# Patient Record
Sex: MALE | Race: WHITE | NOT HISPANIC OR LATINO | Employment: OTHER | URBAN - METROPOLITAN AREA
[De-identification: names, ages, dates, MRNs, and addresses within clinical notes are randomized per-mention and may not be internally consistent; named-entity substitution may affect disease eponyms.]

---

## 2017-09-25 ENCOUNTER — APPOINTMENT (OUTPATIENT)
Dept: PREADMISSION TESTING | Facility: HOSPITAL | Age: 72
End: 2017-09-25

## 2017-09-25 ENCOUNTER — HOSPITAL ENCOUNTER (OUTPATIENT)
Dept: GENERAL RADIOLOGY | Facility: HOSPITAL | Age: 72
Discharge: HOME | End: 2017-09-25

## 2017-09-25 ENCOUNTER — HOSPITAL ENCOUNTER (OUTPATIENT)
Dept: GENERAL RADIOLOGY | Facility: HOSPITAL | Age: 72
Discharge: HOME | End: 2017-09-25
Admitting: ORTHOPAEDIC SURGERY

## 2017-09-25 VITALS
TEMPERATURE: 97.3 F | HEART RATE: 54 BPM | DIASTOLIC BLOOD PRESSURE: 86 MMHG | HEIGHT: 69 IN | OXYGEN SATURATION: 98 % | WEIGHT: 278.3 LBS | SYSTOLIC BLOOD PRESSURE: 131 MMHG | BODY MASS INDEX: 41.22 KG/M2 | RESPIRATION RATE: 16 BRPM

## 2017-09-25 LAB
ALBUMIN SERPL-MCNC: 4 G/DL (ref 3.5–5.2)
ALBUMIN/GLOB SERPL: 1.5 G/DL
ALP SERPL-CCNC: 53 U/L (ref 39–117)
ALT SERPL W P-5'-P-CCNC: 24 U/L (ref 1–41)
ANION GAP SERPL CALCULATED.3IONS-SCNC: 9.9 MMOL/L
APTT PPP: 26.1 SECONDS (ref 22.7–35.4)
AST SERPL-CCNC: 32 U/L (ref 1–40)
BASOPHILS # BLD AUTO: 0.01 10*3/MM3 (ref 0–0.2)
BASOPHILS NFR BLD AUTO: 0.1 % (ref 0–1.5)
BILIRUB SERPL-MCNC: 0.6 MG/DL (ref 0.1–1.2)
BILIRUB UR QL STRIP: NEGATIVE
BUN BLD-MCNC: 25 MG/DL (ref 8–23)
BUN/CREAT SERPL: 20.2 (ref 7–25)
CALCIUM SPEC-SCNC: 9.4 MG/DL (ref 8.6–10.5)
CHLORIDE SERPL-SCNC: 97 MMOL/L (ref 98–107)
CLARITY UR: CLEAR
CO2 SERPL-SCNC: 31.1 MMOL/L (ref 22–29)
COLOR UR: YELLOW
CREAT BLD-MCNC: 1.24 MG/DL (ref 0.76–1.27)
DEPRECATED RDW RBC AUTO: 50.3 FL (ref 37–54)
EOSINOPHIL # BLD AUTO: 0.03 10*3/MM3 (ref 0–0.7)
EOSINOPHIL NFR BLD AUTO: 0.4 % (ref 0.3–6.2)
ERYTHROCYTE [DISTWIDTH] IN BLOOD BY AUTOMATED COUNT: 14.4 % (ref 11.5–14.5)
GFR SERPL CREATININE-BSD FRML MDRD: 58 ML/MIN/1.73
GLOBULIN UR ELPH-MCNC: 2.7 GM/DL
GLUCOSE BLD-MCNC: 114 MG/DL (ref 65–99)
GLUCOSE UR STRIP-MCNC: NEGATIVE MG/DL
HCT VFR BLD AUTO: 39.1 % (ref 40.4–52.2)
HGB BLD-MCNC: 12.9 G/DL (ref 13.7–17.6)
HGB UR QL STRIP.AUTO: NEGATIVE
IMM GRANULOCYTES # BLD: 0 10*3/MM3 (ref 0–0.03)
IMM GRANULOCYTES NFR BLD: 0 % (ref 0–0.5)
INR PPP: 1.01 (ref 0.9–1.1)
KETONES UR QL STRIP: NEGATIVE
LEUKOCYTE ESTERASE UR QL STRIP.AUTO: NEGATIVE
LYMPHOCYTES # BLD AUTO: 2.04 10*3/MM3 (ref 0.9–4.8)
LYMPHOCYTES NFR BLD AUTO: 28.1 % (ref 19.6–45.3)
MCH RBC QN AUTO: 31.5 PG (ref 27–32.7)
MCHC RBC AUTO-ENTMCNC: 33 G/DL (ref 32.6–36.4)
MCV RBC AUTO: 95.6 FL (ref 79.8–96.2)
MONOCYTES # BLD AUTO: 0.71 10*3/MM3 (ref 0.2–1.2)
MONOCYTES NFR BLD AUTO: 9.8 % (ref 5–12)
NEUTROPHILS # BLD AUTO: 4.48 10*3/MM3 (ref 1.9–8.1)
NEUTROPHILS NFR BLD AUTO: 61.6 % (ref 42.7–76)
NITRITE UR QL STRIP: NEGATIVE
PH UR STRIP.AUTO: 6 [PH] (ref 5–8)
PLATELET # BLD AUTO: 211 10*3/MM3 (ref 140–500)
PMV BLD AUTO: 10.4 FL (ref 6–12)
POTASSIUM BLD-SCNC: 4.2 MMOL/L (ref 3.5–5.2)
PROT SERPL-MCNC: 6.7 G/DL (ref 6–8.5)
PROT UR QL STRIP: NEGATIVE
PROTHROMBIN TIME: 12.9 SECONDS (ref 11.7–14.2)
RBC # BLD AUTO: 4.09 10*6/MM3 (ref 4.6–6)
SODIUM BLD-SCNC: 138 MMOL/L (ref 136–145)
SP GR UR STRIP: 1.01 (ref 1–1.03)
UROBILINOGEN UR QL STRIP: NORMAL
WBC NRBC COR # BLD: 7.27 10*3/MM3 (ref 4.5–10.7)

## 2017-09-25 PROCEDURE — 63710000001 MUPIROCIN 2 % OINTMENT 0.9 G SYRINGE: Performed by: ORTHOPAEDIC SURGERY

## 2017-09-25 PROCEDURE — 85610 PROTHROMBIN TIME: CPT | Performed by: ORTHOPAEDIC SURGERY

## 2017-09-25 PROCEDURE — 73560 X-RAY EXAM OF KNEE 1 OR 2: CPT

## 2017-09-25 PROCEDURE — 93005 ELECTROCARDIOGRAM TRACING: CPT

## 2017-09-25 PROCEDURE — 85730 THROMBOPLASTIN TIME PARTIAL: CPT | Performed by: ORTHOPAEDIC SURGERY

## 2017-09-25 PROCEDURE — 85025 COMPLETE CBC W/AUTO DIFF WBC: CPT | Performed by: ORTHOPAEDIC SURGERY

## 2017-09-25 PROCEDURE — 80053 COMPREHEN METABOLIC PANEL: CPT | Performed by: ORTHOPAEDIC SURGERY

## 2017-09-25 PROCEDURE — 36415 COLL VENOUS BLD VENIPUNCTURE: CPT

## 2017-09-25 PROCEDURE — A9270 NON-COVERED ITEM OR SERVICE: HCPCS | Performed by: ORTHOPAEDIC SURGERY

## 2017-09-25 PROCEDURE — 81003 URINALYSIS AUTO W/O SCOPE: CPT | Performed by: ORTHOPAEDIC SURGERY

## 2017-09-25 PROCEDURE — 93010 ELECTROCARDIOGRAM REPORT: CPT | Performed by: INTERNAL MEDICINE

## 2017-09-25 PROCEDURE — 71020 HC CHEST PA AND LATERAL: CPT

## 2017-09-25 RX ORDER — FUROSEMIDE 20 MG/1
20 TABLET ORAL 2 TIMES DAILY
Status: ON HOLD | COMMUNITY
End: 2023-03-14 | Stop reason: SDUPTHER

## 2017-09-25 RX ORDER — SENNOSIDES 8.6 MG
650 CAPSULE ORAL EVERY 6 HOURS PRN
COMMUNITY

## 2017-09-25 RX ORDER — CETIRIZINE HYDROCHLORIDE 10 MG/1
10 TABLET ORAL DAILY
Status: ON HOLD | COMMUNITY
End: 2017-12-14

## 2017-09-25 RX ORDER — OMEPRAZOLE 40 MG/1
40 CAPSULE, DELAYED RELEASE ORAL EVERY EVENING
COMMUNITY

## 2017-09-25 RX ORDER — LISINOPRIL 40 MG/1
40 TABLET ORAL DAILY
COMMUNITY

## 2017-09-25 RX ORDER — CELECOXIB 200 MG/1
200 CAPSULE ORAL DAILY PRN
Status: ON HOLD | COMMUNITY
End: 2017-12-14

## 2017-09-25 RX ORDER — BENZONATATE 200 MG/1
200 CAPSULE ORAL 2 TIMES DAILY PRN
Status: ON HOLD | COMMUNITY
End: 2017-12-14

## 2017-09-25 RX ORDER — UBIQUINOL 100 MG
1 CAPSULE ORAL DAILY
COMMUNITY
End: 2018-01-17 | Stop reason: ALTCHOICE

## 2017-09-25 RX ORDER — LEVOTHYROXINE SODIUM 0.03 MG/1
25 TABLET ORAL DAILY
COMMUNITY

## 2017-09-25 RX ORDER — ALBUTEROL SULFATE 90 UG/1
2 AEROSOL, METERED RESPIRATORY (INHALATION) EVERY 4 HOURS PRN
COMMUNITY
End: 2020-04-06

## 2017-09-25 RX ORDER — ATORVASTATIN CALCIUM 20 MG/1
20 TABLET, FILM COATED ORAL DAILY
COMMUNITY

## 2017-09-25 RX ORDER — ASPIRIN 81 MG/1
81 TABLET ORAL DAILY
COMMUNITY

## 2017-09-25 RX ORDER — ATENOLOL 50 MG/1
50 TABLET ORAL DAILY
COMMUNITY
End: 2019-02-18

## 2017-09-25 RX ORDER — DIVALPROEX SODIUM 500 MG/1
500 TABLET, DELAYED RELEASE ORAL 2 TIMES DAILY
COMMUNITY
End: 2019-02-18

## 2017-09-25 ASSESSMENT — KOOS JR
KOOS JR SCORE: 17
KOOS JR SCORE: 44.905

## 2017-09-25 NOTE — DISCHARGE INSTRUCTIONS
Take the following medications the morning of surgery with a small sip of water:  ATENOLOL, DIVALPROEX    PLEASE ARRIVE AT HOSPITAL AT 10:00 AM ON October 9, 2017      General Instructions:  • Do not eat solid food after midnight the night before surgery.  • You may drink clear liquids day of surgery but must stop at least one hour before your hospital arrival time.  • It is beneficial for you to have a clear drink that contains carbohydrates the day of surgery.  We suggest a 20 ounce bottle of Gatorade or Powerade for non-diabetic patients or a 20 ounce bottle of G2 or Powerade Zero for diabetic patients. (Pediatric patients, are not advised to drink a 20 ounce carbohydrate drink)    Clear liquids are liquids you can see through.  Nothing red in color.     Plain water                               Sports drinks  Sodas                                   Gelatin (Jell-O)  Fruit juices without pulp such as white grape juice and apple juice  Popsicles that contain no fruit or yogurt  Tea or coffee (no cream or milk added)  Gatorade / Powerade  G2 / Powerade Zero    • Infants may have breast milk up to four hours before surgery.  • Infants drinking formula may drink formula up to six hours before surgery.   • Patients who avoid smoking, chewing tobacco and alcohol for 4 weeks prior to surgery have a reduced risk of post-operative complications.  Quit smoking as many days before surgery as you can.  • Do not smoke, use chewing tobacco or drink alcohol the day of surgery.   • If applicable bring your C-PAP/ BI-PAP machine.  • Bring any papers given to you in the doctor’s office.  • Wear clean comfortable clothes and socks.  • Do not wear contact lenses or make-up.  Bring a case for your glasses.   • Bring crutches or walker if applicable.  • Leave all other valuables and jewelry at home.  • The Pre-Admission Testing nurse will instruct you to bring medications if unable to obtain an accurate list in Pre-Admission Testing.         If you were given a blood bank ID arm band remember to bring it with you the day of surgery.    Preventing a Surgical Site Infection:  • For 2 to 3 days before surgery, avoid shaving with a razor because the razor can irritate skin and make it easier to develop an infection.  • The night prior to surgery sleep in a clean bed with clean clothing.  Do not allow pets to sleep with you.  • Shower on the morning of surgery using a fresh bar of anti-bacterial soap (such as Dial) and clean washcloth.  Dry with a clean towel and dress in clean clothing.  • Ask your surgeon if you will be receiving antibiotics prior to surgery.  • Make sure you, your family, and all healthcare providers clean their hands with soap and water or an alcohol based hand  before caring for you or your wound.    Day of surgery:  Upon arrival, a Pre-op nurse and Anesthesiologist will review your health history, obtain vital signs, and answer questions you may have.  The only belongings needed at this time will be your home medications and if applicable your C-PAP/BI-PAP machine.  If you are staying overnight your family can leave the rest of your belongings in the car and bring them to your room later.  A Pre-op nurse will start an IV and you may receive medication in preparation for surgery, including something to help you relax.  Your family will be able to see you in the Pre-op area.  While you are in surgery your family should notify the waiting room  if they leave the waiting room area and provide a contact phone number.    Please be aware that surgery does come with discomfort.  We want to make every effort to control your discomfort so please discuss any uncontrolled symptoms with your nurse.   Your doctor will most likely have prescribed pain medications.      If you are going home after surgery you will receive individualized written care instructions before being discharged.  A responsible adult must drive you to  and from the hospital on the day of your surgery and stay with you for 24 hours.    If you are staying overnight following surgery, you will be transported to your hospital room following the recovery period.  [facility] has all private rooms.    If you have any questions please call Pre-Admission Testing at [phone].        2% CHLORAHEXIDINE GLUCONATE* CLOTH  Preparing or “prepping” skin before surgery can reduce the risk of infection at the surgical site. To make the process easier, [facility] has chosen disposable cloths moistened with a rinse-free, 2% Chlorhexidine Gluconate (CHG) antiseptic solution. The steps below outline the prepping process and should be carefully followed.        Use the prep cloth on the area that is circled in the diagram             Directions Night before Surgery  1) Shower using a fresh bar of anti-bacterial soap (such as Dial) and clean washcloth.  Use a clean towel to completely dry your skin.  2) Do not use any lotions, oils or creams on your skin.  3) Open the package and remove 1 cloth, wipe your skin for 30 seconds in a circular motion.  Allow to dry for 3 minutes.  4) Repeat #3 with second cloth.  5) Do not touch your eyes, ears, or mouth with the prep cloth.  6) Allow the wet prep solution to air dry.  7) Discard the prep cloth and wash your hands with soap and water.   8) Dress in clean bed clothes and sleep on fresh clean bed sheets.   9) You may experience some temporary itching after the prep.    Directions Day of Surgery  1) Repeat steps 1,2,3,4,5,6,7, and 9.   2) Dress in clean clothes before coming to the hospital.    BACTROBAN NASAL OINTMENT  There are many germs normally in your nose. Bactroban is an ointment that will help reduce these germs. Please follow these instructions for Bactroban use:    ____Two days before surgery in the evening Date________    ____The day before surgery in the morning  Date________    ____The day before  surgery in the evening              Date________    ____The day of surgery in the morning    Date________    **Squirt ½ package of Bactroban Ointment onto a cotton applicator and apply to inside of 1st nostril.  Squirt the remaining Bactroban and apply to the inside of the other nostril.    Deductibles and co-payments are collected on the day of service. Please be prepared to pay the required co-pay, deductible or deposit on the day of service as defined by your plan.

## 2017-11-30 ENCOUNTER — APPOINTMENT (OUTPATIENT)
Dept: PREADMISSION TESTING | Facility: HOSPITAL | Age: 72
End: 2017-11-30

## 2017-11-30 ENCOUNTER — HOSPITAL ENCOUNTER (OUTPATIENT)
Dept: GENERAL RADIOLOGY | Facility: HOSPITAL | Age: 72
Discharge: HOME | End: 2017-11-30

## 2017-11-30 ENCOUNTER — HOSPITAL ENCOUNTER (OUTPATIENT)
Dept: GENERAL RADIOLOGY | Facility: HOSPITAL | Age: 72
Discharge: HOME | End: 2017-11-30
Admitting: ORTHOPAEDIC SURGERY

## 2017-11-30 VITALS
TEMPERATURE: 97.1 F | WEIGHT: 276.1 LBS | DIASTOLIC BLOOD PRESSURE: 73 MMHG | SYSTOLIC BLOOD PRESSURE: 144 MMHG | BODY MASS INDEX: 40.89 KG/M2 | OXYGEN SATURATION: 100 % | HEIGHT: 69 IN | HEART RATE: 54 BPM | RESPIRATION RATE: 16 BRPM

## 2017-11-30 LAB
ALBUMIN SERPL-MCNC: 4.1 G/DL (ref 3.5–5.2)
ALBUMIN/GLOB SERPL: 1.3 G/DL
ALP SERPL-CCNC: 65 U/L (ref 39–117)
ALT SERPL W P-5'-P-CCNC: 25 U/L (ref 1–41)
ANION GAP SERPL CALCULATED.3IONS-SCNC: 13.1 MMOL/L
APTT PPP: 26.1 SECONDS (ref 22.7–35.4)
AST SERPL-CCNC: 29 U/L (ref 1–40)
BACTERIA UR QL AUTO: ABNORMAL /HPF
BASOPHILS # BLD AUTO: 0.02 10*3/MM3 (ref 0–0.2)
BASOPHILS NFR BLD AUTO: 0.3 % (ref 0–1.5)
BILIRUB SERPL-MCNC: 0.8 MG/DL (ref 0.1–1.2)
BILIRUB UR QL STRIP: NEGATIVE
BUN BLD-MCNC: 23 MG/DL (ref 8–23)
BUN/CREAT SERPL: 22.5 (ref 7–25)
CALCIUM SPEC-SCNC: 9 MG/DL (ref 8.6–10.5)
CHLORIDE SERPL-SCNC: 96 MMOL/L (ref 98–107)
CLARITY UR: CLEAR
CO2 SERPL-SCNC: 30.9 MMOL/L (ref 22–29)
COLOR UR: YELLOW
CREAT BLD-MCNC: 1.02 MG/DL (ref 0.76–1.27)
DEPRECATED RDW RBC AUTO: 50.8 FL (ref 37–54)
EOSINOPHIL # BLD AUTO: 0.02 10*3/MM3 (ref 0–0.7)
EOSINOPHIL NFR BLD AUTO: 0.3 % (ref 0.3–6.2)
ERYTHROCYTE [DISTWIDTH] IN BLOOD BY AUTOMATED COUNT: 14.6 % (ref 11.5–14.5)
GFR SERPL CREATININE-BSD FRML MDRD: 72 ML/MIN/1.73
GLOBULIN UR ELPH-MCNC: 3.1 GM/DL
GLUCOSE BLD-MCNC: 103 MG/DL (ref 65–99)
GLUCOSE UR STRIP-MCNC: NEGATIVE MG/DL
HCT VFR BLD AUTO: 38.1 % (ref 40.4–52.2)
HGB BLD-MCNC: 12.4 G/DL (ref 13.7–17.6)
HGB UR QL STRIP.AUTO: NEGATIVE
HYALINE CASTS UR QL AUTO: ABNORMAL /LPF
IMM GRANULOCYTES # BLD: 0.02 10*3/MM3 (ref 0–0.03)
IMM GRANULOCYTES NFR BLD: 0.3 % (ref 0–0.5)
INR PPP: 1.03 (ref 0.9–1.1)
KETONES UR QL STRIP: NEGATIVE
LEUKOCYTE ESTERASE UR QL STRIP.AUTO: ABNORMAL
LYMPHOCYTES # BLD AUTO: 2.55 10*3/MM3 (ref 0.9–4.8)
LYMPHOCYTES NFR BLD AUTO: 33.2 % (ref 19.6–45.3)
MCH RBC QN AUTO: 31 PG (ref 27–32.7)
MCHC RBC AUTO-ENTMCNC: 32.5 G/DL (ref 32.6–36.4)
MCV RBC AUTO: 95.3 FL (ref 79.8–96.2)
MONOCYTES # BLD AUTO: 0.87 10*3/MM3 (ref 0.2–1.2)
MONOCYTES NFR BLD AUTO: 11.3 % (ref 5–12)
NEUTROPHILS # BLD AUTO: 4.21 10*3/MM3 (ref 1.9–8.1)
NEUTROPHILS NFR BLD AUTO: 54.6 % (ref 42.7–76)
NITRITE UR QL STRIP: NEGATIVE
PH UR STRIP.AUTO: 6 [PH] (ref 5–8)
PLATELET # BLD AUTO: 198 10*3/MM3 (ref 140–500)
PMV BLD AUTO: 10.2 FL (ref 6–12)
POTASSIUM BLD-SCNC: 4 MMOL/L (ref 3.5–5.2)
PROT SERPL-MCNC: 7.2 G/DL (ref 6–8.5)
PROT UR QL STRIP: NEGATIVE
PROTHROMBIN TIME: 13.1 SECONDS (ref 11.7–14.2)
RBC # BLD AUTO: 4 10*6/MM3 (ref 4.6–6)
RBC # UR: ABNORMAL /HPF
REF LAB TEST METHOD: ABNORMAL
SODIUM BLD-SCNC: 140 MMOL/L (ref 136–145)
SP GR UR STRIP: 1.02 (ref 1–1.03)
SQUAMOUS #/AREA URNS HPF: ABNORMAL /HPF
UROBILINOGEN UR QL STRIP: ABNORMAL
WBC NRBC COR # BLD: 7.69 10*3/MM3 (ref 4.5–10.7)
WBC UR QL AUTO: ABNORMAL /HPF

## 2017-11-30 PROCEDURE — 85610 PROTHROMBIN TIME: CPT | Performed by: ORTHOPAEDIC SURGERY

## 2017-11-30 PROCEDURE — 81001 URINALYSIS AUTO W/SCOPE: CPT | Performed by: ORTHOPAEDIC SURGERY

## 2017-11-30 PROCEDURE — 73560 X-RAY EXAM OF KNEE 1 OR 2: CPT

## 2017-11-30 PROCEDURE — 85025 COMPLETE CBC W/AUTO DIFF WBC: CPT | Performed by: ORTHOPAEDIC SURGERY

## 2017-11-30 PROCEDURE — 80053 COMPREHEN METABOLIC PANEL: CPT | Performed by: ORTHOPAEDIC SURGERY

## 2017-11-30 PROCEDURE — 36415 COLL VENOUS BLD VENIPUNCTURE: CPT

## 2017-11-30 PROCEDURE — 71020 HC CHEST PA AND LATERAL: CPT

## 2017-11-30 PROCEDURE — 85730 THROMBOPLASTIN TIME PARTIAL: CPT | Performed by: ORTHOPAEDIC SURGERY

## 2017-11-30 PROCEDURE — 87086 URINE CULTURE/COLONY COUNT: CPT | Performed by: ORTHOPAEDIC SURGERY

## 2017-11-30 ASSESSMENT — KOOS JR
KOOS JR SCORE: 12
KOOS JR SCORE: 57.14

## 2017-12-02 LAB
BACTERIA SPEC AEROBE CULT: NORMAL
BACTERIA SPEC AEROBE CULT: NORMAL

## 2017-12-14 ENCOUNTER — ANESTHESIA (OUTPATIENT)
Dept: PERIOP | Facility: HOSPITAL | Age: 72
End: 2017-12-14

## 2017-12-14 ENCOUNTER — HOSPITAL ENCOUNTER (INPATIENT)
Facility: HOSPITAL | Age: 72
LOS: 4 days | Discharge: NURSING, REHAB OR LONG-TERM CARE | End: 2017-12-18
Attending: ORTHOPAEDIC SURGERY | Admitting: ORTHOPAEDIC SURGERY

## 2017-12-14 ENCOUNTER — ANESTHESIA EVENT (OUTPATIENT)
Dept: PERIOP | Facility: HOSPITAL | Age: 72
End: 2017-12-14

## 2017-12-14 ENCOUNTER — APPOINTMENT (OUTPATIENT)
Dept: GENERAL RADIOLOGY | Facility: HOSPITAL | Age: 72
End: 2017-12-14

## 2017-12-14 DIAGNOSIS — R26.2 DIFFICULTY WALKING: Primary | ICD-10-CM

## 2017-12-14 PROBLEM — M17.9 DJD (DEGENERATIVE JOINT DISEASE) OF KNEE: Status: ACTIVE | Noted: 2017-12-14

## 2017-12-14 PROCEDURE — 25010000002 KETOROLAC TROMETHAMINE PER 15 MG: Performed by: ORTHOPAEDIC SURGERY

## 2017-12-14 PROCEDURE — 25010000002 PHENYLEPHRINE PER 1 ML: Performed by: NURSE ANESTHETIST, CERTIFIED REGISTERED

## 2017-12-14 PROCEDURE — 94799 UNLISTED PULMONARY SVC/PX: CPT

## 2017-12-14 PROCEDURE — 25010000002 SUCCINYLCHOLINE PER 20 MG: Performed by: NURSE ANESTHETIST, CERTIFIED REGISTERED

## 2017-12-14 PROCEDURE — 25010000002 MIDAZOLAM PER 1 MG: Performed by: ANESTHESIOLOGY

## 2017-12-14 PROCEDURE — 25010000002 CEFAZOLIN PER 500 MG: Performed by: NURSE ANESTHETIST, CERTIFIED REGISTERED

## 2017-12-14 PROCEDURE — C1713 ANCHOR/SCREW BN/BN,TIS/BN: HCPCS | Performed by: ORTHOPAEDIC SURGERY

## 2017-12-14 PROCEDURE — 25010000003 CEFAZOLIN IN DEXTROSE 2-4 GM/100ML-% SOLUTION: Performed by: ORTHOPAEDIC SURGERY

## 2017-12-14 PROCEDURE — 25010000002 HYDRALAZINE PER 20 MG: Performed by: NURSE ANESTHETIST, CERTIFIED REGISTERED

## 2017-12-14 PROCEDURE — 0SRD069 REPLACEMENT OF LEFT KNEE JOINT WITH OXIDIZED ZIRCONIUM ON POLYETHYLENE SYNTHETIC SUBSTITUTE, CEMENTED, OPEN APPROACH: ICD-10-PCS | Performed by: ORTHOPAEDIC SURGERY

## 2017-12-14 PROCEDURE — 25010000002 FENTANYL CITRATE (PF) 100 MCG/2ML SOLUTION: Performed by: NURSE ANESTHETIST, CERTIFIED REGISTERED

## 2017-12-14 PROCEDURE — 25010000002 DEXAMETHASONE PER 1 MG: Performed by: NURSE ANESTHETIST, CERTIFIED REGISTERED

## 2017-12-14 PROCEDURE — 25010000002 ONDANSETRON PER 1 MG: Performed by: NURSE ANESTHETIST, CERTIFIED REGISTERED

## 2017-12-14 PROCEDURE — 25010000002 VANCOMYCIN 10 G RECONSTITUTED SOLUTION: Performed by: ORTHOPAEDIC SURGERY

## 2017-12-14 PROCEDURE — 97161 PT EVAL LOW COMPLEX 20 MIN: CPT

## 2017-12-14 PROCEDURE — 25010000002 PROPOFOL 10 MG/ML EMULSION: Performed by: NURSE ANESTHETIST, CERTIFIED REGISTERED

## 2017-12-14 PROCEDURE — 97110 THERAPEUTIC EXERCISES: CPT

## 2017-12-14 PROCEDURE — C1776 JOINT DEVICE (IMPLANTABLE): HCPCS | Performed by: ORTHOPAEDIC SURGERY

## 2017-12-14 PROCEDURE — 73560 X-RAY EXAM OF KNEE 1 OR 2: CPT

## 2017-12-14 DEVICE — IMPLANTABLE DEVICE: Type: IMPLANTABLE DEVICE | Status: FUNCTIONAL

## 2017-12-14 DEVICE — JOURNEY II CR INSERT XLPE LEFT SZ                                    7-8 10MM
Type: IMPLANTABLE DEVICE | Site: KNEE | Status: FUNCTIONAL
Brand: JOURNEY

## 2017-12-14 DEVICE — JOURNEY TIBIAL BASEPLATE NONPOROUS                                    LEFT SIZE 7
Type: IMPLANTABLE DEVICE | Site: KNEE | Status: FUNCTIONAL
Brand: JOURNEY

## 2017-12-14 DEVICE — CMT BONE PALACOS 120001: Type: IMPLANTABLE DEVICE | Site: KNEE | Status: FUNCTIONAL

## 2017-12-14 DEVICE — JOURNEY 7.5 ROUND RESURF PAT 35MM STANDARD
Type: IMPLANTABLE DEVICE | Site: KNEE | Status: FUNCTIONAL
Brand: JOURNEY

## 2017-12-14 RX ORDER — FENTANYL CITRATE 50 UG/ML
50 INJECTION, SOLUTION INTRAMUSCULAR; INTRAVENOUS
Status: DISCONTINUED | OUTPATIENT
Start: 2017-12-14 | End: 2017-12-14 | Stop reason: HOSPADM

## 2017-12-14 RX ORDER — SODIUM CHLORIDE, SODIUM LACTATE, POTASSIUM CHLORIDE, CALCIUM CHLORIDE 600; 310; 30; 20 MG/100ML; MG/100ML; MG/100ML; MG/100ML
100 INJECTION, SOLUTION INTRAVENOUS CONTINUOUS
Status: DISCONTINUED | OUTPATIENT
Start: 2017-12-14 | End: 2017-12-18 | Stop reason: HOSPADM

## 2017-12-14 RX ORDER — ACETAMINOPHEN 500 MG
1000 TABLET ORAL ONCE
Status: DISCONTINUED | OUTPATIENT
Start: 2017-12-14 | End: 2017-12-14

## 2017-12-14 RX ORDER — ONDANSETRON 4 MG/1
4 TABLET, FILM COATED ORAL EVERY 6 HOURS PRN
Status: DISCONTINUED | OUTPATIENT
Start: 2017-12-14 | End: 2017-12-18 | Stop reason: HOSPADM

## 2017-12-14 RX ORDER — OXYCODONE AND ACETAMINOPHEN 7.5; 325 MG/1; MG/1
1 TABLET ORAL ONCE AS NEEDED
Status: DISCONTINUED | OUTPATIENT
Start: 2017-12-14 | End: 2017-12-14 | Stop reason: HOSPADM

## 2017-12-14 RX ORDER — FAMOTIDINE 10 MG/ML
20 INJECTION, SOLUTION INTRAVENOUS ONCE
Status: COMPLETED | OUTPATIENT
Start: 2017-12-14 | End: 2017-12-14

## 2017-12-14 RX ORDER — DIVALPROEX SODIUM 500 MG/1
500 TABLET, DELAYED RELEASE ORAL EVERY 12 HOURS SCHEDULED
Status: DISCONTINUED | OUTPATIENT
Start: 2017-12-14 | End: 2017-12-18 | Stop reason: HOSPADM

## 2017-12-14 RX ORDER — FUROSEMIDE 20 MG/1
20 TABLET ORAL 2 TIMES DAILY
Status: DISCONTINUED | OUTPATIENT
Start: 2017-12-14 | End: 2017-12-18 | Stop reason: HOSPADM

## 2017-12-14 RX ORDER — PROPOFOL 10 MG/ML
VIAL (ML) INTRAVENOUS AS NEEDED
Status: DISCONTINUED | OUTPATIENT
Start: 2017-12-14 | End: 2017-12-14 | Stop reason: SURG

## 2017-12-14 RX ORDER — ONDANSETRON 4 MG/1
4 TABLET, ORALLY DISINTEGRATING ORAL EVERY 6 HOURS PRN
Status: DISCONTINUED | OUTPATIENT
Start: 2017-12-14 | End: 2017-12-18 | Stop reason: HOSPADM

## 2017-12-14 RX ORDER — LABETALOL HYDROCHLORIDE 5 MG/ML
5 INJECTION, SOLUTION INTRAVENOUS
Status: DISCONTINUED | OUTPATIENT
Start: 2017-12-14 | End: 2017-12-14 | Stop reason: HOSPADM

## 2017-12-14 RX ORDER — ALBUTEROL SULFATE 90 UG/1
2 AEROSOL, METERED RESPIRATORY (INHALATION) EVERY 4 HOURS PRN
Status: DISCONTINUED | OUTPATIENT
Start: 2017-12-14 | End: 2017-12-14 | Stop reason: CLARIF

## 2017-12-14 RX ORDER — ACETAMINOPHEN 325 MG/1
325 TABLET ORAL EVERY 4 HOURS PRN
Status: DISCONTINUED | OUTPATIENT
Start: 2017-12-14 | End: 2017-12-18 | Stop reason: HOSPADM

## 2017-12-14 RX ORDER — ASPIRIN 325 MG
325 TABLET, DELAYED RELEASE (ENTERIC COATED) ORAL DAILY
Status: DISCONTINUED | OUTPATIENT
Start: 2017-12-15 | End: 2017-12-17

## 2017-12-14 RX ORDER — CEFAZOLIN SODIUM 2 G/100ML
2 INJECTION, SOLUTION INTRAVENOUS EVERY 8 HOURS
Status: COMPLETED | OUTPATIENT
Start: 2017-12-14 | End: 2017-12-15

## 2017-12-14 RX ORDER — ACETAMINOPHEN 500 MG
1000 TABLET ORAL ONCE
Status: COMPLETED | OUTPATIENT
Start: 2017-12-14 | End: 2017-12-14

## 2017-12-14 RX ORDER — DEXAMETHASONE SODIUM PHOSPHATE 10 MG/ML
INJECTION INTRAMUSCULAR; INTRAVENOUS AS NEEDED
Status: DISCONTINUED | OUTPATIENT
Start: 2017-12-14 | End: 2017-12-14 | Stop reason: SURG

## 2017-12-14 RX ORDER — MIDAZOLAM HYDROCHLORIDE 1 MG/ML
1 INJECTION INTRAMUSCULAR; INTRAVENOUS
Status: DISCONTINUED | OUTPATIENT
Start: 2017-12-14 | End: 2017-12-14 | Stop reason: HOSPADM

## 2017-12-14 RX ORDER — FENTANYL CITRATE 50 UG/ML
INJECTION, SOLUTION INTRAMUSCULAR; INTRAVENOUS AS NEEDED
Status: DISCONTINUED | OUTPATIENT
Start: 2017-12-14 | End: 2017-12-14 | Stop reason: SURG

## 2017-12-14 RX ORDER — FERROUS SULFATE 325(65) MG
325 TABLET ORAL
Status: DISCONTINUED | OUTPATIENT
Start: 2017-12-15 | End: 2017-12-18 | Stop reason: HOSPADM

## 2017-12-14 RX ORDER — HYDROMORPHONE HYDROCHLORIDE 1 MG/ML
0.5 INJECTION, SOLUTION INTRAMUSCULAR; INTRAVENOUS; SUBCUTANEOUS
Status: DISCONTINUED | OUTPATIENT
Start: 2017-12-14 | End: 2017-12-14 | Stop reason: HOSPADM

## 2017-12-14 RX ORDER — BISACODYL 5 MG/1
10 TABLET, DELAYED RELEASE ORAL DAILY PRN
Status: DISCONTINUED | OUTPATIENT
Start: 2017-12-14 | End: 2017-12-18 | Stop reason: HOSPADM

## 2017-12-14 RX ORDER — POTASSIUM CHLORIDE 750 MG/1
20 CAPSULE, EXTENDED RELEASE ORAL 2 TIMES DAILY WITH MEALS
Status: DISCONTINUED | OUTPATIENT
Start: 2017-12-14 | End: 2017-12-18 | Stop reason: HOSPADM

## 2017-12-14 RX ORDER — ALBUTEROL SULFATE 2.5 MG/3ML
2.5 SOLUTION RESPIRATORY (INHALATION) EVERY 4 HOURS PRN
Status: DISCONTINUED | OUTPATIENT
Start: 2017-12-14 | End: 2017-12-18 | Stop reason: HOSPADM

## 2017-12-14 RX ORDER — PANTOPRAZOLE SODIUM 40 MG/1
40 TABLET, DELAYED RELEASE ORAL
Status: DISCONTINUED | OUTPATIENT
Start: 2017-12-15 | End: 2017-12-18 | Stop reason: HOSPADM

## 2017-12-14 RX ORDER — HYDRALAZINE HYDROCHLORIDE 20 MG/ML
5 INJECTION INTRAMUSCULAR; INTRAVENOUS
Status: DISCONTINUED | OUTPATIENT
Start: 2017-12-14 | End: 2017-12-14 | Stop reason: HOSPADM

## 2017-12-14 RX ORDER — HYDROCODONE BITARTRATE AND ACETAMINOPHEN 7.5; 325 MG/1; MG/1
1 TABLET ORAL ONCE AS NEEDED
Status: DISCONTINUED | OUTPATIENT
Start: 2017-12-14 | End: 2017-12-14 | Stop reason: HOSPADM

## 2017-12-14 RX ORDER — LEVOTHYROXINE SODIUM 0.03 MG/1
25 TABLET ORAL
Status: DISCONTINUED | OUTPATIENT
Start: 2017-12-15 | End: 2017-12-18 | Stop reason: HOSPADM

## 2017-12-14 RX ORDER — HYDROMORPHONE HYDROCHLORIDE 1 MG/ML
0.5 INJECTION, SOLUTION INTRAMUSCULAR; INTRAVENOUS; SUBCUTANEOUS EVERY 4 HOURS PRN
Status: DISCONTINUED | OUTPATIENT
Start: 2017-12-14 | End: 2017-12-18 | Stop reason: HOSPADM

## 2017-12-14 RX ORDER — LISINOPRIL 40 MG/1
40 TABLET ORAL DAILY
Status: DISCONTINUED | OUTPATIENT
Start: 2017-12-14 | End: 2017-12-18 | Stop reason: HOSPADM

## 2017-12-14 RX ORDER — KETOROLAC TROMETHAMINE 30 MG/ML
15 INJECTION, SOLUTION INTRAMUSCULAR; INTRAVENOUS EVERY 6 HOURS
Status: COMPLETED | OUTPATIENT
Start: 2017-12-14 | End: 2017-12-15

## 2017-12-14 RX ORDER — DIPHENHYDRAMINE HCL 25 MG
25 CAPSULE ORAL EVERY 6 HOURS PRN
Status: DISCONTINUED | OUTPATIENT
Start: 2017-12-14 | End: 2017-12-18 | Stop reason: HOSPADM

## 2017-12-14 RX ORDER — NALOXONE HCL 0.4 MG/ML
0.2 VIAL (ML) INJECTION AS NEEDED
Status: DISCONTINUED | OUTPATIENT
Start: 2017-12-14 | End: 2017-12-14 | Stop reason: HOSPADM

## 2017-12-14 RX ORDER — ROCURONIUM BROMIDE 10 MG/ML
INJECTION, SOLUTION INTRAVENOUS AS NEEDED
Status: DISCONTINUED | OUTPATIENT
Start: 2017-12-14 | End: 2017-12-14 | Stop reason: SURG

## 2017-12-14 RX ORDER — NALOXONE HCL 0.4 MG/ML
0.1 VIAL (ML) INJECTION
Status: DISCONTINUED | OUTPATIENT
Start: 2017-12-14 | End: 2017-12-18 | Stop reason: HOSPADM

## 2017-12-14 RX ORDER — OXYCODONE HYDROCHLORIDE AND ACETAMINOPHEN 5; 325 MG/1; MG/1
2 TABLET ORAL EVERY 4 HOURS PRN
Status: DISCONTINUED | OUTPATIENT
Start: 2017-12-14 | End: 2017-12-18 | Stop reason: HOSPADM

## 2017-12-14 RX ORDER — OXYCODONE HYDROCHLORIDE AND ACETAMINOPHEN 5; 325 MG/1; MG/1
1 TABLET ORAL EVERY 4 HOURS PRN
Status: DISCONTINUED | OUTPATIENT
Start: 2017-12-14 | End: 2017-12-18 | Stop reason: HOSPADM

## 2017-12-14 RX ORDER — SODIUM CHLORIDE 9 MG/ML
INJECTION, SOLUTION INTRAVENOUS AS NEEDED
Status: DISCONTINUED | OUTPATIENT
Start: 2017-12-14 | End: 2017-12-14 | Stop reason: HOSPADM

## 2017-12-14 RX ORDER — LIDOCAINE HYDROCHLORIDE 20 MG/ML
INJECTION, SOLUTION INFILTRATION; PERINEURAL AS NEEDED
Status: DISCONTINUED | OUTPATIENT
Start: 2017-12-14 | End: 2017-12-14 | Stop reason: SURG

## 2017-12-14 RX ORDER — SODIUM CHLORIDE 0.9 % (FLUSH) 0.9 %
1-10 SYRINGE (ML) INJECTION AS NEEDED
Status: DISCONTINUED | OUTPATIENT
Start: 2017-12-14 | End: 2017-12-14 | Stop reason: HOSPADM

## 2017-12-14 RX ORDER — CEFAZOLIN SODIUM 500 MG/2.2ML
INJECTION, POWDER, FOR SOLUTION INTRAMUSCULAR; INTRAVENOUS AS NEEDED
Status: DISCONTINUED | OUTPATIENT
Start: 2017-12-14 | End: 2017-12-14 | Stop reason: SURG

## 2017-12-14 RX ORDER — SENNA AND DOCUSATE SODIUM 50; 8.6 MG/1; MG/1
2 TABLET, FILM COATED ORAL 2 TIMES DAILY
Status: DISCONTINUED | OUTPATIENT
Start: 2017-12-14 | End: 2017-12-18 | Stop reason: HOSPADM

## 2017-12-14 RX ORDER — POTASSIUM CHLORIDE 1.5 G/1.77G
20 POWDER, FOR SOLUTION ORAL 2 TIMES DAILY
Status: DISCONTINUED | OUTPATIENT
Start: 2017-12-14 | End: 2017-12-14 | Stop reason: CLARIF

## 2017-12-14 RX ORDER — SODIUM CHLORIDE 0.9 % (FLUSH) 0.9 %
1-10 SYRINGE (ML) INJECTION AS NEEDED
Status: DISCONTINUED | OUTPATIENT
Start: 2017-12-14 | End: 2017-12-18 | Stop reason: HOSPADM

## 2017-12-14 RX ORDER — FLUMAZENIL 0.1 MG/ML
0.2 INJECTION INTRAVENOUS AS NEEDED
Status: DISCONTINUED | OUTPATIENT
Start: 2017-12-14 | End: 2017-12-14 | Stop reason: HOSPADM

## 2017-12-14 RX ORDER — ATENOLOL 25 MG/1
50 TABLET ORAL DAILY
Status: DISCONTINUED | OUTPATIENT
Start: 2017-12-15 | End: 2017-12-18 | Stop reason: HOSPADM

## 2017-12-14 RX ORDER — EPHEDRINE SULFATE 50 MG/ML
5 INJECTION, SOLUTION INTRAVENOUS ONCE AS NEEDED
Status: DISCONTINUED | OUTPATIENT
Start: 2017-12-14 | End: 2017-12-14 | Stop reason: HOSPADM

## 2017-12-14 RX ORDER — ONDANSETRON 2 MG/ML
INJECTION INTRAMUSCULAR; INTRAVENOUS AS NEEDED
Status: DISCONTINUED | OUTPATIENT
Start: 2017-12-14 | End: 2017-12-14 | Stop reason: SURG

## 2017-12-14 RX ORDER — PROMETHAZINE HYDROCHLORIDE 25 MG/1
12.5 TABLET ORAL ONCE AS NEEDED
Status: DISCONTINUED | OUTPATIENT
Start: 2017-12-14 | End: 2017-12-14 | Stop reason: HOSPADM

## 2017-12-14 RX ORDER — DIPHENHYDRAMINE HYDROCHLORIDE 50 MG/ML
12.5 INJECTION INTRAMUSCULAR; INTRAVENOUS
Status: DISCONTINUED | OUTPATIENT
Start: 2017-12-14 | End: 2017-12-14 | Stop reason: HOSPADM

## 2017-12-14 RX ORDER — ONDANSETRON 2 MG/ML
4 INJECTION INTRAMUSCULAR; INTRAVENOUS EVERY 6 HOURS PRN
Status: DISCONTINUED | OUTPATIENT
Start: 2017-12-14 | End: 2017-12-18 | Stop reason: HOSPADM

## 2017-12-14 RX ORDER — ONDANSETRON 2 MG/ML
4 INJECTION INTRAMUSCULAR; INTRAVENOUS ONCE AS NEEDED
Status: DISCONTINUED | OUTPATIENT
Start: 2017-12-14 | End: 2017-12-14 | Stop reason: HOSPADM

## 2017-12-14 RX ORDER — SODIUM CHLORIDE, SODIUM LACTATE, POTASSIUM CHLORIDE, CALCIUM CHLORIDE 600; 310; 30; 20 MG/100ML; MG/100ML; MG/100ML; MG/100ML
9 INJECTION, SOLUTION INTRAVENOUS CONTINUOUS
Status: DISCONTINUED | OUTPATIENT
Start: 2017-12-14 | End: 2017-12-18 | Stop reason: HOSPADM

## 2017-12-14 RX ORDER — BISACODYL 10 MG
10 SUPPOSITORY, RECTAL RECTAL DAILY PRN
Status: DISCONTINUED | OUTPATIENT
Start: 2017-12-14 | End: 2017-12-18 | Stop reason: HOSPADM

## 2017-12-14 RX ORDER — SUCCINYLCHOLINE CHLORIDE 20 MG/ML
INJECTION INTRAMUSCULAR; INTRAVENOUS AS NEEDED
Status: DISCONTINUED | OUTPATIENT
Start: 2017-12-14 | End: 2017-12-14 | Stop reason: SURG

## 2017-12-14 RX ORDER — MAGNESIUM HYDROXIDE 1200 MG/15ML
LIQUID ORAL AS NEEDED
Status: DISCONTINUED | OUTPATIENT
Start: 2017-12-14 | End: 2017-12-14 | Stop reason: HOSPADM

## 2017-12-14 RX ORDER — MIDAZOLAM HYDROCHLORIDE 1 MG/ML
2 INJECTION INTRAMUSCULAR; INTRAVENOUS
Status: DISCONTINUED | OUTPATIENT
Start: 2017-12-14 | End: 2017-12-14 | Stop reason: HOSPADM

## 2017-12-14 RX ORDER — ACETAMINOPHEN 325 MG/1
650 TABLET ORAL EVERY 4 HOURS PRN
Status: DISCONTINUED | OUTPATIENT
Start: 2017-12-14 | End: 2017-12-18 | Stop reason: HOSPADM

## 2017-12-14 RX ADMIN — SUGAMMADEX 200 MG: 100 INJECTION, SOLUTION INTRAVENOUS at 11:08

## 2017-12-14 RX ADMIN — DIVALPROEX SODIUM 500 MG: 500 TABLET, DELAYED RELEASE ORAL at 21:17

## 2017-12-14 RX ADMIN — SODIUM CHLORIDE, POTASSIUM CHLORIDE, SODIUM LACTATE AND CALCIUM CHLORIDE: 600; 310; 30; 20 INJECTION, SOLUTION INTRAVENOUS at 10:54

## 2017-12-14 RX ADMIN — KETOROLAC TROMETHAMINE 15 MG: 30 INJECTION, SOLUTION INTRAMUSCULAR at 12:06

## 2017-12-14 RX ADMIN — SODIUM CHLORIDE, POTASSIUM CHLORIDE, SODIUM LACTATE AND CALCIUM CHLORIDE 100 ML/HR: 600; 310; 30; 20 INJECTION, SOLUTION INTRAVENOUS at 18:38

## 2017-12-14 RX ADMIN — DEXAMETHASONE SODIUM PHOSPHATE 8 MG: 10 INJECTION INTRAMUSCULAR; INTRAVENOUS at 09:51

## 2017-12-14 RX ADMIN — SUCCINYLCHOLINE CHLORIDE 140 MG: 20 INJECTION, SOLUTION INTRAMUSCULAR; INTRAVENOUS; PARENTERAL at 09:42

## 2017-12-14 RX ADMIN — LIDOCAINE HYDROCHLORIDE 100 MG: 20 INJECTION, SOLUTION INFILTRATION; PERINEURAL at 09:45

## 2017-12-14 RX ADMIN — HYDRALAZINE HYDROCHLORIDE 5 MG: 20 INJECTION INTRAMUSCULAR; INTRAVENOUS at 12:17

## 2017-12-14 RX ADMIN — PHENYLEPHRINE HYDROCHLORIDE 100 MCG: 10 INJECTION INTRAVENOUS at 10:00

## 2017-12-14 RX ADMIN — ONDANSETRON 4 MG: 2 INJECTION INTRAMUSCULAR; INTRAVENOUS at 10:45

## 2017-12-14 RX ADMIN — DOCUSATE SODIUM -SENNOSIDES 2 TABLET: 50; 8.6 TABLET, COATED ORAL at 18:33

## 2017-12-14 RX ADMIN — ROCURONIUM BROMIDE 40 MG: 10 INJECTION INTRAVENOUS at 09:51

## 2017-12-14 RX ADMIN — FUROSEMIDE 20 MG: 20 TABLET ORAL at 18:33

## 2017-12-14 RX ADMIN — VANCOMYCIN HYDROCHLORIDE 2000 MG: 100 INJECTION, POWDER, LYOPHILIZED, FOR SOLUTION INTRAVENOUS at 07:45

## 2017-12-14 RX ADMIN — HYDRALAZINE HYDROCHLORIDE 5 MG: 20 INJECTION INTRAMUSCULAR; INTRAVENOUS at 12:04

## 2017-12-14 RX ADMIN — Medication 2 MG: at 09:28

## 2017-12-14 RX ADMIN — FENTANYL CITRATE 50 MCG: 50 INJECTION INTRAMUSCULAR; INTRAVENOUS at 11:00

## 2017-12-14 RX ADMIN — SUGAMMADEX 200 MG: 100 INJECTION, SOLUTION INTRAVENOUS at 11:05

## 2017-12-14 RX ADMIN — POTASSIUM CHLORIDE 20 MEQ: 750 CAPSULE, EXTENDED RELEASE ORAL at 18:33

## 2017-12-14 RX ADMIN — ACETAMINOPHEN 1000 MG: 500 TABLET ORAL at 07:45

## 2017-12-14 RX ADMIN — CEFAZOLIN SODIUM 2 G: 500 INJECTION, POWDER, FOR SOLUTION INTRAMUSCULAR; INTRAVENOUS at 10:54

## 2017-12-14 RX ADMIN — PHENYLEPHRINE HYDROCHLORIDE 200 MCG: 10 INJECTION INTRAVENOUS at 10:20

## 2017-12-14 RX ADMIN — ROCURONIUM BROMIDE 10 MG: 10 INJECTION INTRAVENOUS at 09:42

## 2017-12-14 RX ADMIN — CEFAZOLIN SODIUM 2 G: 2 INJECTION, SOLUTION INTRAVENOUS at 18:33

## 2017-12-14 RX ADMIN — SODIUM CHLORIDE, POTASSIUM CHLORIDE, SODIUM LACTATE AND CALCIUM CHLORIDE: 600; 310; 30; 20 INJECTION, SOLUTION INTRAVENOUS at 09:35

## 2017-12-14 RX ADMIN — FAMOTIDINE 20 MG: 10 INJECTION, SOLUTION INTRAVENOUS at 09:28

## 2017-12-14 RX ADMIN — LIDOCAINE HYDROCHLORIDE 60 MG: 20 INJECTION, SOLUTION INFILTRATION; PERINEURAL at 09:42

## 2017-12-14 RX ADMIN — PROPOFOL 250 MG: 10 INJECTION, EMULSION INTRAVENOUS at 09:42

## 2017-12-14 RX ADMIN — KETOROLAC TROMETHAMINE 15 MG: 30 INJECTION, SOLUTION INTRAMUSCULAR at 18:33

## 2017-12-14 RX ADMIN — SODIUM CHLORIDE, POTASSIUM CHLORIDE, SODIUM LACTATE AND CALCIUM CHLORIDE 9 ML/HR: 600; 310; 30; 20 INJECTION, SOLUTION INTRAVENOUS at 09:28
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
## 2017-12-14 NOTE — ANESTHESIA POSTPROCEDURE EVALUATION
Patient: Hernando Lozada    Procedure Summary     Date Anesthesia Start Anesthesia Stop Room / Location    12/14/17 0934 1122  [facility]       Procedure Diagnosis Surgeon Provider    LT TOTAL KNEE ARTHROPLASTY (Left Knee) No diagnosis on file. MD Albert Salazar MD          Anesthesia Type: general  Last vitals  BP   170/95 (12/14/17 1218)   Temp   37.2 °C (98.9 °F) (12/14/17 1218)   Pulse   78 (12/14/17 1218)   Resp   18 (12/14/17 1202)     SpO2   100 % (12/14/17 1218)     Post Anesthesia Care and Evaluation    Patient location during evaluation: PACU  Patient participation: complete - patient participated  Level of consciousness: awake and alert  Pain management: adequate  Airway patency: patent  Anesthetic complications: No anesthetic complications    Cardiovascular status: acceptable  Respiratory status: acceptable  Hydration status: acceptable    Comments: ---------------------------               12/14/17 1218         ---------------------------   BP:          170/95         Pulse:         78           Resp:                       Temp:   37.2 °C (98.9 °F)   SpO2:         100%         ---------------------------

## 2017-12-14 NOTE — OP NOTE
Jatin Lancaster M.D. - [facility]    Patient Name:  Hernando Lozada  YOB: 1945  Medical Records Number:  [id number]    Date of Procedure:  12/14/2017    Pre-operative Diagnosis:  DJD Left Knee    Post-operative Diagnosis:  DJD Left Knee    Procedure Performed:  Left Total Knee Replacement     Anesthesia: General, supplemented by a periarticular Exparel/bupivacaine injection.      Surgeon: Jatin Lancaster MD     Assistant: Bronson Rodriguez MD; note that as part of the surgical procedure, I utilized service of an assistant surgeon, specifically Bronson Rodriguez MD. Assistant surgeon participated in crucial portion of the operation. Use of the assistant surgeon greatly reduced overall operative time, thus significantly reducing overall morbidity for the patient.      Implants:      Implant Name Type Inv. Item Serial No.  Lot No. LRB No. Used Action   CMT BONE PALACOS [id number] - [id number] Implant CMT BONE PALACOS [id number]  [employer] [id number] Left 1 Implanted   CMT BONE PALACOS [id number] - [id number] Implant CMT BONE PALACOS [id number]  [employer] [id number] Left 1 Implanted   SIZE 6 LEFT OXINIUM FEMORAL COMPONENT Implant   [employer] [id number] Left 1 Implanted   BASEPLT TIB JOURNEY NONPOR SZ7 LT - [id number] Implant BASEPLT TIB JOURNEY NONPOR SZ7 LT  [employer] [id number] Left 1 Implanted   PATELLA RESRF JOURNEY 7.5X35MM RND - [id number] Implant PATELLA RESRF JOURNEY 7.5X35MM RND  [employer] [id number] Left 1 Implanted   INSRT ART JOURNEY2 CR [id number] 10MM LT - [id number] Implant INSRT ART JOURNEY2 CR [id number] 10MM LT   [employer] [id number] Left 1 Implanted     Implant record: We used a Holley nephew journey 2 implant system.  Size 6 femoral component, size 7 tibial component, 35 patella, and 10 mm CR polyethylene tibial insert.    Tourniquet Time: Was 50 minutes.      Medications: Note that we gave 1 g Topical tranexamic acid when the cement was mixed.      Estimated  Blood Loss:   minimal    Specimens: * No orders in the log *     Complications: None.      Quality Measures: I have documented the patient's current medications including dosage, frequency, and route of administration in medical record today. Conservative alternatives were discussed with the patient as part of the decision-making process prior to the procedure. The patient was evaluated immediately preoperatively for cardiovascular and thromboembolic risk factors.  He has a history of a stroke in the past.  Prophylactic IV antibiotics were administered before the tourniquet went up.      Description of Procedure: After prep and drape, Left knee was approached via midline longitudinal anterior incision. Medial parapatellar arthrotomy was extended to the vastus medialis obliquus which was split in line with the fibers near the medial border, in keeping with minimally invasive technique. Patella was osteotomized proper depth for the patella implant. [place] holes are created. Patella was subluxed and protected. Intramedullary instrumentation was used on each side of the joint; careful and thorough canal content irrigation. I cut the femur 10 mm depth, 5° valgus controlling rotation. The femur  was sized appropriatelyt. Anterior, posterior, and chamfer cuts were made. Tibia is cut 10 mm depth, 5° of posterior slope. Meniscectomies are completed. Trial reduction is performed. Rotation is marked and keel slot was created.      Bony surface was thoroughly cleaned and dried. I injected the posterior capsule and medial lateral gutter with Exparel solution containing 20 mL Exparel, 25 mL 0.25% bupivacaine with epinephrine, 20 mL saline. Care was taken to protect popliteal neurovascular structures and the peroneal nerve. I cemented the tibial baseplate,  Femur, and patella.  Trial reduction revealed a 10 mm CR polyethylene insert best balanced stability and range of motion, and is locked in the tibial tray.      Tourniquet was  released; hemostasis obtained. Wound was closed in layers with #1 Vicryl on the capsule, 2-0 Vicryl in subcutaneous tissue, and staples in the skin over a 1/8-inch drain. Note that I injected my capsule with my Exparel solution during closure.      Jatin Lancaster MD  12/14/2017  12:32 PM

## 2017-12-14 NOTE — ANESTHESIA PREPROCEDURE EVALUATION
Anesthesia Evaluation     no history of anesthetic complications:         Airway   Mallampati: I  TM distance: >3 FB  Neck ROM: full  Dental      Comment: 2 lower teeth, worn down uppers    Pulmonary    (+) shortness of breath, sleep apnea,   (-) not a smoker  Cardiovascular     (+) hypertension, past MI , CAD, CABG, dysrhythmias, hyperlipidemia      Neuro/Psych  (+) CVA,      ROS Comment: Hx of head injury, impaired mental capacity  GI/Hepatic/Renal/Endo    (+) morbid obesity, GERD,     Musculoskeletal     Abdominal    Substance History      OB/GYN          Other   (+) arthritis                                     Anesthesia Plan    ASA 3     general     intravenous induction   Anesthetic plan and risks discussed with patient.

## 2017-12-14 NOTE — ANESTHESIA PROCEDURE NOTES
Airway  Urgency: elective    Difficult airway    General Information and Staff    Patient location during procedure: OR  Anesthesiologist: DENAE AMES  CRNA: OSCAR NASH    Indications and Patient Condition  Indications for airway management: airway protection    Preoxygenated: yes  MILS maintained throughout  Mask difficulty assessment: 2 - vent by mask + OA or adjuvant +/- NMBA    Final Airway Details  Final airway type: endotracheal airway      Successful airway: ETT  Cuffed: yes   Successful intubation technique: video laryngoscopy and direct laryngoscopy  Facilitating devices/methods: anterior pressure/BURP and intubating stylet  Endotracheal tube insertion site: oral  Blade: CMAC  Blade size: #3  ETT size: 8.0 mm  Placement verified by: chest auscultation and bronchoscopy   Cuff volume (mL): 8  Measured from: lips  ETT to lips (cm): 24  Number of attempts at approach: 2    Additional Comments  Pt preoxygenated, SIVI, DL with MIL 2, grade III view, esophageal intubation using eschmann, ETT removed immediately  pt bag mask ventilated, DL with CMAC D blade, grade I view, ATEI, dentition as before (poor dentition)

## 2017-12-14 NOTE — PLAN OF CARE
Problem: Patient Care Overview (Adult)  Goal: Plan of Care Review  Outcome: Ongoing (interventions implemented as appropriate)    12/14/17 0729   Coping/Psychosocial Response Interventions   Plan Of Care Reviewed With patient   Patient Care Overview   Progress no change       Goal: Adult Individualization and Mutuality  Outcome: Ongoing (interventions implemented as appropriate)    12/14/17 0729   Individualization   Patient Specific Preferences None       Goal: Discharge Needs Assessment  Outcome: Ongoing (interventions implemented as appropriate)    12/14/17 0729   Discharge Needs Assessment   Concerns To Be Addressed no discharge needs identified         Problem: Perioperative Period (Adult)  Goal: Signs and Symptoms of Listed Potential Problems Will be Absent or Manageable (Perioperative Period)  Outcome: Ongoing (interventions implemented as appropriate)    12/14/17 0729   Perioperative Period   Problems Assessed (Perioperative Period) pain;infection   Problems Present (Perioperative Period) none

## 2017-12-14 NOTE — PROGRESS NOTES
Discharge Planning Assessment  [facility]     Patient Name: Hernando Lozada  MRN: [id number]  Today's Date: 12/14/2017    Admit Date: 12/14/2017          Discharge Needs Assessment       12/14/17 1512    Living Environment    Lives With other (see comments)    Living Arrangements other (see comments)   [facility]    Transportation Available ambulance    Discharge Needs Assessment    Concerns To Be Addressed basic needs concerns    Readmission Within The Last 30 Days no previous admission in last 30 days    Anticipated Changes Related to Illness inability to care for self    Equipment Currently Used at Home bipap/ cpap    Discharge Facility/Level Of Care Needs nursing facility, skilled            Discharge Plan       12/14/17 1513    Case Management/Social Work Plan    Plan [facility]    Patient/Family In Agreement With Plan yes    Additional Comments See previous CCP note per JENNIFER Santacruz. Spoke with pts brother Danny Lozada [phone], who states pt would like to d/c to [facility] referral given to Mary with [facility]. Awaiting confirmation of bed availability.        Discharge Placement     Facility/Agency Request Status Selected? Address Phone Number Fax Number    [facility] Pending - Request Sent     [address] [facility] [address] [phone]                Demographic Summary     None            Functional Status       12/14/17 1512    Functional Status Prior    Ambulation 0-->independent    Transferring 0-->independent    Toileting 0-->independent    Bathing 0-->independent    Dressing 0-->independent    Eating 0-->independent    Communication 0-->understands/communicates without difficulty    Swallowing 0-->swallows foods/liquids without difficulty            Psychosocial     None            Abuse/Neglect     None            Legal     None            Substance Abuse     None            Patient Forms     None          Jaqueline Nova RN

## 2017-12-14 NOTE — DISCHARGE PLACEMENT REQUEST
"Ronald Lozada (72 y.o. Male)     Date of Birth Social Security Number Address Home Phone MRN    1945  [id number] [facility]  [facility]  [place] [id number] [phone] [id number]    Hinduism Marital Status          [facility] Single       Admission Date Admission Type Admitting Provider Attending Provider Department, Room/Bed    12/14/17 Elective Jatin Lancaster MD Sweet, Richard A, MD [address], P890/1    Discharge Date Discharge Disposition Discharge Destination                      Attending Provider: Jatin Lancaster MD     Allergies:  No Known Allergies    Isolation:  None   Infection:  None   Code Status:  FULL    Ht:  175.3 cm (69\")   Wt:  124 kg (272 lb 9.6 oz)    Admission Cmt:  None   Principal Problem:  None                Active Insurance as of 12/14/2017     Primary Coverage     Payor Plan Insurance Group Employer/Plan Group    MEDICARE MEDICARE A & B      Payor Plan Address Payor Plan Phone Number Effective From Effective To    [address] [phone] 1/1/[address]     [place] [address]       Subscriber Name Subscriber Birth Date Member ID       RONALD LOZADA 1945 [id number]           Secondary Coverage     Payor Plan Insurance Group Employer/Plan Group    [place]      Payor Plan Address Payor Plan Phone Number Effective From Effective To    [address] [phone] 9/11/2017     [place] [address]       Subscriber Name Subscriber Birth Date Member ID       RONALD LOZADA 1945 [id number]                 Emergency Contacts      (Rel.) Home Phone Work Phone Mobile Phone    KierraDanny (Brother) [phone] -- --    Zachary Lee (Care Giver) [phone] -- --            "

## 2017-12-14 NOTE — PLAN OF CARE
Problem: Patient Care Overview (Adult)  Goal: Plan of Care Review    12/14/17 1429   Coping/Psychosocial Response Interventions   Plan Of Care Reviewed With patient   Outcome Evaluation   Outcome Summary/Follow up Plan Pt. will benefit from skilled inpt. P.T. to address his functional deficits and to assist pt. in regaining his independence with functional mobility.          Problem: Inpatient Physical Therapy  Goal: Bed Mobility Goal LTG- PT    12/14/17 1429   Bed Mobility PT LTG   Bed Mobility PT LTG, Date Established 12/14/17   Bed Mobility PT LTG, Time to Achieve 3 days   Bed Mobility PT LTG, Activity Type all bed mobility   Bed Mobility PT LTG, [place] Level independent       Goal: Transfer Training Goal 1 LTG- PT    12/14/17 1429   Transfer Training PT LTG   Transfer Training PT LTG, Date Established 12/14/17   Transfer Training PT LTG, Time to Achieve 3 days   Transfer Training PT LTG, Activity Type all transfers   Transfer Training PT LTG, [place] Level independent   Transfer Training PT LTG, Assist Device walker, rolling       Goal: Gait Training Goal LTG- PT    12/14/17 1429   Gait Training PT LTG   Gait Training Goal PT LTG, Date Established 12/14/17   Gait Training Goal PT LTG, Time to Achieve 3 days   Gait Training Goal PT LTG, [place] Level independent   Gait Training Goal PT LTG, Assist Device walker, rolling   Gait Training Goal PT LTG, Distance to Achieve 120 feet       Goal: Range of Motion Goal LTG- PT    12/14/17 1429   Range of Motion PT LTG   Range of Motion Goal PT LTG, Date Established 12/14/17   Range of Motion Goal PT LTG, Time to Achieve 3 days   Range fo Motion Goal PT LTG, Joint L knee   Range of Motion Goal PT LTG, AROM Measure (5, 85)

## 2017-12-15 LAB
ANION GAP SERPL CALCULATED.3IONS-SCNC: 13.4 MMOL/L
BUN BLD-MCNC: 18 MG/DL (ref 8–23)
BUN/CREAT SERPL: 18.2 (ref 7–25)
CALCIUM SPEC-SCNC: 8.6 MG/DL (ref 8.6–10.5)
CHLORIDE SERPL-SCNC: 97 MMOL/L (ref 98–107)
CO2 SERPL-SCNC: 26.6 MMOL/L (ref 22–29)
CREAT BLD-MCNC: 0.99 MG/DL (ref 0.76–1.27)
GFR SERPL CREATININE-BSD FRML MDRD: 75 ML/MIN/1.73
GLUCOSE BLD-MCNC: 162 MG/DL (ref 65–99)
HCT VFR BLD AUTO: 33 % (ref 40.4–52.2)
HGB BLD-MCNC: 10.5 G/DL (ref 13.7–17.6)
POTASSIUM BLD-SCNC: 4 MMOL/L (ref 3.5–5.2)
SODIUM BLD-SCNC: 137 MMOL/L (ref 136–145)

## 2017-12-15 PROCEDURE — 85018 HEMOGLOBIN: CPT | Performed by: ORTHOPAEDIC SURGERY

## 2017-12-15 PROCEDURE — 97150 GROUP THERAPEUTIC PROCEDURES: CPT

## 2017-12-15 PROCEDURE — 25010000002 FONDAPARINUX PER 0.5 MG: Performed by: ORTHOPAEDIC SURGERY

## 2017-12-15 PROCEDURE — 80048 BASIC METABOLIC PNL TOTAL CA: CPT | Performed by: ORTHOPAEDIC SURGERY

## 2017-12-15 PROCEDURE — 25010000002 KETOROLAC TROMETHAMINE PER 15 MG: Performed by: ORTHOPAEDIC SURGERY

## 2017-12-15 PROCEDURE — 25010000003 CEFAZOLIN IN DEXTROSE 2-4 GM/100ML-% SOLUTION: Performed by: ORTHOPAEDIC SURGERY

## 2017-12-15 PROCEDURE — 97110 THERAPEUTIC EXERCISES: CPT

## 2017-12-15 PROCEDURE — 85014 HEMATOCRIT: CPT | Performed by: ORTHOPAEDIC SURGERY

## 2017-12-15 RX ORDER — FONDAPARINUX SODIUM 2.5 MG/.5ML
2.5 INJECTION SUBCUTANEOUS ONCE
Status: COMPLETED | OUTPATIENT
Start: 2017-12-15 | End: 2017-12-15

## 2017-12-15 RX ADMIN — ASPIRIN 325 MG: 325 TABLET, COATED ORAL at 08:41

## 2017-12-15 RX ADMIN — LEVOTHYROXINE SODIUM 25 MCG: 25 TABLET ORAL at 05:49

## 2017-12-15 RX ADMIN — CEFAZOLIN SODIUM 2 G: 2 INJECTION, SOLUTION INTRAVENOUS at 02:54

## 2017-12-15 RX ADMIN — OXYCODONE HYDROCHLORIDE AND ACETAMINOPHEN 2 TABLET: 5; 325 TABLET ORAL at 11:26

## 2017-12-15 RX ADMIN — ATENOLOL 50 MG: 25 TABLET ORAL at 08:41

## 2017-12-15 RX ADMIN — POTASSIUM CHLORIDE 20 MEQ: 750 CAPSULE, EXTENDED RELEASE ORAL at 17:32

## 2017-12-15 RX ADMIN — DIVALPROEX SODIUM 500 MG: 500 TABLET, DELAYED RELEASE ORAL at 08:41

## 2017-12-15 RX ADMIN — DOCUSATE SODIUM -SENNOSIDES 2 TABLET: 50; 8.6 TABLET, COATED ORAL at 08:41

## 2017-12-15 RX ADMIN — FUROSEMIDE 20 MG: 20 TABLET ORAL at 08:41

## 2017-12-15 RX ADMIN — DOCUSATE SODIUM -SENNOSIDES 2 TABLET: 50; 8.6 TABLET, COATED ORAL at 17:32

## 2017-12-15 RX ADMIN — FERROUS SULFATE TAB 325 MG (65 MG ELEMENTAL FE) 325 MG: 325 (65 FE) TAB at 11:27

## 2017-12-15 RX ADMIN — LISINOPRIL 40 MG: 40 TABLET ORAL at 08:41

## 2017-12-15 RX ADMIN — PANTOPRAZOLE SODIUM 40 MG: 40 TABLET, DELAYED RELEASE ORAL at 05:49

## 2017-12-15 RX ADMIN — POTASSIUM CHLORIDE 20 MEQ: 750 CAPSULE, EXTENDED RELEASE ORAL at 08:41

## 2017-12-15 RX ADMIN — DIVALPROEX SODIUM 500 MG: 500 TABLET, DELAYED RELEASE ORAL at 20:24

## 2017-12-15 RX ADMIN — KETOROLAC TROMETHAMINE 15 MG: 30 INJECTION, SOLUTION INTRAMUSCULAR at 00:19

## 2017-12-15 RX ADMIN — FONDAPARINUX SODIUM 2.5 MG: 2.5 INJECTION, SOLUTION SUBCUTANEOUS at 08:42

## 2017-12-15 RX ADMIN — KETOROLAC TROMETHAMINE 15 MG: 30 INJECTION, SOLUTION INTRAMUSCULAR at 05:50

## 2017-12-15 NOTE — THERAPY TREATMENT NOTE
Acute Care - Physical Therapy Treatment Note  [facility]     Patient Name: Hernando Lozada  : 1945  MRN: [id number]  Today's Date: 12/15/2017  Onset of Illness/Injury or Date of Surgery Date: 17  Date of Referral to PT: 17  Referring Physician: Jatin Mccord    Admit Date: 2017    Visit Dx:    ICD-10-CM ICD-9-CM   1. Difficulty walking R26.2 719.7     Patient Active Problem List   Diagnosis   • DJD (degenerative joint disease) of knee               Adult Rehabilitation Note       12/15/17 1611 12/15/17 0916       Rehab Assessment/Intervention    Discipline physical therapist  -MS physical therapist  -MS     Document Type therapy note (daily note)  -MS therapy note (daily note)  -MS     Subjective Information agree to therapy;complains of;pain  -MS agree to therapy;complains of;fatigue;pain  -MS     Patient Effort, Rehab Treatment good  -MS good  -MS     Symptoms Noted During/After Treatment fatigue  -MS fatigue  -MS     Symptoms Noted Comment  Pt. reports continued pain/soreness in his Left knee this AM.  -MS     Precautions/Limitations fall precautions   Exit alarm  -MS fall precautions   Exit alarm  -MS     Recorded by [MS] Axel Snyder, PT [MS] Axel Snyder, PT     Pain Assessment    Pain Assessment 0-10  -MS 0-10  -MS     Pain Score 3  -MS 4  -MS     Post Pain Score 3  -MS 4  -MS     Pain Type Acute pain;Surgical pain  -MS Acute pain;Surgical pain  -MS     Pain Location Knee  -MS Knee  -MS     Pain Orientation Left  -MS Left  -MS     Pain Intervention(s)  Medication (See MAR);Cold applied;Repositioned;Elevated;Rest  -MS     Recorded by [MS] Axel Snyder, PT [MS] Axel Snyder, PT     Cognitive Assessment/Intervention    Current Cognitive/Communication Assessment  functional  -MS     Orientation Status  oriented x 4  -MS     Follows Commands/Answers Questions  100% of the time  -MS     Personal Safety  WNL/WFL  -MS     Personal Safety Interventions  fall prevention  program maintained;gait belt;nonskid shoes/slippers when out of bed;supervised activity  -MS     Recorded by  [MS] Axel Snyder PT     ROM (Range of Motion)    General ROM Detail  Left knee AROM (4, 95)  -MS     Recorded by  [MS] Axel Snyder PT     Mobility Assessment/Training    Extremity Weight-Bearing Status  left lower extremity  -MS     Left Lower Extremity Weight-Bearing  weight-bearing as tolerated  -MS     Recorded by  [MS] Axel Snyder PT     Bed Mobility, Assessment/Treatment    Bed Mobility, Comment Up in chair this PM for the gym.  -MS Pt. up in chair this AM for the gym.  -MS     Recorded by [MS] Axel Snyder PT [MS] Axel Snyder PT     Transfer Assessment/Treatment    Transfers, Sit-Stand [place] contact guard assist  -MS contact guard assist  -MS     Transfers, Stand-Sit [place] contact guard assist  -MS contact guard assist  -MS     Transfers, Sit-Stand-Sit, Assist Device rolling walker  -MS rolling walker  -MS     Recorded by [MS] Axel Snyder PT [MS] Axel Snyder PT     Gait Assessment/Treatment    Gait, [place] Level contact guard assist  -MS contact guard assist  -MS     Gait, Assistive Device rolling walker  -MS rolling walker  -MS     Gait, Distance (Feet) 110  -MS 70  -MS     Gait, Gait Pattern Analysis swing-through gait  -MS swing-through gait  -MS     Gait, Gait Deviations left:;antalgic;sergei decreased;forward flexed posture  -MS left:;antalgic;sergei decreased;forward flexed posture  -MS     Gait, Comment Continued verbal/tactile cues for posture correction.  -MS Pt. given verbal/tactile cues for posture correction.  -MS     Recorded by [MS] Axel Snyder PT [MS] Axel Snyder PT     Therapy Exercises    Exercise Protocols total knee  -MS total knee  -MS     Total Knee Exercises left:;20 reps;completed protocol  -MS left:;15 reps;completed protocol  -MS     Recorded by [MS] Axel Snyder PT [MS] Axel Snyder PT      Positioning and Restraints    Pre-Treatment Position sitting in chair/recliner  -MS sitting in chair/recliner  -MS     Post Treatment Position chair  -MS chair  -MS     In Chair notified nsg;reclined;sitting;call light within reach;encouraged to call for assist;exit alarm on   Ice pack to Left knee.  -MS notified nsg;reclined;sitting;call light within reach;encouraged to call for assist;exit alarm on   Ice pack to Left knee.  -MS     Recorded by [MS] Axel Snyder, PT [MS] Axel Snyder, PT       User Key  (r) = Recorded By, (t) = Taken By, (c) = Cosigned By    Initials Name Effective Dates    MS Axel Snyder, PT 12/01/15 -                 IP PT Goals       12/14/17 1429          Bed Mobility PT LTG    Bed Mobility PT LTG, Date Established 12/14/17  -MS      Bed Mobility PT LTG, Time to Achieve 3 days  -MS      Bed Mobility PT LTG, Activity Type all bed mobility  -MS      Bed Mobility PT LTG, [place] Level independent  -MS      Transfer Training PT LTG    Transfer Training PT LTG, Date Established 12/14/17  -MS      Transfer Training PT LTG, Time to Achieve 3 days  -MS      Transfer Training PT LTG, Activity Type all transfers  -MS      Transfer Training PT LTG, [place] Level independent  -MS      Transfer Training PT LTG, Assist Device walker, rolling  -MS      Gait Training PT LTG    Gait Training Goal PT LTG, Date Established 12/14/17  -MS      Gait Training Goal PT LTG, Time to Achieve 3 days  -MS      Gait Training Goal PT LTG, [place] Level independent  -MS      Gait Training Goal PT LTG, Assist Device walker, rolling  -MS      Gait Training Goal PT LTG, Distance to Achieve 120 feet  -MS      Range of Motion PT LTG    Range of Motion Goal PT LTG, Date Established 12/14/17  -MS      Range of Motion Goal PT LTG, Time to Achieve 3 days  -MS      Range fo Motion Goal PT LTG, Joint L knee  -MS      Range of Motion Goal PT LTG, AROM Measure (5, 85)  -MS        User Key  (r) = Recorded By,  (t) = Taken By, (c) = Cosigned By    Initials Name Provider Type    MS Axel Snyder, PT Physical Therapist          Physical Therapy Education     Title: PT OT SLP Therapies (Done)     Topic: Physical Therapy (Done)     Point: Mobility training (Done)    Learning Progress Summary    Learner Readiness Method Response Comment Documented by Status   Patient Acceptance E,D VU,NR  MS 12/15/17 0918 Done    Acceptance E,D VU,NR  MS 12/14/17 1429 Done               Point: Home exercise program (Done)    Learning Progress Summary    Learner Readiness Method Response Comment Documented by Status   Patient Acceptance E,D VU,NR  MS 12/15/17 0918 Done    Acceptance E,D VU,NR  MS 12/14/17 1429 Done               Point: Body mechanics (Done)    Learning Progress Summary    Learner Readiness Method Response Comment Documented by Status   Patient Acceptance E,D VU,NR  MS 12/15/17 0918 Done    Acceptance E,D VU,NR  MS 12/14/17 1429 Done               Point: Precautions (Done)    Learning Progress Summary    Learner Readiness Method Response Comment Documented by Status   Patient Acceptance E,D VU,NR  MS 12/15/17 0918 Done    Acceptance E,D VU,NR  MS 12/14/17 1429 Done                      User Key     Initials Effective Dates Name Provider Type Discipline    MS 12/01/15 -  Axel Snyder PT Physical Therapist PT                    PT Recommendation and Plan  Anticipated Discharge Disposition: skilled nursing facility  Planned Therapy Interventions: balance training, bed mobility training, gait training, home exercise program, patient/family education, postural re-education, ROM (Range of Motion), strengthening, transfer training  PT Frequency: 2 times/day  Plan of Care Review  Plan Of Care Reviewed With: patient  Progress: improving  Outcome Summary/Follow up Plan: Improved tolerance to functional activity this PM with an increase in gait distance and progression of ther. ex. program          Outcome Measures       12/15/17  1600 12/15/17 0900 12/14/17 1400    How much help from another person do you currently need...    Turning from your back to your side while in flat bed without using bedrails? 4  -MS 4  -MS 3  -MS    Moving from lying on back to sitting on the side of a flat bed without bedrails? 3  -MS 3  -MS 3  -MS    Moving to and from a bed to a chair (including a wheelchair)? 3  -MS 3  -MS 3  -MS    Standing up from a chair using your arms (e.g., wheelchair, bedside chair)? 3  -MS 3  -MS 3  -MS    Climbing 3-5 steps with a railing? 3  -MS 3  -MS 3  -MS    To walk in hospital room? 3  -MS 3  -MS 3  -MS    AM-PAC 6 Clicks Score 19  -MS 19  -MS 18  -MS    Functional Assessment    Outcome Measure Options AM-PAC 6 Clicks Basic Mobility (PT)  -MS AM-PAC 6 Clicks Basic Mobility (PT)  -MS AM-PAC 6 Clicks Basic Mobility (PT)  -MS      User Key  (r) = Recorded By, (t) = Taken By, (c) = Cosigned By    Initials Name Provider Type    MS Axel L Lillian PT Physical Therapist           Time Calculation:         PT Charges       12/15/17 1612 12/15/17 0919       Time Calculation    Start Time 1413  -MS 0846  -MS     Stop Time 1440  -MS 0910  -MS     Time Calculation (min) 27 min  -MS 24 min  -MS     PT Received On 12/15/17  -MS 12/15/17  -MS     PT - Next Appointment 12/16/17  -MS 12/15/17  -MS       User Key  (r) = Recorded By, (t) = Taken By, (c) = Cosigned By    Initials Name Provider Type    MS Axel Snyder PT Physical Therapist          Therapy Charges for Today     Code Description Service Date Service Provider Modifiers Qty    91955953075 HC PT EVAL LOW COMPLEXITY 2 12/14/2017 Axel Snyder, PT GP 1    16291970354 HC PT THER PROC EA 15 MIN 12/14/2017 Axel Snyder PT GP 1    45355783769 HC PT THER SUPP EA 15 MIN 12/14/2017 Axel Snyder PT GP 1    29407045853 HC PT THER PROC EA 15 MIN 12/15/2017 Axel Snyder, PT GP 1    [id number]  PT THER PROC GROUP 12/15/2017 Axel Snyder, PT GP 1    [id number]  PT THER  PROC EA 15 MIN 12/15/2017 Axel Snyder, PT GP 1    71439028545 HC PT THER PROC GROUP 12/15/2017 Axel Snyder, PT GP 1          PT G-Codes  Outcome Measure Options: AM-PAC 6 Clicks Basic Mobility (PT)    Axel Snyder, PT  12/15/2017

## 2017-12-15 NOTE — PLAN OF CARE
Problem: Patient Care Overview (Adult)  Goal: Plan of Care Review    12/15/17 1612   Coping/Psychosocial Response Interventions   Plan Of Care Reviewed With patient   Patient Care Overview   Progress improving   Outcome Evaluation   Outcome Summary/Follow up Plan Improved tolerance to functional activity this PM with an increase in gait distance and progression of ther. ex. program

## 2017-12-15 NOTE — PLAN OF CARE
Problem: Patient Care Overview (Adult)  Goal: Plan of Care Review  Outcome: Ongoing (interventions implemented as appropriate)    12/14/17 0729 12/14/17 1429 12/14/17 2034   Coping/Psychosocial Response Interventions   Plan Of Care Reviewed With --  patient --    Patient Care Overview   Progress no change --  --    Outcome Evaluation   Outcome Summary/Follow up Plan --  --  admitted for a LTKA today. Pain well controlled from meds from surgery. only gave 15mg IV toradol scheduled. pain rated - 0. Had some drainage on dressing, reinfornced with ABD and acewrap. Doing IS independently, voiding, VSS, NV wnl Up to chair worked with PT. Educated patient on his HTN Hx and medications, and patient acknowledge the meds and how he takes them.        Goal: Adult Individualization and Mutuality  Outcome: Outcome(s) achieved Date Met:  12/14/17  Goal: Discharge Needs Assessment  Outcome: Ongoing (interventions implemented as appropriate)    Problem: Perioperative Period (Adult)  Goal: Signs and Symptoms of Listed Potential Problems Will be Absent or Manageable (Perioperative Period)  Outcome: Ongoing (interventions implemented as appropriate)    Problem: Knee Replacement, Total (Adult)  Goal: Signs and Symptoms of Listed Potential Problems Will be Absent or Manageable (Knee Replacement, Total)  Outcome: Ongoing (interventions implemented as appropriate)    Problem: Fall Risk (Adult)  Goal: Identify Related Risk Factors and Signs and Symptoms  Outcome: Ongoing (interventions implemented as appropriate)  Goal: Absence of Falls  Outcome: Ongoing (interventions implemented as appropriate)

## 2017-12-15 NOTE — PROGRESS NOTES
Continued Stay Note  [facility]     Patient Name: Hernando Lozada  MRN: [id number]  Today's Date: 12/15/2017    Admit Date: 12/14/2017          Discharge Plan       12/15/17 1451    Case Management/Social Work Plan    Plan [facility] bed aval on Sunday.  Pkt with numbers on chart.     Additional Comments Spoke with pt @ bedside.  He did express that he would like to return to his group home upon d/c.  Made several phone calls to [facility], spoke with Jen Mota, [phone])  and Zachary Lee ([phone]) nursing, and was unable to find out if they could provide appropriate PT for pt.  HH not able to take since they have some PT aval with company.  Zachary/ [employer] is not sure of pt's payment source, which will depend on if he can come back with or without an additional evaluation from [facility], and no one will be aval to do that until Monday, and then we will not be sure if the can provided appropriate PT until then.  Discussed situation with pt, and now he is agreeable to go to [facility] for rehab.  They will have a bed aval on Sunday for pt.  Pkt with numbers on chart.              Discharge Codes     None            Blanca Aguirre RN

## 2017-12-15 NOTE — PLAN OF CARE
Problem: Patient Care Overview (Adult)  Goal: Plan of Care Review  Outcome: Ongoing (interventions implemented as appropriate)    12/15/17 0645   Coping/Psychosocial Response Interventions   Plan Of Care Reviewed With patient   Patient Care Overview   Progress improving   Outcome Evaluation   Outcome Summary/Follow up Plan VSS, no complaints of pain during night, ambulates well with assist of 1, CPAP at bedside-used during the night, educated on BP monitoring and meds, will require rehab post-op d/t being from group home setting pre-op,        Goal: Adult Individualization and Mutuality  Outcome: Ongoing (interventions implemented as appropriate)  Goal: Discharge Needs Assessment  Outcome: Ongoing (interventions implemented as appropriate)    Problem: Perioperative Period (Adult)  Goal: Signs and Symptoms of Listed Potential Problems Will be Absent or Manageable (Perioperative Period)  Outcome: Ongoing (interventions implemented as appropriate)    Problem: Knee Replacement, Total (Adult)  Goal: Signs and Symptoms of Listed Potential Problems Will be Absent or Manageable (Knee Replacement, Total)  Outcome: Ongoing (interventions implemented as appropriate)    Problem: Fall Risk (Adult)  Goal: Identify Related Risk Factors and Signs and Symptoms  Outcome: Outcome(s) achieved Date Met:  12/15/17  Goal: Absence of Falls  Outcome: Ongoing (interventions implemented as appropriate)

## 2017-12-15 NOTE — THERAPY TREATMENT NOTE
Acute Care - Physical Therapy Treatment Note  [facility]     Patient Name: Hernando Lozada  : 1945  MRN: [id number]  Today's Date: 12/15/2017  Onset of Illness/Injury or Date of Surgery Date: 17  Date of Referral to PT: 17  Referring Physician: Jatin Mccord    Admit Date: 2017    Visit Dx:    ICD-10-CM ICD-9-CM   1. Difficulty walking R26.2 719.7     Patient Active Problem List   Diagnosis   • DJD (degenerative joint disease) of knee               Adult Rehabilitation Note       12/15/17 0916          Rehab Assessment/Intervention    Discipline physical therapist  -MS      Document Type therapy note (daily note)  -MS      Subjective Information agree to therapy;complains of;fatigue;pain  -MS      Patient Effort, Rehab Treatment good  -MS      Symptoms Noted During/After Treatment fatigue  -MS      Symptoms Noted Comment Pt. reports continued pain/soreness in his Left knee this AM.  -MS      Precautions/Limitations fall precautions   Exit alarm  -MS      Recorded by [MS] Axel Snyder PT      Pain Assessment    Pain Assessment 0-10  -MS      Pain Score 4  -MS      Post Pain Score 4  -MS      Pain Type Acute pain;Surgical pain  -MS      Pain Location Knee  -MS      Pain Orientation Left  -MS      Pain Intervention(s) Medication (See MAR);Cold applied;Repositioned;Elevated;Rest  -MS      Recorded by [MS] Axel Snyder PT      Cognitive Assessment/Intervention    Current Cognitive/Communication Assessment functional  -MS      Orientation Status oriented x 4  -MS      Follows Commands/Answers Questions 100% of the time  -MS      Personal Safety WNL/WFL  -MS      Personal Safety Interventions fall prevention program maintained;gait belt;nonskid shoes/slippers when out of bed;supervised activity  -MS      Recorded by [MS] Axel Snyder, PT      ROM (Range of Motion)    General ROM Detail Left knee AROM (4, 95)  -MS      Recorded by [MS] Axel Snyder PT      Mobility  Assessment/Training    Extremity Weight-Bearing Status left lower extremity  -MS      Left Lower Extremity Weight-Bearing weight-bearing as tolerated  -MS      Recorded by [MS] Axel Snyder PT      Bed Mobility, Assessment/Treatment    Bed Mobility, Comment Pt. up in chair this AM for the gym.  -MS      Recorded by [MS] Axel Snyder PT      Transfer Assessment/Treatment    Transfers, Sit-Stand [place] contact guard assist  -MS      Transfers, Stand-Sit [place] contact guard assist  -MS      Transfers, Sit-Stand-Sit, Assist Device rolling walker  -MS      Recorded by [MS] Axel Snyder PT      Gait Assessment/Treatment    Gait, [place] Level contact guard assist  -MS      Gait, Assistive Device rolling walker  -MS      Gait, Distance (Feet) 70  -MS      Gait, Gait Pattern Analysis swing-through gait  -MS      Gait, Gait Deviations left:;antalgic;sergei decreased;forward flexed posture  -MS      Gait, Comment Pt. given verbal/tactile cues for posture correction.  -MS      Recorded by [MS] Axel Snyder PT      Therapy Exercises    Exercise Protocols total knee  -MS      Total Knee Exercises left:;15 reps;completed protocol  -MS      Recorded by [MS] Axel Synder PT      Positioning and Restraints    Pre-Treatment Position sitting in chair/recliner  -MS      Post Treatment Position chair  -MS      In Chair notified nsg;reclined;sitting;call light within reach;encouraged to call for assist;exit alarm on   Ice pack to Left knee.  -MS      Recorded by [MS] Axel Snyder PT        User Key  (r) = Recorded By, (t) = Taken By, (c) = Cosigned By    Initials Name Effective Dates    MS Axel Snyder, PT 12/01/15 -                 IP PT Goals       12/14/17 1429          Bed Mobility PT LTG    Bed Mobility PT LTG, Date Established 12/14/17  -MS      Bed Mobility PT LTG, Time to Achieve 3 days  -MS      Bed Mobility PT LTG, Activity Type all bed mobility  -MS      Bed Mobility PT LTG,  [place] Level independent  -MS      Transfer Training PT LTG    Transfer Training PT LTG, Date Established 12/14/17  -MS      Transfer Training PT LTG, Time to Achieve 3 days  -MS      Transfer Training PT LTG, Activity Type all transfers  -MS      Transfer Training PT LTG, [place] Level independent  -MS      Transfer Training PT LTG, Assist Device walker, rolling  -MS      Gait Training PT LTG    Gait Training Goal PT LTG, Date Established 12/14/17  -MS      Gait Training Goal PT LTG, Time to Achieve 3 days  -MS      Gait Training Goal PT LTG, [place] Level independent  -MS      Gait Training Goal PT LTG, Assist Device walker, rolling  -MS      Gait Training Goal PT LTG, Distance to Achieve 120 feet  -MS      Range of Motion PT LTG    Range of Motion Goal PT LTG, Date Established 12/14/17  -MS      Range of Motion Goal PT LTG, Time to Achieve 3 days  -MS      Range fo Motion Goal PT LTG, Joint L knee  -MS      Range of Motion Goal PT LTG, AROM Measure (5, 85)  -MS        User Key  (r) = Recorded By, (t) = Taken By, (c) = Cosigned By    Initials Name Provider Type    MS Acostaew CARLOS EDUARDO Snyder, PT Physical Therapist          Physical Therapy Education     Title: PT OT SLP Therapies (Done)     Topic: Physical Therapy (Done)     Point: Mobility training (Done)    Learning Progress Summary    Learner Readiness Method Response Comment Documented by Status   Patient Acceptance MARIA ANTONIA ANDRES NR  MS 12/15/17 0918 Done    Acceptance MARIA ANTONIA ANDRES NR  MS 12/14/17 1429 Done               Point: Home exercise program (Done)    Learning Progress Summary    Learner Readiness Method Response Comment Documented by Status   Patient Acceptance MARIA ANTONIA ANDRES NR  MS 12/15/17 0918 Done    Acceptance MARIA ANTONIA ANDRES NR  MS 12/14/17 1429 Done               Point: Body mechanics (Done)    Learning Progress Summary    Learner Readiness Method Response Comment Documented by Status   Patient Acceptance MARIA ANTONIA ANDRES NR  MS 12/15/17 0918 Done    Acceptance MARIA ANTONIA ANDRES NR  MS  12/14/17 1429 Done               Point: Precautions (Done)    Learning Progress Summary    Learner Readiness Method Response Comment Documented by Status   Patient Acceptance MARIA ANTONIA ANDRES OLIVA,NR  MS 12/15/17 0918 Done    Acceptance MARIA ANTONIA ANDRES,ASHANTI  MS 12/14/17 1429 Done                      User Key     Initials Effective Dates Name Provider Type Discipline    MS 12/01/15 -  Axel Snyder, PT Physical Therapist PT                    PT Recommendation and Plan  Anticipated Discharge Disposition: skilled nursing facility  Planned Therapy Interventions: balance training, bed mobility training, gait training, home exercise program, patient/family education, postural re-education, ROM (Range of Motion), strengthening, transfer training  PT Frequency: 2 times/day  Plan of Care Review  Plan Of Care Reviewed With: patient  Progress: improving  Outcome Summary/Follow up Plan: Improved tolerance to functional activity this AM with an increase in gait distance and progression of ther. ex. protocol.  Pt. requires continued verbal/tactile cues for posture correction.          Outcome Measures       12/15/17 0900 12/14/17 1400       How much help from another person do you currently need...    Turning from your back to your side while in flat bed without using bedrails? 4  -MS 3  -MS     Moving from lying on back to sitting on the side of a flat bed without bedrails? 3  -MS 3  -MS     Moving to and from a bed to a chair (including a wheelchair)? 3  -MS 3  -MS     Standing up from a chair using your arms (e.g., wheelchair, bedside chair)? 3  -MS 3  -MS     Climbing 3-5 steps with a railing? 3  -MS 3  -MS     To walk in hospital room? 3  -MS 3  -MS     AM-PAC 6 Clicks Score 19  -MS 18  -MS     Functional Assessment    Outcome Measure Options AM-PAC 6 Clicks Basic Mobility (PT)  -MS AM-PAC 6 Clicks Basic Mobility (PT)  -MS       User Key  (r) = Recorded By, (t) = Taken By, (c) = Cosigned By    Initials Name Provider Type    MS Axel Snyder,  PT Physical Therapist           Time Calculation:         PT Charges       12/15/17 0919          Time Calculation    Start Time 0846  -MS      Stop Time 0910  -MS      Time Calculation (min) 24 min  -MS      PT Received On 12/15/17  -MS      PT - Next Appointment 12/15/17  -MS        User Key  (r) = Recorded By, (t) = Taken By, (c) = Cosigned By    Initials Name Provider Type    MS Axel Snyder, PT Physical Therapist          Therapy Charges for Today     Code Description Service Date Service Provider Modifiers Qty    88937662695 HC PT EVAL LOW COMPLEXITY 2 12/14/2017 Axel Snyder, PT GP 1    57826208477 HC PT THER PROC EA 15 MIN 12/14/2017 Axel Snyder, PT GP 1    12396945390 HC PT THER SUPP EA 15 MIN 12/14/2017 Axel Snyder, PT GP 1    31555156447 HC PT THER PROC EA 15 MIN 12/15/2017 Axel Snyder, PT GP 1    34097518019 HC PT THER PROC GROUP 12/15/2017 Axel Snyder, PT GP 1          PT G-Codes  Outcome Measure Options: AM-PAC 6 Clicks Basic Mobility (PT)    Axel Snyder, PT  12/15/2017

## 2017-12-15 NOTE — PROGRESS NOTES
"/67 (BP Location: Left arm, Patient Position: Lying)  Pulse 78  Temp 97.1 °F (36.2 °C) (Oral)   Resp 16  Ht 175.3 cm (69\")  Wt 124 kg (272 lb 9.6 oz)  SpO2 100%  BMI 40.26 kg/m2    Lab Results (last 24 hours)     Procedure Component Value Units Date/Time    Hemoglobin & Hematocrit, Blood [[id number]]  (Abnormal) Collected:  12/15/17 0351    Specimen:  Blood Updated:  12/15/17 0426     Hemoglobin 10.5 (L) g/dL      Hematocrit 33.0 (L) %     Basic Metabolic Panel [[id number]]  (Abnormal) Collected:  12/15/17 0351    Specimen:  Blood Updated:  12/15/17 0451     Glucose 162 (H) mg/dL      BUN 18 mg/dL      Creatinine 0.99 mg/dL      Sodium 137 mmol/L      Potassium 4.0 mmol/L      Chloride 97 (L) mmol/L      CO2 26.6 mmol/L      Calcium 8.6 mg/dL      eGFR Non African Amer 75 mL/min/1.73      BUN/Creatinine Ratio 18.2     Anion Gap 13.4 mmol/L     Narrative:       GFR Normal >60  Chronic Kidney Disease <60  Kidney Failure <15          Imaging Results (last 24 hours)     Procedure Component Value Units Date/Time    XR Knee 1 or 2 View Left [[id number]] Collected:  12/14/17 1232     Updated:  12/14/17 1329    Narrative:       TWO-VIEW LEFT KNEE     HISTORY: Knee replacement for osteoarthritis     The patient has had recent total knee replacement and the alignment  appears satisfactory.     This report was finalized on 12/14/2017 1:26 PM by Dr. Ankur Cherry MD.             Patient Care Team:  Milo Carrasquillo DO as PCP - General (Physical Medicine and Rehabilitation)  Albert Shukla MD as Consulting Physician (Cardiology)    SUBJECTIVE  comfortable  PHYSICAL EXAM  NV intact   Active Problems:    DJD (degenerative joint disease) of knee      PLAN / DISPOSITION:  Arixtra one time dose  Lives in [facility].  Will likely need to go to rehab.  Will wait on discharge  to weigh in....  Jatin Lancaster MD  12/15/17  7:00 AM      "

## 2017-12-15 NOTE — PLAN OF CARE
Problem: Patient Care Overview (Adult)  Goal: Plan of Care Review    12/15/17 0918   Coping/Psychosocial Response Interventions   Plan Of Care Reviewed With patient   Patient Care Overview   Progress improving   Outcome Evaluation   Outcome Summary/Follow up Plan Improved tolerance to functional activity this AM with an increase in gait distance and progression of ther. ex. protocol. Pt. requires continued verbal/tactile cues for posture correction.

## 2017-12-16 LAB
BILIRUB UR QL STRIP: NEGATIVE
CLARITY UR: CLEAR
COLOR UR: YELLOW
GLUCOSE UR STRIP-MCNC: NEGATIVE MG/DL
HGB UR QL STRIP.AUTO: NEGATIVE
KETONES UR QL STRIP: NEGATIVE
LEUKOCYTE ESTERASE UR QL STRIP.AUTO: NEGATIVE
NITRITE UR QL STRIP: NEGATIVE
PH UR STRIP.AUTO: 5.5 [PH] (ref 5–8)
PROT UR QL STRIP: NEGATIVE
SP GR UR STRIP: 1.03 (ref 1–1.03)
UROBILINOGEN UR QL STRIP: NORMAL

## 2017-12-16 PROCEDURE — 97110 THERAPEUTIC EXERCISES: CPT

## 2017-12-16 PROCEDURE — 81003 URINALYSIS AUTO W/O SCOPE: CPT | Performed by: ORTHOPAEDIC SURGERY

## 2017-12-16 PROCEDURE — 87086 URINE CULTURE/COLONY COUNT: CPT | Performed by: ORTHOPAEDIC SURGERY

## 2017-12-16 PROCEDURE — 97150 GROUP THERAPEUTIC PROCEDURES: CPT

## 2017-12-16 RX ORDER — POLYETHYLENE GLYCOL 3350 17 G/17G
17 POWDER, FOR SOLUTION ORAL DAILY PRN
Status: DISCONTINUED | OUTPATIENT
Start: 2017-12-16 | End: 2017-12-18 | Stop reason: HOSPADM

## 2017-12-16 RX ADMIN — OXYCODONE HYDROCHLORIDE AND ACETAMINOPHEN 1 TABLET: 5; 325 TABLET ORAL at 15:57

## 2017-12-16 RX ADMIN — ASPIRIN 325 MG: 325 TABLET, COATED ORAL at 08:33

## 2017-12-16 RX ADMIN — DIVALPROEX SODIUM 500 MG: 500 TABLET, DELAYED RELEASE ORAL at 20:27

## 2017-12-16 RX ADMIN — DIVALPROEX SODIUM 500 MG: 500 TABLET, DELAYED RELEASE ORAL at 08:33

## 2017-12-16 RX ADMIN — POLYETHYLENE GLYCOL 3350 17 G: 17 POWDER, FOR SOLUTION ORAL at 18:09

## 2017-12-16 RX ADMIN — OXYCODONE HYDROCHLORIDE AND ACETAMINOPHEN 1 TABLET: 5; 325 TABLET ORAL at 20:27

## 2017-12-16 RX ADMIN — OXYCODONE HYDROCHLORIDE AND ACETAMINOPHEN 2 TABLET: 5; 325 TABLET ORAL at 00:47

## 2017-12-16 RX ADMIN — DOCUSATE SODIUM -SENNOSIDES 2 TABLET: 50; 8.6 TABLET, COATED ORAL at 18:09

## 2017-12-16 RX ADMIN — PANTOPRAZOLE SODIUM 40 MG: 40 TABLET, DELAYED RELEASE ORAL at 06:36

## 2017-12-16 RX ADMIN — POTASSIUM CHLORIDE 20 MEQ: 750 CAPSULE, EXTENDED RELEASE ORAL at 08:33

## 2017-12-16 RX ADMIN — DOCUSATE SODIUM -SENNOSIDES 2 TABLET: 50; 8.6 TABLET, COATED ORAL at 08:33

## 2017-12-16 RX ADMIN — FUROSEMIDE 20 MG: 20 TABLET ORAL at 18:09

## 2017-12-16 RX ADMIN — POTASSIUM CHLORIDE 20 MEQ: 750 CAPSULE, EXTENDED RELEASE ORAL at 18:09

## 2017-12-16 RX ADMIN — OXYCODONE HYDROCHLORIDE AND ACETAMINOPHEN 1 TABLET: 5; 325 TABLET ORAL at 06:35

## 2017-12-16 RX ADMIN — LEVOTHYROXINE SODIUM 25 MCG: 25 TABLET ORAL at 06:36

## 2017-12-16 RX ADMIN — FERROUS SULFATE TAB 325 MG (65 MG ELEMENTAL FE) 325 MG: 325 (65 FE) TAB at 08:33

## 2017-12-16 RX ADMIN — OXYCODONE HYDROCHLORIDE AND ACETAMINOPHEN 1 TABLET: 5; 325 TABLET ORAL at 11:50

## 2017-12-16 NOTE — PROGRESS NOTES
"/69 (BP Location: Right arm, Patient Position: Lying)  Pulse 67  Temp 97.6 °F (36.4 °C) (Oral)   Resp 16  Ht 175.3 cm (69\")  Wt 124 kg (272 lb 9.6 oz)  SpO2 98%  BMI 40.26 kg/m2    Lab Results (last 24 hours)     Procedure Component Value Units Date/Time    Urine Culture - Urine, Urine, Clean Catch [[id number]] Collected:  12/16/17 0049    Specimen:  Urine from Urine, Clean Catch Updated:  12/16/17 0058    Urinalysis With / Microscopic If Indicated - Urine, Clean Catch [[id number]]  (Normal) Collected:  12/16/17 0049    Specimen:  Urine from Urine, Clean Catch Updated:  12/16/17 0110     Color, UA Yellow     Appearance, UA Clear     pH, UA 5.5     Specific Gravity, UA 1.026     Glucose, UA Negative     Ketones, UA Negative     Bilirubin, UA Negative     Blood, UA Negative     Protein, UA Negative     Leuk Esterase, UA Negative     Nitrite, UA Negative     Urobilinogen, UA 0.2 E.U./dL    Narrative:       Urine microscopic not indicated.          Imaging Results (last 24 hours)     ** No results found for the last 24 hours. **          Patient Care Team:  Milo Carrasquillo DO as PCP - General (Physical Medicine and Rehabilitation)  Albert Shukla MD as Consulting Physician (Cardiology)    SUBJECTIVE  Doing well  PHYSICAL EXAM  Wound fine   Active Problems:    DJD (degenerative joint disease) of knee      PLAN / DISPOSITION:  Rehab discharge today   Jatin Lancaster MD  12/16/17  7:24 AM      "

## 2017-12-16 NOTE — THERAPY TREATMENT NOTE
Acute Care - Physical Therapy Treatment Note  [facility]     Patient Name: Hernando Lozada  : 1945  MRN: [id number]  Today's Date: 2017  Onset of Illness/Injury or Date of Surgery Date: 17  Date of Referral to PT: 17  Referring Physician: Jatin Mccord    Admit Date: 2017    Visit Dx:    ICD-10-CM ICD-9-CM   1. Difficulty walking R26.2 719.7     Patient Active Problem List   Diagnosis   • DJD (degenerative joint disease) of knee               Adult Rehabilitation Note       17 1019 12/15/17 1611 12/15/17 0916    Rehab Assessment/Intervention    Discipline physical therapist  -MS physical therapist  -MS physical therapist  -MS    Document Type therapy note (daily note)  -MS therapy note (daily note)  -MS therapy note (daily note)  -MS    Subjective Information agree to therapy;complains of;fatigue;pain  -MS agree to therapy;complains of;pain  -MS agree to therapy;complains of;fatigue;pain  -MS    Patient Effort, Rehab Treatment good  -MS good  -MS good  -MS    Symptoms Noted During/After Treatment fatigue;increased pain  -MS fatigue  -MS fatigue  -MS    Symptoms Noted Comment   Pt. reports continued pain/soreness in his Left knee this AM.  -MS    Precautions/Limitations fall precautions   Exit alarm  -MS fall precautions   Exit alarm  -MS fall precautions   Exit alarm  -MS    Recorded by [MS] Axel Snyder, PT [MS] Axel Snyder, PT [MS] Axel Snyder, PT    Pain Assessment    Pain Assessment  0-10  -MS 0-10  -MS    Pain Score 5  -MS 3  -MS 4  -MS    Post Pain Score 5  -MS 3  -MS 4  -MS    Pain Type Acute pain;Surgical pain  -MS Acute pain;Surgical pain  -MS Acute pain;Surgical pain  -MS    Pain Location Knee  -MS Knee  -MS Knee  -MS    Pain Orientation Left  -MS Left  -MS Left  -MS    Pain Intervention(s) Medication (See MAR);Cold applied;Repositioned;Elevated;Rest  -MS  Medication (See MAR);Cold applied;Repositioned;Elevated;Rest  -MS    Recorded by [MS] Axel THIBODEAUX  Lillian, PT [MS] Axel Snyder PT [MS] Axel Snyder PT    Cognitive Assessment/Intervention    Current Cognitive/Communication Assessment functional  -MS  functional  -MS    Orientation Status oriented x 4  -MS  oriented x 4  -MS    Follows Commands/Answers Questions 100% of the time  -MS  100% of the time  -MS    Personal Safety WNL/WFL  -MS  WNL/WFL  -MS    Personal Safety Interventions fall prevention program maintained;gait belt;nonskid shoes/slippers when out of bed;supervised activity  -MS  fall prevention program maintained;gait belt;nonskid shoes/slippers when out of bed;supervised activity  -MS    Recorded by [MS] Axel Snyder PT  [MS] Axel Snyder PT    ROM (Range of Motion)    General ROM Detail Left knee AROM (8, 80)  -MS  Left knee AROM (4, 95)  -MS    Recorded by [MS] Axel Snyder PT  [MS] Axel Snyder PT    Mobility Assessment/Training    Extremity Weight-Bearing Status   left lower extremity  -MS    Left Lower Extremity Weight-Bearing   weight-bearing as tolerated  -MS    Recorded by   [MS] Axel Snyder PT    Bed Mobility, Assessment/Treatment    Bed Mobility, Comment Up in chair this AM.  -MS Up in chair this PM for the gym.  -MS Pt. up in chair this AM for the gym.  -MS    Recorded by [MS] Axel Snyder PT [MS] Axel Snyder PT [MS] Axel Snyder PT    Transfer Assessment/Treatment    Transfers, Sit-Stand [place] contact guard assist  -MS contact guard assist  -MS contact guard assist  -MS    Transfers, Stand-Sit [place] contact guard assist  -MS contact guard assist  -MS contact guard assist  -MS    Transfers, Sit-Stand-Sit, Assist Device rolling walker  -MS rolling walker  -MS rolling walker  -MS    Recorded by [MS] Axel Snyder PT [MS] Axel Snyder PT [MS] Axel Snyder PT    Gait Assessment/Treatment    Gait, [place] Level stand by assist  -MS contact guard assist  -MS contact guard assist  -MS    Gait, Assistive Device  rolling walker  -MS rolling walker  -MS rolling walker  -MS    Gait, Distance (Feet) 120  -  -MS 70  -MS    Gait, Gait Pattern Analysis swing-through gait  -MS swing-through gait  -MS swing-through gait  -MS    Gait, Gait Deviations left:;antalgic;sergei decreased;forward flexed posture  -MS left:;antalgic;sergei decreased;forward flexed posture  -MS left:;antalgic;sergei decreased;forward flexed posture  -MS    Gait, Comment Verbal/tactile cues for posture correction.  -MS Continued verbal/tactile cues for posture correction.  -MS Pt. given verbal/tactile cues for posture correction.  -MS    Recorded by [MS] Axel Snyder PT [MS] Axel Snyder PT [MS] Axel Snyder PT    Therapy Exercises    Exercise Protocols total knee  -MS total knee  -MS total knee  -MS    Total Knee Exercises left:;25 reps;completed protocol  -MS left:;20 reps;completed protocol  -MS left:;15 reps;completed protocol  -MS    Recorded by [MS] Axel Snyder PT [MS] Axel Snyder PT [MS] Axel Snyder PT    Positioning and Restraints    Pre-Treatment Position sitting in chair/recliner  -MS sitting in chair/recliner  -MS sitting in chair/recliner  -MS    Post Treatment Position chair  -MS chair  -MS chair  -MS    In Chair notified nsg;reclined;sitting;call light within reach;encouraged to call for assist;exit alarm on  -MS notified nsg;reclined;sitting;call light within reach;encouraged to call for assist;exit alarm on   Ice pack to Left knee.  -MS notified nsg;reclined;sitting;call light within reach;encouraged to call for assist;exit alarm on   Ice pack to Left knee.  -MS    Recorded by [MS] Axel Snyder PT [MS] Axel Snyder PT [MS] Axel Snyder PT      User Key  (r) = Recorded By, (t) = Taken By, (c) = Cosigned By    Initials Name Effective Dates    MS Axel Snyder PT 12/01/15 -                 IP PT Goals       12/14/17 1429          Bed Mobility PT LTG    Bed Mobility PT LTG, Date  Established 12/14/17  -MS      Bed Mobility PT LTG, Time to Achieve 3 days  -MS      Bed Mobility PT LTG, Activity Type all bed mobility  -MS      Bed Mobility PT LTG, [place] Level independent  -MS      Transfer Training PT LTG    Transfer Training PT LTG, Date Established 12/14/17  -MS      Transfer Training PT LTG, Time to Achieve 3 days  -MS      Transfer Training PT LTG, Activity Type all transfers  -MS      Transfer Training PT LTG, [place] Level independent  -MS      Transfer Training PT LTG, Assist Device walker, rolling  -MS      Gait Training PT LTG    Gait Training Goal PT LTG, Date Established 12/14/17  -MS      Gait Training Goal PT LTG, Time to Achieve 3 days  -MS      Gait Training Goal PT LTG, [place] Level independent  -MS      Gait Training Goal PT LTG, Assist Device walker, rolling  -MS      Gait Training Goal PT LTG, Distance to Achieve 120 feet  -MS      Range of Motion PT LTG    Range of Motion Goal PT LTG, Date Established 12/14/17  -MS      Range of Motion Goal PT LTG, Time to Achieve 3 days  -MS      Range fo Motion Goal PT LTG, Joint L knee  -MS      Range of Motion Goal PT LTG, AROM Measure (5, 85)  -MS        User Key  (r) = Recorded By, (t) = Taken By, (c) = Cosigned By    Initials Name Provider Type    MS Axel Snyder, PT Physical Therapist          Physical Therapy Education     Title: PT OT SLP Therapies (Done)     Topic: Physical Therapy (Done)     Point: Mobility training (Done)    Learning Progress Summary    Learner Readiness Method Response Comment Documented by Status   Patient Acceptance E,D OLIVA,NR  MS 12/16/17 1020 Done    Acceptance EMARIA ANTONIA,NR  MS 12/15/17 0918 Done    Acceptance EMARIA ANTONIA,NR  MS 12/14/17 1429 Done               Point: Home exercise program (Done)    Learning Progress Summary    Learner Readiness Method Response Comment Documented by Status   Patient Acceptance E,D OLIVA,NR  MS 12/16/17 1020 Done    Acceptance EMARIA ANTONIA,NR  MS 12/15/17 0918 Done     Acceptance EMARIA ANTONIA VU,NR  MS 12/14/17 1429 Done               Point: Body mechanics (Done)    Learning Progress Summary    Learner Readiness Method Response Comment Documented by Status   Patient Acceptance ED VU,NR  MS 12/16/17 1020 Done    Acceptance ED VU,NR  MS 12/15/17 0918 Done    Acceptance EMARIA ANTONIA,NR  MS 12/14/17 1429 Done               Point: Precautions (Done)    Learning Progress Summary    Learner Readiness Method Response Comment Documented by Status   Patient Acceptance ED VU,NR  MS 12/16/17 1020 Done    Acceptance EMARIA ANTONIA VU,NR  MS 12/15/17 0918 Done    Acceptance EMARIA ANTONIA VU,NR  MS 12/14/17 1429 Done                      User Key     Initials Effective Dates Name Provider Type Discipline    MS 12/01/15 -  Axel Snyder, PT Physical Therapist PT                    PT Recommendation and Plan  Anticipated Discharge Disposition: skilled nursing facility  Planned Therapy Interventions: balance training, bed mobility training, gait training, home exercise program, patient/family education, postural re-education, ROM (Range of Motion), strengthening, transfer training  PT Frequency: 2 times/day  Plan of Care Review  Plan Of Care Reviewed With: patient  Progress: improving  Outcome Summary/Follow up Plan: Improved tolerance to functional activity this day with an increase in gait distance and progression of ther. ex. protocol.          Outcome Measures       12/16/17 1000 12/15/17 1600 12/15/17 0900    How much help from another person do you currently need...    Turning from your back to your side while in flat bed without using bedrails? 4  -MS 4  -MS 4  -MS    Moving from lying on back to sitting on the side of a flat bed without bedrails? 3  -MS 3  -MS 3  -MS    Moving to and from a bed to a chair (including a wheelchair)? 3  -MS 3  -MS 3  -MS    Standing up from a chair using your arms (e.g., wheelchair, bedside chair)? 3  -MS 3  -MS 3  -MS    Climbing 3-5 steps with a railing? 3  -MS 3  -MS 3  -MS    To walk  in hospital room? 3  -MS 3  -MS 3  -MS    AM-PAC 6 Clicks Score 19  -MS 19  -MS 19  -MS    Functional Assessment    Outcome Measure Options AM-PAC 6 Clicks Basic Mobility (PT)  -MS AM-PAC 6 Clicks Basic Mobility (PT)  -MS AM-PAC 6 Clicks Basic Mobility (PT)  -MS      12/14/17 1400          How much help from another person do you currently need...    Turning from your back to your side while in flat bed without using bedrails? 3  -MS      Moving from lying on back to sitting on the side of a flat bed without bedrails? 3  -MS      Moving to and from a bed to a chair (including a wheelchair)? 3  -MS      Standing up from a chair using your arms (e.g., wheelchair, bedside chair)? 3  -MS      Climbing 3-5 steps with a railing? 3  -MS      To walk in hospital room? 3  -MS      AM-PAC 6 Clicks Score 18  -MS      Functional Assessment    Outcome Measure Options AM-PAC 6 Clicks Basic Mobility (PT)  -MS        User Key  (r) = Recorded By, (t) = Taken By, (c) = Cosigned By    Initials Name Provider Type    MS Axel THIBODEAUX HENRY Snyder Physical Therapist           Time Calculation:         PT Charges       12/16/17 1021          Time Calculation    Start Time 0944  -MS      Stop Time 1015  -MS      Time Calculation (min) 31 min  -MS      PT Received On 12/16/17  -MS      PT - Next Appointment 12/16/17  -MS        User Key  (r) = Recorded By, (t) = Taken By, (c) = Cosigned By    Initials Name Provider Type    MS Axel THIBODEAUX HENRY Snyder Physical Therapist          Therapy Charges for Today     Code Description Service Date Service Provider Modifiers Qty    02117955676 HC PT THER PROC EA 15 MIN 12/15/2017 Axel Snyder, PT GP 1    71845890747 HC PT THER PROC GROUP 12/15/2017 Axel Snyder, PT GP 1    43783759104 HC PT THER PROC EA 15 MIN 12/15/2017 Axel Snyder PT GP 1    26778023914 HC PT THER PROC GROUP 12/15/2017 Axel Snyder, PT GP 1    42930986133 HC PT THER PROC EA 15 MIN 12/16/2017 Axel Snyder, PT GP 1     [id number]  PT THER PROC GROUP 12/16/2017 Axel Snyder, PT GP 1          PT G-Codes  Outcome Measure Options: AM-PAC 6 Clicks Basic Mobility (PT)    Axel Snyder, PT  12/16/2017

## 2017-12-16 NOTE — PLAN OF CARE
Problem: Patient Care Overview (Adult)  Goal: Plan of Care Review  Outcome: Ongoing (interventions implemented as appropriate)    12/16/17 0443   Coping/Psychosocial Response Interventions   Plan Of Care Reviewed With patient   Patient Care Overview   Progress improving   Outcome Evaluation   Outcome Summary/Follow up Plan VSS, voiding well, UA sent, educated on BP monitoring and med management, rehab on DC       Goal: Adult Individualization and Mutuality  Outcome: Ongoing (interventions implemented as appropriate)  Goal: Discharge Needs Assessment  Outcome: Ongoing (interventions implemented as appropriate)    Problem: Knee Replacement, Total (Adult)  Goal: Signs and Symptoms of Listed Potential Problems Will be Absent or Manageable (Knee Replacement, Total)  Outcome: Ongoing (interventions implemented as appropriate)    Problem: Fall Risk (Adult)  Goal: Absence of Falls  Outcome: Ongoing (interventions implemented as appropriate)

## 2017-12-16 NOTE — PLAN OF CARE
Problem: Patient Care Overview (Adult)  Goal: Plan of Care Review  Outcome: Ongoing (interventions implemented as appropriate)    12/16/17 5386   Coping/Psychosocial Response Interventions   Plan Of Care Reviewed With patient   Patient Care Overview   Progress improving   Outcome Evaluation   Outcome Summary/Follow up Plan low BP this AM. home BP medication regimen held this AM due to hypotension. BP slowly improving throughout shift. voiding per urinal without difficulty. PT gym session x 2. increased ambulation with PT and nursing staff. voices adequate pain control with PO Percocet. moderate drainage from left knee incision but no s/s of infection. c/o constipation- miralax given this shift. plans to discharge to rehab in AM if medcally stable.          Problem: Knee Replacement, Total (Adult)  Goal: Signs and Symptoms of Listed Potential Problems Will be Absent or Manageable (Knee Replacement, Total)  Outcome: Ongoing (interventions implemented as appropriate)    Problem: Fall Risk (Adult)  Goal: Absence of Falls  Outcome: Ongoing (interventions implemented as appropriate)

## 2017-12-16 NOTE — PLAN OF CARE
Problem: Patient Care Overview (Adult)  Goal: Plan of Care Review    12/16/17 1020   Coping/Psychosocial Response Interventions   Plan Of Care Reviewed With patient   Patient Care Overview   Progress improving   Outcome Evaluation   Outcome Summary/Follow up Plan Improved tolerance to functional activity this day with an increase in gait distance and progression of ther. ex. protocol.

## 2017-12-16 NOTE — THERAPY TREATMENT NOTE
Acute Care - Physical Therapy Treatment Note  [facility]     Patient Name: Hernando Lozada  : 1945  MRN: [id number]  Today's Date: 2017  Onset of Illness/Injury or Date of Surgery Date: 17  Date of Referral to PT: 17  Referring Physician: Jatin Mccord    Admit Date: 2017    Visit Dx:    ICD-10-CM ICD-9-CM   1. Difficulty walking R26.2 719.7     Patient Active Problem List   Diagnosis   • DJD (degenerative joint disease) of knee               Adult Rehabilitation Note       17 1631 17 1019 12/15/17 1611    Rehab Assessment/Intervention    Discipline physical therapist  -MS physical therapist  -MS physical therapist  -MS    Document Type therapy note (daily note)  -MS therapy note (daily note)  -MS therapy note (daily note)  -MS    Subjective Information agree to therapy;complains of;pain  -MS agree to therapy;complains of;fatigue;pain  -MS agree to therapy;complains of;pain  -MS    Patient Effort, Rehab Treatment good  -MS good  -MS good  -MS    Symptoms Noted During/After Treatment fatigue  -MS fatigue;increased pain  -MS fatigue  -MS    Precautions/Limitations fall precautions   Exit alarm  -MS fall precautions   Exit alarm  -MS fall precautions   Exit alarm  -MS    Recorded by [MS] Axel Snyder PT [MS] Axel Snyder PT [MS] Axel Snyder PT    Pain Assessment    Pain Assessment 0-10  -MS  0-10  -MS    Pain Score 4  -MS 5  -MS 3  -MS    Post Pain Score 4  -MS 5  -MS 3  -MS    Pain Type Acute pain;Surgical pain  -MS Acute pain;Surgical pain  -MS Acute pain;Surgical pain  -MS    Pain Location Knee  -MS Knee  -MS Knee  -MS    Pain Orientation Left  -MS Left  -MS Left  -MS    Pain Intervention(s) Medication (See MAR);Cold applied;Repositioned;Elevated;Rest  -MS Medication (See MAR);Cold applied;Repositioned;Elevated;Rest  -MS     Recorded by [MS] Axel Snyder PT [MS] Axel Snyder PT [MS] Axel Snyder PT    Cognitive Assessment/Intervention     Current Cognitive/Communication Assessment functional  -MS functional  -MS     Orientation Status oriented x 4  -MS oriented x 4  -MS     Follows Commands/Answers Questions 100% of the time  -% of the time  -MS     Personal Safety WNL/WFL  -MS WNL/WFL  -MS     Personal Safety Interventions fall prevention program maintained;gait belt;nonskid shoes/slippers when out of bed;supervised activity  -MS fall prevention program maintained;gait belt;nonskid shoes/slippers when out of bed;supervised activity  -MS     Recorded by [MS] Axel Snyder PT [MS] Axel Snyder PT     ROM (Range of Motion)    General ROM Detail  Left knee AROM (8, 80)  -MS     Recorded by  [MS] Axel Snyder PT     Bed Mobility, Assessment/Treatment    Bed Mobility, Comment Up in chair this PM for the gym.  -MS Up in chair this AM.  -MS Up in chair this PM for the gym.  -MS    Recorded by [MS] Axel Snyder PT [MS] Axel Snyder PT [MS] Axel Snyder PT    Transfer Assessment/Treatment    Transfers, Sit-Stand [place] contact guard assist  -MS contact guard assist  -MS contact guard assist  -MS    Transfers, Stand-Sit [place] contact guard assist  -MS contact guard assist  -MS contact guard assist  -MS    Transfers, Sit-Stand-Sit, Assist Device rolling walker  -MS rolling walker  -MS rolling walker  -MS    Recorded by [MS] Axel Snyder PT [MS] Axel Snyder PT [MS] Axel Snyder PT    Gait Assessment/Treatment    Gait, [place] Level stand by assist  -MS stand by assist  -MS contact guard assist  -MS    Gait, Assistive Device rolling walker  -MS rolling walker  -MS rolling walker  -MS    Gait, Distance (Feet) 100  -  -  -MS    Gait, Gait Pattern Analysis swing-through gait  -MS swing-through gait  -MS swing-through gait  -MS    Gait, Gait Deviations left:;antalgic;sergei decreased;forward flexed posture  -MS left:;antalgic;sergei decreased;forward flexed posture  -MS  left:;antalgic;sergei decreased;forward flexed posture  -MS    Gait, Comment Limited this PM  in gait distance due to increased fatigue.  Pt. requires continued verbal/tactile cues for posture correction.  -MS Verbal/tactile cues for posture correction.  -MS Continued verbal/tactile cues for posture correction.  -MS    Recorded by [MS] Axel Snyder PT [MS] Axel Snyder PT [MS] Axel Snyder PT    Therapy Exercises    Exercise Protocols total knee  -MS total knee  -MS total knee  -MS    Total Knee Exercises left:;30 reps;completed protocol  -MS left:;25 reps;completed protocol  -MS left:;20 reps;completed protocol  -MS    Recorded by [MS] Axel Snyder PT [MS] Axel Snyder PT [MS] Axel Snyder PT    Positioning and Restraints    Pre-Treatment Position sitting in chair/recliner  -MS sitting in chair/recliner  -MS sitting in chair/recliner  -MS    Post Treatment Position chair  -MS chair  -MS chair  -MS    In Chair notified nsg;reclined;sitting;call light within reach;encouraged to call for assist  -MS notified nsg;reclined;sitting;call light within reach;encouraged to call for assist;exit alarm on  -MS notified nsg;reclined;sitting;call light within reach;encouraged to call for assist;exit alarm on   Ice pack to Left knee.  -MS    Recorded by [MS] Axel Snyder PT [MS] Axel Snyder PT [MS] Axel Snyder PT      12/15/17 0916          Rehab Assessment/Intervention    Discipline physical therapist  -MS      Document Type therapy note (daily note)  -MS      Subjective Information agree to therapy;complains of;fatigue;pain  -MS      Patient Effort, Rehab Treatment good  -MS      Symptoms Noted During/After Treatment fatigue  -MS      Symptoms Noted Comment Pt. reports continued pain/soreness in his Left knee this AM.  -MS      Precautions/Limitations fall precautions   Exit alarm  -MS      Recorded by [MS] Axel Snyder PT      Pain Assessment    Pain Assessment 0-10  -MS       Pain Score 4  -MS      Post Pain Score 4  -MS      Pain Type Acute pain;Surgical pain  -MS      Pain Location Knee  -MS      Pain Orientation Left  -MS      Pain Intervention(s) Medication (See MAR);Cold applied;Repositioned;Elevated;Rest  -MS      Recorded by [MS] Axel Snyder, PT      Cognitive Assessment/Intervention    Current Cognitive/Communication Assessment functional  -MS      Orientation Status oriented x 4  -MS      Follows Commands/Answers Questions 100% of the time  -MS      Personal Safety WNL/WFL  -MS      Personal Safety Interventions fall prevention program maintained;gait belt;nonskid shoes/slippers when out of bed;supervised activity  -MS      Recorded by [MS] Axel Snyder, PT      ROM (Range of Motion)    General ROM Detail Left knee AROM (4, 95)  -MS      Recorded by [MS] Axel Snyder PT      Mobility Assessment/Training    Extremity Weight-Bearing Status left lower extremity  -MS      Left Lower Extremity Weight-Bearing weight-bearing as tolerated  -MS      Recorded by [MS] Axel Snyder PT      Bed Mobility, Assessment/Treatment    Bed Mobility, Comment Pt. up in chair this AM for the gym.  -MS      Recorded by [MS] Axel Snyder PT      Transfer Assessment/Treatment    Transfers, Sit-Stand [place] contact guard assist  -MS      Transfers, Stand-Sit [place] contact guard assist  -MS      Transfers, Sit-Stand-Sit, Assist Device rolling walker  -MS      Recorded by [MS] Axel Snyder PT      Gait Assessment/Treatment    Gait, [place] Level contact guard assist  -MS      Gait, Assistive Device rolling walker  -MS      Gait, Distance (Feet) 70  -MS      Gait, Gait Pattern Analysis swing-through gait  -MS      Gait, Gait Deviations left:;antalgic;sergei decreased;forward flexed posture  -MS      Gait, Comment Pt. given verbal/tactile cues for posture correction.  -MS      Recorded by [MS] Axel Snyder, PT      Therapy Exercises    Exercise Protocols  total knee  -MS      Total Knee Exercises left:;15 reps;completed protocol  -MS      Recorded by [MS] Axel Snyder, PT      Positioning and Restraints    Pre-Treatment Position sitting in chair/recliner  -MS      Post Treatment Position chair  -MS      In Chair notified nsg;reclined;sitting;call light within reach;encouraged to call for assist;exit alarm on   Ice pack to Left knee.  -MS      Recorded by [MS] Axel Snyder, PT        User Key  (r) = Recorded By, (t) = Taken By, (c) = Cosigned By    Initials Name Effective Dates    MS Axel CARLOS EDUARDO Snyder, PT 12/01/15 -                 IP PT Goals       12/14/17 1429          Bed Mobility PT LTG    Bed Mobility PT LTG, Date Established 12/14/17  -MS      Bed Mobility PT LTG, Time to Achieve 3 days  -MS      Bed Mobility PT LTG, Activity Type all bed mobility  -MS      Bed Mobility PT LTG, [place] Level independent  -MS      Transfer Training PT LTG    Transfer Training PT LTG, Date Established 12/14/17  -MS      Transfer Training PT LTG, Time to Achieve 3 days  -MS      Transfer Training PT LTG, Activity Type all transfers  -MS      Transfer Training PT LTG, [place] Level independent  -MS      Transfer Training PT LTG, Assist Device walker, rolling  -MS      Gait Training PT LTG    Gait Training Goal PT LTG, Date Established 12/14/17  -MS      Gait Training Goal PT LTG, Time to Achieve 3 days  -MS      Gait Training Goal PT LTG, [place] Level independent  -MS      Gait Training Goal PT LTG, Assist Device walker, rolling  -MS      Gait Training Goal PT LTG, Distance to Achieve 120 feet  -MS      Range of Motion PT LTG    Range of Motion Goal PT LTG, Date Established 12/14/17  -MS      Range of Motion Goal PT LTG, Time to Achieve 3 days  -MS      Range fo Motion Goal PT LTG, Joint L knee  -MS      Range of Motion Goal PT LTG, AROM Measure (5, 85)  -MS        User Key  (r) = Recorded By, (t) = Taken By, (c) = Cosigned By    Initials Name Provider Type     MS Axel Snyder, PT Physical Therapist          Physical Therapy Education     Title: PT OT SLP Therapies (Done)     Topic: Physical Therapy (Done)     Point: Mobility training (Done)    Learning Progress Summary    Learner Readiness Method Response Comment Documented by Status   Patient Acceptance E,D VU,NR  MS 12/16/17 1633 Done    Acceptance E,D VU,NR  MS 12/16/17 1020 Done    Acceptance E,D VU,NR  MS 12/15/17 0918 Done    Acceptance E,D VU,NR  MS 12/14/17 1429 Done               Point: Home exercise program (Done)    Learning Progress Summary    Learner Readiness Method Response Comment Documented by Status   Patient Acceptance E,D VU,NR  MS 12/16/17 1633 Done    Acceptance E,D VU,NR  MS 12/16/17 1020 Done    Acceptance E,D VU,NR  MS 12/15/17 0918 Done    Acceptance E,D VU,NR  MS 12/14/17 1429 Done               Point: Body mechanics (Done)    Learning Progress Summary    Learner Readiness Method Response Comment Documented by Status   Patient Acceptance E,D VU,NR  MS 12/16/17 1633 Done    Acceptance E,D VU,NR  MS 12/16/17 1020 Done    Acceptance E,D VU,NR  MS 12/15/17 0918 Done    Acceptance E,D VU,NR  MS 12/14/17 1429 Done               Point: Precautions (Done)    Learning Progress Summary    Learner Readiness Method Response Comment Documented by Status   Patient Acceptance E,D VU,NR  MS 12/16/17 1633 Done    Acceptance E,D VU,NR  MS 12/16/17 1020 Done    Acceptance E,D VU,NR  MS 12/15/17 0918 Done    Acceptance E,D VU,NR  MS 12/14/17 1429 Done                      User Key     Initials Effective Dates Name Provider Type Discipline    MS 12/01/15 -  Axel Snyder, PT Physical Therapist PT                    PT Recommendation and Plan  Anticipated Discharge Disposition: skilled nursing facility  Planned Therapy Interventions: balance training, bed mobility training, gait training, home exercise program, patient/family education, postural re-education, ROM (Range of Motion), strengthening, transfer  training  PT Frequency: 2 times/day  Plan of Care Review  Plan Of Care Reviewed With: patient  Progress: improving  Outcome Summary/Follow up Plan: Pt. able to ambulate 100 feet with use of RWX this PM, limited in distance compared to this AM due to increased fatigue, weakness.  Pt. requires continued verbal/tactile cues for posture correction with use of RWX.          Outcome Measures       12/16/17 1600 12/16/17 1000 12/15/17 1600    How much help from another person do you currently need...    Turning from your back to your side while in flat bed without using bedrails? 4  -MS 4  -MS 4  -MS    Moving from lying on back to sitting on the side of a flat bed without bedrails? 3  -MS 3  -MS 3  -MS    Moving to and from a bed to a chair (including a wheelchair)? 3  -MS 3  -MS 3  -MS    Standing up from a chair using your arms (e.g., wheelchair, bedside chair)? 3  -MS 3  -MS 3  -MS    Climbing 3-5 steps with a railing? 3  -MS 3  -MS 3  -MS    To walk in hospital room? 3  -MS 3  -MS 3  -MS    AM-PAC 6 Clicks Score 19  -MS 19  -MS 19  -MS    Functional Assessment    Outcome Measure Options AM-PAC 6 Clicks Basic Mobility (PT)  -MS AM-PAC 6 Clicks Basic Mobility (PT)  -MS AM-PAC 6 Clicks Basic Mobility (PT)  -MS      12/15/17 0900 12/14/17 1400       How much help from another person do you currently need...    Turning from your back to your side while in flat bed without using bedrails? 4  -MS 3  -MS     Moving from lying on back to sitting on the side of a flat bed without bedrails? 3  -MS 3  -MS     Moving to and from a bed to a chair (including a wheelchair)? 3  -MS 3  -MS     Standing up from a chair using your arms (e.g., wheelchair, bedside chair)? 3  -MS 3  -MS     Climbing 3-5 steps with a railing? 3  -MS 3  -MS     To walk in hospital room? 3  -MS 3  -MS     AM-PAC 6 Clicks Score 19  -MS 18  -MS     Functional Assessment    Outcome Measure Options AM-PAC 6 Clicks Basic Mobility (PT)  -MS AM-PAC 6 Clicks Basic  Mobility (PT)  -MS       User Key  (r) = Recorded By, (t) = Taken By, (c) = Cosigned By    Initials Name Provider Type    MS Axel Snyder PT Physical Therapist           Time Calculation:         PT Charges       12/16/17 1634 12/16/17 1021       Time Calculation    Start Time 1405  -MS 0944  -MS     Stop Time 1448  -MS 1015  -MS     Time Calculation (min) 43 min  -MS 31 min  -MS     PT Received On 12/16/17  -MS 12/16/17  -MS     PT - Next Appointment 12/17/17  -MS 12/16/17  -MS       User Key  (r) = Recorded By, (t) = Taken By, (c) = Cosigned By    Initials Name Provider Type    MS Axel Snyder, PT Physical Therapist          Therapy Charges for Today     Code Description Service Date Service Provider Modifiers Qty    17138706087 HC PT THER PROC EA 15 MIN 12/15/2017 Axel Snyder, PT GP 1    01558995106 HC PT THER PROC GROUP 12/15/2017 Axel Snyder, PT GP 1    48610737452 HC PT THER PROC EA 15 MIN 12/15/2017 Axel Snyder, PT GP 1    23104101594 HC PT THER PROC GROUP 12/15/2017 Axel Snyder, PT GP 1    22118050235 HC PT THER PROC EA 15 MIN 12/16/2017 Axel Snyder, PT GP 1    75828701158 HC PT THER PROC GROUP 12/16/2017 Axel Snyder, PT GP 1    34668765234 HC PT THER PROC EA 15 MIN 12/16/2017 Axel Snyder, PT GP 1    74951970684 HC PT THER PROC GROUP 12/16/2017 Axel Snyder, PT GP 1          PT G-Codes  Outcome Measure Options: AM-PAC 6 Clicks Basic Mobility (PT)    Axel Snyder, PT  12/16/2017

## 2017-12-16 NOTE — PLAN OF CARE
Problem: Patient Care Overview (Adult)  Goal: Plan of Care Review  Outcome: Ongoing (interventions implemented as appropriate)    12/15/17 1800   Coping/Psychosocial Response Interventions   Plan Of Care Reviewed With patient   Patient Care Overview   Progress improving   Outcome Evaluation   Outcome Summary/Follow up Plan Patient ambulaitng well using walker and x1 assist. Pain is minimal and well controlled with po meds. Vitals are stable and voiding function intact. Paitient educated on importance of bp monitoring and med compliance. Patient anticipates d/c to snf on Sunday.        Goal: Adult Individualization and Mutuality  Outcome: Ongoing (interventions implemented as appropriate)    12/14/17 0729   Individualization   Patient Specific Preferences None       Goal: Discharge Needs Assessment  Outcome: Ongoing (interventions implemented as appropriate)    12/14/17 1512   Discharge Needs Assessment   Discharge Facility/Level Of Care Needs nursing facility, skilled   Living Environment   Transportation Available ambulance         Problem: Perioperative Period (Adult)  Goal: Signs and Symptoms of Listed Potential Problems Will be Absent or Manageable (Perioperative Period)  Outcome: Outcome(s) achieved Date Met:  12/15/17    12/15/17 1800   Perioperative Period   Problems Assessed (Perioperative Period) all   Problems Present (Perioperative Period) pain         Problem: Knee Replacement, Total (Adult)  Goal: Signs and Symptoms of Listed Potential Problems Will be Absent or Manageable (Knee Replacement, Total)  Outcome: Ongoing (interventions implemented as appropriate)    12/15/17 1800   Knee Replacement, Total   Problems Assessed (Total Knee Replacement) all   Problems Present (Total Knee Replacement) pain;functional decline/self care deficit;decreased range of motion         Problem: Fall Risk (Adult)  Goal: Absence of Falls  Outcome: Ongoing (interventions implemented as appropriate)    12/15/17 1800   Fall  Risk (Adult)   Absence of Falls achieves outcome

## 2017-12-16 NOTE — PLAN OF CARE
Problem: Patient Care Overview (Adult)  Goal: Plan of Care Review    12/16/17 6427   Coping/Psychosocial Response Interventions   Plan Of Care Reviewed With patient   Outcome Evaluation   Outcome Summary/Follow up Plan Pt. able to ambulate 100 feet with use of RWX this PM, limited in distance compared to this AM due to increased fatigue, weakness. Pt. requires continued verbal/tactile cues for posture correction with use of RWX.

## 2017-12-17 LAB — BACTERIA SPEC AEROBE CULT: NO GROWTH

## 2017-12-17 PROCEDURE — 97110 THERAPEUTIC EXERCISES: CPT

## 2017-12-17 PROCEDURE — 97150 GROUP THERAPEUTIC PROCEDURES: CPT

## 2017-12-17 RX ORDER — ASPIRIN 81 MG/1
81 TABLET, CHEWABLE ORAL DAILY
Status: DISCONTINUED | OUTPATIENT
Start: 2017-12-17 | End: 2017-12-18 | Stop reason: HOSPADM

## 2017-12-17 RX ORDER — TAMSULOSIN HYDROCHLORIDE 0.4 MG/1
0.4 CAPSULE ORAL DAILY
Status: DISCONTINUED | OUTPATIENT
Start: 2017-12-17 | End: 2017-12-18 | Stop reason: HOSPADM

## 2017-12-17 RX ADMIN — TAMSULOSIN HYDROCHLORIDE 0.4 MG: 0.4 CAPSULE ORAL at 17:34

## 2017-12-17 RX ADMIN — DIVALPROEX SODIUM 500 MG: 500 TABLET, DELAYED RELEASE ORAL at 08:57

## 2017-12-17 RX ADMIN — OXYCODONE HYDROCHLORIDE AND ACETAMINOPHEN 1 TABLET: 5; 325 TABLET ORAL at 03:30

## 2017-12-17 RX ADMIN — FUROSEMIDE 20 MG: 20 TABLET ORAL at 17:34

## 2017-12-17 RX ADMIN — OXYCODONE HYDROCHLORIDE AND ACETAMINOPHEN 1 TABLET: 5; 325 TABLET ORAL at 17:35

## 2017-12-17 RX ADMIN — OXYCODONE HYDROCHLORIDE AND ACETAMINOPHEN 1 TABLET: 5; 325 TABLET ORAL at 13:28

## 2017-12-17 RX ADMIN — DOCUSATE SODIUM -SENNOSIDES 2 TABLET: 50; 8.6 TABLET, COATED ORAL at 17:34

## 2017-12-17 RX ADMIN — ASPIRIN 81 MG: 81 TABLET, CHEWABLE ORAL at 11:18

## 2017-12-17 RX ADMIN — ATENOLOL 50 MG: 25 TABLET ORAL at 08:57

## 2017-12-17 RX ADMIN — POTASSIUM CHLORIDE 20 MEQ: 750 CAPSULE, EXTENDED RELEASE ORAL at 17:34

## 2017-12-17 RX ADMIN — OXYCODONE HYDROCHLORIDE AND ACETAMINOPHEN 1 TABLET: 5; 325 TABLET ORAL at 08:57

## 2017-12-17 RX ADMIN — DIVALPROEX SODIUM 500 MG: 500 TABLET, DELAYED RELEASE ORAL at 21:16

## 2017-12-17 RX ADMIN — LEVOTHYROXINE SODIUM 25 MCG: 25 TABLET ORAL at 06:55

## 2017-12-17 RX ADMIN — LISINOPRIL 40 MG: 40 TABLET ORAL at 08:57

## 2017-12-17 RX ADMIN — POTASSIUM CHLORIDE 20 MEQ: 750 CAPSULE, EXTENDED RELEASE ORAL at 08:56

## 2017-12-17 RX ADMIN — FUROSEMIDE 20 MG: 20 TABLET ORAL at 08:57

## 2017-12-17 RX ADMIN — DOCUSATE SODIUM -SENNOSIDES 2 TABLET: 50; 8.6 TABLET, COATED ORAL at 08:56

## 2017-12-17 RX ADMIN — FERROUS SULFATE TAB 325 MG (65 MG ELEMENTAL FE) 325 MG: 325 (65 FE) TAB at 08:57

## 2017-12-17 RX ADMIN — PANTOPRAZOLE SODIUM 40 MG: 40 TABLET, DELAYED RELEASE ORAL at 06:55

## 2017-12-17 NOTE — PLAN OF CARE
Problem: Patient Care Overview (Adult)  Goal: Plan of Care Review    12/17/17 1118   Coping/Psychosocial Response Interventions   Plan Of Care Reviewed With patient   Patient Care Overview   Progress progress towards functional goals is fair   Outcome Evaluation   Outcome Summary/Follow up Plan Improved tolerance to functional activity this AM with an increase in gait distance. Pt. continues to participate at 30 reps of TKR ther. ex. protocol. Pt. requires verbal/tactile cues for posture correction during ambulation but is able to perform reciprocating gait pattern.

## 2017-12-17 NOTE — THERAPY TREATMENT NOTE
Acute Care - Physical Therapy Treatment Note  [facility]     Patient Name: Hernando Lozada  : 1945  MRN: [id number]  Today's Date: 2017  Onset of Illness/Injury or Date of Surgery Date: 17  Date of Referral to PT: 17  Referring Physician: Jatin Mccord    Admit Date: 2017    Visit Dx:    ICD-10-CM ICD-9-CM   1. Difficulty walking R26.2 719.7     Patient Active Problem List   Diagnosis   • DJD (degenerative joint disease) of knee               Adult Rehabilitation Note       17 1115 17 1631 17 1019    Rehab Assessment/Intervention    Discipline physical therapist  -MS physical therapist  -MS physical therapist  -MS    Document Type therapy note (daily note)  -MS therapy note (daily note)  -MS therapy note (daily note)  -MS    Subjective Information agree to therapy;complains of;pain  -MS agree to therapy;complains of;pain  -MS agree to therapy;complains of;fatigue;pain  -MS    Patient Effort, Rehab Treatment good  -MS good  -MS good  -MS    Symptoms Noted During/After Treatment fatigue;increased pain  -MS fatigue  -MS fatigue;increased pain  -MS    Precautions/Limitations fall precautions  -MS fall precautions   Exit alarm  -MS fall precautions   Exit alarm  -MS    Recorded by [MS] Axel Snyder, PT [MS] Axel Snyder, PT [MS] Axel Snyder, PT    Pain Assessment    Pain Assessment 0-10  -MS 0-10  -MS     Pain Score 5  -MS 4  -MS 5  -MS    Post Pain Score 5  -MS 4  -MS 5  -MS    Pain Type Acute pain;Surgical pain  -MS Acute pain;Surgical pain  -MS Acute pain;Surgical pain  -MS    Pain Location Knee  -MS Knee  -MS Knee  -MS    Pain Orientation Left  -MS Left  -MS Left  -MS    Pain Descriptors Sore  -MS      Pain Intervention(s) Medication (See MAR);Cold applied;Repositioned;Elevated;Rest  -MS Medication (See MAR);Cold applied;Repositioned;Elevated;Rest  -MS Medication (See MAR);Cold applied;Repositioned;Elevated;Rest  -MS    Recorded by [MS] Axel THIBODEAUX  Lillian PT [MS] Axel Snyder PT [MS] Axel Snyder PT    Cognitive Assessment/Intervention    Current Cognitive/Communication Assessment functional  -MS functional  -MS functional  -MS    Orientation Status oriented x 4  -MS oriented x 4  -MS oriented x 4  -MS    Follows Commands/Answers Questions 100% of the time  -% of the time  -% of the time  -MS    Personal Safety WNL/WFL  -MS WNL/WFL  -MS WNL/WFL  -MS    Personal Safety Interventions fall prevention program maintained;gait belt;nonskid shoes/slippers when out of bed;supervised activity  -MS fall prevention program maintained;gait belt;nonskid shoes/slippers when out of bed;supervised activity  -MS fall prevention program maintained;gait belt;nonskid shoes/slippers when out of bed;supervised activity  -MS    Recorded by [MS] Axel Snyder PT [MS] Axel Snyder PT [MS] Axel Snyder PT    ROM (Range of Motion)    General ROM Detail Left knee AROM (9, 83)  -MS  Left knee AROM (8, 80)  -MS    Recorded by [MS] Axel Snyder PT  [MS] Axel Snyder PT    Bed Mobility, Assessment/Treatment    Bed Mobility, Comment Up in chair this AM for the gym.  -MS Up in chair this PM for the gym.  -MS Up in chair this AM.  -MS    Recorded by [MS] Axel Snyder PT [MS] Axel Snyder PT [MS] Axel Snyder PT    Transfer Assessment/Treatment    Transfers, Sit-Stand [place] contact guard assist  -MS contact guard assist  -MS contact guard assist  -MS    Transfers, Stand-Sit [place] contact guard assist  -MS contact guard assist  -MS contact guard assist  -MS    Transfers, Sit-Stand-Sit, Assist Device rolling walker  -MS rolling walker  -MS rolling walker  -MS    Recorded by [MS] Axel Snyder PT [MS] Axel Snyder PT [MS] Axel Snyder PT    Gait Assessment/Treatment    Gait, [place] Level stand by assist  -MS stand by assist  -MS stand by assist  -MS    Gait, Assistive Device rolling walker  -MS rolling  walker  -MS rolling walker  -MS    Gait, Distance (Feet) 120  -  -  -MS    Gait, Gait Pattern Analysis swing-through gait  -MS swing-through gait  -MS swing-through gait  -MS    Gait, Gait Deviations left:;antalgic;sergei decreased;forward flexed posture  -MS left:;antalgic;sergei decreased;forward flexed posture  -MS left:;antalgic;sergei decreased;forward flexed posture  -MS    Gait, Comment Min. verbal/tactile cues for posture correction with use of the RWX.  -MS Limited this PM  in gait distance due to increased fatigue.  Pt. requires continued verbal/tactile cues for posture correction.  -MS Verbal/tactile cues for posture correction.  -MS    Recorded by [MS] Axel Snydre PT [MS] Axel Snyder PT [MS] Axel Snyder PT    Therapy Exercises    Exercise Protocols total knee  -MS total knee  -MS total knee  -MS    Total Knee Exercises left:;30 reps;completed protocol  -MS left:;30 reps;completed protocol  -MS left:;25 reps;completed protocol  -MS    Recorded by [MS] Axel Snyder PT [MS] Axel Snyder PT [MS] Axel Snyder PT    Positioning and Restraints    Pre-Treatment Position sitting in chair/recliner  -MS sitting in chair/recliner  -MS sitting in chair/recliner  -MS    Post Treatment Position chair  -MS chair  -MS chair  -MS    In Chair notified nsg;reclined;sitting;call light within reach;encouraged to call for assist   Ice pack to Left knee.  -MS notified nsg;reclined;sitting;call light within reach;encouraged to call for assist  -MS notified nsg;reclined;sitting;call light within reach;encouraged to call for assist;exit alarm on  -MS    Recorded by [MS] Axel Snyder PT [MS] Axel Snyder PT [MS] Axel Snyder PT      12/15/17 1611 12/15/17 0916       Rehab Assessment/Intervention    Discipline physical therapist  -MS physical therapist  -MS     Document Type therapy note (daily note)  -MS therapy note (daily note)  -MS     Subjective Information agree to  therapy;complains of;pain  -MS agree to therapy;complains of;fatigue;pain  -MS     Patient Effort, Rehab Treatment good  -MS good  -MS     Symptoms Noted During/After Treatment fatigue  -MS fatigue  -MS     Symptoms Noted Comment  Pt. reports continued pain/soreness in his Left knee this AM.  -MS     Precautions/Limitations fall precautions   Exit alarm  -MS fall precautions   Exit alarm  -MS     Recorded by [MS] Axel Snyder PT [MS] Axel Snyder PT     Pain Assessment    Pain Assessment 0-10  -MS 0-10  -MS     Pain Score 3  -MS 4  -MS     Post Pain Score 3  -MS 4  -MS     Pain Type Acute pain;Surgical pain  -MS Acute pain;Surgical pain  -MS     Pain Location Knee  -MS Knee  -MS     Pain Orientation Left  -MS Left  -MS     Pain Intervention(s)  Medication (See MAR);Cold applied;Repositioned;Elevated;Rest  -MS     Recorded by [MS] Axel Snyder PT [MS] Axel Snyder PT     Cognitive Assessment/Intervention    Current Cognitive/Communication Assessment  functional  -MS     Orientation Status  oriented x 4  -MS     Follows Commands/Answers Questions  100% of the time  -MS     Personal Safety  WNL/WFL  -MS     Personal Safety Interventions  fall prevention program maintained;gait belt;nonskid shoes/slippers when out of bed;supervised activity  -MS     Recorded by  [MS] Axel Snyder PT     ROM (Range of Motion)    General ROM Detail  Left knee AROM (4, 95)  -MS     Recorded by  [MS] Axel Snyder PT     Mobility Assessment/Training    Extremity Weight-Bearing Status  left lower extremity  -MS     Left Lower Extremity Weight-Bearing  weight-bearing as tolerated  -MS     Recorded by  [MS] Axel Snyder PT     Bed Mobility, Assessment/Treatment    Bed Mobility, Comment Up in chair this PM for the gym.  -MS Pt. up in chair this AM for the gym.  -MS     Recorded by [MS] Axel Snyder PT [MS] Axel Snyder PT     Transfer Assessment/Treatment    Transfers, Sit-Stand [place] contact  guard assist  -MS contact guard assist  -MS     Transfers, Stand-Sit [place] contact guard assist  -MS contact guard assist  -MS     Transfers, Sit-Stand-Sit, Assist Device rolling walker  -MS rolling walker  -MS     Recorded by [MS] Axel Snyder PT [MS] Axel Snyder PT     Gait Assessment/Treatment    Gait, [place] Level contact guard assist  -MS contact guard assist  -MS     Gait, Assistive Device rolling walker  -MS rolling walker  -MS     Gait, Distance (Feet) 110  -MS 70  -MS     Gait, Gait Pattern Analysis swing-through gait  -MS swing-through gait  -MS     Gait, Gait Deviations left:;antalgic;sergei decreased;forward flexed posture  -MS left:;antalgic;sergei decreased;forward flexed posture  -MS     Gait, Comment Continued verbal/tactile cues for posture correction.  -MS Pt. given verbal/tactile cues for posture correction.  -MS     Recorded by [MS] Axel Snyder PT [MS] Axel Snyder PT     Therapy Exercises    Exercise Protocols total knee  -MS total knee  -MS     Total Knee Exercises left:;20 reps;completed protocol  -MS left:;15 reps;completed protocol  -MS     Recorded by [MS] Axel Snyder PT [MS] Axel Snyder PT     Positioning and Restraints    Pre-Treatment Position sitting in chair/recliner  -MS sitting in chair/recliner  -MS     Post Treatment Position chair  -MS chair  -MS     In Chair notified nsg;reclined;sitting;call light within reach;encouraged to call for assist;exit alarm on   Ice pack to Left knee.  -MS notified nsg;reclined;sitting;call light within reach;encouraged to call for assist;exit alarm on   Ice pack to Left knee.  -MS     Recorded by [MS] Axel Snyder PT [MS] Axel Snyder PT       User Key  (r) = Recorded By, (t) = Taken By, (c) = Cosigned By    Initials Name Effective Dates    MS Axel Snyder PT 12/01/15 -                 IP PT Goals       12/14/17 1429          Bed Mobility PT LTG    Bed Mobility PT LTG, Date Established  12/14/17  -MS      Bed Mobility PT LTG, Time to Achieve 3 days  -MS      Bed Mobility PT LTG, Activity Type all bed mobility  -MS      Bed Mobility PT LTG, [place] Level independent  -MS      Transfer Training PT LTG    Transfer Training PT LTG, Date Established 12/14/17  -MS      Transfer Training PT LTG, Time to Achieve 3 days  -MS      Transfer Training PT LTG, Activity Type all transfers  -MS      Transfer Training PT LTG, [place] Level independent  -MS      Transfer Training PT LTG, Assist Device walker, rolling  -MS      Gait Training PT LTG    Gait Training Goal PT LTG, Date Established 12/14/17  -MS      Gait Training Goal PT LTG, Time to Achieve 3 days  -MS      Gait Training Goal PT LTG, [place] Level independent  -MS      Gait Training Goal PT LTG, Assist Device walker, rolling  -MS      Gait Training Goal PT LTG, Distance to Achieve 120 feet  -MS      Range of Motion PT LTG    Range of Motion Goal PT LTG, Date Established 12/14/17  -MS      Range of Motion Goal PT LTG, Time to Achieve 3 days  -MS      Range fo Motion Goal PT LTG, Joint L knee  -MS      Range of Motion Goal PT LTG, AROM Measure (5, 85)  -MS        User Key  (r) = Recorded By, (t) = Taken By, (c) = Cosigned By    Initials Name Provider Type    MS Axel Snyder, PT Physical Therapist          Physical Therapy Education     Title: PT OT SLP Therapies (Done)     Topic: Physical Therapy (Done)     Point: Mobility training (Done)    Learning Progress Summary    Learner Readiness Method Response Comment Documented by Status   Patient Acceptance E,D VU,NR  MS 12/17/17 1117 Done    Acceptance E,D VU,NR  MS 12/16/17 1633 Done    Acceptance E,D VU,NR  MS 12/16/17 1020 Done    Acceptance E,D VU,NR  MS 12/15/17 0918 Done    Acceptance E,D VU,NR  MS 12/14/17 1429 Done               Point: Home exercise program (Done)    Learning Progress Summary    Learner Readiness Method Response Comment Documented by Status   Patient Acceptance E,D  VU,NR  MS 12/17/17 1117 Done    Acceptance E,D VU,NR  MS 12/16/17 1633 Done    Acceptance E,D VU,NR  MS 12/16/17 1020 Done    Acceptance E,D VU,NR  MS 12/15/17 0918 Done    Acceptance E,D VU,NR  MS 12/14/17 1429 Done               Point: Body mechanics (Done)    Learning Progress Summary    Learner Readiness Method Response Comment Documented by Status   Patient Acceptance E,D VU,NR  MS 12/17/17 1117 Done    Acceptance E,D VU,NR  MS 12/16/17 1633 Done    Acceptance E,D VU,NR  MS 12/16/17 1020 Done    Acceptance E,D VU,NR  MS 12/15/17 0918 Done    Acceptance E,D VU,NR  MS 12/14/17 1429 Done               Point: Precautions (Done)    Learning Progress Summary    Learner Readiness Method Response Comment Documented by Status   Patient Acceptance E,D VU,NR  MS 12/17/17 1117 Done    Acceptance E,D VU,NR  MS 12/16/17 1633 Done    Acceptance E,D VU,NR  MS 12/16/17 1020 Done    Acceptance E,D VU,NR  MS 12/15/17 0918 Done    Acceptance E,D VU,NR  MS 12/14/17 1429 Done                      User Key     Initials Effective Dates Name Provider Type Discipline    MS 12/01/15 -  Axel Snyder, PT Physical Therapist PT                    PT Recommendation and Plan  Anticipated Discharge Disposition: skilled nursing facility  Planned Therapy Interventions: balance training, bed mobility training, gait training, home exercise program, patient/family education, postural re-education, ROM (Range of Motion), strengthening, transfer training  PT Frequency: 2 times/day  Plan of Care Review  Plan Of Care Reviewed With: patient  Progress: progress towards functional goals is fair  Outcome Summary/Follow up Plan: Improved tolerance to functional activity this AM with an increase in gait distance.  Pt. continues to participate at 30 reps of TKR ther. ex. protocol.  Pt. requires verbal/tactile cues for posture correction during ambulation but is able to perform reciprocating gait pattern.          Outcome Measures       12/17/17 1100  12/16/17 1600 12/16/17 1000    How much help from another person do you currently need...    Turning from your back to your side while in flat bed without using bedrails? 4  -MS 4  -MS 4  -MS    Moving from lying on back to sitting on the side of a flat bed without bedrails? 3  -MS 3  -MS 3  -MS    Moving to and from a bed to a chair (including a wheelchair)? 3  -MS 3  -MS 3  -MS    Standing up from a chair using your arms (e.g., wheelchair, bedside chair)? 3  -MS 3  -MS 3  -MS    Climbing 3-5 steps with a railing? 3  -MS 3  -MS 3  -MS    To walk in hospital room? 3  -MS 3  -MS 3  -MS    AM-PAC 6 Clicks Score 19  -MS 19  -MS 19  -MS    Functional Assessment    Outcome Measure Options AM-PAC 6 Clicks Basic Mobility (PT)  -MS AM-PAC 6 Clicks Basic Mobility (PT)  -MS AM-PAC 6 Clicks Basic Mobility (PT)  -MS      12/15/17 1600 12/15/17 0900 12/14/17 1400    How much help from another person do you currently need...    Turning from your back to your side while in flat bed without using bedrails? 4  -MS 4  -MS 3  -MS    Moving from lying on back to sitting on the side of a flat bed without bedrails? 3  -MS 3  -MS 3  -MS    Moving to and from a bed to a chair (including a wheelchair)? 3  -MS 3  -MS 3  -MS    Standing up from a chair using your arms (e.g., wheelchair, bedside chair)? 3  -MS 3  -MS 3  -MS    Climbing 3-5 steps with a railing? 3  -MS 3  -MS 3  -MS    To walk in hospital room? 3  -MS 3  -MS 3  -MS    AM-PAC 6 Clicks Score 19  -MS 19  -MS 18  -MS    Functional Assessment    Outcome Measure Options AM-PAC 6 Clicks Basic Mobility (PT)  -MS AM-PAC 6 Clicks Basic Mobility (PT)  -MS AM-PAC 6 Clicks Basic Mobility (PT)  -MS      User Key  (r) = Recorded By, (t) = Taken By, (c) = Cosigned By    Initials Name Provider Type    MS Axel Snyder, PT Physical Therapist           Time Calculation:         PT Charges       12/17/17 1114          Time Calculation    Start Time 0945  -MS      Stop Time 1035  -MS      Time  Calculation (min) 50 min  -MS      PT Received On 12/17/17  -MS      PT - Next Appointment 12/17/17  -MS        User Key  (r) = Recorded By, (t) = Taken By, (c) = Cosigned By    Initials Name Provider Type    MS Axel Snyder, PT Physical Therapist          Therapy Charges for Today     Code Description Service Date Service Provider Modifiers Qty    92113293966 HC PT THER PROC EA 15 MIN 12/16/2017 Axel Snyder, PT GP 1    35732970685 HC PT THER PROC GROUP 12/16/2017 Axel Snyder, PT GP 1    55044199047 HC PT THER PROC EA 15 MIN 12/16/2017 Axel Snyder, PT GP 1    29184326076 HC PT THER PROC GROUP 12/16/2017 Axel Snyder, PT GP 1    79653507244 HC PT THER PROC EA 15 MIN 12/17/2017 Axel Snyder, PT GP 1    13545558178 HC PT THER PROC GROUP 12/17/2017 Axel Snyder, PT GP 1          PT G-Codes  Outcome Measure Options: AM-PAC 6 Clicks Basic Mobility (PT)    Axel Snyder, PT  12/17/2017

## 2017-12-17 NOTE — THERAPY TREATMENT NOTE
"Acute Care - Physical Therapy Treatment Note  [facility]     Patient Name: Hernando Lozada  : 1945  MRN: [id number]  Today's Date: 2017  Onset of Illness/Injury or Date of Surgery Date: 17  Date of Referral to PT: 17  Referring Physician: Jatin Mccord    Admit Date: 2017    Visit Dx:    ICD-10-CM ICD-9-CM   1. Difficulty walking R26.2 719.7     Patient Active Problem List   Diagnosis   • DJD (degenerative joint disease) of knee               Adult Rehabilitation Note       17 1421 17 1115 17 1631    Rehab Assessment/Intervention    Discipline physical therapist  -MS physical therapist  -MS physical therapist  -MS    Document Type therapy note (daily note)  -MS therapy note (daily note)  -MS therapy note (daily note)  -MS    Subjective Information agree to therapy;complains of;weakness;fatigue;pain  -MS agree to therapy;complains of;pain  -MS agree to therapy;complains of;pain  -MS    Patient Effort, Rehab Treatment good  -MS good  -MS good  -MS    Symptoms Noted During/After Treatment  fatigue;increased pain  -MS fatigue  -MS    Symptoms Noted Comment Pt. reports feeling \"worn out\" this PM with c/o continued pain/soreness in his Left knee.  -MS      Precautions/Limitations fall precautions  -MS fall precautions  -MS fall precautions   Exit alarm  -MS    Recorded by [MS] Axel Snyder, PT [MS] Axel Snyder, PT [MS] Axel Snyder, PT    Pain Assessment    Pain Assessment 0-10  -MS 0-10  -MS 0-10  -MS    Pain Score 5  -MS 5  -MS 4  -MS    Post Pain Score 5  -MS 5  -MS 4  -MS    Pain Type Acute pain;Surgical pain  -MS Acute pain;Surgical pain  -MS Acute pain;Surgical pain  -MS    Pain Location Knee  -MS Knee  -MS Knee  -MS    Pain Orientation Left  -MS Left  -MS Left  -MS    Pain Descriptors Sore  -MS Sore  -MS     Pain Intervention(s) Medication (See MAR);Cold applied;Repositioned;Elevated;Rest  -MS Medication (See MAR);Cold " applied;Repositioned;Elevated;Rest  -MS Medication (See MAR);Cold applied;Repositioned;Elevated;Rest  -MS    Recorded by [MS] Axel Snyder PT [MS] Axel Snyder PT [MS] Axel Snyder PT    Cognitive Assessment/Intervention    Current Cognitive/Communication Assessment functional  -MS functional  -MS functional  -MS    Orientation Status oriented x 4  -MS oriented x 4  -MS oriented x 4  -MS    Follows Commands/Answers Questions 100% of the time  -% of the time  -% of the time  -MS    Personal Safety WNL/WFL  -MS WNL/WFL  -MS WNL/WFL  -MS    Personal Safety Interventions fall prevention program maintained;gait belt;nonskid shoes/slippers when out of bed;supervised activity  -MS fall prevention program maintained;gait belt;nonskid shoes/slippers when out of bed;supervised activity  -MS fall prevention program maintained;gait belt;nonskid shoes/slippers when out of bed;supervised activity  -MS    Recorded by [MS] Axel Snyder PT [MS] Axel Snyder PT [MS] Axel Snyder PT    ROM (Range of Motion)    General ROM Detail  Left knee AROM (9, 83)  -MS     Recorded by  [MS] Axel Snyder PT     Bed Mobility, Assessment/Treatment    Bed Mobility, Comment Pt. up in chair this PM for the gym.  -MS Up in chair this AM for the gym.  -MS Up in chair this PM for the gym.  -MS    Recorded by [MS] Axel Snyder PT [MS] Axel Snyder PT [MS] Axel Snyder PT    Transfer Assessment/Treatment    Transfers, Sit-Stand [place] contact guard assist  -MS contact guard assist  -MS contact guard assist  -MS    Transfers, Stand-Sit [place] contact guard assist  -MS contact guard assist  -MS contact guard assist  -MS    Transfers, Sit-Stand-Sit, Assist Device rolling walker  -MS rolling walker  -MS rolling walker  -MS    Recorded by [MS] Axel Snyder PT [MS] Axel Snyder PT [MS] Axel Snyder PT    Gait Assessment/Treatment    Gait, [place] Level contact guard  "assist  -MS stand by assist  -MS stand by assist  -MS    Gait, Assistive Device rolling walker  -MS rolling walker  -MS rolling walker  -MS    Gait, Distance (Feet) 85  -  -  -MS    Gait, Gait Pattern Analysis swing-through gait  -MS swing-through gait  -MS swing-through gait  -MS    Gait, Gait Deviations left:;antalgic;sergei decreased;forward flexed posture  -MS left:;antalgic;sergei decreased;forward flexed posture  -MS left:;antalgic;sergei decreased;forward flexed posture  -MS    Gait, Comment x 2 standing rest breaks this PM during ambulation due to increased fatigue and pt. feeling \"worn out\".  Pt. requires continued verbal/tactile cues for posture correction with use of RWX.  -MS Min. verbal/tactile cues for posture correction with use of the RWX.  -MS Limited this PM  in gait distance due to increased fatigue.  Pt. requires continued verbal/tactile cues for posture correction.  -MS    Recorded by [MS] Axel Snyder PT [MS] Axel Snyder PT [MS] Axel Snyder PT    Therapy Exercises    Exercise Protocols total knee  -MS total knee  -MS total knee  -MS    Total Knee Exercises left:;30 reps;completed protocol  -MS left:;30 reps;completed protocol  -MS left:;30 reps;completed protocol  -MS    Recorded by [MS] Axel Snyder PT [MS] Axel Snyder PT [MS] Axel Snyder PT    Positioning and Restraints    Pre-Treatment Position sitting in chair/recliner  -MS sitting in chair/recliner  -MS sitting in chair/recliner  -MS    Post Treatment Position chair  -MS chair  -MS chair  -MS    In Chair notified nsg;reclined;sitting;call light within reach;encouraged to call for assist   Ice pack to Left knee.  -MS notified nsg;reclined;sitting;call light within reach;encouraged to call for assist   Ice pack to Left knee.  -MS notified nsg;reclined;sitting;call light within reach;encouraged to call for assist  -MS    Recorded by [MS] Axel Snyder PT [MS] Axel Snyder PT [MS] " Axel Snyder, PT      12/16/17 1019 12/15/17 1611 12/15/17 0916    Rehab Assessment/Intervention    Discipline physical therapist  -MS physical therapist  -MS physical therapist  -MS    Document Type therapy note (daily note)  -MS therapy note (daily note)  -MS therapy note (daily note)  -MS    Subjective Information agree to therapy;complains of;fatigue;pain  -MS agree to therapy;complains of;pain  -MS agree to therapy;complains of;fatigue;pain  -MS    Patient Effort, Rehab Treatment good  -MS good  -MS good  -MS    Symptoms Noted During/After Treatment fatigue;increased pain  -MS fatigue  -MS fatigue  -MS    Symptoms Noted Comment   Pt. reports continued pain/soreness in his Left knee this AM.  -MS    Precautions/Limitations fall precautions   Exit alarm  -MS fall precautions   Exit alarm  -MS fall precautions   Exit alarm  -MS    Recorded by [MS] Axel Snyder PT [MS] Axel Snyder PT [MS] Axel Snyder PT    Pain Assessment    Pain Assessment  0-10  -MS 0-10  -MS    Pain Score 5  -MS 3  -MS 4  -MS    Post Pain Score 5  -MS 3  -MS 4  -MS    Pain Type Acute pain;Surgical pain  -MS Acute pain;Surgical pain  -MS Acute pain;Surgical pain  -MS    Pain Location Knee  -MS Knee  -MS Knee  -MS    Pain Orientation Left  -MS Left  -MS Left  -MS    Pain Intervention(s) Medication (See MAR);Cold applied;Repositioned;Elevated;Rest  -MS  Medication (See MAR);Cold applied;Repositioned;Elevated;Rest  -MS    Recorded by [MS] Axel Snyder PT [MS] Axel Snyder, PT [MS] Axel Snyder PT    Cognitive Assessment/Intervention    Current Cognitive/Communication Assessment functional  -MS  functional  -MS    Orientation Status oriented x 4  -MS  oriented x 4  -MS    Follows Commands/Answers Questions 100% of the time  -MS  100% of the time  -MS    Personal Safety WNL/WFL  -MS  WNL/WFL  -MS    Personal Safety Interventions fall prevention program maintained;gait belt;nonskid shoes/slippers when out of  bed;supervised activity  -MS  fall prevention program maintained;gait belt;nonskid shoes/slippers when out of bed;supervised activity  -MS    Recorded by [MS] Axel Snyder PT  [MS] Axel Snyder PT    ROM (Range of Motion)    General ROM Detail Left knee AROM (8, 80)  -MS  Left knee AROM (4, 95)  -MS    Recorded by [MS] Axel Snyder PT  [MS] Axel Snyder PT    Mobility Assessment/Training    Extremity Weight-Bearing Status   left lower extremity  -MS    Left Lower Extremity Weight-Bearing   weight-bearing as tolerated  -MS    Recorded by   [MS] Axel Snyder PT    Bed Mobility, Assessment/Treatment    Bed Mobility, Comment Up in chair this AM.  -MS Up in chair this PM for the gym.  -MS Pt. up in chair this AM for the gym.  -MS    Recorded by [MS] Axel Snyder PT [MS] Axel Snyder PT [MS] Axel Snyder PT    Transfer Assessment/Treatment    Transfers, Sit-Stand [place] contact guard assist  -MS contact guard assist  -MS contact guard assist  -MS    Transfers, Stand-Sit [place] contact guard assist  -MS contact guard assist  -MS contact guard assist  -MS    Transfers, Sit-Stand-Sit, Assist Device rolling walker  -MS rolling walker  -MS rolling walker  -MS    Recorded by [MS] Axel Snyder PT [MS] Axel Snyder, PT [MS] Axel Snyder PT    Gait Assessment/Treatment    Gait, [place] Level stand by assist  -MS contact guard assist  -MS contact guard assist  -MS    Gait, Assistive Device rolling walker  -MS rolling walker  -MS rolling walker  -MS    Gait, Distance (Feet) 120  -  -MS 70  -MS    Gait, Gait Pattern Analysis swing-through gait  -MS swing-through gait  -MS swing-through gait  -MS    Gait, Gait Deviations left:;antalgic;sergei decreased;forward flexed posture  -MS left:;antalgic;sergei decreased;forward flexed posture  -MS left:;antalgic;sergei decreased;forward flexed posture  -MS    Gait, Comment Verbal/tactile cues for posture  correction.  -MS Continued verbal/tactile cues for posture correction.  -MS Pt. given verbal/tactile cues for posture correction.  -MS    Recorded by [MS] Axel Snyder PT [MS] Axel Snyder PT [MS] Axel Snyder PT    Therapy Exercises    Exercise Protocols total knee  -MS total knee  -MS total knee  -MS    Total Knee Exercises left:;25 reps;completed protocol  -MS left:;20 reps;completed protocol  -MS left:;15 reps;completed protocol  -MS    Recorded by [MS] Axel Snyder PT [MS] Axel Snyder PT [MS] Axel Snyder PT    Positioning and Restraints    Pre-Treatment Position sitting in chair/recliner  -MS sitting in chair/recliner  -MS sitting in chair/recliner  -MS    Post Treatment Position chair  -MS chair  -MS chair  -MS    In Chair notified nsg;reclined;sitting;call light within reach;encouraged to call for assist;exit alarm on  -MS notified nsg;reclined;sitting;call light within reach;encouraged to call for assist;exit alarm on   Ice pack to Left knee.  -MS notified nsg;reclined;sitting;call light within reach;encouraged to call for assist;exit alarm on   Ice pack to Left knee.  -MS    Recorded by [MS] Axel Snyder PT [MS] Axel Snyder PT [MS] Axel Snyder PT      User Key  (r) = Recorded By, (t) = Taken By, (c) = Cosigned By    Initials Name Effective Dates    MS Axel Snyder PT 12/01/15 -                 IP PT Goals       12/14/17 1429          Bed Mobility PT LTG    Bed Mobility PT LTG, Date Established 12/14/17  -MS      Bed Mobility PT LTG, Time to Achieve 3 days  -MS      Bed Mobility PT LTG, Activity Type all bed mobility  -MS      Bed Mobility PT LTG, [place] Level independent  -MS      Transfer Training PT LTG    Transfer Training PT LTG, Date Established 12/14/17  -MS      Transfer Training PT LTG, Time to Achieve 3 days  -MS      Transfer Training PT LTG, Activity Type all transfers  -MS      Transfer Training PT LTG, [place] Level independent   -MS      Transfer Training PT LTG, Assist Device walker, rolling  -MS      Gait Training PT LTG    Gait Training Goal PT LTG, Date Established 12/14/17  -MS      Gait Training Goal PT LTG, Time to Achieve 3 days  -MS      Gait Training Goal PT LTG, [place] Level independent  -MS      Gait Training Goal PT LTG, Assist Device walker, rolling  -MS      Gait Training Goal PT LTG, Distance to Achieve 120 feet  -MS      Range of Motion PT LTG    Range of Motion Goal PT LTG, Date Established 12/14/17  -MS      Range of Motion Goal PT LTG, Time to Achieve 3 days  -MS      Range fo Motion Goal PT LTG, Joint L knee  -MS      Range of Motion Goal PT LTG, AROM Measure (5, 85)  -MS        User Key  (r) = Recorded By, (t) = Taken By, (c) = Cosigned By    Initials Name Provider Type    MS Axel Snyder, PT Physical Therapist          Physical Therapy Education     Title: PT OT SLP Therapies (Done)     Topic: Physical Therapy (Done)     Point: Mobility training (Done)    Learning Progress Summary    Learner Readiness Method Response Comment Documented by Status   Patient Acceptance E,D VU,NR  MS 12/17/17 1424 Done    Acceptance E,D VU,NR  MS 12/17/17 1117 Done    Acceptance E,D VU,NR  MS 12/16/17 1633 Done    Acceptance E,D VU,NR  MS 12/16/17 1020 Done    Acceptance E,D VU,NR  MS 12/15/17 0918 Done    Acceptance E,D VU,NR  MS 12/14/17 1429 Done               Point: Home exercise program (Done)    Learning Progress Summary    Learner Readiness Method Response Comment Documented by Status   Patient Acceptance E,D VU,NR  MS 12/17/17 1424 Done    Acceptance E,D VU,NR  MS 12/17/17 1117 Done    Acceptance E,D VU,NR  MS 12/16/17 1633 Done    Acceptance E,D VU,NR  MS 12/16/17 1020 Done    Acceptance E,D VU,NR  MS 12/15/17 0918 Done    Acceptance E,D VU,NR  MS 12/14/17 1429 Done               Point: Body mechanics (Done)    Learning Progress Summary    Learner Readiness Method Response Comment Documented by Status   Patient  Acceptance E,D VU,NR  MS 12/17/17 1424 Done    Acceptance E,D VU,NR  MS 12/17/17 1117 Done    Acceptance E,D VU,NR  MS 12/16/17 1633 Done    Acceptance E,D VU,NR  MS 12/16/17 1020 Done    Acceptance E,D VU,NR  MS 12/15/17 0918 Done    Acceptance E,D VU,NR  MS 12/14/17 1429 Done               Point: Precautions (Done)    Learning Progress Summary    Learner Readiness Method Response Comment Documented by Status   Patient Acceptance E,D VU,NR  MS 12/17/17 1424 Done    Acceptance E,D VU,NR  MS 12/17/17 1117 Done    Acceptance E,D VU,NR  MS 12/16/17 1633 Done    Acceptance E,D VU,NR  MS 12/16/17 1020 Done    Acceptance E,D VU,NR  MS 12/15/17 0918 Done    Acceptance E,D VU,NR  MS 12/14/17 1429 Done                      User Key     Initials Effective Dates Name Provider Type Discipline    MS 12/01/15 -  Axel Snyder, PT Physical Therapist PT                    PT Recommendation and Plan  Anticipated Discharge Disposition: skilled nursing facility  Planned Therapy Interventions: balance training, bed mobility training, gait training, home exercise program, patient/family education, postural re-education, ROM (Range of Motion), strengthening, transfer training  PT Frequency: 2 times/day  Plan of Care Review  Plan Of Care Reviewed With: patient  Progress: progress towards functional goals is fair  Outcome Summary/Follow up Plan: Gait distance limited this PM due to increased fatigue, weakness.  Pt. requires continued verbal/tactile cues for posture correction with use of RWX.  Pt. independent however in TKR ther. ex. protocol at 30 reps.          Outcome Measures       12/17/17 1400 12/17/17 1100 12/16/17 1600    How much help from another person do you currently need...    Turning from your back to your side while in flat bed without using bedrails? 4  -MS 4  -MS 4  -MS    Moving from lying on back to sitting on the side of a flat bed without bedrails? 3  -MS 3  -MS 3  -MS    Moving to and from a bed to a chair  (including a wheelchair)? 3  -MS 3  -MS 3  -MS    Standing up from a chair using your arms (e.g., wheelchair, bedside chair)? 3  -MS 3  -MS 3  -MS    Climbing 3-5 steps with a railing? 3  -MS 3  -MS 3  -MS    To walk in hospital room? 3  -MS 3  -MS 3  -MS    AM-PAC 6 Clicks Score 19  -MS 19  -MS 19  -MS    Functional Assessment    Outcome Measure Options AM-PAC 6 Clicks Basic Mobility (PT)  -MS AM-PAC 6 Clicks Basic Mobility (PT)  -MS AM-PAC 6 Clicks Basic Mobility (PT)  -MS      12/16/17 1000 12/15/17 1600 12/15/17 0900    How much help from another person do you currently need...    Turning from your back to your side while in flat bed without using bedrails? 4  -MS 4  -MS 4  -MS    Moving from lying on back to sitting on the side of a flat bed without bedrails? 3  -MS 3  -MS 3  -MS    Moving to and from a bed to a chair (including a wheelchair)? 3  -MS 3  -MS 3  -MS    Standing up from a chair using your arms (e.g., wheelchair, bedside chair)? 3  -MS 3  -MS 3  -MS    Climbing 3-5 steps with a railing? 3  -MS 3  -MS 3  -MS    To walk in hospital room? 3  -MS 3  -MS 3  -MS    AM-PAC 6 Clicks Score 19  -MS 19  -MS 19  -MS    Functional Assessment    Outcome Measure Options AM-PAC 6 Clicks Basic Mobility (PT)  -MS AM-PAC 6 Clicks Basic Mobility (PT)  -MS AM-PAC 6 Clicks Basic Mobility (PT)  -MS      User Key  (r) = Recorded By, (t) = Taken By, (c) = Cosigned By    Initials Name Provider Type    MS Axel Snyder, PT Physical Therapist           Time Calculation:         PT Charges       12/17/17 1425 12/17/17 1119       Time Calculation    Start Time 1319  -MS 0945  -MS     Stop Time 1345  -MS 1035  -MS     Time Calculation (min) 26 min  -MS 50 min  -MS     PT Received On 12/17/17  -MS 12/17/17  -MS     PT - Next Appointment 12/18/17  -MS 12/17/17  -MS       User Key  (r) = Recorded By, (t) = Taken By, (c) = Cosigned By    Initials Name Provider Type    MS Axel Snyder, PT Physical Therapist          Therapy  Charges for Today     Code Description Service Date Service Provider Modifiers Qty    24127623644 HC PT THER PROC EA 15 MIN 12/16/2017 Axel Snyder, PT GP 1    96575328492 HC PT THER PROC GROUP 12/16/2017 Axel Snyder, PT GP 1    17725445152 HC PT THER PROC EA 15 MIN 12/16/2017 Axel Snyder, PT GP 1    93414635944 HC PT THER PROC GROUP 12/16/2017 Axel Snyder, PT GP 1    40608148015 HC PT THER PROC EA 15 MIN 12/17/2017 Axel Snyder, PT GP 1    67395124006 HC PT THER PROC GROUP 12/17/2017 Axel Snyder, PT GP 1    58767842936 HC PT THER PROC EA 15 MIN 12/17/2017 Axel Snyder, PT GP 1    06131783638 HC PT THER PROC GROUP 12/17/2017 Axel Snyder, PT GP 1          PT G-Codes  Outcome Measure Options: AM-PAC 6 Clicks Basic Mobility (PT)    Axel Snyder, PT  12/17/2017

## 2017-12-17 NOTE — NURSING NOTE
Patient states he should have some clothes that he came to the hospital with. No key present on chart. This RN called security to see if patient belonging were locked up. Security states his name is not on their belongings log. This RN reviewed the charting, which states his belongings were placed in locker the today of surgery by Justina Wise RN. This RN called PACU and spoke with Shirley who is checking to see if belongings are still in the recovery room. Awaiting a return call.

## 2017-12-17 NOTE — PLAN OF CARE
Problem: Patient Care Overview (Adult)  Goal: Plan of Care Review  Outcome: Ongoing (interventions implemented as appropriate)    12/17/17 0228   Coping/Psychosocial Response Interventions   Plan Of Care Reviewed With patient   Patient Care Overview   Progress improving   Outcome Evaluation   Outcome Summary/Follow up Plan VSS, very minimal pain, BRP, RA, SL, CPAP at hs, encouraged IS use and educated on BP monitoring and use of CPAP while sleeping, plans for rehab in the AM       Goal: Adult Individualization and Mutuality  Outcome: Ongoing (interventions implemented as appropriate)  Goal: Discharge Needs Assessment  Outcome: Ongoing (interventions implemented as appropriate)    Problem: Knee Replacement, Total (Adult)  Goal: Signs and Symptoms of Listed Potential Problems Will be Absent or Manageable (Knee Replacement, Total)  Outcome: Ongoing (interventions implemented as appropriate)    Problem: Fall Risk (Adult)  Goal: Absence of Falls  Outcome: Ongoing (interventions implemented as appropriate)

## 2017-12-17 NOTE — PROGRESS NOTES
"/72 (BP Location: Right arm, Patient Position: Lying)  Pulse 80  Temp 98.5 °F (36.9 °C) (Oral)   Resp 16  Ht 175.3 cm (69\")  Wt 124 kg (272 lb 9.6 oz)  SpO2 97%  BMI 40.26 kg/m2    Lab Results (last 24 hours)     Procedure Component Value Units Date/Time    Urine Culture - Urine, Urine, Clean Catch [[id number]]  (Normal) Collected:  12/16/17 0049    Specimen:  Urine from Urine, Clean Catch Updated:  12/17/17 0718     Urine Culture No growth          Imaging Results (last 24 hours)     ** No results found for the last 24 hours. **          Patient Care Team:  Milo Carrasquillo DO as PCP - General (Physical Medicine and Rehabilitation)  Albert Shukla MD as Consulting Physician (Cardiology)    SUBJECTIVE  Doing well in PT  PHYSICAL EXAM   Quite a bit of bloody drainage  Active Problems:    DJD (degenerative joint disease) of knee      PLAN / DISPOSITION:  Hold off discharge 24 hours and watch wound  Jatin Lancaster MD  12/17/17  8:05 AM      "

## 2017-12-17 NOTE — NURSING NOTE
Patient c/o not emptying his bladder after he urinates. Patient has voided small amounts all day. Ambulated to the bathroom and he voided 25 cc in the toilet. Bladder scan shows PVR of 932 cc. Dr. Lancaster notified and Flomax ordered and perez catheter ordered.

## 2017-12-17 NOTE — PLAN OF CARE
Problem: Patient Care Overview (Adult)  Goal: Plan of Care Review    12/17/17 4760   Coping/Psychosocial Response Interventions   Plan Of Care Reviewed With patient   Outcome Evaluation   Outcome Summary/Follow up Plan Gait distance limited this PM due to increased fatigue, weakness. Pt. requires continued verbal/tactile cues for posture correction with use of RWX. Pt. independent however in TKR ther. ex. protocol at 30 reps.

## 2017-12-17 NOTE — PLAN OF CARE
Problem: Patient Care Overview (Adult)  Goal: Plan of Care Review  Outcome: Ongoing (interventions implemented as appropriate)    12/17/17 1211   Coping/Psychosocial Response Interventions   Plan Of Care Reviewed With patient   Patient Care Overview   Progress improving   Outcome Evaluation   Outcome Summary/Follow up Plan discharge held today for increased drainage from incision. VSS. BP stable on home BP medication regimen. no s/s of HTN voiced or noted this shift. PT gym session x 2. voices adequate pain control with PO Percocet. scant drainage coming from left knee incision. large BM this shift. c/o not empting bladder, bladder fullness/pressure- PVR should > 900cc. perez placed this shift and flomax initiated. plans to discharge to rehab in AM if medically stable.          Problem: Knee Replacement, Total (Adult)  Goal: Signs and Symptoms of Listed Potential Problems Will be Absent or Manageable (Knee Replacement, Total)  Outcome: Ongoing (interventions implemented as appropriate)    Problem: Fall Risk (Adult)  Goal: Absence of Falls  Outcome: Ongoing (interventions implemented as appropriate)

## 2017-12-18 VITALS
TEMPERATURE: 98.2 F | WEIGHT: 272.6 LBS | RESPIRATION RATE: 18 BRPM | HEIGHT: 69 IN | HEART RATE: 87 BPM | BODY MASS INDEX: 40.38 KG/M2 | OXYGEN SATURATION: 94 % | SYSTOLIC BLOOD PRESSURE: 107 MMHG | DIASTOLIC BLOOD PRESSURE: 61 MMHG

## 2017-12-18 PROCEDURE — 97110 THERAPEUTIC EXERCISES: CPT

## 2017-12-18 PROCEDURE — 97150 GROUP THERAPEUTIC PROCEDURES: CPT

## 2017-12-18 RX ORDER — OXYCODONE HYDROCHLORIDE AND ACETAMINOPHEN 5; 325 MG/1; MG/1
1-2 TABLET ORAL EVERY 4 HOURS PRN
Qty: 60 TABLET | Refills: 0 | Status: SHIPPED | OUTPATIENT
Start: 2017-12-18 | End: 2017-12-24

## 2017-12-18 RX ORDER — TAMSULOSIN HYDROCHLORIDE 0.4 MG/1
0.4 CAPSULE ORAL DAILY
Qty: 30 CAPSULE | Refills: 0 | Status: SHIPPED | OUTPATIENT
Start: 2017-12-18 | End: 2018-01-17

## 2017-12-18 RX ADMIN — PANTOPRAZOLE SODIUM 40 MG: 40 TABLET, DELAYED RELEASE ORAL at 06:49

## 2017-12-18 RX ADMIN — DIVALPROEX SODIUM 500 MG: 500 TABLET, DELAYED RELEASE ORAL at 08:30

## 2017-12-18 RX ADMIN — DOCUSATE SODIUM -SENNOSIDES 2 TABLET: 50; 8.6 TABLET, COATED ORAL at 08:30

## 2017-12-18 RX ADMIN — POTASSIUM CHLORIDE 20 MEQ: 750 CAPSULE, EXTENDED RELEASE ORAL at 08:30

## 2017-12-18 RX ADMIN — OXYCODONE HYDROCHLORIDE AND ACETAMINOPHEN 1 TABLET: 5; 325 TABLET ORAL at 04:21

## 2017-12-18 RX ADMIN — ASPIRIN 81 MG: 81 TABLET, CHEWABLE ORAL at 08:30

## 2017-12-18 RX ADMIN — TAMSULOSIN HYDROCHLORIDE 0.4 MG: 0.4 CAPSULE ORAL at 08:30

## 2017-12-18 RX ADMIN — OXYCODONE HYDROCHLORIDE AND ACETAMINOPHEN 1 TABLET: 5; 325 TABLET ORAL at 11:56

## 2017-12-18 RX ADMIN — FERROUS SULFATE TAB 325 MG (65 MG ELEMENTAL FE) 325 MG: 325 (65 FE) TAB at 08:30

## 2017-12-18 RX ADMIN — OXYCODONE HYDROCHLORIDE AND ACETAMINOPHEN 1 TABLET: 5; 325 TABLET ORAL at 16:20

## 2017-12-18 RX ADMIN — LEVOTHYROXINE SODIUM 25 MCG: 25 TABLET ORAL at 06:49

## 2017-12-18 NOTE — THERAPY DISCHARGE NOTE
Acute Care - Physical Therapy Treatment Note/Discharge  [facility]     Patient Name: Hernando Lozada  : 1945  MRN: [id number]  Today's Date: 2017  Onset of Illness/Injury or Date of Surgery Date: 17  Date of Referral to PT: 17  Referring Physician: Jatin Mccord    Admit Date: 2017    Visit Dx:    ICD-10-CM ICD-9-CM   1. Difficulty walking R26.2 719.7     Patient Active Problem List   Diagnosis   • DJD (degenerative joint disease) of knee       Physical Therapy Education     Title: PT OT SLP Therapies (Done)     Topic: Physical Therapy (Done)     Point: Mobility training (Done)    Learning Progress Summary    Learner Readiness Method Response Comment Documented by Status   Patient Acceptance E,D VU,NR  MS 17 0935 Done    Acceptance E,D VU,NR  MS 17 1424 Done    Acceptance E,D VU,NR  MS 17 1117 Done    Acceptance E,D VU,NR  MS 17 1633 Done    Acceptance E,D VU,NR  MS 17 1020 Done    Acceptance E,D VU,NR  MS 12/15/17 0918 Done    Acceptance E,D VU,NR  MS 17 1429 Done               Point: Home exercise program (Done)    Learning Progress Summary    Learner Readiness Method Response Comment Documented by Status   Patient Acceptance E,D VU,NR  MS 17 0935 Done    Acceptance E,D VU,NR  MS 17 1424 Done    Acceptance E,D VU,NR  MS 17 1117 Done    Acceptance E,D VU,NR  MS 17 1633 Done    Acceptance E,D VU,NR  MS 17 1020 Done    Acceptance E,D VU,NR  MS 12/15/17 0918 Done    Acceptance E,D VU,NR  MS 17 1429 Done               Point: Body mechanics (Done)    Learning Progress Summary    Learner Readiness Method Response Comment Documented by Status   Patient Acceptance E,D VU,NR  MS 17 0935 Done    Acceptance E,D VU,NR  MS 17 1424 Done    Acceptance E,D VU,NR  MS 17 1117 Done    Acceptance E,D VU,NR  MS 17 1633 Done    Acceptance E,D VU,NR  MS 17 1020 Done    Acceptance MARIA ANTONIA ANDRES NR  MS 12/15/17  0918 Done    Acceptance E,D VU,NR  MS 12/14/17 1429 Done               Point: Precautions (Done)    Learning Progress Summary    Learner Readiness Method Response Comment Documented by Status   Patient Acceptance E,D VU,NR  MS 12/18/17 0935 Done    Acceptance E,D VU,NR  MS 12/17/17 1424 Done    Acceptance E,D VU,NR  MS 12/17/17 1117 Done    Acceptance E,D VU,NR  MS 12/16/17 1633 Done    Acceptance E,D VU,NR  MS 12/16/17 1020 Done    Acceptance E,D VU,NR  MS 12/15/17 0918 Done    Acceptance E,D VU,NR  MS 12/14/17 1429 Done                      User Key     Initials Effective Dates Name Provider Type Discipline    MS 12/01/15 -  Axel Snyder, PT Physical Therapist PT                    IP PT Goals       12/18/17 0935 12/14/17 1429       Bed Mobility PT LTG    Bed Mobility PT LTG, Date Established 12/18/17  -MS 12/14/17  -MS     Bed Mobility PT LTG, Time to Achieve  3 days  -MS     Bed Mobility PT LTG, Activity Type  all bed mobility  -MS     Bed Mobility PT LTG, [place] Level  independent  -MS     Bed Mobility PT LTG, Outcome goal not met  -MS      Transfer Training PT LTG    Transfer Training PT LTG, Date Established 12/18/17  -MS 12/14/17  -MS     Transfer Training PT LTG, Time to Achieve  3 days  -MS     Transfer Training PT LTG, Activity Type  all transfers  -MS     Transfer Training PT LTG, [place] Level  independent  -MS     Transfer Training PT LTG, Assist Device  walker, rolling  -MS     Transfer Training PT LTG, Outcome goal not met  -MS      Transfer Training PT LTG, Reason Goal Not Met discharged from facility  -MS      Gait Training PT LTG    Gait Training Goal PT LTG, Date Established 12/18/17  -MS 12/14/17  -MS     Gait Training Goal PT LTG, Time to Achieve  3 days  -MS     Gait Training Goal PT LTG, [place] Level contact guard assist  -MS independent  -MS     Gait Training Goal PT LTG, Assist Device walker, rolling  -MS walker, rolling  -MS     Gait Training Goal PT LTG, Distance to  "Achieve 120 feet  - feet  -MS     Gait Training Goal PT LTG, Outcome goal partially met  -MS      Gait Training Goal PT LTG, Reason Goal Not Met discharged from facility  -MS      Range of Motion PT LTG    Range of Motion Goal PT LTG, Date Established 12/18/17  -MS 12/14/17  -MS     Range of Motion Goal PT LTG, Time to Achieve  3 days  -MS     Range fo Motion Goal PT LTG, Joint L knee  -MS L knee  -MS     Range of Motion Goal PT LTG, AROM Measure (7, 90)  -MS (5, 85)  -MS     Range of Motion Goal PT LTG, Outcome goal partially met  -MS      Reason Goal Not Met (ROM) PT LTG discharged from facility  -MS        User Key  (r) = Recorded By, (t) = Taken By, (c) = Cosigned By    Initials Name Provider Type    MS Axel Snyder, PT Physical Therapist              Adult Rehabilitation Note       12/18/17 0932 12/17/17 1421 12/17/17 1115    Rehab Assessment/Intervention    Discipline physical therapist  -MS physical therapist  -MS physical therapist  -MS    Document Type therapy note (daily note);discharge summary  -MS therapy note (daily note)  -MS therapy note (daily note)  -MS    Subjective Information agree to therapy;complains of;pain  -MS agree to therapy;complains of;weakness;fatigue;pain  -MS agree to therapy;complains of;pain  -MS    Patient Effort, Rehab Treatment good  -MS good  -MS good  -MS    Symptoms Noted During/After Treatment   fatigue;increased pain  -MS    Symptoms Noted Comment Pt. eager to work with P.T. this AM and \"head to rehab\".  Pt. reports mild pain/soreness in his Left knee.  -MS Pt. reports feeling \"worn out\" this PM with c/o continued pain/soreness in his Left knee.  -MS     Precautions/Limitations --   Pt. now has Trujillo Catheter.  -MS fall precautions  -MS fall precautions  -MS    Recorded by [MS] Axel Snyder, PT [MS] Axel Snyder, PT [MS] Axel Snyder, PT    Pain Assessment    Pain Assessment 0-10  -MS 0-10  -MS 0-10  -MS    Pain Score 3  -MS 5  -MS 5  -MS    Post Pain " Score 3  -MS 5  -MS 5  -MS    Pain Type Acute pain;Surgical pain  -MS Acute pain;Surgical pain  -MS Acute pain;Surgical pain  -MS    Pain Location Knee  -MS Knee  -MS Knee  -MS    Pain Orientation Left  -MS Left  -MS Left  -MS    Pain Descriptors Sore  -MS Sore  -MS Sore  -MS    Pain Intervention(s)  Medication (See MAR);Cold applied;Repositioned;Elevated;Rest  -MS Medication (See MAR);Cold applied;Repositioned;Elevated;Rest  -MS    Recorded by [MS] Axel Snyder PT [MS] Axel Snyder PT [MS] Axel Snyder PT    Cognitive Assessment/Intervention    Current Cognitive/Communication Assessment functional  -MS functional  -MS functional  -MS    Orientation Status oriented x 4  -MS oriented x 4  -MS oriented x 4  -MS    Follows Commands/Answers Questions 100% of the time  -% of the time  -% of the time  -MS    Personal Safety WNL/WFL  -MS WNL/WFL  -MS WNL/WFL  -MS    Personal Safety Interventions gait belt;fall prevention program maintained;nonskid shoes/slippers when out of bed;supervised activity  -MS fall prevention program maintained;gait belt;nonskid shoes/slippers when out of bed;supervised activity  -MS fall prevention program maintained;gait belt;nonskid shoes/slippers when out of bed;supervised activity  -MS    Recorded by [MS] Axel Snyder PT [MS] Axel Snyder PT [MS] Axel Snyder PT    ROM (Range of Motion)    General ROM Detail Left knee AROM (7, 90)  -MS  Left knee AROM (9, 83)  -MS    Recorded by [MS] Axel Snyder PT  [MS] Axel Snyder PT    Bed Mobility, Assessment/Treatment    Bed Mobility, Comment Up in chair this AM for the gym.  -MS Pt. up in chair this PM for the gym.  -MS Up in chair this AM for the gym.  -MS    Recorded by [MS] Axel Sndyer PT [MS] Axel Snyder PT [MS] Axel Snyder PT    Transfer Assessment/Treatment    Transfers, Sit-Stand [place] contact guard assist  -MS contact guard assist  -MS contact guard assist  -MS     "Transfers, Stand-Sit [place] contact guard assist  -MS contact guard assist  -MS contact guard assist  -MS    Transfers, Sit-Stand-Sit, Assist Device rolling walker  -MS rolling walker  -MS rolling walker  -MS    Recorded by [MS] Axel Snyder PT [MS] Axel Snyder PT [MS] Axel Snyder PT    Gait Assessment/Treatment    Gait, [place] Level contact guard assist  -MS contact guard assist  -MS stand by assist  -MS    Gait, Assistive Device rolling walker  -MS rolling walker  -MS rolling walker  -MS    Gait, Distance (Feet) 120  -MS 85  -  -MS    Gait, Gait Pattern Analysis swing-through gait  -MS swing-through gait  -MS swing-through gait  -MS    Gait, Gait Deviations left:;antalgic;sergei decreased;forward flexed posture  -MS left:;antalgic;sergei decreased;forward flexed posture  -MS left:;antalgic;sergei decreased;forward flexed posture  -MS    Gait, Comment Pt. requires continued verbal/tactile cues for posture correction.   -MS x 2 standing rest breaks this PM during ambulation due to increased fatigue and pt. feeling \"worn out\".  Pt. requires continued verbal/tactile cues for posture correction with use of RWX.  -MS Min. verbal/tactile cues for posture correction with use of the RWX.  -MS    Recorded by [MS] Axel Snyder PT [MS] Axel Snyder PT [MS] Axel Snyder PT    Therapy Exercises    Exercise Protocols total knee  -MS total knee  -MS total knee  -MS    Total Knee Exercises left:;30 reps;completed protocol   Independent with all ther. ex.  -MS left:;30 reps;completed protocol  -MS left:;30 reps;completed protocol  -MS    Recorded by [MS] Axel Snyder PT [MS] Axel Snyder PT [MS] Axel Snyder PT    Positioning and Restraints    Pre-Treatment Position sitting in chair/recliner  -MS sitting in chair/recliner  -MS sitting in chair/recliner  -MS    Post Treatment Position chair  -MS chair  -MS chair  -MS    In Chair notified nsg;reclined;sitting;call " light within reach;encouraged to call for assist   MD (Urologist) in room to see pt.  -MS notified nsg;reclined;sitting;call light within reach;encouraged to call for assist   Ice pack to Left knee.  -MS notified nsg;reclined;sitting;call light within reach;encouraged to call for assist   Ice pack to Left knee.  -MS    Recorded by [MS] Axel Snyder PT [MS] Axel Snyder PT [MS] Axel Snyder PT      12/16/17 1631 12/16/17 1019 12/15/17 1611    Rehab Assessment/Intervention    Discipline physical therapist  -MS physical therapist  -MS physical therapist  -MS    Document Type therapy note (daily note)  -MS therapy note (daily note)  -MS therapy note (daily note)  -MS    Subjective Information agree to therapy;complains of;pain  -MS agree to therapy;complains of;fatigue;pain  -MS agree to therapy;complains of;pain  -MS    Patient Effort, Rehab Treatment good  -MS good  -MS good  -MS    Symptoms Noted During/After Treatment fatigue  -MS fatigue;increased pain  -MS fatigue  -MS    Precautions/Limitations fall precautions   Exit alarm  -MS fall precautions   Exit alarm  -MS fall precautions   Exit alarm  -MS    Recorded by [MS] Axel Snyder PT [MS] Axel Snyder PT [MS] Axel Snyder PT    Pain Assessment    Pain Assessment 0-10  -MS  0-10  -MS    Pain Score 4  -MS 5  -MS 3  -MS    Post Pain Score 4  -MS 5  -MS 3  -MS    Pain Type Acute pain;Surgical pain  -MS Acute pain;Surgical pain  -MS Acute pain;Surgical pain  -MS    Pain Location Knee  -MS Knee  -MS Knee  -MS    Pain Orientation Left  -MS Left  -MS Left  -MS    Pain Intervention(s) Medication (See MAR);Cold applied;Repositioned;Elevated;Rest  -MS Medication (See MAR);Cold applied;Repositioned;Elevated;Rest  -MS     Recorded by [MS] Axel Snyder PT [MS] Axel Snyder PT [MS] Axel Snyder PT    Cognitive Assessment/Intervention    Current Cognitive/Communication Assessment functional  -MS functional  -MS     Orientation Status  oriented x 4  -MS oriented x 4  -MS     Follows Commands/Answers Questions 100% of the time  -% of the time  -MS     Personal Safety WNL/WFL  -MS WNL/WFL  -MS     Personal Safety Interventions fall prevention program maintained;gait belt;nonskid shoes/slippers when out of bed;supervised activity  -MS fall prevention program maintained;gait belt;nonskid shoes/slippers when out of bed;supervised activity  -MS     Recorded by [MS] Axel Snyder PT [MS] Axel Snyder PT     ROM (Range of Motion)    General ROM Detail  Left knee AROM (8, 80)  -MS     Recorded by  [MS] Axel Snyder PT     Bed Mobility, Assessment/Treatment    Bed Mobility, Comment Up in chair this PM for the gym.  -MS Up in chair this AM.  -MS Up in chair this PM for the gym.  -MS    Recorded by [MS] Axel Snyder PT [MS] Axel Snyder PT [MS] Axel Snyder PT    Transfer Assessment/Treatment    Transfers, Sit-Stand [place] contact guard assist  -MS contact guard assist  -MS contact guard assist  -MS    Transfers, Stand-Sit [place] contact guard assist  -MS contact guard assist  -MS contact guard assist  -MS    Transfers, Sit-Stand-Sit, Assist Device rolling walker  -MS rolling walker  -MS rolling walker  -MS    Recorded by [MS] Axel Snyder PT [MS] Axel Snyder PT [MS] Axel Snyder PT    Gait Assessment/Treatment    Gait, [place] Level stand by assist  -MS stand by assist  -MS contact guard assist  -MS    Gait, Assistive Device rolling walker  -MS rolling walker  -MS rolling walker  -MS    Gait, Distance (Feet) 100  -  -  -MS    Gait, Gait Pattern Analysis swing-through gait  -MS swing-through gait  -MS swing-through gait  -MS    Gait, Gait Deviations left:;antalgic;sergei decreased;forward flexed posture  -MS left:;antalgic;sergei decreased;forward flexed posture  -MS left:;antalgic;sergei decreased;forward flexed posture  -MS    Gait, Comment Limited this PM  in gait distance  due to increased fatigue.  Pt. requires continued verbal/tactile cues for posture correction.  -MS Verbal/tactile cues for posture correction.  -MS Continued verbal/tactile cues for posture correction.  -MS    Recorded by [MS] Axel Snyder PT [MS] Axel Snyder PT [MS] Axel Snyder PT    Therapy Exercises    Exercise Protocols total knee  -MS total knee  -MS total knee  -MS    Total Knee Exercises left:;30 reps;completed protocol  -MS left:;25 reps;completed protocol  -MS left:;20 reps;completed protocol  -MS    Recorded by [MS] Axel Snyder PT [MS] Axel Snyder PT [MS] Axel Snyder PT    Positioning and Restraints    Pre-Treatment Position sitting in chair/recliner  -MS sitting in chair/recliner  -MS sitting in chair/recliner  -MS    Post Treatment Position chair  -MS chair  -MS chair  -MS    In Chair notified nsg;reclined;sitting;call light within reach;encouraged to call for assist  -MS notified nsg;reclined;sitting;call light within reach;encouraged to call for assist;exit alarm on  -MS notified nsg;reclined;sitting;call light within reach;encouraged to call for assist;exit alarm on   Ice pack to Left knee.  -MS    Recorded by [MS] Axel Snyder PT [MS] Axel Snyder PT [MS] Axel Snyder PT      User Key  (r) = Recorded By, (t) = Taken By, (c) = Cosigned By    Initials Name Effective Dates    MS Axel Snyder PT 12/01/15 -           PT Recommendation and Plan  Anticipated Discharge Disposition: skilled nursing facility  Planned Therapy Interventions: balance training, bed mobility training, gait training, home exercise program, patient/family education, postural re-education, ROM (Range of Motion), strengthening, transfer training  PT Frequency: 2 times/day  Plan of Care Review  Plan Of Care Reviewed With: patient  Progress: progress towards functional goals is fair  Outcome Summary/Follow up Plan: Gait distance limited this PM due to increased fatigue, weakness.   Pt. requires continued verbal/tactile cues for posture correction with use of RWX.  Pt. independent however in TKR ther. ex. protocol at 30 reps.          Outcome Measures       12/18/17 0900 12/17/17 1400 12/17/17 1100    How much help from another person do you currently need...    Turning from your back to your side while in flat bed without using bedrails? 4  -MS 4  -MS 4  -MS    Moving from lying on back to sitting on the side of a flat bed without bedrails? 3  -MS 3  -MS 3  -MS    Moving to and from a bed to a chair (including a wheelchair)? 3  -MS 3  -MS 3  -MS    Standing up from a chair using your arms (e.g., wheelchair, bedside chair)? 3  -MS 3  -MS 3  -MS    Climbing 3-5 steps with a railing? 3  -MS 3  -MS 3  -MS    To walk in hospital room? 3  -MS 3  -MS 3  -MS    AM-PAC 6 Clicks Score 19  -MS 19  -MS 19  -MS    Functional Assessment    Outcome Measure Options AM-PAC 6 Clicks Basic Mobility (PT)  -MS AM-PAC 6 Clicks Basic Mobility (PT)  -MS AM-PAC 6 Clicks Basic Mobility (PT)  -MS      12/16/17 1600 12/16/17 1000 12/15/17 1600    How much help from another person do you currently need...    Turning from your back to your side while in flat bed without using bedrails? 4  -MS 4  -MS 4  -MS    Moving from lying on back to sitting on the side of a flat bed without bedrails? 3  -MS 3  -MS 3  -MS    Moving to and from a bed to a chair (including a wheelchair)? 3  -MS 3  -MS 3  -MS    Standing up from a chair using your arms (e.g., wheelchair, bedside chair)? 3  -MS 3  -MS 3  -MS    Climbing 3-5 steps with a railing? 3  -MS 3  -MS 3  -MS    To walk in hospital room? 3  -MS 3  -MS 3  -MS    AM-PAC 6 Clicks Score 19  -MS 19  -MS 19  -MS    Functional Assessment    Outcome Measure Options AM-PAC 6 Clicks Basic Mobility (PT)  -MS AM-PAC 6 Clicks Basic Mobility (PT)  -MS AM-PAC 6 Clicks Basic Mobility (PT)  -MS      User Key  (r) = Recorded By, (t) = Taken By, (c) = Cosigned By    Initials Name Provider Type    MS  Axel Snyder, PT Physical Therapist           Time Calculation:         PT Charges       12/18/17 0937          Time Calculation    Start Time 0843  -MS      Stop Time 0909  -MS      Time Calculation (min) 26 min  -MS      PT Received On 12/18/17  -MS        User Key  (r) = Recorded By, (t) = Taken By, (c) = Cosigned By    Initials Name Provider Type    MS Axel Snyder, PT Physical Therapist          Therapy Charges for Today     Code Description Service Date Service Provider Modifiers Qty    77168278042 HC PT THER PROC EA 15 MIN 12/17/2017 Axel Snyder, PT GP 1    41039057356 HC PT THER PROC GROUP 12/17/2017 Axel Snyder, PT GP 1    38729842931 HC PT THER PROC EA 15 MIN 12/17/2017 Axel Snyder, PT GP 1    64586443419 HC PT THER PROC GROUP 12/17/2017 Axel Snyder, PT GP 1    00760582438 HC PT THER PROC EA 15 MIN 12/18/2017 Axel Snyder, PT GP 1    46620727254 HC PT THER PROC GROUP 12/18/2017 Axel Snyder, PT GP 1          PT G-Codes  Outcome Measure Options: AM-PAC 6 Clicks Basic Mobility (PT)    PT Discharge Summary  Anticipated Discharge Disposition: skilled nursing facility  Reason for Discharge: Discharge from facility  Outcomes Achieved: Refer to plan of care for updates on goals achieved  Discharge Destination: SNF    Axel Snyder, PT  12/18/2017

## 2017-12-18 NOTE — PLAN OF CARE
Problem: Inpatient Physical Therapy  Goal: Bed Mobility Goal LTG- PT    12/18/17 0935   Bed Mobility PT LTG   Bed Mobility PT LTG, Date Established 12/18/17   Bed Mobility PT LTG, Outcome goal not met       Goal: Transfer Training Goal 1 LTG- PT    12/18/17 0935   Transfer Training PT LTG   Transfer Training PT LTG, Date Established 12/18/17   Transfer Training PT LTG, Outcome goal not met   Transfer Training PT LTG, Reason Goal Not Met discharged from facility       Goal: Gait Training Goal LTG- PT    12/18/17 0935   Gait Training PT LTG   Gait Training Goal PT LTG, Date Established 12/18/17   Gait Training Goal PT LTG, [place] Level contact guard assist   Gait Training Goal PT LTG, Assist Device walker, rolling   Gait Training Goal PT LTG, Distance to Achieve 120 feet   Gait Training Goal PT LTG, Outcome goal partially met   Gait Training Goal PT LTG, Reason Goal Not Met discharged from facility       Goal: Range of Motion Goal LTG- PT    12/18/17 0935   Range of Motion PT LTG   Range of Motion Goal PT LTG, Date Established 12/18/17   Range fo Motion Goal PT LTG, Joint L knee   Range of Motion Goal PT LTG, AROM Measure (7, 90)   Range of Motion Goal PT LTG, Outcome goal partially met   Reason Goal Not Met (ROM) PT LTG discharged from facility

## 2017-12-18 NOTE — PROGRESS NOTES
"/67 (BP Location: Right arm, Patient Position: Lying)  Pulse 83  Temp 97 °F (36.1 °C) (Oral)   Resp 16  Ht 175.3 cm (69\")  Wt 124 kg (272 lb 9.6 oz)  SpO2 97%  BMI 40.26 kg/m2    Lab Results (last 24 hours)     Procedure Component Value Units Date/Time    Urine Culture - Urine, Urine, Clean Catch [[id number]]  (Normal) Collected:  12/16/17 0049    Specimen:  Urine from Urine, Clean Catch Updated:  12/17/17 0718     Urine Culture No growth          Imaging Results (last 24 hours)     ** No results found for the last 24 hours. **          Patient Care Team:  Milo Carrasquillo DO as PCP - General (Physical Medicine and Rehabilitation)  Albert Shukla MD as Consulting Physician (Cardiology)    SUBJECTIVE  Knee doing well  Voiding issues  PHYSICAL EXAM   Wound better.  Minimal bloody drainage now  Active Problems:    DJD (degenerative joint disease) of knee  Urinary retention (900 cc residual when Trujillo placed)    PLAN / DISPOSITION:  Send to rehab with Trujillo and follow up with Urology (will check with Urology to see if this plan is OK with them)  Rehab discharge today  Jatin Lancaster MD  12/18/17  6:36 AM      "

## 2017-12-18 NOTE — DISCHARGE SUMMARY
Discharge Summary   Jatin Lancaster M.D.    NAME: Hernando Lozada ADMIT: 2017   : 1945  PCP: Milo Carrasquillo DO    MRN: [id number] LOS: 4 days   AGE/SEX: 72 y.o. male  ROOM:        Date of Discharge:  17    Primary Discharge Diagnosis:  DJD (degenerative joint disease) of knee [M17.10]    Secondary Discharge Diagnosis:    Problem List Items Addressed This Visit     None          Procedures Performed:  Left Total Knee Arthroplasty    Hospital Course:    Hernando Lozada is a 72 y.o.  male who underwent successful Left Total Knee Arthroplasty on 2017.  Hernando Lozada was started on Aspirin 325mg po daily immediately post-operatively for DVT prophylaxis.  On post-op day 1 the patients dressing was changed, drain removed and their incision was clean, with no signs of infection and their calf was soft, with no signs of DVT.  The patient progressed well with physical therapy and the patients hemoglobin remained stable. On post-operative day 4 the patient was felt ready for discharge.  We kept him in hospital an extra day due to bloody wound seepage.  This had largely resolved by the day of discharge.  However, he was having issues voiding, and the night before discharge a perez was placed with a 900 cc residual.      Total Knee Joint Replacement Discharge Instructions:    I. ACTIVITIES:    1. Exercises:  ? Complete exercise program as taught by the hospital physical therapist 2 times per day  ? Exercise program will be advanced by the physical therapist  ? During the day be up ambulating every 2 hours (while awake) for short distances  ? Complete the ankle pump exercises at least 10 times per hour (while awake)  ? Elevate legs most of the day the first week post operatively and thereafter elevate legs when in bed and for at least 30 minutes during the day. Caution must be taken to avoid pillow placement under the bend of the knee as this can led to flexion contractures of the knee.  ? Use cold  packs 20-30 minutes approximately 5 times per day. This should be done before and after completing your exercises and at any time you are experiencing pain/ stiffness in your operative extremity.    2. Activities of Daily Living:  ? No tub baths, hot tubs, or swimming pools for 4 weeks  ? May shower and let water run over the incision on post-operative day #7 if no drainage. Do not scrub or rub the incision. Simply let the water run over the incision and pat dry.    II. Precautions:  ? Everyone that comes near you should wash their hands  ? No elective dental, genital-urinary, or colon procedures or surgical procedures for 12 weeks after surgery unless absolutely necessary.  ? If dental work or surgical procedure is deemed absolutely necessary during the first 12 weeks, you will need to contact your surgeon as you will need to take antibiotics 1 hour prior to any dental work (including teeth cleanings).  ? Please discuss with your surgeon prophylactic antibiotics as the length of time this intervention will be necessary for you varies with each patient’s health history and situation.  ? Avoid sick people. If you must be around someone who is ill, they should wear a mask.  ? Avoid visits to the Emergency Room or Urgent Care unless you are having a life threatening event.     III. INCISION CARE:  ? Wash your hands prior to dressing changes  ? Change the dressing as needed to keep incision clean and dry. Utilize dry gauze and paper tape. Avoid touching the side of the gauze that goes against the incision with your hands.  ? No creams or ointments to the incision  ? May remove dressing once the incision is free of drainage  ? Do not touch or pick at the incision  ? Check incision every day and notify surgeon immediately if any of the following signs or symptoms are noted:  o Increase in redness  o Increase in swelling around the incision and of the entire extremity  o Increase in pain  o Drainage oozing from the  incision  o Pulling apart of the edges of the incision  o Increase in overall body temperature (greater than 100.5 degrees)  ? Your surgeon will instruct you regarding suture or staple removal    IV. Medications:     1. Anticoagulants: You will be discharged on an anticoagulant. This is a prophylactic medication that helps prevent blood clots during your post-operative period. The type and length of dosage varies based on your individual needs, procedure performed, and surgeon’s preference.    ? While taking the anticoagulant, you should avoid taking any additional aspirin, ibuprofen (Advil or Motrin), Aleve (Naprosyn) or other non-steroidal anti-inflammatory medications.   ? Notify surgeon immediately if any kelton bleeding is noted in the urine, stool, emesis, or from the nose or the incision. Blood in the stool will often appear as black rather than red. Blood in urine may appear as pink. Blood in emesis may appear as brown/black like coffee grounds.  ? You will need to apply pressure for longer periods of time to any cuts or abrasions to stop bleeding  ? Avoid alcohol while taking anticoagulants    2. Stool Softeners: You will be at greater risk of constipation after surgery due to being less mobile and the pain medications.     ? Take stool softeners as instructed by your surgeon while on pain medications. Bran cereal is most effective. Over the counter Colace 100 mg 1-2 capsules twice daily.   ? Drink plenty of fluids, and eat fruits and vegetables during your recovery time    3. Pain Medications utilized after surgery are narcotics and the law requires that the following information be given to all patients that are prescribed narcotics:    ? CLASSIFICATION: Pain medications are called Opioids and are narcotics  ? LEGALITIES: It is illegal to share narcotics with others and to drive within 24 hours of taking narcotics  ? POTENTIAL SIDE EFFECTS: Potential side effects of opioids include: nausea, vomiting,  itching, dizziness, drowsiness, dry mouth, constipation, and difficulty urinating.  ? POTENTIAL ADVERSE EFFECTS:   o Opioid tolerance can develop with use of pain medications and this simply means that it requires more and more of the medication to control pain; however, this is seen more in patients that use opioids for longer periods of time.  o Opioid dependence can develop with use of Opioids and this simply means that to stop the medication can cause withdrawal symptoms; however, this is seen with patients that use Opioids for longer periods of time.  o Opioid addiction can develop with use of Opioids and the incidence of this is very unlikely in patients who take the medications as ordered and stop the medications as instructed.  o Opioid overdose can be dangerous, but is unlikely when the medication is taken as ordered and stopped when ordered. It is important not to mix opioids with alcohol or with and type of sedative such as Benadryl as this can lead to over sedation and respiratory difficulty.  ? DOSAGE:   o Pain medications will need to be taken consistently for the first week to decrease pain and promote adequate pain relief and participation in physical therapy.  o After the initial surgical pain begins to resolve, you may begin to decrease the pain medication. By the end of 6-8 weeks, you should be off of pain medications.  o Refills will not be given by the office during evening hours, on weekends, or after 6-8 weeks post-op.  o To seek refills on pain medications during the initial 6 week post-operative period, you must call the office 48 hours in advance to request the refill. The office will then notify you when to  the prescription. DO NOT wait until you are out of the medication to request a refill.    V. FOLLOW-UP VISITS:  ? You will need to follow up in the office with your surgeon in 3 weeks. Please call this number [phone] to schedule this appointment.  If you have any concerns  or suspected complications prior to your follow up visit, please call your surgeons office. Do not wait until your appointment time if you suspect complications. These will need to be addressed in the office promptly.  -- Follow up with Urology    Discharge Medications:     1) Percocet 5/325  1-2 po q 4-6 hours for pain control  2)  Aspirin 81 mg po daily.      Jatin Lancaster MD  12/18/2017  6:43 AM
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
## 2017-12-18 NOTE — PLAN OF CARE
Problem: Patient Care Overview (Adult)  Goal: Plan of Care Review  Outcome: Ongoing (interventions implemented as appropriate)    12/18/17 0255   Coping/Psychosocial Response Interventions   Plan Of Care Reviewed With patient   Patient Care Overview   Progress improving   Outcome Evaluation   Outcome Summary/Follow up Plan VSS. Neurovascular assessments WNL. No drainage to left knee. Has denied pain thus far this shift. Compliant with wearing CPAP while asleep. [place] patency maintained with cath care complete. ABD soft/non-distended, Verbalizes understanding of educational topics to include monitoring b/p and compliance with antihypertensives as well as indications and possible side effects of indwelling urinary catheter. Plans to d/c this date to Rehab if medically stable         Problem: Knee Replacement, Total (Adult)  Goal: Signs and Symptoms of Listed Potential Problems Will be Absent or Manageable (Knee Replacement, Total)  Outcome: Ongoing (interventions implemented as appropriate)    12/18/17 0255   Knee Replacement, Total   Problems Assessed (Total Knee Replacement) all   Problems Present (Total Knee Replacement) decreased range of motion;functional decline/self care deficit;voiding dysfunction         Problem: Fall Risk (Adult)  Goal: Absence of Falls  Outcome: Ongoing (interventions implemented as appropriate)    12/18/17 0255   Fall Risk (Adult)   Absence of Falls achieves outcome

## 2018-01-09 ENCOUNTER — HOSPITAL ENCOUNTER (EMERGENCY)
Facility: HOSPITAL | Age: 73
Discharge: LEFT BEFORE DISCHARGE | End: 2018-01-09

## 2018-01-09 VITALS
HEIGHT: 69 IN | SYSTOLIC BLOOD PRESSURE: 110 MMHG | TEMPERATURE: 98.7 F | HEART RATE: 62 BPM | RESPIRATION RATE: 15 BRPM | BODY MASS INDEX: 39.1 KG/M2 | WEIGHT: 264 LBS | OXYGEN SATURATION: 91 % | DIASTOLIC BLOOD PRESSURE: 69 MMHG

## 2018-01-09 LAB
ALBUMIN SERPL-MCNC: 3.5 G/DL (ref 3.5–5.2)
ALBUMIN/GLOB SERPL: 1 G/DL
ALP SERPL-CCNC: 76 U/L (ref 39–117)
ALT SERPL W P-5'-P-CCNC: 14 U/L (ref 1–41)
ANION GAP SERPL CALCULATED.3IONS-SCNC: 11.5 MMOL/L
AST SERPL-CCNC: 24 U/L (ref 1–40)
BASOPHILS # BLD AUTO: 0.01 10*3/MM3 (ref 0–0.2)
BASOPHILS NFR BLD AUTO: 0.2 % (ref 0–1.5)
BILIRUB SERPL-MCNC: 0.4 MG/DL (ref 0.1–1.2)
BUN BLD-MCNC: 19 MG/DL (ref 8–23)
BUN/CREAT SERPL: 14 (ref 7–25)
CALCIUM SPEC-SCNC: 8.6 MG/DL (ref 8.6–10.5)
CHLORIDE SERPL-SCNC: 97 MMOL/L (ref 98–107)
CO2 SERPL-SCNC: 29.5 MMOL/L (ref 22–29)
CREAT BLD-MCNC: 1.36 MG/DL (ref 0.76–1.27)
DEPRECATED RDW RBC AUTO: 51 FL (ref 37–54)
EOSINOPHIL # BLD AUTO: 0.02 10*3/MM3 (ref 0–0.7)
EOSINOPHIL NFR BLD AUTO: 0.3 % (ref 0.3–6.2)
ERYTHROCYTE [DISTWIDTH] IN BLOOD BY AUTOMATED COUNT: 14.8 % (ref 11.5–14.5)
GFR SERPL CREATININE-BSD FRML MDRD: 52 ML/MIN/1.73
GLOBULIN UR ELPH-MCNC: 3.4 GM/DL
GLUCOSE BLD-MCNC: 133 MG/DL (ref 65–99)
HCT VFR BLD AUTO: 31.1 % (ref 40.4–52.2)
HGB BLD-MCNC: 9.6 G/DL (ref 13.7–17.6)
HOLD SPECIMEN: NORMAL
HOLD SPECIMEN: NORMAL
IMM GRANULOCYTES # BLD: 0 10*3/MM3 (ref 0–0.03)
IMM GRANULOCYTES NFR BLD: 0 % (ref 0–0.5)
LYMPHOCYTES # BLD AUTO: 1.27 10*3/MM3 (ref 0.9–4.8)
LYMPHOCYTES NFR BLD AUTO: 21.5 % (ref 19.6–45.3)
MCH RBC QN AUTO: 29.4 PG (ref 27–32.7)
MCHC RBC AUTO-ENTMCNC: 30.9 G/DL (ref 32.6–36.4)
MCV RBC AUTO: 95.4 FL (ref 79.8–96.2)
MONOCYTES # BLD AUTO: 0.78 10*3/MM3 (ref 0.2–1.2)
MONOCYTES NFR BLD AUTO: 13.2 % (ref 5–12)
NEUTROPHILS # BLD AUTO: 3.84 10*3/MM3 (ref 1.9–8.1)
NEUTROPHILS NFR BLD AUTO: 64.8 % (ref 42.7–76)
PLATELET # BLD AUTO: 267 10*3/MM3 (ref 140–500)
PMV BLD AUTO: 9.1 FL (ref 6–12)
POTASSIUM BLD-SCNC: 3.9 MMOL/L (ref 3.5–5.2)
PROT SERPL-MCNC: 6.9 G/DL (ref 6–8.5)
RBC # BLD AUTO: 3.26 10*6/MM3 (ref 4.6–6)
SODIUM BLD-SCNC: 138 MMOL/L (ref 136–145)
TROPONIN T SERPL-MCNC: <0.01 NG/ML (ref 0–0.03)
VALPROATE SERPL-MCNC: 56 MCG/ML (ref 50–125)
WBC NRBC COR # BLD: 5.92 10*3/MM3 (ref 4.5–10.7)
WHOLE BLOOD HOLD SPECIMEN: NORMAL
WHOLE BLOOD HOLD SPECIMEN: NORMAL

## 2018-01-09 PROCEDURE — 99211 OFF/OP EST MAY X REQ PHY/QHP: CPT

## 2018-01-09 PROCEDURE — 80053 COMPREHEN METABOLIC PANEL: CPT | Performed by: PHYSICIAN ASSISTANT

## 2018-01-09 PROCEDURE — 85025 COMPLETE CBC W/AUTO DIFF WBC: CPT | Performed by: PHYSICIAN ASSISTANT

## 2018-01-09 PROCEDURE — 84484 ASSAY OF TROPONIN QUANT: CPT | Performed by: PHYSICIAN ASSISTANT

## 2018-01-09 PROCEDURE — 80164 ASSAY DIPROPYLACETIC ACD TOT: CPT | Performed by: PHYSICIAN ASSISTANT

## 2018-01-09 RX ORDER — SODIUM CHLORIDE 0.9 % (FLUSH) 0.9 %
10 SYRINGE (ML) INJECTION AS NEEDED
Status: DISCONTINUED | OUTPATIENT
Start: 2018-01-09 | End: 2018-01-09 | Stop reason: HOSPADM

## 2018-01-09 NOTE — ED TRIAGE NOTES
[facility] reports a BP of 73/48 and called EMS. EMS reports a BP of 90/46 while sitting upon EMS arrival. Upon standing, pt had a BP of 90/50.

## 2018-01-17 ENCOUNTER — OFFICE VISIT (OUTPATIENT)
Dept: CARDIOLOGY | Age: 73
End: 2018-01-17

## 2018-01-17 VITALS
HEART RATE: 62 BPM | DIASTOLIC BLOOD PRESSURE: 74 MMHG | SYSTOLIC BLOOD PRESSURE: 121 MMHG | WEIGHT: 262 LBS | BODY MASS INDEX: 38.69 KG/M2

## 2018-01-17 DIAGNOSIS — I25.118 CORONARY ARTERY DISEASE OF NATIVE HEART WITH STABLE ANGINA PECTORIS, UNSPECIFIED VESSEL OR LESION TYPE (HCC): Primary | ICD-10-CM

## 2018-01-17 PROCEDURE — 99213 OFFICE O/P EST LOW 20 MIN: CPT | Performed by: INTERNAL MEDICINE

## 2018-01-17 PROCEDURE — 93000 ELECTROCARDIOGRAM COMPLETE: CPT | Performed by: INTERNAL MEDICINE

## 2018-01-17 RX ORDER — CELECOXIB 200 MG/1
200 CAPSULE ORAL DAILY
COMMUNITY
End: 2019-02-18
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
## 2018-07-06 ENCOUNTER — TRANSCRIBE ORDERS (OUTPATIENT)
Dept: ADMINISTRATIVE | Facility: HOSPITAL | Age: 73
End: 2018-07-06

## 2018-07-06 DIAGNOSIS — R13.10 DYSPHAGIA, UNSPECIFIED TYPE: Primary | ICD-10-CM

## 2018-07-13 ENCOUNTER — HOSPITAL ENCOUNTER (OUTPATIENT)
Dept: GENERAL RADIOLOGY | Facility: HOSPITAL | Age: 73
Discharge: HOME | End: 2018-07-13
Admitting: NURSE PRACTITIONER

## 2018-07-13 DIAGNOSIS — R13.10 DYSPHAGIA, UNSPECIFIED TYPE: ICD-10-CM

## 2018-07-13 PROCEDURE — 92611 MOTION FLUOROSCOPY/SWALLOW: CPT | Performed by: SPEECH-LANGUAGE PATHOLOGIST

## 2018-07-13 PROCEDURE — G8996 SWALLOW CURRENT STATUS: HCPCS

## 2018-07-13 PROCEDURE — 74230 X-RAY XM SWLNG FUNCJ C+: CPT

## 2018-07-13 PROCEDURE — A9270 NON-COVERED ITEM OR SERVICE: HCPCS | Performed by: NURSE PRACTITIONER

## 2018-07-13 PROCEDURE — 63710000001 BARIUM SULFATE 98 % RECONSTITUTED SUSPENSION: Performed by: NURSE PRACTITIONER

## 2018-07-13 PROCEDURE — 63710000001 BARIUM SULFATE 40 % SUSPENSION: Performed by: NURSE PRACTITIONER

## 2018-07-13 PROCEDURE — 63710000001 BARIUM SULFATE 96 % RECONSTITUTED SUSPENSION: Performed by: NURSE PRACTITIONER

## 2018-07-13 PROCEDURE — G8998 SWALLOW D/C STATUS: HCPCS

## 2018-07-13 PROCEDURE — G8997 SWALLOW GOAL STATUS: HCPCS

## 2018-07-13 RX ADMIN — BARIUM SULFATE 65 ML: 960 POWDER, FOR SUSPENSION ORAL at 10:35

## 2018-07-13 RX ADMIN — BARIUM SULFATE 50 ML: 400 SUSPENSION ORAL at 10:35

## 2018-07-13 RX ADMIN — BARIUM SULFATE 4 ML: 980 POWDER, FOR SUSPENSION ORAL at 10:35
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
## 2018-12-31 ENCOUNTER — HOSPITAL ENCOUNTER (EMERGENCY)
Facility: HOSPITAL | Age: 73
Discharge: HOME | End: 2018-12-31
Attending: EMERGENCY MEDICINE | Admitting: EMERGENCY MEDICINE

## 2018-12-31 ENCOUNTER — APPOINTMENT (OUTPATIENT)
Dept: GENERAL RADIOLOGY | Facility: HOSPITAL | Age: 73
End: 2018-12-31

## 2018-12-31 VITALS
OXYGEN SATURATION: 95 % | HEIGHT: 69 IN | HEART RATE: 75 BPM | DIASTOLIC BLOOD PRESSURE: 79 MMHG | RESPIRATION RATE: 16 BRPM | BODY MASS INDEX: 38.69 KG/M2 | TEMPERATURE: 98.6 F | SYSTOLIC BLOOD PRESSURE: 118 MMHG

## 2018-12-31 DIAGNOSIS — R07.89 ATYPICAL CHEST PAIN: Primary | ICD-10-CM

## 2018-12-31 LAB
ALBUMIN SERPL-MCNC: 4.1 G/DL (ref 3.5–5.2)
ALBUMIN/GLOB SERPL: 1.1 G/DL
ALP SERPL-CCNC: 66 U/L (ref 39–117)
ALT SERPL W P-5'-P-CCNC: 30 U/L (ref 1–41)
ANION GAP SERPL CALCULATED.3IONS-SCNC: 6.7 MMOL/L
AST SERPL-CCNC: 41 U/L (ref 1–40)
BASOPHILS # BLD AUTO: 0.02 10*3/MM3 (ref 0–0.2)
BASOPHILS NFR BLD AUTO: 0.3 % (ref 0–1.5)
BILIRUB SERPL-MCNC: 0.8 MG/DL (ref 0.1–1.2)
BUN BLD-MCNC: 24 MG/DL (ref 8–23)
BUN/CREAT SERPL: 19.8 (ref 7–25)
CALCIUM SPEC-SCNC: 9.4 MG/DL (ref 8.6–10.5)
CHLORIDE SERPL-SCNC: 99 MMOL/L (ref 98–107)
CO2 SERPL-SCNC: 32.3 MMOL/L (ref 22–29)
CREAT BLD-MCNC: 1.21 MG/DL (ref 0.76–1.27)
DEPRECATED RDW RBC AUTO: 49.7 FL (ref 37–54)
EOSINOPHIL # BLD AUTO: 0.04 10*3/MM3 (ref 0–0.7)
EOSINOPHIL NFR BLD AUTO: 0.5 % (ref 0.3–6.2)
ERYTHROCYTE [DISTWIDTH] IN BLOOD BY AUTOMATED COUNT: 14.6 % (ref 11.5–14.5)
GFR SERPL CREATININE-BSD FRML MDRD: 59 ML/MIN/1.73
GLOBULIN UR ELPH-MCNC: 3.7 GM/DL
GLUCOSE BLD-MCNC: 149 MG/DL (ref 65–99)
HCT VFR BLD AUTO: 39.1 % (ref 40.4–52.2)
HGB BLD-MCNC: 13 G/DL (ref 13.7–17.6)
HOLD SPECIMEN: NORMAL
HOLD SPECIMEN: NORMAL
IMM GRANULOCYTES # BLD AUTO: 0.02 10*3/MM3 (ref 0–0.03)
IMM GRANULOCYTES NFR BLD AUTO: 0.3 % (ref 0–0.5)
LYMPHOCYTES # BLD AUTO: 2.47 10*3/MM3 (ref 0.9–4.8)
LYMPHOCYTES NFR BLD AUTO: 33.3 % (ref 19.6–45.3)
MCH RBC QN AUTO: 31.2 PG (ref 27–32.7)
MCHC RBC AUTO-ENTMCNC: 33.2 G/DL (ref 32.6–36.4)
MCV RBC AUTO: 93.8 FL (ref 79.8–96.2)
MONOCYTES # BLD AUTO: 1.08 10*3/MM3 (ref 0.2–1.2)
MONOCYTES NFR BLD AUTO: 14.6 % (ref 5–12)
NEUTROPHILS # BLD AUTO: 3.81 10*3/MM3 (ref 1.9–8.1)
NEUTROPHILS NFR BLD AUTO: 51.3 % (ref 42.7–76)
PLATELET # BLD AUTO: 198 10*3/MM3 (ref 140–500)
PMV BLD AUTO: 10.4 FL (ref 6–12)
POTASSIUM BLD-SCNC: 4.4 MMOL/L (ref 3.5–5.2)
PROT SERPL-MCNC: 7.8 G/DL (ref 6–8.5)
RBC # BLD AUTO: 4.17 10*6/MM3 (ref 4.6–6)
SODIUM BLD-SCNC: 138 MMOL/L (ref 136–145)
TROPONIN T SERPL-MCNC: <0.01 NG/ML (ref 0–0.03)
TROPONIN T SERPL-MCNC: <0.01 NG/ML (ref 0–0.03)
WBC NRBC COR # BLD: 7.42 10*3/MM3 (ref 4.5–10.7)
WHOLE BLOOD HOLD SPECIMEN: NORMAL
WHOLE BLOOD HOLD SPECIMEN: NORMAL

## 2018-12-31 PROCEDURE — 84484 ASSAY OF TROPONIN QUANT: CPT | Performed by: EMERGENCY MEDICINE

## 2018-12-31 PROCEDURE — 93005 ELECTROCARDIOGRAM TRACING: CPT

## 2018-12-31 PROCEDURE — 93005 ELECTROCARDIOGRAM TRACING: CPT | Performed by: EMERGENCY MEDICINE

## 2018-12-31 PROCEDURE — 93010 ELECTROCARDIOGRAM REPORT: CPT | Performed by: INTERNAL MEDICINE

## 2018-12-31 PROCEDURE — 71046 X-RAY EXAM CHEST 2 VIEWS: CPT

## 2018-12-31 PROCEDURE — 80053 COMPREHEN METABOLIC PANEL: CPT

## 2018-12-31 PROCEDURE — 99285 EMERGENCY DEPT VISIT HI MDM: CPT

## 2018-12-31 PROCEDURE — 85025 COMPLETE CBC W/AUTO DIFF WBC: CPT

## 2018-12-31 PROCEDURE — 84484 ASSAY OF TROPONIN QUANT: CPT

## 2018-12-31 RX ORDER — SODIUM CHLORIDE 0.9 % (FLUSH) 0.9 %
10 SYRINGE (ML) INJECTION AS NEEDED
Status: DISCONTINUED | OUTPATIENT
Start: 2018-12-31 | End: 2018-12-31 | Stop reason: HOSPADM

## 2018-12-31 RX ORDER — ASPIRIN 325 MG
325 TABLET ORAL ONCE
Status: DISCONTINUED | OUTPATIENT
Start: 2018-12-31 | End: 2018-12-31 | Stop reason: HOSPADM

## 2019-01-01 NOTE — DISCHARGE INSTRUCTIONS
Return to the emergency department for recurrent or persistent chest pain, difficulty breathing, nausea, unexplained sweating, or other concern.  Follow-up with Dr. Miranda.

## 2019-02-18 ENCOUNTER — OFFICE VISIT (OUTPATIENT)
Dept: CARDIOLOGY | Facility: CLINIC | Age: 74
End: 2019-02-18

## 2019-02-18 VITALS
HEART RATE: 60 BPM | HEIGHT: 69 IN | BODY MASS INDEX: 42.95 KG/M2 | DIASTOLIC BLOOD PRESSURE: 91 MMHG | WEIGHT: 290 LBS | SYSTOLIC BLOOD PRESSURE: 174 MMHG

## 2019-02-18 DIAGNOSIS — I10 ESSENTIAL HYPERTENSION: ICD-10-CM

## 2019-02-18 DIAGNOSIS — R07.2 PRECORDIAL PAIN: Primary | ICD-10-CM

## 2019-02-18 DIAGNOSIS — M79.89 LEG SWELLING: ICD-10-CM

## 2019-02-18 DIAGNOSIS — R06.02 SOB (SHORTNESS OF BREATH): ICD-10-CM

## 2019-02-18 PROCEDURE — 99214 OFFICE O/P EST MOD 30 MIN: CPT | Performed by: INTERNAL MEDICINE

## 2019-02-18 RX ORDER — PROPRANOLOL HYDROCHLORIDE 20 MG/1
20 TABLET ORAL 3 TIMES DAILY
Status: ON HOLD | COMMUNITY
End: 2022-11-27 | Stop reason: SDUPTHER

## 2019-02-18 RX ORDER — SODIUM PHOSPHATE,MONO-DIBASIC 19G-7G/118
1000 ENEMA (ML) RECTAL DAILY
COMMUNITY

## 2019-02-18 RX ORDER — AMLODIPINE BESYLATE 2.5 MG/1
2.5 TABLET ORAL DAILY
Qty: 30 TABLET | Refills: 11 | Status: SHIPPED | OUTPATIENT
Start: 2019-02-18 | End: 2023-03-14 | Stop reason: HOSPADM

## 2019-03-04 ENCOUNTER — HOSPITAL ENCOUNTER (OUTPATIENT)
Dept: CARDIOLOGY | Facility: HOSPITAL | Age: 74
Discharge: HOME | End: 2019-03-04
Admitting: INTERNAL MEDICINE

## 2019-03-04 VITALS
SYSTOLIC BLOOD PRESSURE: 147 MMHG | BODY MASS INDEX: 42.95 KG/M2 | HEIGHT: 69 IN | HEART RATE: 64 BPM | DIASTOLIC BLOOD PRESSURE: 92 MMHG | WEIGHT: 290 LBS

## 2019-03-04 PROCEDURE — 93306 TTE W/DOPPLER COMPLETE: CPT | Performed by: INTERNAL MEDICINE

## 2019-03-04 PROCEDURE — 93306 TTE W/DOPPLER COMPLETE: CPT

## 2019-03-04 PROCEDURE — 25010000002 PERFLUTREN (DEFINITY) 8.476 MG IN SODIUM CHLORIDE 0.9 % 10 ML INJECTION: Performed by: INTERNAL MEDICINE

## 2019-03-04 RX ADMIN — SODIUM CHLORIDE 2.5 ML: 9 INJECTION INTRAMUSCULAR; INTRAVENOUS; SUBCUTANEOUS at 13:20

## 2019-03-06 LAB
BH CV ECHO MEAS - ACS: 1.7 CM
BH CV ECHO MEAS - AO MAX PG (FULL): 14.7 MMHG
BH CV ECHO MEAS - AO MAX PG: 20.8 MMHG
BH CV ECHO MEAS - AO MEAN PG (FULL): 8 MMHG
BH CV ECHO MEAS - AO MEAN PG: 11 MMHG
BH CV ECHO MEAS - AO ROOT AREA (BSA CORRECTED): 1.6
BH CV ECHO MEAS - AO ROOT AREA: 11.3 CM^2
BH CV ECHO MEAS - AO ROOT DIAM: 3.8 CM
BH CV ECHO MEAS - AO V2 MAX: 228 CM/SEC
BH CV ECHO MEAS - AO V2 MEAN: 154 CM/SEC
BH CV ECHO MEAS - AO V2 VTI: 46 CM
BH CV ECHO MEAS - AVA(I,A): 2.6 CM^2
BH CV ECHO MEAS - AVA(I,D): 2.6 CM^2
BH CV ECHO MEAS - AVA(V,A): 2.4 CM^2
BH CV ECHO MEAS - AVA(V,D): 2.4 CM^2
BH CV ECHO MEAS - BSA(HAYCOCK): 2.6 M^2
BH CV ECHO MEAS - BSA: 2.4 M^2
BH CV ECHO MEAS - BZI_BMI: 42.8 KILOGRAMS/M^2
BH CV ECHO MEAS - BZI_METRIC_HEIGHT: 175.3 CM
BH CV ECHO MEAS - BZI_METRIC_WEIGHT: 131.5 KG
BH CV ECHO MEAS - EDV(CUBED): 110.6 ML
BH CV ECHO MEAS - EDV(MOD-SP2): 81 ML
BH CV ECHO MEAS - EDV(MOD-SP4): 114 ML
BH CV ECHO MEAS - EDV(TEICH): 107.5 ML
BH CV ECHO MEAS - EF(CUBED): 70.4 %
BH CV ECHO MEAS - EF(MOD-BP): 66 %
BH CV ECHO MEAS - EF(MOD-SP2): 65.4 %
BH CV ECHO MEAS - EF(MOD-SP4): 64 %
BH CV ECHO MEAS - EF(TEICH): 61.9 %
BH CV ECHO MEAS - ESV(CUBED): 32.8 ML
BH CV ECHO MEAS - ESV(MOD-SP2): 28 ML
BH CV ECHO MEAS - ESV(MOD-SP4): 41 ML
BH CV ECHO MEAS - ESV(TEICH): 41 ML
BH CV ECHO MEAS - FS: 33.3 %
BH CV ECHO MEAS - IVS/LVPW: 1.1
BH CV ECHO MEAS - IVSD: 1.5 CM
BH CV ECHO MEAS - LA DIMENSION: 5.2 CM
BH CV ECHO MEAS - LA/AO: 1.4
BH CV ECHO MEAS - LAT PEAK E' VEL: 7.1 CM/SEC
BH CV ECHO MEAS - LV DIASTOLIC VOL/BSA (35-75): 47.1 ML/M^2
BH CV ECHO MEAS - LV MASS(C)D: 288.4 GRAMS
BH CV ECHO MEAS - LV MASS(C)DI: 119.2 GRAMS/M^2
BH CV ECHO MEAS - LV MAX PG: 6.1 MMHG
BH CV ECHO MEAS - LV MEAN PG: 3 MMHG
BH CV ECHO MEAS - LV SYSTOLIC VOL/BSA (12-30): 16.9 ML/M^2
BH CV ECHO MEAS - LV V1 MAX: 123 CM/SEC
BH CV ECHO MEAS - LV V1 MEAN: 78.4 CM/SEC
BH CV ECHO MEAS - LV V1 VTI: 26.1 CM
BH CV ECHO MEAS - LVIDD: 4.8 CM
BH CV ECHO MEAS - LVIDS: 3.2 CM
BH CV ECHO MEAS - LVLD AP2: 7.1 CM
BH CV ECHO MEAS - LVLD AP4: 7.5 CM
BH CV ECHO MEAS - LVLS AP2: 6.3 CM
BH CV ECHO MEAS - LVLS AP4: 6.3 CM
BH CV ECHO MEAS - LVOT AREA (M): 4.5 CM^2
BH CV ECHO MEAS - LVOT AREA: 4.5 CM^2
BH CV ECHO MEAS - LVOT DIAM: 2.4 CM
BH CV ECHO MEAS - LVPWD: 1.4 CM
BH CV ECHO MEAS - MED PEAK E' VEL: 6.1 CM/SEC
BH CV ECHO MEAS - MV A DUR: 0.23 SEC
BH CV ECHO MEAS - MV A MAX VEL: 144 CM/SEC
BH CV ECHO MEAS - MV DEC SLOPE: 264 CM/SEC^2
BH CV ECHO MEAS - MV DEC TIME: 0.21 SEC
BH CV ECHO MEAS - MV E MAX VEL: 120 CM/SEC
BH CV ECHO MEAS - MV E/A: 0.83
BH CV ECHO MEAS - MV MAX PG: 10 MMHG
BH CV ECHO MEAS - MV MEAN PG: 4 MMHG
BH CV ECHO MEAS - MV P1/2T MAX VEL: 119 CM/SEC
BH CV ECHO MEAS - MV P1/2T: 132 MSEC
BH CV ECHO MEAS - MV V2 MAX: 158 CM/SEC
BH CV ECHO MEAS - MV V2 MEAN: 95.7 CM/SEC
BH CV ECHO MEAS - MV V2 VTI: 44.4 CM
BH CV ECHO MEAS - MVA P1/2T LCG: 1.8 CM^2
BH CV ECHO MEAS - MVA(P1/2T): 1.7 CM^2
BH CV ECHO MEAS - MVA(VTI): 2.7 CM^2
BH CV ECHO MEAS - PA ACC TIME: 0.1 SEC
BH CV ECHO MEAS - PA MAX PG (FULL): 3.1 MMHG
BH CV ECHO MEAS - PA MAX PG: 4.6 MMHG
BH CV ECHO MEAS - PA PR(ACCEL): 34.9 MMHG
BH CV ECHO MEAS - PA V2 MAX: 107 CM/SEC
BH CV ECHO MEAS - PULM A REVS DUR: 0.15 SEC
BH CV ECHO MEAS - PULM A REVS VEL: 21 CM/SEC
BH CV ECHO MEAS - PULM DIAS VEL: 35.1 CM/SEC
BH CV ECHO MEAS - PULM S/D: 0.9
BH CV ECHO MEAS - PULM SYS VEL: 31.6 CM/SEC
BH CV ECHO MEAS - PVA(V,A): 2.2 CM^2
BH CV ECHO MEAS - PVA(V,D): 2.2 CM^2
BH CV ECHO MEAS - QP/QS: 0.38
BH CV ECHO MEAS - RAP SYSTOLE: 3 MMHG
BH CV ECHO MEAS - RV MAX PG: 1.5 MMHG
BH CV ECHO MEAS - RV MEAN PG: 1 MMHG
BH CV ECHO MEAS - RV V1 MAX: 60.6 CM/SEC
BH CV ECHO MEAS - RV V1 MEAN: 45.6 CM/SEC
BH CV ECHO MEAS - RV V1 VTI: 11.8 CM
BH CV ECHO MEAS - RVDD: 3 CM
BH CV ECHO MEAS - RVOT AREA: 3.8 CM^2
BH CV ECHO MEAS - RVOT DIAM: 2.2 CM
BH CV ECHO MEAS - RVSP: 10.6 MMHG
BH CV ECHO MEAS - SI(AO): 215.7 ML/M^2
BH CV ECHO MEAS - SI(CUBED): 32.2 ML/M^2
BH CV ECHO MEAS - SI(LVOT): 48.8 ML/M^2
BH CV ECHO MEAS - SI(MOD-SP2): 21.9 ML/M^2
BH CV ECHO MEAS - SI(MOD-SP4): 30.2 ML/M^2
BH CV ECHO MEAS - SI(TEICH): 27.5 ML/M^2
BH CV ECHO MEAS - SV(AO): 521.7 ML
BH CV ECHO MEAS - SV(CUBED): 77.8 ML
BH CV ECHO MEAS - SV(LVOT): 118.1 ML
BH CV ECHO MEAS - SV(MOD-SP2): 53 ML
BH CV ECHO MEAS - SV(MOD-SP4): 73 ML
BH CV ECHO MEAS - SV(RVOT): 44.9 ML
BH CV ECHO MEAS - SV(TEICH): 66.6 ML
BH CV ECHO MEAS - TAPSE (>1.6): 1.4 CM2
BH CV ECHO MEAS - TR MAX VEL: 138 CM/SEC
BH CV ECHO MEASUREMENTS AVERAGE E/E' RATIO: 18.18
BH CV XLRA - RV BASE: 2.9 CM
BH CV XLRA - RV LENGTH: 6.5 CM
BH CV XLRA - RV MID: 1.6 CM
BH CV XLRA - TDI S': 11.3 CM/SEC
LEFT ATRIUM VOLUME INDEX: 35 ML/M2
MAXIMAL PREDICTED HEART RATE: 149 BPM
STRESS TARGET HR: 127 BPM

## 2019-03-18 ENCOUNTER — TELEPHONE (OUTPATIENT)
Dept: CARDIOLOGY | Facility: CLINIC | Age: 74
End: 2019-03-18

## 2019-03-18 NOTE — TELEPHONE ENCOUNTER
----- Message from Kelly Ontiveros sent at 3/13/2019  3:02 PM EDT -----  Regarding: D/C MED  Contact: [phone]  EXT [phone]  ELMER WITH [facility] IS ASKING ABOUT HIS   glucosamine sulfate 500 MG capsule capsule   SHE IS SAYING PHARMACY HAS IT THAT HE WAS D/C FROM IT???

## 2019-03-27 ENCOUNTER — HOSPITAL ENCOUNTER (OUTPATIENT)
Dept: CARDIOLOGY | Facility: HOSPITAL | Age: 74
Discharge: HOME | End: 2019-03-27

## 2019-03-27 VITALS — DIASTOLIC BLOOD PRESSURE: 80 MMHG | SYSTOLIC BLOOD PRESSURE: 128 MMHG

## 2019-03-27 PROCEDURE — 25010000002 REGADENOSON 0.4 MG/5ML SOLUTION: Performed by: INTERNAL MEDICINE

## 2019-03-27 PROCEDURE — 78452 HT MUSCLE IMAGE SPECT MULT: CPT

## 2019-03-27 PROCEDURE — 93018 CV STRESS TEST I&R ONLY: CPT | Performed by: INTERNAL MEDICINE

## 2019-03-27 PROCEDURE — 0 TECHNETIUM SESTAMIBI: Performed by: INTERNAL MEDICINE

## 2019-03-27 PROCEDURE — 78452 HT MUSCLE IMAGE SPECT MULT: CPT | Performed by: INTERNAL MEDICINE

## 2019-03-27 PROCEDURE — 93017 CV STRESS TEST TRACING ONLY: CPT

## 2019-03-27 PROCEDURE — A9500 TC99M SESTAMIBI: HCPCS | Performed by: INTERNAL MEDICINE

## 2019-03-27 PROCEDURE — 93016 CV STRESS TEST SUPVJ ONLY: CPT | Performed by: NURSE PRACTITIONER

## 2019-03-27 RX ADMIN — TECHNETIUM TC 99M SESTAMIBI 1 DOSE: 1 INJECTION INTRAVENOUS at 08:04

## 2019-03-27 RX ADMIN — REGADENOSON 0.4 MG: 0.08 INJECTION, SOLUTION INTRAVENOUS at 10:01

## 2019-03-27 RX ADMIN — TECHNETIUM TC 99M SESTAMIBI 1 DOSE: 1 INJECTION INTRAVENOUS at 10:01

## 2019-03-29 LAB
BH CV STRESS BP STAGE 1: NORMAL
BH CV STRESS COMMENTS STAGE 1: NORMAL
BH CV STRESS DOSE REGADENOSON STAGE 1: 0.4
BH CV STRESS DURATION MIN STAGE 1: 2
BH CV STRESS DURATION SEC STAGE 1: 33
BH CV STRESS GRADE STAGE 1: 4
BH CV STRESS HR STAGE 1: 114
BH CV STRESS METS STAGE 1: 2.3
BH CV STRESS PROTOCOL 1: NORMAL
BH CV STRESS RECOVERY BP: NORMAL MMHG
BH CV STRESS RECOVERY HR: 83 BPM
BH CV STRESS RECOVERY O2: 96 %
BH CV STRESS SPEED STAGE 1: 1
BH CV STRESS STAGE 1: 1
LV EF NUC BP: 60 %
MAXIMAL PREDICTED HEART RATE: 148 BPM
PERCENT MAX PREDICTED HR: 77.03 %
STRESS BASELINE BP: NORMAL MMHG
STRESS BASELINE HR: 86 BPM
STRESS O2 SAT REST: 95 %
STRESS PERCENT HR: 91 %
STRESS POST ESTIMATED WORKLOAD: 2.3 METS
STRESS POST EXERCISE DUR MIN: 2 MIN
STRESS POST EXERCISE DUR SEC: 33 SEC
STRESS POST PEAK BP: NORMAL MMHG
STRESS POST PEAK HR: 114 BPM
STRESS TARGET HR: 126 BPM

## 2019-04-01 ENCOUNTER — OFFICE VISIT (OUTPATIENT)
Dept: CARDIOLOGY | Facility: CLINIC | Age: 74
End: 2019-04-01

## 2019-04-01 VITALS
DIASTOLIC BLOOD PRESSURE: 92 MMHG | HEIGHT: 69 IN | HEART RATE: 70 BPM | BODY MASS INDEX: 42.83 KG/M2 | SYSTOLIC BLOOD PRESSURE: 162 MMHG

## 2019-04-01 DIAGNOSIS — I10 ESSENTIAL HYPERTENSION: Primary | ICD-10-CM

## 2019-04-01 DIAGNOSIS — R06.02 SOB (SHORTNESS OF BREATH): ICD-10-CM

## 2019-04-01 DIAGNOSIS — M79.89 LEG SWELLING: ICD-10-CM

## 2019-04-01 PROCEDURE — 99213 OFFICE O/P EST LOW 20 MIN: CPT | Performed by: INTERNAL MEDICINE

## 2019-04-01 RX ORDER — FLUTICASONE PROPIONATE 50 MCG
2 SPRAY, SUSPENSION (ML) NASAL DAILY
COMMUNITY
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
## 2019-04-29 ENCOUNTER — APPOINTMENT (OUTPATIENT)
Dept: GENERAL RADIOLOGY | Facility: HOSPITAL | Age: 74
End: 2019-04-29

## 2019-04-29 ENCOUNTER — HOSPITAL ENCOUNTER (OUTPATIENT)
Facility: HOSPITAL | Age: 74
Discharge: HOME | End: 2019-05-01
Attending: EMERGENCY MEDICINE | Admitting: INTERNAL MEDICINE

## 2019-04-29 DIAGNOSIS — I10 PRIMARY HYPERTENSION: ICD-10-CM

## 2019-04-29 DIAGNOSIS — R07.9 CHEST PAIN WITH HIGH RISK OF ACUTE CORONARY SYNDROME: Primary | ICD-10-CM

## 2019-04-29 DIAGNOSIS — E78.5 DYSLIPIDEMIA: ICD-10-CM

## 2019-04-29 DIAGNOSIS — I10 ESSENTIAL HYPERTENSION: ICD-10-CM

## 2019-04-29 DIAGNOSIS — R06.02 SOB (SHORTNESS OF BREATH): ICD-10-CM

## 2019-04-29 LAB
ALBUMIN SERPL-MCNC: 3.8 G/DL (ref 3.5–5.2)
ALBUMIN/GLOB SERPL: 1.2 G/DL
ALP SERPL-CCNC: 79 U/L (ref 39–117)
ALT SERPL W P-5'-P-CCNC: 26 U/L (ref 1–41)
ANION GAP SERPL CALCULATED.3IONS-SCNC: 11 MMOL/L
AST SERPL-CCNC: 29 U/L (ref 1–40)
BASOPHILS # BLD AUTO: 0.03 10*3/MM3 (ref 0–0.2)
BASOPHILS NFR BLD AUTO: 0.4 % (ref 0–1.5)
BILIRUB SERPL-MCNC: 0.4 MG/DL (ref 0.2–1.2)
BUN BLD-MCNC: 19 MG/DL (ref 8–23)
BUN/CREAT SERPL: 19.8 (ref 7–25)
CALCIUM SPEC-SCNC: 8.7 MG/DL (ref 8.6–10.5)
CHLORIDE SERPL-SCNC: 100 MMOL/L (ref 98–107)
CO2 SERPL-SCNC: 28 MMOL/L (ref 22–29)
CREAT BLD-MCNC: 0.96 MG/DL (ref 0.76–1.27)
DEPRECATED RDW RBC AUTO: 49.2 FL (ref 37–54)
EOSINOPHIL # BLD AUTO: 0.06 10*3/MM3 (ref 0–0.4)
EOSINOPHIL NFR BLD AUTO: 0.8 % (ref 0.3–6.2)
ERYTHROCYTE [DISTWIDTH] IN BLOOD BY AUTOMATED COUNT: 14.1 % (ref 12.3–15.4)
GFR SERPL CREATININE-BSD FRML MDRD: 77 ML/MIN/1.73
GLOBULIN UR ELPH-MCNC: 3.1 GM/DL
GLUCOSE BLD-MCNC: 202 MG/DL (ref 65–99)
HCT VFR BLD AUTO: 42 % (ref 37.5–51)
HGB BLD-MCNC: 13.4 G/DL (ref 13–17.7)
LARGE PLATELETS: NORMAL
LIPASE SERPL-CCNC: 29 U/L (ref 13–60)
LYMPHOCYTES # BLD AUTO: 2.26 10*3/MM3 (ref 0.7–3.1)
LYMPHOCYTES NFR BLD AUTO: 28.4 % (ref 19.6–45.3)
MCH RBC QN AUTO: 30.4 PG (ref 26.6–33)
MCHC RBC AUTO-ENTMCNC: 31.9 G/DL (ref 31.5–35.7)
MCV RBC AUTO: 95.2 FL (ref 79–97)
MONOCYTES # BLD AUTO: 0.97 10*3/MM3 (ref 0.1–0.9)
MONOCYTES NFR BLD AUTO: 12.2 % (ref 5–12)
NEUTROPHILS # BLD AUTO: 4.63 10*3/MM3 (ref 1.7–7)
NEUTROPHILS NFR BLD AUTO: 57.9 % (ref 42.7–76)
NT-PROBNP SERPL-MCNC: 293.1 PG/ML (ref 5–900)
PLATELET # BLD AUTO: 169 10*3/MM3 (ref 140–450)
PMV BLD AUTO: 10.6 FL (ref 6–12)
POTASSIUM BLD-SCNC: 4.3 MMOL/L (ref 3.5–5.2)
PROT SERPL-MCNC: 6.9 G/DL (ref 6–8.5)
RBC # BLD AUTO: 4.41 10*6/MM3 (ref 4.14–5.8)
RBC MORPH BLD: NORMAL
SODIUM BLD-SCNC: 139 MMOL/L (ref 136–145)
TROPONIN T SERPL-MCNC: <0.01 NG/ML (ref 0–0.03)
WBC MORPH BLD: NORMAL
WBC NRBC COR # BLD: 7.97 10*3/MM3 (ref 3.4–10.8)

## 2019-04-29 PROCEDURE — 80053 COMPREHEN METABOLIC PANEL: CPT | Performed by: EMERGENCY MEDICINE

## 2019-04-29 PROCEDURE — 99285 EMERGENCY DEPT VISIT HI MDM: CPT

## 2019-04-29 PROCEDURE — 83690 ASSAY OF LIPASE: CPT | Performed by: EMERGENCY MEDICINE

## 2019-04-29 PROCEDURE — 83880 ASSAY OF NATRIURETIC PEPTIDE: CPT | Performed by: EMERGENCY MEDICINE

## 2019-04-29 PROCEDURE — 71046 X-RAY EXAM CHEST 2 VIEWS: CPT

## 2019-04-29 PROCEDURE — 85025 COMPLETE CBC W/AUTO DIFF WBC: CPT | Performed by: EMERGENCY MEDICINE

## 2019-04-29 PROCEDURE — 93010 ELECTROCARDIOGRAM REPORT: CPT | Performed by: INTERNAL MEDICINE

## 2019-04-29 PROCEDURE — G0378 HOSPITAL OBSERVATION PER HR: HCPCS

## 2019-04-29 PROCEDURE — 85007 BL SMEAR W/DIFF WBC COUNT: CPT | Performed by: EMERGENCY MEDICINE

## 2019-04-29 PROCEDURE — 93005 ELECTROCARDIOGRAM TRACING: CPT

## 2019-04-29 PROCEDURE — 93005 ELECTROCARDIOGRAM TRACING: CPT | Performed by: EMERGENCY MEDICINE

## 2019-04-29 PROCEDURE — 84484 ASSAY OF TROPONIN QUANT: CPT | Performed by: EMERGENCY MEDICINE

## 2019-04-29 PROCEDURE — 99219 PR INITIAL OBSERVATION CARE/DAY 50 MINUTES: CPT | Performed by: NURSE PRACTITIONER

## 2019-04-29 RX ORDER — FUROSEMIDE 20 MG/1
20 TABLET ORAL
Status: DISCONTINUED | OUTPATIENT
Start: 2019-04-29 | End: 2019-05-01 | Stop reason: HOSPADM

## 2019-04-29 RX ORDER — SODIUM CHLORIDE 0.9 % (FLUSH) 0.9 %
10 SYRINGE (ML) INJECTION AS NEEDED
Status: DISCONTINUED | OUTPATIENT
Start: 2019-04-29 | End: 2019-05-01 | Stop reason: HOSPADM

## 2019-04-29 RX ORDER — ATORVASTATIN CALCIUM 20 MG/1
20 TABLET, FILM COATED ORAL NIGHTLY
Status: DISCONTINUED | OUTPATIENT
Start: 2019-04-29 | End: 2019-05-01 | Stop reason: HOSPADM

## 2019-04-29 RX ORDER — SODIUM CHLORIDE 0.9 % (FLUSH) 0.9 %
3-10 SYRINGE (ML) INJECTION AS NEEDED
Status: DISCONTINUED | OUTPATIENT
Start: 2019-04-29 | End: 2019-05-01 | Stop reason: HOSPADM

## 2019-04-29 RX ORDER — ASPIRIN 81 MG/1
81 TABLET, CHEWABLE ORAL DAILY
Status: DISCONTINUED | OUTPATIENT
Start: 2019-04-29 | End: 2019-04-30 | Stop reason: SDUPTHER

## 2019-04-29 RX ORDER — PANTOPRAZOLE SODIUM 40 MG/1
40 TABLET, DELAYED RELEASE ORAL EVERY MORNING
Status: DISCONTINUED | OUTPATIENT
Start: 2019-04-30 | End: 2019-05-01 | Stop reason: HOSPADM

## 2019-04-29 RX ORDER — SODIUM CHLORIDE 0.9 % (FLUSH) 0.9 %
3 SYRINGE (ML) INJECTION EVERY 12 HOURS SCHEDULED
Status: DISCONTINUED | OUTPATIENT
Start: 2019-04-29 | End: 2019-05-01 | Stop reason: HOSPADM

## 2019-04-29 RX ORDER — POTASSIUM CHLORIDE 750 MG/1
10 CAPSULE, EXTENDED RELEASE ORAL 2 TIMES DAILY WITH MEALS
Status: DISCONTINUED | OUTPATIENT
Start: 2019-04-29 | End: 2019-05-01 | Stop reason: HOSPADM

## 2019-04-29 RX ORDER — LISINOPRIL 40 MG/1
40 TABLET ORAL DAILY
Status: DISCONTINUED | OUTPATIENT
Start: 2019-04-29 | End: 2019-05-01 | Stop reason: HOSPADM

## 2019-04-29 RX ORDER — LEVOTHYROXINE SODIUM 0.03 MG/1
25 TABLET ORAL
Status: DISCONTINUED | OUTPATIENT
Start: 2019-04-30 | End: 2019-05-01 | Stop reason: HOSPADM

## 2019-04-29 RX ORDER — AMLODIPINE BESYLATE 2.5 MG/1
2.5 TABLET ORAL DAILY
Status: DISCONTINUED | OUTPATIENT
Start: 2019-04-29 | End: 2019-05-01 | Stop reason: HOSPADM

## 2019-04-29 RX ADMIN — LISINOPRIL 40 MG: 40 TABLET ORAL at 17:54

## 2019-04-29 RX ADMIN — ASPIRIN 81 MG: 81 TABLET, CHEWABLE ORAL at 17:52

## 2019-04-29 RX ADMIN — AMLODIPINE BESYLATE 2.5 MG: 2.5 TABLET ORAL at 17:54

## 2019-04-29 RX ADMIN — ATORVASTATIN CALCIUM 20 MG: 20 TABLET, FILM COATED ORAL at 20:24

## 2019-04-29 RX ADMIN — POTASSIUM CHLORIDE 10 MEQ: 750 CAPSULE, EXTENDED RELEASE ORAL at 17:52

## 2019-04-29 RX ADMIN — FUROSEMIDE 20 MG: 20 TABLET ORAL at 17:52

## 2019-04-29 RX ADMIN — SODIUM CHLORIDE, PRESERVATIVE FREE 3 ML: 5 INJECTION INTRAVENOUS at 20:24

## 2019-04-30 LAB
ACT BLD: 164 SECONDS (ref 82–152)
ALBUMIN SERPL-MCNC: 3.7 G/DL (ref 3.5–5.2)
ALBUMIN/GLOB SERPL: 1.3 G/DL
ALP SERPL-CCNC: 55 U/L (ref 39–117)
ALT SERPL W P-5'-P-CCNC: 25 U/L (ref 1–41)
ANION GAP SERPL CALCULATED.3IONS-SCNC: 9.7 MMOL/L
AST SERPL-CCNC: 24 U/L (ref 1–40)
BASOPHILS # BLD AUTO: 0.02 10*3/MM3 (ref 0–0.2)
BASOPHILS NFR BLD AUTO: 0.3 % (ref 0–1.5)
BILIRUB SERPL-MCNC: 0.6 MG/DL (ref 0.2–1.2)
BUN BLD-MCNC: 17 MG/DL (ref 8–23)
BUN/CREAT SERPL: 20.5 (ref 7–25)
CALCIUM SPEC-SCNC: 8.5 MG/DL (ref 8.6–10.5)
CHLORIDE SERPL-SCNC: 99 MMOL/L (ref 98–107)
CO2 SERPL-SCNC: 30.3 MMOL/L (ref 22–29)
CREAT BLD-MCNC: 0.83 MG/DL (ref 0.76–1.27)
DEPRECATED RDW RBC AUTO: 49 FL (ref 37–54)
EOSINOPHIL # BLD AUTO: 0.07 10*3/MM3 (ref 0–0.4)
EOSINOPHIL NFR BLD AUTO: 1 % (ref 0.3–6.2)
ERYTHROCYTE [DISTWIDTH] IN BLOOD BY AUTOMATED COUNT: 14 % (ref 12.3–15.4)
GFR SERPL CREATININE-BSD FRML MDRD: 91 ML/MIN/1.73
GLOBULIN UR ELPH-MCNC: 2.9 GM/DL
GLUCOSE BLD-MCNC: 146 MG/DL (ref 65–99)
HCT VFR BLD AUTO: 39 % (ref 37.5–51)
HGB BLD-MCNC: 12.4 G/DL (ref 13–17.7)
IMM GRANULOCYTES # BLD AUTO: 0.03 10*3/MM3 (ref 0–0.05)
IMM GRANULOCYTES NFR BLD AUTO: 0.4 % (ref 0–0.5)
INR PPP: 1.01 (ref 0.9–1.1)
LYMPHOCYTES # BLD AUTO: 1.93 10*3/MM3 (ref 0.7–3.1)
LYMPHOCYTES NFR BLD AUTO: 27.7 % (ref 19.6–45.3)
MAGNESIUM SERPL-MCNC: 2.1 MG/DL (ref 1.6–2.4)
MCH RBC QN AUTO: 30.3 PG (ref 26.6–33)
MCHC RBC AUTO-ENTMCNC: 31.8 G/DL (ref 31.5–35.7)
MCV RBC AUTO: 95.4 FL (ref 79–97)
MONOCYTES # BLD AUTO: 0.86 10*3/MM3 (ref 0.1–0.9)
MONOCYTES NFR BLD AUTO: 12.3 % (ref 5–12)
NEUTROPHILS # BLD AUTO: 4.07 10*3/MM3 (ref 1.7–7)
NEUTROPHILS NFR BLD AUTO: 58.3 % (ref 42.7–76)
NRBC BLD AUTO-RTO: 0 /100 WBC (ref 0–0.2)
PLATELET # BLD AUTO: 176 10*3/MM3 (ref 140–450)
PMV BLD AUTO: 10 FL (ref 6–12)
POTASSIUM BLD-SCNC: 4.3 MMOL/L (ref 3.5–5.2)
PROT SERPL-MCNC: 6.6 G/DL (ref 6–8.5)
PROTHROMBIN TIME: 13 SECONDS (ref 11.7–14.2)
RBC # BLD AUTO: 4.09 10*6/MM3 (ref 4.14–5.8)
SODIUM BLD-SCNC: 139 MMOL/L (ref 136–145)
TROPONIN T SERPL-MCNC: <0.01 NG/ML (ref 0–0.03)
WBC NRBC COR # BLD: 6.98 10*3/MM3 (ref 3.4–10.8)

## 2019-04-30 PROCEDURE — 85610 PROTHROMBIN TIME: CPT | Performed by: NURSE PRACTITIONER

## 2019-04-30 PROCEDURE — C1769 GUIDE WIRE: HCPCS | Performed by: INTERNAL MEDICINE

## 2019-04-30 PROCEDURE — 83735 ASSAY OF MAGNESIUM: CPT | Performed by: NURSE PRACTITIONER

## 2019-04-30 PROCEDURE — 25010000002 FENTANYL CITRATE (PF) 100 MCG/2ML SOLUTION: Performed by: INTERNAL MEDICINE

## 2019-04-30 PROCEDURE — 93459 L HRT ART/GRFT ANGIO: CPT | Performed by: INTERNAL MEDICINE

## 2019-04-30 PROCEDURE — G0378 HOSPITAL OBSERVATION PER HR: HCPCS

## 2019-04-30 PROCEDURE — A9270 NON-COVERED ITEM OR SERVICE: HCPCS | Performed by: INTERNAL MEDICINE

## 2019-04-30 PROCEDURE — 63710000001 TICAGRELOR 90 MG TABLET: Performed by: INTERNAL MEDICINE

## 2019-04-30 PROCEDURE — C1887 CATHETER, GUIDING: HCPCS | Performed by: INTERNAL MEDICINE

## 2019-04-30 PROCEDURE — 80053 COMPREHEN METABOLIC PANEL: CPT | Performed by: NURSE PRACTITIONER

## 2019-04-30 PROCEDURE — 85025 COMPLETE CBC W/AUTO DIFF WBC: CPT | Performed by: NURSE PRACTITIONER

## 2019-04-30 PROCEDURE — 25010000002 HYDRALAZINE PER 20 MG: Performed by: INTERNAL MEDICINE

## 2019-04-30 PROCEDURE — A9270 NON-COVERED ITEM OR SERVICE: HCPCS | Performed by: NURSE PRACTITIONER

## 2019-04-30 PROCEDURE — 93005 ELECTROCARDIOGRAM TRACING: CPT | Performed by: NURSE PRACTITIONER

## 2019-04-30 PROCEDURE — 99153 MOD SED SAME PHYS/QHP EA: CPT | Performed by: INTERNAL MEDICINE

## 2019-04-30 PROCEDURE — 92928 PRQ TCAT PLMT NTRAC ST 1 LES: CPT | Performed by: INTERNAL MEDICINE

## 2019-04-30 PROCEDURE — 93010 ELECTROCARDIOGRAM REPORT: CPT | Performed by: INTERNAL MEDICINE

## 2019-04-30 PROCEDURE — 85347 COAGULATION TIME ACTIVATED: CPT

## 2019-04-30 PROCEDURE — 99152 MOD SED SAME PHYS/QHP 5/>YRS: CPT | Performed by: INTERNAL MEDICINE

## 2019-04-30 PROCEDURE — C9600 PERC DRUG-EL COR STENT SING: HCPCS | Performed by: INTERNAL MEDICINE

## 2019-04-30 PROCEDURE — 25010000002 HEPARIN (PORCINE) PER 1000 UNITS: Performed by: INTERNAL MEDICINE

## 2019-04-30 PROCEDURE — C1874 STENT, COATED/COV W/DEL SYS: HCPCS | Performed by: INTERNAL MEDICINE

## 2019-04-30 PROCEDURE — 84484 ASSAY OF TROPONIN QUANT: CPT | Performed by: NURSE PRACTITIONER

## 2019-04-30 PROCEDURE — C1725 CATH, TRANSLUMIN NON-LASER: HCPCS | Performed by: INTERNAL MEDICINE

## 2019-04-30 PROCEDURE — 63710000001 POTASSIUM CHLORIDE 10 MEQ CAPSULE CONTROLLED-RELEASE: Performed by: NURSE PRACTITIONER

## 2019-04-30 PROCEDURE — 25010000002 MIDAZOLAM PER 1 MG: Performed by: INTERNAL MEDICINE

## 2019-04-30 PROCEDURE — 0 IOPAMIDOL PER 1 ML: Performed by: INTERNAL MEDICINE

## 2019-04-30 PROCEDURE — 63710000001 ASPIRIN 81 MG CHEWABLE TABLET: Performed by: INTERNAL MEDICINE

## 2019-04-30 PROCEDURE — C1894 INTRO/SHEATH, NON-LASER: HCPCS | Performed by: INTERNAL MEDICINE

## 2019-04-30 DEVICE — XIENCE SIERRA™ EVEROLIMUS ELUTING CORONARY STENT SYSTEM 2.75 MM X 15 MM / RAPID-EXCHANGE
Type: IMPLANTABLE DEVICE | Status: FUNCTIONAL
Brand: XIENCE SIERRA™

## 2019-04-30 RX ORDER — SODIUM CHLORIDE 9 MG/ML
INJECTION, SOLUTION INTRAVENOUS CONTINUOUS PRN
Status: COMPLETED | OUTPATIENT
Start: 2019-04-30 | End: 2019-04-30

## 2019-04-30 RX ORDER — LIDOCAINE HYDROCHLORIDE 20 MG/ML
INJECTION, SOLUTION INFILTRATION; PERINEURAL AS NEEDED
Status: DISCONTINUED | OUTPATIENT
Start: 2019-04-30 | End: 2019-04-30 | Stop reason: HOSPADM

## 2019-04-30 RX ORDER — HYDRALAZINE HYDROCHLORIDE 20 MG/ML
10 INJECTION INTRAMUSCULAR; INTRAVENOUS ONCE
Status: COMPLETED | OUTPATIENT
Start: 2019-04-30 | End: 2019-04-30

## 2019-04-30 RX ORDER — SODIUM CHLORIDE 9 MG/ML
250 INJECTION, SOLUTION INTRAVENOUS ONCE AS NEEDED
Status: DISCONTINUED | OUTPATIENT
Start: 2019-04-30 | End: 2019-05-01 | Stop reason: HOSPADM

## 2019-04-30 RX ORDER — SODIUM CHLORIDE 9 MG/ML
100 INJECTION, SOLUTION INTRAVENOUS CONTINUOUS
Status: DISCONTINUED | OUTPATIENT
Start: 2019-04-30 | End: 2019-05-01 | Stop reason: HOSPADM

## 2019-04-30 RX ORDER — HEPARIN SODIUM 1000 [USP'U]/ML
INJECTION, SOLUTION INTRAVENOUS; SUBCUTANEOUS AS NEEDED
Status: DISCONTINUED | OUTPATIENT
Start: 2019-04-30 | End: 2019-04-30 | Stop reason: HOSPADM

## 2019-04-30 RX ORDER — ASPIRIN 81 MG/1
81 TABLET, CHEWABLE ORAL DAILY
Status: DISCONTINUED | OUTPATIENT
Start: 2019-04-30 | End: 2019-05-01 | Stop reason: HOSPADM

## 2019-04-30 RX ORDER — ASPIRIN 81 MG/1
TABLET, CHEWABLE ORAL AS NEEDED
Status: DISCONTINUED | OUTPATIENT
Start: 2019-04-30 | End: 2019-04-30 | Stop reason: HOSPADM

## 2019-04-30 RX ORDER — MIDAZOLAM HYDROCHLORIDE 1 MG/ML
INJECTION INTRAMUSCULAR; INTRAVENOUS AS NEEDED
Status: DISCONTINUED | OUTPATIENT
Start: 2019-04-30 | End: 2019-04-30 | Stop reason: HOSPADM

## 2019-04-30 RX ORDER — FENTANYL CITRATE 50 UG/ML
INJECTION, SOLUTION INTRAMUSCULAR; INTRAVENOUS AS NEEDED
Status: DISCONTINUED | OUTPATIENT
Start: 2019-04-30 | End: 2019-04-30 | Stop reason: HOSPADM

## 2019-04-30 RX ADMIN — AMLODIPINE BESYLATE 2.5 MG: 2.5 TABLET ORAL at 08:14

## 2019-04-30 RX ADMIN — POTASSIUM CHLORIDE 10 MEQ: 750 CAPSULE, EXTENDED RELEASE ORAL at 22:55

## 2019-04-30 RX ADMIN — POTASSIUM CHLORIDE 10 MEQ: 750 CAPSULE, EXTENDED RELEASE ORAL at 08:14

## 2019-04-30 RX ADMIN — TICAGRELOR 90 MG: 90 TABLET ORAL at 22:57

## 2019-04-30 RX ADMIN — PANTOPRAZOLE SODIUM 40 MG: 40 TABLET, DELAYED RELEASE ORAL at 06:07

## 2019-04-30 RX ADMIN — SODIUM CHLORIDE 100 ML/HR: 9 INJECTION, SOLUTION INTRAVENOUS at 20:42

## 2019-04-30 RX ADMIN — LEVOTHYROXINE SODIUM 25 MCG: 25 TABLET ORAL at 06:07

## 2019-04-30 RX ADMIN — ASPIRIN 81 MG: 81 TABLET, CHEWABLE ORAL at 08:14

## 2019-04-30 RX ADMIN — SODIUM CHLORIDE, PRESERVATIVE FREE 3 ML: 5 INJECTION INTRAVENOUS at 22:58

## 2019-04-30 RX ADMIN — FUROSEMIDE 20 MG: 20 TABLET ORAL at 08:14

## 2019-04-30 RX ADMIN — HYDRALAZINE HYDROCHLORIDE 10 MG: 20 INJECTION INTRAMUSCULAR; INTRAVENOUS at 22:54

## 2019-04-30 RX ADMIN — LISINOPRIL 40 MG: 40 TABLET ORAL at 08:14

## 2019-04-30 RX ADMIN — ASPIRIN 81 MG: 81 TABLET, CHEWABLE ORAL at 22:55

## 2019-05-01 VITALS
HEART RATE: 84 BPM | RESPIRATION RATE: 16 BRPM | SYSTOLIC BLOOD PRESSURE: 115 MMHG | DIASTOLIC BLOOD PRESSURE: 98 MMHG | BODY MASS INDEX: 41.34 KG/M2 | HEIGHT: 69 IN | TEMPERATURE: 98.7 F | OXYGEN SATURATION: 95 % | WEIGHT: 279.1 LBS

## 2019-05-01 LAB
ACT BLD: 224 SECONDS (ref 82–152)
ACT BLD: 252 SECONDS (ref 82–152)
ANION GAP SERPL CALCULATED.3IONS-SCNC: 12.9 MMOL/L
BUN BLD-MCNC: 18 MG/DL (ref 8–23)
BUN/CREAT SERPL: 19.8 (ref 7–25)
CALCIUM SPEC-SCNC: 8.6 MG/DL (ref 8.6–10.5)
CHLORIDE SERPL-SCNC: 99 MMOL/L (ref 98–107)
CHOLEST SERPL-MCNC: 145 MG/DL (ref 0–200)
CO2 SERPL-SCNC: 23.1 MMOL/L (ref 22–29)
CREAT BLD-MCNC: 0.91 MG/DL (ref 0.76–1.27)
DEPRECATED RDW RBC AUTO: 47.9 FL (ref 37–54)
ERYTHROCYTE [DISTWIDTH] IN BLOOD BY AUTOMATED COUNT: 14.3 % (ref 12.3–15.4)
GFR SERPL CREATININE-BSD FRML MDRD: 82 ML/MIN/1.73
GLUCOSE BLD-MCNC: 178 MG/DL (ref 65–99)
HBA1C MFR BLD: 6.8 % (ref 4.8–5.6)
HCT VFR BLD AUTO: 38.5 % (ref 37.5–51)
HDLC SERPL-MCNC: 33 MG/DL (ref 40–60)
HGB BLD-MCNC: 12.7 G/DL (ref 13–17.7)
LDLC SERPL CALC-MCNC: 82 MG/DL (ref 0–100)
LDLC/HDLC SERPL: 2.48 {RATIO}
MAGNESIUM SERPL-MCNC: 2.2 MG/DL (ref 1.6–2.4)
MCH RBC QN AUTO: 30.5 PG (ref 26.6–33)
MCHC RBC AUTO-ENTMCNC: 33 G/DL (ref 31.5–35.7)
MCV RBC AUTO: 92.3 FL (ref 79–97)
PLATELET # BLD AUTO: 218 10*3/MM3 (ref 140–450)
PMV BLD AUTO: 10.1 FL (ref 6–12)
POTASSIUM BLD-SCNC: 4 MMOL/L (ref 3.5–5.2)
RBC # BLD AUTO: 4.17 10*6/MM3 (ref 4.14–5.8)
SODIUM BLD-SCNC: 135 MMOL/L (ref 136–145)
TRIGL SERPL-MCNC: 150 MG/DL (ref 0–150)
VLDLC SERPL-MCNC: 30 MG/DL (ref 5–40)
WBC NRBC COR # BLD: 9.12 10*3/MM3 (ref 3.4–10.8)

## 2019-05-01 PROCEDURE — 93005 ELECTROCARDIOGRAM TRACING: CPT | Performed by: INTERNAL MEDICINE

## 2019-05-01 PROCEDURE — 63710000001 LEVOTHYROXINE 25 MCG TABLET: Performed by: NURSE PRACTITIONER

## 2019-05-01 PROCEDURE — 93010 ELECTROCARDIOGRAM REPORT: CPT | Performed by: INTERNAL MEDICINE

## 2019-05-01 PROCEDURE — 80061 LIPID PANEL: CPT | Performed by: INTERNAL MEDICINE

## 2019-05-01 PROCEDURE — A9270 NON-COVERED ITEM OR SERVICE: HCPCS | Performed by: NURSE PRACTITIONER

## 2019-05-01 PROCEDURE — A9270 NON-COVERED ITEM OR SERVICE: HCPCS | Performed by: INTERNAL MEDICINE

## 2019-05-01 PROCEDURE — 63710000001 POTASSIUM CHLORIDE 10 MEQ CAPSULE CONTROLLED-RELEASE: Performed by: NURSE PRACTITIONER

## 2019-05-01 PROCEDURE — 63710000001 FUROSEMIDE 20 MG TABLET: Performed by: NURSE PRACTITIONER

## 2019-05-01 PROCEDURE — 85027 COMPLETE CBC AUTOMATED: CPT | Performed by: INTERNAL MEDICINE

## 2019-05-01 PROCEDURE — 63710000001 ASPIRIN 81 MG CHEWABLE TABLET: Performed by: INTERNAL MEDICINE

## 2019-05-01 PROCEDURE — 99217 PR OBSERVATION CARE DISCHARGE MANAGEMENT: CPT | Performed by: NURSE PRACTITIONER

## 2019-05-01 PROCEDURE — 63710000001 ATORVASTATIN 20 MG TABLET: Performed by: NURSE PRACTITIONER

## 2019-05-01 PROCEDURE — 63710000001 PANTOPRAZOLE 40 MG TABLET DELAYED-RELEASE: Performed by: NURSE PRACTITIONER

## 2019-05-01 PROCEDURE — G0378 HOSPITAL OBSERVATION PER HR: HCPCS

## 2019-05-01 PROCEDURE — 63710000001 TICAGRELOR 90 MG TABLET: Performed by: INTERNAL MEDICINE

## 2019-05-01 PROCEDURE — 83735 ASSAY OF MAGNESIUM: CPT | Performed by: INTERNAL MEDICINE

## 2019-05-01 PROCEDURE — 80048 BASIC METABOLIC PNL TOTAL CA: CPT | Performed by: INTERNAL MEDICINE

## 2019-05-01 PROCEDURE — 63710000001 LISINOPRIL 40 MG TABLET: Performed by: NURSE PRACTITIONER

## 2019-05-01 PROCEDURE — 83036 HEMOGLOBIN GLYCOSYLATED A1C: CPT | Performed by: INTERNAL MEDICINE

## 2019-05-01 PROCEDURE — 63710000001 AMLODIPINE 2.5 MG TABLET: Performed by: NURSE PRACTITIONER

## 2019-05-01 RX ADMIN — SODIUM CHLORIDE, PRESERVATIVE FREE 3 ML: 5 INJECTION INTRAVENOUS at 08:15

## 2019-05-01 RX ADMIN — FUROSEMIDE 20 MG: 20 TABLET ORAL at 08:14

## 2019-05-01 RX ADMIN — AMLODIPINE BESYLATE 2.5 MG: 2.5 TABLET ORAL at 08:14

## 2019-05-01 RX ADMIN — LEVOTHYROXINE SODIUM 25 MCG: 25 TABLET ORAL at 06:04

## 2019-05-01 RX ADMIN — ATORVASTATIN CALCIUM 20 MG: 20 TABLET, FILM COATED ORAL at 00:31

## 2019-05-01 RX ADMIN — ASPIRIN 81 MG: 81 TABLET, CHEWABLE ORAL at 08:14

## 2019-05-01 RX ADMIN — PANTOPRAZOLE SODIUM 40 MG: 40 TABLET, DELAYED RELEASE ORAL at 06:04

## 2019-05-01 RX ADMIN — POTASSIUM CHLORIDE 10 MEQ: 750 CAPSULE, EXTENDED RELEASE ORAL at 08:14

## 2019-05-01 RX ADMIN — TICAGRELOR 90 MG: 90 TABLET ORAL at 08:14

## 2019-05-01 RX ADMIN — LISINOPRIL 40 MG: 40 TABLET ORAL at 08:14

## 2019-05-01 NOTE — DISCHARGE SUMMARY
[facility]  Physician Discharge Summary    Patient Identification:  Name: Hernando Lozada  Age: 74 y.o.  Sex: male  :  1945  MRN: [id number]    Admit date: 2019    Discharge date and time:  19 15:35      Admitting Physician: Rio Miranda MD     Discharge Physician: Rio Miranda MD    Discharge Diagnoses:   Patient Active Problem List   Diagnosis   • DJD (degenerative joint disease) of knee   • Essential hypertension   • SOB (shortness of breath)   • Leg swelling   • Chest pain with high risk of acute coronary syndrome       Discharged Condition: good    Hospital Course: This is a 74-year-old male who presented on  to the emergency department complaining of pinching central chest pain that had begun the night prior.  History to include CAD, hyperlipidemia, hypertension, sleep apnea, stroke.  Troponins were negative on admission with nonspecific ST wave abnormalities noted on ECG.  The patient had 1 month prior in 2019 underwent stress testing which showed a small sized mildly severe area of ischemia in the lateral wall.  Echo also performed in 2019 revealed moderate dilation of LAC and EF of 66%.  Patient underwent cardiac catheterization/ which yielded successful angioplasty and stent to the obtuse marginal branch with 80% stenosis reduced to 0 status post stent, normal left main, % occluded with LIMA to LAD patent, circumflex artery had OM 80% stenosis reduced to 0 status post stent, SVG to the point closed, RCA dominant with minimum diffuse irregularity, and normal LV gram.    Electrolytes have been normal and stable throughout admission.  Blood pressure has been stable and within normal limits throughout admission, discharge blood pressure is noted to be 127/66 with heart rate in the 80s.  Internal medicine was consulted to manage comorbidities throughout admission.  Patient referred to cardiac rehab upon discharge.  Patient is discharged  "home on maximum medical therapy to include Brilinta, aspirin, statin, calcium channel blocker, ACE inhibitor, beta-blockade.  CHF patient was counseled on the necessity for dual antiplatelet therapy for at least one year after stenting.  Patient is discharged home in stable condition with no complaints of chest pain, shortness of breath, dizziness, weakness, palpitations, syncope or near syncope.  Cardiac catheterization site at groin is free of any signs or symptoms of hematoma, infection, pain, or irritation.    Consults:   IP CONSULT TO CARDIOLOGY  IP CONSULT TO INTERNAL MEDICINE  CARDIAC REHAB EVALUATION AND ENROLLMENT    Discharge Exam:   General: No acute distress; resting in chair watching tv   Skin: Warm and dry, no diaphoresis noted   EYES: PERTL   HEENT: external ear and nose normal; oral mucosa moist   Neck: Supple; no carotid bruits; no JVD   Heart: S1S2 regular rate and rhythm; Systolic murmur RSB; no gallop or rub appreciated   Pulmonary: Respirations regular, unlabored at rest, bilateral breath sounds have good air entry throughout all lung fields; no crackles, rubs or wheezes auscultated.     GI: Soft, non-tender, non-distended, positive bowel sounds  No hepatosplenomegaly   Extremities: Bilateral lower extremities have no pre-tibial pitting edema; DP/PT pulses are palpable   Neurological: Alert and oriented x 3; no neuro deficits      /98 (BP Location: Right arm, Patient Position: Sitting)   Pulse 84   Temp 98.7 °F (37.1 °C) (Oral)   Resp 16   Ht 175.3 cm (69\")   Wt 127 kg (279 lb 1.6 oz)   SpO2 95%   BMI 41.22 kg/m²   General appearance: No acute changes   Neck: Trachea midline; NECK, supple, no thyromegaly or lymphadenopathy   Lungs: Normal size and shape, normal breath sounds, equal distribution of air, no rales and rhonchi   CV: S1-S2 regular, no murmurs, no rub, no gallop   Abdomen: Soft, non-tender; no masses , no abnormal abdominal sounds   Extremities: No deformity , normal " color , no peripheral edema   Skin: Normal temperature, turgor and texture; no rash, ulcers        LABS:  Results from last 7 days   Lab Units 04/30/19  0605 04/29/19  1046   TROPONIN T ng/mL <0.010 <0.010     Results from last 7 days   Lab Units 05/01/19  0436   MAGNESIUM mg/dL 2.2     Results from last 7 days   Lab Units 05/01/19  0436   SODIUM mmol/L 135*   POTASSIUM mmol/L 4.0   BUN mg/dL 18   CREATININE mg/dL 0.91   CALCIUM mg/dL 8.6       Results from last 7 days   Lab Units 05/01/19  0436 04/30/19  0605 04/29/19  1046   WBC 10*3/mm3 9.12 6.98 7.97   HEMOGLOBIN g/dL 12.7* 12.4* 13.4   HEMATOCRIT % 38.5 39.0 42.0   PLATELETS 10*3/mm3 218 176 169     Results from last 7 days   Lab Units 04/30/19  0605   INR  1.01     Results from last 7 days   Lab Units 05/01/19  0436   CHOLESTEROL mg/dL 145   TRIGLYCERIDES mg/dL 150   HDL CHOL mg/dL 33*   LDL CHOL mg/dL 82     Disposition:  Home    Discharge Medications:      Discharge Medications      New Medications      Instructions Start Date   ticagrelor 90 MG tablet tablet  Commonly known as:  BRILINTA   90 mg, Oral, 2 Times Daily         Continue These Medications      Instructions Start Date   acetaminophen 650 MG 8 hr tablet  Commonly known as:  TYLENOL   650 mg, Oral, Every 8 Hours PRN      albuterol sulfate  (90 Base) MCG/ACT inhaler  Commonly known as:  PROVENTIL HFA;VENTOLIN HFA;PROAIR HFA   2 puffs, Inhalation, Every 4 Hours PRN      amLODIPine 2.5 MG tablet  Commonly known as:  NORVASC   2.5 mg, Oral, Daily      aspirin 81 MG chewable tablet   81 mg, Oral, Daily, TO BE HELD 10 DAYS PRIOR TO SURGERY      atorvastatin 20 MG tablet  Commonly known as:  LIPITOR   20 mg, Oral, Daily      fluticasone 50 MCG/ACT nasal spray  Commonly known as:  FLONASE   fluticasone propionate 50 mcg/actuation nasal spray,suspension      furosemide 20 MG tablet  Commonly known as:  LASIX   20 mg, Oral, 2 Times Daily      glucosamine sulfate 500 MG capsule capsule   Oral, 3 Times  Daily With Meals      KLOR-CON 10 PO   1 tablet, Oral, 2 Times Daily      levothyroxine 25 MCG tablet  Commonly known as:  SYNTHROID, LEVOTHROID   25 mcg, Oral, Daily      lisinopril 40 MG tablet  Commonly known as:  PRINIVIL,ZESTRIL   40 mg, Oral, Daily      omeprazole 40 MG capsule  Commonly known as:  priLOSEC   40 mg, Oral, Every Evening      propranolol 20 MG tablet  Commonly known as:  INDERAL   20 mg, Oral, 3 Times Daily               Discharge Home Instructions:     Discharge Instructions    Routine post cardiac catheterization/PCI discharge home care instructions:    1. No submerging procedure site below water for 7-10 days.  2. No lifting objects greater than 1 lbs for 3 days.  3. If groin site used, avoid climbing several flights of stairs or sitting for longer than 2 hours at a time for the next 24 hours.   4. Monitor puncture site for bleeding and/or knots;. If bleeding should occur at the groin site: lie flat, apply pressure and return to the ER.  5.  You may apply a DRY Band-Aid over the puncture site if needed. Do not apply any lotions, salves or ointments to site.  6. No driving for 3 days.  7. Return to ER for recurrent symptoms.  8. No smoking.  9. Take all medications as prescribed.  10.  Do not stop or change Brilinta or aspirin without first speaking with Dr. Miranda.  11. Follow up with Dr. Miranda's nurse practitioner FAISAL Reeves Friday May 17th at 9:30am  12. Follow up with your PCP within one week of discharge.       Signed:  FAISAL Marsh  5/1/2019  12:27 PM    Patient personally interviewed and above subjective findings personally confirmed during a face to face contact with patient today  All findings of physical examination confirmed  All pertinent and performed labs, cardiac procedures ,  radiographs of the last 24 hours personally reviewed  Impression and plans discussed/elaborated and implemented jointly as described above     Rio Miranda  "MD MAURO Soares/Transcription:   \"Dictated utilizing Dragon dictation\".   "

## 2019-05-01 NOTE — PLAN OF CARE
Problem: Patient Care Overview  Goal: Plan of Care Review  Outcome: Ongoing (interventions implemented as appropriate)   05/01/19 7475   Coping/Psychosocial   Plan of Care Reviewed With patient   Plan of Care Review   Progress improving   OTHER   Outcome Summary Pt. ran NSR. on heart monitor. Had heart cath yesterday 4/30/19 put a stent to the OM. Pt. right groin site clean dry and intact no blood on dressing.

## 2019-05-01 NOTE — CONSULTS
"Met with patient, discussed benefits of cardiac rehab. Provided phase II information packet, which includes; general information about cardiac rehab, hospitals Cardiac Rehab Programs handout and [facility] letter article entitled “Cardiac Rehab is often the Best Medicine for Recovery\", stresses the importance of cardiac rehab after a heart event.   I provided the contact information for cardiac rehab here at [facility] and encouraged him to call when discharged.    Instructed to bring a copy of After Visit Summary to initial assessment.      "

## 2019-05-01 NOTE — PROGRESS NOTES
Case Management Discharge Note    Final Note: Pt to D/C today back to his home managed by  sahil [facility].  Per nursing call has been made to [facility] [phone] to arrange for pt's ride home.      Destination      No service has been selected for the patient.      Durable Medical Equipment      No service has been selected for the patient.      Dialysis/Infusion      No service has been selected for the patient.      Home Medical Care      No service has been selected for the patient.      Therapy      No service has been selected for the patient.      Community Resources      No service has been selected for the patient.             Final Discharge Disposition Code: 01 - home or self-care(neuro restoritive)

## 2019-05-01 NOTE — PROGRESS NOTES
"DAILY PROGRESS NOTE  [facility]    Patient Identification:  Name: Hernando Lozada  Age: 74 y.o.  Sex: male  :  1945  MRN: [id number]         Primary Care Physician: Milo Carrasquillo DO    Subjective: patient is sitting up; his neck is better today  Interval History: follow up for hypertension, cad, hyperglycemia, obesity    Objective:    Scheduled Meds:  amLODIPine 2.5 mg Oral Daily   aspirin 81 mg Oral Daily   atorvastatin 20 mg Oral Nightly   furosemide 20 mg Oral BID   levothyroxine 25 mcg Oral Q AM   lisinopril 40 mg Oral Daily   pantoprazole 40 mg Oral QAM   potassium chloride 10 mEq Oral BID With Meals   sodium chloride 3 mL Intravenous Q12H   ticagrelor 90 mg Oral BID     Continuous Infusions:  sodium chloride 100 mL/hr Last Rate: Stopped (19 0815)       Vital signs in last 24 hours:  Temp:  [98.1 °F (36.7 °C)-98.5 °F (36.9 °C)] 98.3 °F (36.8 °C)  Heart Rate:  [] 100  Resp:  [16-18] 16  BP: (122-173)/() 127/66    Intake/Output:    Intake/Output Summary (Last 24 hours) at 2019 0942  Last data filed at 2019 0729  Gross per 24 hour   Intake 465 ml   Output 1100 ml   Net -635 ml       Exam:  /66 (BP Location: Left arm, Patient Position: Sitting)   Pulse 100   Temp 98.3 °F (36.8 °C) (Oral)   Resp 16   Ht 175.3 cm (69\")   Wt 127 kg (279 lb 1.6 oz)   SpO2 95%   BMI 41.22 kg/m²     General Appearance:    Alert, cooperative, no distress, AAOx3                          Head:    Normocephalic, without obvious abnormality, atraumatic                           Eyes:    PERRL, conjunctiva/corneas clear, EOM's intact, both eyes                         Throat:   Lips, tongue, gums normal; oral mucosa pink and moist                           Neck:   Supple, symmetrical, trachea midline, no JVD                         Lungs:    Decreased breath sounds bilaterally, respirations unlabored                 Chest Wall:    No tenderness or deformity                          " Heart:    Regular rate and rhythm, S1 and S2 normal, no murmur,no  Rub                                      or gallop                  Abdomen:     Soft, non-tender, bowel sounds active, no masses, no                                                        organomegaly                  Extremities:   Extremities normal, atraumatic, no cyanosis or edema                        Pulses:   Pulses palpable in all extremities                            Skin:   Skin is warm and dry,  no rashes or palpable lesions                         Data Review:  Lab Results   Component Value Date    WBC 9.12 05/01/2019    HGB 12.7 (L) 05/01/2019    HCT 38.5 05/01/2019     05/01/2019     Lab Results   Component Value Date     (L) 05/01/2019    K 4.0 05/01/2019    CL 99 05/01/2019    CO2 23.1 05/01/2019    BUN 18 05/01/2019    CREATININE 0.91 05/01/2019    GLUCOSE 178 (H) 05/01/2019     Lab Results   Component Value Date    CALCIUM 8.6 05/01/2019    MG 2.2 05/01/2019     Lab Results   Component Value Date    AST 24 04/30/2019    ALT 25 04/30/2019    ALKPHOS 55 04/30/2019     Lab Results   Component Value Date    INR 1.01 04/30/2019     Patient Active Problem List   Diagnosis Code   • DJD (degenerative joint disease) of knee M17.10   • Essential hypertension I10   • SOB (shortness of breath) R06.02   • Leg swelling M79.89   • Chest pain with high risk of acute coronary syndrome R07.9       Assessment:    Essential hypertension    SOB (shortness of breath)    Chest pain with high risk of acute coronary syndrome  hyperglycemia  Hypertension  Obesity affecting all aspects of care  Plan:  hgba1c iss6.8  Can follow up with pcp if discharged  Blood pressure is better with hydralazine added  Not short of air today  Neck pain/stiffness has also resolved  Medium risk  Shonna Stovall MD  5/1/2019  9:42 AM
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
## 2019-05-17 ENCOUNTER — OFFICE VISIT (OUTPATIENT)
Dept: CARDIOLOGY | Facility: CLINIC | Age: 74
End: 2019-05-17

## 2019-05-17 VITALS
WEIGHT: 293 LBS | HEIGHT: 69 IN | BODY MASS INDEX: 43.4 KG/M2 | DIASTOLIC BLOOD PRESSURE: 82 MMHG | SYSTOLIC BLOOD PRESSURE: 123 MMHG | HEART RATE: 67 BPM

## 2019-05-17 DIAGNOSIS — I10 ESSENTIAL HYPERTENSION: Primary | ICD-10-CM

## 2019-05-17 PROCEDURE — 99212 OFFICE O/P EST SF 10 MIN: CPT | Performed by: NURSE PRACTITIONER
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
## 2019-08-23 ENCOUNTER — OFFICE VISIT (OUTPATIENT)
Dept: CARDIOLOGY | Facility: CLINIC | Age: 74
End: 2019-08-23

## 2019-08-23 VITALS
BODY MASS INDEX: 41.62 KG/M2 | WEIGHT: 281 LBS | DIASTOLIC BLOOD PRESSURE: 73 MMHG | HEIGHT: 69 IN | HEART RATE: 66 BPM | SYSTOLIC BLOOD PRESSURE: 109 MMHG

## 2019-08-23 DIAGNOSIS — R07.2 PRECORDIAL PAIN: ICD-10-CM

## 2019-08-23 DIAGNOSIS — E78.5 HYPERLIPIDEMIA LDL GOAL <100: ICD-10-CM

## 2019-08-23 DIAGNOSIS — I10 ESSENTIAL HYPERTENSION: ICD-10-CM

## 2019-08-23 DIAGNOSIS — R06.02 SOB (SHORTNESS OF BREATH): Primary | ICD-10-CM

## 2019-08-23 PROCEDURE — 99213 OFFICE O/P EST LOW 20 MIN: CPT | Performed by: NURSE PRACTITIONER

## 2019-09-12 ENCOUNTER — APPOINTMENT (OUTPATIENT)
Dept: CT IMAGING | Facility: HOSPITAL | Age: 74
End: 2019-09-12

## 2019-09-12 ENCOUNTER — APPOINTMENT (OUTPATIENT)
Dept: GENERAL RADIOLOGY | Facility: HOSPITAL | Age: 74
End: 2019-09-12

## 2019-09-12 ENCOUNTER — HOSPITAL ENCOUNTER (EMERGENCY)
Facility: HOSPITAL | Age: 74
Discharge: NURSING, REHAB OR LONG-TERM CARE | End: 2019-09-12
Attending: EMERGENCY MEDICINE | Admitting: EMERGENCY MEDICINE

## 2019-09-12 VITALS
BODY MASS INDEX: 41.47 KG/M2 | HEART RATE: 68 BPM | DIASTOLIC BLOOD PRESSURE: 95 MMHG | HEIGHT: 69 IN | RESPIRATION RATE: 18 BRPM | OXYGEN SATURATION: 94 % | TEMPERATURE: 97.7 F | SYSTOLIC BLOOD PRESSURE: 161 MMHG | WEIGHT: 280 LBS

## 2019-09-12 DIAGNOSIS — R53.1 GENERAL WEAKNESS: Primary | ICD-10-CM

## 2019-09-12 DIAGNOSIS — R73.9 ELEVATED BLOOD SUGAR: ICD-10-CM

## 2019-09-12 LAB
ALBUMIN SERPL-MCNC: 4.2 G/DL (ref 3.5–5.2)
ALBUMIN/GLOB SERPL: 1.3 G/DL
ALP SERPL-CCNC: 76 U/L (ref 39–117)
ALT SERPL W P-5'-P-CCNC: 18 U/L (ref 1–41)
ANION GAP SERPL CALCULATED.3IONS-SCNC: 9.3 MMOL/L (ref 5–15)
AST SERPL-CCNC: 23 U/L (ref 1–40)
BASOPHILS # BLD AUTO: 0.02 10*3/MM3 (ref 0–0.2)
BASOPHILS NFR BLD AUTO: 0.3 % (ref 0–1.5)
BILIRUB SERPL-MCNC: 1.1 MG/DL (ref 0.2–1.2)
BUN BLD-MCNC: 17 MG/DL (ref 8–23)
BUN/CREAT SERPL: 14.7 (ref 7–25)
CALCIUM SPEC-SCNC: 9.3 MG/DL (ref 8.6–10.5)
CHLORIDE SERPL-SCNC: 89 MMOL/L (ref 98–107)
CO2 SERPL-SCNC: 30.7 MMOL/L (ref 22–29)
CREAT BLD-MCNC: 1.16 MG/DL (ref 0.76–1.27)
DEPRECATED RDW RBC AUTO: 48.3 FL (ref 37–54)
EOSINOPHIL # BLD AUTO: 0.02 10*3/MM3 (ref 0–0.4)
EOSINOPHIL NFR BLD AUTO: 0.3 % (ref 0.3–6.2)
ERYTHROCYTE [DISTWIDTH] IN BLOOD BY AUTOMATED COUNT: 14 % (ref 12.3–15.4)
GFR SERPL CREATININE-BSD FRML MDRD: 62 ML/MIN/1.73
GLOBULIN UR ELPH-MCNC: 3.3 GM/DL
GLUCOSE BLD-MCNC: 359 MG/DL (ref 65–99)
GLUCOSE BLDC GLUCOMTR-MCNC: 307 MG/DL (ref 70–130)
HCT VFR BLD AUTO: 40.4 % (ref 37.5–51)
HGB BLD-MCNC: 13.2 G/DL (ref 13–17.7)
HOLD SPECIMEN: NORMAL
HOLD SPECIMEN: NORMAL
IMM GRANULOCYTES # BLD AUTO: 0.04 10*3/MM3 (ref 0–0.05)
IMM GRANULOCYTES NFR BLD AUTO: 0.6 % (ref 0–0.5)
LYMPHOCYTES # BLD AUTO: 1.83 10*3/MM3 (ref 0.7–3.1)
LYMPHOCYTES NFR BLD AUTO: 26 % (ref 19.6–45.3)
MAGNESIUM SERPL-MCNC: 2 MG/DL (ref 1.6–2.4)
MCH RBC QN AUTO: 31.1 PG (ref 26.6–33)
MCHC RBC AUTO-ENTMCNC: 32.7 G/DL (ref 31.5–35.7)
MCV RBC AUTO: 95.1 FL (ref 79–97)
MONOCYTES # BLD AUTO: 0.65 10*3/MM3 (ref 0.1–0.9)
MONOCYTES NFR BLD AUTO: 9.2 % (ref 5–12)
NEUTROPHILS # BLD AUTO: 4.47 10*3/MM3 (ref 1.7–7)
NEUTROPHILS NFR BLD AUTO: 63.6 % (ref 42.7–76)
NRBC BLD AUTO-RTO: 0 /100 WBC (ref 0–0.2)
NT-PROBNP SERPL-MCNC: 183.8 PG/ML (ref 5–900)
PLATELET # BLD AUTO: 216 10*3/MM3 (ref 140–450)
PMV BLD AUTO: 10.3 FL (ref 6–12)
POTASSIUM BLD-SCNC: 4.4 MMOL/L (ref 3.5–5.2)
PROT SERPL-MCNC: 7.5 G/DL (ref 6–8.5)
RBC # BLD AUTO: 4.25 10*6/MM3 (ref 4.14–5.8)
SODIUM BLD-SCNC: 129 MMOL/L (ref 136–145)
TROPONIN T SERPL-MCNC: <0.01 NG/ML (ref 0–0.03)
WBC NRBC COR # BLD: 7.03 10*3/MM3 (ref 3.4–10.8)
WHOLE BLOOD HOLD SPECIMEN: NORMAL
WHOLE BLOOD HOLD SPECIMEN: NORMAL

## 2019-09-12 PROCEDURE — 70450 CT HEAD/BRAIN W/O DYE: CPT

## 2019-09-12 PROCEDURE — 80053 COMPREHEN METABOLIC PANEL: CPT | Performed by: NURSE PRACTITIONER

## 2019-09-12 PROCEDURE — 84484 ASSAY OF TROPONIN QUANT: CPT | Performed by: NURSE PRACTITIONER

## 2019-09-12 PROCEDURE — 83735 ASSAY OF MAGNESIUM: CPT | Performed by: NURSE PRACTITIONER

## 2019-09-12 PROCEDURE — 93010 ELECTROCARDIOGRAM REPORT: CPT | Performed by: INTERNAL MEDICINE

## 2019-09-12 PROCEDURE — 82962 GLUCOSE BLOOD TEST: CPT

## 2019-09-12 PROCEDURE — 85025 COMPLETE CBC W/AUTO DIFF WBC: CPT | Performed by: NURSE PRACTITIONER

## 2019-09-12 PROCEDURE — 99284 EMERGENCY DEPT VISIT MOD MDM: CPT

## 2019-09-12 PROCEDURE — 93005 ELECTROCARDIOGRAM TRACING: CPT | Performed by: NURSE PRACTITIONER

## 2019-09-12 PROCEDURE — 83880 ASSAY OF NATRIURETIC PEPTIDE: CPT | Performed by: NURSE PRACTITIONER

## 2019-09-12 PROCEDURE — 71046 X-RAY EXAM CHEST 2 VIEWS: CPT

## 2019-09-12 RX ORDER — SODIUM CHLORIDE 0.9 % (FLUSH) 0.9 %
10 SYRINGE (ML) INJECTION AS NEEDED
Status: DISCONTINUED | OUTPATIENT
Start: 2019-09-12 | End: 2019-09-12 | Stop reason: HOSPADM

## 2019-09-12 RX ADMIN — SODIUM CHLORIDE 500 ML: 9 INJECTION, SOLUTION INTRAVENOUS at 15:43

## 2019-09-12 NOTE — ED PROVIDER NOTES
Pt presents to the ED c/o generalized weakness that started two days ago. Pt reports that he has been running into walls secondary to weakness. Pt denies CP, SOA, diarrhea, and difficulty urinating. Pt is not a known diabetic.      On exam,   Awake, alert, answers questions appropriately, lungs clear, regular rate and rhythm, abd soft and non-tender     Plan: pt's blood sugar is elevated, will consult with pt's PCP    EKG          EKG time: 1347  Rhythm/Rate: nsr, 60  P waves and MT: nl  QRS, axis: nl   ST and T waves: lateral T wave inversion     Interpreted Contemporaneously by me, independently viewed  Unchanged compared to prior 5/1/19.        Attestation:  The BRANDI and I have discussed this patient's history, physical exam, and treatment plan.  I have reviewed the documentation and personally had a face to face interaction with the patient. I affirm the documentation and agree with the treatment and plan.  The attached note describes my personal findings.       Documentation assistance provided by ana Daniel for Dr. Snow.  Information recorded by the scribe was done at my direction and has been verified and validated by me.              Antoni Daniel  09/12/19 2213       Scot Snow MD  09/12/19 2327

## 2019-09-12 NOTE — DISCHARGE INSTRUCTIONS
Start metformin.   Home to rest  Drink plenty of fluids  Follow up with Dr Carrasquillo   Return if worse or new concerns   Continue care with your primary care physician and have your blood pressure regularly checked and managed. Normal blood pressure is 120/80.

## 2019-09-12 NOTE — ED PROVIDER NOTES
EMERGENCY DEPARTMENT ENCOUNTER    CHIEF COMPLAINT  Chief Complaint: weakness  History given by: patient  History limited by: nothing  Room Number: 30/30  PMD: Milo Carrasquillo DO      HPI:  Pt is a 74 y.o. male who presents complaining of generalized weakness that started 2 days ago. Pt states he does 'not feel like [him]self' and he 'runs into walls.' He reports an abnormal gait, stating he cannot acquire enough strength to ambulate properly. He denies CP, SOA, palpitations, abd pain, N/V/D, dysuria, hematuria, headache, blurred vision and all other complaints at this time. He reports chronic BLE edema. He denies syncope or any falls or injuries. BP was 117/40 this morning. Pt takes diuretics. He denies recent medication change.  He had a stent placed in May 2018. Pt notes a hx of CVA which occurred almost 20 years ago.    Duration:  2 days  Onset: gradual  Timing: constant  Location: generalized  Radiation: none  Quality: weakness  Intensity/Severity: mild  Progression: unchanged  Associated Symptoms: abnormal gait, chronic BLE edema  Aggravating Factors: none  Alleviating Factors: none  Previous Episodes: none  Treatment before arrival: none    PAST MEDICAL HISTORY  Active Ambulatory Problems     Diagnosis Date Noted   • DJD (degenerative joint disease) of knee 12/14/2017   • Essential hypertension 02/18/2019   • SOB (shortness of breath) 02/18/2019   • Leg swelling 02/18/2019   • Chest pain with high risk of acute coronary syndrome 04/29/2019   • Hyperlipidemia LDL goal <100 08/23/2019     Resolved Ambulatory Problems     Diagnosis Date Noted   • No Resolved Ambulatory Problems     Past Medical History:   Diagnosis Date   • Arthritis    • Bilateral leg edema    • CAD (coronary artery disease)    • Disease of thyroid gland    • GERD (gastroesophageal reflux disease)    • Heart murmur    • History of head injury    • Douglas (hard of hearing)    • Hyperlipidemia    • Hypertension    • Irregular heart beat    • GIOVANNY  (obstructive sleep apnea)    • Osteoarthritis    • Past heart attack    • SOB (shortness of breath) on exertion    • Stroke (CMS/HCC)    • Vasovagal episode        PAST SURGICAL HISTORY  Past Surgical History:   Procedure Laterality Date   • CARDIAC CATHETERIZATION N/A 4/30/2019    Procedure: Coronary angiography with grafts;  Surgeon: Rio Miranda MD;  Location:  [facility];  Service: Cardiology   • CARDIAC CATHETERIZATION N/A 4/30/2019    Procedure: Left Heart Cath;  Surgeon: Rio Miranda MD;  Location: [facility] CATH INVASIVE LOCATION;  Service: Cardiology   • CARDIAC CATHETERIZATION N/A 4/30/2019    Procedure: Left ventriculography;  Surgeon: Rio Miranda MD;  Location: [facility] CATH INVASIVE LOCATION;  Service: Cardiology   • CARDIAC CATHETERIZATION  4/30/2019    Procedure: Saphenous Vein Graft;  Surgeon: Rio Miranda MD;  Location: [facility] CATH INVASIVE LOCATION;  Service: Cardiology   • CARDIAC CATHETERIZATION N/A 4/30/2019    Procedure: Stent GLENDY coronary;  Surgeon: Rio Miranda MD;  Location: [facility] CATH INVASIVE LOCATION;  Service: Cardiology   • CARPAL TUNNEL RELEASE Bilateral    • CATARACT EXTRACTION     • CORONARY ARTERY BYPASS GRAFT  2002   • FINGER SURGERY      MISSING LEFT POINTER FINGER TIP   • KNEE ARTHROPLASTY Right 02/15/2017   • AK RT/LT HEART CATHETERS N/A 4/30/2019    Procedure: Percutaneous Coronary Intervention;  Surgeon: Rio Miranda MD;  Location: [facility] CATH INVASIVE LOCATION;  Service: Cardiology   • AK TOTAL KNEE ARTHROPLASTY Left 12/14/2017    Procedure: LT TOTAL KNEE ARTHROPLASTY;  Surgeon: Jatin Lancaster MD;  Location: [facility] OR;  Service: Orthopedics       FAMILY HISTORY  Family History   Problem Relation Age of Onset   • Parkinsonism Sister    • Diabetes Brother    • Malig Hyperthermia Neg Hx        SOCIAL HISTORY  Social History     Socioeconomic History   • Marital status: Single     Spouse name: Not  on file   • Number of children: Not on file   • Years of education: Not on file   • Highest education level: Not on file   Tobacco Use   • Smoking status: Never Smoker   • Smokeless tobacco: Never Used   Substance and Sexual Activity   • Alcohol use: No   • Drug use: No   • Sexual activity: Defer       ALLERGIES  Patient has no known allergies.    REVIEW OF SYSTEMS  Review of Systems   Constitutional: Negative for activity change, appetite change and fever.   HENT: Negative for congestion and sore throat.    Eyes: Negative.    Respiratory: Negative for cough and shortness of breath.    Cardiovascular: Positive for leg swelling (chronic BLE). Negative for chest pain.   Gastrointestinal: Negative for abdominal pain, diarrhea and vomiting.   Endocrine: Negative.    Genitourinary: Negative for decreased urine volume and dysuria.   Musculoskeletal: Positive for gait problem. Negative for neck pain.   Skin: Negative for rash and wound.   Allergic/Immunologic: Negative.    Neurological: Positive for weakness (generalized). Negative for numbness and headaches.   Hematological: Negative.    Psychiatric/Behavioral: Negative.    All other systems reviewed and are negative.      PHYSICAL EXAM  ED Triage Vitals [09/12/19 1250]   Temp Heart Rate Resp BP SpO2   97.7 °F (36.5 °C) 74 16 -- 96 %       Physical Exam   Constitutional: He is oriented to person, place, and time. No distress.   HENT:   Head: Normocephalic and atraumatic.   Eyes: EOM are normal. Pupils are equal, round, and reactive to light.   Neck: Normal range of motion. Neck supple.   Cardiovascular: Normal rate and regular rhythm.   Murmur heard.   Systolic murmur is present with a grade of 3/6.  3/6 systolic murmur   Pulmonary/Chest: Effort normal and breath sounds normal. No respiratory distress.   Abdominal: Soft. There is no tenderness. There is no rebound and no guarding.   Musculoskeletal: Normal range of motion. He exhibits edema.   2+ BLE edema   Neurological:  He is alert and oriented to person, place, and time. He has normal sensation and normal strength.   Skin: Skin is warm and dry.   Psychiatric: Mood and affect normal.   Nursing note and vitals reviewed.      LAB RESULTS  Lab Results (last 24 hours)     Procedure Component Value Units Date/Time    POC Glucose Once [[id number]]  (Abnormal) Collected:  09/12/19 1559    Specimen:  Blood Updated:  09/12/19 1601     Glucose 307 mg/dL         Labs Reviewed   COMPREHENSIVE METABOLIC PANEL - Abnormal; Notable for the following components:       Result Value    Glucose 359 (*)     Sodium 129 (*)     Chloride 89 (*)     CO2 30.7 (*)     All other components within normal limits    Narrative:     GFR Normal >60  Chronic Kidney Disease <60  Kidney Failure <15   CBC WITH AUTO DIFFERENTIAL - Abnormal; Notable for the following components:    Immature Grans % 0.6 (*)     All other components within normal limits   POCT GLUCOSE FINGERSTICK - Abnormal; Notable for the following components:    Glucose 307 (*)     All other components within normal limits   TROPONIN (IN-HOUSE) - Normal    Narrative:     Troponin T Reference Range:  <= 0.03 ng/mL-   Negative for AMI  >0.03 ng/mL-     Abnormal for myocardial necrosis.  Clinicians would have to utilize clinical acumen, EKG, Troponin and serial changes to determine if it is an Acute Myocardial Infarction or myocardial injury due to an underlying chronic condition.    BNP (IN-HOUSE) - Normal    Narrative:     Among patients with dyspnea, NT-proBNP is highly sensitive for the detection of acute congestive heart failure. In addition NT-proBNP of <300 pg/ml effectively rules out acute congestive heart failure with 99% negative predictive value.   MAGNESIUM - Normal   RAINBOW DRAW    Narrative:     The following orders were created for panel order [place] Draw.  Procedure                               Abnormality         Status                     ---------                                -----------         ------                     Light Blue Top[[id number]]                                   Final result               Green Top (Gel)[[id number]]                                  Final result               Lavender Top[[id number]]                                     Final result               Gold Top - SST[[id number]]                                   Final result                 Please view results for these tests on the individual orders.   POCT GLUCOSE FINGERSTICK   CBC AND DIFFERENTIAL    Narrative:     The following orders were created for panel order CBC & Differential.  Procedure                               Abnormality         Status                     ---------                               -----------         ------                     CBC Auto Differential[[id number]]        Abnormal            Final result                 Please view results for these tests on the individual orders.   LIGHT BLUE TOP   GREEN TOP   LAVENDER TOP   GOLD TOP - SST         I ordered the above labs and reviewed the results    RADIOLOGY  CT Head Without Contrast   Final Result   Atrophy, small vessel ischemic disease and vascular   calcification are noted. There is no evidence of acute infarction or   hemorrhage. Further evaluation could be performed with an MRI   examination of the brain as indicated.           Radiation dose reduction techniques were utilized, including automated   exposure control and exposure modulation based on body size.       This report was finalized on 9/13/2019 8:16 AM by Dr. Fercho Snyder M.D.          XR Chest 2 View   Final Result   No focal pulmonary consolidation. Follow-up as clinical   indications persist.       This report was finalized on 9/12/2019 2:22 PM by Dr. Chava Flores M.D.               I ordered the above noted radiological studies. Interpreted by radiologist. Reviewed by me in PACS.       PROCEDURES  Procedures    EKG interpreted by ED physician. See note.        PROGRESS AND CONSULTS  ED Course as of Sep 13 1529   Thu Sep 12, 2019   1507 Dr Delarosa: neg acute Ct head. Athersclerotic disease noted.   [JS]      ED Course User Index  [JS] Kishan  Thierry, FAISAL      1340. Ordered CXR and labs.     1356. Ordered orthostatic vitals.     1358. Ordered CT head.     1515. Discussed case with Dr Snow. After a bedside evaluation, he agrees with the plan of care.    1545 Spoke with assistant for Dr Carrasquillo as he is unavailable .she requests any medications we order are sent to [facility] and they can be given at the facility. States the pt can be seen in 2 weeks and they will try to see him sooner.discussed blood sugar, she confirmed pt does not have diabetes. Will start on Metformin. Renal function wnl in the ED. This was also communicated with the staff member for neurorestorative who is present in the ED.  Pt is agreeable and ready for discharge.     MEDICAL DECISION MAKING  Results were reviewed/discussed with the patient and they were also made aware of online access. Pt also made aware that some labs, such as cultures, will not be resulted during ER visit and follow up with PMD is necessary.     MDM  Number of Diagnoses or Management Options  General weakness:      Amount and/or Complexity of Data Reviewed  Clinical lab tests: ordered and reviewed (Glucose 359)  Tests in the radiology section of CPT®: ordered and reviewed (Nothing acute seen on CXR)  Tests in the medicine section of CPT®: reviewed and ordered (See EKG procedure note.)    Patient Progress  Patient progress: stable         DIAGNOSIS  Final diagnoses:   General weakness   Elevated blood sugar       DISPOSITION  Discharge     Latest Documented Vital Signs:  As of 3:29 PM  BP- 161/95 HR- 68 Temp- 97.7 °F (36.5 °C) (Tympanic) O2 sat- 94%    --  Documentation assistance provided by ana Alvarado for FAISAL Holley.  Information recorded by the scribsaurav was done at my direction and has been  verified and validated by me.     Rhonda Alvarado  09/12/19 1456       Jennifer Holley APRN  09/13/19 1531       Jennifer Holley, FAISAL  09/13/19 1538

## 2019-09-12 NOTE — ED NOTES
"Pt reports generalized weakness x2 days. Pt states \"I feel like I'm in a daze. My balance is off.\" Pt ambulated to triage desk with steady gait. Denies fever.     Jacque Wallace RN  09/12/19 7261    "

## 2019-10-24 ENCOUNTER — APPOINTMENT (OUTPATIENT)
Dept: CT IMAGING | Facility: HOSPITAL | Age: 74
End: 2019-10-24

## 2019-10-24 ENCOUNTER — HOSPITAL ENCOUNTER (EMERGENCY)
Facility: HOSPITAL | Age: 74
Discharge: HOME | End: 2019-10-24
Attending: EMERGENCY MEDICINE | Admitting: EMERGENCY MEDICINE

## 2019-10-24 VITALS
RESPIRATION RATE: 16 BRPM | HEART RATE: 67 BPM | TEMPERATURE: 98 F | BODY MASS INDEX: 41.35 KG/M2 | HEIGHT: 69 IN | SYSTOLIC BLOOD PRESSURE: 144 MMHG | DIASTOLIC BLOOD PRESSURE: 86 MMHG | OXYGEN SATURATION: 98 %

## 2019-10-24 DIAGNOSIS — R94.6 BORDERLINE ABNORMAL TFTS: ICD-10-CM

## 2019-10-24 DIAGNOSIS — R45.89 DEPRESSED MOOD: Primary | ICD-10-CM

## 2019-10-24 LAB
ALBUMIN SERPL-MCNC: 4.3 G/DL (ref 3.5–5.2)
ALBUMIN/GLOB SERPL: 1.5 G/DL
ALP SERPL-CCNC: 70 U/L (ref 39–117)
ALT SERPL W P-5'-P-CCNC: 19 U/L (ref 1–41)
AMPHET+METHAMPHET UR QL: NEGATIVE
ANION GAP SERPL CALCULATED.3IONS-SCNC: 11.6 MMOL/L (ref 5–15)
AST SERPL-CCNC: 19 U/L (ref 1–40)
BARBITURATES UR QL SCN: NEGATIVE
BASOPHILS # BLD AUTO: 0.03 10*3/MM3 (ref 0–0.2)
BASOPHILS NFR BLD AUTO: 0.4 % (ref 0–1.5)
BENZODIAZ UR QL SCN: NEGATIVE
BILIRUB SERPL-MCNC: 0.5 MG/DL (ref 0.2–1.2)
BILIRUB UR QL STRIP: NEGATIVE
BUN BLD-MCNC: 14 MG/DL (ref 8–23)
BUN/CREAT SERPL: 14.4 (ref 7–25)
CALCIUM SPEC-SCNC: 9 MG/DL (ref 8.6–10.5)
CANNABINOIDS SERPL QL: NEGATIVE
CHLORIDE SERPL-SCNC: 94 MMOL/L (ref 98–107)
CLARITY UR: CLEAR
CO2 SERPL-SCNC: 30.4 MMOL/L (ref 22–29)
COCAINE UR QL: NEGATIVE
COLOR UR: YELLOW
CREAT BLD-MCNC: 0.97 MG/DL (ref 0.76–1.27)
DEPRECATED RDW RBC AUTO: 45.6 FL (ref 37–54)
EOSINOPHIL # BLD AUTO: 0.05 10*3/MM3 (ref 0–0.4)
EOSINOPHIL NFR BLD AUTO: 0.7 % (ref 0.3–6.2)
ERYTHROCYTE [DISTWIDTH] IN BLOOD BY AUTOMATED COUNT: 13.4 % (ref 12.3–15.4)
ETHANOL BLD-MCNC: <10 MG/DL (ref 0–10)
ETHANOL UR QL: <0.01 %
GFR SERPL CREATININE-BSD FRML MDRD: 76 ML/MIN/1.73
GLOBULIN UR ELPH-MCNC: 2.8 GM/DL
GLUCOSE BLD-MCNC: 138 MG/DL (ref 65–99)
GLUCOSE UR STRIP-MCNC: NEGATIVE MG/DL
HCT VFR BLD AUTO: 35.9 % (ref 37.5–51)
HGB BLD-MCNC: 12.7 G/DL (ref 13–17.7)
HGB UR QL STRIP.AUTO: NEGATIVE
IMM GRANULOCYTES # BLD AUTO: 0.03 10*3/MM3 (ref 0–0.05)
IMM GRANULOCYTES NFR BLD AUTO: 0.4 % (ref 0–0.5)
INR PPP: 0.96 (ref 0.9–1.1)
KETONES UR QL STRIP: NEGATIVE
LEUKOCYTE ESTERASE UR QL STRIP.AUTO: NEGATIVE
LYMPHOCYTES # BLD AUTO: 2.02 10*3/MM3 (ref 0.7–3.1)
LYMPHOCYTES NFR BLD AUTO: 28.5 % (ref 19.6–45.3)
MAGNESIUM SERPL-MCNC: 2.1 MG/DL (ref 1.6–2.4)
MCH RBC QN AUTO: 33.2 PG (ref 26.6–33)
MCHC RBC AUTO-ENTMCNC: 35.4 G/DL (ref 31.5–35.7)
MCV RBC AUTO: 93.7 FL (ref 79–97)
METHADONE UR QL SCN: NEGATIVE
MONOCYTES # BLD AUTO: 0.81 10*3/MM3 (ref 0.1–0.9)
MONOCYTES NFR BLD AUTO: 11.4 % (ref 5–12)
NEUTROPHILS # BLD AUTO: 4.16 10*3/MM3 (ref 1.7–7)
NEUTROPHILS NFR BLD AUTO: 58.6 % (ref 42.7–76)
NITRITE UR QL STRIP: NEGATIVE
NRBC BLD AUTO-RTO: 0 /100 WBC (ref 0–0.2)
OPIATES UR QL: NEGATIVE
OXYCODONE UR QL SCN: NEGATIVE
PH UR STRIP.AUTO: 6.5 [PH] (ref 5–8)
PLATELET # BLD AUTO: 285 10*3/MM3 (ref 140–450)
PMV BLD AUTO: 9.6 FL (ref 6–12)
POTASSIUM BLD-SCNC: 3.7 MMOL/L (ref 3.5–5.2)
PROT SERPL-MCNC: 7.1 G/DL (ref 6–8.5)
PROT UR QL STRIP: NEGATIVE
PROTHROMBIN TIME: 12.5 SECONDS (ref 11.7–14.2)
RBC # BLD AUTO: 3.83 10*6/MM3 (ref 4.14–5.8)
SODIUM BLD-SCNC: 136 MMOL/L (ref 136–145)
SP GR UR STRIP: 1.01 (ref 1–1.03)
T4 FREE SERPL-MCNC: 0.89 NG/DL (ref 0.93–1.7)
TSH SERPL DL<=0.05 MIU/L-ACNC: 6.16 UIU/ML (ref 0.27–4.2)
UROBILINOGEN UR QL STRIP: NORMAL
WBC NRBC COR # BLD: 7.1 10*3/MM3 (ref 3.4–10.8)

## 2019-10-24 PROCEDURE — 80307 DRUG TEST PRSMV CHEM ANLYZR: CPT | Performed by: EMERGENCY MEDICINE

## 2019-10-24 PROCEDURE — 81003 URINALYSIS AUTO W/O SCOPE: CPT | Performed by: EMERGENCY MEDICINE

## 2019-10-24 PROCEDURE — 84439 ASSAY OF FREE THYROXINE: CPT | Performed by: EMERGENCY MEDICINE

## 2019-10-24 PROCEDURE — 85025 COMPLETE CBC W/AUTO DIFF WBC: CPT | Performed by: EMERGENCY MEDICINE

## 2019-10-24 PROCEDURE — 83735 ASSAY OF MAGNESIUM: CPT | Performed by: EMERGENCY MEDICINE

## 2019-10-24 PROCEDURE — 90791 PSYCH DIAGNOSTIC EVALUATION: CPT | Performed by: SOCIAL WORKER

## 2019-10-24 PROCEDURE — 70450 CT HEAD/BRAIN W/O DYE: CPT

## 2019-10-24 PROCEDURE — 85610 PROTHROMBIN TIME: CPT | Performed by: EMERGENCY MEDICINE

## 2019-10-24 PROCEDURE — 80053 COMPREHEN METABOLIC PANEL: CPT | Performed by: EMERGENCY MEDICINE

## 2019-10-24 PROCEDURE — 99283 EMERGENCY DEPT VISIT LOW MDM: CPT

## 2019-10-24 PROCEDURE — 84443 ASSAY THYROID STIM HORMONE: CPT | Performed by: EMERGENCY MEDICINE

## 2019-10-24 RX ORDER — SODIUM CHLORIDE 0.9 % (FLUSH) 0.9 %
10 SYRINGE (ML) INJECTION AS NEEDED
Status: DISCONTINUED | OUTPATIENT
Start: 2019-10-24 | End: 2019-10-24 | Stop reason: HOSPADM

## 2019-10-24 NOTE — ED PROVIDER NOTES
" EMERGENCY DEPARTMENT ENCOUNTER    CHIEF COMPLAINT  Chief Complaint: Depression  History given by: Patient  History limited by: nothing  Room Number: 10/10  PMD: Milo Carrasquillo DO      HPI:  Pt is a 74 y.o. male with hx of DM  presents complaining of depression and needing a \"tune up\"  that has been going on for \"2-3 days\". He admits to tooth pain and BLE swelling. He denies SI/HI. Pt says that he does not feel himself. \"I was wondering if you all could scan my brain to make sure it is ticking right\". Pt is on Brilinta.    Duration:  \"2-3 days\"  Onset: gradual  Timing: constant  Quality: Depression  Intensity/Severity: mild  Progression: unchanged  Associated Symptoms: tooth pain  Aggravating Factors: none  Alleviating Factors: none      PAST MEDICAL HISTORY  Active Ambulatory Problems     Diagnosis Date Noted   • DJD (degenerative joint disease) of knee 12/14/2017   • Essential hypertension 02/18/2019   • SOB (shortness of breath) 02/18/2019   • Leg swelling 02/18/2019   • Chest pain with high risk of acute coronary syndrome 04/29/2019   • Hyperlipidemia LDL goal <100 08/23/2019     Resolved Ambulatory Problems     Diagnosis Date Noted   • No Resolved Ambulatory Problems     Past Medical History:   Diagnosis Date   • Arthritis    • Bilateral leg edema    • CAD (coronary artery disease)    • Disease of thyroid gland    • GERD (gastroesophageal reflux disease)    • Heart murmur    • History of head injury    • [place] (hard of hearing)    • Hyperlipidemia    • Hypertension    • Irregular heart beat    • GIOVANNY (obstructive sleep apnea)    • Osteoarthritis    • Past heart attack    • SOB (shortness of breath) on exertion    • Stroke (CMS/HCC)    • Vasovagal episode        PAST SURGICAL HISTORY  Past Surgical History:   Procedure Laterality Date   • CARDIAC CATHETERIZATION N/A 4/30/2019    Procedure: Coronary angiography with grafts;  Surgeon: Rio Miranda MD;  Location: [facility] INVASIVE LOCATION;  " Service: Cardiology   • CARDIAC CATHETERIZATION N/A 4/30/2019    Procedure: Left Heart Cath;  Surgeon: Rio Miranda MD;  Location:  [facility] CATH INVASIVE LOCATION;  Service: Cardiology   • CARDIAC CATHETERIZATION N/A 4/30/2019    Procedure: Left ventriculography;  Surgeon: Rio Miranda MD;  Location:  [facility] CATH INVASIVE LOCATION;  Service: Cardiology   • CARDIAC CATHETERIZATION  4/30/2019    Procedure: Saphenous Vein Graft;  Surgeon: Rio Miranda MD;  [facility];  Service: Cardiology   • CARDIAC CATHETERIZATION N/A 4/30/2019    Procedure: Stent GLENDY coronary;  Surgeon: Rio Miranda MD;  [facility] C[facility] LOC[facility];  Service: Cardiology   • CARPAL TUNNEL RELEASE Bilateral    • CATARACT EXTRACTION     • CORONARY ARTERY BYPASS GRAFT  2002   • FINGER SURGERY      MISSING LEFT POINTER FINGER TIP   • KNEE ARTHROPLASTY Right 02/15/2017   • OH RT/LT HEART CATHETERS N/A 4/30/2019    Procedure: Percutaneous Coronary Intervention;  Surgeon: Rio Miranda MD;  [facility] LOC[facility];  Service: Cardiology   • OH TOTAL KNEE ARTHROPLASTY Left 12/14/2017    Procedure: LT TOTAL KNEE ARTHROPLASTY;  Surgeon: Jatin Lancaster MD;  Location: [facility] MAIN OR;  Service: Orthopedics       FAMILY HISTORY  Family History   Problem Relation Age of Onset   • Parkinsonism Sister    • Diabetes Brother    • Malig Hyperthermia Neg Hx        SOCIAL HISTORY  Social History     Socioeconomic History   • Marital status: Single     Spouse name: Not on file   • Number of children: Not on file   • Years of education: Not on file   • Highest education level: Not on file   Tobacco Use   • Smoking status: Never Smoker   • Smokeless tobacco: Never Used   Substance and Sexual Activity   • Alcohol use: No   • Drug use: No   • Sexual activity: Defer       ALLERGIES  Patient has no known allergies.    REVIEW OF SYSTEMS  Review of Systems   Constitutional:  Negative for activity change, appetite change and fever.   HENT: Negative for congestion and sore throat.         + Tooth pain   Eyes: Negative.    Respiratory: Negative for cough and shortness of breath.    Cardiovascular: Positive for leg swelling (BLE). Negative for chest pain.   Gastrointestinal: Negative for abdominal pain, diarrhea and vomiting.   Endocrine: Negative.    Genitourinary: Negative for decreased urine volume and dysuria.   Musculoskeletal: Negative for neck pain.   Skin: Negative for rash and wound.   Allergic/Immunologic: Negative.    Neurological: Negative for weakness, numbness and headaches.   Hematological: Negative.    Psychiatric/Behavioral: Positive for dysphoric mood (depression). Negative for suicidal ideas.   All other systems reviewed and are negative.        PHYSICAL EXAM  ED Triage Vitals [10/24/19 1750]   Temp Heart Rate Resp BP SpO2   97.5 °F (36.4 °C) 75 16 -- 98 %      Temp src Heart Rate Source Patient Position BP Location FiO2 (%)   Tympanic -- -- -- --       Physical Exam   Constitutional: He is oriented to person, place, and time. No distress.   HENT:   Head: Normocephalic and atraumatic.   Mouth/Throat: Oropharynx is clear and moist and mucous membranes are normal.   Eyes: EOM are normal. Pupils are equal, round, and reactive to light.   Neck: Normal range of motion. Neck supple.   Cardiovascular: Normal rate, regular rhythm and normal heart sounds.   Pulmonary/Chest: Effort normal and breath sounds normal. No respiratory distress.   Abdominal: Soft. There is no tenderness. There is no rebound and no guarding.   Musculoskeletal: Normal range of motion. He exhibits no edema.   Neurological: He is alert and oriented to person, place, and time. He has normal sensation and normal strength.   Skin: Skin is warm and dry.   Psychiatric: Mood and affect normal.   Nursing note and vitals reviewed.        LAB RESULTS  Lab Results (last 24 hours)     Procedure Component Value Units  Date/Time    CBC & Differential [[id number]] Collected:  10/24/19 1829    Specimen:  Blood Updated:  10/24/19 1843    Narrative:       The following orders were created for panel order CBC & Differential.  Procedure                               Abnormality         Status                     ---------                               -----------         ------                     CBC Auto Differential[[id number]]        Abnormal            Final result                 Please view results for these tests on the individual orders.    Comprehensive Metabolic Panel [[id number]]  (Abnormal) Collected:  10/24/19 1829    Specimen:  Blood Updated:  10/24/19 1907     Glucose 138 mg/dL      BUN 14 mg/dL      Creatinine 0.97 mg/dL      Sodium 136 mmol/L      Potassium 3.7 mmol/L      Chloride 94 mmol/L      CO2 30.4 mmol/L      Calcium 9.0 mg/dL      Total Protein 7.1 g/dL      Albumin 4.30 g/dL      ALT (SGPT) 19 U/L      AST (SGOT) 19 U/L      Alkaline Phosphatase 70 U/L      Total Bilirubin 0.5 mg/dL      eGFR Non African Amer 76 mL/min/1.73      Globulin 2.8 gm/dL      A/G Ratio 1.5 g/dL      BUN/Creatinine Ratio 14.4     Anion Gap 11.6 mmol/L     Narrative:       GFR Normal >60  Chronic Kidney Disease <60  Kidney Failure <15    Ethanol [[id number]] Collected:  10/24/19 1829    Specimen:  Blood Updated:  10/24/19 1907     Ethanol <10 mg/dL      Ethanol % <0.010 %     Magnesium [[id number]]  (Normal) Collected:  10/24/19 1829    Specimen:  Blood Updated:  10/24/19 1906     Magnesium 2.1 mg/dL     TSH [[id number]]  (Abnormal) Collected:  10/24/19 1829    Specimen:  Blood Updated:  10/24/19 1914     TSH 6.160 uIU/mL     T4, Free [[id number]]  (Abnormal) Collected:  10/24/19 1829    Specimen:  Blood Updated:  10/24/19 1914     Free T4 0.89 ng/dL     CBC Auto Differential [[id number]]  (Abnormal) Collected:  10/24/19 1829    Specimen:  Blood Updated:  10/24/19 1843     WBC 7.10 10*3/mm3      RBC 3.83 10*6/mm3      Hemoglobin 12.7 g/dL       Hematocrit 35.9 %      MCV 93.7 fL      MCH 33.2 pg      MCHC 35.4 g/dL      RDW 13.4 %      RDW-SD 45.6 fl      MPV 9.6 fL      Platelets 285 10*3/mm3      Neutrophil % 58.6 %      Lymphocyte % 28.5 %      Monocyte % 11.4 %      Eosinophil % 0.7 %      Basophil % 0.4 %      Immature Grans % 0.4 %      Neutrophils, Absolute 4.16 10*3/mm3      Lymphocytes, Absolute 2.02 10*3/mm3      Monocytes, Absolute 0.81 10*3/mm3      Eosinophils, Absolute 0.05 10*3/mm3      Basophils, Absolute 0.03 10*3/mm3      Immature Grans, Absolute 0.03 10*3/mm3      nRBC 0.0 /100 WBC     Protime-INR [[id number]]  (Normal) Collected:  10/24/19 1830    Specimen:  Blood Updated:  10/24/19 1853     Protime 12.5 Seconds      INR 0.96    Urinalysis With Microscopic If Indicated (No Culture) - Urine, Clean Catch [[id number]]  (Normal) Collected:  10/24/19 1855    Specimen:  Urine, Clean Catch Updated:  10/24/19 1910     Color, UA Yellow     Appearance, UA Clear     pH, UA 6.5     Specific Gravity, UA 1.015     Glucose, UA Negative     Ketones, UA Negative     Bilirubin, UA Negative     Blood, UA Negative     Protein, UA Negative     Leuk Esterase, UA Negative     Nitrite, UA Negative     Urobilinogen, UA 0.2 E.U./dL    Narrative:       Urine microscopic not indicated.    Urine Drug Screen - Urine, Clean Catch [[id number]]  (Normal) Collected:  10/24/19 1855    Specimen:  Urine, Clean Catch Updated:  10/24/19 1929     Amphet/Methamphet, Screen Negative     Barbiturates Screen, Urine Negative     Benzodiazepine Screen, Urine Negative     Cocaine Screen, Urine Negative     Opiate Screen Negative     THC, Screen, Urine Negative     Methadone Screen, Urine Negative     Oxycodone Screen, Urine Negative    Narrative:       Negative Thresholds For Drugs Screened:     Amphetamines               500 ng/ml   Barbiturates               200 ng/ml   Benzodiazepines            100 ng/ml   Cocaine                    300 ng/ml   Methadone                  300  ng/ml   Opiates                    300 ng/ml   Oxycodone                  100 ng/ml   THC                        50 ng/ml    The Normal Value for all drugs tested is negative. This report includes final unconfirmed screening results to be used for medical treatment purposes only. Unconfirmed results must not be used for non-medical purposes such as employment or legal testing. Clinical consideration should be applied to any drug of abuse test, particulary when unconfirmed results are used.          I ordered the above labs and reviewed the results    RADIOLOGY  Ct Head Without Contrast    Result Date: 10/24/2019  CT HEAD WO CONTRAST-  INDICATIONS: Trauma  TECHNIQUE: Radiation dose reduction techniques were utilized, including automated exposure control and exposure modulation based on body size. Noncontrast head CT  COMPARISON: 09/12/2019  FINDINGS:    No acute intracranial hemorrhage, midline shift or mass effect. No acute territorial infarct is identified.  Mild periventricular hypodensities suggest chronic small vessel ischemic change in a patient this age.  Arterial calcifications are seen at the base of the brain.  Ventricles, cisterns, cerebral sulci are unremarkable for patient age.  Small mucosal thickening in right maxillary sinus, minimally in the right frontal sinus.. The visualized paranasal sinuses, orbits, mastoid air cells are otherwise unremarkable.           No acute intracranial hemorrhage or hydrocephalus. If there is further clinical concern, MRI could be considered for further evaluation.  This report was finalized on 10/24/2019 8:17 PM by Dr. Chava Flores M.D.        I ordered the above noted radiological studies. Interpreted by radiologist. Reviewed by me in PACS.       PROCEDURES  Procedures      PROGRESS AND CONSULTS     1822- Access requested.     1932- Access evaluated the pt and state the pt is safe to be DC and will be given referrals for care.    1949- Rechecked pt who is resting  comfortably in NAD.Pt states that he has a guard rail on his bed, but hen he got up this morning he slipped out of the bed and hit his head but did not lose consciousness. Informed pt of the lab results. Pt understands and agrees with plan. All questions answered.        MEDICATIONS GIVEN IN ER  Medications - No data to display      MEDICAL DECISION MAKING  Results were reviewed/discussed with the patient and they were also made aware of online access. Pt also made aware that some labs, such as cultures, will not be resulted during ER visit and follow up with PMD is necessary.     MDM  Number of Diagnoses or Management Options  Depressed mood:      Amount and/or Complexity of Data Reviewed  Clinical lab tests: ordered and reviewed (TSH: 6.160  WBC: WNL)           DIAGNOSIS  Final diagnoses:   Depressed mood   Borderline abnormal TFTs       DISPOSITION  DISCHARGE    Patient discharged in stable condition.    Reviewed implications of results, diagnosis, meds, responsibility to follow up, warning signs and symptoms of possible worsening, potential complications and reasons to return to ER.    Patient/Family voiced understanding of above instructions.    Discussed plan for discharge, as there is no emergent indication for admission. Patient referred to primary care provider for BP management due to today's BP. Pt/family is agreeable and understands need for follow up and repeat testing.  Pt is aware that discharge does not mean that nothing is wrong but it indicates no emergency is present that requires admission and they must continue care with follow-up as given below or physician of their choice.     FOLLOW-UP  Milo Carrasquillo,   [address] [facility] [address]  [facility] [address]  [phone]    In 1 week  If symptoms worsen         Medication List      No changes were made to your prescriptions during this visit.           Latest Documented Vital Signs:  As of 10:46 PM  BP- 144/86 HR- 67 Temp- 98 °F (36.7 °C)  (Oral) O2 sat- 98%    --  Documentation assistance provided by ana Lazo for Dr. Patterson.  Information recorded by the scribe was done at my direction and has been verified and validated by me.     Ricarda Lazo  10/24/19 2042       Satish Patterson MD  10/24/19 6125

## 2019-10-24 NOTE — ED NOTES
Pts caregiver brings pt in requesting pt be evaluated for depression. Pt denies SI and HI.      Jacque Wallace, RN  10/24/19 2574
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
## 2019-10-25 NOTE — CONSULTS
75 y/o cauc male with a TBI who [facility] in [facility] home. He has multiple medical issues are no no longer acute. He reports that he has not been feeling himself recently and wanted to have his brain scanned to make sure it was working properly.  He denies any SI or HI. He denied any sleep or appetite issues.  He denies any a/v hallucinations.  He is not interested in any psychiatric medications.      Patient lives in a group home. He is on disability. He has a caregiver with him.  He reports that he has a lot to live for naming his brothers and cousins.  He likes where he lives. He states that he has a good  Jen Nakul.  He indicates she provides a lot of good help and guidance.  He denies use of ETOH or illicit drugs.     Patient was alert and O to person place and recent events.  He denies any SI or HI.  He is bright and engaging. His affect did not appear as if he was depressed.    Patient was provided a list of outpatient mental health providers. The list was provided to the care giver. He is also going to talk to his  Ms Mota as well about resources.     Discussed case w/ Dr. Patterson.  Pt is not meeting IP psych criteria.

## 2020-03-23 RX ORDER — TICAGRELOR 90 MG/1
TABLET ORAL
Qty: 60 TABLET | Refills: 0 | Status: SHIPPED | OUTPATIENT
Start: 2020-03-23

## 2020-04-06 ENCOUNTER — OFFICE VISIT (OUTPATIENT)
Dept: CARDIOLOGY | Facility: CLINIC | Age: 75
End: 2020-04-06

## 2020-04-06 VITALS
BODY MASS INDEX: 37.92 KG/M2 | DIASTOLIC BLOOD PRESSURE: 87 MMHG | HEIGHT: 69 IN | SYSTOLIC BLOOD PRESSURE: 132 MMHG | HEART RATE: 77 BPM | WEIGHT: 256 LBS

## 2020-04-06 DIAGNOSIS — G47.33 OBSTRUCTIVE SLEEP APNEA SYNDROME: ICD-10-CM

## 2020-04-06 DIAGNOSIS — M79.89 LEG SWELLING: ICD-10-CM

## 2020-04-06 DIAGNOSIS — E78.5 HYPERLIPIDEMIA LDL GOAL <100: ICD-10-CM

## 2020-04-06 DIAGNOSIS — I25.119 CORONARY ARTERY DISEASE INVOLVING NATIVE CORONARY ARTERY OF NATIVE HEART WITH ANGINA PECTORIS (HCC): ICD-10-CM

## 2020-04-06 DIAGNOSIS — I10 ESSENTIAL HYPERTENSION: Primary | ICD-10-CM

## 2020-04-06 PROCEDURE — 99442 PR PHYS/QHP TELEPHONE EVALUATION 11-20 MIN: CPT | Performed by: NURSE PRACTITIONER

## 2020-04-06 RX ORDER — DIVALPROEX SODIUM 500 MG/1
500 TABLET, EXTENDED RELEASE ORAL 2 TIMES DAILY
COMMUNITY

## 2020-04-06 RX ORDER — SEMAGLUTIDE 1.34 MG/ML
0.5 INJECTION, SOLUTION SUBCUTANEOUS WEEKLY
COMMUNITY
Start: 2020-03-30
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
## 2020-04-08 DIAGNOSIS — E78.5 HYPERLIPIDEMIA LDL GOAL <100: ICD-10-CM

## 2020-04-08 DIAGNOSIS — I10 ESSENTIAL HYPERTENSION: Primary | ICD-10-CM

## 2020-04-08 DIAGNOSIS — M79.89 LEG SWELLING: ICD-10-CM

## 2020-12-24 ENCOUNTER — APPOINTMENT (OUTPATIENT)
Dept: GENERAL RADIOLOGY | Facility: HOSPITAL | Age: 75
End: 2020-12-24

## 2020-12-24 ENCOUNTER — HOSPITAL ENCOUNTER (EMERGENCY)
Facility: HOSPITAL | Age: 75
Discharge: HOME | End: 2020-12-25
Attending: EMERGENCY MEDICINE | Admitting: EMERGENCY MEDICINE

## 2020-12-24 DIAGNOSIS — F41.9 ANXIETY: ICD-10-CM

## 2020-12-24 DIAGNOSIS — R07.2 PRECORDIAL PAIN: Primary | ICD-10-CM

## 2020-12-24 LAB
ALBUMIN SERPL-MCNC: 4.3 G/DL (ref 3.5–5.2)
ALBUMIN/GLOB SERPL: 1.6 G/DL
ALP SERPL-CCNC: 71 U/L (ref 39–117)
ALT SERPL W P-5'-P-CCNC: 16 U/L (ref 1–41)
ANION GAP SERPL CALCULATED.3IONS-SCNC: 13.2 MMOL/L (ref 5–15)
AST SERPL-CCNC: 21 U/L (ref 1–40)
BASOPHILS # BLD AUTO: 0.02 10*3/MM3 (ref 0–0.2)
BASOPHILS NFR BLD AUTO: 0.3 % (ref 0–1.5)
BILIRUB SERPL-MCNC: 0.4 MG/DL (ref 0–1.2)
BUN SERPL-MCNC: 10 MG/DL (ref 8–23)
BUN/CREAT SERPL: 13.2 (ref 7–25)
CALCIUM SPEC-SCNC: 9.3 MG/DL (ref 8.6–10.5)
CHLORIDE SERPL-SCNC: 95 MMOL/L (ref 98–107)
CO2 SERPL-SCNC: 29.8 MMOL/L (ref 22–29)
CREAT SERPL-MCNC: 0.76 MG/DL (ref 0.76–1.27)
DEPRECATED RDW RBC AUTO: 46.4 FL (ref 37–54)
EOSINOPHIL # BLD AUTO: 0.02 10*3/MM3 (ref 0–0.4)
EOSINOPHIL NFR BLD AUTO: 0.3 % (ref 0.3–6.2)
ERYTHROCYTE [DISTWIDTH] IN BLOOD BY AUTOMATED COUNT: 14 % (ref 12.3–15.4)
GFR SERPL CREATININE-BSD FRML MDRD: 101 ML/MIN/1.73
GLOBULIN UR ELPH-MCNC: 2.7 GM/DL
GLUCOSE SERPL-MCNC: 137 MG/DL (ref 65–99)
HCT VFR BLD AUTO: 35 % (ref 37.5–51)
HGB BLD-MCNC: 11.8 G/DL (ref 13–17.7)
IMM GRANULOCYTES # BLD AUTO: 0.02 10*3/MM3 (ref 0–0.05)
IMM GRANULOCYTES NFR BLD AUTO: 0.3 % (ref 0–0.5)
LYMPHOCYTES # BLD AUTO: 1.51 10*3/MM3 (ref 0.7–3.1)
LYMPHOCYTES NFR BLD AUTO: 20.1 % (ref 19.6–45.3)
MCH RBC QN AUTO: 31.1 PG (ref 26.6–33)
MCHC RBC AUTO-ENTMCNC: 33.7 G/DL (ref 31.5–35.7)
MCV RBC AUTO: 92.1 FL (ref 79–97)
MONOCYTES # BLD AUTO: 0.78 10*3/MM3 (ref 0.1–0.9)
MONOCYTES NFR BLD AUTO: 10.4 % (ref 5–12)
NEUTROPHILS NFR BLD AUTO: 5.15 10*3/MM3 (ref 1.7–7)
NEUTROPHILS NFR BLD AUTO: 68.6 % (ref 42.7–76)
NRBC BLD AUTO-RTO: 0 /100 WBC (ref 0–0.2)
NT-PROBNP SERPL-MCNC: 1072 PG/ML (ref 0–900)
PLATELET # BLD AUTO: 216 10*3/MM3 (ref 140–450)
PMV BLD AUTO: 9.9 FL (ref 6–12)
POTASSIUM SERPL-SCNC: 3.2 MMOL/L (ref 3.5–5.2)
PROT SERPL-MCNC: 7 G/DL (ref 6–8.5)
RBC # BLD AUTO: 3.8 10*6/MM3 (ref 4.14–5.8)
SODIUM SERPL-SCNC: 138 MMOL/L (ref 136–145)
TROPONIN T SERPL-MCNC: <0.01 NG/ML (ref 0–0.03)
WBC # BLD AUTO: 7.5 10*3/MM3 (ref 3.4–10.8)

## 2020-12-24 PROCEDURE — 93010 ELECTROCARDIOGRAM REPORT: CPT | Performed by: INTERNAL MEDICINE

## 2020-12-24 PROCEDURE — 84484 ASSAY OF TROPONIN QUANT: CPT | Performed by: EMERGENCY MEDICINE

## 2020-12-24 PROCEDURE — 80053 COMPREHEN METABOLIC PANEL: CPT | Performed by: EMERGENCY MEDICINE

## 2020-12-24 PROCEDURE — 93005 ELECTROCARDIOGRAM TRACING: CPT | Performed by: EMERGENCY MEDICINE

## 2020-12-24 PROCEDURE — 99284 EMERGENCY DEPT VISIT MOD MDM: CPT

## 2020-12-24 PROCEDURE — 83880 ASSAY OF NATRIURETIC PEPTIDE: CPT | Performed by: EMERGENCY MEDICINE

## 2020-12-24 PROCEDURE — 85025 COMPLETE CBC W/AUTO DIFF WBC: CPT | Performed by: EMERGENCY MEDICINE

## 2020-12-24 PROCEDURE — 71045 X-RAY EXAM CHEST 1 VIEW: CPT

## 2020-12-24 RX ORDER — ASPIRIN 325 MG
325 TABLET ORAL ONCE
Status: COMPLETED | OUTPATIENT
Start: 2020-12-24 | End: 2020-12-24

## 2020-12-24 RX ORDER — SODIUM CHLORIDE 0.9 % (FLUSH) 0.9 %
10 SYRINGE (ML) INJECTION AS NEEDED
Status: DISCONTINUED | OUTPATIENT
Start: 2020-12-24 | End: 2020-12-25 | Stop reason: HOSPADM

## 2020-12-24 RX ADMIN — ASPIRIN 325 MG: 325 TABLET ORAL at 23:03

## 2020-12-25 VITALS
HEART RATE: 97 BPM | SYSTOLIC BLOOD PRESSURE: 138 MMHG | TEMPERATURE: 98.4 F | WEIGHT: 223 LBS | DIASTOLIC BLOOD PRESSURE: 92 MMHG | HEIGHT: 69 IN | RESPIRATION RATE: 20 BRPM | OXYGEN SATURATION: 98 % | BODY MASS INDEX: 33.03 KG/M2

## 2020-12-25 LAB — QT INTERVAL: 368 MS
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
## 2020-12-25 NOTE — DISCHARGE INSTRUCTIONS
Follow-up with Dr. Miranda.  Return to emergency department for worsening or persistent chest pain, shortness of breath, nausea, vomiting, sweating, or other concern.
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
## 2020-12-25 NOTE — ED PROVIDER NOTES
" EMERGENCY DEPARTMENT ENCOUNTER    Room Number:  21/21  Date of encounter:  12/25/2020  PCP: Milo Carrasquillo DO  Historian: Patient     I used full protective equipment while examining this patient.  This includes face mask, gloves and protective eyewear.  I washed my hands before entering the room and immediately upon leaving the room.  Patient was wearing a surgical mask.      HPI:  Chief Complaint: Chest pain  A complete HPI/ROS/PMH/PSH/SH/FH are unobtainable due to: None    Context: Hernando Lozada is a 75 y.o. male who presents to the ED c/o chest pain that began around 8 PM.  Patient states the pain began after he had an argument with his brother.  Pain is described as \"feeling like a bunch of needles were sticking me\".  Pain did not radiate.  Patient felt mildly short of breath but denies nausea, vomiting, or sweating.  Pain lasted approximately 1 hour and then resolved.  Pain was moderate in intensity.  EMS reports that upon their arrival on scene, the patient was tachycardic and shaky.  Patient denies recent illness, cough, fever, abdominal pain, palpitations, syncope, dizziness, leg swelling, or calf pain.  Patient does have a history of CAD.      PAST MEDICAL HISTORY  Active Ambulatory Problems     Diagnosis Date Noted   • DJD (degenerative joint disease) of knee 12/14/2017   • Essential hypertension 02/18/2019   • SOB (shortness of breath) 02/18/2019   • Leg swelling 02/18/2019   • Chest pain with high risk of acute coronary syndrome 04/29/2019   • Hyperlipidemia LDL goal <100 08/23/2019     Resolved Ambulatory Problems     Diagnosis Date Noted   • No Resolved Ambulatory Problems     Past Medical History:   Diagnosis Date   • Arthritis    • Bilateral leg edema    • CAD (coronary artery disease)    • Disease of thyroid gland    • GERD (gastroesophageal reflux disease)    • Heart murmur    • History of head injury    • [place] (hard of hearing)    • Hyperlipidemia    • Hypertension    • Irregular heart beat  "   • GIOVANNY (obstructive sleep apnea)    • Osteoarthritis    • Past heart attack    • SOB (shortness of breath) on exertion    • Stroke (CMS/HCC)    • Vasovagal episode          PAST SURGICAL HISTORY  Past Surgical History:   Procedure Laterality Date   • CARDIAC CATHETERIZATION N/A 4/30/2019    Procedure: Coronary angiography with grafts;  Surgeon: Rio Miranda MD;  Location: [facility] CATH INVASIVE LOCATION;  Service: Cardiology   • CARDIAC CATHETERIZATION N/A 4/30/2019    Procedure: Left Heart Cath;  Surgeon: Rio Miranda MD;  Location: [facility] CATH INVASIVE LOCATION;  Service: Cardiology   • CARDIAC CATHETERIZATION N/A 4/30/2019    Procedure: Left ventriculography;  Surgeon: Rio Miranda MD;  Location: [facility] CATH INVASIVE LOCATION;  Service: Cardiology   • CARDIAC CATHETERIZATION  4/30/2019    Procedure: Saphenous Vein Graft;  Surgeon: Rio Miranda MD;  Location: [facility] CATH INVASIVE LOCATION;  Service: Cardiology   • CARDIAC CATHETERIZATION N/A 4/30/2019    Procedure: Stent GLENDY coronary;  Surgeon: Rio Miranda MD;  Location: [facility] CATH INVASIVE LOCATION;  Service: Cardiology   • CARPAL TUNNEL RELEASE Bilateral    • CATARACT EXTRACTION     • CORONARY ARTERY BYPASS GRAFT  2002   • FINGER SURGERY      MISSING LEFT POINTER FINGER TIP   • KNEE ARTHROPLASTY Right 02/15/2017   • HI RT/LT HEART CATHETERS N/A 4/30/2019    Procedure: Percutaneous Coronary Intervention;  Surgeon: Rio Miranda MD;  Location: [facility] CATH INVASIVE LOCATION;  Service: Cardiology   • HI TOTAL KNEE ARTHROPLASTY Left 12/14/2017    Procedure: LT TOTAL KNEE ARTHROPLASTY;  Surgeon: Jatin Lancaster MD;  Location: [facility] OR;  Service: Orthopedics         FAMILY HISTORY  Family History   Problem Relation Age of Onset   • Parkinsonism Sister    • Diabetes Brother    • Malig Hyperthermia Neg Hx          SOCIAL HISTORY  Social History     Socioeconomic History   • Marital status: Single      Spouse name: Not on file   • Number of children: Not on file   • Years of education: Not on file   • Highest education level: Not on file   Tobacco Use   • Smoking status: Never Smoker   • Smokeless tobacco: Never Used   Substance and Sexual Activity   • Alcohol use: No   • Drug use: No   • Sexual activity: Defer         ALLERGIES  Patient has no known allergies.       REVIEW OF SYSTEMS  Review of Systems      All systems have been reviewed and are negative except as as discussed in the HPI    PHYSICAL EXAM    I have reviewed the triage vital signs and nursing notes.    ED Triage Vitals   Temp Heart Rate Resp BP SpO2   12/24/20 2122 12/24/20 2122 12/24/20 2122 12/24/20 2122 12/24/20 2122   98.4 °F (36.9 °C) 86 18 150/62 100 %      Temp src Heart Rate Source Patient Position BP Location FiO2 (%)   12/24/20 2122 12/24/20 2137 12/24/20 2137 12/24/20 2137 --   Tympanic Monitor Lying Right arm        Physical Exam  GENERAL: Awake, alert, oriented x3  HENT: NCAT, nares patent, moist mucous membranes  NECK: supple  EYES: no scleral icterus  CV: regular rhythm, regular rate, normal radial pulses bilaterally  RESPIRATORY: normal effort, clear to auscultation bilaterally, chest nontender  ABDOMEN: soft, nontender  MUSCULOSKELETAL: Extremities are nontender and without obvious deformity.  There is normal range of motion in all extremities.  There is no calf tenderness or pedal edema  NEURO: Strength, sensation, and coordination are grossly intact.  Speech and mentation are unremarkable.  No facial droop.  SKIN: warm, dry, no rash  PSYCH: Normal mood and affect.  Denies SI.      LAB RESULTS  Recent Results (from the past 24 hour(s))   Comprehensive Metabolic Panel    Collection Time: 12/24/20 10:23 PM    Specimen: Blood   Result Value Ref Range    Glucose 137 (H) 65 - 99 mg/dL    BUN 10 8 - 23 mg/dL    Creatinine 0.76 0.76 - 1.27 mg/dL    Sodium 138 136 - 145 mmol/L    Potassium 3.2 (L) 3.5 - 5.2 mmol/L    Chloride 95 (L) 98 -  107 mmol/L    CO2 29.8 (H) 22.0 - 29.0 mmol/L    Calcium 9.3 8.6 - 10.5 mg/dL    Total Protein 7.0 6.0 - 8.5 g/dL    Albumin 4.30 3.50 - 5.20 g/dL    ALT (SGPT) 16 1 - 41 U/L    AST (SGOT) 21 1 - 40 U/L    Alkaline Phosphatase 71 39 - 117 U/L    Total Bilirubin 0.4 0.0 - 1.2 mg/dL    eGFR Non African Amer 101 >60 mL/min/1.73    Globulin 2.7 gm/dL    A/G Ratio 1.6 g/dL    BUN/Creatinine Ratio 13.2 7.0 - 25.0    Anion Gap 13.2 5.0 - 15.0 mmol/L   BNP    Collection Time: 12/24/20 10:23 PM    Specimen: Blood   Result Value Ref Range    proBNP 1,072.0 (H) 0.0 - 900.0 pg/mL   Troponin    Collection Time: 12/24/20 10:23 PM    Specimen: Blood   Result Value Ref Range    Troponin T <0.010 0.000 - 0.030 ng/mL   CBC Auto Differential    Collection Time: 12/24/20 10:23 PM    Specimen: Blood   Result Value Ref Range    WBC 7.50 3.40 - 10.80 10*3/mm3    RBC 3.80 (L) 4.14 - 5.80 10*6/mm3    Hemoglobin 11.8 (L) 13.0 - 17.7 g/dL    Hematocrit 35.0 (L) 37.5 - 51.0 %    MCV 92.1 79.0 - 97.0 fL    MCH 31.1 26.6 - 33.0 pg    MCHC 33.7 31.5 - 35.7 g/dL    RDW 14.0 12.3 - 15.4 %    RDW-SD 46.4 37.0 - 54.0 fl    MPV 9.9 6.0 - 12.0 fL    Platelets 216 140 - 450 10*3/mm3    Neutrophil % 68.6 42.7 - 76.0 %    Lymphocyte % 20.1 19.6 - 45.3 %    Monocyte % 10.4 5.0 - 12.0 %    Eosinophil % 0.3 0.3 - 6.2 %    Basophil % 0.3 0.0 - 1.5 %    Immature Grans % 0.3 0.0 - 0.5 %    Neutrophils, Absolute 5.15 1.70 - 7.00 10*3/mm3    Lymphocytes, Absolute 1.51 0.70 - 3.10 10*3/mm3    Monocytes, Absolute 0.78 0.10 - 0.90 10*3/mm3    Eosinophils, Absolute 0.02 0.00 - 0.40 10*3/mm3    Basophils, Absolute 0.02 0.00 - 0.20 10*3/mm3    Immature Grans, Absolute 0.02 0.00 - 0.05 10*3/mm3    nRBC 0.0 0.0 - 0.2 /100 WBC   ECG 12 Lead    Collection Time: 12/24/20 10:26 PM   Result Value Ref Range    QT Interval 368 ms       Ordered the above labs and independently reviewed the results.      RADIOLOGY  Xr Chest 1 View    Result Date: 12/24/2020  CHEST X-RAY 1 View:   HISTORY: Chest pain   COMPARISON:   Chest x-ray 09/12/2019.      FINDINGS/IMPRESSION: Low lung volumes. Hazy left lung base opacity that may represent infiltrate or atelectasis. The cardiac mediastinal silhouette is normal in size. Stable sternotomy wires. No acute osseous abnormality.  This report was finalized on 12/24/2020 11:25 PM by Dr. Juanjo Baptiste M.D.        I ordered the above noted radiological studies. Reviewed by me and discussed with radiologist.  See dictation for official radiology interpretation.      PROCEDURES  Procedures      MEDICATIONS GIVEN IN ER    Medications   sodium chloride 0.9 % flush 10 mL (has no administration in time range)   aspirin tablet 325 mg (325 mg Oral Given 12/24/20 2303)         PROGRESS, DATA ANALYSIS, CONSULTS, AND MEDICAL DECISION MAKING    All labs have been independently reviewed by me.  All radiology studies have been reviewed by me and discussed with radiologist dictating the report.   EKG's independently viewed and interpreted by me.  I have reviewed the nurse's notes, vital signs, past medical history, and medication list.  Discussion below represents my analysis of pertinent findings related to patient's condition, differential diagnosis, treatment plan and final disposition.      DDx includes but is not limited to acute coronary syndrome, pulmonary embolism, thoracic aortic dissection, pneumonia, pneumothorax, musculoskeletal pain, GERD or esophageal spasm, anxiety, myocarditis/pericarditis, esophageal rupture, pancreatitis.           ED Course as of Dec 25 0103   Thu Dec 24, 2020   2159 Old records reviewed.  Patient was last admitted here in April 2019 for chest pain.  Cardiac catheterization was performed and the patient underwent angioplasty and stent placement to the obtuse marginal branch.  LV gram was normal.    [WH]   2229 EKG          EKG time: 2226  Rhythm/Rate: Sinus rhythm with a PVC, rate 98  P waves and WI: Normal  QRS, axis: LAD, small lateral Q  waves  ST and T waves: Nonspecific ST changes laterally    Interpreted Contemporaneously by me, independently viewed  EKG is not significantly changed compared to prior EKG done 9/12/2019      []   2358 Patient is resting comfortably.  He denies any further chest pain.  Initial troponin is negative.  Repeat troponin is pending.    []   Fri Dec 25, 2020   0034 Patient is now refusing to have a repeat troponin drawn.  He states he is ready to be discharged.  I suspect this chest pain was due to anxiety.  His EKG is unchanged.  He remained asymptomatic in the ER.  Return precautions were discussed.  He was advised to follow-up with Dr. Miranda soon as possible.    []      ED Course User Index  [] Randall Smalls MD       AS OF 01:03 EST VITALS:    BP - 153/79  HR - 101  TEMP - 98.4 °F (36.9 °C) (Tympanic)  O2 SATS - 90%      DIAGNOSIS  Final diagnoses:   Precordial pain   Anxiety         DISPOSITION  Discharge    DISCHARGE    Patient discharged in stable condition.    Reviewed implications of results, diagnosis, meds, responsibility to follow up, warning signs and symptoms of possible worsening, potential complications and reasons to return to ER, including worsening or persistent chest pain, nausea, vomiting, shortness of breath, fever, or other concern..    Patient/Family voiced understanding of above instructions.    Discussed plan for discharge, as there is no emergent indication for admission. Patient referred to primary care provider for BP management due to today's BP. Pt/family is agreeable and understands need for follow up and repeat testing.  Pt is aware that discharge does not mean that nothing is wrong but it indicates no emergency is present that requires admission and they must continue care with follow-up as given below or physician of their choice.     FOLLOW-UP  Rio Miranda MD  [address] [facility] [id number] [phone]    Schedule an appointment as soon as  possible for a visit            Medication List      No changes were made to your prescriptions during this visit.           Dictated utilizing Dragon dictation:  Much of this encounter note is an electronic transcription/translation of spoken language to printed text. The electronic translation of spoken language may permit erroneous, or at times, nonsensical words or phrases to be inadvertently transcribed; Although I have reviewed the note for such errors, some may still exist.     Randall Smalls MD  12/25/20 0103

## 2020-12-25 NOTE — ED NOTES
PPE per protocol, eye protection, mask, and gloves worn, pt in mask,        Carlos, Tracie, RN  12/24/20 1249

## 2020-12-30 ENCOUNTER — HOSPITAL ENCOUNTER (EMERGENCY)
Facility: HOSPITAL | Age: 75
Discharge: HOME | End: 2020-12-30
Attending: EMERGENCY MEDICINE | Admitting: EMERGENCY MEDICINE

## 2020-12-30 VITALS
OXYGEN SATURATION: 100 % | RESPIRATION RATE: 16 BRPM | DIASTOLIC BLOOD PRESSURE: 87 MMHG | TEMPERATURE: 97.8 F | HEART RATE: 80 BPM | SYSTOLIC BLOOD PRESSURE: 139 MMHG

## 2020-12-30 DIAGNOSIS — R45.851 SUICIDAL THOUGHTS: Primary | ICD-10-CM

## 2020-12-30 LAB
AMPHET+METHAMPHET UR QL: NEGATIVE
BARBITURATES UR QL SCN: NEGATIVE
BENZODIAZ UR QL SCN: NEGATIVE
CANNABINOIDS SERPL QL: NEGATIVE
COCAINE UR QL: NEGATIVE
ETHANOL BLD-MCNC: <10 MG/DL (ref 0–10)
ETHANOL UR QL: <0.01 %
METHADONE UR QL SCN: NEGATIVE
OPIATES UR QL: NEGATIVE
OXYCODONE UR QL SCN: NEGATIVE
VALPROATE SERPL-MCNC: 73 MCG/ML (ref 50–125)

## 2020-12-30 PROCEDURE — 80307 DRUG TEST PRSMV CHEM ANLYZR: CPT | Performed by: EMERGENCY MEDICINE

## 2020-12-30 PROCEDURE — 36415 COLL VENOUS BLD VENIPUNCTURE: CPT

## 2020-12-30 PROCEDURE — 90791 PSYCH DIAGNOSTIC EVALUATION: CPT

## 2020-12-30 PROCEDURE — 99283 EMERGENCY DEPT VISIT LOW MDM: CPT

## 2020-12-30 PROCEDURE — 80164 ASSAY DIPROPYLACETIC ACD TOT: CPT | Performed by: EMERGENCY MEDICINE

## 2021-02-19 ENCOUNTER — HOSPITAL ENCOUNTER (EMERGENCY)
Facility: HOSPITAL | Age: 76
Discharge: HOME | End: 2021-02-19
Attending: EMERGENCY MEDICINE | Admitting: EMERGENCY MEDICINE

## 2021-02-19 ENCOUNTER — APPOINTMENT (OUTPATIENT)
Dept: CT IMAGING | Facility: HOSPITAL | Age: 76
End: 2021-02-19

## 2021-02-19 VITALS
RESPIRATION RATE: 18 BRPM | BODY MASS INDEX: 32.93 KG/M2 | OXYGEN SATURATION: 98 % | TEMPERATURE: 97 F | HEART RATE: 89 BPM | DIASTOLIC BLOOD PRESSURE: 86 MMHG | SYSTOLIC BLOOD PRESSURE: 149 MMHG | HEIGHT: 69 IN

## 2021-02-19 DIAGNOSIS — N48.1 BALANITIS: ICD-10-CM

## 2021-02-19 DIAGNOSIS — R31.9 HEMATURIA, UNSPECIFIED TYPE: ICD-10-CM

## 2021-02-19 DIAGNOSIS — S00.03XA CONTUSION OF SCALP, INITIAL ENCOUNTER: Primary | ICD-10-CM

## 2021-02-19 LAB
ALBUMIN SERPL-MCNC: 3.7 G/DL (ref 3.5–5.2)
ALBUMIN/GLOB SERPL: 1.4 G/DL
ALP SERPL-CCNC: 83 U/L (ref 39–117)
ALT SERPL W P-5'-P-CCNC: 14 U/L (ref 1–41)
ANION GAP SERPL CALCULATED.3IONS-SCNC: 7 MMOL/L (ref 5–15)
AST SERPL-CCNC: 17 U/L (ref 1–40)
BACTERIA UR QL AUTO: ABNORMAL /HPF
BASOPHILS # BLD AUTO: 0.04 10*3/MM3 (ref 0–0.2)
BASOPHILS NFR BLD AUTO: 0.7 % (ref 0–1.5)
BILIRUB SERPL-MCNC: 0.3 MG/DL (ref 0–1.2)
BILIRUB UR QL STRIP: NEGATIVE
BILIRUB UR QL STRIP: NEGATIVE
BUN SERPL-MCNC: 15 MG/DL (ref 8–23)
BUN/CREAT SERPL: 16.7 (ref 7–25)
CALCIUM SPEC-SCNC: 8.2 MG/DL (ref 8.6–10.5)
CHLORIDE SERPL-SCNC: 103 MMOL/L (ref 98–107)
CLARITY UR: CLEAR
CLARITY UR: CLEAR
CO2 SERPL-SCNC: 30 MMOL/L (ref 22–29)
COLOR UR: YELLOW
COLOR UR: YELLOW
CREAT SERPL-MCNC: 0.9 MG/DL (ref 0.76–1.27)
DEPRECATED RDW RBC AUTO: 43.7 FL (ref 37–54)
EOSINOPHIL # BLD AUTO: 0.06 10*3/MM3 (ref 0–0.4)
EOSINOPHIL NFR BLD AUTO: 1 % (ref 0.3–6.2)
ERYTHROCYTE [DISTWIDTH] IN BLOOD BY AUTOMATED COUNT: 13.1 % (ref 12.3–15.4)
GFR SERPL CREATININE-BSD FRML MDRD: 83 ML/MIN/1.73
GLOBULIN UR ELPH-MCNC: 2.6 GM/DL
GLUCOSE SERPL-MCNC: 110 MG/DL (ref 65–99)
GLUCOSE UR STRIP-MCNC: NEGATIVE MG/DL
GLUCOSE UR STRIP-MCNC: NEGATIVE MG/DL
HCT VFR BLD AUTO: 34.9 % (ref 37.5–51)
HGB BLD-MCNC: 11.3 G/DL (ref 13–17.7)
HGB UR QL STRIP.AUTO: ABNORMAL
HGB UR QL STRIP.AUTO: ABNORMAL
HYALINE CASTS UR QL AUTO: ABNORMAL /LPF
IMM GRANULOCYTES # BLD AUTO: 0.02 10*3/MM3 (ref 0–0.05)
IMM GRANULOCYTES NFR BLD AUTO: 0.3 % (ref 0–0.5)
KETONES UR QL STRIP: NEGATIVE
KETONES UR QL STRIP: NEGATIVE
LEUKOCYTE ESTERASE UR QL STRIP.AUTO: NEGATIVE
LEUKOCYTE ESTERASE UR QL STRIP.AUTO: NEGATIVE
LYMPHOCYTES # BLD AUTO: 1.77 10*3/MM3 (ref 0.7–3.1)
LYMPHOCYTES NFR BLD AUTO: 28.8 % (ref 19.6–45.3)
MCH RBC QN AUTO: 29.7 PG (ref 26.6–33)
MCHC RBC AUTO-ENTMCNC: 32.4 G/DL (ref 31.5–35.7)
MCV RBC AUTO: 91.8 FL (ref 79–97)
MONOCYTES # BLD AUTO: 0.76 10*3/MM3 (ref 0.1–0.9)
MONOCYTES NFR BLD AUTO: 12.4 % (ref 5–12)
NEUTROPHILS NFR BLD AUTO: 3.5 10*3/MM3 (ref 1.7–7)
NEUTROPHILS NFR BLD AUTO: 56.8 % (ref 42.7–76)
NITRITE UR QL STRIP: NEGATIVE
NITRITE UR QL STRIP: NEGATIVE
NRBC BLD AUTO-RTO: 0 /100 WBC (ref 0–0.2)
PH UR STRIP.AUTO: 7.5 [PH] (ref 5–8)
PH UR STRIP.AUTO: 7.5 [PH] (ref 5–8)
PLATELET # BLD AUTO: 241 10*3/MM3 (ref 140–450)
PMV BLD AUTO: 9.2 FL (ref 6–12)
POTASSIUM SERPL-SCNC: 4 MMOL/L (ref 3.5–5.2)
PROT SERPL-MCNC: 6.3 G/DL (ref 6–8.5)
PROT UR QL STRIP: NEGATIVE
PROT UR QL STRIP: NEGATIVE
RBC # BLD AUTO: 3.8 10*6/MM3 (ref 4.14–5.8)
RBC # UR: ABNORMAL /HPF
REF LAB TEST METHOD: ABNORMAL
SODIUM SERPL-SCNC: 140 MMOL/L (ref 136–145)
SP GR UR STRIP: 1.01 (ref 1–1.03)
SP GR UR STRIP: 1.01 (ref 1–1.03)
SQUAMOUS #/AREA URNS HPF: ABNORMAL /HPF
UROBILINOGEN UR QL STRIP: ABNORMAL
UROBILINOGEN UR QL STRIP: ABNORMAL
WBC # BLD AUTO: 6.15 10*3/MM3 (ref 3.4–10.8)
WBC UR QL AUTO: ABNORMAL /HPF

## 2021-02-19 PROCEDURE — 99283 EMERGENCY DEPT VISIT LOW MDM: CPT

## 2021-02-19 PROCEDURE — 74176 CT ABD & PELVIS W/O CONTRAST: CPT

## 2021-02-19 PROCEDURE — 81001 URINALYSIS AUTO W/SCOPE: CPT | Performed by: EMERGENCY MEDICINE

## 2021-02-19 PROCEDURE — 80053 COMPREHEN METABOLIC PANEL: CPT | Performed by: PHYSICIAN ASSISTANT

## 2021-02-19 PROCEDURE — 85025 COMPLETE CBC W/AUTO DIFF WBC: CPT | Performed by: PHYSICIAN ASSISTANT

## 2021-02-19 PROCEDURE — 36415 COLL VENOUS BLD VENIPUNCTURE: CPT

## 2021-02-19 PROCEDURE — 70450 CT HEAD/BRAIN W/O DYE: CPT

## 2021-02-19 RX ORDER — CEPHALEXIN 500 MG/1
500 CAPSULE ORAL 3 TIMES DAILY
Qty: 21 CAPSULE | Refills: 0 | Status: ON HOLD | OUTPATIENT
Start: 2021-02-19 | End: 2022-11-25

## 2021-02-19 RX ORDER — CLOTRIMAZOLE 1 %
CREAM (GRAM) TOPICAL 2 TIMES DAILY
Qty: 28 G | Refills: 0 | Status: ON HOLD | OUTPATIENT
Start: 2021-02-19 | End: 2022-11-25

## 2021-02-20 NOTE — ED PROVIDER NOTES
EMERGENCY DEPARTMENT ENCOUNTER    Room Number:  02/02  Date of encounter:  2/19/2021  PCP: Mlio Carrasquillo DO  Historian: Patient, caregiver      I used full protective equipment while examining this patient.  This includes face mask, gloves and protective eyewear.  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 male who presents to the ED c/o 2 separate complaints.  Patient states he has had an approximate 5-month history of dysuria, hematuria and drops of blood coming out of the end of his penis.  He states the symptoms are intermittent.  He states the pain is mild and seems to occur at the end of his urine stream.  Patient denies any abdominal or flank pain.  Patient states that he has mild discharge around the glans of his penis.  And it has a foul odor.  Patient states he has been prescribed antibiotics in the past however he has not taken these.    In addition patient states he was on his way to the hospital for the above complaint, when he slipped on ice falling and hitting his head.  Patient is on Brilinta for heart disease.  He denies any loss of consciousness, nausea, vomiting, dizziness.  He denies any neck pain.  He denies any other injuries from the fall.    Review of Medical Records  I reviewed telemedicine visit from 1/11/2021.  Patient being treated for spastic neurogenic bladder.    PAST MEDICAL HISTORY  Active Ambulatory Problems     Diagnosis Date Noted   • DJD (degenerative joint disease) of knee 12/14/2017   • Essential hypertension 02/18/2019   • SOB (shortness of breath) 02/18/2019   • Leg swelling 02/18/2019   • Chest pain with high risk of acute coronary syndrome 04/29/2019   • Hyperlipidemia LDL goal <100 08/23/2019     Resolved Ambulatory Problems      Diagnosis Date Noted   • No Resolved Ambulatory Problems     Past Medical History:   Diagnosis Date   • Arthritis    • Bilateral leg edema    • CAD (coronary artery disease)    • Disease of thyroid gland    • GERD (gastroesophageal reflux disease)    • Heart murmur    • History of head injury    • [place] (hard of hearing)    • Hyperlipidemia    • Hypertension    • Irregular heart beat    • GIOVANNY (obstructive sleep apnea)    • Osteoarthritis    • Past heart attack    • SOB (shortness of breath) on exertion    • Stroke (CMS/HCC)    • Vasovagal episode          PAST SURGICAL HISTORY  Past Surgical History:   Procedure Laterality Date   • CARDIAC CATHETERIZATION N/A 4/30/2019    Procedure: Coronary angiography with grafts;  Surgeon: Rio Miranda MD;  Location: [facility] CATH INVASIVE LOCATION;  Service: Cardiology   • CARDIAC CATHETERIZATION N/A 4/30/2019    Procedure: Left Heart Cath;  Surgeon: Rio Miranda MD;  Location: [facility] CATH INVASIVE LOCATION;  Service: Cardiology   • CARDIAC CATHETERIZATION N/A 4/30/2019    Procedure: Left ventriculography;  Surgeon: Rio Miranda MD;  Location: [facility] CATH INVASIVE LOCATION;  Service: Cardiology   • CARDIAC CATHETERIZATION  4/30/2019    Procedure: Saphenous Vein Graft;  Surgeon: Rio Miranda MD;  Location: [facility] CATH INVASIVE LOCATION;  Service: Cardiology   • CARDIAC CATHETERIZATION N/A 4/30/2019    Procedure: Stent GLENDY coronary;  Surgeon: Rio Miranda MD;  Location: [facility] CATH INVASIVE LOCATION;  Service: Cardiology   • CARPAL TUNNEL RELEASE Bilateral    • CATARACT EXTRACTION     • CORONARY ARTERY BYPASS GRAFT  2002   • FINGER SURGERY      MISSING LEFT POINTER FINGER TIP   • KNEE ARTHROPLASTY Right 02/15/2017   • AR RT/LT HEART CATHETERS N/A 4/30/2019    Procedure: Percutaneous Coronary Intervention;  Surgeon: Rio Miranda MD;  Location: [facility] CATH INVASIVE LOCATION;  Service: Cardiology   • AR TOTAL KNEE ARTHROPLASTY  Left 12/14/2017    Procedure: LT TOTAL KNEE ARTHROPLASTY;  Surgeon: Jatin Lancaster MD;  Location: [facility] OR;  Service: Orthopedics         FAMILY HISTORY  Family History   Problem Relation Age of Onset   • Parkinsonism Sister    • Diabetes Brother    • Malig Hyperthermia Neg Hx          SOCIAL HISTORY  Social History     Socioeconomic History   • Marital status: Single     Spouse name: Not on file   • Number of children: Not on file   • Years of education: Not on file   • Highest education level: Not on file   Tobacco Use   • Smoking status: Never Smoker   • Smokeless tobacco: Never Used   Substance and Sexual Activity   • Alcohol use: No   • Drug use: No   • Sexual activity: Defer         ALLERGIES  Patient has no known allergies.        REVIEW OF SYSTEMS  All systems reviewed and negative except for those discussed in HPI.       PHYSICAL EXAM    I have reviewed the triage vital signs and nursing notes.    ED Triage Vitals   Temp Heart Rate Resp BP SpO2   02/19/21 2007 02/19/21 2007 02/19/21 2007 02/19/21 2030 02/19/21 2007   97 °F (36.1 °C) 101 18 140/85 99 %      Temp src Heart Rate Source Patient Position BP Location FiO2 (%)   -- -- -- -- --              Physical Exam  GENERAL: Alert, oriented, not distressed  HENT: No obvious hematoma or laceration.  No vertebral tenderness or step-off.  EYES: no scleral icterus, EOMI  CV: regular rhythm, regular rate, no murmur  RESPIRATORY: normal effort, CTA  ABDOMEN: soft, nontender  : No discharge.  Thick white discharge around the glans of the penis.  No discharge from the meatus.  No skin lesions.  MUSCULOSKELETAL: no deformity, FROM, no calf swelling or tenderness  NEURO: alert, moves all extremities, follows commands  SKIN: warm, dry        LAB RESULTS  Recent Results (from the past 24 hour(s))   Urinalysis With Microscopic If Indicated (No Culture) - Urine, Clean Catch    Collection Time: 02/19/21  8:35 PM    Specimen: Urine, Clean Catch   Result Value Ref  Range    Color, UA Yellow Yellow, Straw    Appearance, UA Clear Clear    pH, UA 7.5 5.0 - 8.0    Specific Gravity, UA 1.012 1.005 - 1.030    Glucose, UA Negative Negative    Ketones, UA Negative Negative    Bilirubin, UA Negative Negative    Blood, UA Moderate (2+) (A) Negative    Protein, UA Negative Negative    Leuk Esterase, UA Negative Negative    Nitrite, UA Negative Negative    Urobilinogen, UA 1.0 E.U./dL 0.2 - 1.0 E.U./dL   Urinalysis With Culture If Indicated - Urine, Clean Catch    Collection Time: 02/19/21  8:35 PM    Specimen: Urine, Clean Catch   Result Value Ref Range    Color, UA Yellow Yellow, Straw    Appearance, UA Clear Clear    pH, UA 7.5 5.0 - 8.0    Specific Gravity, UA 1.012 1.005 - 1.030    Glucose, UA Negative Negative    Ketones, UA Negative Negative    Bilirubin, UA Negative Negative    Blood, UA Moderate (2+) (A) Negative    Protein, UA Negative Negative    Leuk Esterase, UA Negative Negative    Nitrite, UA Negative Negative    Urobilinogen, UA 1.0 E.U./dL 0.2 - 1.0 E.U./dL   Urinalysis, Microscopic Only - Urine, Clean Catch    Collection Time: 02/19/21  8:35 PM    Specimen: Urine, Clean Catch   Result Value Ref Range    RBC, UA Too Numerous to Count (A) None Seen, 0-2 /HPF    WBC, UA 0-2 None Seen, 0-2 /HPF    Bacteria, UA None Seen None Seen /HPF    Squamous Epithelial Cells, UA 0-2 None Seen, 0-2 /HPF    Hyaline Casts, UA None Seen None Seen /LPF    Methodology Automated Microscopy    Comprehensive Metabolic Panel    Collection Time: 02/19/21  9:06 PM    Specimen: Blood   Result Value Ref Range    Glucose 110 (H) 65 - 99 mg/dL    BUN 15 8 - 23 mg/dL    Creatinine 0.90 0.76 - 1.27 mg/dL    Sodium 140 136 - 145 mmol/L    Potassium 4.0 3.5 - 5.2 mmol/L    Chloride 103 98 - 107 mmol/L    CO2 30.0 (H) 22.0 - 29.0 mmol/L    Calcium 8.2 (L) 8.6 - 10.5 mg/dL    Total Protein 6.3 6.0 - 8.5 g/dL    Albumin 3.70 3.50 - 5.20 g/dL    ALT (SGPT) 14 1 - 41 U/L    AST (SGOT) 17 1 - 40 U/L    Alkaline  Phosphatase 83 39 - 117 U/L    Total Bilirubin 0.3 0.0 - 1.2 mg/dL    eGFR Non African Amer 83 >60 mL/min/1.73    Globulin 2.6 gm/dL    A/G Ratio 1.4 g/dL    BUN/Creatinine Ratio 16.7 7.0 - 25.0    Anion Gap 7.0 5.0 - 15.0 mmol/L   CBC Auto Differential    Collection Time: 02/19/21  9:06 PM    Specimen: Blood   Result Value Ref Range    WBC 6.15 3.40 - 10.80 10*3/mm3    RBC 3.80 (L) 4.14 - 5.80 10*6/mm3    Hemoglobin 11.3 (L) 13.0 - 17.7 g/dL    Hematocrit 34.9 (L) 37.5 - 51.0 %    MCV 91.8 79.0 - 97.0 fL    MCH 29.7 26.6 - 33.0 pg    MCHC 32.4 31.5 - 35.7 g/dL    RDW 13.1 12.3 - 15.4 %    RDW-SD 43.7 37.0 - 54.0 fl    MPV 9.2 6.0 - 12.0 fL    Platelets 241 140 - 450 10*3/mm3    Neutrophil % 56.8 42.7 - 76.0 %    Lymphocyte % 28.8 19.6 - 45.3 %    Monocyte % 12.4 (H) 5.0 - 12.0 %    Eosinophil % 1.0 0.3 - 6.2 %    Basophil % 0.7 0.0 - 1.5 %    Immature Grans % 0.3 0.0 - 0.5 %    Neutrophils, Absolute 3.50 1.70 - 7.00 10*3/mm3    Lymphocytes, Absolute 1.77 0.70 - 3.10 10*3/mm3    Monocytes, Absolute 0.76 0.10 - 0.90 10*3/mm3    Eosinophils, Absolute 0.06 0.00 - 0.40 10*3/mm3    Basophils, Absolute 0.04 0.00 - 0.20 10*3/mm3    Immature Grans, Absolute 0.02 0.00 - 0.05 10*3/mm3    nRBC 0.0 0.0 - 0.2 /100 WBC       Ordered the above labs and independently reviewed the results.        RADIOLOGY  Ct Abdomen Pelvis Without Contrast    Result Date: 2/19/2021  CT OF THE ABDOMEN AND PELVIS WITHOUT CONTRAST  HISTORY: Hematuria  COMPARISON: None available.  TECHNIQUE: Axial CT imaging was obtained through the abdomen and pelvis. No IV contrast was administered.  FINDINGS: No fractures are identified. Images through the lung bases demonstrate some mild scarring. Dense coronary calcifications are present. The stomach and duodenum are unremarkable, as are the adrenal glands. No focal hepatic lesions are seen. Gallbladder is surgically absent. Calcified granulomata are seen within the spleen. Pancreas is normal. There is no  hydronephrosis. There are areas of cortical thinning identified within both kidneys, suggesting sequela prior insults. There is a hyperdensity noted within the inferior pole of the right kidney. It measures 3 mm, and may reflect a nonobstructing stone. Prostate gland contains a few calcifications. Urinary bladder appears unremarkable. There are calcifications of the aorta. There is no bowel obstruction. The appendix is within normal limits.      Suspected 3 mm nonobstructing stone noted within the inferior pole of the right kidney.  Radiation dose reduction techniques were utilized, including automated exposure control and exposure modulation based on body size.  This report was finalized on 2/19/2021 9:29 PM by Dr. Sosa Leos M.D.      Ct Head Without Contrast    Result Date: 2/19/2021  CT OF THE HEAD WITHOUT CONTRAST  HISTORY: Fall. Patient is on blood thinners.  COMPARISON: 10/24/2019  TECHNIQUE: Axial CT imaging was obtained through the brain. No IV contrast was administered.  FINDINGS: No acute intracranial hemorrhage is seen. There is diffuse atrophy. There is periventricular and deep white matter microangiopathic change. There is no calvarial fracture. There is mucosal thickening noted within the ethmoid and maxillary sinuses, as well as mild partial opacification of the mastoid air cells.      No acute intracranial findings.  Radiation dose reduction techniques were utilized, including automated exposure control and exposure modulation based on body size.  This report was finalized on 2/19/2021 9:31 PM by Dr. Sosa Leos M.D.        I ordered the above noted radiological studies. Reviewed by me and discussed with radiologist.  See dictation for official radiology interpretation.        PROGRESS, DATA ANALYSIS, CONSULTS, AND MEDICAL DECISION MAKING    All labs have been independently reviewed by me.  All radiology studies have been reviewed by me and discussed with radiologist dictating the  report.   EKG's independently viewed and interpreted by me.  Discussion below represents my analysis of pertinent findings related to patient's condition, differential diagnosis, treatment plan and final disposition.    I have discussed case with Dr. Dixon, emergency room physician.  He has performed his own bedside examination and agrees with treatment plan.    ED Course as of Feb 19 2313   Fri Feb 19, 2021 2047 Patient presents with 2 separate complaints.  Patient has had dysuria and hematuria x5 months, as well as irritation around the glans of his penis.  Differential diagnoses include but not limited to UTI, bladder/kidney mass, kidney stone, balanitis, STI.  In addition patient states he fell on the ice coming to the hospital.  Patient did hit his head.  He denies any loss of consciousness.  He is on Brilinta.  Will obtain a head CT to rule out intracranial hemorrhage.    [EE]   2056 CT scan of the abdomen ordered for further evaluation.   RBC, UA(!): Too Numerous to Count [EE]   2059 Bacteria, UA: None Seen [EE]   2122 WBC: 6.15 [EE]   2122 Hemoglobin(!): 11.3 [EE]   2122 Platelets: 241 [EE]   2137 Patient has a negative head CT.  He has no neuro deficits.Patient has evidence of a nonobstructing stone.  He has no flank pain.  No clear etiology to patient's hematuria.  We will culture urine, place patient on antibiotics, and refer to urology.  In addition we will treat patient's balanitis with OTC Chlortrimazole.    [EE]   2139 Creatinine: 0.90 [EE]      ED Course User Index  [EE] Raúl Matos PA       AS OF 23:13 EST VITALS:    BP - 149/86  HR - 89  TEMP - 97 °F (36.1 °C)  O2 SATS - 98%        DIAGNOSIS  Final diagnoses:   Contusion of scalp, initial encounter   Hematuria, unspecified type   Balanitis         DISPOSITION  Discharged           Raúl Matos PA  02/19/21 2313

## 2021-02-20 NOTE — ED NOTES
"Pt to ED from [facility] with staff member at bedside with reports of bladder infection, \"pecker stinking\", and dysuria x2 weeks.  Pt reports he was prescribed antibiotics but has not started them.      Pt also reports he slipped on ice today and fell, striking head on concrete.  No LOC, no emesis reported.  Pt awake, alert, appropriate.       Graciela Saucedo, RN  02/19/21 2048    "

## 2021-02-20 NOTE — ED TRIAGE NOTES
Pt to ER from home via [facility] with c/o bleeding from his penis as well as dysuria. Pt states symptoms have been ongoing for approx 2-3 months. Pt states he is being treated for a bladder infection.    Pt states while stepping out of his house today he slipped on ice and fell, striking his head. Pt denies LOC or blood thinners.    Pt alert, oriented in triage.      Pt masked in triage, staff in appropriate ppe.

## 2021-02-20 NOTE — ED PROVIDER NOTES
MD ATTESTATION NOTE    The BRANDI and I have discussed this patient's history, physical exam, and treatment plan.  I have reviewed the documentation and personally had a face to face interaction with the patient. I affirm the documentation and agree with the treatment and plan.  The attached note describes my personal findings.    75-year-old gentleman presents with hematuria as well as head injury when he slipped and fell on the ice.  The hematuria is been present for 6 months, the head injury occurred today.    He does have microscopic hematuria, and we will treat him with some antibiotics for that.  His CT head is negative and his GCS is 15.  He safe for discharge home     Davy Dixon MD  02/20/21 0047

## 2021-03-02 DIAGNOSIS — Z23 IMMUNIZATION DUE: ICD-10-CM

## 2021-09-02 ENCOUNTER — HOSPITAL ENCOUNTER (EMERGENCY)
Facility: HOSPITAL | Age: 76
Discharge: HOME | End: 2021-09-02
Attending: EMERGENCY MEDICINE | Admitting: EMERGENCY MEDICINE

## 2021-09-02 ENCOUNTER — APPOINTMENT (OUTPATIENT)
Dept: GENERAL RADIOLOGY | Facility: HOSPITAL | Age: 76
End: 2021-09-02

## 2021-09-02 VITALS
HEART RATE: 72 BPM | TEMPERATURE: 98.3 F | DIASTOLIC BLOOD PRESSURE: 85 MMHG | RESPIRATION RATE: 20 BRPM | OXYGEN SATURATION: 100 % | SYSTOLIC BLOOD PRESSURE: 147 MMHG

## 2021-09-02 DIAGNOSIS — J06.9 VIRAL UPPER RESPIRATORY TRACT INFECTION: Primary | ICD-10-CM

## 2021-09-02 LAB
ALBUMIN SERPL-MCNC: 4.2 G/DL (ref 3.5–5.2)
ALBUMIN/GLOB SERPL: 1.4 G/DL
ALP SERPL-CCNC: 58 U/L (ref 39–117)
ALT SERPL W P-5'-P-CCNC: 21 U/L (ref 1–41)
ANION GAP SERPL CALCULATED.3IONS-SCNC: 8.2 MMOL/L (ref 5–15)
AST SERPL-CCNC: 21 U/L (ref 1–40)
BASOPHILS # BLD AUTO: 0.03 10*3/MM3 (ref 0–0.2)
BASOPHILS NFR BLD AUTO: 0.4 % (ref 0–1.5)
BILIRUB SERPL-MCNC: 0.6 MG/DL (ref 0–1.2)
BUN SERPL-MCNC: 10 MG/DL (ref 8–23)
BUN/CREAT SERPL: 10.5 (ref 7–25)
CALCIUM SPEC-SCNC: 9.1 MG/DL (ref 8.6–10.5)
CHLORIDE SERPL-SCNC: 103 MMOL/L (ref 98–107)
CO2 SERPL-SCNC: 28.8 MMOL/L (ref 22–29)
CREAT SERPL-MCNC: 0.95 MG/DL (ref 0.76–1.27)
DEPRECATED RDW RBC AUTO: 47.5 FL (ref 37–54)
EOSINOPHIL # BLD AUTO: 0.08 10*3/MM3 (ref 0–0.4)
EOSINOPHIL NFR BLD AUTO: 1.2 % (ref 0.3–6.2)
ERYTHROCYTE [DISTWIDTH] IN BLOOD BY AUTOMATED COUNT: 13.8 % (ref 12.3–15.4)
GFR SERPL CREATININE-BSD FRML MDRD: 77 ML/MIN/1.73
GLOBULIN UR ELPH-MCNC: 2.9 GM/DL
GLUCOSE SERPL-MCNC: 110 MG/DL (ref 65–99)
HCT VFR BLD AUTO: 36.9 % (ref 37.5–51)
HGB BLD-MCNC: 12.5 G/DL (ref 13–17.7)
HOLD SPECIMEN: NORMAL
HOLD SPECIMEN: NORMAL
IMM GRANULOCYTES # BLD AUTO: 0.02 10*3/MM3 (ref 0–0.05)
IMM GRANULOCYTES NFR BLD AUTO: 0.3 % (ref 0–0.5)
LYMPHOCYTES # BLD AUTO: 1.69 10*3/MM3 (ref 0.7–3.1)
LYMPHOCYTES NFR BLD AUTO: 24.5 % (ref 19.6–45.3)
MCH RBC QN AUTO: 31.6 PG (ref 26.6–33)
MCHC RBC AUTO-ENTMCNC: 33.9 G/DL (ref 31.5–35.7)
MCV RBC AUTO: 93.4 FL (ref 79–97)
MONOCYTES # BLD AUTO: 0.65 10*3/MM3 (ref 0.1–0.9)
MONOCYTES NFR BLD AUTO: 9.4 % (ref 5–12)
NEUTROPHILS NFR BLD AUTO: 4.43 10*3/MM3 (ref 1.7–7)
NEUTROPHILS NFR BLD AUTO: 64.2 % (ref 42.7–76)
NRBC BLD AUTO-RTO: 0 /100 WBC (ref 0–0.2)
PLATELET # BLD AUTO: 188 10*3/MM3 (ref 140–450)
PMV BLD AUTO: 10.3 FL (ref 6–12)
POTASSIUM SERPL-SCNC: 4.2 MMOL/L (ref 3.5–5.2)
PROT SERPL-MCNC: 7.1 G/DL (ref 6–8.5)
RBC # BLD AUTO: 3.95 10*6/MM3 (ref 4.14–5.8)
SARS-COV-2 RNA RESP QL NAA+PROBE: NOT DETECTED
SODIUM SERPL-SCNC: 140 MMOL/L (ref 136–145)
WBC # BLD AUTO: 6.9 10*3/MM3 (ref 3.4–10.8)
WHOLE BLOOD HOLD SPECIMEN: NORMAL
WHOLE BLOOD HOLD SPECIMEN: NORMAL

## 2021-09-02 PROCEDURE — 71045 X-RAY EXAM CHEST 1 VIEW: CPT

## 2021-09-02 PROCEDURE — U0005 INFEC AGEN DETEC AMPLI PROBE: HCPCS | Performed by: EMERGENCY MEDICINE

## 2021-09-02 PROCEDURE — 85025 COMPLETE CBC W/AUTO DIFF WBC: CPT | Performed by: EMERGENCY MEDICINE

## 2021-09-02 PROCEDURE — 99284 EMERGENCY DEPT VISIT MOD MDM: CPT

## 2021-09-02 PROCEDURE — 80053 COMPREHEN METABOLIC PANEL: CPT | Performed by: EMERGENCY MEDICINE

## 2021-09-02 PROCEDURE — U0003 INFECTIOUS AGENT DETECTION BY NUCLEIC ACID (DNA OR RNA); SEVERE ACUTE RESPIRATORY SYNDROME CORONAVIRUS 2 (SARS-COV-2) (CORONAVIRUS DISEASE [COVID-19]), AMPLIFIED PROBE TECHNIQUE, MAKING USE OF HIGH THROUGHPUT TECHNOLOGIES AS DESCRIBED BY CMS-2020-01-R: HCPCS | Performed by: EMERGENCY MEDICINE

## 2021-09-02 RX ORDER — BENZONATATE 200 MG/1
200 CAPSULE ORAL 3 TIMES DAILY PRN
Qty: 21 CAPSULE | Refills: 0 | Status: ON HOLD | OUTPATIENT
Start: 2021-09-02 | End: 2022-11-25

## 2021-09-02 NOTE — ED PROVIDER NOTES
EMERGENCY DEPARTMENT ENCOUNTER  Patient was placed in face mask in first look and the following protective measures were taken unless additional measures were taken and documented below in the ED course. Patient was wearing facemask when I entered the room and throughout our encounter. I wore full protective equipment throughout this patient encounter including a face mask, and gloves. Hand hygiene was performed before donning protective equipment and after removal when leaving the room.    Room Number:  30/30  Date of encounter:  9/2/2021  PCP: Milo Carrasquillo DO    HPI:  Context: Hernando Lozada is a 76 y.o. male who presents to the ED c/o chief complaint of cough.  Patient complains of cough for the last 2 weeks, nonproductive in nature.  Has had some runny nose but no purulent nasal discharge, no sinus congestion.  Patient reports mild sore throat but only after coughing excessively.  Patient denies any nausea vomiting.  Patient does report he was having diarrhea but has not had diarrhea for the last several days.  Patient denies any shortness of breath.  No loss of sense of smell or taste.  No fever shakes chills or night sweats.  Patient reports that he does live with 2 roommates and one of them has had upper respiratory symptoms recently but they have not been tested for Covid.  Patient denies any known exposure to Covid.  Patient reports he was previously vaccinated against Covid.    MEDICAL HISTORY REVIEW  Reviewed in EPIC    PAST MEDICAL HISTORY  Active Ambulatory Problems     Diagnosis Date Noted   • DJD (degenerative joint disease) of knee 12/14/2017   • Essential hypertension 02/18/2019   • SOB (shortness of breath) 02/18/2019   • Leg swelling 02/18/2019   • Chest pain with high risk of acute coronary syndrome 04/29/2019   • Hyperlipidemia LDL goal <100 08/23/2019     Resolved Ambulatory Problems     Diagnosis Date Noted   • No Resolved Ambulatory Problems     Past Medical History:   Diagnosis Date   •  Arthritis    • Bilateral leg edema    • CAD (coronary artery disease)    • Disease of thyroid gland    • GERD (gastroesophageal reflux disease)    • Heart murmur    • History of head injury    • [place] (hard of hearing)    • Hyperlipidemia    • Hypertension    • Irregular heart beat    • GIOVANNY (obstructive sleep apnea)    • Osteoarthritis    • Past heart attack    • SOB (shortness of breath) on exertion    • Stroke (CMS/HCC)    • Vasovagal episode        PAST SURGICAL HISTORY  Past Surgical History:   Procedure Laterality Date   • CARDIAC CATHETERIZATION N/A 4/30/2019    Procedure: Coronary angiography with grafts;  Surgeon: Rio Miranda MD;  Location: [facility] CATH INVASIVE LOCATION;  Service: Cardiology   • CARDIAC CATHETERIZATION N/A 4/30/2019    Procedure: Left Heart Cath;  Surgeon: Rio Miranda MD;  Location: [facility] CATH INVASIVE LOCATION;  Service: Cardiology   • CARDIAC CATHETERIZATION N/A 4/30/2019    Procedure: Left ventriculography;  Surgeon: Rio Miranda MD;  Location: [facility] CATH INVASIVE LOCATION;  Service: Cardiology   • CARDIAC CATHETERIZATION  4/30/2019    Procedure: Saphenous Vein Graft;  Surgeon: Rio Miranda MD;  Location: [facility] CATH INVASIVE LOCATION;  Service: Cardiology   • CARDIAC CATHETERIZATION N/A 4/30/2019    Procedure: Stent GLENDY coronary;  Surgeon: Rio Miranda MD;  Location: [facility] INVASIVE LOCATION;  Service: Cardiology   • CARPAL TUNNEL RELEASE Bilateral    • CATARACT EXTRACTION     • CORONARY ARTERY BYPASS GRAFT  2002   • FINGER SURGERY      MISSING LEFT POINTER FINGER TIP   • KNEE ARTHROPLASTY Right 02/15/2017   • OH RT/LT HEART CATHETERS N/A 4/30/2019    Procedure: Percutaneous Coronary Intervention;  Surgeon: Rio Miranda MD;  Location: [facility] INVASIVE LOCATION;  Service: Cardiology   • OH TOTAL KNEE ARTHROPLASTY Left 12/14/2017    Procedure: LT TOTAL KNEE ARTHROPLASTY;  Surgeon: Jatin Lancaster MD;  Location: [facility]  MAIN OR;  Service: Orthopedics       FAMILY HISTORY  Family History   Problem Relation Age of Onset   • Parkinsonism Sister    • Diabetes Brother    • Malig Hyperthermia Neg Hx        SOCIAL HISTORY  Social History     Socioeconomic History   • Marital status: Single     Spouse name: Not on file   • Number of children: Not on file   • Years of education: Not on file   • Highest education level: Not on file   Tobacco Use   • Smoking status: Never Smoker   • Smokeless tobacco: Never Used   Substance and Sexual Activity   • Alcohol use: No   • Drug use: No   • Sexual activity: Defer       ALLERGIES  Patient has no known allergies.    The patient's allergies have been reviewed    REVIEW OF SYSTEMS  All systems reviewed and negative except for those discussed in HPI.     PHYSICAL EXAM  I have reviewed the triage vital signs and nursing notes.  ED Triage Vitals [09/02/21 1022]   Temp Heart Rate Resp BP SpO2   98.3 °F (36.8 °C) 74 20 172/97 99 %      Temp src Heart Rate Source Patient Position BP Location FiO2 (%)   Oral Monitor -- -- --       General: No acute distress, well-appearing, speaking full sentences, maintaining oxygen saturation on room air without difficulty  HENT: NCAT, PERRL, Nares patent  Eyes: no scleral icterus  Neck: trachea midline, no ROM limitations  CV: regular rhythm, regular rate  Respiratory: normal effort, CTAB, no wheezing rales or rhonchi  Abdomen: soft, nondistended, nontender to palpation, no rebound tenderness, no guarding or rigidity  : deferred  Musculoskeletal: no deformity  Neuro: alert, moves all extremities, follows commands  Skin: warm, dry, no peripheral edema      LAB RESULTS  Recent Results (from the past 24 hour(s))   Comprehensive Metabolic Panel    Collection Time: 09/02/21 12:23 PM    Specimen: Blood   Result Value Ref Range    Glucose 110 (H) 65 - 99 mg/dL    BUN 10 8 - 23 mg/dL    Creatinine 0.95 0.76 - 1.27 mg/dL    Sodium 140 136 - 145 mmol/L    Potassium 4.2 3.5 - 5.2  mmol/L    Chloride 103 98 - 107 mmol/L    CO2 28.8 22.0 - 29.0 mmol/L    Calcium 9.1 8.6 - 10.5 mg/dL    Total Protein 7.1 6.0 - 8.5 g/dL    Albumin 4.20 3.50 - 5.20 g/dL    ALT (SGPT) 21 1 - 41 U/L    AST (SGOT) 21 1 - 40 U/L    Alkaline Phosphatase 58 39 - 117 U/L    Total Bilirubin 0.6 0.0 - 1.2 mg/dL    eGFR Non African Amer 77 >60 mL/min/1.73    Globulin 2.9 gm/dL    A/G Ratio 1.4 g/dL    BUN/Creatinine Ratio 10.5 7.0 - 25.0    Anion Gap 8.2 5.0 - 15.0 mmol/L   Green Top (Gel)    Collection Time: 09/02/21 12:23 PM   Result Value Ref Range    Extra Tube Hold for add-ons.    Lavender Top    Collection Time: 09/02/21 12:23 PM   Result Value Ref Range    Extra Tube hold for add-on    Gold Top - SST    Collection Time: 09/02/21 12:23 PM   Result Value Ref Range    Extra Tube Hold for add-ons.    Light Blue Top    Collection Time: 09/02/21 12:23 PM   Result Value Ref Range    Extra Tube hold for add-on    CBC Auto Differential    Collection Time: 09/02/21 12:23 PM    Specimen: Blood   Result Value Ref Range    WBC 6.90 3.40 - 10.80 10*3/mm3    RBC 3.95 (L) 4.14 - 5.80 10*6/mm3    Hemoglobin 12.5 (L) 13.0 - 17.7 g/dL    Hematocrit 36.9 (L) 37.5 - 51.0 %    MCV 93.4 79.0 - 97.0 fL    MCH 31.6 26.6 - 33.0 pg    MCHC 33.9 31.5 - 35.7 g/dL    RDW 13.8 12.3 - 15.4 %    RDW-SD 47.5 37.0 - 54.0 fl    MPV 10.3 6.0 - 12.0 fL    Platelets 188 140 - 450 10*3/mm3    Neutrophil % 64.2 42.7 - 76.0 %    Lymphocyte % 24.5 19.6 - 45.3 %    Monocyte % 9.4 5.0 - 12.0 %    Eosinophil % 1.2 0.3 - 6.2 %    Basophil % 0.4 0.0 - 1.5 %    Immature Grans % 0.3 0.0 - 0.5 %    Neutrophils, Absolute 4.43 1.70 - 7.00 10*3/mm3    Lymphocytes, Absolute 1.69 0.70 - 3.10 10*3/mm3    Monocytes, Absolute 0.65 0.10 - 0.90 10*3/mm3    Eosinophils, Absolute 0.08 0.00 - 0.40 10*3/mm3    Basophils, Absolute 0.03 0.00 - 0.20 10*3/mm3    Immature Grans, Absolute 0.02 0.00 - 0.05 10*3/mm3    nRBC 0.0 0.0 - 0.2 /100 WBC   COVID-19, YOU IN-HOUSE CEPHEID/VIANEY NP  SWAB IN TRANSPORT MEDIA 8-12 HR TAT - Swab, Nasopharynx    Collection Time: 09/02/21 12:24 PM    Specimen: Nasopharynx; Swab   Result Value Ref Range    COVID19 Not Detected Not Detected - Ref. Range       I ordered the above labs and reviewed the results.    RADIOLOGY  XR Chest 1 View    Result Date: 9/2/2021  XR CHEST 1 VW-  09/02/2021  HISTORY: Cough.  Heart size is within normal limits. Lungs appear free of acute infiltrates. Sternotomy wires are seen. Bones and soft tissues are otherwise unremarkable.      No acute process.  This report was finalized on 9/2/2021 1:09 PM by Dr. Mario Davis M.D.        I ordered the above noted radiological studies. I reviewed the images and results. I agree with the radiologist interpretation.    PROCEDURES  Procedures    MEDICATIONS GIVEN IN ER  Medications - No data to display    PROGRESS, DATA ANALYSIS, CONSULTS, AND MEDICAL DECISION MAKING  A complete history and physical exam have been performed.  All available laboratory and imaging results have been reviewed by myself prior to disposition.    MDM  After the initial H&P, I discussed pertinent information from history and physical exam with patient/family.  Discussed differential diagnosis.  Discussed plan for ED evaluation/work-up/treatment.  All questions answered.  Patient/family is agreeable with plan.  ED Course as of Sep 02 1430   Thu Sep 02, 2021   1252 I wore full protective equipment throughout this patient encounter including a N95 facemask, faceshield, gown and gloves. Hand hygiene was performed before donning protective equipment and after removal when leaving the room.        [JG]   1327 Patient complains of cough, concern for possible Covid.  Patient afebrile, vital signs stable, no leukocytosis or left shift, chest x-ray unremarkable.  Patient has Covid test pending but is speaking full sentences, satting fine on room air, well-appearing and appears appropriate for discharge.  Did discussed pending  Covid test, need to quarantine until test has resulted, patient given extensive discussion return precautions, discharging.    [JG]   1328 The patient was reexamined.  They have had symptomatic improvement during their ED stay.  I discussed today's findings with the patient, explaining the pertinent positives and negatives from today's visit, and the plan of care.  Discussed plan for discharge as there is no emergent indication for admission.  Discussed limitation of the ED work-up and that this is to rule out life-threatening emergencies but that they could require further testing as determined by their primary care and or any referred specialist patient is agreeable and understands need for follow-up and repeat exam/testing.  Patient is aware that discharge does not mean there is nothing wrong, indicates no emergency is present, and that they must continue their care with their primary care physician and/or any referred specialist.  They were given appropriate follow-up with their primary care physician and/or specialist.  I had an extensive discussion on the expected clinical course and return precautions.  Patient understands to return to the emergency department for continuation, worsening, or new symptoms.  I answered any of the patient's questions. Patient was discharged home in a stable condition.        [JG]   1430 Covid test negative, patient updated on results while waiting for his ride.    [JG]      ED Course User Index  [JG] Martínez Rincon MD       AS OF 14:30 EDT VITALS:    BP - 147/85  HR - 72  TEMP - 98.3 °F (36.8 °C) (Oral)  O2 SATS - 100%    DIAGNOSIS  Final diagnoses:   Viral upper respiratory tract infection         DISPOSITION  DISCHARGE    Patient discharged in stable condition.    Reviewed implications of results, diagnosis, meds, responsibility to follow up, warning signs and symptoms of possible worsening, potential complications and reasons to return to ER.    Patient/Family voiced  understanding of above instructions.    Discussed plan for discharge, as there is no emergent indication for admission. Patient referred to primary care provider for BP management due to today's BP. Pt/family is agreeable and understands need for follow up and repeat testing.  Pt is aware that discharge does not mean that nothing is wrong but it indicates no emergency is present that requires admission and they must continue care with follow-up as given below or physician of their choice.     FOLLOW-UP  Milo Carrasquillo,   [address] [place] [address]  [phone]    Schedule an appointment as soon as possible for a visit in 2 days  even if well         Medication List      New Prescriptions    benzonatate 200 MG capsule  Commonly known as: TESSALON  Take 1 capsule by mouth 3 (Three) Times a Day As Needed for Cough.           Where to Get Your Medications      You can get these medications from any pharmacy    Bring a paper prescription for each of these medications  · benzonatate 200 MG capsule          Martínez Rincon MD  09/02/21 1330       Martínez Rincon MD  09/02/21 1333       Martínez Rincon MD  09/02/21 1430

## 2021-09-02 NOTE — ED NOTES
"Pt to ED from home via PV for cough x 2 weeks. Pt reports he has \"annoying\" dry cough. Pt denies covid 19 exposure and is vaccinated. He denies SOA, cp, fever. Pt reports he has not been tested for covid in the last two weeks.    Pt does not appear to be in distress. Pt is ambulatory.     This RN wore goggles, gown, mask, and gloves while in pts room. Pt wearing surgical mask.     Gogo Canales, DESMOND  09/02/21 1303    "

## 2021-09-02 NOTE — ED NOTES
Pt requesting to use restroom. This RN offered BSC and urinal. Pt verbalizes frustration and raising voice due to not being able to go to bathroom. Pt educated on covid- hospital guidelines.     Gogo Canales RN  09/02/21 [id number]

## 2021-09-02 NOTE — ED NOTES
[facility] staff to  patient.     Gogo Canales RN  09/02/21 1418    [facility] unable to provide ETA. MD informed pt he can wait in waiting room. Catherine branch RN notified and triage staff.     Gogo Canales RN  09/02/21 1435       Gogo Canales RN  09/02/21 143

## 2021-09-02 NOTE — ED TRIAGE NOTES
Pt has cough, sore throat and runny nose x 1 week.  Home wants covid ruled out    Patient was placed in face mask during first look triage.  Patient was wearing a face mask throughout encounter.  I wore personal protective equipment throughout the encounter.  Hand hygiene was performed before and after patient encounter.

## 2022-11-25 ENCOUNTER — HOSPITAL ENCOUNTER (INPATIENT)
Facility: HOSPITAL | Age: 77
LOS: 2 days | Discharge: HOME HEALTH | DRG: 178 | End: 2022-11-27
Attending: EMERGENCY MEDICINE | Admitting: INTERNAL MEDICINE
Payer: MEDICARE

## 2022-11-25 ENCOUNTER — APPOINTMENT (OUTPATIENT)
Dept: GENERAL RADIOLOGY | Facility: HOSPITAL | Age: 77
DRG: 178 | End: 2022-11-25
Payer: MEDICARE

## 2022-11-25 DIAGNOSIS — U07.1 COVID-19: Primary | ICD-10-CM

## 2022-11-25 DIAGNOSIS — R53.1 GENERALIZED WEAKNESS: ICD-10-CM

## 2022-11-25 DIAGNOSIS — R09.02 HYPOXIA: ICD-10-CM

## 2022-11-25 DIAGNOSIS — R01.1 MURMUR: ICD-10-CM

## 2022-11-25 DIAGNOSIS — N28.9 ACUTE RENAL INSUFFICIENCY: ICD-10-CM

## 2022-11-25 LAB
ALBUMIN SERPL-MCNC: 4 G/DL (ref 3.5–5.2)
ALBUMIN/GLOB SERPL: 1.5 G/DL
ALP SERPL-CCNC: 53 U/L (ref 39–117)
ALT SERPL W P-5'-P-CCNC: 30 U/L (ref 1–41)
ANION GAP SERPL CALCULATED.3IONS-SCNC: 12 MMOL/L (ref 5–15)
AST SERPL-CCNC: 44 U/L (ref 1–40)
B PARAPERT DNA SPEC QL NAA+PROBE: NOT DETECTED
B PERT DNA SPEC QL NAA+PROBE: NOT DETECTED
BASOPHILS # BLD AUTO: 0.01 10*3/MM3 (ref 0–0.2)
BASOPHILS NFR BLD AUTO: 0.1 % (ref 0–1.5)
BILIRUB SERPL-MCNC: 0.9 MG/DL (ref 0–1.2)
BUN SERPL-MCNC: 16 MG/DL (ref 8–23)
BUN/CREAT SERPL: 12.1 (ref 7–25)
C PNEUM DNA NPH QL NAA+NON-PROBE: NOT DETECTED
CALCIUM SPEC-SCNC: 9.1 MG/DL (ref 8.6–10.5)
CHLORIDE SERPL-SCNC: 100 MMOL/L (ref 98–107)
CO2 SERPL-SCNC: 26 MMOL/L (ref 22–29)
CREAT SERPL-MCNC: 1.32 MG/DL (ref 0.76–1.27)
DEPRECATED RDW RBC AUTO: 51.7 FL (ref 37–54)
EGFRCR SERPLBLD CKD-EPI 2021: 56.2 ML/MIN/1.73
EOSINOPHIL # BLD AUTO: 0.01 10*3/MM3 (ref 0–0.4)
EOSINOPHIL NFR BLD AUTO: 0.1 % (ref 0.3–6.2)
ERYTHROCYTE [DISTWIDTH] IN BLOOD BY AUTOMATED COUNT: 14.6 % (ref 12.3–15.4)
FLUAV SUBTYP SPEC NAA+PROBE: NOT DETECTED
FLUBV RNA ISLT QL NAA+PROBE: NOT DETECTED
GLOBULIN UR ELPH-MCNC: 2.6 GM/DL
GLUCOSE BLDC GLUCOMTR-MCNC: 141 MG/DL (ref 70–130)
GLUCOSE BLDC GLUCOMTR-MCNC: 151 MG/DL (ref 70–130)
GLUCOSE SERPL-MCNC: 140 MG/DL (ref 65–99)
HADV DNA SPEC NAA+PROBE: NOT DETECTED
HCOV 229E RNA SPEC QL NAA+PROBE: NOT DETECTED
HCOV HKU1 RNA SPEC QL NAA+PROBE: NOT DETECTED
HCOV NL63 RNA SPEC QL NAA+PROBE: NOT DETECTED
HCOV OC43 RNA SPEC QL NAA+PROBE: NOT DETECTED
HCT VFR BLD AUTO: 38.3 % (ref 37.5–51)
HGB BLD-MCNC: 12.6 G/DL (ref 13–17.7)
HMPV RNA NPH QL NAA+NON-PROBE: NOT DETECTED
HOLD SPECIMEN: NORMAL
HOLD SPECIMEN: NORMAL
HPIV1 RNA ISLT QL NAA+PROBE: NOT DETECTED
HPIV2 RNA SPEC QL NAA+PROBE: NOT DETECTED
HPIV3 RNA NPH QL NAA+PROBE: NOT DETECTED
HPIV4 P GENE NPH QL NAA+PROBE: NOT DETECTED
IMM GRANULOCYTES # BLD AUTO: 0.03 10*3/MM3 (ref 0–0.05)
IMM GRANULOCYTES NFR BLD AUTO: 0.4 % (ref 0–0.5)
LYMPHOCYTES # BLD AUTO: 0.9 10*3/MM3 (ref 0.7–3.1)
LYMPHOCYTES NFR BLD AUTO: 12 % (ref 19.6–45.3)
M PNEUMO IGG SER IA-ACNC: NOT DETECTED
MAGNESIUM SERPL-MCNC: 1.4 MG/DL (ref 1.6–2.4)
MCH RBC QN AUTO: 31.3 PG (ref 26.6–33)
MCHC RBC AUTO-ENTMCNC: 32.9 G/DL (ref 31.5–35.7)
MCV RBC AUTO: 95.3 FL (ref 79–97)
MONOCYTES # BLD AUTO: 1.48 10*3/MM3 (ref 0.1–0.9)
MONOCYTES NFR BLD AUTO: 19.7 % (ref 5–12)
NEUTROPHILS NFR BLD AUTO: 5.07 10*3/MM3 (ref 1.7–7)
NEUTROPHILS NFR BLD AUTO: 67.7 % (ref 42.7–76)
NRBC BLD AUTO-RTO: 0 /100 WBC (ref 0–0.2)
PLATELET # BLD AUTO: 183 10*3/MM3 (ref 140–450)
PMV BLD AUTO: 10.8 FL (ref 6–12)
POTASSIUM SERPL-SCNC: 4 MMOL/L (ref 3.5–5.2)
PROT SERPL-MCNC: 6.6 G/DL (ref 6–8.5)
QT INTERVAL: 357 MS
RBC # BLD AUTO: 4.02 10*6/MM3 (ref 4.14–5.8)
RHINOVIRUS RNA SPEC NAA+PROBE: NOT DETECTED
RSV RNA NPH QL NAA+NON-PROBE: NOT DETECTED
SARS-COV-2 RNA NPH QL NAA+NON-PROBE: DETECTED
SODIUM SERPL-SCNC: 138 MMOL/L (ref 136–145)
TROPONIN T SERPL-MCNC: <0.01 NG/ML (ref 0–0.03)
TSH SERPL DL<=0.05 MIU/L-ACNC: 3.28 UIU/ML (ref 0.27–4.2)
VALPROATE SERPL-MCNC: 67 MCG/ML (ref 50–125)
WBC NRBC COR # BLD: 7.5 10*3/MM3 (ref 3.4–10.8)
WHOLE BLOOD HOLD COAG: NORMAL
WHOLE BLOOD HOLD SPECIMEN: NORMAL

## 2022-11-25 PROCEDURE — 84443 ASSAY THYROID STIM HORMONE: CPT | Performed by: EMERGENCY MEDICINE

## 2022-11-25 PROCEDURE — 84484 ASSAY OF TROPONIN QUANT: CPT | Performed by: EMERGENCY MEDICINE

## 2022-11-25 PROCEDURE — 83735 ASSAY OF MAGNESIUM: CPT | Performed by: EMERGENCY MEDICINE

## 2022-11-25 PROCEDURE — 0202U NFCT DS 22 TRGT SARS-COV-2: CPT | Performed by: EMERGENCY MEDICINE

## 2022-11-25 PROCEDURE — XW033E5 INTRODUCTION OF REMDESIVIR ANTI-INFECTIVE INTO PERIPHERAL VEIN, PERCUTANEOUS APPROACH, NEW TECHNOLOGY GROUP 5: ICD-10-PCS | Performed by: STUDENT IN AN ORGANIZED HEALTH CARE EDUCATION/TRAINING PROGRAM

## 2022-11-25 PROCEDURE — 82962 GLUCOSE BLOOD TEST: CPT

## 2022-11-25 PROCEDURE — 25010000002 ENOXAPARIN PER 10 MG: Performed by: INTERNAL MEDICINE

## 2022-11-25 PROCEDURE — 80053 COMPREHEN METABOLIC PANEL: CPT | Performed by: EMERGENCY MEDICINE

## 2022-11-25 PROCEDURE — 93005 ELECTROCARDIOGRAM TRACING: CPT | Performed by: EMERGENCY MEDICINE

## 2022-11-25 PROCEDURE — 25010000002 DEXAMETHASONE PER 1 MG: Performed by: EMERGENCY MEDICINE

## 2022-11-25 PROCEDURE — 80164 ASSAY DIPROPYLACETIC ACD TOT: CPT | Performed by: EMERGENCY MEDICINE

## 2022-11-25 PROCEDURE — 25010000002 REMDESIVIR 100 MG/20ML SOLUTION 1 EACH VIAL: Performed by: INTERNAL MEDICINE

## 2022-11-25 PROCEDURE — 93010 ELECTROCARDIOGRAM REPORT: CPT | Performed by: INTERNAL MEDICINE

## 2022-11-25 PROCEDURE — 25010000002 MAGNESIUM SULFATE 2 GM/50ML SOLUTION: Performed by: INTERNAL MEDICINE

## 2022-11-25 PROCEDURE — 71045 X-RAY EXAM CHEST 1 VIEW: CPT

## 2022-11-25 PROCEDURE — 85025 COMPLETE CBC W/AUTO DIFF WBC: CPT

## 2022-11-25 PROCEDURE — 99285 EMERGENCY DEPT VISIT HI MDM: CPT

## 2022-11-25 RX ORDER — MAGNESIUM SULFATE HEPTAHYDRATE 40 MG/ML
2 INJECTION, SOLUTION INTRAVENOUS AS NEEDED
Status: DISCONTINUED | OUTPATIENT
Start: 2022-11-25 | End: 2022-11-27 | Stop reason: HOSPADM

## 2022-11-25 RX ORDER — POTASSIUM CHLORIDE 7.45 MG/ML
10 INJECTION INTRAVENOUS
Status: DISCONTINUED | OUTPATIENT
Start: 2022-11-25 | End: 2022-11-27 | Stop reason: HOSPADM

## 2022-11-25 RX ORDER — DIVALPROEX SODIUM 500 MG/1
500 TABLET, EXTENDED RELEASE ORAL DAILY
Status: DISCONTINUED | OUTPATIENT
Start: 2022-11-25 | End: 2022-11-27 | Stop reason: HOSPADM

## 2022-11-25 RX ORDER — PROPRANOLOL HYDROCHLORIDE 20 MG/1
20 TABLET ORAL 3 TIMES DAILY
Status: DISCONTINUED | OUTPATIENT
Start: 2022-11-25 | End: 2022-11-27

## 2022-11-25 RX ORDER — SODIUM CHLORIDE 9 MG/ML
100 INJECTION, SOLUTION INTRAVENOUS CONTINUOUS
Status: ACTIVE | OUTPATIENT
Start: 2022-11-25 | End: 2022-11-26

## 2022-11-25 RX ORDER — LISINOPRIL 40 MG/1
40 TABLET ORAL DAILY
Status: DISCONTINUED | OUTPATIENT
Start: 2022-11-25 | End: 2022-11-27 | Stop reason: HOSPADM

## 2022-11-25 RX ORDER — ASPIRIN 81 MG/1
81 TABLET, CHEWABLE ORAL DAILY
Status: DISCONTINUED | OUTPATIENT
Start: 2022-11-25 | End: 2022-11-27 | Stop reason: HOSPADM

## 2022-11-25 RX ORDER — POTASSIUM CHLORIDE 750 MG/1
40 TABLET, FILM COATED, EXTENDED RELEASE ORAL AS NEEDED
Status: DISCONTINUED | OUTPATIENT
Start: 2022-11-25 | End: 2022-11-27 | Stop reason: HOSPADM

## 2022-11-25 RX ORDER — SODIUM CHLORIDE 9 MG/ML
40 INJECTION, SOLUTION INTRAVENOUS AS NEEDED
Status: DISCONTINUED | OUTPATIENT
Start: 2022-11-25 | End: 2022-11-27 | Stop reason: HOSPADM

## 2022-11-25 RX ORDER — NITROGLYCERIN 0.4 MG/1
0.4 TABLET SUBLINGUAL
Status: DISCONTINUED | OUTPATIENT
Start: 2022-11-25 | End: 2022-11-27 | Stop reason: HOSPADM

## 2022-11-25 RX ORDER — ONDANSETRON 2 MG/ML
4 INJECTION INTRAMUSCULAR; INTRAVENOUS EVERY 6 HOURS PRN
Status: DISCONTINUED | OUTPATIENT
Start: 2022-11-25 | End: 2022-11-27 | Stop reason: HOSPADM

## 2022-11-25 RX ORDER — MAGNESIUM SULFATE HEPTAHYDRATE 40 MG/ML
4 INJECTION, SOLUTION INTRAVENOUS AS NEEDED
Status: DISCONTINUED | OUTPATIENT
Start: 2022-11-25 | End: 2022-11-27 | Stop reason: HOSPADM

## 2022-11-25 RX ORDER — DEXTROSE MONOHYDRATE 25 G/50ML
25 INJECTION, SOLUTION INTRAVENOUS
Status: DISCONTINUED | OUTPATIENT
Start: 2022-11-25 | End: 2022-11-27 | Stop reason: HOSPADM

## 2022-11-25 RX ORDER — BENZONATATE 100 MG/1
200 CAPSULE ORAL 3 TIMES DAILY PRN
Status: DISCONTINUED | OUTPATIENT
Start: 2022-11-25 | End: 2022-11-27 | Stop reason: HOSPADM

## 2022-11-25 RX ORDER — SODIUM CHLORIDE 0.9 % (FLUSH) 0.9 %
10 SYRINGE (ML) INJECTION AS NEEDED
Status: DISCONTINUED | OUTPATIENT
Start: 2022-11-25 | End: 2022-11-27 | Stop reason: HOSPADM

## 2022-11-25 RX ORDER — AMLODIPINE BESYLATE 2.5 MG/1
2.5 TABLET ORAL DAILY
Status: DISCONTINUED | OUTPATIENT
Start: 2022-11-25 | End: 2022-11-27 | Stop reason: HOSPADM

## 2022-11-25 RX ORDER — INSULIN LISPRO 100 [IU]/ML
0-9 INJECTION, SOLUTION INTRAVENOUS; SUBCUTANEOUS
Status: DISCONTINUED | OUTPATIENT
Start: 2022-11-25 | End: 2022-11-27 | Stop reason: HOSPADM

## 2022-11-25 RX ORDER — POTASSIUM CHLORIDE 1.5 G/1.77G
40 POWDER, FOR SOLUTION ORAL AS NEEDED
Status: DISCONTINUED | OUTPATIENT
Start: 2022-11-25 | End: 2022-11-27 | Stop reason: HOSPADM

## 2022-11-25 RX ORDER — PANTOPRAZOLE SODIUM 40 MG/1
40 TABLET, DELAYED RELEASE ORAL EVERY MORNING
Status: DISCONTINUED | OUTPATIENT
Start: 2022-11-26 | End: 2022-11-27 | Stop reason: HOSPADM

## 2022-11-25 RX ORDER — SODIUM CHLORIDE 0.9 % (FLUSH) 0.9 %
10 SYRINGE (ML) INJECTION EVERY 12 HOURS SCHEDULED
Status: DISCONTINUED | OUTPATIENT
Start: 2022-11-25 | End: 2022-11-27 | Stop reason: HOSPADM

## 2022-11-25 RX ORDER — ACETAMINOPHEN 325 MG/1
650 TABLET ORAL EVERY 6 HOURS PRN
Status: DISCONTINUED | OUTPATIENT
Start: 2022-11-25 | End: 2022-11-27 | Stop reason: HOSPADM

## 2022-11-25 RX ORDER — LEVOTHYROXINE SODIUM 0.03 MG/1
25 TABLET ORAL DAILY
Status: DISCONTINUED | OUTPATIENT
Start: 2022-11-25 | End: 2022-11-27 | Stop reason: HOSPADM

## 2022-11-25 RX ORDER — ENOXAPARIN SODIUM 100 MG/ML
30 INJECTION SUBCUTANEOUS DAILY
Status: DISCONTINUED | OUTPATIENT
Start: 2022-11-25 | End: 2022-11-26

## 2022-11-25 RX ORDER — ATORVASTATIN CALCIUM 20 MG/1
20 TABLET, FILM COATED ORAL DAILY
Status: DISCONTINUED | OUTPATIENT
Start: 2022-11-25 | End: 2022-11-27 | Stop reason: HOSPADM

## 2022-11-25 RX ORDER — DEXAMETHASONE SODIUM PHOSPHATE 10 MG/ML
6 INJECTION INTRAMUSCULAR; INTRAVENOUS ONCE
Status: COMPLETED | OUTPATIENT
Start: 2022-11-25 | End: 2022-11-25

## 2022-11-25 RX ORDER — NICOTINE POLACRILEX 4 MG
15 LOZENGE BUCCAL
Status: DISCONTINUED | OUTPATIENT
Start: 2022-11-25 | End: 2022-11-27 | Stop reason: HOSPADM

## 2022-11-25 RX ADMIN — PROPRANOLOL HYDROCHLORIDE 20 MG: 20 TABLET ORAL at 18:19

## 2022-11-25 RX ADMIN — LISINOPRIL 40 MG: 40 TABLET ORAL at 18:19

## 2022-11-25 RX ADMIN — SODIUM CHLORIDE 500 ML: 9 INJECTION, SOLUTION INTRAVENOUS at 11:19

## 2022-11-25 RX ADMIN — Medication 10 ML: at 20:58

## 2022-11-25 RX ADMIN — DIVALPROEX SODIUM 500 MG: 500 TABLET, FILM COATED, EXTENDED RELEASE ORAL at 20:57

## 2022-11-25 RX ADMIN — TICAGRELOR 90 MG: 90 TABLET ORAL at 20:57

## 2022-11-25 RX ADMIN — ATORVASTATIN CALCIUM 20 MG: 20 TABLET, FILM COATED ORAL at 18:19

## 2022-11-25 RX ADMIN — MAGNESIUM SULFATE HEPTAHYDRATE 2 G: 2 INJECTION, SOLUTION INTRAVENOUS at 18:29

## 2022-11-25 RX ADMIN — MAGNESIUM SULFATE HEPTAHYDRATE 2 G: 2 INJECTION, SOLUTION INTRAVENOUS at 20:58

## 2022-11-25 RX ADMIN — ENOXAPARIN SODIUM 30 MG: 30 INJECTION SUBCUTANEOUS at 18:18

## 2022-11-25 RX ADMIN — DEXAMETHASONE SODIUM PHOSPHATE 6 MG: 10 INJECTION INTRAMUSCULAR; INTRAVENOUS at 13:17

## 2022-11-25 RX ADMIN — ASPIRIN 81 MG: 81 TABLET, CHEWABLE ORAL at 18:19

## 2022-11-25 RX ADMIN — AMLODIPINE BESYLATE 2.5 MG: 2.5 TABLET ORAL at 18:19

## 2022-11-25 RX ADMIN — SODIUM CHLORIDE 100 ML/HR: 9 INJECTION, SOLUTION INTRAVENOUS at 18:18

## 2022-11-25 RX ADMIN — PROPRANOLOL HYDROCHLORIDE 20 MG: 20 TABLET ORAL at 20:57

## 2022-11-25 RX ADMIN — REMDESIVIR 200 MG: 100 INJECTION, POWDER, LYOPHILIZED, FOR SOLUTION INTRAVENOUS at 20:57

## 2022-11-25 RX ADMIN — LEVOTHYROXINE SODIUM 25 MCG: 0.03 TABLET ORAL at 18:19
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
## 2022-11-25 NOTE — ED NOTES
"Nursing report ED to floor  Hernando Lozada  77 y.o.  male    HPI :   Chief Complaint   Patient presents with    Weakness - Generalized       Admitting doctor:   Jeanne Chan MD    Admitting diagnosis:   The primary encounter diagnosis was COVID-19. Diagnoses of Generalized weakness, Acute renal insufficiency, Hypoxia, and Murmur were also pertinent to this visit.    Code status:   Current Code Status       Date Active Code Status Order ID Comments User Context       Prior            Allergies:   Patient has no known allergies.    Intake and Output  No intake or output data in the 24 hours ending 11/25/22 1322    Weight:       11/25/22  1019   Weight: 114 kg (251 lb)       Most recent vitals:   Vitals:    11/25/22 0837 11/25/22 1019 11/25/22 1120 11/25/22 1317   BP: 122/68  116/70 117/78   Pulse: 88  80 90   Resp: 16  16 16   Temp: 99.1 °F (37.3 °C)      SpO2: 96%  93% 94%   Weight:  114 kg (251 lb)     Height:  175.3 cm (69\")         Active LDAs/IV Access:   Lines, Drains & Airways       Active LDAs       Name Placement date Placement time Site Days    Peripheral IV 11/25/22 1030 Right Antecubital 11/25/22  1030  Antecubital  less than 1                    Labs (abnormal labs have a star):   Labs Reviewed   RESPIRATORY PANEL PCR W/ COVID-19 (SARS-COV-2) YOU/EMILY/VALENTIN/PAD/COR/MAD/CHRISSY IN-HOUSE, NP SWAB IN UTM/VTP, 3-4 HR TAT - Abnormal; Notable for the following components:       Result Value    COVID19 Detected (*)     All other components within normal limits    Narrative:     In the setting of a positive respiratory panel with a viral infection PLUS a negative procalcitonin without other underlying concern for bacterial infection, consider observing off antibiotics or discontinuation of antibiotics and continue supportive care. If the respiratory panel is positive for atypical bacterial infection (Bordetella pertussis, Chlamydophila pneumoniae, or Mycoplasma pneumoniae), consider antibiotic de-escalation to target " atypical bacterial infection.   COMPREHENSIVE METABOLIC PANEL - Abnormal; Notable for the following components:    Glucose 140 (*)     Creatinine 1.32 (*)     AST (SGOT) 44 (*)     eGFR 56.2 (*)     All other components within normal limits    Narrative:     GFR Normal >60  Chronic Kidney Disease <60  Kidney Failure <15    The GFR formula is only valid for adults with stable renal function between ages 18 and 70.   MAGNESIUM - Abnormal; Notable for the following components:    Magnesium 1.4 (*)     All other components within normal limits   CBC WITH AUTO DIFFERENTIAL - Abnormal; Notable for the following components:    RBC 4.02 (*)     Hemoglobin 12.6 (*)     Lymphocyte % 12.0 (*)     Monocyte % 19.7 (*)     Eosinophil % 0.1 (*)     Monocytes, Absolute 1.48 (*)     All other components within normal limits   TROPONIN (IN-HOUSE) - Normal    Narrative:     Troponin T Reference Range:  <= 0.03 ng/mL-   Negative for AMI  >0.03 ng/mL-     Abnormal for myocardial necrosis.  Clinicians would have to utilize clinical acumen, EKG, Troponin and serial changes to determine if it is an Acute Myocardial Infarction or myocardial injury due to an underlying chronic condition.       Results may be falsely decreased if patient taking Biotin.     VALPROIC ACID LEVEL, TOTAL - Normal    Narrative:     Therapeutic Ranges for Valproic Acid    Epilepsy:       mcg/ml  Bipolar/Tatiana  up to 125 mcg/ml     TSH - Normal   RAINBOW DRAW    Narrative:     The following orders were created for panel order [place] Draw.  Procedure                               Abnormality         Status                     ---------                               -----------         ------                     Green Top (Gel)[[id number]]                                  Final result               Lavender Top[[id number]]                                     Final result               Gold Top - SST[[id number]]                                   Final result                Light Blue Top[[id number]]                                   Final result                 Please view results for these tests on the individual orders.   URINALYSIS W/ MICROSCOPIC IF INDICATED (NO CULTURE)   POCT GLUCOSE FINGERSTICK   CBC AND DIFFERENTIAL    Narrative:     The following orders were created for panel order CBC & Differential.  Procedure                               Abnormality         Status                     ---------                               -----------         ------                     CBC Auto Differential[[id number]]        Abnormal            Final result                 Please view results for these tests on the individual orders.   GREEN TOP   LAVENDER TOP   GOLD TOP - SST   LIGHT BLUE TOP       EKG:   ECG 12 Lead ED Triage Standing Order; Weak / Dizzy / AMS   Preliminary Result   HEART RATE= 78  bpm   RR Interval= 769  ms   IN Interval= 170  ms   P Horizontal Axis= -27  deg   P Front Axis= 1  deg   QRSD Interval= 102  ms   QT Interval= 357  ms   QRS Axis= 2  deg   T Wave Axis= 160  deg   - ABNORMAL ECG -   Sinus rhythm   Multiform ventricular premature complexes   Borderline repolarization abnormality   Electronically Signed By:    Date and Time of Study: 2022-11-25 11:20:53          Meds given in ED:   Medications   sodium chloride 0.9 % flush 10 mL (has no administration in time range)   sodium chloride 0.9 % bolus 500 mL (0 mL Intravenous Stopped 11/25/22 1142)   dexamethasone (DECADRON) injection 6 mg (6 mg Intravenous Given 11/25/22 1317)       Imaging results:  XR Chest 1 View    Result Date: 11/25/2022  No focal pulmonary consolidation. Borderline heart size. Follow-up as clinical indications persist.  This report was finalized on 11/25/2022 10:57 AM by Dr. Chava Flores M.D.       Ambulatory status: Up with assist    Social issues:   Social History     Socioeconomic History    Marital status: Single   Tobacco Use    Smoking status: Never    Smokeless tobacco: Never    Substance and Sexual Activity    Alcohol use: No    Drug use: No    Sexual activity: Defer       NIH Stroke Scale:        Nursing report ED to floor:
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
## 2022-11-25 NOTE — ED TRIAGE NOTES
Patient to Er via ems from home for weakness x 12 hours    Patient reports that he couldn't even button his pants this morning    Patient wearing mask this RN in PPE

## 2022-11-25 NOTE — NURSING NOTE
Patient does not know all of the home meds he is currently taking. He lives in a home that is a part of the [facility] here in [place] and they are in charge of giving him his medications. He did not have a number for his home. I looked up the local number and I tried Calling the Program to obtain a list of his current meds multiple times. I did not get an answer. I called his Brother Danny but he did not have a number for them either. I called the National hotline number and received the mobile number of the local . I did not receive and answer there either. The lady at the national hotline sent the director and email for me with my name and number. She also tried reaching out to several people but did not receive and answer. Numbers are listed below and placed in the patients paper chart.     Local Office [phone]  Marine Amanda Local Director [phone]  [place] Hotline [phone]

## 2022-11-25 NOTE — PLAN OF CARE
Goal Outcome Evaluation:  Plan of Care Reviewed With: patient        Progress: no change  Outcome Evaluation: pt admitted from ER today. COVID +. VSS on 2L NC. NSR with frequent PVCs. Med list updated to the best of my ability. See nursing note for details. IVF started. Low mag, getting replacement now.

## 2022-11-25 NOTE — PROGRESS NOTES
[facility]  Clinical Pharmacy Department     Remdesivir Review Note    Hernando Lozada is a 77 y.o. male with confirmed COVID-19 infection on day 1 of hospitalization.     Consulting Provider:  Dr. Chan  Date of Confirmed SARS-CoV-2: 11/25/22  Date of Symptom Onset: 11/25/22  Other Antimicrobials: none  Hydroxychloroquine or chloroquine prior to arrival: no    Allergies  Allergies as of 11/25/2022    (No Known Allergies)       Microbiology:  Microbiology Results (last 10 days)       Procedure Component Value - Date/Time    Respiratory Panel PCR w/COVID-19(SARS-CoV-2) YOU/[facility] In-House, NP Swab in UTM/VTM, 3-4 HR TAT - Swab, Nasopharynx [[id number]]  (Abnormal) Collected: 11/25/22 1121    Lab Status: Final result Specimen: Swab from Nasopharynx Updated: 11/25/22 1253     ADENOVIRUS, PCR Not Detected     Coronavirus 229E Not Detected     Coronavirus HKU1 Not Detected     Coronavirus NL63 Not Detected     Coronavirus OC43 Not Detected     COVID19 Detected     Human Metapneumovirus Not Detected     Human Rhinovirus/Enterovirus Not Detected     Influenza A PCR Not Detected     Influenza B PCR Not Detected     Parainfluenza Virus 1 Not Detected     Parainfluenza Virus 2 Not Detected     Parainfluenza Virus 3 Not Detected     Parainfluenza Virus 4 Not Detected     RSV, PCR Not Detected     Bordetella pertussis pcr Not Detected     Bordetella parapertussis PCR Not Detected     Chlamydophila pneumoniae PCR Not Detected     Mycoplasma pneumo by PCR Not Detected    Narrative:      In the setting of a positive respiratory panel with a viral infection PLUS a negative procalcitonin without other underlying concern for bacterial infection, consider observing off antibiotics or discontinuation of antibiotics and continue supportive care. If the respiratory panel is positive for atypical bacterial infection (Bordetella pertussis, Chlamydophila pneumoniae, or Mycoplasma pneumoniae), consider antibiotic de-escalation  "to target atypical bacterial infection.            Vitals/Labs/I&O  Visit Vitals  /98 (BP Location: Left arm, Patient Position: Lying)   Pulse 86   Temp 99.5 °F (37.5 °C) (Oral)   Resp 18   Ht 175.3 cm (69\")   Wt 114 kg (251 lb)   SpO2 96%   BMI 37.07 kg/m²       Results from last 7 days   Lab Units 11/25/22  1021   WBC 10*3/mm3 7.50         Results from last 7 days   Lab Units 11/25/22  1021   AST (SGOT) U/L 44*      Results from last 7 days   Lab Units 11/25/22  1021   ALT (SGPT) U/L 30       Estimated Creatinine Clearance: 60.2 mL/min (A) (by C-G formula based on SCr of 1.32 mg/dL (H)).  Results from last 7 days   Lab Units 11/25/22  1021   BUN mg/dL 16   CREATININE mg/dL 1.32*     Intake & Output (last 3 days)       None            Assessment/Plan:    Patient meets the following inclusion/exclusion criteria:  Patient is hospitalized with confirmed COVID-19 infection (and accompanying symptoms)  Patient meets one of the two below criteria:  Patient is requiring an increase in baseline supplemental oxygen requirements OR SpO2 </= 94% on room air secondary to COVID-19 infection OR  Patient is symptomatic but not requiring supplemental oxygen or an increase in baseline oxygen AND has at least one of the below high risk criteria for progression to severe illness. High risk criteria:   Age >/= 65  Cancer  Cardiovascular diseases (HF, CAD, or cardiomyopathies)  CKD  Chronic lung disease (COPD, CF, interstitial lung disease, PE, pulmonary hypertension, bronchopulmonary dysplasia, bronchiectasis)  Diabetes mellitis (insulin dependent or poorly controlled)  Immunocompromising conditions or receipt of immunosuppressive medications  Obesity (BMI > 35 kg/m2)  Pregnancy and recent pregnancy (within 7 days of delivery)  Sickle cell disease  Baseline and daily LFTs and Scr have been ordered prior to remdesivir initiation  ALT is not ? 10 times the upper limit of normal  Patient is not on concomitant hydroxychloroquine or " chloroquine  Patient does not require invasive mechanical ventilation (IMV) or ECMO  Patient is within 10 days from symptom onset (for criteria 2a) or within 7 days of symptom onset (for criteria 2b)    Remdesivir 200 mg IV x 1 dose, followed by 100 mg IV q24h for 4 days or until hospital discharge (whichever comes first) has been ordered.    Thank you for involving pharmacy in this patient's care. Please contact pharmacy with any questions or concerns.                           Hina Bacon, [facility]  Clinical Pharmacist

## 2022-11-26 LAB
ALBUMIN SERPL-MCNC: 3.5 G/DL (ref 3.5–5.2)
ALBUMIN/GLOB SERPL: 1.3 G/DL
ALP SERPL-CCNC: 48 U/L (ref 39–117)
ALT SERPL W P-5'-P-CCNC: 27 U/L (ref 1–41)
ANION GAP SERPL CALCULATED.3IONS-SCNC: 6.3 MMOL/L (ref 5–15)
AST SERPL-CCNC: 33 U/L (ref 1–40)
BASOPHILS # BLD AUTO: 0 10*3/MM3 (ref 0–0.2)
BASOPHILS NFR BLD AUTO: 0 % (ref 0–1.5)
BILIRUB CONJ SERPL-MCNC: <0.2 MG/DL (ref 0–0.3)
BILIRUB SERPL-MCNC: 0.8 MG/DL (ref 0–1.2)
BUN SERPL-MCNC: 16 MG/DL (ref 8–23)
BUN/CREAT SERPL: 17.2 (ref 7–25)
CALCIUM SPEC-SCNC: 8.2 MG/DL (ref 8.6–10.5)
CHLORIDE SERPL-SCNC: 101 MMOL/L (ref 98–107)
CO2 SERPL-SCNC: 28.7 MMOL/L (ref 22–29)
CREAT SERPL-MCNC: 0.93 MG/DL (ref 0.76–1.27)
DEPRECATED RDW RBC AUTO: 50.6 FL (ref 37–54)
EGFRCR SERPLBLD CKD-EPI 2021: 85.6 ML/MIN/1.73
EOSINOPHIL # BLD AUTO: 0 10*3/MM3 (ref 0–0.4)
EOSINOPHIL NFR BLD AUTO: 0 % (ref 0.3–6.2)
ERYTHROCYTE [DISTWIDTH] IN BLOOD BY AUTOMATED COUNT: 14.6 % (ref 12.3–15.4)
GLOBULIN UR ELPH-MCNC: 2.7 GM/DL
GLUCOSE BLDC GLUCOMTR-MCNC: 102 MG/DL (ref 70–130)
GLUCOSE BLDC GLUCOMTR-MCNC: 109 MG/DL (ref 70–130)
GLUCOSE BLDC GLUCOMTR-MCNC: 126 MG/DL (ref 70–130)
GLUCOSE BLDC GLUCOMTR-MCNC: 172 MG/DL (ref 70–130)
GLUCOSE SERPL-MCNC: 133 MG/DL (ref 65–99)
HBA1C MFR BLD: 5.3 % (ref 4.8–5.6)
HCT VFR BLD AUTO: 34.7 % (ref 37.5–51)
HGB BLD-MCNC: 11.5 G/DL (ref 13–17.7)
IMM GRANULOCYTES # BLD AUTO: 0.02 10*3/MM3 (ref 0–0.05)
IMM GRANULOCYTES NFR BLD AUTO: 0.5 % (ref 0–0.5)
LYMPHOCYTES # BLD AUTO: 0.51 10*3/MM3 (ref 0.7–3.1)
LYMPHOCYTES NFR BLD AUTO: 11.8 % (ref 19.6–45.3)
MCH RBC QN AUTO: 31 PG (ref 26.6–33)
MCHC RBC AUTO-ENTMCNC: 33.1 G/DL (ref 31.5–35.7)
MCV RBC AUTO: 93.5 FL (ref 79–97)
MONOCYTES # BLD AUTO: 0.55 10*3/MM3 (ref 0.1–0.9)
MONOCYTES NFR BLD AUTO: 12.8 % (ref 5–12)
NEUTROPHILS NFR BLD AUTO: 3.23 10*3/MM3 (ref 1.7–7)
NEUTROPHILS NFR BLD AUTO: 74.9 % (ref 42.7–76)
NRBC BLD AUTO-RTO: 0 /100 WBC (ref 0–0.2)
PLATELET # BLD AUTO: 137 10*3/MM3 (ref 140–450)
PMV BLD AUTO: 10.6 FL (ref 6–12)
POTASSIUM SERPL-SCNC: 4.3 MMOL/L (ref 3.5–5.2)
PROT SERPL-MCNC: 6.2 G/DL (ref 6–8.5)
QT INTERVAL: 406 MS
RBC # BLD AUTO: 3.71 10*6/MM3 (ref 4.14–5.8)
SODIUM SERPL-SCNC: 136 MMOL/L (ref 136–145)
WBC NRBC COR # BLD: 4.31 10*3/MM3 (ref 3.4–10.8)

## 2022-11-26 PROCEDURE — 83036 HEMOGLOBIN GLYCOSYLATED A1C: CPT | Performed by: INTERNAL MEDICINE

## 2022-11-26 PROCEDURE — 97161 PT EVAL LOW COMPLEX 20 MIN: CPT

## 2022-11-26 PROCEDURE — 85025 COMPLETE CBC W/AUTO DIFF WBC: CPT | Performed by: INTERNAL MEDICINE

## 2022-11-26 PROCEDURE — 82248 BILIRUBIN DIRECT: CPT | Performed by: INTERNAL MEDICINE

## 2022-11-26 PROCEDURE — 82962 GLUCOSE BLOOD TEST: CPT

## 2022-11-26 PROCEDURE — 97530 THERAPEUTIC ACTIVITIES: CPT

## 2022-11-26 PROCEDURE — 80053 COMPREHEN METABOLIC PANEL: CPT | Performed by: INTERNAL MEDICINE

## 2022-11-26 PROCEDURE — 25010000002 ENOXAPARIN PER 10 MG: Performed by: INTERNAL MEDICINE

## 2022-11-26 PROCEDURE — 63710000001 INSULIN LISPRO (HUMAN) PER 5 UNITS: Performed by: INTERNAL MEDICINE

## 2022-11-26 PROCEDURE — 25010000002 REMDESIVIR 100 MG/20ML SOLUTION 1 EACH VIAL: Performed by: INTERNAL MEDICINE

## 2022-11-26 PROCEDURE — 93010 ELECTROCARDIOGRAM REPORT: CPT | Performed by: STUDENT IN AN ORGANIZED HEALTH CARE EDUCATION/TRAINING PROGRAM

## 2022-11-26 PROCEDURE — 93005 ELECTROCARDIOGRAM TRACING: CPT | Performed by: STUDENT IN AN ORGANIZED HEALTH CARE EDUCATION/TRAINING PROGRAM

## 2022-11-26 RX ORDER — DEXAMETHASONE 6 MG/1
6 TABLET ORAL
Status: DISCONTINUED | OUTPATIENT
Start: 2022-11-26 | End: 2022-11-26

## 2022-11-26 RX ORDER — DEXAMETHASONE 6 MG/1
6 TABLET ORAL
Status: DISCONTINUED | OUTPATIENT
Start: 2022-11-26 | End: 2022-11-27 | Stop reason: HOSPADM

## 2022-11-26 RX ORDER — ENOXAPARIN SODIUM 100 MG/ML
40 INJECTION SUBCUTANEOUS DAILY
Status: DISCONTINUED | OUTPATIENT
Start: 2022-11-27 | End: 2022-11-27 | Stop reason: HOSPADM

## 2022-11-26 RX ADMIN — ENOXAPARIN SODIUM 30 MG: 30 INJECTION SUBCUTANEOUS at 09:02

## 2022-11-26 RX ADMIN — INSULIN LISPRO 2 UNITS: 100 INJECTION, SOLUTION INTRAVENOUS; SUBCUTANEOUS at 12:37

## 2022-11-26 RX ADMIN — TICAGRELOR 90 MG: 90 TABLET ORAL at 09:02

## 2022-11-26 RX ADMIN — REMDESIVIR 100 MG: 100 INJECTION, POWDER, LYOPHILIZED, FOR SOLUTION INTRAVENOUS at 19:45

## 2022-11-26 RX ADMIN — ATORVASTATIN CALCIUM 20 MG: 20 TABLET, FILM COATED ORAL at 09:12

## 2022-11-26 RX ADMIN — PROPRANOLOL HYDROCHLORIDE 20 MG: 20 TABLET ORAL at 16:43

## 2022-11-26 RX ADMIN — LEVOTHYROXINE SODIUM 25 MCG: 0.03 TABLET ORAL at 09:02

## 2022-11-26 RX ADMIN — DIVALPROEX SODIUM 500 MG: 500 TABLET, FILM COATED, EXTENDED RELEASE ORAL at 09:02

## 2022-11-26 RX ADMIN — TICAGRELOR 90 MG: 90 TABLET ORAL at 21:03

## 2022-11-26 RX ADMIN — ASPIRIN 81 MG: 81 TABLET, CHEWABLE ORAL at 09:02

## 2022-11-26 RX ADMIN — Medication 10 ML: at 09:03

## 2022-11-26 RX ADMIN — PANTOPRAZOLE SODIUM 40 MG: 40 TABLET, DELAYED RELEASE ORAL at 09:02

## 2022-11-26 RX ADMIN — PROPRANOLOL HYDROCHLORIDE 20 MG: 20 TABLET ORAL at 09:02

## 2022-11-26 RX ADMIN — AMLODIPINE BESYLATE 2.5 MG: 2.5 TABLET ORAL at 09:02

## 2022-11-26 RX ADMIN — PROPRANOLOL HYDROCHLORIDE 20 MG: 20 TABLET ORAL at 21:03

## 2022-11-26 RX ADMIN — DEXAMETHASONE 6 MG: 6 TABLET ORAL at 18:24

## 2022-11-26 RX ADMIN — Medication 10 ML: at 21:04

## 2022-11-26 RX ADMIN — LISINOPRIL 40 MG: 40 TABLET ORAL at 09:02
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
## 2022-11-26 NOTE — PLAN OF CARE
Goal Outcome Evaluation:              Outcome Evaluation: pt is A&Ox4, up with standby assist, pt is now on RA, no c/o of SOB. Pt will receive day one of remdesivir tonight. VSS, Will continue to monitor rest of shift.

## 2022-11-26 NOTE — PROGRESS NOTES
Name: Hernando Lozada ADMIT: 2022   : 1945  PCP: Milo Carrasquillo DO    MRN: [id number] LOS: 1 days   AGE/SEX: 77 y.o. male  ROOM: [facility]     Subjective   Subjective   Patient sitting up in the bed, said he is feeling tired,  Denies chest pain, fevers, chills, nausea, vomiting.  Denies shortness of breath.  Has mild cough.  Patient on 2 L nasal cannula, when turned off oxygen dropped to 91-93 percent while in the room.  Per RN,  patient had 21 beats of SVT last night.  Asymptomatic.    Review of Systems   as above  Objective   Objective   Vital Signs  Temp:  [96.8 °F (36 °C)-98.4 °F (36.9 °C)] 97.8 °F (36.6 °C)  Heart Rate:  [73-83] 75  Resp:  [18-20] 20  BP: (101-129)/(62-77) 120/67  SpO2:  [96 %] 96 %  on  Flow (L/min):  [2] 2;   Device (Oxygen Therapy): room air  Body mass index is 38.42 kg/m².  Physical Exam    General: Alert, patient with delayed response to questions however oriented x3,  HEENT: Normocephalic, atraumatic  CV: Regular rate and rhythm,  + systolic murmur  Lungs: Decreased at bases, no wheezing, on 2 L nasal cannula   abdomen: Soft, nontender, nondistended  Extremities: No significant peripheral edema , no cyanosis, generalized weakness    Results Review     I reviewed the patient's new clinical results.  Results from last 7 days   Lab Units 22  0600 22  1021   WBC 10*3/mm3 4.31 7.50   HEMOGLOBIN g/dL 11.5* 12.6*   PLATELETS 10*3/mm3 137* 183     Results from last 7 days   Lab Units 22  0600 22  1021   SODIUM mmol/L 136 138   POTASSIUM mmol/L 4.3 4.0   CHLORIDE mmol/L 101 100   CO2 mmol/L 28.7 26.0   BUN mg/dL 16 16   CREATININE mg/dL 0.93 1.32*   GLUCOSE mg/dL 133* 140*   Estimated Creatinine Clearance: 87 mL/min (by C-G formula based on SCr of 0.93 mg/dL).  Results from last 7 days   Lab Units 22  0600 22  1021   ALBUMIN g/dL 3.50 4.00   BILIRUBIN mg/dL 0.8 0.9   ALK PHOS U/L 48 53   AST (SGOT) U/L 33 44*   ALT (SGPT) U/L 27 30     Results  from last 7 days   Lab Units 11/26/22  0600 11/25/22  1021   CALCIUM mg/dL 8.2* 9.1   ALBUMIN g/dL 3.50 4.00   MAGNESIUM mg/dL  --  1.4*       COVID19   Date Value Ref Range Status   11/25/2022 Detected (C) Not Detected - Ref. Range Final   09/02/2021 Not Detected Not Detected - Ref. Range Final     Hemoglobin A1C   Date/Time Value Ref Range Status   11/26/2022 0600 5.30 4.80 - 5.60 % Final     Glucose   Date/Time Value Ref Range Status   11/26/2022 1141 172 (H) 70 - 130 mg/dL Final     Comment:     Meter: [id number] : [id number] Smailagic Madhavi CNA   11/26/2022 0654 109 70 - 130 mg/dL Final     Comment:     Meter: [id number] : [id number] Hector Fan RN   11/25/2022 2138 141 (H) 70 - 130 mg/dL Final     Comment:     Meter: [id number] : [id number] Hector Fan RN   11/25/2022 1656 151 (H) 70 - 130 mg/dL Final     Comment:     Meter: [id number] : [id number] Terrance KIRBY       XR Chest 1 View  Narrative: XR CHEST 1 VW-     HISTORY: Male who is 77 years-old,  weakness     TECHNIQUE: Frontal view of the chest     COMPARISON: 09/02/2021     FINDINGS: Patient is rotated to the left. The heart size is borderline.  Sternotomy wires are present. Pulmonary vasculature appears  unremarkable. No focal pulmonary consolidation, pleural effusion, or  pneumothorax. No acute osseous process.     Impression: No focal pulmonary consolidation. Borderline heart size.  Follow-up as clinical indications persist.     This report was finalized on 11/25/2022 10:57 AM by Dr. Chava Flores M.D.       Scheduled Medications  amLODIPine, 2.5 mg, Oral, Daily  aspirin, 81 mg, Oral, Daily  atorvastatin, 20 mg, Oral, Daily  dexamethasone, 6 mg, Oral, Daily With Breakfast  divalproex, 500 mg, Oral, Daily  [START ON 11/27/2022] enoxaparin, 40 mg, Subcutaneous, Daily  insulin lispro, 0-9 Units, Subcutaneous, TID AC  levothyroxine, 25 mcg, Oral, Daily  lisinopril, 40 mg, Oral, Daily  pantoprazole, 40 mg, Oral,  QAM  propranolol, 20 mg, Oral, TID  remdesivir, 100 mg, Intravenous, Q24H  sodium chloride, 10 mL, Intravenous, Q12H  ticagrelor, 90 mg, Oral, BID    Infusions   Diet  Diet: Cardiac Diets, Diabetic Diets, Renal Diets; Healthy Heart (2-3 Na+); Consistent Carbohydrate; Low Sodium (2-3g), Low Potassium, Low Phosphorus; Texture: Regular Texture (IDDSI 7); Fluid Consistency: Thin (IDDSI 0)       Assessment/Plan     Active Hospital Problems    Diagnosis  POA   • **COVID-19 [U07.1]  Yes   • CAD (coronary artery disease) [I25.10]  Unknown   • Diabetes mellitus (HCC) [E11.9]  Unknown   • GIOVANNY (obstructive sleep apnea) [G47.33]  Unknown   • Disease of thyroid gland [E07.9]  Unknown   • CKD (chronic kidney disease) [N18.9]  Unknown   • Hypomagnesemia [E83.42]  Unknown   • Chronic brain injury [S06.9XAS]  Not Applicable   • Generalized weakness [R53.1]  Unknown   • Essential hypertension [I10]  Yes      Resolved Hospital Problems   No resolved problems to display.     Patient is a 77-year-old male past medical history of sleep apnea on CPAP, CVA, hypothyroidism, CAD, DM type II, history of TBI, presented to the ED with generalized weakness.  Was diagnosed with COVID-19.    COVID 19 infection;   -Oxygen saturations drop below 94% on room air  -Continue remdesivir x5 days  -Continue dexamethasone 6 mg daily   -Incentive spirometer  -Lovenox for DVT prophylaxis  -Monitor inflammatory markers          CAD status post CABG and stent placement:  PCI 04/2019 -successful angioplasty and stent to the obtuse marginal branch with 80% stenosis reduced to 0 status post stent, normal left main, % occluded with LIMA to LAD patent, circumflex artery had OM 80% stenosis reduced to 0 status post stent, SVG to the point closed, RCA dominant with minimum diffuse irregularity, and normal LV gram.  -Continue ticagrelor, aspirin, statin  -Patient with an episode of SVT 21 beats, asymptomatic.  EKG ordered.  Cardiology consult.      Essential  hypertension: Continue lisinopril, amlodipine.  BP stable.      GIOVANNY: Home CPAP available, oxygen supplement.    Discussed with patient  Discussed with RN,        I wore protective equipment throughout this patient encounter including a face mask, gloves and protective eyewear.  Hand hygiene was performed before donning protective equipment and after removal when leaving the room.      Dictated utilizing Dragon dictation        Jacqui Chambers MD  [facility]  11/26/22  16:19 EST

## 2022-11-27 ENCOUNTER — READMISSION MANAGEMENT (OUTPATIENT)
Dept: CALL CENTER | Facility: HOSPITAL | Age: 77
End: 2022-11-27

## 2022-11-27 VITALS
WEIGHT: 260.14 LBS | RESPIRATION RATE: 18 BRPM | DIASTOLIC BLOOD PRESSURE: 79 MMHG | SYSTOLIC BLOOD PRESSURE: 125 MMHG | BODY MASS INDEX: 38.53 KG/M2 | OXYGEN SATURATION: 94 % | HEART RATE: 66 BPM | TEMPERATURE: 97.6 F | HEIGHT: 69 IN

## 2022-11-27 LAB
ALBUMIN SERPL-MCNC: 3.3 G/DL (ref 3.5–5.2)
ALBUMIN/GLOB SERPL: 1.1 G/DL
ALP SERPL-CCNC: 51 U/L (ref 39–117)
ALT SERPL W P-5'-P-CCNC: 24 U/L (ref 1–41)
ANION GAP SERPL CALCULATED.3IONS-SCNC: 8.9 MMOL/L (ref 5–15)
AST SERPL-CCNC: 28 U/L (ref 1–40)
BASOPHILS # BLD AUTO: 0 10*3/MM3 (ref 0–0.2)
BASOPHILS NFR BLD AUTO: 0 % (ref 0–1.5)
BILIRUB CONJ SERPL-MCNC: <0.2 MG/DL (ref 0–0.3)
BILIRUB SERPL-MCNC: 0.6 MG/DL (ref 0–1.2)
BUN SERPL-MCNC: 17 MG/DL (ref 8–23)
BUN/CREAT SERPL: 19.8 (ref 7–25)
CALCIUM SPEC-SCNC: 8.3 MG/DL (ref 8.6–10.5)
CHLORIDE SERPL-SCNC: 101 MMOL/L (ref 98–107)
CK SERPL-CCNC: 125 U/L (ref 20–200)
CO2 SERPL-SCNC: 26.1 MMOL/L (ref 22–29)
CREAT SERPL-MCNC: 0.86 MG/DL (ref 0.76–1.27)
D DIMER PPP FEU-MCNC: 0.42 MCGFEU/ML (ref 0–0.49)
DEPRECATED RDW RBC AUTO: 47.4 FL (ref 37–54)
EGFRCR SERPLBLD CKD-EPI 2021: 90.3 ML/MIN/1.73
EOSINOPHIL # BLD AUTO: 0 10*3/MM3 (ref 0–0.4)
EOSINOPHIL NFR BLD AUTO: 0 % (ref 0.3–6.2)
ERYTHROCYTE [DISTWIDTH] IN BLOOD BY AUTOMATED COUNT: 14.4 % (ref 12.3–15.4)
FERRITIN SERPL-MCNC: 471 NG/ML (ref 30–400)
GLOBULIN UR ELPH-MCNC: 2.9 GM/DL
GLUCOSE BLDC GLUCOMTR-MCNC: 123 MG/DL (ref 70–130)
GLUCOSE BLDC GLUCOMTR-MCNC: 162 MG/DL (ref 70–130)
GLUCOSE SERPL-MCNC: 135 MG/DL (ref 65–99)
HCT VFR BLD AUTO: 35.3 % (ref 37.5–51)
HGB BLD-MCNC: 12.1 G/DL (ref 13–17.7)
IMM GRANULOCYTES # BLD AUTO: 0.02 10*3/MM3 (ref 0–0.05)
IMM GRANULOCYTES NFR BLD AUTO: 0.4 % (ref 0–0.5)
LYMPHOCYTES # BLD AUTO: 0.58 10*3/MM3 (ref 0.7–3.1)
LYMPHOCYTES NFR BLD AUTO: 10.5 % (ref 19.6–45.3)
MAGNESIUM SERPL-MCNC: 2.2 MG/DL (ref 1.6–2.4)
MCH RBC QN AUTO: 31.2 PG (ref 26.6–33)
MCHC RBC AUTO-ENTMCNC: 34.3 G/DL (ref 31.5–35.7)
MCV RBC AUTO: 91 FL (ref 79–97)
MONOCYTES # BLD AUTO: 0.44 10*3/MM3 (ref 0.1–0.9)
MONOCYTES NFR BLD AUTO: 8 % (ref 5–12)
NEUTROPHILS NFR BLD AUTO: 4.46 10*3/MM3 (ref 1.7–7)
NEUTROPHILS NFR BLD AUTO: 81.1 % (ref 42.7–76)
NRBC BLD AUTO-RTO: 0 /100 WBC (ref 0–0.2)
PHOSPHATE SERPL-MCNC: 3.2 MG/DL (ref 2.5–4.5)
PLATELET # BLD AUTO: 151 10*3/MM3 (ref 140–450)
PMV BLD AUTO: 10.9 FL (ref 6–12)
POTASSIUM SERPL-SCNC: 4.3 MMOL/L (ref 3.5–5.2)
PROT SERPL-MCNC: 6.2 G/DL (ref 6–8.5)
RBC # BLD AUTO: 3.88 10*6/MM3 (ref 4.14–5.8)
SODIUM SERPL-SCNC: 136 MMOL/L (ref 136–145)
WBC NRBC COR # BLD: 5.5 10*3/MM3 (ref 3.4–10.8)

## 2022-11-27 PROCEDURE — 80053 COMPREHEN METABOLIC PANEL: CPT | Performed by: STUDENT IN AN ORGANIZED HEALTH CARE EDUCATION/TRAINING PROGRAM

## 2022-11-27 PROCEDURE — 85379 FIBRIN DEGRADATION QUANT: CPT | Performed by: STUDENT IN AN ORGANIZED HEALTH CARE EDUCATION/TRAINING PROGRAM

## 2022-11-27 PROCEDURE — 99222 1ST HOSP IP/OBS MODERATE 55: CPT | Performed by: STUDENT IN AN ORGANIZED HEALTH CARE EDUCATION/TRAINING PROGRAM

## 2022-11-27 PROCEDURE — 25010000002 REMDESIVIR 100 MG/20ML SOLUTION 1 EACH VIAL: Performed by: INTERNAL MEDICINE

## 2022-11-27 PROCEDURE — 82962 GLUCOSE BLOOD TEST: CPT

## 2022-11-27 PROCEDURE — 25010000002 ENOXAPARIN PER 10 MG: Performed by: INTERNAL MEDICINE

## 2022-11-27 PROCEDURE — 82728 ASSAY OF FERRITIN: CPT | Performed by: STUDENT IN AN ORGANIZED HEALTH CARE EDUCATION/TRAINING PROGRAM

## 2022-11-27 PROCEDURE — 84100 ASSAY OF PHOSPHORUS: CPT | Performed by: STUDENT IN AN ORGANIZED HEALTH CARE EDUCATION/TRAINING PROGRAM

## 2022-11-27 PROCEDURE — 82248 BILIRUBIN DIRECT: CPT | Performed by: INTERNAL MEDICINE

## 2022-11-27 PROCEDURE — 83735 ASSAY OF MAGNESIUM: CPT | Performed by: INTERNAL MEDICINE

## 2022-11-27 PROCEDURE — 85025 COMPLETE CBC W/AUTO DIFF WBC: CPT | Performed by: STUDENT IN AN ORGANIZED HEALTH CARE EDUCATION/TRAINING PROGRAM

## 2022-11-27 PROCEDURE — 82550 ASSAY OF CK (CPK): CPT | Performed by: STUDENT IN AN ORGANIZED HEALTH CARE EDUCATION/TRAINING PROGRAM

## 2022-11-27 PROCEDURE — 63710000001 INSULIN LISPRO (HUMAN) PER 5 UNITS: Performed by: INTERNAL MEDICINE

## 2022-11-27 RX ORDER — PROPRANOLOL HCL 60 MG
60 CAPSULE, EXTENDED RELEASE 24HR ORAL 2 TIMES DAILY
Status: DISCONTINUED | OUTPATIENT
Start: 2022-11-27 | End: 2022-11-27 | Stop reason: HOSPADM

## 2022-11-27 RX ORDER — BENZONATATE 200 MG/1
200 CAPSULE ORAL 3 TIMES DAILY PRN
Qty: 21 CAPSULE | Refills: 0 | Status: SHIPPED | OUTPATIENT
Start: 2022-11-27 | End: 2023-03-06

## 2022-11-27 RX ORDER — PROPRANOLOL HYDROCHLORIDE 20 MG/1
60 TABLET ORAL 2 TIMES DAILY
Start: 2022-11-27 | End: 2023-04-20 | Stop reason: HOSPADM

## 2022-11-27 RX ADMIN — LEVOTHYROXINE SODIUM 25 MCG: 0.03 TABLET ORAL at 08:04

## 2022-11-27 RX ADMIN — PANTOPRAZOLE SODIUM 40 MG: 40 TABLET, DELAYED RELEASE ORAL at 08:04

## 2022-11-27 RX ADMIN — DEXAMETHASONE 6 MG: 6 TABLET ORAL at 08:03

## 2022-11-27 RX ADMIN — LISINOPRIL 40 MG: 40 TABLET ORAL at 08:03

## 2022-11-27 RX ADMIN — DIVALPROEX SODIUM 500 MG: 500 TABLET, FILM COATED, EXTENDED RELEASE ORAL at 08:04

## 2022-11-27 RX ADMIN — REMDESIVIR 100 MG: 100 INJECTION, POWDER, LYOPHILIZED, FOR SOLUTION INTRAVENOUS at 16:08

## 2022-11-27 RX ADMIN — ASPIRIN 81 MG: 81 TABLET, CHEWABLE ORAL at 08:04

## 2022-11-27 RX ADMIN — AMLODIPINE BESYLATE 2.5 MG: 2.5 TABLET ORAL at 08:03

## 2022-11-27 RX ADMIN — TICAGRELOR 90 MG: 90 TABLET ORAL at 08:03

## 2022-11-27 RX ADMIN — ATORVASTATIN CALCIUM 20 MG: 20 TABLET, FILM COATED ORAL at 08:03

## 2022-11-27 RX ADMIN — PROPRANOLOL HYDROCHLORIDE 20 MG: 20 TABLET ORAL at 08:04

## 2022-11-27 RX ADMIN — INSULIN LISPRO 2 UNITS: 100 INJECTION, SOLUTION INTRAVENOUS; SUBCUTANEOUS at 12:53

## 2022-11-27 RX ADMIN — Medication 10 ML: at 08:06

## 2022-11-27 RX ADMIN — ENOXAPARIN SODIUM 40 MG: 100 INJECTION SUBCUTANEOUS at 08:02
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
## 2022-11-27 NOTE — PLAN OF CARE
Goal Outcome Evaluation:              Outcome Evaluation: pt being d/c after dose of remdesivir, Jazlyn from [facility] coming to pick patient up.

## 2022-11-28 ENCOUNTER — READMISSION MANAGEMENT (OUTPATIENT)
Dept: CALL CENTER | Facility: HOSPITAL | Age: 77
End: 2022-11-28

## 2022-11-28 NOTE — OUTREACH NOTE
COVID-19 Week 1 Survey    Flowsheet Row Responses   [facility] facility patient discharged from? [place]   Does the patient have one of the following disease processes/diagnoses(primary or secondary)? COVID-19   COVID-19 underlying condition? None   Call Number Call 1   Week 1 Call successful? No   Discharge diagnosis COVID-19 with transient hypoxia, dehydration, DM, CKD, Hx traumatic brain injury          ERA SCHWAB - Licensed Nurse

## 2022-11-28 NOTE — H&P
Patient Name: Hernando Lozada  : 1945  MRN: [id number]    Date of Admission: 2022  Date of Discharge:  2022  Primary Care Physician: Milo Carrasquillo DO      Chief Complaint:   Weakness - Generalized      Discharge Diagnoses     Active Hospital Problems    Diagnosis  POA   • **COVID-19 [U07.1]  Yes   • CAD (coronary artery disease) [I25.10]  Unknown   • Diabetes mellitus (HCC) [E11.9]  Unknown   • GIOVANNY (obstructive sleep apnea) [G47.33]  Unknown   • Disease of thyroid gland [E07.9]  Unknown   • CKD (chronic kidney disease) [N18.9]  Unknown   • Hypomagnesemia [E83.42]  Unknown   • Chronic brain injury [S06.9XAS]  Not Applicable   • Generalized weakness [R53.1]  Unknown   • Essential hypertension [I10]  Yes      Resolved Hospital Problems   No resolved problems to display.        Hospital Course     Patient is a 77-year-old male past medical history of sleep apnea on CPAP, CVA, hypothyroidism, CAD, DM type II, history of TBI, presented to the ED with generalized weakness.  Was diagnosed with COVID-19.     COVID 19 infection/weakness.   Chest x-ray with no acute findings.  Oxygen saturations were dropping below 94% on room air, so patient was initiated on remdesivir and steroids.  .  Completed 3-day course of remdesivir and steroids. Symptomatically improved.  Patient was on room air at the time of discharge.  Weakness improved as well, worked with the physical therapy, and patient was back at baseline functionally.             Nonsustained ventricular tachycardia.  Patient had an episode of 21 beats of NSVT.  Asymptomatic.  Cardiology was consulted, propranolol was adjusted back to home dose of 60 mg twice daily.  Discussed with patient to follow-up with outpatient with primary cardiologist in 1 to 2 months per recommendations.    At the time of discharge patient was told to take all medications as prescribed, keep all follow-up appointments, and call their doctor or return to the hospital with  any worsening or concerning symptoms.              Day of Discharge     Subjective:  Laying in bed.  No complaints.  Denies shortness breath, cough, chest pain, palpitations, nausea, vomiting.  Patient states that he feels like  million bucks this morning.  Wants to be discharged home.    Physical Exam:  Temp:  [95.9 °F (35.5 °C)-97.6 °F (36.4 °C)] 97.6 °F (36.4 °C)  Heart Rate:  [61-66] 66  Resp:  [18] 18  BP: (117-125)/(79-83) 125/79  Body mass index is 38.42 kg/m².  Physical Exam    General: Alert and oriented x3, no acute distress  HEENT: Normocephalic, atraumatic  CV: Regular rate and rhythm, + systolic murmur  Lungs: Clear to auscultation bilaterally, no crackles or wheezes  Abdomen: Soft, nontender, nondistended  Extremities: No significant peripheral edema , no cyanosis     Consultants     Consult Orders (all) (From admission, onward)     Start     Ordered    11/26/22 1618  Inpatient Cardiology Consult  Once        Specialty:  Cardiology  Provider:  Rio Miranda MD    11/26/22 1618    11/26/22 1601  Inpatient Cardiology Consult  Once,   Status:  Canceled        Specialty:  Cardiology  Provider:  Elfego Spivey MD    11/26/22 1601    11/25/22 1547  Inpatient Case Management  Consult  Once        Provider:  (Not yet assigned)    11/25/22 1547    11/25/22 1302  [facility] (on-call MD unless specified) Details  Once        Specialty:  Hospitalist  Provider:  Jeanne Chan MD    11/25/22 1301              Procedures     * Surgery not found *      Imaging Results (All)     Procedure Component Value Units Date/Time    XR Chest 1 View [[id number]] Collected: 11/25/22 1057     Updated: 11/25/22 1101    Narrative:      XR CHEST 1 VW-     HISTORY: Male who is 77 years-old,  weakness     TECHNIQUE: Frontal view of the chest     COMPARISON: 09/02/2021     FINDINGS: Patient is rotated to the left. The heart size is borderline.  Sternotomy wires are present. Pulmonary vasculature appears  unremarkable. No  focal pulmonary consolidation, pleural effusion, or  pneumothorax. No acute osseous process.       Impression:      No focal pulmonary consolidation. Borderline heart size.  Follow-up as clinical indications persist.     This report was finalized on 11/25/2022 10:57 AM by Dr. Chava Flores M.D.             Results for orders placed in visit on 02/18/19    Adult Transthoracic Echo Complete W/ Cont if Necessary Per Protocol    Interpretation Summary  · Left atrial cavity size is moderately dilated.  · Calculated EF = 66%  · There is no evidence of pericardial effusion.    Pertinent Labs     Results from last 7 days   Lab Units 11/27/22 0646 11/26/22 0600 11/25/22  1021   WBC 10*3/mm3 5.50 4.31 7.50   HEMOGLOBIN g/dL 12.1* 11.5* 12.6*   PLATELETS 10*3/mm3 151 137* 183     Results from last 7 days   Lab Units 11/27/22 0646 11/26/22 0600 11/25/22  1021   SODIUM mmol/L 136 136 138   POTASSIUM mmol/L 4.3 4.3 4.0   CHLORIDE mmol/L 101 101 100   CO2 mmol/L 26.1 28.7 26.0   BUN mg/dL 17 16 16   CREATININE mg/dL 0.86 0.93 1.32*   GLUCOSE mg/dL 135* 133* 140*   Estimated Creatinine Clearance: 94.1 mL/min (by C-G formula based on SCr of 0.86 mg/dL).  Results from last 7 days   Lab Units 11/27/22 0646 11/26/22 0600 11/25/22  1021   ALBUMIN g/dL 3.30* 3.50 4.00   BILIRUBIN mg/dL 0.6 0.8 0.9   ALK PHOS U/L 51 48 53   AST (SGOT) U/L 28 33 44*   ALT (SGPT) U/L 24 27 30     Results from last 7 days   Lab Units 11/27/22 0646 11/26/22 0600 11/25/22  1021   CALCIUM mg/dL 8.3* 8.2* 9.1   ALBUMIN g/dL 3.30* 3.50 4.00   MAGNESIUM mg/dL 2.2  --  1.4*   PHOSPHORUS mg/dL 3.2  --   --        Results from last 7 days   Lab Units 11/27/22 0646 11/25/22  1021   CK TOTAL U/L 125  --    TROPONIN T ng/mL  --  <0.010   D DIMER QUANT MCGFEU/mL 0.42  --            Invalid input(s): LDLCALC      Results from last 7 days   Lab Units 11/25/22  1121   COVID19  Detected*       Test Results Pending at Discharge       Discharge Details         Discharge Medications      Changes to Medications      Instructions Start Date   propranolol 20 MG tablet  Commonly known as: INDERAL  What changed:   · how much to take  · when to take this   60 mg, Oral, 2 Times Daily         Continue These Medications      Instructions Start Date   acetaminophen 650 MG 8 hr tablet  Commonly known as: TYLENOL   650 mg, Oral, Every 8 Hours PRN      amLODIPine 2.5 MG tablet  Commonly known as: NORVASC   2.5 mg, Oral, Daily      aspirin 81 MG chewable tablet   81 mg, Oral, Daily, TO BE HELD 10 DAYS PRIOR TO SURGERY      atorvastatin 20 MG tablet  Commonly known as: LIPITOR   20 mg, Oral, Daily      benzonatate 200 MG capsule  Commonly known as: TESSALON   200 mg, Oral, 3 Times Daily PRN      Brilinta 90 MG tablet tablet  Generic drug: ticagrelor   Take 1 tablet by mouth 2 (Two) Times a Day.      divalproex 500 MG 24 hr tablet  Commonly known as: DEPAKOTE ER   divalproex  mg tablet,extended release 24 hr      fluticasone 50 MCG/ACT nasal spray  Commonly known as: FLONASE   fluticasone propionate 50 mcg/actuation nasal spray,suspension      furosemide 20 MG tablet  Commonly known as: LASIX   20 mg, Oral, 2 Times Daily      glucosamine sulfate 500 MG capsule capsule   Oral, 3 Times Daily With Meals      KLOR-CON 10 PO   1 tablet, Oral, 2 Times Daily      levothyroxine 25 MCG tablet  Commonly known as: SYNTHROID, LEVOTHROID   25 mcg, Oral, Daily      lisinopril 40 MG tablet  Commonly known as: PRINIVIL,ZESTRIL   40 mg, Oral, Daily      metFORMIN 850 MG tablet  Commonly known as: GLUCOPHAGE   500 mg, Daily With Breakfast      omeprazole 40 MG capsule  Commonly known as: priLOSEC   40 mg, Oral, Every Evening      Ozempic (0.25 or 0.5 MG/DOSE) 2 MG/1.5ML solution pen-injector  Generic drug: Semaglutide(0.25 or 0.5MG/DOS)   No dose, route, or frequency recorded.             No Known Allergies    [place] [facility] [place] Diet:  Diet Order    Procedures   • Diet: Cardiac Diets, Diabetic Diets, Renal Diets; Healthy Heart (2-3 Na+); Consistent Carbohydrate; Low Sodium (2-3g), Low Potassium, Low Phosphorus; Texture: Regular Texture (IDDSI 7); Fluid Consistency: Thin (IDDSI 0)       Discharge Activity:       CODE STATUS:    Code Status and Medical Interventions:   Ordered at: 11/25/22 1650     Code Status (Patient has no pulse and is not breathing):    CPR (Attempt to Resuscitate)     Medical Interventions (Patient has pulse or is breathing):    Full Support       No future appointments.   Follow-up Information     Milo Carrasquillo, DO Follow up in 1 week(s).    Specialty: Physical Medicine and Rehabilitation  Contact information:  [address] [place] [address]  [phone]                         [unfilled]Time Spent on Discharge:  Greater than 30 minutes      Jacqui Chambers MD  [facility]  11/27/22  20:11 EST

## 2022-11-28 NOTE — OUTREACH NOTE
Prep Survey    Flowsheet Row Responses   [facility] facility patient discharged from? [place]   Is LACE score < 7 ? No   Emergency Room discharge w/ pulse ox? No   Eligibility Readm Mgmt   Discharge diagnosis COVID-19 with transient hypoxia, dehydration, DM, CKD, Hx traumatic brain injury   Does the patient have one of the following disease processes/diagnoses(primary or secondary)? COVID-19   Does the patient have Home health ordered? No   Is there a DME ordered? No   Prep survey completed? Yes          PRESLEY SCHWAB - Registered Nurse

## 2022-11-28 NOTE — DISCHARGE SUMMARY
Patient Name: Hernando Lozada  : 1945  MRN: [id number]    Date of Admission: 2022  Date of Discharge:  2022  Primary Care Physician: Milo Carrasquillo DO      Chief Complaint:   Weakness - Generalized      Discharge Diagnoses     Active Hospital Problems    Diagnosis  POA   • **COVID-19 [U07.1]  Yes   • CAD (coronary artery disease) [I25.10]  Unknown   • Diabetes mellitus (HCC) [E11.9]  Unknown   • GIOVANNY (obstructive sleep apnea) [G47.33]  Unknown   • Disease of thyroid gland [E07.9]  Unknown   • CKD (chronic kidney disease) [N18.9]  Unknown   • Hypomagnesemia [E83.42]  Unknown   • Chronic brain injury [S06.9XAS]  Not Applicable   • Generalized weakness [R53.1]  Unknown   • Essential hypertension [I10]  Yes      Resolved Hospital Problems   No resolved problems to display.        Hospital Course     Patient is a 77-year-old male past medical history of sleep apnea on CPAP, CVA, hypothyroidism, CAD, DM type II, history of TBI, presented to the ED with generalized weakness.  Was diagnosed with COVID-19.     COVID 19 infection/weakness.   Chest x-ray with no acute findings.  Oxygen saturations were dropping below 94% on room air, so patient was initiated on remdesivir and steroids.  .  Completed 3-day course of remdesivir and steroids. Symptomatically improved.  Patient was on room air at the time of discharge.  Weakness improved as well, worked with the physical therapy, and patient was back at baseline functionally.               Nonsustained ventricular tachycardia.  Patient had an episode of 21 beats of NSVT.  Asymptomatic.  Cardiology was consulted, propranolol was adjusted back to home dose of 60 mg twice daily.  Discussed with patient to follow-up with outpatient with primary cardiologist in 1 to 2 months per recommendations.     At the time of discharge patient was told to take all medications as prescribed, keep all follow-up appointments, and call their doctor or return to the hospital with  any worsening or concerning symptoms.      Day of Discharge     Subjective:  Laying in bed.  No complaints.  Denies shortness breath, cough, chest pain, palpitations, nausea, vomiting.  Patient states that he feels like  million bucks this morning.  Wants to be discharged home.    Physical Exam:  Temp:  [95.9 °F (35.5 °C)-97.6 °F (36.4 °C)] 97.6 °F (36.4 °C)  Heart Rate:  [61-66] 66  Resp:  [18] 18  BP: (117-125)/(79-83) 125/79  Body mass index is 38.42 kg/m².  Physical Exam  General: Alert and oriented x3, no acute distress  HEENT: Normocephalic, atraumatic  CV: Regular rate and rhythm, + systolic murmur  Lungs: Clear to auscultation bilaterally, no crackles or wheezes  Abdomen: Soft, nontender, nondistended  Extremities: No significant peripheral edema , no cyanosis   Consultants     Consult Orders (all) (From admission, onward)     Start     Ordered    11/26/22 1618  Inpatient Cardiology Consult  Once        Specialty:  Cardiology  Provider:  Rio Miranda MD    11/26/22 1618    11/26/22 1601  Inpatient Cardiology Consult  Once,   Status:  Canceled        Specialty:  Cardiology  Provider:  Elfego Spivey MD    11/26/22 1601    11/25/22 1547  Inpatient Case Management  Consult  Once        Provider:  (Not yet assigned)    11/25/22 1547    11/25/22 1302  [facility] (on-call MD unless specified) Details  Once        Specialty:  Hospitalist  Provider:  Jeanne Chan MD    11/25/22 1301              Procedures     * Surgery not found *      Imaging Results (All)     Procedure Component Value Units Date/Time    XR Chest 1 View [[id number]] Collected: 11/25/22 1057     Updated: 11/25/22 1101    Narrative:      XR CHEST 1 VW-     HISTORY: Male who is 77 years-old,  weakness     TECHNIQUE: Frontal view of the chest     COMPARISON: 09/02/2021     FINDINGS: Patient is rotated to the left. The heart size is borderline.  Sternotomy wires are present. Pulmonary vasculature appears  unremarkable. No focal  pulmonary consolidation, pleural effusion, or  pneumothorax. No acute osseous process.       Impression:      No focal pulmonary consolidation. Borderline heart size.  Follow-up as clinical indications persist.     This report was finalized on 11/25/2022 10:57 AM by Dr. Chava Flores M.D.             Results for orders placed in visit on 02/18/19    Adult Transthoracic Echo Complete W/ Cont if Necessary Per Protocol    Interpretation Summary  · Left atrial cavity size is moderately dilated.  · Calculated EF = 66%  · There is no evidence of pericardial effusion.    Pertinent Labs     Results from last 7 days   Lab Units 11/27/22 0646 11/26/22 0600 11/25/22  1021   WBC 10*3/mm3 5.50 4.31 7.50   HEMOGLOBIN g/dL 12.1* 11.5* 12.6*   PLATELETS 10*3/mm3 151 137* 183     Results from last 7 days   Lab Units 11/27/22 0646 11/26/22 0600 11/25/22  1021   SODIUM mmol/L 136 136 138   POTASSIUM mmol/L 4.3 4.3 4.0   CHLORIDE mmol/L 101 101 100   CO2 mmol/L 26.1 28.7 26.0   BUN mg/dL 17 16 16   CREATININE mg/dL 0.86 0.93 1.32*   GLUCOSE mg/dL 135* 133* 140*   Estimated Creatinine Clearance: 94.1 mL/min (by C-G formula based on SCr of 0.86 mg/dL).  Results from last 7 days   Lab Units 11/27/22 0646 11/26/22 0600 11/25/22  1021   ALBUMIN g/dL 3.30* 3.50 4.00   BILIRUBIN mg/dL 0.6 0.8 0.9   ALK PHOS U/L 51 48 53   AST (SGOT) U/L 28 33 44*   ALT (SGPT) U/L 24 27 30     Results from last 7 days   Lab Units 11/27/22 0646 11/26/22 0600 11/25/22  1021   CALCIUM mg/dL 8.3* 8.2* 9.1   ALBUMIN g/dL 3.30* 3.50 4.00   MAGNESIUM mg/dL 2.2  --  1.4*   PHOSPHORUS mg/dL 3.2  --   --        Results from last 7 days   Lab Units 11/27/22 0646 11/25/22  1021   CK TOTAL U/L 125  --    TROPONIN T ng/mL  --  <0.010   D DIMER QUANT MCGFEU/mL 0.42  --            Invalid input(s): LDLCALC      Results from last 7 days   Lab Units 11/25/22  1121   COVID19  Detected*       Test Results Pending at Discharge       Discharge Details         Discharge Medications      Changes to Medications      Instructions Start Date   propranolol 20 MG tablet  Commonly known as: INDERAL  What changed:   · how much to take  · when to take this   60 mg, Oral, 2 Times Daily         Continue These Medications      Instructions Start Date   acetaminophen 650 MG 8 hr tablet  Commonly known as: TYLENOL   650 mg, Oral, Every 8 Hours PRN      amLODIPine 2.5 MG tablet  Commonly known as: NORVASC   2.5 mg, Oral, Daily      aspirin 81 MG chewable tablet   81 mg, Oral, Daily, TO BE HELD 10 DAYS PRIOR TO SURGERY      atorvastatin 20 MG tablet  Commonly known as: LIPITOR   20 mg, Oral, Daily      benzonatate 200 MG capsule  Commonly known as: TESSALON   200 mg, Oral, 3 Times Daily PRN      Brilinta 90 MG tablet tablet  Generic drug: ticagrelor   Take 1 tablet by mouth 2 (Two) Times a Day.      divalproex 500 MG 24 hr tablet  Commonly known as: DEPAKOTE ER   divalproex  mg tablet,extended release 24 hr      fluticasone 50 MCG/ACT nasal spray  Commonly known as: FLONASE   fluticasone propionate 50 mcg/actuation nasal spray,suspension      furosemide 20 MG tablet  Commonly known as: LASIX   20 mg, Oral, 2 Times Daily      glucosamine sulfate 500 MG capsule capsule   Oral, 3 Times Daily With Meals      KLOR-CON 10 PO   1 tablet, Oral, 2 Times Daily      levothyroxine 25 MCG tablet  Commonly known as: SYNTHROID, LEVOTHROID   25 mcg, Oral, Daily      lisinopril 40 MG tablet  Commonly known as: PRINIVIL,ZESTRIL   40 mg, Oral, Daily      metFORMIN 850 MG tablet  Commonly known as: GLUCOPHAGE   500 mg, Daily With Breakfast      omeprazole 40 MG capsule  Commonly known as: priLOSEC   40 mg, Oral, Every Evening      Ozempic (0.25 or 0.5 MG/DOSE) 2 MG/1.5ML solution pen-injector  Generic drug: Semaglutide(0.25 or 0.5MG/DOS)   No dose, route, or frequency recorded.             No Known Allergies    Discharge Disposition:  Home-Health Care [facility] [place]      Discharge Diet:  Diet Order    Procedures   • Diet: Cardiac Diets, Diabetic Diets, Renal Diets; Healthy Heart (2-3 Na+); Consistent Carbohydrate; Low Sodium (2-3g), Low Potassium, Low Phosphorus; Texture: Regular Texture (IDDSI 7); Fluid Consistency: Thin (IDDSI 0)       Discharge Activity:       CODE STATUS:    Code Status and Medical Interventions:   Ordered at: 11/25/22 1650     Code Status (Patient has no pulse and is not breathing):    CPR (Attempt to Resuscitate)     Medical Interventions (Patient has pulse or is breathing):    Full Support       No future appointments.   Follow-up Information     Milo Carrasquillo, DO Follow up in 1 week(s).    Specialty: Physical Medicine and Rehabilitation  Contact information:  [address] [place] [id number] [phone]                         Time Spent on Discharge:  Greater than 30 minutes      Jacqui Chambers MD  [facility]  11/27/22  20:22 EST

## 2022-11-28 NOTE — CASE MANAGEMENT/SOCIAL WORK
Case Management Discharge Note      Final Note: Home no additonal dc orders noted for CCP. Yue LOGAN/[facility]P         Selected Continued Care - Discharged on 11/27/2022 Admission date: 11/25/2022 - Discharge disposition: Home-Health Care Svc    Destination    No services have been selected for the patient.              Durable Medical Equipment    No services have been selected for the patient.              Dialysis/Infusion    No services have been selected for the patient.              Home Medical Care    No services have been selected for the patient.              Therapy    No services have been selected for the patient.              Community Resources    No services have been selected for the patient.              Community & DME    No services have been selected for the patient.                  Transportation Services  Private: Car    Final Discharge Disposition Code: 01 - home or self-care

## 2022-11-30 ENCOUNTER — READMISSION MANAGEMENT (OUTPATIENT)
Dept: CALL CENTER | Facility: HOSPITAL | Age: 77
End: 2022-11-30

## 2022-11-30 NOTE — OUTREACH NOTE
COVID-19 Week 1 Survey    Flowsheet Row Responses   [facility] patient discharged from? [place]   Does the patient have one of the following disease processes/diagnoses(primary or secondary)? COVID-19   COVID-19 underlying condition? None   Call Number Call 2   Week 1 Call successful? No   Revoke Phone number issues  [brother does not have medical info on pt,  # provided by brother is not in service]   Discharge diagnosis COVID-19 with transient hypoxia, dehydration, DM, CKD, Hx traumatic brain injury          MICHEAL SCHWAB - Registered Nurse

## 2022-12-02 ENCOUNTER — APPOINTMENT (OUTPATIENT)
Dept: GENERAL RADIOLOGY | Facility: HOSPITAL | Age: 77
End: 2022-12-02

## 2022-12-02 ENCOUNTER — HOSPITAL ENCOUNTER (EMERGENCY)
Facility: HOSPITAL | Age: 77
Discharge: HOME | End: 2022-12-02
Attending: EMERGENCY MEDICINE | Admitting: EMERGENCY MEDICINE

## 2022-12-02 VITALS
DIASTOLIC BLOOD PRESSURE: 67 MMHG | RESPIRATION RATE: 18 BRPM | TEMPERATURE: 97 F | HEIGHT: 69 IN | SYSTOLIC BLOOD PRESSURE: 98 MMHG | BODY MASS INDEX: 38.51 KG/M2 | HEART RATE: 69 BPM | WEIGHT: 260 LBS | OXYGEN SATURATION: 98 %

## 2022-12-02 DIAGNOSIS — G47.00 INSOMNIA, UNSPECIFIED TYPE: Primary | ICD-10-CM

## 2022-12-02 LAB
ALBUMIN SERPL-MCNC: 3.9 G/DL (ref 3.5–5.2)
ALBUMIN/GLOB SERPL: 2.1 G/DL
ALP SERPL-CCNC: 48 U/L (ref 39–117)
ALT SERPL W P-5'-P-CCNC: 24 U/L (ref 1–41)
AMMONIA BLD-SCNC: 32 UMOL/L (ref 16–60)
ANION GAP SERPL CALCULATED.3IONS-SCNC: 10 MMOL/L (ref 5–15)
AST SERPL-CCNC: 26 U/L (ref 1–40)
BASOPHILS # BLD AUTO: 0.01 10*3/MM3 (ref 0–0.2)
BASOPHILS NFR BLD AUTO: 0.2 % (ref 0–1.5)
BILIRUB SERPL-MCNC: 0.8 MG/DL (ref 0–1.2)
BUN SERPL-MCNC: 16 MG/DL (ref 8–23)
BUN/CREAT SERPL: 15.1 (ref 7–25)
CALCIUM SPEC-SCNC: 8.4 MG/DL (ref 8.6–10.5)
CHLORIDE SERPL-SCNC: 102 MMOL/L (ref 98–107)
CO2 SERPL-SCNC: 28 MMOL/L (ref 22–29)
CREAT SERPL-MCNC: 1.06 MG/DL (ref 0.76–1.27)
DEPRECATED RDW RBC AUTO: 47.7 FL (ref 37–54)
EGFRCR SERPLBLD CKD-EPI 2021: 73.2 ML/MIN/1.73
EOSINOPHIL # BLD AUTO: 0.04 10*3/MM3 (ref 0–0.4)
EOSINOPHIL NFR BLD AUTO: 0.7 % (ref 0.3–6.2)
ERYTHROCYTE [DISTWIDTH] IN BLOOD BY AUTOMATED COUNT: 14.4 % (ref 12.3–15.4)
GLOBULIN UR ELPH-MCNC: 1.9 GM/DL
GLUCOSE BLDC GLUCOMTR-MCNC: 97 MG/DL (ref 70–130)
GLUCOSE SERPL-MCNC: 119 MG/DL (ref 65–99)
HCT VFR BLD AUTO: 36.1 % (ref 37.5–51)
HGB BLD-MCNC: 12.6 G/DL (ref 13–17.7)
IMM GRANULOCYTES # BLD AUTO: 0.04 10*3/MM3 (ref 0–0.05)
IMM GRANULOCYTES NFR BLD AUTO: 0.7 % (ref 0–0.5)
LYMPHOCYTES # BLD AUTO: 1.9 10*3/MM3 (ref 0.7–3.1)
LYMPHOCYTES NFR BLD AUTO: 34.1 % (ref 19.6–45.3)
MCH RBC QN AUTO: 31.3 PG (ref 26.6–33)
MCHC RBC AUTO-ENTMCNC: 34.9 G/DL (ref 31.5–35.7)
MCV RBC AUTO: 89.6 FL (ref 79–97)
MONOCYTES # BLD AUTO: 0.53 10*3/MM3 (ref 0.1–0.9)
MONOCYTES NFR BLD AUTO: 9.5 % (ref 5–12)
NEUTROPHILS NFR BLD AUTO: 3.06 10*3/MM3 (ref 1.7–7)
NEUTROPHILS NFR BLD AUTO: 54.8 % (ref 42.7–76)
NRBC BLD AUTO-RTO: 0 /100 WBC (ref 0–0.2)
PLATELET # BLD AUTO: 183 10*3/MM3 (ref 140–450)
PMV BLD AUTO: 10.3 FL (ref 6–12)
POTASSIUM SERPL-SCNC: 3.6 MMOL/L (ref 3.5–5.2)
PROT SERPL-MCNC: 5.8 G/DL (ref 6–8.5)
RBC # BLD AUTO: 4.03 10*6/MM3 (ref 4.14–5.8)
SODIUM SERPL-SCNC: 140 MMOL/L (ref 136–145)
VALPROATE SERPL-MCNC: 72 MCG/ML (ref 50–125)
WBC NRBC COR # BLD: 5.58 10*3/MM3 (ref 3.4–10.8)

## 2022-12-02 PROCEDURE — 80053 COMPREHEN METABOLIC PANEL: CPT | Performed by: EMERGENCY MEDICINE

## 2022-12-02 PROCEDURE — 82962 GLUCOSE BLOOD TEST: CPT

## 2022-12-02 PROCEDURE — 80164 ASSAY DIPROPYLACETIC ACD TOT: CPT | Performed by: EMERGENCY MEDICINE

## 2022-12-02 PROCEDURE — 96360 HYDRATION IV INFUSION INIT: CPT

## 2022-12-02 PROCEDURE — 99284 EMERGENCY DEPT VISIT MOD MDM: CPT

## 2022-12-02 PROCEDURE — 71045 X-RAY EXAM CHEST 1 VIEW: CPT

## 2022-12-02 PROCEDURE — 82140 ASSAY OF AMMONIA: CPT | Performed by: EMERGENCY MEDICINE

## 2022-12-02 PROCEDURE — 36415 COLL VENOUS BLD VENIPUNCTURE: CPT

## 2022-12-02 PROCEDURE — 85025 COMPLETE CBC W/AUTO DIFF WBC: CPT | Performed by: EMERGENCY MEDICINE

## 2022-12-02 RX ORDER — CHOLECALCIFEROL (VITAMIN D3) 125 MCG
5 CAPSULE ORAL NIGHTLY PRN
Qty: 30 TABLET | Refills: 0 | Status: SHIPPED | OUTPATIENT
Start: 2022-12-02 | End: 2023-01-01

## 2022-12-02 RX ORDER — SODIUM CHLORIDE 0.9 % (FLUSH) 0.9 %
10 SYRINGE (ML) INJECTION AS NEEDED
Status: DISCONTINUED | OUTPATIENT
Start: 2022-12-02 | End: 2022-12-02 | Stop reason: HOSPADM

## 2022-12-02 RX ADMIN — SODIUM CHLORIDE 1000 ML: 9 INJECTION, SOLUTION INTRAVENOUS at 10:36

## 2022-12-02 NOTE — ED NOTES
"Pt recently admitted for COVID.  Pt states \"I think I was released too soon from the hospital\". When asked why, pt stated he has not been able to sleep and feels \"fidgety\" or restless.  Pt denies SOB or chest pain.     Facility stated that when they woke pt he did not want to do his normal routine like taking his blood sugar and blood pressure. Facility states this is not his normal. When pt was asked why he did not want to do his normal activity he stated \"I just did not feel like it\". Pt did not want to get out of bed however stated he can walk, talk and do what he wants he just doesn't feel like it.   "

## 2022-12-02 NOTE — ED PROVIDER NOTES
EMERGENCY DEPARTMENT ENCOUNTER    Room Number:  26/26  Date seen:  12/2/2022  PCP: Milo Carrasquillo DO  Historian: Patient      HPI:  Chief Complaint: Difficulty sleeping  A complete HPI/ROS/PMH/PSH/SH/FH are unobtainable due to: Nothing  Context: Hernando Lozada is a 77 y.o. male who presents to the ED c/o difficulty sleeping.  He was sent from the group home where he resides due to not acting himself this morning.  Patient reports that he has a history of brain injury and he lives at home with 2 other individuals from [facility].  Patient reports that he has recently had COVID-19.  He feels that he is mostly gotten over that.  He denies shortness of breath.  He denies nausea, vomiting, diarrhea.  He denies any pain.  He reports that he has not been able to sleep well recently.  He denies any hallucinations.  He has had some fatigue.            PAST MEDICAL HISTORY  Active Ambulatory Problems     Diagnosis Date Noted   • DJD (degenerative joint disease) of knee 12/14/2017   • Essential hypertension 02/18/2019   • SOB (shortness of breath) 02/18/2019   • Leg swelling 02/18/2019   • Chest pain with high risk of acute coronary syndrome 04/29/2019   • Hyperlipidemia LDL goal <100 08/23/2019   • COVID-19 11/25/2022   • Diabetes mellitus (HCC)    • GIOVANNY (obstructive sleep apnea)    • Disease of thyroid gland    • CKD (chronic kidney disease)    • Hypomagnesemia    • Chronic brain injury    • Generalized weakness    • CAD (coronary artery disease)      Resolved Ambulatory Problems     Diagnosis Date Noted   • No Resolved Ambulatory Problems     Past Medical History:   Diagnosis Date   • Arthritis    • Bilateral leg edema    • GERD (gastroesophageal reflux disease)    • Heart murmur    • History of head injury    • [place] (hard of hearing)    • Hyperlipidemia    • Hypertension    • Irregular heart beat    • Osteoarthritis    • Past heart attack    • SOB (shortness of breath) on exertion    • Stroke (HCC)    • Vasovagal  episode          PAST SURGICAL HISTORY  Past Surgical History:   Procedure Laterality Date   • CARDIAC CATHETERIZATION N/A 4/30/2019    Procedure: Coronary angiography with grafts;  Surgeon: Rio Miranda MD;  Location: [facility] CATH [facility] LOC[facility];  Service: Cardiology   • CARDIAC CATHETERIZATION N/A 4/30/2019    Procedure: Left Heart Cath;  Surgeon: Rio Miranda MD;  Location:  [facility] CATH INVASIVE LOCATION;  Service: Cardiology   • CARDIAC CATHETERIZATION N/A 4/30/2019    Procedure: Left ventriculography;  Surgeon: Rio Miranda MD;  Location: [facility] LOC[facility];  Service: Cardiology   • CARDIAC CATHETERIZATION  4/30/2019    Procedure: Saphenous Vein Graft;  Surgeon: Rio Miranda MD;  Location: [facility] C[facility] LOC[facility];  Service: Cardiology   • CARDIAC CATHETERIZATION N/A 4/30/2019    Procedure: Stent GLENDY coronary;  Surgeon: Rio Miranda MD;  Location: [facility] CATH [facility]IVE LOC[facility];  Service: Cardiology   • CARPAL TUNNEL RELEASE Bilateral    • CATARACT EXTRACTION     • CORONARY ARTERY BYPASS GRAFT  2002   • FINGER SURGERY      MISSING LEFT POINTER FINGER TIP   • KNEE ARTHROPLASTY Right 02/15/2017   • UT RT/LT HEART CATHETERS N/A 4/30/2019    Procedure: Percutaneous Coronary Intervention;  Surgeon: Rio Miranda MD;  Location: [facility] C[facility]IVE LOC[facility];  Service: Cardiology   • UT TOTAL KNEE ARTHROPLASTY Left 12/14/2017    Procedure: LT TOTAL KNEE ARTHROPLASTY;  Surgeon: Jatin Lancaster MD;  Location: [facility] MAIN OR;  Service: Orthopedics         FAMILY HISTORY  Family History   Problem Relation Age of Onset   • Parkinsonism Sister    • Diabetes Brother    • Malig Hyperthermia Neg Hx          SOCIAL HISTORY  Social History     Socioeconomic History   • Marital status: Single   Tobacco Use   • Smoking status: Never   • Smokeless tobacco: Never   Vaping Use   • Vaping Use: Never used   Substance and Sexual Activity   • Alcohol use: No   •  Drug use: No   • Sexual activity: Defer         ALLERGIES  Patient has no known allergies.        REVIEW OF SYSTEMS  Review of Systems   Review of all 14 systems is negative other than stated in the HPI above.      PHYSICAL EXAM  ED Triage Vitals   Temp Heart Rate Resp BP SpO2   12/02/22 0904 12/02/22 0904 12/02/22 0904 12/02/22 0904 12/02/22 0904   97 °F (36.1 °C) 72 18 109/69 99 %      Temp src Heart Rate Source Patient Position BP Location FiO2 (%)   -- 12/02/22 0917 12/02/22 0917 12/02/22 0917 --    Monitor Sitting Right arm        Physical Exam      GENERAL: Awake and alert, well-appearing, no acute distress  HENT: nares patent  EYES: no scleral icterus, EOMI  CV: regular rhythm, normal rate  RESPIRATORY: normal effort, lungs clear to auscultation bilaterally  ABDOMEN: soft, nondistended, nontender throughout  MUSCULOSKELETAL: no deformity  NEURO: alert, moves all extremities, follows commands, cranial nerves II through XII grossly intact, no apparent neurologic deficits  PSYCH:  calm, cooperative  SKIN: warm, dry, normal to inspection, no rash    Vital signs and nursing notes reviewed.          LAB RESULTS  Recent Results (from the past 24 hour(s))   Comprehensive Metabolic Panel    Collection Time: 12/02/22  9:21 AM    Specimen: Blood   Result Value Ref Range    Glucose 119 (H) 65 - 99 mg/dL    BUN 16 8 - 23 mg/dL    Creatinine 1.06 0.76 - 1.27 mg/dL    Sodium 140 136 - 145 mmol/L    Potassium 3.6 3.5 - 5.2 mmol/L    Chloride 102 98 - 107 mmol/L    CO2 28.0 22.0 - 29.0 mmol/L    Calcium 8.4 (L) 8.6 - 10.5 mg/dL    Total Protein 5.8 (L) 6.0 - 8.5 g/dL    Albumin 3.90 3.50 - 5.20 g/dL    ALT (SGPT) 24 1 - 41 U/L    AST (SGOT) 26 1 - 40 U/L    Alkaline Phosphatase 48 39 - 117 U/L    Total Bilirubin 0.8 0.0 - 1.2 mg/dL    Globulin 1.9 gm/dL    A/G Ratio 2.1 g/dL    BUN/Creatinine Ratio 15.1 7.0 - 25.0    Anion Gap 10.0 5.0 - 15.0 mmol/L    eGFR 73.2 >60.0 mL/min/1.73   Valproic Acid Level, Total    Collection  Time: 12/02/22  9:21 AM    Specimen: Blood   Result Value Ref Range    Valproic Acid 72.0 50.0 - 125.0 mcg/mL   Ammonia    Collection Time: 12/02/22  9:21 AM    Specimen: Blood   Result Value Ref Range    Ammonia 32 16 - 60 umol/L   CBC Auto Differential    Collection Time: 12/02/22  9:21 AM    Specimen: Blood   Result Value Ref Range    WBC 5.58 3.40 - 10.80 10*3/mm3    RBC 4.03 (L) 4.14 - 5.80 10*6/mm3    Hemoglobin 12.6 (L) 13.0 - 17.7 g/dL    Hematocrit 36.1 (L) 37.5 - 51.0 %    MCV 89.6 79.0 - 97.0 fL    MCH 31.3 26.6 - 33.0 pg    MCHC 34.9 31.5 - 35.7 g/dL    RDW 14.4 12.3 - 15.4 %    RDW-SD 47.7 37.0 - 54.0 fl    MPV 10.3 6.0 - 12.0 fL    Platelets 183 140 - 450 10*3/mm3    Neutrophil % 54.8 42.7 - 76.0 %    Lymphocyte % 34.1 19.6 - 45.3 %    Monocyte % 9.5 5.0 - 12.0 %    Eosinophil % 0.7 0.3 - 6.2 %    Basophil % 0.2 0.0 - 1.5 %    Immature Grans % 0.7 (H) 0.0 - 0.5 %    Neutrophils, Absolute 3.06 1.70 - 7.00 10*3/mm3    Lymphocytes, Absolute 1.90 0.70 - 3.10 10*3/mm3    Monocytes, Absolute 0.53 0.10 - 0.90 10*3/mm3    Eosinophils, Absolute 0.04 0.00 - 0.40 10*3/mm3    Basophils, Absolute 0.01 0.00 - 0.20 10*3/mm3    Immature Grans, Absolute 0.04 0.00 - 0.05 10*3/mm3    nRBC 0.0 0.0 - 0.2 /100 WBC   POC Glucose Once    Collection Time: 12/02/22  9:24 AM    Specimen: Blood   Result Value Ref Range    Glucose 97 70 - 130 mg/dL       Ordered the above labs and reviewed the results.        RADIOLOGY  XR Chest 1 View    Result Date: 12/2/2022  XR CHEST 1 VW-  12/02/2022  HISTORY: Weakness. Covid.  The heart size is within normal limits. Lungs appear free of acute infiltrates. Sternotomy wires are seen. Bones and soft tissues are unremarkable.      1. No acute process.  This report was finalized on 12/2/2022 9:44 AM by Dr. Mario Davis M.D.        Ordered the above noted radiological studies. Reviewed by me in PACS.            PROCEDURES  Procedures            MEDICATIONS GIVEN IN ER  Medications   sodium  chloride 0.9 % flush 10 mL (has no administration in time range)   sodium chloride 0.9 % bolus 1,000 mL (1,000 mL Intravenous New Bag 12/2/22 1036)                   MEDICAL DECISION MAKING, PROGRESS, and CONSULTS    All labs have been independently reviewed by me.  All radiology studies have been reviewed by me and discussed with radiologist dictating the report.   EKG's independently viewed and interpreted by me.  Discussion below represents my analysis of pertinent findings related to patient's condition, differential diagnosis, treatment plan and final disposition.      Additional sources:  - Discussed/ obtained information from independent historians: N/A    - External (non-ED) record review:  reviewed recent discharge summary from 11/27/2022 where patient was admitted for COVID-19.  He completed a 3-day course of remdesivir and steroids.    - Chronic or social conditions impacting care: Patient has a history of brain injury      Orders placed during this visit:  Orders Placed This Encounter   Procedures   • XR Chest 1 View   • Comprehensive Metabolic Panel   • Valproic Acid Level, Total   • Ammonia   • CBC Auto Differential   • Cardiac Monitoring   • Pulse Oximetry, Continuous   • Monitor Blood Pressure   • Orthostatic Vitals   • POC Glucose Once   • Insert Peripheral IV   • CBC & Differential         Additional orders considered but not ordered:  N/A        Differential diagnosis:    My differential diagnosis for altered mental status includes but is not limited to:  Hypoglycemia, hyperglycemia, DKA, overdose, ethanol intoxication, thiamine deficiency, niacin deficiency, hypothymia, hyperviscosity, Reinaldo’s disease, hyponatremia, hypernatremia, liver failure, kidney failure, hyper or hypothyroid, no insufficiency, hypoxia, hypercarbia, carbon monoxide poisoning, postanoxic encephalopathy, ischemic stroke, intracranial bleed, subarachnoid hemorrhage, brain tumor, closed head injury, epidural hematoma, epidural  hematoma, seizure activity, postictal state, syncopal episode, disseminated encephalomyelitis, central pontine myelinolysis, post cardiac arrest, bacterial meningitis, viral meningitis, fungal meningitis, encephalitis, brain abscess, subdural empyema, hysteria, catatonic state, malingering, hypertensive encephalopathy, vasculitis, TTP, DIC      Independent interpretation of labs, radiology studies, and discussions with consultants:  ED Course as of 12/02/22 1213   Fri Dec 02, 2022   0940 Hemoglobin(!): 12.6 [JR]   0940 SpO2: 99 % [JR]   0940 Device (Oxygen Therapy): room air [JR]   1028 Creatinine: 1.06 [JR]   1028 Valproic Acid: 72.0 [JR]   1028 Ammonia: 32 [JR]   1028 Chest x-ray independently interpreted in PACS and demonstrates no infiltrate, no pleural effusion. [JR]   1031 Medical record review: I reviewed echo from 3/6/2019 that showed EF 66% with normal diastolic function. [JR]   1140 Patient had some low blood pressure recordings here.  I ordered IV fluids.  I then had V. tach perform orthostatic vital signs.  He was not orthostatic nor was he symptomatic upon standing.  He may have some mild dehydration.  His hemoglobin is normal.  He does not have any evidence of sepsis or bacterial infectious process at this time.  His chief complaint is just difficulty sleeping.  I recommended a trial of melatonin 5 mg p.o. nightly to help with his sleep.  He is appropriate discharge home at this time with return precautions. [JR]      ED Course User Index  [JR] Indio Sierra MD     Patient's insomnia may be secondary to recent steroid use.  He will be given a short course of melatonin.  Labs are otherwise reassuring.  No indication for admission at this time.         I wore an N95 mask, face shield, and gloves during this patient encounter.  Patient also wearing a surgical mask.  Hand hygeine performed before and after seeing the patient.    DIAGNOSIS  Final diagnoses:   Insomnia, unspecified type          DISPOSITION  DISCHARGE    Patient discharged in stable condition.    Reviewed implications of results, diagnosis, meds, responsibility to follow up, warning signs and symptoms of possible worsening, potential complications and reasons to return to ER.    Patient/Family voiced understanding of above instructions.    Discussed plan for discharge, as there is no emergent indication for admission. Patient referred to primary care provider for BP management due to today's BP. Pt/family is agreeable and understands need for follow up and repeat testing.  Pt is aware that discharge does not mean that nothing is wrong but it indicates no emergency is present that requires admission and they must continue care with follow-up as given below or physician of their choice.     FOLLOW-UP  Milo Carrasquillo DO  [address]  [address]  [place] [address]  [phone]      As needed         Medication List      New Prescriptions    Melatonin 5 MG chewable tablet  Chew 1 tablet At Night As Needed (insomnia) for up to 30 days.           Where to Get Your Medications      These medications were sent to [facility] - [place] [id number]    Hours: 7:00 AM-6:00 PM Mon-Fri, 8:00 AM-4:30 PM Sat-Sun (Closed 12-12:30PM) Phone: [phone]   · Melatonin 5 MG chewable tablet                   Latest Documented Vital Signs:  As of 12:13 EST  BP- 113/62 HR- 72 Temp- 97 °F (36.1 °C) O2 sat- 98%        --    Please note that portions of this were completed with a voice recognition program.       Note Disclaimer: At [facility], we believe that sharing information builds trust and better relationships. You are receiving this note because you are receiving care at [facility] or recently visited. It is possible you will see health information before a provider has talked with you about it. This kind of information can be easy to misunderstand. To help you fully understand what it  means for your health, we urge you to discuss this note with your provider.           Indio Sierra MD  12/02/22 3332

## 2022-12-02 NOTE — ED TRIAGE NOTES
"Pt comes to ER for generally \"not feeling well\" and not being able sleep. Staff at the facility state pt was just taken off of quarantine for COVID and hasn't been acting himself.     Pt and RN wearing mask upon triage.     "

## 2022-12-02 NOTE — ED NOTES
Pt's fluids were not flowing in by gravity, RN flushed line and applied a pressure bag to fluids. NS fluids are now flowing.

## 2022-12-20 ENCOUNTER — HOSPITAL ENCOUNTER (EMERGENCY)
Facility: HOSPITAL | Age: 77
Discharge: HOME | End: 2022-12-20
Attending: EMERGENCY MEDICINE | Admitting: EMERGENCY MEDICINE

## 2022-12-20 VITALS
HEIGHT: 69 IN | WEIGHT: 260 LBS | SYSTOLIC BLOOD PRESSURE: 96 MMHG | DIASTOLIC BLOOD PRESSURE: 72 MMHG | RESPIRATION RATE: 18 BRPM | BODY MASS INDEX: 38.51 KG/M2 | OXYGEN SATURATION: 97 % | HEART RATE: 74 BPM | TEMPERATURE: 97.5 F

## 2022-12-20 DIAGNOSIS — S06.9XAS CHRONIC BRAIN INJURY: ICD-10-CM

## 2022-12-20 DIAGNOSIS — G47.00 INSOMNIA, UNSPECIFIED TYPE: Primary | ICD-10-CM

## 2022-12-20 DIAGNOSIS — N18.9 CHRONIC KIDNEY DISEASE, UNSPECIFIED CKD STAGE: ICD-10-CM

## 2022-12-20 PROCEDURE — 99283 EMERGENCY DEPT VISIT LOW MDM: CPT

## 2022-12-20 RX ORDER — AMITRIPTYLINE HYDROCHLORIDE 10 MG/1
10 TABLET, FILM COATED ORAL NIGHTLY
Qty: 30 TABLET | Refills: 0 | Status: SHIPPED | OUTPATIENT
Start: 2022-12-20

## 2022-12-20 NOTE — ED TRIAGE NOTES
From home with difficulty sleeping x 3 nights.  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    Pt wearing mask on arrival.

## 2022-12-20 NOTE — ED PROVIDER NOTES
EMERGENCY DEPARTMENT ENCOUNTER    Room Number:  11/11  Date seen:  12/20/2022  PCP: Milo Carrasquillo DO  Historian: Patient at bedside      HPI:  Chief Complaint: Insomnia  A complete HPI/ROS/PMH/PSH/SH/FH are unobtainable due to: History of traumatic brain injury  Context: Hernando Lozada is a 77 y.o. male who presents to the ED c/o insomnia.  Patient has had insomnia ongoing for several weeks.  He was seen in the ED and prescribed melatonin which really has not helped well.  He states he is not sleeping excessively in the daytime but has trouble sleeping at night.  Denies chest pain or trouble breathing.  Denies recent fever.  He is compliant with his medications.      MEDICAL RECORD REVIEW  I reviewed prior medical records note patient was here on 12/2 with insomnia.  He had fairly extensive work-up including x-ray and labs which were fairly reassuring.  Patient was prescribed melatonin to help with his sleep.  Patient was hospitalized here in late November with COVID infection.    PAST MEDICAL HISTORY  Active Ambulatory Problems     Diagnosis Date Noted   • DJD (degenerative joint disease) of knee 12/14/2017   • Essential hypertension 02/18/2019   • SOB (shortness of breath) 02/18/2019   • Leg swelling 02/18/2019   • Chest pain with high risk of acute coronary syndrome 04/29/2019   • Hyperlipidemia LDL goal <100 08/23/2019   • COVID-19 11/25/2022   • Diabetes mellitus (HCC)    • GIOVANNY (obstructive sleep apnea)    • Disease of thyroid gland    • CKD (chronic kidney disease)    • Hypomagnesemia    • Chronic brain injury    • Generalized weakness    • CAD (coronary artery disease)      Resolved Ambulatory Problems     Diagnosis Date Noted   • No Resolved Ambulatory Problems     Past Medical History:   Diagnosis Date   • Arthritis    • Bilateral leg edema    • GERD (gastroesophageal reflux disease)    • Heart murmur    • History of head injury    • [place] (hard of hearing)    • Hyperlipidemia    • Hypertension    •  Irregular heart beat    • Osteoarthritis    • Past heart attack    • SOB (shortness of breath) on exertion    • Stroke (HCC)    • Vasovagal episode          PAST SURGICAL HISTORY  Past Surgical History:   Procedure Laterality Date   • CARDIAC CATHETERIZATION N/A 4/30/2019    Procedure: Coronary angiography with grafts;  Surgeon: Rio Miranda MD;  [facility];  Service: Cardiology   • CARDIAC CATHETERIZATION N/A 4/30/2019    Procedure: Left Heart Cath;  Surgeon: Rio Miranda MD;  [facility];  Service: Cardiology   • CARDIAC CATHETERIZATION N/A 4/30/2019    Procedure: Left ventriculography;  Surgeon: Rio Miranda MD;  Location: [facility] CATH INVASIVE LOCATION;  Service: Cardiology   • CARDIAC CATHETERIZATION  4/30/2019    Procedure: Saphenous Vein Graft;  Surgeon: Rio Miranda MD;  Location: [facility] CATH INVASIVE LOCATION;  Service: Cardiology   • CARDIAC CATHETERIZATION N/A 4/30/2019    Procedure: Stent GLENDY coronary;  Surgeon: Rio Miranda MD;  [facility];  Service: Cardiology   • CARPAL TUNNEL RELEASE Bilateral    • CATARACT EXTRACTION     • CORONARY ARTERY BYPASS GRAFT  2002   • FINGER SURGERY      MISSING LEFT POINTER FINGER TIP   • KNEE ARTHROPLASTY Right 02/15/2017   • MI RT/LT HEART CATHETERS N/A 4/30/2019    Procedure: Percutaneous Coronary Intervention;  Surgeon: Rio Miranda MD;  Location: [facility] CATH INVASIVE LOCATION;  Service: Cardiology   • MI TOTAL KNEE ARTHROPLASTY Left 12/14/2017    Procedure: LT TOTAL KNEE ARTHROPLASTY;  Surgeon: Jatin Lancaster MD;  Location: [facility];  Service: Orthopedics         FAMILY HISTORY  Family History   Problem Relation Age of Onset   • Parkinsonism Sister    • Diabetes Brother    • Malig Hyperthermia Neg Hx          SOCIAL HISTORY  Social History     Socioeconomic History   • Marital status: Single   Tobacco Use   • Smoking  status: Never   • Smokeless tobacco: Never   Vaping Use   • Vaping Use: Never used   Substance and Sexual Activity   • Alcohol use: No   • Drug use: No   • Sexual activity: Defer         ALLERGIES  Patient has no known allergies.        REVIEW OF SYSTEMS  Review of Systems   Constitutional: Negative for fever.   Respiratory: Negative for shortness of breath.    Cardiovascular: Negative for chest pain.   Psychiatric/Behavioral: Positive for sleep disturbance.   All other systems reviewed and are negative.       PHYSICAL EXAM  ED Triage Vitals   Temp Heart Rate Resp BP SpO2   12/20/22 1217 12/20/22 1217 12/20/22 1217 12/20/22 1217 12/20/22 1217   98.5 °F (36.9 °C) 82 18 157/86 99 %      Temp src Heart Rate Source Patient Position BP Location FiO2 (%)   -- 12/20/22 1316 12/20/22 1316 12/20/22 1316 --    Monitor Lying Right arm        Physical Exam    GENERAL: Alert male in no obvious distress.  Triage vitals reviewed and are unremarkable  HENT: nares patent  EYES: no scleral icterus  CV: regular rhythm, regular rate  RESPIRATORY: normal effort, clear to auscultation bilaterally-O2 sats upper 90s on room air  ABDOMEN: soft  MUSCULOSKELETAL: no deformity  NEURO: Strength sensation and coordination are grossly intact.  Speech and mentation are unremarkable  SKIN: warm, dry      Vital signs and nursing notes reviewed.          LAB RESULTS  No results found for this or any previous visit (from the past 24 hour(s)).    Ordered the above labs and reviewed the results.        RADIOLOGY  No Radiology Exams Resulted Within Past 24 Hours    Ordered the above noted radiological studies. Reviewed by me in PACS.            PROCEDURES  Procedures          MEDICATIONS GIVEN IN ER  Medications - No data to display                MEDICAL DECISION MAKING, PROGRESS, and CONSULTS    All labs have been independently reviewed by me.  All radiology studies have been reviewed by me and I have also reviewed the radiology report.   EKG's  independently viewed and interpreted by me.  Discussion below represents my analysis of pertinent findings related to patient's condition, differential diagnosis, treatment plan and final disposition.      Additional sources:    - External (non-ED) record review: Please see documented above    - Chronic or social conditions impacting care: Patient with chronic traumatic brain injury    - Shared decision making: Discussed evaluation and treatment plan with patient bedside      Orders placed during this visit:  No orders of the defined types were placed in this encounter.          Differential diagnosis:    Primary insomnia  Medication reaction  Anxiety reaction      Independent interpretation of labs, radiology studies, and discussions with consultants:  ED Course as of 12/20/22 1514   Tue Dec 20, 2022   1512 [facility]-I spent at least 5 minutes reviewing old records including recent hospitalization and ED visit for insomnia.  I reviewed patient's current medications.  Patient is well-appearing but having trouble sleeping.  I think at this point that he is failed melatonin and would start him on some amitriptyline nightly.  I see no indication for medical work-up as I do not see signs of acute infection, dehydration or electrolyte disturbance. [DB]      ED Course User Index  [DB] Juan Miguel Carroll MD             I used full protective equipment while examining this patient.  This includes face mask, gloves and protective eyewear.  I washed my hands before entering the room and immediately upon leaving the room    DIAGNOSIS  Final diagnoses:   Insomnia, unspecified type   Chronic kidney disease, unspecified CKD stage   Chronic brain injury         DISPOSITION  DISCHARGE    Patient discharged in stable condition.    Reviewed implications of results, diagnosis, meds, responsibility to follow up, warning signs and symptoms of possible worsening, potential complications and reasons to return to ER, including worsening symptoms  worsening.    Patient/Family voiced understanding of above instructions.    Discussed plan for discharge, as there is no emergent indication for admission. Patient referred to primary care provider for BP management due to today's BP. Pt/family is agreeable and understands need for follow up and repeat testing.  Pt is aware that discharge does not mean that nothing is wrong but it indicates no emergency is present that requires admission and they must continue care with follow-up as given below or physician of their choice.     FOLLOW-UP  No follow-up provider specified.       Medication List      New Prescriptions    amitriptyline 10 MG tablet  Commonly known as: ELAVIL  Take 1 tablet by mouth Every Night.           Where to Get Your Medications      These medications were sent to [facility] [place] [address]    Hours: 7:00 AM-6:00 PM Mon-Fri, 8:00 AM-4:30 PM Sat-Sun (Closed 12-12:30PM) Phone: [phone]   · amitriptyline 10 MG tablet                   Latest Documented Vital Signs:  As of 15:14 EST  BP- 90/58 HR- 77 Temp- 98.5 °F (36.9 °C) O2 sat- 95%              --    Please note that portions of this were completed with a voice recognition program.       Note Disclaimer: At [facility], we believe that sharing information builds trust and better relationships. You are receiving this note because you are receiving care at [facility] or recently visited. It is possible you will see health information before a provider has talked with you about it. This kind of information can be easy to misunderstand. To help you fully understand what it means for your health, we urge you to discuss this note with your provider.           Juan Miguel Carroll MD  12/20/22 5526

## 2023-02-16 ENCOUNTER — APPOINTMENT (OUTPATIENT)
Dept: GENERAL RADIOLOGY | Facility: HOSPITAL | Age: 78
End: 2023-02-16
Payer: MEDICARE

## 2023-02-16 ENCOUNTER — APPOINTMENT (OUTPATIENT)
Dept: CT IMAGING | Facility: HOSPITAL | Age: 78
End: 2023-02-16
Payer: MEDICARE

## 2023-02-16 ENCOUNTER — HOSPITAL ENCOUNTER (EMERGENCY)
Facility: HOSPITAL | Age: 78
Discharge: HOME | End: 2023-02-16
Attending: EMERGENCY MEDICINE | Admitting: EMERGENCY MEDICINE
Payer: MEDICARE

## 2023-02-16 VITALS
DIASTOLIC BLOOD PRESSURE: 81 MMHG | BODY MASS INDEX: 35.7 KG/M2 | RESPIRATION RATE: 18 BRPM | OXYGEN SATURATION: 100 % | HEIGHT: 69 IN | HEART RATE: 84 BPM | WEIGHT: 241 LBS | TEMPERATURE: 97.5 F | SYSTOLIC BLOOD PRESSURE: 119 MMHG

## 2023-02-16 DIAGNOSIS — S39.012A LUMBAR STRAIN, INITIAL ENCOUNTER: ICD-10-CM

## 2023-02-16 DIAGNOSIS — S16.1XXA CERVICAL STRAIN, ACUTE, INITIAL ENCOUNTER: ICD-10-CM

## 2023-02-16 DIAGNOSIS — S80.00XA CONTUSION OF KNEE, UNSPECIFIED LATERALITY, INITIAL ENCOUNTER: ICD-10-CM

## 2023-02-16 DIAGNOSIS — S09.90XA INJURY OF HEAD, INITIAL ENCOUNTER: Primary | ICD-10-CM

## 2023-02-16 DIAGNOSIS — S50.02XA CONTUSION OF LEFT ELBOW, INITIAL ENCOUNTER: ICD-10-CM

## 2023-02-16 PROCEDURE — 73070 X-RAY EXAM OF ELBOW: CPT

## 2023-02-16 PROCEDURE — 72110 X-RAY EXAM L-2 SPINE 4/>VWS: CPT

## 2023-02-16 PROCEDURE — 70450 CT HEAD/BRAIN W/O DYE: CPT

## 2023-02-16 PROCEDURE — 72125 CT NECK SPINE W/O DYE: CPT

## 2023-02-16 PROCEDURE — 73560 X-RAY EXAM OF KNEE 1 OR 2: CPT

## 2023-02-16 PROCEDURE — 99283 EMERGENCY DEPT VISIT LOW MDM: CPT

## 2023-02-16 RX ORDER — ACETAMINOPHEN 500 MG
1000 TABLET ORAL ONCE
Status: COMPLETED | OUTPATIENT
Start: 2023-02-16 | End: 2023-02-16

## 2023-02-16 RX ADMIN — ACETAMINOPHEN 1000 MG: 500 TABLET, FILM COATED ORAL at 14:26

## 2023-02-16 NOTE — ED PROVIDER NOTES
EMERGENCY DEPARTMENT ENCOUNTER    Room Number:  T03/03  Date seen:  2/16/2023  PCP: Milo Carrasquillo DO  Discussed/ obtained information from independent historians: patient, caregiver at bedside      HPI:  Chief Complaint: fall, multiple injuries  A complete HPI/ROS/PMH/PSH/SH/FH are unobtainable due to: incomplete historian  Context: Hernando Lozada is a 77 y.o. male who presents to the ED c/o multiple injuries related to a fall that occurred yesterday.  He is a resident at [facility].  He states he slipped in socked feet on the floor in his room and fell striking the back of his head on a cabinet and onto his back.  Has some mild low back pain as well as some mild to moderate headache.  Denies any loss of consciousness nausea vomiting or vision changes.  Does report some mild neck pain and left elbow pain.  He states it was very difficult to get up and was crawling around on his knees which made his knees hurt as well.  He is not anticoagulated.  Denies any chest pain or shortness of breath.      External (non-ED) record review: Patient was admitted to the hospital in November 2022 for generalized weakness, found to have COVID-19 and mild hypoxia, completed 3-day course of remdesivir and steroids and was discharged.      PAST MEDICAL HISTORY  Active Ambulatory Problems     Diagnosis Date Noted   • DJD (degenerative joint disease) of knee 12/14/2017   • Essential hypertension 02/18/2019   • SOB (shortness of breath) 02/18/2019   • Leg swelling 02/18/2019   • Chest pain with high risk of acute coronary syndrome 04/29/2019   • Hyperlipidemia LDL goal <100 08/23/2019   • COVID-19 11/25/2022   • Diabetes mellitus (HCC)    • GIOVANNY (obstructive sleep apnea)    • Disease of thyroid gland    • CKD (chronic kidney disease)    • Hypomagnesemia    • Chronic brain injury    • Generalized weakness    • CAD (coronary artery disease)      Resolved Ambulatory Problems     Diagnosis Date Noted   • No Resolved Ambulatory  Problems     Past Medical History:   Diagnosis Date   • Arthritis    • Bilateral leg edema    • GERD (gastroesophageal reflux disease)    • Heart murmur    • History of head injury    • [place] (hard of hearing)    • Hyperlipidemia    • Hypertension    • Irregular heart beat    • Osteoarthritis    • Past heart attack    • SOB (shortness of breath) on exertion    • Stroke (HCC)    • Vasovagal episode          PAST SURGICAL HISTORY  Past Surgical History:   Procedure Laterality Date   • CARDIAC CATHETERIZATION N/A 4/30/2019    Procedure: Coronary angiography with grafts;  Surgeon: Rio Miranda MD;  [facility];  Service: Cardiology   • CARDIAC CATHETERIZATION N/A 4/30/2019    Procedure: Left Heart Cath;  Surgeon: Rio Miranda MD;  [facility];  Service: Cardiology   • CARDIAC CATHETERIZATION N/A 4/30/2019    Procedure: Left ventriculography;  Surgeon: Rio Miranda MD;  Location: [facility] CATH INVASIVE LOCATION;  Service: Cardiology   • CARDIAC CATHETERIZATION  4/30/2019    Procedure: Saphenous Vein Graft;  Surgeon: Rio Miranda MD;  Location: [facility] CATH INVASIVE LOCATION;  Service: Cardiology   • CARDIAC CATHETERIZATION N/A 4/30/2019    Procedure: Stent GLENDY coronary;  Surgeon: Rio Miranda MD;  Location: [facility] CATH INVASIVE LOCATION;  Service: Cardiology   • CARPAL TUNNEL RELEASE Bilateral    • CATARACT EXTRACTION     • CORONARY ARTERY BYPASS GRAFT  2002   • FINGER SURGERY      MISSING LEFT POINTER FINGER TIP   • KNEE ARTHROPLASTY Right 02/15/2017   • IA RT/LT HEART CATHETERS N/A 4/30/2019    Procedure: Percutaneous Coronary Intervention;  Surgeon: Rio Miranda MD;  Location: [facility] CATH INVASIVE LOCATION;  Service: Cardiology   • IA TOTAL KNEE ARTHROPLASTY Left 12/14/2017    Procedure: LT TOTAL KNEE ARTHROPLASTY;  Surgeon: Jatin Lancaster MD;  Location: [facility] MAIN OR;  Service: Orthopedics         FAMILY  HISTORY  Family History   Problem Relation Age of Onset   • Parkinsonism Sister    • Diabetes Brother    • Malig Hyperthermia Neg Hx          SOCIAL HISTORY  Social History     Socioeconomic History   • Marital status: Single   Tobacco Use   • Smoking status: Never   • Smokeless tobacco: Never   Vaping Use   • Vaping Use: Never used   Substance and Sexual Activity   • Alcohol use: No   • Drug use: No   • Sexual activity: Defer         ALLERGIES  Patient has no known allergies.        REVIEW OF SYSTEMS  Review of Systems         PHYSICAL EXAM  ED Triage Vitals   Temp Heart Rate Resp BP SpO2   02/16/23 1128 02/16/23 1128 02/16/23 1128 02/16/23 1200 02/16/23 1128   97.5 °F (36.4 °C) 87 17 119/81 100 %      Temp src Heart Rate Source Patient Position BP Location FiO2 (%)   02/16/23 1128 02/16/23 1200 02/16/23 1200 02/16/23 1200 --   Tympanic Monitor Sitting Right arm        Physical Exam      GENERAL: no acute distress  HENT: normocephalic, atraumatic, no bajwa sign raccoon eyes septal hematoma otorrhea or rhinorrhea  EYES: no scleral icterus, PERRL, extract movements intact  CV: regular rhythm, normal rate  RESPIRATORY: normal effort CTA B no chest wall tenderness  ABDOMEN: nondistended, soft, nontender  MUSCULOSKELETAL: no deformity.  Mild tenderness about left elbow and the anterior knees bilaterally.  No signs of trauma.  He has full range of motion of these areas.  He is ambulatory.  No midline C, T, L-spine tenderness.  He has some tenderness to the paraspinal musculature of the neck in all lumbosacral area.  Full range of motion of the neck.  NEURO: alert, moves all extremities, follows commands  PSYCH:  calm, cooperative  SKIN: warm, dry    Vital signs and nursing notes reviewed.     RADIOLOGY  XR ELBOW 2 VIEW LEFT, XR Knee 1 or 2 View Bilateral, XR Spine Lumbar Complete 4+VW    Result Date: 2/16/2023  XR ELBOW 2 VW LEFT-, XR SPINE LUMBAR COMPLETE 4+VW-, XR KNEE 1 OR 2 VW BILATERAL-  INDICATIONS: Trauma   TECHNIQUE: 2 views of the left elbow, 6 views of the lumbar spine, 5 views including the bilateral knees  COMPARISON: Correlated with CT of the abdomen and pelvis from 02/18/2021  FINDINGS:  Left elbow: No acute fracture, erosion, or dislocation is identified. A rounded metallic density projecting at the soft tissues posterior to the distal end of the humerus suggests form body. No elbow effusion is noted. Arterial calcification is present.  Lumbar spine: Alignment is in range of normal. Vertebral body heights appear stable. No acute fracture is identified. Endplate spurring and disc space narrowing are seen at L1/2, and lower lumbar facet arthropathy is evident. Arterial calcifications are noted.  Bilateral knees: Bilateral knee arthroplasty hardware appears intact. No acute fracture, erosion, or dislocation is identified. Trace knee effusions. Arterial calcifications are present.  Follow-up/further evaluation can be obtained as indications persist.         As described.  This report was finalized on 2/16/2023 2:30 PM by Dr. Chava Flores M.D.      CT Head Without Contrast, CT Cervical Spine Without Contrast    Result Date: 2/16/2023  CT HEAD AND CERVICAL SPINE WITHOUT CONTRAST  CLINICAL HISTORY: Fall with head injury. Neck pain.  TECHNIQUE: CT scan of the head was obtained with 3 mm axial soft tissue and 2 mm bone algorithm images. No intravenous contrast was administered. Sagittal and coronal reconstructions were obtained.  COMPARISON: CT head dated 02/09/2021.  FINDINGS:   There is no evidence for a calvarial fracture. There is no evidence for an acute extra-axial hemorrhage.  Diffuse parenchymal atrophic changes are noted. There is no abnormality within the gray-white matter differentiation. The basal ganglia and thalami are unremarkable. Atherosclerotic calcifications are incidentally appreciated within the intracranial vasculature.  Incidental mucosal thickening and mucous retention cysts are noted  within the left maxillary sinus. A mucus retention cyst is seen within the right maxillary sinus. Minor mucosal thickening is seen within the ethmoid air cells.       No evidence for acute traumatic intracranial pathology.    TECHNIQUE: CT scan of the cervical spine was obtained with 1 mm axial bone algorithm and 2 mm axial soft tissue algorithm images. Sagittal and coronal reconstructed images were obtained.  FINDINGS:  There is no evidence for acute fracture or bony malalignment involving the cervical spine.  A disc osteophyte complex at C6-7 results in a mild degree of canal narrowing. There is mild to moderate right C6-7 foraminal narrowing secondary to uncovertebral joint hypertrophy. Additionally, mild bilateral C3-4 foraminal narrowing is noted.  IMPRESSION:  No evidence for acute fracture or bony malalignment involving the cervical spine.   Radiation dose reduction techniques were utilized, including automated exposure control and exposure modulation based on body size.  This report was finalized on 2/16/2023 2:35 PM by Dr. Humberto Ramirez M.D.        Ordered the above noted radiological studies. Reviewed by me in PACS.            PROCEDURES  Procedures              MEDICATIONS GIVEN IN ER  Medications   acetaminophen (TYLENOL) tablet 1,000 mg (1,000 mg Oral Given 2/16/23 1426)                   MEDICAL DECISION MAKING, PROGRESS, and CONSULTS    All labs have been independently reviewed by me.  All radiology studies have been reviewed by me and I have also reviewed the radiology report.   EKG's independently viewed and interpreted by me.  Discussion below represents my analysis of pertinent findings related to patient's condition, differential diagnosis, treatment plan and final disposition.      Additional sources:    - Chronic or social conditions impacting care: TBI, incomplete historian        Orders placed during this visit:  Orders Placed This Encounter   Procedures   • CT Head Without Contrast   • CT  Cervical Spine Without Contrast   • XR ELBOW 2 VIEW LEFT   • XR Knee 1 or 2 View Bilateral   • XR Spine Lumbar Complete 4+VW             Differential diagnosis:  Head contusion, skull fracture, intracranial hemorrhage, cervical strain, cervical fracture, lumbar strain, lumbar fracture, elbow contusion, elbow fracture, knee contusion, knee fracture first IV fluid      Independent interpretation of labs, radiology studies, and discussions with consultants:       I reassessed the patient, he is well-appearing.  Tylenol given for pain here in the ER has improved his symptoms.  He is ambulatory has full range of motion no significant signs of trauma.  All imaging is unremarkable though I did discuss all of his incidental findings with him.  At this time he can be discharged for symptomatic treatment, Tylenol as needed, follow-up with PCP for persistent symptoms.  Patient and caregiver are agreeable with the plan.      Patient was wearing a face mask when I entered the room and they continued to wear a mask throughout their stay in the ED.  I wore PPE, including  gloves, face mask with shield or face mask with goggles whenever I was in the room with patient.     DIAGNOSIS  Final diagnoses:   Injury of head, initial encounter   Cervical strain, acute, initial encounter   Contusion of left elbow, initial encounter   Contusion of knee, unspecified laterality, initial encounter   Lumbar strain, initial encounter           Follow Up:  Milo Carrasquillo,   [address] [address]  [phone]    In 1 week  As needed      RX:     Medication List      No changes were made to your prescriptions during this visit.         Latest Documented Vital Signs:  As of 17:30 EST  BP- 119/81 HR- 84 Temp- 97.5 °F (36.4 °C) (Tympanic) O2 sat- 100%              --    Please note that portions of this were completed with a voice recognition program.       Note Disclaimer: At [facility], we believe that sharing  information builds trust and better relationships. You are receiving this note because you are receiving care at [facility] or recently visited. It is possible you will see health information before a provider has talked with you about it. This kind of information can be easy to misunderstand. To help you fully understand what it means for your health, we urge you to discuss this note with your provider.           Komal Allen PA  02/16/23 1731

## 2023-02-16 NOTE — ED NOTES
Pt c/o left shoulder, left elbow, and left knee pain. Pt also c/o headache. Pt states he slipped and fell last night. Pt states he hit his head, but denies loc or blood thinners.    This RN wore mask, gloves, eyewear and all other appropriate PPE while performing patient care.

## 2023-02-16 NOTE — ED TRIAGE NOTES
C/O LT shoulder, LT leg and HA S/P fall yesterday. Slipped on wood floor     Mask placed on patient in triage. Triage staff wore appropriate PPE during interaction with patient.

## 2023-02-16 NOTE — ED PROVIDER NOTES
I supervised care provided by the midlevel provider.   We have discussed this patient's history, physical exam, and treatment plan.  I have reviewed the note and personally saw and examined the patient and agree with the plan of care.   I have seen and evaluated this gentleman.  He is a patient in neuro restorative unit.  He had a fall yesterday.  Injured his left knee and hit the back of his head.  When I am seeing him he is acting at his baseline.  He mainly only complains of some pain in his left knee.  He is status post total knee replacement to the left.  He is very hard to get an accurate history from he is very talkative and rambles and does not stay on point when he asked, direct question.    GENERAL: Elderly male that is chronically ill.  Not distressed  HENT: nares patent  Head/neck/ face are symmetric without gross deformity or swelling  EYES: no scleral icterus  CV: regular rhythm, regular rate with intact distal pulses  RESPIRATORY: normal effort and no respiratory distress  ABDOMEN: soft and nontender  MUSCULOSKELETAL: no deformity patient is able to actively and passively move all extremities.  There is no gross bony deformity.  He has no pain with external/internal rotation of hips bilaterally.  Intact distal pulses  NEURO: alert moves all extremities, follows commands  SKIN: warm, dry    Vital signs and nursing notes reviewed.    Plan I reviewed the CT scan and x-rays report.  I also talked with Bridgette Holley the physician assistant caring for the patient.  No acute fracture or abnormalities on the CT scan.  Patient will be discharged back to neuro restorative.             Eliel Terry MD  02/16/23 1927

## 2023-03-06 ENCOUNTER — HOSPITAL ENCOUNTER (INPATIENT)
Facility: HOSPITAL | Age: 78
LOS: 7 days | Discharge: NURSING, REHAB OR LONG-TERM CARE | DRG: 246 | End: 2023-03-14
Attending: EMERGENCY MEDICINE | Admitting: INTERNAL MEDICINE
Payer: MEDICARE

## 2023-03-06 ENCOUNTER — APPOINTMENT (OUTPATIENT)
Dept: GENERAL RADIOLOGY | Facility: HOSPITAL | Age: 78
DRG: 246 | End: 2023-03-06
Payer: MEDICARE

## 2023-03-06 DIAGNOSIS — R60.0 PEDAL EDEMA: Primary | ICD-10-CM

## 2023-03-06 DIAGNOSIS — Z86.79 HISTORY OF CORONARY ARTERY DISEASE: ICD-10-CM

## 2023-03-06 DIAGNOSIS — R53.1 WEAKNESS: ICD-10-CM

## 2023-03-06 DIAGNOSIS — I35.0 NONRHEUMATIC AORTIC VALVE STENOSIS: ICD-10-CM

## 2023-03-06 DIAGNOSIS — I10 ESSENTIAL HYPERTENSION: ICD-10-CM

## 2023-03-06 DIAGNOSIS — I25.10 CORONARY ARTERY DISEASE INVOLVING NATIVE HEART WITHOUT ANGINA PECTORIS, UNSPECIFIED VESSEL OR LESION TYPE: ICD-10-CM

## 2023-03-06 DIAGNOSIS — E11.69 TYPE 2 DIABETES MELLITUS WITH OTHER SPECIFIED COMPLICATION, UNSPECIFIED WHETHER LONG TERM INSULIN USE: ICD-10-CM

## 2023-03-06 DIAGNOSIS — R06.02 SOB (SHORTNESS OF BREATH): ICD-10-CM

## 2023-03-06 PROBLEM — R25.1 TREMOR: Status: ACTIVE | Noted: 2023-03-06

## 2023-03-06 PROBLEM — L03.119 LOWER EXTREMITY CELLULITIS: Status: ACTIVE | Noted: 2023-03-06

## 2023-03-06 PROBLEM — R79.89 ELEVATED TROPONIN: Status: ACTIVE | Noted: 2023-03-06

## 2023-03-06 PROBLEM — B35.6 TINEA CRURIS: Status: ACTIVE | Noted: 2023-03-06

## 2023-03-06 PROBLEM — R77.8 ELEVATED TROPONIN: Status: ACTIVE | Noted: 2023-03-06

## 2023-03-06 LAB
ALBUMIN SERPL-MCNC: 4.2 G/DL (ref 3.5–5.2)
ALBUMIN/GLOB SERPL: 1.4 G/DL
ALP SERPL-CCNC: 74 U/L (ref 39–117)
ALT SERPL W P-5'-P-CCNC: 18 U/L (ref 1–41)
ANION GAP SERPL CALCULATED.3IONS-SCNC: 11.7 MMOL/L (ref 5–15)
AST SERPL-CCNC: 26 U/L (ref 1–40)
BASOPHILS # BLD AUTO: 0.02 10*3/MM3 (ref 0–0.2)
BASOPHILS # BLD AUTO: 0.03 10*3/MM3 (ref 0–0.2)
BASOPHILS NFR BLD AUTO: 0.3 % (ref 0–1.5)
BASOPHILS NFR BLD AUTO: 0.6 % (ref 0–1.5)
BILIRUB SERPL-MCNC: 0.5 MG/DL (ref 0–1.2)
BILIRUB UR QL STRIP: NEGATIVE
BUN SERPL-MCNC: 11 MG/DL (ref 8–23)
BUN/CREAT SERPL: 10.4 (ref 7–25)
CALCIUM SPEC-SCNC: 9.4 MG/DL (ref 8.6–10.5)
CHLORIDE SERPL-SCNC: 95 MMOL/L (ref 98–107)
CLARITY UR: CLEAR
CO2 SERPL-SCNC: 30.3 MMOL/L (ref 22–29)
COLOR UR: YELLOW
CREAT SERPL-MCNC: 1.06 MG/DL (ref 0.76–1.27)
D-LACTATE SERPL-SCNC: 2 MMOL/L (ref 0.5–2)
DEPRECATED RDW RBC AUTO: 46.2 FL (ref 37–54)
DEPRECATED RDW RBC AUTO: 48.6 FL (ref 37–54)
EGFRCR SERPLBLD CKD-EPI 2021: 72.7 ML/MIN/1.73
EOSINOPHIL # BLD AUTO: 0.04 10*3/MM3 (ref 0–0.4)
EOSINOPHIL # BLD AUTO: 0.06 10*3/MM3 (ref 0–0.4)
EOSINOPHIL NFR BLD AUTO: 0.7 % (ref 0.3–6.2)
EOSINOPHIL NFR BLD AUTO: 1.1 % (ref 0.3–6.2)
ERYTHROCYTE [DISTWIDTH] IN BLOOD BY AUTOMATED COUNT: 13.3 % (ref 12.3–15.4)
ERYTHROCYTE [DISTWIDTH] IN BLOOD BY AUTOMATED COUNT: 13.7 % (ref 12.3–15.4)
GEN 5 2HR TROPONIN T REFLEX: 27 NG/L
GLOBULIN UR ELPH-MCNC: 2.9 GM/DL
GLUCOSE BLDC GLUCOMTR-MCNC: 132 MG/DL (ref 70–130)
GLUCOSE BLDC GLUCOMTR-MCNC: 162 MG/DL (ref 70–130)
GLUCOSE SERPL-MCNC: 109 MG/DL (ref 65–99)
GLUCOSE UR STRIP-MCNC: NEGATIVE MG/DL
HBA1C MFR BLD: 5.5 % (ref 4.8–5.6)
HCT VFR BLD AUTO: 36 % (ref 37.5–51)
HCT VFR BLD AUTO: 36.9 % (ref 37.5–51)
HGB BLD-MCNC: 11.6 G/DL (ref 13–17.7)
HGB BLD-MCNC: 12.6 G/DL (ref 13–17.7)
HGB UR QL STRIP.AUTO: NEGATIVE
IMM GRANULOCYTES # BLD AUTO: 0.01 10*3/MM3 (ref 0–0.05)
IMM GRANULOCYTES # BLD AUTO: 0.02 10*3/MM3 (ref 0–0.05)
IMM GRANULOCYTES NFR BLD AUTO: 0.2 % (ref 0–0.5)
IMM GRANULOCYTES NFR BLD AUTO: 0.3 % (ref 0–0.5)
KETONES UR QL STRIP: NEGATIVE
LEUKOCYTE ESTERASE UR QL STRIP.AUTO: NEGATIVE
LYMPHOCYTES # BLD AUTO: 1.46 10*3/MM3 (ref 0.7–3.1)
LYMPHOCYTES # BLD AUTO: 1.69 10*3/MM3 (ref 0.7–3.1)
LYMPHOCYTES NFR BLD AUTO: 24.7 % (ref 19.6–45.3)
LYMPHOCYTES NFR BLD AUTO: 31.3 % (ref 19.6–45.3)
MCH RBC QN AUTO: 30.9 PG (ref 26.6–33)
MCH RBC QN AUTO: 32 PG (ref 26.6–33)
MCHC RBC AUTO-ENTMCNC: 32.2 G/DL (ref 31.5–35.7)
MCHC RBC AUTO-ENTMCNC: 34.1 G/DL (ref 31.5–35.7)
MCV RBC AUTO: 93.7 FL (ref 79–97)
MCV RBC AUTO: 95.7 FL (ref 79–97)
MONOCYTES # BLD AUTO: 0.77 10*3/MM3 (ref 0.1–0.9)
MONOCYTES # BLD AUTO: 0.85 10*3/MM3 (ref 0.1–0.9)
MONOCYTES NFR BLD AUTO: 14.3 % (ref 5–12)
MONOCYTES NFR BLD AUTO: 14.4 % (ref 5–12)
NEUTROPHILS NFR BLD AUTO: 2.84 10*3/MM3 (ref 1.7–7)
NEUTROPHILS NFR BLD AUTO: 3.51 10*3/MM3 (ref 1.7–7)
NEUTROPHILS NFR BLD AUTO: 52.5 % (ref 42.7–76)
NEUTROPHILS NFR BLD AUTO: 59.6 % (ref 42.7–76)
NITRITE UR QL STRIP: NEGATIVE
NRBC BLD AUTO-RTO: 0 /100 WBC (ref 0–0.2)
NRBC BLD AUTO-RTO: 0 /100 WBC (ref 0–0.2)
NT-PROBNP SERPL-MCNC: 1616 PG/ML (ref 0–1800)
PH UR STRIP.AUTO: 7.5 [PH] (ref 5–8)
PLATELET # BLD AUTO: 219 10*3/MM3 (ref 140–450)
PLATELET # BLD AUTO: 259 10*3/MM3 (ref 140–450)
PMV BLD AUTO: 9.5 FL (ref 6–12)
PMV BLD AUTO: 9.8 FL (ref 6–12)
POTASSIUM SERPL-SCNC: 3.8 MMOL/L (ref 3.5–5.2)
PROCALCITONIN SERPL-MCNC: 0.02 NG/ML (ref 0–0.25)
PROT SERPL-MCNC: 7.1 G/DL (ref 6–8.5)
PROT UR QL STRIP: NEGATIVE
QT INTERVAL: 398 MS
RBC # BLD AUTO: 3.76 10*6/MM3 (ref 4.14–5.8)
RBC # BLD AUTO: 3.94 10*6/MM3 (ref 4.14–5.8)
SODIUM SERPL-SCNC: 137 MMOL/L (ref 136–145)
SP GR UR STRIP: 1.01 (ref 1–1.03)
TROPONIN T DELTA: 5 NG/L
TROPONIN T SERPL HS-MCNC: 22 NG/L
TSH SERPL DL<=0.05 MIU/L-ACNC: 3.35 UIU/ML (ref 0.27–4.2)
UROBILINOGEN UR QL STRIP: NORMAL
WBC NRBC COR # BLD: 5.4 10*3/MM3 (ref 3.4–10.8)
WBC NRBC COR # BLD: 5.9 10*3/MM3 (ref 3.4–10.8)

## 2023-03-06 PROCEDURE — 25010000002 FUROSEMIDE PER 20 MG

## 2023-03-06 PROCEDURE — 99285 EMERGENCY DEPT VISIT HI MDM: CPT

## 2023-03-06 PROCEDURE — G0378 HOSPITAL OBSERVATION PER HR: HCPCS

## 2023-03-06 PROCEDURE — 81003 URINALYSIS AUTO W/O SCOPE: CPT | Performed by: EMERGENCY MEDICINE

## 2023-03-06 PROCEDURE — 25010000002 CEFTRIAXONE PER 250 MG: Performed by: INTERNAL MEDICINE

## 2023-03-06 PROCEDURE — 25010000002 FUROSEMIDE PER 20 MG: Performed by: PHYSICIAN ASSISTANT

## 2023-03-06 PROCEDURE — 85025 COMPLETE CBC W/AUTO DIFF WBC: CPT | Performed by: PHYSICIAN ASSISTANT

## 2023-03-06 PROCEDURE — 80053 COMPREHEN METABOLIC PANEL: CPT | Performed by: PHYSICIAN ASSISTANT

## 2023-03-06 PROCEDURE — 71045 X-RAY EXAM CHEST 1 VIEW: CPT

## 2023-03-06 PROCEDURE — 85025 COMPLETE CBC W/AUTO DIFF WBC: CPT | Performed by: INTERNAL MEDICINE

## 2023-03-06 PROCEDURE — 63710000001 INSULIN LISPRO (HUMAN) PER 5 UNITS: Performed by: INTERNAL MEDICINE

## 2023-03-06 PROCEDURE — 83605 ASSAY OF LACTIC ACID: CPT | Performed by: INTERNAL MEDICINE

## 2023-03-06 PROCEDURE — 93010 ELECTROCARDIOGRAM REPORT: CPT | Performed by: INTERNAL MEDICINE

## 2023-03-06 PROCEDURE — 83036 HEMOGLOBIN GLYCOSYLATED A1C: CPT | Performed by: INTERNAL MEDICINE

## 2023-03-06 PROCEDURE — 84443 ASSAY THYROID STIM HORMONE: CPT | Performed by: NURSE PRACTITIONER

## 2023-03-06 PROCEDURE — 93005 ELECTROCARDIOGRAM TRACING: CPT | Performed by: PHYSICIAN ASSISTANT

## 2023-03-06 PROCEDURE — 25010000002 VANCOMYCIN 10 G RECONSTITUTED SOLUTION: Performed by: INTERNAL MEDICINE

## 2023-03-06 PROCEDURE — 99221 1ST HOSP IP/OBS SF/LOW 40: CPT | Performed by: INTERNAL MEDICINE

## 2023-03-06 PROCEDURE — 25010000002 ENOXAPARIN PER 10 MG: Performed by: NURSE PRACTITIONER

## 2023-03-06 PROCEDURE — 82962 GLUCOSE BLOOD TEST: CPT

## 2023-03-06 PROCEDURE — 83880 ASSAY OF NATRIURETIC PEPTIDE: CPT | Performed by: PHYSICIAN ASSISTANT

## 2023-03-06 PROCEDURE — 87040 BLOOD CULTURE FOR BACTERIA: CPT | Performed by: INTERNAL MEDICINE

## 2023-03-06 PROCEDURE — 84484 ASSAY OF TROPONIN QUANT: CPT | Performed by: PHYSICIAN ASSISTANT

## 2023-03-06 PROCEDURE — 84145 PROCALCITONIN (PCT): CPT | Performed by: INTERNAL MEDICINE

## 2023-03-06 RX ORDER — LEVOTHYROXINE SODIUM 0.07 MG/1
37.5 TABLET ORAL WEEKLY
COMMUNITY

## 2023-03-06 RX ORDER — LOPERAMIDE HYDROCHLORIDE 2 MG/1
2 TABLET ORAL 4 TIMES DAILY PRN
COMMUNITY

## 2023-03-06 RX ORDER — ONDANSETRON 4 MG/1
4 TABLET, FILM COATED ORAL EVERY 6 HOURS PRN
Status: DISCONTINUED | OUTPATIENT
Start: 2023-03-06 | End: 2023-03-14 | Stop reason: HOSPADM

## 2023-03-06 RX ORDER — PROPRANOLOL HYDROCHLORIDE 20 MG/1
60 TABLET ORAL 2 TIMES DAILY
Status: DISCONTINUED | OUTPATIENT
Start: 2023-03-06 | End: 2023-03-14 | Stop reason: HOSPADM

## 2023-03-06 RX ORDER — DOCUSATE SODIUM 100 MG/1
100 CAPSULE, LIQUID FILLED ORAL DAILY
COMMUNITY

## 2023-03-06 RX ORDER — POTASSIUM CHLORIDE 750 MG/1
20 TABLET, FILM COATED, EXTENDED RELEASE ORAL
Status: DISCONTINUED | OUTPATIENT
Start: 2023-03-06 | End: 2023-03-14 | Stop reason: HOSPADM

## 2023-03-06 RX ORDER — IBUPROFEN 400 MG/1
400 TABLET ORAL EVERY 8 HOURS PRN
COMMUNITY
End: 2023-03-14 | Stop reason: HOSPADM

## 2023-03-06 RX ORDER — CLOTRIMAZOLE 1 %
1 CREAM (GRAM) TOPICAL 2 TIMES DAILY
COMMUNITY
End: 2023-03-06

## 2023-03-06 RX ORDER — ASPIRIN 81 MG/1
81 TABLET ORAL DAILY
Status: DISCONTINUED | OUTPATIENT
Start: 2023-03-07 | End: 2023-03-14 | Stop reason: HOSPADM

## 2023-03-06 RX ORDER — FUROSEMIDE 20 MG/1
20 TABLET ORAL
Status: DISCONTINUED | OUTPATIENT
Start: 2023-03-06 | End: 2023-03-06

## 2023-03-06 RX ORDER — NYSTATIN 100000 [USP'U]/G
POWDER TOPICAL EVERY 8 HOURS SCHEDULED
Status: DISCONTINUED | OUTPATIENT
Start: 2023-03-06 | End: 2023-03-14 | Stop reason: HOSPADM

## 2023-03-06 RX ORDER — TAMSULOSIN HYDROCHLORIDE 0.4 MG/1
0.4 CAPSULE ORAL DAILY
Status: DISCONTINUED | OUTPATIENT
Start: 2023-03-07 | End: 2023-03-14 | Stop reason: HOSPADM

## 2023-03-06 RX ORDER — INSULIN LISPRO 100 [IU]/ML
0-7 INJECTION, SOLUTION INTRAVENOUS; SUBCUTANEOUS
Status: DISCONTINUED | OUTPATIENT
Start: 2023-03-06 | End: 2023-03-14 | Stop reason: HOSPADM

## 2023-03-06 RX ORDER — ACETAMINOPHEN 650 MG/1
650 SUPPOSITORY RECTAL EVERY 4 HOURS PRN
Status: DISCONTINUED | OUTPATIENT
Start: 2023-03-06 | End: 2023-03-11

## 2023-03-06 RX ORDER — ACETAMINOPHEN 325 MG/1
650 TABLET ORAL EVERY 4 HOURS PRN
Status: DISCONTINUED | OUTPATIENT
Start: 2023-03-06 | End: 2023-03-14 | Stop reason: HOSPADM

## 2023-03-06 RX ORDER — LEVOTHYROXINE SODIUM 0.03 MG/1
25 TABLET ORAL DAILY
Status: DISCONTINUED | OUTPATIENT
Start: 2023-03-07 | End: 2023-03-14 | Stop reason: HOSPADM

## 2023-03-06 RX ORDER — IBUPROFEN 600 MG/1
1 TABLET ORAL
Status: DISCONTINUED | OUTPATIENT
Start: 2023-03-06 | End: 2023-03-14 | Stop reason: HOSPADM

## 2023-03-06 RX ORDER — POTASSIUM CHLORIDE 750 MG/1
40 TABLET, FILM COATED, EXTENDED RELEASE ORAL AS NEEDED
Status: DISCONTINUED | OUTPATIENT
Start: 2023-03-06 | End: 2023-03-14 | Stop reason: HOSPADM

## 2023-03-06 RX ORDER — POLYETHYLENE GLYCOL 3350 17 G/17G
17 POWDER, FOR SOLUTION ORAL DAILY PRN
Status: DISCONTINUED | OUTPATIENT
Start: 2023-03-06 | End: 2023-03-14 | Stop reason: HOSPADM

## 2023-03-06 RX ORDER — POTASSIUM CHLORIDE 1.5 G/1.77G
40 POWDER, FOR SOLUTION ORAL AS NEEDED
Status: DISCONTINUED | OUTPATIENT
Start: 2023-03-06 | End: 2023-03-14 | Stop reason: HOSPADM

## 2023-03-06 RX ORDER — DIVALPROEX SODIUM 500 MG/1
500 TABLET, EXTENDED RELEASE ORAL 2 TIMES DAILY
Status: DISCONTINUED | OUTPATIENT
Start: 2023-03-06 | End: 2023-03-14 | Stop reason: HOSPADM

## 2023-03-06 RX ORDER — DEXTROSE MONOHYDRATE 25 G/50ML
25 INJECTION, SOLUTION INTRAVENOUS
Status: DISCONTINUED | OUTPATIENT
Start: 2023-03-06 | End: 2023-03-14 | Stop reason: HOSPADM

## 2023-03-06 RX ORDER — OXYBUTYNIN CHLORIDE 5 MG/1
5 TABLET, EXTENDED RELEASE ORAL DAILY
COMMUNITY

## 2023-03-06 RX ORDER — ATORVASTATIN CALCIUM 20 MG/1
20 TABLET, FILM COATED ORAL DAILY
Status: DISCONTINUED | OUTPATIENT
Start: 2023-03-07 | End: 2023-03-14 | Stop reason: HOSPADM

## 2023-03-06 RX ORDER — MENTHOL 5.8 MG
1 LOZENGE MUCOUS MEMBRANE
COMMUNITY

## 2023-03-06 RX ORDER — NITROGLYCERIN 0.4 MG/1
0.4 TABLET SUBLINGUAL
Status: DISCONTINUED | OUTPATIENT
Start: 2023-03-06 | End: 2023-03-14 | Stop reason: HOSPADM

## 2023-03-06 RX ORDER — UREA 40 %
1 CREAM (GRAM) TOPICAL 2 TIMES DAILY
COMMUNITY
End: 2023-03-06

## 2023-03-06 RX ORDER — ACETAMINOPHEN 160 MG/5ML
650 SOLUTION ORAL EVERY 4 HOURS PRN
Status: DISCONTINUED | OUTPATIENT
Start: 2023-03-06 | End: 2023-03-11

## 2023-03-06 RX ORDER — SODIUM CHLORIDE 0.9 % (FLUSH) 0.9 %
10 SYRINGE (ML) INJECTION AS NEEDED
Status: DISCONTINUED | OUTPATIENT
Start: 2023-03-06 | End: 2023-03-11

## 2023-03-06 RX ORDER — LISINOPRIL 40 MG/1
40 TABLET ORAL DAILY
Status: DISCONTINUED | OUTPATIENT
Start: 2023-03-07 | End: 2023-03-14 | Stop reason: HOSPADM

## 2023-03-06 RX ORDER — AMITRIPTYLINE HYDROCHLORIDE 10 MG/1
10 TABLET, FILM COATED ORAL NIGHTLY
Status: DISCONTINUED | OUTPATIENT
Start: 2023-03-06 | End: 2023-03-14 | Stop reason: HOSPADM

## 2023-03-06 RX ORDER — AMOXICILLIN 250 MG
2 CAPSULE ORAL 2 TIMES DAILY PRN
Status: DISCONTINUED | OUTPATIENT
Start: 2023-03-06 | End: 2023-03-14 | Stop reason: HOSPADM

## 2023-03-06 RX ORDER — ENOXAPARIN SODIUM 100 MG/ML
40 INJECTION SUBCUTANEOUS NIGHTLY
Status: DISCONTINUED | OUTPATIENT
Start: 2023-03-06 | End: 2023-03-07

## 2023-03-06 RX ORDER — TAMSULOSIN HYDROCHLORIDE 0.4 MG/1
1 CAPSULE ORAL DAILY
COMMUNITY

## 2023-03-06 RX ORDER — FUROSEMIDE 10 MG/ML
40 INJECTION INTRAMUSCULAR; INTRAVENOUS EVERY 8 HOURS
Status: DISCONTINUED | OUTPATIENT
Start: 2023-03-06 | End: 2023-03-07

## 2023-03-06 RX ORDER — MAGNESIUM SULFATE HEPTAHYDRATE 40 MG/ML
2 INJECTION, SOLUTION INTRAVENOUS AS NEEDED
Status: DISCONTINUED | OUTPATIENT
Start: 2023-03-06 | End: 2023-03-14 | Stop reason: HOSPADM

## 2023-03-06 RX ORDER — NICOTINE POLACRILEX 4 MG
15 LOZENGE BUCCAL
Status: DISCONTINUED | OUTPATIENT
Start: 2023-03-06 | End: 2023-03-14 | Stop reason: HOSPADM

## 2023-03-06 RX ORDER — CETIRIZINE HYDROCHLORIDE 10 MG/1
10 TABLET ORAL DAILY
COMMUNITY

## 2023-03-06 RX ORDER — FUROSEMIDE 10 MG/ML
80 INJECTION INTRAMUSCULAR; INTRAVENOUS ONCE
Status: COMPLETED | OUTPATIENT
Start: 2023-03-06 | End: 2023-03-06

## 2023-03-06 RX ORDER — ONDANSETRON 2 MG/ML
4 INJECTION INTRAMUSCULAR; INTRAVENOUS EVERY 6 HOURS PRN
Status: DISCONTINUED | OUTPATIENT
Start: 2023-03-06 | End: 2023-03-14 | Stop reason: HOSPADM

## 2023-03-06 RX ORDER — SODIUM CHLORIDE 0.9 % (FLUSH) 0.9 %
10 SYRINGE (ML) INJECTION AS NEEDED
Status: DISCONTINUED | OUTPATIENT
Start: 2023-03-06 | End: 2023-03-14 | Stop reason: HOSPADM

## 2023-03-06 RX ORDER — SODIUM CHLORIDE 9 MG/ML
40 INJECTION, SOLUTION INTRAVENOUS AS NEEDED
Status: DISCONTINUED | OUTPATIENT
Start: 2023-03-06 | End: 2023-03-14 | Stop reason: HOSPADM

## 2023-03-06 RX ORDER — BISACODYL 5 MG/1
5 TABLET, DELAYED RELEASE ORAL DAILY PRN
Status: DISCONTINUED | OUTPATIENT
Start: 2023-03-06 | End: 2023-03-14 | Stop reason: HOSPADM

## 2023-03-06 RX ORDER — MAGNESIUM SULFATE HEPTAHYDRATE 40 MG/ML
4 INJECTION, SOLUTION INTRAVENOUS AS NEEDED
Status: DISCONTINUED | OUTPATIENT
Start: 2023-03-06 | End: 2023-03-14 | Stop reason: HOSPADM

## 2023-03-06 RX ORDER — BISACODYL 10 MG
10 SUPPOSITORY, RECTAL RECTAL DAILY PRN
Status: DISCONTINUED | OUTPATIENT
Start: 2023-03-06 | End: 2023-03-14 | Stop reason: HOSPADM

## 2023-03-06 RX ORDER — SODIUM CHLORIDE 0.9 % (FLUSH) 0.9 %
10 SYRINGE (ML) INJECTION EVERY 12 HOURS SCHEDULED
Status: DISCONTINUED | OUTPATIENT
Start: 2023-03-06 | End: 2023-03-14 | Stop reason: HOSPADM

## 2023-03-06 RX ORDER — PANTOPRAZOLE SODIUM 40 MG/1
40 TABLET, DELAYED RELEASE ORAL
Status: DISCONTINUED | OUTPATIENT
Start: 2023-03-07 | End: 2023-03-14 | Stop reason: HOSPADM

## 2023-03-06 RX ADMIN — ENOXAPARIN SODIUM 40 MG: 100 INJECTION SUBCUTANEOUS at 21:26

## 2023-03-06 RX ADMIN — POTASSIUM CHLORIDE 20 MEQ: 750 TABLET, EXTENDED RELEASE ORAL at 18:43

## 2023-03-06 RX ADMIN — VANCOMYCIN HYDROCHLORIDE 2250 MG: 10 INJECTION, POWDER, LYOPHILIZED, FOR SOLUTION INTRAVENOUS at 22:02

## 2023-03-06 RX ADMIN — FUROSEMIDE 80 MG: 10 INJECTION, SOLUTION INTRAMUSCULAR; INTRAVENOUS at 11:17

## 2023-03-06 RX ADMIN — DIVALPROEX SODIUM 500 MG: 500 TABLET, FILM COATED, EXTENDED RELEASE ORAL at 21:26

## 2023-03-06 RX ADMIN — NYSTATIN: 100000 POWDER TOPICAL at 21:26

## 2023-03-06 RX ADMIN — TICAGRELOR 90 MG: 90 TABLET ORAL at 21:26

## 2023-03-06 RX ADMIN — AMITRIPTYLINE HYDROCHLORIDE 10 MG: 10 TABLET, FILM COATED ORAL at 21:26

## 2023-03-06 RX ADMIN — INSULIN LISPRO 2 UNITS: 100 INJECTION, SOLUTION INTRAVENOUS; SUBCUTANEOUS at 19:06

## 2023-03-06 RX ADMIN — CEFTRIAXONE SODIUM 1 G: 1 INJECTION, POWDER, FOR SOLUTION INTRAMUSCULAR; INTRAVENOUS at 21:20

## 2023-03-06 RX ADMIN — PROPRANOLOL HYDROCHLORIDE 60 MG: 20 TABLET ORAL at 21:26

## 2023-03-06 RX ADMIN — Medication 10 ML: at 21:27

## 2023-03-06 RX ADMIN — Medication 10 ML: at 21:26

## 2023-03-06 RX ADMIN — ACETAMINOPHEN 650 MG: 325 TABLET, FILM COATED ORAL at 23:42

## 2023-03-06 RX ADMIN — FUROSEMIDE 40 MG: 10 INJECTION, SOLUTION INTRAMUSCULAR; INTRAVENOUS at 18:42
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
## 2023-03-06 NOTE — ED NOTES
Nursing report ED to floor  Hernando Lozada  78 y.o.  male    HPI :   Chief Complaint   Patient presents with    Foot Swelling       Admitting doctor:   Jorge Banks MD    Admitting diagnosis:   The primary encounter diagnosis was Pedal edema. Diagnoses of Weakness, History of coronary artery disease, and Type 2 diabetes mellitus with other specified complication, unspecified whether long term insulin use (HCC) were also pertinent to this visit.    Code status:   Current Code Status       Date Active Code Status Order ID Comments User Context       3/6/2023 1318 CPR (Attempt to Resuscitate) [id number]  Elfego Chakraborty APRN ED        Question Answer    Code Status (Patient has no pulse and is not breathing) CPR (Attempt to Resuscitate)    Medical Interventions (Patient has pulse or is breathing) Full Support                    Allergies:   Patient has no known allergies.    Isolation:   No active isolations    Intake and Output    Intake/Output Summary (Last 24 hours) at 3/6/2023 1406  Last data filed at 3/6/2023 1241  Gross per 24 hour   Intake --   Output 650 ml   Net -650 ml       Weight:       03/06/23  0935   Weight: 109 kg (241 lb)       Most recent vitals:   Vitals:    03/06/23 1201 03/06/23 1209 03/06/23 1241 03/06/23 1406   BP: 145/87      Pulse: 68 79 82 68   Resp:       Temp:       TempSrc:       SpO2: 97% 97% 99% 100%   Weight:       Height:           Active LDAs/IV Access:   Lines, Drains & Airways       Active LDAs       Name Placement date Placement time Site Days    Peripheral IV 03/06/23 1113 Anterior;Distal;Right;Upper Arm 03/06/23  1113  Arm  less than 1                    Labs (abnormal labs have a star):   Labs Reviewed   COMPREHENSIVE METABOLIC PANEL - Abnormal; Notable for the following components:       Result Value    Glucose 109 (*)     Chloride 95 (*)     CO2 30.3 (*)     All other components within normal limits    Narrative:     GFR Normal >60  Chronic Kidney Disease <60  Kidney Failure  <15    The GFR formula is only valid for adults with stable renal function between ages 18 and 70.   TROPONIN - Abnormal; Notable for the following components:    HS Troponin T 22 (*)     All other components within normal limits    Narrative:     High Sensitive Troponin T Reference Range:  <10.0 ng/L- Negative Female for AMI  <15.0 ng/L- Negative Male for AMI  >=10 - Abnormal Female indicating possible myocardial injury.  >=15 - Abnormal Male indicating possible myocardial injury.   Clinicians would have to utilize clinical acumen, EKG, Troponin, and serial changes to determine if it is an Acute Myocardial Infarction or myocardial injury due to an underlying chronic condition.        CBC WITH AUTO DIFFERENTIAL - Abnormal; Notable for the following components:    RBC 3.76 (*)     Hemoglobin 11.6 (*)     Hematocrit 36.0 (*)     Monocyte % 14.4 (*)     All other components within normal limits   BNP (IN-HOUSE) - Normal    Narrative:     Among patients with dyspnea, NT-proBNP is highly sensitive for the detection of acute congestive heart failure. In addition NT-proBNP of <300 pg/ml effectively rules out acute congestive heart failure with 99% negative predictive value.    Results may be falsely decreased if patient taking Biotin.     URINALYSIS W/ MICROSCOPIC IF INDICATED (NO CULTURE) - Normal    Narrative:     Urine microscopic not indicated.   HIGH SENSITIVITIY TROPONIN T 2HR   CBC AND DIFFERENTIAL    Narrative:     The following orders were created for panel order CBC & Differential.  Procedure                               Abnormality         Status                     ---------                               -----------         ------                     CBC Auto Differential[[id number]]        Abnormal            Final result                 Please view results for these tests on the individual orders.       EKG:   ECG 12 Lead Other; pedal edema   Preliminary Result   HEART RATE= 78  bpm   RR Interval= 769  ms    MT Interval= 198  ms   P Horizontal Axis= -23  deg   P Front Axis= 13  deg   QRSD Interval= 103  ms   QT Interval= 398  ms   QRS Axis= -9  deg   T Wave Axis= 125  deg   - ABNORMAL ECG -   Sinus rhythm   LVH with secondary repolarization abnormality   Electronically Signed By:    Date and Time of Study: 2023-03-06 10:22:33          Meds given in ED:   Medications   sodium chloride 0.9 % flush 10 mL (has no administration in time range)   sodium chloride 0.9 % flush 10 mL (has no administration in time range)   sodium chloride 0.9 % flush 10 mL (has no administration in time range)   sodium chloride 0.9 % infusion 40 mL (has no administration in time range)   nitroglycerin (NITROSTAT) SL tablet 0.4 mg (has no administration in time range)   acetaminophen (TYLENOL) tablet 650 mg (has no administration in time range)     Or   acetaminophen (TYLENOL) 160 MG/5ML solution 650 mg (has no administration in time range)     Or   acetaminophen (TYLENOL) suppository 650 mg (has no administration in time range)   ondansetron (ZOFRAN) tablet 4 mg (has no administration in time range)     Or   ondansetron (ZOFRAN) injection 4 mg (has no administration in time range)   sennosides-docusate (PERICOLACE) 8.6-50 MG per tablet 2 tablet (has no administration in time range)     And   polyethylene glycol (MIRALAX) packet 17 g (has no administration in time range)     And   bisacodyl (DULCOLAX) EC tablet 5 mg (has no administration in time range)     And   bisacodyl (DULCOLAX) suppository 10 mg (has no administration in time range)   Enoxaparin Sodium (LOVENOX) syringe 40 mg (has no administration in time range)   furosemide (LASIX) injection 80 mg (80 mg Intravenous Given 3/6/23 1117)       Imaging results:  XR Chest 1 View    Result Date: 3/6/2023  1. Borderline cardiomegaly. 2. Lungs appear clear.  This report was finalized on 3/6/2023 10:16 AM by Dr. Mario Davis M.D.       Ambulatory status:   - stand by assist    Social issues:    Social History     Socioeconomic History    Marital status: Single   Tobacco Use    Smoking status: Never    Smokeless tobacco: Never   Vaping Use    Vaping Use: Never used   Substance and Sexual Activity    Alcohol use: No    Drug use: No    Sexual activity: Defer       NIH Stroke Scale:         Negra Mcrae RN  03/06/23 14:06 EST

## 2023-03-06 NOTE — ED PROVIDER NOTES
EMERGENCY DEPARTMENT ENCOUNTER    Room Number:  23/23  Date of encounter:  3/6/2023  PCP: Milo Carrasquillo DO  Historian: Patient  Chronic or social conditions impacting care (social determinants of health): Patient lives in a group home      I used full protective equipment while examining this patient.  This includes face mask, gloves and protective eyewear.  I washed my hands before entering the room and immediately upon leaving the room      HPI:  Chief Complaint: Pedal edema, weakness  A complete HPI/ROS/PMH/PSH/SH/FH are unobtainable due to: Cognitive deficits    Context: Hernando Lozada is a 78 y.o. male who presents to the ED c/o pedal edema times several weeks.  Patient is a poor historian secondary to underlying cognitive deficits.  He states his legs have been swollen for several weeks.  He denies any overt chest pain or shortness of breath.  He states that his swelling is to the point where he can no longer walk.  He does feel weak in general.  Denies any fever or significant redness of his feet.  He denies any history of a fib or CHF.  He has not seen a cardiologist in many years.    Review of Medical Records  I reviewed Epic records from 1/23/2023.  Patient saw Dr. Carrasquillo, his primary care physician.  Patient being followed for bipolar disorder, mild cognitive decline, diabetes.    PAST MEDICAL HISTORY  Active Ambulatory Problems     Diagnosis Date Noted   • DJD (degenerative joint disease) of knee 12/14/2017   • Essential hypertension 02/18/2019   • SOB (shortness of breath) 02/18/2019   • Leg swelling 02/18/2019   • Chest pain with high risk of acute coronary syndrome 04/29/2019   • Hyperlipidemia LDL goal <100 08/23/2019   • COVID-19 11/25/2022   • Diabetes mellitus (HCC)    • GIOVANNY (obstructive sleep apnea)    • Disease of thyroid gland    • CKD (chronic kidney disease)    • Hypomagnesemia    • Chronic brain injury    • Generalized weakness    • CAD (coronary artery disease)      Resolved Ambulatory  Problems     Diagnosis Date Noted   • No Resolved Ambulatory Problems     Past Medical History:   Diagnosis Date   • Arthritis    • Bilateral leg edema    • GERD (gastroesophageal reflux disease)    • Heart murmur    • History of head injury    • [place] (hard of hearing)    • Hyperlipidemia    • Hypertension    • Irregular heart beat    • Osteoarthritis    • Past heart attack    • SOB (shortness of breath) on exertion    • Stroke (HCC)    • Vasovagal episode          PAST SURGICAL HISTORY  Past Surgical History:   Procedure Laterality Date   • CARDIAC CATHETERIZATION N/A 4/30/2019    Procedure: Coronary angiography with grafts;  Surgeon: Rio Miranda MD;  Location: [facility] CATH INVASIVE LOCATION;  Service: Cardiology   • CARDIAC CATHETERIZATION N/A 4/30/2019    Procedure: Left Heart Cath;  Surgeon: Rio Miranda MD;  Location: [facility] CATH INVASIVE LOCATION;  Service: Cardiology   • CARDIAC CATHETERIZATION N/A 4/30/2019    Procedure: Left ventriculography;  Surgeon: Rio Miranda MD;  Location: [facility] CATH INVASIVE LOCATION;  Service: Cardiology   • CARDIAC CATHETERIZATION  4/30/2019    Procedure: Saphenous Vein Graft;  Surgeon: Rio Miranda MD;  Location: [facility] CATH INVASIVE LOCATION;  Service: Cardiology   • CARDIAC CATHETERIZATION N/A 4/30/2019    Procedure: Stent GLENDY coronary;  Surgeon: Rio Miranda MD;  Location: [facility] CATH INVASIVE LOCATION;  Service: Cardiology   • CARPAL TUNNEL RELEASE Bilateral    • CATARACT EXTRACTION     • CORONARY ARTERY BYPASS GRAFT  2002   • FINGER SURGERY      MISSING LEFT POINTER FINGER TIP   • KNEE ARTHROPLASTY Right 02/15/2017   • AK ARTHRP KNE CONDYLE&PLATU MEDIAL&LAT COMPARTMENTS Left 12/14/2017    Procedure: LT TOTAL KNEE ARTHROPLASTY;  Surgeon: Jatin Lancaster MD;  Location: [facility] OR;  Service: Orthopedics   • AK RT/LT HEART CATHETERS N/A 4/30/2019    Procedure: Percutaneous Coronary Intervention;  Surgeon: Ruth  MD Rio;  Location: [facility] INVASIVE LOCATION;  Service: Cardiology         FAMILY HISTORY  Family History   Problem Relation Age of Onset   • Parkinsonism Sister    • Diabetes Brother    • Malig Hyperthermia Neg Hx          SOCIAL HISTORY  Social History     Socioeconomic History   • Marital status: Single   Tobacco Use   • Smoking status: Never   • Smokeless tobacco: Never   Vaping Use   • Vaping Use: Never used   Substance and Sexual Activity   • Alcohol use: No   • Drug use: No   • Sexual activity: Defer         ALLERGIES  Patient has no known allergies.        REVIEW OF SYSTEMS  All systems reviewed and negative except for those discussed in HPI.       PHYSICAL EXAM    I have reviewed the triage vital signs and nursing notes.    ED Triage Vitals [03/06/23 0934]   Temp Heart Rate Resp BP SpO2   97.8 °F (36.6 °C) 79 14 148/94 99 %      Temp src Heart Rate Source Patient Position BP Location FiO2 (%)   Tympanic Monitor -- -- --       Physical Exam  GENERAL: Alert, oriented, not distressed  HENT: head atraumatic, no nuchal rigidity  EYES: no scleral icterus, EOMI  CV: regular rhythm, regular rate  RESPIRATORY: normal effort, CTA  ABDOMEN: soft, nontender  MUSCULOSKELETAL: no deformity, FROM, 3+ pedal edema bilaterally  NEURO: alert, moves all extremities, follows commands  SKIN: warm, dry        LAB RESULTS  Recent Results (from the past 24 hour(s))   ECG 12 Lead Other; pedal edema    Collection Time: 03/06/23 10:22 AM   Result Value Ref Range    QT Interval 398 ms   Comprehensive Metabolic Panel    Collection Time: 03/06/23 11:15 AM    Specimen: Blood   Result Value Ref Range    Glucose 109 (H) 65 - 99 mg/dL    BUN 11 8 - 23 mg/dL    Creatinine 1.06 0.76 - 1.27 mg/dL    Sodium 137 136 - 145 mmol/L    Potassium 3.8 3.5 - 5.2 mmol/L    Chloride 95 (L) 98 - 107 mmol/L    CO2 30.3 (H) 22.0 - 29.0 mmol/L    Calcium 9.4 8.6 - 10.5 mg/dL    Total Protein 7.1 6.0 - 8.5 g/dL    Albumin 4.2 3.5 - 5.2 g/dL    ALT  (SGPT) 18 1 - 41 U/L    AST (SGOT) 26 1 - 40 U/L    Alkaline Phosphatase 74 39 - 117 U/L    Total Bilirubin 0.5 0.0 - 1.2 mg/dL    Globulin 2.9 gm/dL    A/G Ratio 1.4 g/dL    BUN/Creatinine Ratio 10.4 7.0 - 25.0    Anion Gap 11.7 5.0 - 15.0 mmol/L    eGFR 72.7 >60.0 mL/min/1.73   BNP    Collection Time: 03/06/23 11:15 AM    Specimen: Blood   Result Value Ref Range    proBNP 1,616.0 0.0 - 1,800.0 pg/mL   High Sensitivity Troponin T    Collection Time: 03/06/23 11:15 AM    Specimen: Blood   Result Value Ref Range    HS Troponin T 22 (H) <15 ng/L   CBC Auto Differential    Collection Time: 03/06/23 11:15 AM    Specimen: Blood   Result Value Ref Range    WBC 5.90 3.40 - 10.80 10*3/mm3    RBC 3.76 (L) 4.14 - 5.80 10*6/mm3    Hemoglobin 11.6 (L) 13.0 - 17.7 g/dL    Hematocrit 36.0 (L) 37.5 - 51.0 %    MCV 95.7 79.0 - 97.0 fL    MCH 30.9 26.6 - 33.0 pg    MCHC 32.2 31.5 - 35.7 g/dL    RDW 13.7 12.3 - 15.4 %    RDW-SD 48.6 37.0 - 54.0 fl    MPV 9.5 6.0 - 12.0 fL    Platelets 219 140 - 450 10*3/mm3    Neutrophil % 59.6 42.7 - 76.0 %    Lymphocyte % 24.7 19.6 - 45.3 %    Monocyte % 14.4 (H) 5.0 - 12.0 %    Eosinophil % 0.7 0.3 - 6.2 %    Basophil % 0.3 0.0 - 1.5 %    Immature Grans % 0.3 0.0 - 0.5 %    Neutrophils, Absolute 3.51 1.70 - 7.00 10*3/mm3    Lymphocytes, Absolute 1.46 0.70 - 3.10 10*3/mm3    Monocytes, Absolute 0.85 0.10 - 0.90 10*3/mm3    Eosinophils, Absolute 0.04 0.00 - 0.40 10*3/mm3    Basophils, Absolute 0.02 0.00 - 0.20 10*3/mm3    Immature Grans, Absolute 0.02 0.00 - 0.05 10*3/mm3    nRBC 0.0 0.0 - 0.2 /100 WBC   Urinalysis With Microscopic If Indicated (No Culture) - Urine, Clean Catch    Collection Time: 03/06/23 11:21 AM    Specimen: Urine, Clean Catch   Result Value Ref Range    Color, UA Yellow Yellow, Straw    Appearance, UA Clear Clear    pH, UA 7.5 5.0 - 8.0    Specific Gravity, UA 1.012 1.005 - 1.030    Glucose, UA Negative Negative    Ketones, UA Negative Negative    Bilirubin, UA Negative Negative     Blood, UA Negative Negative    Protein, UA Negative Negative    Leuk Esterase, UA Negative Negative    Nitrite, UA Negative Negative    Urobilinogen, UA 1.0 E.U./dL 0.2 - 1.0 E.U./dL       Ordered the above labs and independently reviewed the results.        RADIOLOGY  XR Chest 1 View    Result Date: 3/6/2023  XR CHEST 1 VW-  03/06/2023  HISTORY: Pedal edema.  Heart size is at the upper limits of normal. Lungs appear clear. Sternotomy wires are seen. No pneumothorax is seen.      1. Borderline cardiomegaly. 2. Lungs appear clear.  This report was finalized on 3/6/2023 10:16 AM by Dr. Mario Davis M.D.        I ordered the above noted radiological studies. Reviewed by me and discussed with radiologist.  See dictation for official radiology interpretation.      MEDICATIONS GIVEN IN ER    Medications   sodium chloride 0.9 % flush 10 mL (has no administration in time range)   sodium chloride 0.9 % flush 10 mL (has no administration in time range)   sodium chloride 0.9 % flush 10 mL (has no administration in time range)   sodium chloride 0.9 % infusion 40 mL (has no administration in time range)   nitroglycerin (NITROSTAT) SL tablet 0.4 mg (has no administration in time range)   acetaminophen (TYLENOL) tablet 650 mg (has no administration in time range)     Or   acetaminophen (TYLENOL) 160 MG/5ML solution 650 mg (has no administration in time range)     Or   acetaminophen (TYLENOL) suppository 650 mg (has no administration in time range)   ondansetron (ZOFRAN) tablet 4 mg (has no administration in time range)     Or   ondansetron (ZOFRAN) injection 4 mg (has no administration in time range)   sennosides-docusate (PERICOLACE) 8.6-50 MG per tablet 2 tablet (has no administration in time range)     And   polyethylene glycol (MIRALAX) packet 17 g (has no administration in time range)     And   bisacodyl (DULCOLAX) EC tablet 5 mg (has no administration in time range)     And   bisacodyl (DULCOLAX) suppository 10 mg  (has no administration in time range)   Enoxaparin Sodium (LOVENOX) syringe 40 mg (has no administration in time range)   furosemide (LASIX) injection 80 mg (80 mg Intravenous Given 3/6/23 1117)         ADDITIONAL ORDERS CONSIDERED BUT NOT ORDERED:  Nothing      PROGRESS, DATA ANALYSIS, CONSULTS, AND MEDICAL DECISION MAKING    All labs have been independently interpreted by myself.  All radiology studies have been independently interpreted by myself and discussed with radiologist dictating the report.   EKG's independently interpreted by myself.  Discussion below represents my analysis of pertinent findings related to patient's condition, differential diagnosis, treatment plan and final disposition.    I have discussed case with Dr. Holley, emergency room physician.  He has performed his own bedside examination and agrees with treatment plan.    ED Course as of 03/06/23 1529   Mon Mar 06, 2023   1011 Patient presents with several week history of bilateral pedal edema, generalized weakness.  Differential diagnoses include but not limited to CHF, venous insufficiency, DVT. [EE]   1011 Chest x-ray interpreted myself shows no acute evidence of CHF or infiltrate. [EE]   1127 WBC: 5.90 [EE]   1127 Hemoglobin(!): 11.6 [EE]   1142 proBNP: 1,616.0 [EE]   1142 EKG independently interpreted myself.  Time 1022.  Sinus rhythm, 70 bpm.  Normal P/LUCIANA.  QRS normal, with normal axis.  No significant ST abnormalities.  Similar to prior EKG from 11/26/2022. [EE]   1145 HS Troponin T(!): 22 [EE]   1224 I discussed case with Elfego Chakraborty, nurse practitioner with [facility].  She agrees to admit the patient to Dr. Banks.  [EE]      ED Course User Index  [EE] Raúl Matos PA       AS OF 15:29 EST VITALS:    BP - 145/87  HR - 68  TEMP - 97.8 °F (36.6 °C) (Tympanic)  O2 SATS - 100%        DIAGNOSIS  Final diagnoses:   Pedal edema   Weakness   History of coronary artery disease   Type 2 diabetes mellitus with other specified complication,  unspecified whether long term insulin use (HCC)         DISPOSITION  Admitted      Dictated utilizing Dragon dictation     Raúl Matos PA  03/06/23 8552

## 2023-03-06 NOTE — ED PROVIDER NOTES
Brief history of present illness: 78-year-old male complaining of increasing lower extremity edema now affecting the lower abdomen over several months.  He feels very weak and unable to walk at this time.    ED Triage Vitals [03/06/23 0934]   Temp Heart Rate Resp BP SpO2   97.8 °F (36.6 °C) 79 14 148/94 99 %      Temp src Heart Rate Source Patient Position BP Location FiO2 (%)   Tympanic Monitor -- -- --       Physical exam:     Physical Exam   Constitutional: No distress.  Nontoxic but chronically ill-appearing  HENT:  Head: Normocephalic and atraumatic.   Oropharynx: Mucous membranes are moist.   Eyes: . No scleral icterus. No conjunctival pallor.  Neck: Normal range of motion. Neck supple.   Cardiovascular: Pink warm and well perfused throughout.    Pulmonary/Chest: Speaks in full sentences.  No tachypnea or increased work of breathing  Abdominal: Soft. There is no tenderness. There is no rebound and no guarding.   Musculoskeletal: Moves all extremities equally.  3+ pitting edema bilateral lower extremities extending all the way up to the buttock posteriorly.  Some hyperemia bilateral lower extremities without focal fluctuance  Neurological: Alert and oriented.  Diminished sensation throughout bilateral lower extremities.  Skin: Skin is pink, warm, and dry.   Psychiatric: Mood and affect normal.   Nursing note and vitals reviewed.      MDM:      I have personally reviewed and interpreted the EKG obtained today in the emergency department at 1022 concomitant with treatment.  EKG reveals rhythm/rate -sinus, 80. [place]-LUCIANA within normal.  QRS-narrow complex ST/T-wave -no STEMI; QTc within normal limits.  Comparison: 1/26/2020-no acute change    RADIOLOGY      Study: Single view chest  Findings: Cardiomegaly, no pneumothorax  I independently viewed and interpreted these images contemporaneously with treatment.       My diagnosis for lower extremity pain and injury includes but is not limited to hip fracture, femur  fracture, hip dislocation, hip contusion, hip sprain, hip strain, pelvic fracture, ischio-tibial band pain, ischio-tibial band bursitis, knee sprain, patella dislocation, knee dislocation, internal derangement of knee, fractures of the femur, tibia, fibula, ankle, foot and digits, ankle sprain, ankle dislocation, Lisfranc fracture, fracture dislocations of the digits, pulmonary embolism, claudication, peripheral vascular disease, gout, osteoarthritis, rheumatoid arthritis, bursitis, septic joint, poly-rheumatica, polyarthralgia and other inflammatory or infectious disease processes.      I have seen and personally evaluated this patient, discussed the case with the treating advanced practice provider, and reviewed their note. I was involved in the medical decision making during the evaluation, testing and disposition planning for this patient.    Progress:  Patient stable for admission    Final diagnoses:   Pedal edema   Weakness   History of coronary artery disease   Type 2 diabetes mellitus with other specified complication, unspecified whether long term insulin use (HCC)       Disposition:  ADMISSION    Discussed treatment plan and reason for admission with pt/family and admitting physician.  Pt/family voiced understanding of the plan for admission for further testing/treatment as needed.          René Holley MD  03/06/23 2024

## 2023-03-06 NOTE — ED NOTES
bilat foot swelling x 2 months.  He is weak    Patient was placed in face mask during first look triage.  Patient was wearing a face mask throughout encounter.  I wore personal protective equipment throughout the encounter.  Hand hygiene was performed before and after patient encounter.

## 2023-03-06 NOTE — PROGRESS NOTES
"[facility] Clinical Pharmacy Services: Enoxaparin Consult    Hernando Lozada has a pharmacy consult to dose prophylactic enoxaparin per FAISAL Moura with [facility]'s request.     Indication: VTE Prophylaxis  Home Anticoagulation: None     Relevant clinical data and objective history reviewed:  78 y.o. male 175.3 cm (69\") 109 kg (241 lb)   Body mass index is 35.59 kg/m².   Results from last 7 days   Lab Units 03/06/23  1115   PLATELETS 10*3/mm3 219     Estimated Creatinine Clearance: 72.1 mL/min (by C-G formula based on SCr of 1.06 mg/dL).    Assessment/Plan    Will start patient on enoxaparin 40mg subcutaneous every 24 hours, adjusted for renal function. Consult order will be discontinued but pharmacy will continue to follow.     Cristina Rhodes, PharmD, [facility]  Clinical Pharmacy Specialist, Emergency Medicine   Phone: [phone]      "
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
## 2023-03-07 ENCOUNTER — APPOINTMENT (OUTPATIENT)
Dept: CARDIOLOGY | Facility: HOSPITAL | Age: 78
DRG: 246 | End: 2023-03-07
Payer: MEDICARE

## 2023-03-07 ENCOUNTER — APPOINTMENT (OUTPATIENT)
Dept: NUCLEAR MEDICINE | Facility: HOSPITAL | Age: 78
DRG: 246 | End: 2023-03-07
Payer: MEDICARE

## 2023-03-07 PROBLEM — I82.562 CHRONIC DEEP VEIN THROMBOSIS (DVT) OF CALF MUSCLE VEIN OF LEFT LOWER EXTREMITY (HCC): Status: ACTIVE | Noted: 2023-03-07

## 2023-03-07 LAB
ANION GAP SERPL CALCULATED.3IONS-SCNC: 8 MMOL/L (ref 5–15)
AORTIC DIMENSIONLESS INDEX: 0.3 (DI)
ASCENDING AORTA: 4.2 CM
BASOPHILS # BLD AUTO: 0.02 10*3/MM3 (ref 0–0.2)
BASOPHILS NFR BLD AUTO: 0.3 % (ref 0–1.5)
BH CV ECHO MEAS - ACS: 1.31 CM
BH CV ECHO MEAS - AO MAX PG: 33.1 MMHG
BH CV ECHO MEAS - AO MEAN PG: 17.5 MMHG
BH CV ECHO MEAS - AO ROOT DIAM: 3.9 CM
BH CV ECHO MEAS - AO V2 MAX: 287.8 CM/SEC
BH CV ECHO MEAS - AO V2 VTI: 69.1 CM
BH CV ECHO MEAS - AVA(I,D): 1 CM2
BH CV ECHO MEAS - EDV(CUBED): 138.8 ML
BH CV ECHO MEAS - EDV(MOD-SP2): 61 ML
BH CV ECHO MEAS - EDV(MOD-SP4): 99 ML
BH CV ECHO MEAS - EF(MOD-BP): 59.1 %
BH CV ECHO MEAS - EF(MOD-SP2): 57.4 %
BH CV ECHO MEAS - EF(MOD-SP4): 61.6 %
BH CV ECHO MEAS - ESV(CUBED): 37.5 ML
BH CV ECHO MEAS - ESV(MOD-SP2): 26 ML
BH CV ECHO MEAS - ESV(MOD-SP4): 38 ML
BH CV ECHO MEAS - FS: 35.3 %
BH CV ECHO MEAS - IVS/LVPW: 0.97 CM
BH CV ECHO MEAS - IVSD: 1.27 CM
BH CV ECHO MEAS - LAT PEAK E' VEL: 5.5 CM/SEC
BH CV ECHO MEAS - LV DIASTOLIC VOL/BSA (35-75): 44.3 CM2
BH CV ECHO MEAS - LV MASS(C)D: 274.4 GRAMS
BH CV ECHO MEAS - LV MAX PG: 3 MMHG
BH CV ECHO MEAS - LV MEAN PG: 1.75 MMHG
BH CV ECHO MEAS - LV SYSTOLIC VOL/BSA (12-30): 17 CM2
BH CV ECHO MEAS - LV V1 MAX: 87 CM/SEC
BH CV ECHO MEAS - LV V1 VTI: 22 CM
BH CV ECHO MEAS - LVIDD: 5.2 CM
BH CV ECHO MEAS - LVIDS: 3.3 CM
BH CV ECHO MEAS - LVOT AREA: 3.1 CM2
BH CV ECHO MEAS - LVOT DIAM: 2 CM
BH CV ECHO MEAS - LVPWD: 1.31 CM
BH CV ECHO MEAS - MED PEAK E' VEL: 5.7 CM/SEC
BH CV ECHO MEAS - MV A DUR: 0.13 SEC
BH CV ECHO MEAS - MV A MAX VEL: 127.8 CM/SEC
BH CV ECHO MEAS - MV DEC SLOPE: 1049 CM/SEC2
BH CV ECHO MEAS - MV DEC TIME: 0.23 MSEC
BH CV ECHO MEAS - MV E MAX VEL: 173 CM/SEC
BH CV ECHO MEAS - MV E/A: 1.35
BH CV ECHO MEAS - MV MAX PG: 18.2 MMHG
BH CV ECHO MEAS - MV MEAN PG: 7 MMHG
BH CV ECHO MEAS - MV P1/2T: 56.9 MSEC
BH CV ECHO MEAS - MV V2 VTI: 45.5 CM
BH CV ECHO MEAS - MVA(P1/2T): 3.9 CM2
BH CV ECHO MEAS - MVA(VTI): 1.52 CM2
BH CV ECHO MEAS - PA ACC TIME: 0.12 SEC
BH CV ECHO MEAS - PA PR(ACCEL): 26.7 MMHG
BH CV ECHO MEAS - PA V2 MAX: 90.9 CM/SEC
BH CV ECHO MEAS - PULM A REVS DUR: 0.08 SEC
BH CV ECHO MEAS - PULM A REVS VEL: 20.9 CM/SEC
BH CV ECHO MEAS - PULM DIAS VEL: 34 CM/SEC
BH CV ECHO MEAS - PULM S/D: 1.15
BH CV ECHO MEAS - PULM SYS VEL: 39.2 CM/SEC
BH CV ECHO MEAS - QP/QS: 0.7
BH CV ECHO MEAS - RAP SYSTOLE: 3 MMHG
BH CV ECHO MEAS - RV MAX PG: 1.9 MMHG
BH CV ECHO MEAS - RV V1 MAX: 69 CM/SEC
BH CV ECHO MEAS - RV V1 VTI: 14.5 CM
BH CV ECHO MEAS - RVOT DIAM: 2.07 CM
BH CV ECHO MEAS - RVSP: 33 MMHG
BH CV ECHO MEAS - SI(MOD-SP2): 15.7 ML/M2
BH CV ECHO MEAS - SI(MOD-SP4): 27.3 ML/M2
BH CV ECHO MEAS - SV(LVOT): 69.4 ML
BH CV ECHO MEAS - SV(MOD-SP2): 35 ML
BH CV ECHO MEAS - SV(MOD-SP4): 61 ML
BH CV ECHO MEAS - SV(RVOT): 48.9 ML
BH CV ECHO MEAS - TAPSE (>1.6): 1.07 CM
BH CV ECHO MEAS - TR MAX PG: 30.8 MMHG
BH CV ECHO MEAS - TR MAX VEL: 277.5 CM/SEC
BH CV ECHO MEASUREMENTS AVERAGE E/E' RATIO: 30.89
BH CV LOW VAS LEFT MID FEMORAL SPONT: 1
BH CV LOWER VASCULAR LEFT COMMON FEMORAL AUGMENT: NORMAL
BH CV LOWER VASCULAR LEFT COMMON FEMORAL COMPETENT: NORMAL
BH CV LOWER VASCULAR LEFT COMMON FEMORAL COMPRESS: NORMAL
BH CV LOWER VASCULAR LEFT COMMON FEMORAL PHASIC: NORMAL
BH CV LOWER VASCULAR LEFT COMMON FEMORAL SPONT: NORMAL
BH CV LOWER VASCULAR LEFT DISTAL FEMORAL COMPRESS: NORMAL
BH CV LOWER VASCULAR LEFT GASTRONEMIUS COMPRESS: NORMAL
BH CV LOWER VASCULAR LEFT GREATER SAPH AK COMPRESS: NORMAL
BH CV LOWER VASCULAR LEFT GREATER SAPH BK COMPRESS: NORMAL
BH CV LOWER VASCULAR LEFT LESSER SAPH COMPRESS: NORMAL
BH CV LOWER VASCULAR LEFT MID FEMORAL AUGMENT: NORMAL
BH CV LOWER VASCULAR LEFT MID FEMORAL COMPRESS: NORMAL
BH CV LOWER VASCULAR LEFT MID FEMORAL PHASIC: NORMAL
BH CV LOWER VASCULAR LEFT MID FEMORAL SPONT: NORMAL
BH CV LOWER VASCULAR LEFT MID FEMORAL THROMBUS: NORMAL
BH CV LOWER VASCULAR LEFT PERONEAL COMPRESS: NORMAL
BH CV LOWER VASCULAR LEFT POPLITEAL AUGMENT: NORMAL
BH CV LOWER VASCULAR LEFT POPLITEAL COMPETENT: NORMAL
BH CV LOWER VASCULAR LEFT POPLITEAL COMPRESS: NORMAL
BH CV LOWER VASCULAR LEFT POPLITEAL PHASIC: NORMAL
BH CV LOWER VASCULAR LEFT POPLITEAL SPONT: NORMAL
BH CV LOWER VASCULAR LEFT POSTERIOR TIBIAL COMPRESS: NORMAL
BH CV LOWER VASCULAR LEFT PROFUNDA FEMORAL COMPRESS: NORMAL
BH CV LOWER VASCULAR LEFT PROXIMAL FEMORAL COMPRESS: NORMAL
BH CV LOWER VASCULAR LEFT SAPHENOFEMORAL JUNCTION COMPRESS: NORMAL
BH CV LOWER VASCULAR RIGHT COMMON FEMORAL AUGMENT: NORMAL
BH CV LOWER VASCULAR RIGHT COMMON FEMORAL COMPETENT: NORMAL
BH CV LOWER VASCULAR RIGHT COMMON FEMORAL COMPRESS: NORMAL
BH CV LOWER VASCULAR RIGHT COMMON FEMORAL PHASIC: NORMAL
BH CV LOWER VASCULAR RIGHT COMMON FEMORAL SPONT: NORMAL
BH CV LOWER VASCULAR RIGHT DISTAL FEMORAL COMPRESS: NORMAL
BH CV LOWER VASCULAR RIGHT GASTRONEMIUS COMPRESS: NORMAL
BH CV LOWER VASCULAR RIGHT LESSER SAPH COMPRESS: NORMAL
BH CV LOWER VASCULAR RIGHT MID FEMORAL AUGMENT: NORMAL
BH CV LOWER VASCULAR RIGHT MID FEMORAL COMPETENT: NORMAL
BH CV LOWER VASCULAR RIGHT MID FEMORAL COMPRESS: NORMAL
BH CV LOWER VASCULAR RIGHT MID FEMORAL PHASIC: NORMAL
BH CV LOWER VASCULAR RIGHT MID FEMORAL SPONT: NORMAL
BH CV LOWER VASCULAR RIGHT PERONEAL COMPRESS: NORMAL
BH CV LOWER VASCULAR RIGHT POPLITEAL AUGMENT: NORMAL
BH CV LOWER VASCULAR RIGHT POPLITEAL COMPETENT: NORMAL
BH CV LOWER VASCULAR RIGHT POPLITEAL COMPRESS: NORMAL
BH CV LOWER VASCULAR RIGHT POPLITEAL PHASIC: NORMAL
BH CV LOWER VASCULAR RIGHT POPLITEAL SPONT: NORMAL
BH CV LOWER VASCULAR RIGHT POSTERIOR TIBIAL COMPRESS: NORMAL
BH CV LOWER VASCULAR RIGHT PROFUNDA FEMORAL COMPRESS: NORMAL
BH CV LOWER VASCULAR RIGHT PROXIMAL FEMORAL COMPRESS: NORMAL
BH CV LOWER VASCULAR RIGHT SAPHENOFEMORAL JUNCTION COMPRESS: NORMAL
BH CV REST NUCLEAR ISOTOPE DOSE: 10.5 MCI
BH CV STRESS COMMENTS STAGE 1: NORMAL
BH CV STRESS DOSE REGADENOSON STAGE 1: 0.4
BH CV STRESS DURATION MIN STAGE 1: 0
BH CV STRESS DURATION SEC STAGE 1: 10
BH CV STRESS NUCLEAR ISOTOPE DOSE: 33.2 MCI
BH CV STRESS PROTOCOL 1: NORMAL
BH CV STRESS RECOVERY BP: NORMAL MMHG
BH CV STRESS RECOVERY HR: 85 BPM
BH CV STRESS STAGE 1: 1
BH CV XLRA - TDI S': 10 CM/SEC
BUN SERPL-MCNC: 12 MG/DL (ref 8–23)
BUN/CREAT SERPL: 10.8 (ref 7–25)
CALCIUM SPEC-SCNC: 8.6 MG/DL (ref 8.6–10.5)
CHLORIDE SERPL-SCNC: 100 MMOL/L (ref 98–107)
CO2 SERPL-SCNC: 32 MMOL/L (ref 22–29)
CREAT SERPL-MCNC: 1.11 MG/DL (ref 0.76–1.27)
DEPRECATED RDW RBC AUTO: 47.8 FL (ref 37–54)
EGFRCR SERPLBLD CKD-EPI 2021: 68.8 ML/MIN/1.73
EOSINOPHIL # BLD AUTO: 0.05 10*3/MM3 (ref 0–0.4)
EOSINOPHIL NFR BLD AUTO: 0.9 % (ref 0.3–6.2)
ERYTHROCYTE [DISTWIDTH] IN BLOOD BY AUTOMATED COUNT: 13.7 % (ref 12.3–15.4)
GLUCOSE BLDC GLUCOMTR-MCNC: 111 MG/DL (ref 70–130)
GLUCOSE BLDC GLUCOMTR-MCNC: 118 MG/DL (ref 70–130)
GLUCOSE BLDC GLUCOMTR-MCNC: 83 MG/DL (ref 70–130)
GLUCOSE BLDC GLUCOMTR-MCNC: 91 MG/DL (ref 70–130)
GLUCOSE SERPL-MCNC: 97 MG/DL (ref 65–99)
HCT VFR BLD AUTO: 34.7 % (ref 37.5–51)
HGB BLD-MCNC: 11.3 G/DL (ref 13–17.7)
IMM GRANULOCYTES # BLD AUTO: 0.02 10*3/MM3 (ref 0–0.05)
IMM GRANULOCYTES NFR BLD AUTO: 0.3 % (ref 0–0.5)
LEFT ATRIUM VOLUME INDEX: 29.4 ML/M2
LV EF NUC BP: 60 %
LYMPHOCYTES # BLD AUTO: 2.06 10*3/MM3 (ref 0.7–3.1)
LYMPHOCYTES NFR BLD AUTO: 36 % (ref 19.6–45.3)
MAGNESIUM SERPL-MCNC: 2.2 MG/DL (ref 1.6–2.4)
MAXIMAL PREDICTED HEART RATE: 144 BPM
MCH RBC QN AUTO: 31.2 PG (ref 26.6–33)
MCHC RBC AUTO-ENTMCNC: 32.6 G/DL (ref 31.5–35.7)
MCV RBC AUTO: 95.9 FL (ref 79–97)
MONOCYTES # BLD AUTO: 1 10*3/MM3 (ref 0.1–0.9)
MONOCYTES NFR BLD AUTO: 17.5 % (ref 5–12)
NEUTROPHILS NFR BLD AUTO: 2.57 10*3/MM3 (ref 1.7–7)
NEUTROPHILS NFR BLD AUTO: 45 % (ref 42.7–76)
NRBC BLD AUTO-RTO: 0 /100 WBC (ref 0–0.2)
PERCENT MAX PREDICTED HR: 63.89 %
PLATELET # BLD AUTO: 242 10*3/MM3 (ref 140–450)
PMV BLD AUTO: 10 FL (ref 6–12)
POTASSIUM SERPL-SCNC: 3.4 MMOL/L (ref 3.5–5.2)
RBC # BLD AUTO: 3.62 10*6/MM3 (ref 4.14–5.8)
SINUS: 3.4 CM
SODIUM SERPL-SCNC: 140 MMOL/L (ref 136–145)
STJ: 3.2 CM
STRESS BASELINE BP: NORMAL MMHG
STRESS BASELINE HR: 78 BPM
STRESS PERCENT HR: 75 %
STRESS POST PEAK BP: NORMAL MMHG
STRESS POST PEAK HR: 92 BPM
STRESS TARGET HR: 122 BPM
TROPONIN T SERPL HS-MCNC: 27 NG/L
VALPROATE SERPL-MCNC: 59 MCG/ML (ref 50–125)
WBC NRBC COR # BLD: 5.72 10*3/MM3 (ref 3.4–10.8)

## 2023-03-07 PROCEDURE — 25010000002 REGADENOSON 0.4 MG/5ML SOLUTION: Performed by: INTERNAL MEDICINE

## 2023-03-07 PROCEDURE — 97110 THERAPEUTIC EXERCISES: CPT

## 2023-03-07 PROCEDURE — 97162 PT EVAL MOD COMPLEX 30 MIN: CPT

## 2023-03-07 PROCEDURE — 93018 CV STRESS TEST I&R ONLY: CPT | Performed by: INTERNAL MEDICINE

## 2023-03-07 PROCEDURE — 83735 ASSAY OF MAGNESIUM: CPT

## 2023-03-07 PROCEDURE — 25010000002 ENOXAPARIN PER 10 MG: Performed by: INTERNAL MEDICINE

## 2023-03-07 PROCEDURE — 85025 COMPLETE CBC W/AUTO DIFF WBC: CPT | Performed by: INTERNAL MEDICINE

## 2023-03-07 PROCEDURE — 0 TECHNETIUM SESTAMIBI: Performed by: INTERNAL MEDICINE

## 2023-03-07 PROCEDURE — 93005 ELECTROCARDIOGRAM TRACING: CPT

## 2023-03-07 PROCEDURE — 25010000002 CEFTRIAXONE PER 250 MG: Performed by: INTERNAL MEDICINE

## 2023-03-07 PROCEDURE — 99232 SBSQ HOSP IP/OBS MODERATE 35: CPT

## 2023-03-07 PROCEDURE — 78452 HT MUSCLE IMAGE SPECT MULT: CPT | Performed by: INTERNAL MEDICINE

## 2023-03-07 PROCEDURE — 84484 ASSAY OF TROPONIN QUANT: CPT

## 2023-03-07 PROCEDURE — 25010000002 FUROSEMIDE PER 20 MG

## 2023-03-07 PROCEDURE — 93970 EXTREMITY STUDY: CPT

## 2023-03-07 PROCEDURE — 25010000002 VANCOMYCIN 10 G RECONSTITUTED SOLUTION: Performed by: INTERNAL MEDICINE

## 2023-03-07 PROCEDURE — 25510000001 PERFLUTREN (DEFINITY) 8.476 MG IN SODIUM CHLORIDE (PF) 0.9 % 10 ML INJECTION

## 2023-03-07 PROCEDURE — 82962 GLUCOSE BLOOD TEST: CPT

## 2023-03-07 PROCEDURE — 93306 TTE W/DOPPLER COMPLETE: CPT | Performed by: INTERNAL MEDICINE

## 2023-03-07 PROCEDURE — 78452 HT MUSCLE IMAGE SPECT MULT: CPT

## 2023-03-07 PROCEDURE — 93010 ELECTROCARDIOGRAM REPORT: CPT | Performed by: INTERNAL MEDICINE

## 2023-03-07 PROCEDURE — A9500 TC99M SESTAMIBI: HCPCS | Performed by: INTERNAL MEDICINE

## 2023-03-07 PROCEDURE — 80164 ASSAY DIPROPYLACETIC ACD TOT: CPT | Performed by: INTERNAL MEDICINE

## 2023-03-07 PROCEDURE — 93306 TTE W/DOPPLER COMPLETE: CPT

## 2023-03-07 PROCEDURE — 87102 FUNGUS ISOLATION CULTURE: CPT | Performed by: INTERNAL MEDICINE

## 2023-03-07 PROCEDURE — 80048 BASIC METABOLIC PNL TOTAL CA: CPT

## 2023-03-07 PROCEDURE — 93017 CV STRESS TEST TRACING ONLY: CPT

## 2023-03-07 RX ORDER — ENOXAPARIN SODIUM 150 MG/ML
110 INJECTION SUBCUTANEOUS EVERY 24 HOURS
Status: DISCONTINUED | OUTPATIENT
Start: 2023-03-07 | End: 2023-03-08

## 2023-03-07 RX ORDER — FINASTERIDE 5 MG/1
5 TABLET, FILM COATED ORAL DAILY
Status: DISCONTINUED | OUTPATIENT
Start: 2023-03-07 | End: 2023-03-14 | Stop reason: HOSPADM

## 2023-03-07 RX ORDER — FUROSEMIDE 10 MG/ML
20 INJECTION INTRAMUSCULAR; INTRAVENOUS EVERY 8 HOURS
Status: DISCONTINUED | OUTPATIENT
Start: 2023-03-08 | End: 2023-03-14 | Stop reason: HOSPADM

## 2023-03-07 RX ADMIN — ENOXAPARIN SODIUM 105 MG: 150 INJECTION SUBCUTANEOUS at 21:36

## 2023-03-07 RX ADMIN — TAMSULOSIN HYDROCHLORIDE 0.4 MG: 0.4 CAPSULE ORAL at 14:37

## 2023-03-07 RX ADMIN — FINASTERIDE 5 MG: 5 TABLET, FILM COATED ORAL at 21:36

## 2023-03-07 RX ADMIN — FUROSEMIDE 40 MG: 10 INJECTION, SOLUTION INTRAMUSCULAR; INTRAVENOUS at 14:36

## 2023-03-07 RX ADMIN — NYSTATIN: 100000 POWDER TOPICAL at 21:37

## 2023-03-07 RX ADMIN — DIVALPROEX SODIUM 500 MG: 500 TABLET, FILM COATED, EXTENDED RELEASE ORAL at 14:36

## 2023-03-07 RX ADMIN — VANCOMYCIN HYDROCHLORIDE 1500 MG: 10 INJECTION, POWDER, LYOPHILIZED, FOR SOLUTION INTRAVENOUS at 14:35

## 2023-03-07 RX ADMIN — TECHNETIUM TC 99M SESTAMIBI 1 DOSE: 1 INJECTION INTRAVENOUS at 06:48

## 2023-03-07 RX ADMIN — REGADENOSON 0.4 MG: 0.08 INJECTION, SOLUTION INTRAVENOUS at 10:10

## 2023-03-07 RX ADMIN — PERFLUTREN 2 ML: 6.52 INJECTION, SUSPENSION INTRAVENOUS at 12:50

## 2023-03-07 RX ADMIN — POTASSIUM CHLORIDE 20 MEQ: 750 TABLET, EXTENDED RELEASE ORAL at 14:36

## 2023-03-07 RX ADMIN — ASPIRIN 81 MG: 81 TABLET, COATED ORAL at 14:37

## 2023-03-07 RX ADMIN — TICAGRELOR 90 MG: 90 TABLET ORAL at 14:37

## 2023-03-07 RX ADMIN — POTASSIUM CHLORIDE 20 MEQ: 750 TABLET, EXTENDED RELEASE ORAL at 18:13

## 2023-03-07 RX ADMIN — TECHNETIUM TC 99M SESTAMIBI 1 DOSE: 1 INJECTION INTRAVENOUS at 10:10

## 2023-03-07 RX ADMIN — Medication 10 ML: at 20:45

## 2023-03-07 RX ADMIN — LEVOTHYROXINE SODIUM 25 MCG: 0.03 TABLET ORAL at 14:37

## 2023-03-07 RX ADMIN — FUROSEMIDE 40 MG: 10 INJECTION, SOLUTION INTRAMUSCULAR; INTRAVENOUS at 03:26

## 2023-03-07 RX ADMIN — PROPRANOLOL HYDROCHLORIDE 60 MG: 20 TABLET ORAL at 20:41

## 2023-03-07 RX ADMIN — PROPRANOLOL HYDROCHLORIDE 60 MG: 20 TABLET ORAL at 14:37

## 2023-03-07 RX ADMIN — NYSTATIN: 100000 POWDER TOPICAL at 14:39

## 2023-03-07 RX ADMIN — ATORVASTATIN CALCIUM 20 MG: 20 TABLET, FILM COATED ORAL at 14:37

## 2023-03-07 RX ADMIN — AMITRIPTYLINE HYDROCHLORIDE 10 MG: 10 TABLET, FILM COATED ORAL at 20:41

## 2023-03-07 RX ADMIN — PANTOPRAZOLE SODIUM 40 MG: 40 TABLET, DELAYED RELEASE ORAL at 06:19

## 2023-03-07 RX ADMIN — FUROSEMIDE 40 MG: 10 INJECTION, SOLUTION INTRAMUSCULAR; INTRAVENOUS at 18:14

## 2023-03-07 RX ADMIN — DOCUSATE SODIUM 50MG AND SENNOSIDES 8.6MG 2 TABLET: 8.6; 5 TABLET, FILM COATED ORAL at 20:41

## 2023-03-07 RX ADMIN — NYSTATIN: 100000 POWDER TOPICAL at 06:19

## 2023-03-07 RX ADMIN — CEFTRIAXONE SODIUM 1 G: 1 INJECTION, POWDER, FOR SOLUTION INTRAMUSCULAR; INTRAVENOUS at 20:41

## 2023-03-07 RX ADMIN — LISINOPRIL 40 MG: 40 TABLET ORAL at 14:37

## 2023-03-07 RX ADMIN — Medication 10 ML: at 14:38

## 2023-03-07 RX ADMIN — POTASSIUM CHLORIDE 20 MEQ: 750 TABLET, EXTENDED RELEASE ORAL at 14:35

## 2023-03-07 RX ADMIN — TICAGRELOR 90 MG: 90 TABLET ORAL at 20:41

## 2023-03-07 RX ADMIN — DIVALPROEX SODIUM 500 MG: 500 TABLET, FILM COATED, EXTENDED RELEASE ORAL at 20:41

## 2023-03-07 NOTE — PLAN OF CARE
Goal Outcome Evaluation:     Patient is a pleasant 78 y.o. male admitted to [facility] for pedal edema and weakness on 3/6/2023. PMHx includes CAD and TBI and resides in a group home. Patient is ambulatory with bilateral canes at baseline- patient reported he has access to a RW though his family MD wanted him to use the canes or get a rollator instead? + fall history noted. Today, patient performed bed mobility with SBA, required CGA with a RW for transfers, and ambulated 8' CGA with a RW. Pain in B feet limited further mobility today. Patient may benefit from skilled PT services acutely to address functional deficits as well as improve level of independence prior to discharge. Discussed recommendation to use RW for mobility as opposed to bilateral canes at this time. Anticipate patient returning back to group home upon DC.
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
## 2023-03-07 NOTE — PLAN OF CARE
Goal Outcome Evaluation:  Plan of Care Reviewed With: patient        Progress: no change  Outcome Evaluation: Patient admitted to the unit with pedal edema, pleasant. AOx4 but has periods of forgetfulness and disorganized thinking at times. IV lasix given. Orientated to the room. Up to BR with one assist and walker. IV abx ordered. Pt denies pain. BG monitored. Q 2 hour turns.

## 2023-03-07 NOTE — SIGNIFICANT NOTE
03/07/23 1429   OTHER   Discipline occupational therapist   Rehab Time/Intention   Session Not Performed patient unavailable for evaluation  (pt was off floor for test, will follow up 3/8)   Recommendation   OT - Next Appointment 03/08/23
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
## 2023-03-07 NOTE — NURSING NOTE
Called lab to request someone come to draw blood culture sets x2 so that abx can be hung/administered.  Nichole in lab will send someone.

## 2023-03-07 NOTE — CASE MANAGEMENT/SOCIAL WORK
Continued Stay Note  [facility]     Patient Name: Hernando Lozada  MRN: [id number]  Today's Date: 3/7/2023    Admit Date: 3/6/2023    Plan: [facility] when medically ready   Discharge Plan     Row Name 03/07/23 1417       Plan    Plan [facility] when medically ready    Patient/Family in Agreement with Plan yes    Plan Comments CCP placed call to Danny [phone], pts brother, d/t pts confusion at times; introduced self and role of CCP. Face sheet information and pharmacy verified. Pt lives at [facility] and facility help with all ADL’s, meds, meals etc. Pt does not drive. Pt uses a CPAP. Pt has a living will. Pt is currently enrolled in meds to beds and denies trouble affording medications. Danny unsure if pt has used HH or been to SNF in the past. DC plan is to return to [facility] at [place]. Sharmin/[facility] notified and stated pt can return; [place] summary needs to be faxed to [phone] and report called to Sharmin at [phone]. Partial Packet in CCP office. Shari LOGAN/CCP               Discharge Codes    No documentation.                     Fozia Erickson, RN

## 2023-03-07 NOTE — DISCHARGE PLACEMENT REQUEST
"Ronald Lozada (78 y.o. Male)     Date of Birth   1945    Social Security Number       [place]   [address] [place] [address]    Home Phone   [phone]    MRN   [id number]       [facility]    Marital Status   Single                            Admission Date   3/6/23    Admission Type   Emergency    Admitting Provider   Jorge Banks MD    Attending Provider   Jorge Banks MD    Department, Room/Bed   [address], [address]/1       Discharge Date       Discharge Disposition       Discharge Destination                               Attending Provider: Jorge Banks MD    Allergies: No Known Allergies    Isolation: None   Infection: None   Code Status: CPR    Ht: 175.3 cm (69\")   Wt: 110 kg (241 lb 8 oz)    Admission Cmt: None   Principal Problem: Pedal edema [R60.0]                 Active Insurance as of 3/6/2023     Primary Coverage     Payor Plan Insurance Group Employer/Plan Group    MEDICARE MEDICARE A & B      Payor Plan Address Payor Plan Phone Number Payor Plan Fax Number Effective Dates    [address] [phone]  1/1/1992 - None Entered    [facility] [id number]       Subscriber Name Subscriber Birth Date Member ID       RONALD LOZADA 1945 [id number]           Secondary Coverage     Payor Plan Insurance Group Employer/Plan Group    [place]      Payor Plan Address Payor Plan Phone Number Payor Plan Fax Number Effective Dates    [address] [phone]  7/11/2018 - None Entered    [place] [address]       Subscriber Name Subscriber Birth Date Member ID       RONALD LOZADA 1945 [id number]                 Emergency Contacts      (Rel.) Home Phone Work Phone Mobile Phone    Danny Lozada (Brother) [phone] -- --    Teo Lozada (Brother) -- -- --              "

## 2023-03-07 NOTE — PROGRESS NOTES
"[facility] Clinical Pharmacy Services: Vancomycin Pharmacokinetic Initial Consult Note    Hernando Lozada is a 78 y.o. male who is on day 1 of pharmacy to dose vancomycin.    Indication: Skin and Soft Tissue  Consulting Provider: Dr oJrge Banks  Planned Duration of Therapy: 5 days  Loading Dose Ordered or Given: 2250 mg on 03/06/23 at 2202  Target: -600 mg/L.hr   Other Antimicrobials: Rocephin    Vitals/Labs  Ht: 175.3 cm (69\"); Wt: 110 kg (241 lb 8 oz)  Temp Readings from Last 1 Encounters:   03/06/23 98 °F (36.7 °C) (Oral)    Estimated Creatinine Clearance: 72.5 mL/min (by C-G formula based on SCr of 1.06 mg/dL).        Results from last 7 days   Lab Units 03/06/23 2047 03/06/23  1115   CREATININE mg/dL  --  1.06   WBC 10*3/mm3 5.40 5.90     Assessment/Plan:    1. Vancomycin Dose:   1500 mg IV every  24  hours  2. Predictive AUC level for the dose ordered is 411 mg/L.hr, which is within the target of 400-600 mg/L.hr  3. Vanc Trough has been ordered for 03/09/23 at 0900     Pharmacy will follow patient's kidney function and will adjust doses and obtain levels as necessary. Thank you for involving pharmacy in this patient's care. Please contact pharmacy with any questions or concerns.                           Jatin Bates, [facility]  Clinical Pharmacist      "
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
## 2023-03-07 NOTE — PLAN OF CARE
Goal Outcome Evaluation:  Plan of Care Reviewed With: patient           Outcome Evaluation: VSS, ra, sr, a/o x4 but forgetful and occassional disorganized thoughts. Q2 turns. IV lasix, IV abx. Echo pending. Stress test normal. Doppler positive for L femoral DVT.

## 2023-03-08 LAB
ANION GAP SERPL CALCULATED.3IONS-SCNC: 10 MMOL/L (ref 5–15)
BASOPHILS # BLD AUTO: 0.03 10*3/MM3 (ref 0–0.2)
BASOPHILS NFR BLD AUTO: 0.5 % (ref 0–1.5)
BUN SERPL-MCNC: 12 MG/DL (ref 8–23)
BUN/CREAT SERPL: 11.5 (ref 7–25)
CALCIUM SPEC-SCNC: 8.7 MG/DL (ref 8.6–10.5)
CHLORIDE SERPL-SCNC: 101 MMOL/L (ref 98–107)
CO2 SERPL-SCNC: 24 MMOL/L (ref 22–29)
CREAT SERPL-MCNC: 1.04 MG/DL (ref 0.76–1.27)
CRP SERPL-MCNC: 0.38 MG/DL (ref 0–0.5)
DEPRECATED RDW RBC AUTO: 44.6 FL (ref 37–54)
EGFRCR SERPLBLD CKD-EPI 2021: 74.4 ML/MIN/1.73
EOSINOPHIL # BLD AUTO: 0.03 10*3/MM3 (ref 0–0.4)
EOSINOPHIL NFR BLD AUTO: 0.5 % (ref 0.3–6.2)
ERYTHROCYTE [DISTWIDTH] IN BLOOD BY AUTOMATED COUNT: 13 % (ref 12.3–15.4)
GLUCOSE BLDC GLUCOMTR-MCNC: 108 MG/DL (ref 70–130)
GLUCOSE BLDC GLUCOMTR-MCNC: 122 MG/DL (ref 70–130)
GLUCOSE BLDC GLUCOMTR-MCNC: 128 MG/DL (ref 70–130)
GLUCOSE SERPL-MCNC: 109 MG/DL (ref 65–99)
HCT VFR BLD AUTO: 36.4 % (ref 37.5–51)
HGB BLD-MCNC: 12.1 G/DL (ref 13–17.7)
LYMPHOCYTES # BLD AUTO: 1.48 10*3/MM3 (ref 0.7–3.1)
LYMPHOCYTES NFR BLD AUTO: 23.1 % (ref 19.6–45.3)
MCH RBC QN AUTO: 31.4 PG (ref 26.6–33)
MCHC RBC AUTO-ENTMCNC: 33.2 G/DL (ref 31.5–35.7)
MCV RBC AUTO: 94.5 FL (ref 79–97)
MONOCYTES # BLD AUTO: 1.21 10*3/MM3 (ref 0.1–0.9)
MONOCYTES NFR BLD AUTO: 18.9 % (ref 5–12)
NEUTROPHILS NFR BLD AUTO: 3.63 10*3/MM3 (ref 1.7–7)
NEUTROPHILS NFR BLD AUTO: 56.7 % (ref 42.7–76)
PLATELET # BLD AUTO: 190 10*3/MM3 (ref 140–450)
PMV BLD AUTO: 10.5 FL (ref 6–12)
POTASSIUM SERPL-SCNC: 4.1 MMOL/L (ref 3.5–5.2)
RBC # BLD AUTO: 3.85 10*6/MM3 (ref 4.14–5.8)
SODIUM SERPL-SCNC: 135 MMOL/L (ref 136–145)
WBC NRBC COR # BLD: 6.4 10*3/MM3 (ref 3.4–10.8)

## 2023-03-08 PROCEDURE — 85025 COMPLETE CBC W/AUTO DIFF WBC: CPT | Performed by: INTERNAL MEDICINE

## 2023-03-08 PROCEDURE — 25010000002 VANCOMYCIN 10 G RECONSTITUTED SOLUTION: Performed by: INTERNAL MEDICINE

## 2023-03-08 PROCEDURE — 97166 OT EVAL MOD COMPLEX 45 MIN: CPT

## 2023-03-08 PROCEDURE — 82962 GLUCOSE BLOOD TEST: CPT

## 2023-03-08 PROCEDURE — 80048 BASIC METABOLIC PNL TOTAL CA: CPT

## 2023-03-08 PROCEDURE — 97110 THERAPEUTIC EXERCISES: CPT

## 2023-03-08 PROCEDURE — 97530 THERAPEUTIC ACTIVITIES: CPT

## 2023-03-08 PROCEDURE — 25010000002 ENOXAPARIN PER 10 MG: Performed by: INTERNAL MEDICINE

## 2023-03-08 PROCEDURE — 25010000002 FUROSEMIDE PER 20 MG: Performed by: INTERNAL MEDICINE

## 2023-03-08 PROCEDURE — 99232 SBSQ HOSP IP/OBS MODERATE 35: CPT | Performed by: INTERNAL MEDICINE

## 2023-03-08 PROCEDURE — 86140 C-REACTIVE PROTEIN: CPT | Performed by: STUDENT IN AN ORGANIZED HEALTH CARE EDUCATION/TRAINING PROGRAM

## 2023-03-08 RX ORDER — ENOXAPARIN SODIUM 150 MG/ML
110 INJECTION SUBCUTANEOUS EVERY 12 HOURS
Status: COMPLETED | OUTPATIENT
Start: 2023-03-08 | End: 2023-03-10

## 2023-03-08 RX ADMIN — TICAGRELOR 90 MG: 90 TABLET ORAL at 09:37

## 2023-03-08 RX ADMIN — NYSTATIN: 100000 POWDER TOPICAL at 22:23

## 2023-03-08 RX ADMIN — PANTOPRAZOLE SODIUM 40 MG: 40 TABLET, DELAYED RELEASE ORAL at 06:15

## 2023-03-08 RX ADMIN — ACETAMINOPHEN 650 MG: 325 TABLET, FILM COATED ORAL at 02:27

## 2023-03-08 RX ADMIN — VANCOMYCIN HYDROCHLORIDE 1500 MG: 10 INJECTION, POWDER, LYOPHILIZED, FOR SOLUTION INTRAVENOUS at 09:37

## 2023-03-08 RX ADMIN — POTASSIUM CHLORIDE 20 MEQ: 750 TABLET, EXTENDED RELEASE ORAL at 11:45

## 2023-03-08 RX ADMIN — FUROSEMIDE 20 MG: 10 INJECTION, SOLUTION INTRAMUSCULAR; INTRAVENOUS at 17:10

## 2023-03-08 RX ADMIN — PROPRANOLOL HYDROCHLORIDE 60 MG: 20 TABLET ORAL at 09:37

## 2023-03-08 RX ADMIN — POLYETHYLENE GLYCOL 3350 17 G: 17 POWDER, FOR SOLUTION ORAL at 10:55

## 2023-03-08 RX ADMIN — FUROSEMIDE 20 MG: 10 INJECTION, SOLUTION INTRAMUSCULAR; INTRAVENOUS at 01:31

## 2023-03-08 RX ADMIN — NYSTATIN: 100000 POWDER TOPICAL at 06:15

## 2023-03-08 RX ADMIN — DIVALPROEX SODIUM 500 MG: 500 TABLET, FILM COATED, EXTENDED RELEASE ORAL at 09:40

## 2023-03-08 RX ADMIN — AMITRIPTYLINE HYDROCHLORIDE 10 MG: 10 TABLET, FILM COATED ORAL at 20:11

## 2023-03-08 RX ADMIN — DIVALPROEX SODIUM 500 MG: 500 TABLET, FILM COATED, EXTENDED RELEASE ORAL at 22:20

## 2023-03-08 RX ADMIN — PROPRANOLOL HYDROCHLORIDE 60 MG: 20 TABLET ORAL at 20:10

## 2023-03-08 RX ADMIN — TAMSULOSIN HYDROCHLORIDE 0.4 MG: 0.4 CAPSULE ORAL at 09:37

## 2023-03-08 RX ADMIN — NYSTATIN: 100000 POWDER TOPICAL at 13:27

## 2023-03-08 RX ADMIN — FUROSEMIDE 20 MG: 10 INJECTION, SOLUTION INTRAMUSCULAR; INTRAVENOUS at 09:39

## 2023-03-08 RX ADMIN — BISACODYL 5 MG: 5 TABLET ORAL at 10:55

## 2023-03-08 RX ADMIN — LEVOTHYROXINE SODIUM 25 MCG: 0.03 TABLET ORAL at 09:36

## 2023-03-08 RX ADMIN — Medication 10 ML: at 20:11

## 2023-03-08 RX ADMIN — TICAGRELOR 90 MG: 90 TABLET ORAL at 20:12

## 2023-03-08 RX ADMIN — ASPIRIN 81 MG: 81 TABLET, COATED ORAL at 09:39

## 2023-03-08 RX ADMIN — POTASSIUM CHLORIDE 20 MEQ: 750 TABLET, EXTENDED RELEASE ORAL at 17:06

## 2023-03-08 RX ADMIN — POTASSIUM CHLORIDE 20 MEQ: 750 TABLET, EXTENDED RELEASE ORAL at 09:39

## 2023-03-08 RX ADMIN — ENOXAPARIN SODIUM 105 MG: 150 INJECTION SUBCUTANEOUS at 17:06

## 2023-03-08 RX ADMIN — ATORVASTATIN CALCIUM 20 MG: 20 TABLET, FILM COATED ORAL at 09:39

## 2023-03-08 RX ADMIN — Medication 10 ML: at 09:39

## 2023-03-08 RX ADMIN — ACETAMINOPHEN 650 MG: 325 TABLET, FILM COATED ORAL at 22:22

## 2023-03-08 RX ADMIN — LISINOPRIL 40 MG: 40 TABLET ORAL at 09:36
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
## 2023-03-08 NOTE — PLAN OF CARE
Goal Outcome Evaluation:        Problem: Adult Inpatient Plan of Care  Goal: Absence of Hospital-Acquired Illness or Injury  Intervention: Identify and Manage Fall Risk  Recent Flowsheet Documentation  Taken 3/8/2023 1422 by Verónica Kee RN  Safety Promotion/Fall Prevention:   activity supervised   room organization consistent   safety round/check completed  Taken 3/8/2023 1239 by Verónica Kee RN  Safety Promotion/Fall Prevention:   activity supervised   room organization consistent   safety round/check completed  Taken 3/8/2023 1022 by Verónica Kee RN  Safety Promotion/Fall Prevention:   activity supervised   room organization consistent   safety round/check completed  Taken 3/8/2023 0931 by Verónica Kee RN  Safety Promotion/Fall Prevention: activity supervised  Taken 3/8/2023 0803 by Verónica Kee RN  Safety Promotion/Fall Prevention:   activity supervised   room organization consistent   safety round/check completed     Problem: Adult Inpatient Plan of Care  Goal: Absence of Hospital-Acquired Illness or Injury  Outcome: Ongoing, Progressing  Intervention: Identify and Manage Fall Risk  Recent Flowsheet Documentation  Taken 3/8/2023 1422 by Verónica Kee RN  Safety Promotion/Fall Prevention:   activity supervised   room organization consistent   safety round/check completed  Taken 3/8/2023 1239 by Verónica Kee RN  Safety Promotion/Fall Prevention:   activity supervised   room organization consistent   safety round/check completed  Taken 3/8/2023 1022 by Verónica Kee RN  Safety Promotion/Fall Prevention:   activity supervised   room organization consistent   safety round/check completed  Taken 3/8/2023 0931 by Verónica Kee RN  Safety Promotion/Fall Prevention: activity supervised  Taken 3/8/2023 0803 by Verónica Kee RN  Safety Promotion/Fall Prevention:   activity supervised   room organization consistent   safety round/check completed  Intervention: Prevent Skin  Injury  Recent Flowsheet Documentation  Taken 3/8/2023 1422 by Verónica Kee RN  Body Position: (sitting in chair) --  Skin Protection: adhesive use limited  Taken 3/8/2023 1239 by Verónica Kee RN  Body Position: (Sitting in chair) --  Taken 3/8/2023 1022 by Verónica Kee RN  Body Position: (Sitting in chair) --  Taken 3/8/2023 0931 by Verónica Kee RN  Body Position: (sitrting in chair) weight shifting  Skin Protection: adhesive use limited  Taken 3/8/2023 0803 by Verónica Kee RN  Body Position:   weight shifting   sitting up in bed  Intervention: Prevent and Manage VTE (Venous Thromboembolism) Risk  Recent Flowsheet Documentation  Taken 3/8/2023 1422 by Verónica Kee RN  Activity Management: activity adjusted per tolerance  VTE Prevention/Management:   bilateral   sequential compression devices off  Taken 3/8/2023 1239 by Verónica Kee RN  Activity Management: activity adjusted per tolerance  Taken 3/8/2023 1022 by Verónica Kee RN  Activity Management:   activity adjusted per tolerance   activity encouraged  Taken 3/8/2023 0931 by Verónica Kee RN  Activity Management: activity adjusted per tolerance  VTE Prevention/Management:   bilateral   sequential compression devices off  Taken 3/8/2023 0803 by Verónica Kee RN  Activity Management: activity adjusted per tolerance  Intervention: Prevent Infection  Recent Flowsheet Documentation  Taken 3/8/2023 1422 by Verónica Kee RN  Infection Prevention:   cohorting utilized   hand hygiene promoted  Taken 3/8/2023 1239 by Verónica Kee RN  Infection Prevention:   cohorting utilized   hand hygiene promoted  Taken 3/8/2023 1022 by Verónica Kee RN  Infection Prevention:   cohorting utilized   hand hygiene promoted  Taken 3/8/2023 0931 by Verónica Kee RN  Infection Prevention:   cohorting utilized   hand hygiene promoted  Taken 3/8/2023 0803 by Verónica Kee RN  Infection Prevention: cohorting utilized      Problem: Adult Inpatient Plan of Care  Goal: Patient-Specific Goal (Individualized)  Outcome: Ongoing, Progressing      VSS. Sat in chair most of the day. Ivabx. A/ox4 but confused at time. IV lasix. Good urine output. Care plan carefully updated.

## 2023-03-08 NOTE — PLAN OF CARE
Goal Outcome Evaluation:      Pt resting in bed. Alert and oriented, but forgetful and occasional disorganized thoughts. On RA. NSR on tele. VSS. Will continue to monitor.

## 2023-03-08 NOTE — PLAN OF CARE
Goal Outcome Evaluation:  Plan of Care Reviewed With: patient        Progress: improving  Outcome Evaluation: Patient pleasant and agreeable to PT and sitting UIC upon arrival. SBA for transfers and ambulated 150' SBA-CGA with a RW. No safety concerns demo'd and encouraged patient to continue to use RW at DC instead of bilateral canes. PT will continue to advance as able and begin following peripherally. Encourage ambulation 3x/day with nsg to maintain functional mobility.

## 2023-03-08 NOTE — PROGRESS NOTES
Name: Hernando Lozada ADMIT: 3/6/2023   : 1945  PCP: Milo Carrasquillo DO    MRN: [id number] LOS: 0 days   AGE/SEX: 78 y.o. male  ROOM: [facility]     Subjective   Subjective   Patient reports improvement of the lower extremity swelling and redness.  Decreased pain in the lower extremity.  There is no fever or chills.  No chest pain.  No palpitation.  No PND.  No orthopnea.    Review of Systems  GI.  No abdominal pain or nausea or vomiting.  .  Positive polyuria but no dysuria or hematuria.     Objective   Objective   Vital Signs  Temp:  [98 °F (36.7 °C)-98.9 °F (37.2 °C)] 98.9 °F (37.2 °C)  Heart Rate:  [60-84] 71  Resp:  [16] 16  BP: (102-161)/(65-87) 102/71  SpO2:  [100 %] 100 %  on   ;   Device (Oxygen Therapy): room air    Intake/Output Summary (Last 24 hours) at 3/7/2023 193  Last data filed at 3/7/2023 191  Gross per 24 hour   Intake 720 ml   Output 2740 ml   Net -2020 ml     Body mass index is 35.59 kg/m².      23  0935 23  1623 23  1249   Weight: 109 kg (241 lb) 110 kg (241 lb 8 oz) 109 kg (241 lb)     Physical Exam  General.  Elderly gentleman appears younger than stated age.  Alert and oriented x3.  No apparent pain/distress/diaphoresis.  Normal mood and affect.  Eyes.  Pupils equal round and reactive.  Intact extraocular musculature.  Status post bilateral cataract surgery.  No pallor or jaundice  Oral cavity.  Moist mucous membrane.  Neck.  Supple.  No JVD.  No lymphadenopathy or thyromegaly.  Cardiovascular.  Regular rate and rhythm.  Occasional ectopic beat.  Grade 3 systolic murmur.  Chest.  Clear to auscultation bilaterally with no added sounds  Abdomen.  Soft lax.  No tenderness.  No organomegaly.  No guarding or rebound.  Extremities.  +2  bilateral lower extremity edema associated with redness and hotness and tenderness of both legs with small abrasions over the right leg (improved from yesterday).  Genitalia.  Macular erythematous rash on both groins.   CNS.  No acute  focal neurological deficits.      Results Review:      Results from last 7 days   Lab Units 03/07/23  0318 03/06/23  1115   SODIUM mmol/L 140 137   POTASSIUM mmol/L 3.4* 3.8   CHLORIDE mmol/L 100 95*   CO2 mmol/L 32.0* 30.3*   BUN mg/dL 12 11   CREATININE mg/dL 1.11 1.06   GLUCOSE mg/dL 97 109*   CALCIUM mg/dL 8.6 9.4   AST (SGOT) U/L  --  26   ALT (SGPT) U/L  --  18     Estimated Creatinine Clearance: 68.9 mL/min (by C-G formula based on SCr of 1.11 mg/dL).  Results from last 7 days   Lab Units 03/06/23  2047   HEMOGLOBIN A1C % 5.50     Results from last 7 days   Lab Units 03/07/23  1619 03/07/23  1431 03/07/23  0531 03/06/23  2041 03/06/23  1849   GLUCOSE mg/dL 111 83 118 132* 162*     Results from last 7 days   Lab Units 03/07/23  0318 03/06/23  1508 03/06/23  1115   HSTROP T ng/L 27* 27* 22*     Results from last 7 days   Lab Units 03/06/23  1115   PROBNP pg/mL 1,616.0     Results from last 7 days   Lab Units 03/06/23  1508   TSH uIU/mL 3.350     Results from last 7 days   Lab Units 03/07/23  0318   MAGNESIUM mg/dL 2.2           Invalid input(s): LDLCALC  Results from last 7 days   Lab Units 03/07/23  0318 03/06/23 2047 03/06/23  1115   WBC 10*3/mm3 5.72 5.40 5.90   HEMOGLOBIN g/dL 11.3* 12.6* 11.6*   HEMATOCRIT % 34.7* 36.9* 36.0*   PLATELETS 10*3/mm3 242 259 219   MCV fL 95.9 93.7 95.7   MCH pg 31.2 32.0 30.9   MCHC g/dL 32.6 34.1 32.2   RDW % 13.7 13.3 13.7   RDW-SD fl 47.8 46.2 48.6   MPV fL 10.0 9.8 9.5   NEUTROPHIL % % 45.0 52.5 59.6   LYMPHOCYTE % % 36.0 31.3 24.7   MONOCYTES % % 17.5* 14.3* 14.4*   EOSINOPHIL % % 0.9 1.1 0.7   BASOPHIL % % 0.3 0.6 0.3   IMM GRAN % % 0.3 0.2 0.3   NEUTROS ABS 10*3/mm3 2.57 2.84 3.51   LYMPHS ABS 10*3/mm3 2.06 1.69 1.46   MONOS ABS 10*3/mm3 1.00* 0.77 0.85   EOS ABS 10*3/mm3 0.05 0.06 0.04   BASOS ABS 10*3/mm3 0.02 0.03 0.02   IMMATURE GRANS (ABS) 10*3/mm3 0.02 0.01 0.02   NRBC /100 WBC 0.0 0.0 0.0             Results from last 7 days   Lab Units 03/06/23  5496  03/06/23  1508   PROCALCITONIN ng/mL  --  0.02   LACTATE mmol/L 2.0  --                      Results from last 7 days   Lab Units 03/06/23  1121   NITRITE UA  Negative           Imaging:  Imaging Results (Last 24 Hours)     ** No results found for the last 24 hours. **             I reviewed the patient's new clinical results / labs / tests / procedures      Assessment/Plan     Active Hospital Problems    Diagnosis  POA   • **Pedal edema [R60.0]  Yes   • Chronic deep vein thrombosis (DVT) of calf muscle vein of left lower extremity (HCC) [I82.562]  Yes   • Tremor [R25.1]  Yes   • Elevated troponin [R77.8]  Yes   • Lower extremity cellulitis [L03.119]  Yes   • Tinea cruris [B35.6]  Yes   • CAD (coronary artery disease) [I25.10]  Yes   • Diabetes mellitus (HCC) [E11.9]  Yes   • Disease of thyroid gland [E07.9]  Yes   • Chronic brain injury [S06.9XAS]  Not Applicable   • Essential hypertension [I10]  Yes      Resolved Hospital Problems   No resolved problems to display.         1.  Progressive wilate lower extremity edema.  Secondary to right-sided heart failure/left lower extremity chronic DVT/bilateral lower extremity cellulitis. proBNP is normal.  Total protein and albumin are normal.  Renal function is normal.  UA without proteinuria.  TSH is normal. .  2D echo with moderate to severe aortic stenosis and normal left ejection fraction and diastolic function. Normal  lactic acid and procalcitonin.    Continue IV vancomycin and IV Rocephin.  Will decrease IV diuresis.  Awaiting nuclear stress test resolved done by cardiology.  We will see what cardiology has to say about the aortic stenosis.  Clinically much better.  Good diuresis.  Initiate anticoagulation with Lovenox (was not on any anticoagulation at home)  2.  Elevated troponin patient with a history of coronary artery disease and hypertension/severe aortic stenosis/right-sided heart failure..  EKG without acute ischemic changes.  There is no chest pain.  2D echo  with normal ejection fraction, moderate to severe AS, normal diastolic function.   Good diuresis on IV Lasix and I will decrease dose. Good blood pressure control.    Continue holding Norvasc secondary to the edema.  Continue the patient on has aspirin/Lipitor/lisinopril/Inderal/Brilinta.  Cardiology following.  3.  Type 2 diabetes.    Continue holding metformin and Ozempic and place A1c 5.5.  Continue sliding scale insulin.  Might have to decrease his diabetic regimen to avoid hypoglycemia at the time of discharge.  4.  Hypothyroidism.  Continue current replacement.  TSH is normal.  5.  History of traumatic brain injury/CVA/benign essential tremor/intellectual disability.  This appears to be stable.  CNS examination is negative.  We will continue Depakote (check level)/Elavil/aspirin/Brilinta/Lipitor.  6.  Benign prostatic hypertrophy with symptoms of urine outflow dysfunction.  We will continue Flomax/Proscar.    Oxybutynin DC'd..  UA is negative.    7.  Anemia.  Hemoglobin stable about the baseline between 11.3 and 12.6.  Will monitor  8.  Dyslipidemia.  Continue aspirin and statin.  9.  GERD.  Benign GI examination.  Continue proton pump inhibitor.  10.  Hypokalemia.  Normal magnesium.  Continue substitution.  11.  DVT prophylaxis.  Patient will be anticoagulated.       Discussed my findings and plan of treatment with the patient.  Disposition.  To be determined based on clinical course.      Jorge Banks MD  [facility]  03/07/23  19:36 EST

## 2023-03-08 NOTE — PROGRESS NOTES
Name: Hernando Lozada ADMIT: 3/6/2023   : 1945  PCP: Milo Carrasquillo DO    MRN: [id number] LOS: 1 days   AGE/SEX: 78 y.o. male  ROOM: [facility]     Subjective   Subjective   Sitting up in chair.  No chest pain.  No new issues this morning.    Objective   Objective   Vital Signs  Temp:  [98 °F (36.7 °C)-98.9 °F (37.2 °C)] 98 °F (36.7 °C)  Heart Rate:  [60-84] 81  Resp:  [16-18] 18  BP: (102-161)/(65-87) 117/87  SpO2:  [98 %-100 %] 99 %  on   ;   Device (Oxygen Therapy): room air  Body mass index is 35.59 kg/m².  Physical Exam  Constitutional:       Appearance: He is obese.      Comments: Chronically ill-appearing   Cardiovascular:      Rate and Rhythm: Normal rate.      Heart sounds: No murmur heard.    No gallop.   Pulmonary:      Effort: Pulmonary effort is normal. No respiratory distress.      Breath sounds: Normal breath sounds. No wheezing.   Abdominal:      General: Abdomen is flat. There is no distension.      Palpations: Abdomen is soft.      Tenderness: There is no guarding.   Musculoskeletal:      Right lower leg: Edema present.      Left lower leg: Edema present.   Neurological:      General: No focal deficit present.      Mental Status: He is alert.         Results Review     I reviewed the patient's new clinical results.  Results from last 7 days   Lab Units 23  04223  1115   WBC 10*3/mm3 6.40 5.72 5.40 5.90   HEMOGLOBIN g/dL 12.1* 11.3* 12.6* 11.6*   PLATELETS 10*3/mm3 190 242 259 219     Results from last 7 days   Lab Units 23  04223  1115   SODIUM mmol/L 135* 140 137   POTASSIUM mmol/L 4.1 3.4* 3.8   CHLORIDE mmol/L 101 100 95*   CO2 mmol/L 24.0 32.0* 30.3*   BUN mg/dL 12 12 11   CREATININE mg/dL 1.04 1.11 1.06   GLUCOSE mg/dL 109* 97 109*   EGFR mL/min/1.73 74.4 68.8 72.7     Results from last 7 days   Lab Units 23  1115   ALBUMIN g/dL 4.2   BILIRUBIN mg/dL 0.5   ALK PHOS U/L 74   AST (SGOT) U/L 26   ALT (SGPT) U/L  18     Results from last 7 days   Lab Units 03/08/23  0421 03/07/23  0318 03/06/23  1115   CALCIUM mg/dL 8.7 8.6 9.4   ALBUMIN g/dL  --   --  4.2   MAGNESIUM mg/dL  --  2.2  --      Results from last 7 days   Lab Units 03/06/23 2047 03/06/23  1508   PROCALCITONIN ng/mL  --  0.02   LACTATE mmol/L 2.0  --      Hemoglobin A1C   Date/Time Value Ref Range Status   03/06/2023 2047 5.50 4.80 - 5.60 % Final     Glucose   Date/Time Value Ref Range Status   03/08/2023 0546 108 70 - 130 mg/dL Final     Comment:     Meter: [id number] : [id number] Chaparro Pat MIRTA   03/07/2023 2033 91 70 - 130 mg/dL Final     Comment:     Meter: [id number] : [id number] Chaparro Pat MIRTA   03/07/2023 1619 111 70 - 130 mg/dL Final     Comment:     Meter: [id number] : [id number] Judd Braxton MIRTA   03/07/2023 1431 83 70 - 130 mg/dL Final     Comment:     Meter: [id number] : [id number] Judd Braxton MIRTA   03/07/2023 0531 118 70 - 130 mg/dL Final     Comment:     Meter: [id number] : [id number] Chaparro Pat MIRTA   03/06/2023 2041 132 (H) 70 - 130 mg/dL Final     Comment:     Meter: [id number] : [id number] Chaparro Pat MIRTA   03/06/2023 1849 162 (H) 70 - 130 mg/dL Final     Comment:     Meter: [id number] : [id number] Kishan Reyesbandar TOBAR       XR Chest 1 View    Result Date: 3/6/2023  1. Borderline cardiomegaly. 2. Lungs appear clear.  This report was finalized on 3/6/2023 10:16 AM by Dr. Mario Davis M.D.      Scheduled Medications  amitriptyline, 10 mg, Oral, Nightly  aspirin, 81 mg, Oral, Daily  atorvastatin, 20 mg, Oral, Daily  cefTRIAXone, 1 g, Intravenous, Q24H  divalproex, 500 mg, Oral, BID  enoxaparin, 105 mg, Subcutaneous, Q24H  finasteride, 5 mg, Oral, Daily  furosemide, 20 mg, Intravenous, Q8H  insulin lispro, 0-7 Units, Subcutaneous, TID AC  levothyroxine, 25 mcg, Oral, Daily  lisinopril, 40 mg, Oral, Daily  nystatin, , Topical, Q8H  pantoprazole, 40 mg, Oral, Q AM  potassium chloride, 20 mEq, Oral, TID With  Meals  propranolol, 60 mg, Oral, BID  sodium chloride, 10 mL, Intravenous, Q12H  tamsulosin, 0.4 mg, Oral, Daily  ticagrelor, 90 mg, Oral, BID  vancomycin, 12.5 mg/kg, Intravenous, Q24H    Infusions  Pharmacy to dose vancomycin,     Diet  Diet: Cardiac Diets, Diabetic Diets; Healthy Heart (2-3 Na+); Consistent Carbohydrate; Texture: Regular Texture (IDDSI 7); Fluid Consistency: Thin (IDDSI 0)       Assessment/Plan     Active Hospital Problems    Diagnosis  POA   • **Pedal edema [R60.0]  Yes   • Chronic deep vein thrombosis (DVT) of calf muscle vein of left lower extremity (HCC) [I82.562]  Yes   • Tremor [R25.1]  Yes   • Elevated troponin [R77.8]  Yes   • Lower extremity cellulitis [L03.119]  Yes   • Tinea cruris [B35.6]  Yes   • CAD (coronary artery disease) [I25.10]  Yes   • Diabetes mellitus (HCC) [E11.9]  Yes   • Disease of thyroid gland [E07.9]  Yes   • Chronic brain injury [S06.9XAS]  Not Applicable   • Essential hypertension [I10]  Yes      Resolved Hospital Problems   No resolved problems to display.       78 y.o. male admitted with Pedal edema.      03/08/23  Follow-up nuclear stress test    LE edema  Chronic LLE DVT  -Bilateral lower extremity Doppler (3/7/2023): Chronic LLE DVT in mid femoral  -We will discontinue antibiotics as at this point I do not feel strongly that patient has lower extremity cellulitis.  Monitor off ABX  -Pt was started on therapeutic Lovenox- can consider stopping as DVT is chronic  -Lasix 20mg q8h    Acute myocardial injury  CAD  Severe AS  -troponin 22 > 27 > 27  -TTE (3/7/2023): 56 to 60%; moderate to severe AS (valve area 1 cm²)  -Cardiology following  -cont ASA, ticagrelor, atorvastatin    Elevated troponin patient with a history of coronary artery disease and hypertension/severe aortic stenosis/right-sided heart failure..  EKG without acute ischemic changes.  There is no chest pain.  2D echo with normal ejection fraction, moderate to severe AS, normal diastolic function.    Good diuresis on IV Lasix and I will decrease dose. Good blood pressure control.    Continue holding Norvasc secondary to the edema.  Continue the patient on has aspirin/Lipitor/lisinopril/Inderal/Brilinta.  Cardiology following.    DM2  -A1c 5.5  -Metformin and Ozempic held on admit  -SSI    Hypothyroid  -Synthroid 25 mcg    History of traumatic brain injury/CVA/benign essential tremor  -continue Depakote (check level), Elavil, ASA, ticagrelor, atorvastatin    BPH  -Finasteride, tamsulosin    HLD  -Atorvastatin 20 mg    · Therapeutic Lovenox for DVT prophylaxis.  · Full code.  · Discussed with patient and care team on multidisciplinary rounds.  · Anticipate discharge return to group home when cleared by consultants      René Millan MD  [facility]  03/08/23  08:35 EST

## 2023-03-08 NOTE — PLAN OF CARE
Goal Outcome Evaluation:  Plan of Care Reviewed With: patient           Outcome Evaluation: Pt seen this date for OT eval. Pt here for B pedal edema, weakness. Pt lives in group home, history of TBI. Pt very pleasant and cooperative and willing to participate in OT eval. Pt performed bed mobility with sba. Sat edge of bed for LE dressing, unable to thread socks, mod assist to complete. Pt performed fm to recliner with cga using rw. Pt required cues for safety, but generally stable on feet. Pt feels he is weaker than usual. Pt demonstrating decreased functional endurance/strength from baseline. Will benefit from OT to address strength, endurance and adls. Pt may need therapy upon dc, hh.OT wore appropriate PPE during session and performed hand hygiene before/after session.
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
## 2023-03-09 PROBLEM — I35.0 NONRHEUMATIC AORTIC VALVE STENOSIS: Status: ACTIVE | Noted: 2023-03-06

## 2023-03-09 LAB
ALBUMIN SERPL-MCNC: 3.8 G/DL (ref 3.5–5.2)
ANION GAP SERPL CALCULATED.3IONS-SCNC: 10 MMOL/L (ref 5–15)
BASOPHILS # BLD AUTO: 0.02 10*3/MM3 (ref 0–0.2)
BASOPHILS NFR BLD AUTO: 0.4 % (ref 0–1.5)
BUN SERPL-MCNC: 12 MG/DL (ref 8–23)
BUN/CREAT SERPL: 12.6 (ref 7–25)
CALCIUM SPEC-SCNC: 8.8 MG/DL (ref 8.6–10.5)
CHLORIDE SERPL-SCNC: 101 MMOL/L (ref 98–107)
CO2 SERPL-SCNC: 27 MMOL/L (ref 22–29)
CREAT SERPL-MCNC: 0.95 MG/DL (ref 0.76–1.27)
DEPRECATED RDW RBC AUTO: 46.8 FL (ref 37–54)
EGFRCR SERPLBLD CKD-EPI 2021: 83 ML/MIN/1.73
EOSINOPHIL # BLD AUTO: 0.04 10*3/MM3 (ref 0–0.4)
EOSINOPHIL NFR BLD AUTO: 0.8 % (ref 0.3–6.2)
ERYTHROCYTE [DISTWIDTH] IN BLOOD BY AUTOMATED COUNT: 13.6 % (ref 12.3–15.4)
GLUCOSE BLDC GLUCOMTR-MCNC: 113 MG/DL (ref 70–130)
GLUCOSE BLDC GLUCOMTR-MCNC: 130 MG/DL (ref 70–130)
GLUCOSE BLDC GLUCOMTR-MCNC: 94 MG/DL (ref 70–130)
GLUCOSE SERPL-MCNC: 113 MG/DL (ref 65–99)
HCT VFR BLD AUTO: 35.2 % (ref 37.5–51)
HGB BLD-MCNC: 12 G/DL (ref 13–17.7)
IMM GRANULOCYTES # BLD AUTO: 0.02 10*3/MM3 (ref 0–0.05)
IMM GRANULOCYTES NFR BLD AUTO: 0.4 % (ref 0–0.5)
LYMPHOCYTES # BLD AUTO: 1.73 10*3/MM3 (ref 0.7–3.1)
LYMPHOCYTES NFR BLD AUTO: 32.8 % (ref 19.6–45.3)
MCH RBC QN AUTO: 32.2 PG (ref 26.6–33)
MCHC RBC AUTO-ENTMCNC: 34.1 G/DL (ref 31.5–35.7)
MCV RBC AUTO: 94.4 FL (ref 79–97)
MONOCYTES # BLD AUTO: 0.82 10*3/MM3 (ref 0.1–0.9)
MONOCYTES NFR BLD AUTO: 15.6 % (ref 5–12)
NEUTROPHILS NFR BLD AUTO: 2.64 10*3/MM3 (ref 1.7–7)
NEUTROPHILS NFR BLD AUTO: 50 % (ref 42.7–76)
NRBC BLD AUTO-RTO: 0 /100 WBC (ref 0–0.2)
PLATELET # BLD AUTO: 230 10*3/MM3 (ref 140–450)
PMV BLD AUTO: 9.7 FL (ref 6–12)
POTASSIUM SERPL-SCNC: 4 MMOL/L (ref 3.5–5.2)
RBC # BLD AUTO: 3.73 10*6/MM3 (ref 4.14–5.8)
SODIUM SERPL-SCNC: 138 MMOL/L (ref 136–145)
VALPROATE SERPL-MCNC: 69 MCG/ML (ref 50–125)
WBC NRBC COR # BLD: 5.27 10*3/MM3 (ref 3.4–10.8)

## 2023-03-09 PROCEDURE — 25010000002 FUROSEMIDE PER 20 MG: Performed by: INTERNAL MEDICINE

## 2023-03-09 PROCEDURE — 80048 BASIC METABOLIC PNL TOTAL CA: CPT

## 2023-03-09 PROCEDURE — 97535 SELF CARE MNGMENT TRAINING: CPT

## 2023-03-09 PROCEDURE — 80164 ASSAY DIPROPYLACETIC ACD TOT: CPT | Performed by: STUDENT IN AN ORGANIZED HEALTH CARE EDUCATION/TRAINING PROGRAM

## 2023-03-09 PROCEDURE — 82962 GLUCOSE BLOOD TEST: CPT

## 2023-03-09 PROCEDURE — 25010000002 ENOXAPARIN PER 10 MG

## 2023-03-09 PROCEDURE — 25010000002 ENOXAPARIN PER 10 MG: Performed by: INTERNAL MEDICINE

## 2023-03-09 PROCEDURE — 99232 SBSQ HOSP IP/OBS MODERATE 35: CPT | Performed by: INTERNAL MEDICINE

## 2023-03-09 PROCEDURE — 82040 ASSAY OF SERUM ALBUMIN: CPT | Performed by: STUDENT IN AN ORGANIZED HEALTH CARE EDUCATION/TRAINING PROGRAM

## 2023-03-09 PROCEDURE — 85025 COMPLETE CBC W/AUTO DIFF WBC: CPT | Performed by: INTERNAL MEDICINE

## 2023-03-09 RX ORDER — EMOLLIENT COMBINATION NO.110
LOTION (ML) TOPICAL AS NEEDED
Status: DISCONTINUED | OUTPATIENT
Start: 2023-03-09 | End: 2023-03-14 | Stop reason: HOSPADM

## 2023-03-09 RX ADMIN — AMITRIPTYLINE HYDROCHLORIDE 10 MG: 10 TABLET, FILM COATED ORAL at 20:26

## 2023-03-09 RX ADMIN — DIVALPROEX SODIUM 500 MG: 500 TABLET, FILM COATED, EXTENDED RELEASE ORAL at 20:25

## 2023-03-09 RX ADMIN — NYSTATIN: 100000 POWDER TOPICAL at 06:11

## 2023-03-09 RX ADMIN — FUROSEMIDE 20 MG: 10 INJECTION, SOLUTION INTRAMUSCULAR; INTRAVENOUS at 18:22

## 2023-03-09 RX ADMIN — ASPIRIN 81 MG: 81 TABLET, COATED ORAL at 09:23

## 2023-03-09 RX ADMIN — Medication 10 ML: at 09:24

## 2023-03-09 RX ADMIN — DIVALPROEX SODIUM 500 MG: 500 TABLET, FILM COATED, EXTENDED RELEASE ORAL at 09:23

## 2023-03-09 RX ADMIN — ATORVASTATIN CALCIUM 20 MG: 20 TABLET, FILM COATED ORAL at 09:23

## 2023-03-09 RX ADMIN — POTASSIUM CHLORIDE 20 MEQ: 750 TABLET, EXTENDED RELEASE ORAL at 11:40

## 2023-03-09 RX ADMIN — TAMSULOSIN HYDROCHLORIDE 0.4 MG: 0.4 CAPSULE ORAL at 09:23

## 2023-03-09 RX ADMIN — LISINOPRIL 40 MG: 40 TABLET ORAL at 09:23

## 2023-03-09 RX ADMIN — FINASTERIDE 5 MG: 5 TABLET, FILM COATED ORAL at 09:23

## 2023-03-09 RX ADMIN — FUROSEMIDE 20 MG: 10 INJECTION, SOLUTION INTRAMUSCULAR; INTRAVENOUS at 09:23

## 2023-03-09 RX ADMIN — POTASSIUM CHLORIDE 20 MEQ: 750 TABLET, EXTENDED RELEASE ORAL at 09:23

## 2023-03-09 RX ADMIN — ENOXAPARIN SODIUM 105 MG: 150 INJECTION SUBCUTANEOUS at 03:17

## 2023-03-09 RX ADMIN — PANTOPRAZOLE SODIUM 40 MG: 40 TABLET, DELAYED RELEASE ORAL at 06:11

## 2023-03-09 RX ADMIN — FUROSEMIDE 20 MG: 10 INJECTION, SOLUTION INTRAMUSCULAR; INTRAVENOUS at 03:17

## 2023-03-09 RX ADMIN — PROPRANOLOL HYDROCHLORIDE 60 MG: 20 TABLET ORAL at 09:23

## 2023-03-09 RX ADMIN — NYSTATIN: 100000 POWDER TOPICAL at 22:15

## 2023-03-09 RX ADMIN — Medication 10 ML: at 20:16

## 2023-03-09 RX ADMIN — TICAGRELOR 90 MG: 90 TABLET ORAL at 09:23

## 2023-03-09 RX ADMIN — ENOXAPARIN SODIUM 105 MG: 150 INJECTION SUBCUTANEOUS at 16:44

## 2023-03-09 RX ADMIN — TICAGRELOR 90 MG: 90 TABLET ORAL at 21:23

## 2023-03-09 RX ADMIN — NYSTATIN: 100000 POWDER TOPICAL at 16:44

## 2023-03-09 RX ADMIN — PROPRANOLOL HYDROCHLORIDE 60 MG: 20 TABLET ORAL at 20:25

## 2023-03-09 RX ADMIN — LEVOTHYROXINE SODIUM 25 MCG: 0.03 TABLET ORAL at 09:23

## 2023-03-09 RX ADMIN — POTASSIUM CHLORIDE 20 MEQ: 750 TABLET, EXTENDED RELEASE ORAL at 16:44

## 2023-03-09 NOTE — CASE MANAGEMENT/SOCIAL WORK
Continued Stay Note  [facility]     Patient Name: Hernando Lozada  MRN: [id number]  Today's Date: 3/9/2023    Admit Date: 3/6/2023    Plan: [facility]   Discharge Plan     Row Name 03/09/23 1334       Plan    Plan [facility]    Patient/Family in Agreement with Plan yes    Plan Comments CCP received call from Sharmin/[facility] for update on pt. Sharmin/[facility] stated pt will need to be reviewed by [facility] Staff to verify pt can return. CCP discussed hospital course and PT/OT notes. Sharmin/[facility] will continue to follow for updates. Shari RN/CCP               Discharge Codes    No documentation.               Expected Discharge Date and Time     Expected Discharge Date Expected Discharge Time    Mar 10, 2023             Fozia Erickson RN

## 2023-03-09 NOTE — PLAN OF CARE
Goal Outcome Evaluation:     Problem: Adult Inpatient Plan of Care  Goal: Absence of Hospital-Acquired Illness or Injury  Outcome: Ongoing, Progressing  Intervention: Identify and Manage Fall Risk  Recent Flowsheet Documentation  Taken 3/9/2023 1602 by Verónica Kee RN  Safety Promotion/Fall Prevention:   activity supervised   room organization consistent   safety round/check completed  Taken 3/9/2023 1414 by Verónica Kee RN  Safety Promotion/Fall Prevention:   activity supervised   safety round/check completed   room organization consistent  Taken 3/9/2023 1257 by Verónica Kee RN  Safety Promotion/Fall Prevention:   activity supervised   room organization consistent  Taken 3/9/2023 1051 by Verónica Kee RN  Safety Promotion/Fall Prevention:   activity supervised   room organization consistent   safety round/check completed  Taken 3/9/2023 0938 by Verónica Kee RN  Safety Promotion/Fall Prevention:   activity supervised   room organization consistent   safety round/check completed  Taken 3/9/2023 0801 by Verónica Kee RN  Safety Promotion/Fall Prevention: activity supervised  Intervention: Prevent Skin Injury  Recent Flowsheet Documentation  Taken 3/9/2023 1602 by Verónica Kee RN  Body Position: (Sitting in chair) --  Taken 3/9/2023 1414 by Verónica Kee RN  Body Position: (Sitting in chair) --  Skin Protection: adhesive use limited  Taken 3/9/2023 1257 by Verónica Kee RN  Body Position: (Sitting in chair) --  Taken 3/9/2023 1051 by Verónica Kee RN  Body Position: weight shifting  Taken 3/9/2023 0938 by Verónica Kee RN  Body Position: (wokring with PT) --  Skin Protection: adhesive use limited  Taken 3/9/2023 0801 by Verónica Kee RN  Body Position: sitting up in bed  Intervention: Prevent and Manage VTE (Venous Thromboembolism) Risk  Recent Flowsheet Documentation  Taken 3/9/2023 1602 by Verónica Kee RN  Activity Management: up in chair  Taken 3/9/2023 1414  by Verónica Kee RN  Activity Management: activity adjusted per tolerance  VTE Prevention/Management:   bilateral   sequential compression devices off  Taken 3/9/2023 1257 by Verónica Kee RN  Activity Management: activity adjusted per tolerance  Taken 3/9/2023 1051 by Verónica Kee RN  Activity Management: activity adjusted per tolerance  Taken 3/9/2023 0938 by Verónica Kee RN  Activity Management: activity adjusted per tolerance  VTE Prevention/Management:   bilateral   sequential compression devices off  Taken 3/9/2023 0801 by Verónica Kee RN  Activity Management: activity adjusted per tolerance  Intervention: Prevent Infection  Recent Flowsheet Documentation  Taken 3/9/2023 1602 by Verónica Kee RN  Infection Prevention:   cohorting utilized   hand hygiene promoted  Taken 3/9/2023 1414 by Verónica Kee RN  Infection Prevention:   cohorting utilized   hand hygiene promoted  Taken 3/9/2023 1257 by Verónica Kee RN  Infection Prevention:   cohorting utilized   hand hygiene promoted  Taken 3/9/2023 1051 by Verónica Kee RN  Infection Prevention:   cohorting utilized   hand hygiene promoted  Taken 3/9/2023 0938 by Verónica Kee RN  Infection Prevention:   cohorting utilized   hand hygiene promoted  Taken 3/9/2023 0801 by Verónica Kee RN  Infection Prevention:   cohorting utilized   hand hygiene promoted     Problem: Adult Inpatient Plan of Care  Goal: Patient-Specific Goal (Individualized)  Outcome: Ongoing, Progressing      VSS. Sat in chair. Walked today. Ivlasx. Rested well. A/ox4. Care plan carefully updated.

## 2023-03-09 NOTE — PLAN OF CARE
Goal Outcome Evaluation:  Plan of Care Reviewed With: patient        Progress: improving  Outcome Evaluation: Pt seen for OT this session. Pt agreeable and eager to participate. Pt performed bed mobility with sba. Walked to sink for grooming with verbal cues for walker placement, safety. Stood at sink with sba to brush teeth. Pt performed mobility into bathroom with verbal cues to negotiate small bathroom and walker, otherwise performed with sba. Pt cont to demo some below baseline performance of adls and fm, will cont to see for OT to address. Pt is scheduled to have cardiac cath friday. Plan to return to group home with HH.OT wore appropriate PPE during session and performed hand hygiene before/after session.

## 2023-03-09 NOTE — PROGRESS NOTES
Name: Hernando Lozada ADMIT: 3/6/2023   : 1945  PCP: Milo Carrasquillo DO    MRN: [id number] LOS: 2 days   AGE/SEX: 78 y.o. male  ROOM: [facility]     Subjective   Subjective   Sitting up in chair today.  Feels that his leg edema has improved.  No new complaints today.    Objective   Objective   Vital Signs  Temp:  [97.8 °F (36.6 °C)-98.1 °F (36.7 °C)] 98.1 °F (36.7 °C)  Heart Rate:  [68-78] 68  Resp:  [18] 18  BP: (113-130)/(74-89) 116/74  SpO2:  [96 %-99 %] 99 %  on   ;   Device (Oxygen Therapy): room air  Body mass index is 35.59 kg/m².  Physical Exam  Constitutional:       Appearance: He is obese.      Comments: Chronically ill-appearing   Cardiovascular:      Rate and Rhythm: Normal rate.      Heart sounds: No murmur heard.    No gallop.   Pulmonary:      Effort: Pulmonary effort is normal. No respiratory distress.      Breath sounds: Normal breath sounds. No wheezing.   Abdominal:      General: Abdomen is flat. There is no distension.      Palpations: Abdomen is soft.      Tenderness: There is no guarding.   Musculoskeletal:      Right lower leg: Edema present.      Left lower leg: Edema present.   Neurological:      General: No focal deficit present.      Mental Status: He is alert.         Results Review     I reviewed the patient's new clinical results.  Results from last 7 days   Lab Units 23  03423  2047   WBC 10*3/mm3 5.27 6.40 5.72 5.40   HEMOGLOBIN g/dL 12.0* 12.1* 11.3* 12.6*   PLATELETS 10*3/mm3 230 190 242 259     Results from last 7 days   Lab Units 23  04223  1115   SODIUM mmol/L 138 135* 140 137   POTASSIUM mmol/L 4.0 4.1 3.4* 3.8   CHLORIDE mmol/L 101 101 100 95*   CO2 mmol/L 27.0 24.0 32.0* 30.3*   BUN mg/dL 12 12 12 11   CREATININE mg/dL 0.95 1.04 1.11 1.06   GLUCOSE mg/dL 113* 109* 97 109*   EGFR mL/min/1.73 83.0 74.4 68.8 72.7     Results from last 7 days   Lab Units 23  1115   ALBUMIN g/dL 4.2    BILIRUBIN mg/dL 0.5   ALK PHOS U/L 74   AST (SGOT) U/L 26   ALT (SGPT) U/L 18     Results from last 7 days   Lab Units 03/09/23  0344 03/08/23  0421 03/07/23  0318 03/06/23  1115   CALCIUM mg/dL 8.8 8.7 8.6 9.4   ALBUMIN g/dL  --   --   --  4.2   MAGNESIUM mg/dL  --   --  2.2  --      Results from last 7 days   Lab Units 03/06/23 2047 03/06/23  1508   PROCALCITONIN ng/mL  --  0.02   LACTATE mmol/L 2.0  --      Hemoglobin A1C   Date/Time Value Ref Range Status   03/06/2023 2047 5.50 4.80 - 5.60 % Final     Glucose   Date/Time Value Ref Range Status   03/09/2023 1108 113 70 - 130 mg/dL Final     Comment:     Meter: [id number] : [id number] Uziel Aly NA   03/09/2023 0634 130 70 - 130 mg/dL Final     Comment:     Meter: [id number] : [id number] Janice Suarez NA   03/08/2023 1613 122 70 - 130 mg/dL Final     Comment:     Meter: [id number] : [id number] Sergio Jasso NA   03/08/2023 1053 128 70 - 130 mg/dL Final     Comment:     Meter: [id number] : [id number] Lucila Mattson CNA   03/08/2023 0546 108 70 - 130 mg/dL Final     Comment:     Meter: [id number] : [id number] Chaparro Pat NA   03/07/2023 2033 91 70 - 130 mg/dL Final     Comment:     Meter: [id number] : [id number] Chaparro Pat NA   03/07/2023 1619 111 70 - 130 mg/dL Final     Comment:     Meter: [id number] : [id number] Judd KIRBY       No radiology results for the last day  Scheduled Medications  amitriptyline, 10 mg, Oral, Nightly  aspirin, 81 mg, Oral, Daily  atorvastatin, 20 mg, Oral, Daily  divalproex, 500 mg, Oral, BID  enoxaparin, 105 mg, Subcutaneous, Q12H  finasteride, 5 mg, Oral, Daily  furosemide, 20 mg, Intravenous, Q8H  insulin lispro, 0-7 Units, Subcutaneous, TID AC  levothyroxine, 25 mcg, Oral, Daily  lisinopril, 40 mg, Oral, Daily  nystatin, , Topical, Q8H  pantoprazole, 40 mg, Oral, Q AM  potassium chloride, 20 mEq, Oral, TID With Meals  propranolol, 60 mg, Oral, BID  sodium chloride, 10 mL, Intravenous, Q12H  tamsulosin,  0.4 mg, Oral, Daily  ticagrelor, 90 mg, Oral, BID    Infusions   Diet  Diet: Cardiac Diets, Diabetic Diets; Healthy Heart (2-3 Na+); Consistent Carbohydrate; Texture: Regular Texture (IDDSI 7); Fluid Consistency: Thin (IDDSI 0)  NPO Diet NPO Type: Sips with Meds       Assessment/Plan     Active Hospital Problems    Diagnosis  POA   • **Pedal edema [R60.0]  Yes   • Chronic deep vein thrombosis (DVT) of calf muscle vein of left lower extremity (HCC) [I82.562]  Yes   • Tremor [R25.1]  Yes   • Elevated troponin [R77.8]  Yes   • Lower extremity cellulitis [L03.119]  Yes   • Tinea cruris [B35.6]  Yes   • Nonrheumatic aortic valve stenosis [I35.0]  Unknown   • CAD (coronary artery disease) [I25.10]  Yes   • Diabetes mellitus (HCC) [E11.9]  Yes   • Disease of thyroid gland [E07.9]  Yes   • Chronic brain injury [S06.9XAS]  Not Applicable   • Essential hypertension [I10]  Yes   • SOB (shortness of breath) [R06.02]  Unknown      Resolved Hospital Problems   No resolved problems to display.       78 y.o. male admitted with Pedal edema.      03/09/23  Cardiology planning right and left heart cath tentatively 3/10/2023.    LE edema  Chronic LLE DVT  -Bilateral lower extremity Doppler (3/7/2023): Chronic LLE DVT in mid femoral  -We will discontinue antibiotics as at this point I do not feel strongly that patient has lower extremity cellulitis.  Monitor off ABX   -Pt was started on therapeutic Lovenox- can consider stopping as DVT is chronic  -Lasix 20mg q8h    Acute myocardial injury  CAD  Severe AS  -troponin 22 > 27 > 27  -TTE (3/7/2023): 56 to 60%; moderate to severe AS (valve area 1 cm²)  -Cardiology following  -Nuclear stress test 3/8/2023 WNL  -plan for RHC+LHC 3/10/23  -cont ASA, ticagrelor, atorvastatin    DM2  -A1c 5.5  -Metformin and Ozempic held on admit  -Has not required SSI coverage; will DC Accu-Cheks for now, check morning labs and if BG regularly elevated will resume    Hypothyroid  -Synthroid 25 mcg    History of  traumatic brain injury/CVA/benign essential tremor  -continue Depakote (check level), Elavil, ASA, ticagrelor, atorvastatin    BPH  -Finasteride, tamsulosin    HLD  -Atorvastatin 20 mg    · Therapeutic Lovenox for DVT prophylaxis.  · Full code.  · Discussed with patient and care team on multidisciplinary rounds.  · Anticipate discharge return to group home when cleared by consultants      René Millan MD  [facility]  03/09/23  15:28 EST
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
## 2023-03-10 ENCOUNTER — APPOINTMENT (OUTPATIENT)
Dept: GENERAL RADIOLOGY | Facility: HOSPITAL | Age: 78
DRG: 246 | End: 2023-03-10
Payer: MEDICARE

## 2023-03-10 LAB
ACT BLD: 173 SECONDS (ref 82–152)
ANION GAP SERPL CALCULATED.3IONS-SCNC: 11.6 MMOL/L (ref 5–15)
BUN SERPL-MCNC: 14 MG/DL (ref 8–23)
BUN/CREAT SERPL: 13.9 (ref 7–25)
CALCIUM SPEC-SCNC: 9.7 MG/DL (ref 8.6–10.5)
CHLORIDE SERPL-SCNC: 98 MMOL/L (ref 98–107)
CO2 SERPL-SCNC: 29.4 MMOL/L (ref 22–29)
CREAT SERPL-MCNC: 1.01 MG/DL (ref 0.76–1.27)
DEPRECATED RDW RBC AUTO: 46.7 FL (ref 37–54)
EGFRCR SERPLBLD CKD-EPI 2021: 77.1 ML/MIN/1.73
ERYTHROCYTE [DISTWIDTH] IN BLOOD BY AUTOMATED COUNT: 13.5 % (ref 12.3–15.4)
GLUCOSE BLDC GLUCOMTR-MCNC: 112 MG/DL (ref 70–130)
GLUCOSE BLDC GLUCOMTR-MCNC: 112 MG/DL (ref 70–130)
GLUCOSE BLDC GLUCOMTR-MCNC: 121 MG/DL (ref 70–130)
GLUCOSE BLDC GLUCOMTR-MCNC: 143 MG/DL (ref 70–130)
GLUCOSE SERPL-MCNC: 105 MG/DL (ref 65–99)
HCT VFR BLD AUTO: 38.7 % (ref 37.5–51)
HGB BLD-MCNC: 12.6 G/DL (ref 13–17.7)
MCH RBC QN AUTO: 30.9 PG (ref 26.6–33)
MCHC RBC AUTO-ENTMCNC: 32.6 G/DL (ref 31.5–35.7)
MCV RBC AUTO: 94.9 FL (ref 79–97)
PLATELET # BLD AUTO: 279 10*3/MM3 (ref 140–450)
PMV BLD AUTO: 9.6 FL (ref 6–12)
POTASSIUM SERPL-SCNC: 4 MMOL/L (ref 3.5–5.2)
RBC # BLD AUTO: 4.08 10*6/MM3 (ref 4.14–5.8)
SODIUM SERPL-SCNC: 139 MMOL/L (ref 136–145)
WBC NRBC COR # BLD: 7.57 10*3/MM3 (ref 3.4–10.8)

## 2023-03-10 PROCEDURE — 87102 FUNGUS ISOLATION CULTURE: CPT | Performed by: INTERNAL MEDICINE

## 2023-03-10 PROCEDURE — 99152 MOD SED SAME PHYS/QHP 5/>YRS: CPT | Performed by: INTERNAL MEDICINE

## 2023-03-10 PROCEDURE — 25010000002 FUROSEMIDE PER 20 MG: Performed by: INTERNAL MEDICINE

## 2023-03-10 PROCEDURE — B2181ZZ FLUOROSCOPY OF LEFT INTERNAL MAMMARY BYPASS GRAFT USING LOW OSMOLAR CONTRAST: ICD-10-PCS | Performed by: INTERNAL MEDICINE

## 2023-03-10 PROCEDURE — 82962 GLUCOSE BLOOD TEST: CPT

## 2023-03-10 PROCEDURE — 02703ZZ DILATION OF CORONARY ARTERY, ONE ARTERY, PERCUTANEOUS APPROACH: ICD-10-PCS | Performed by: INTERNAL MEDICINE

## 2023-03-10 PROCEDURE — 4A023N8 MEASUREMENT OF CARDIAC SAMPLING AND PRESSURE, BILATERAL, PERCUTANEOUS APPROACH: ICD-10-PCS | Performed by: INTERNAL MEDICINE

## 2023-03-10 PROCEDURE — B2131ZZ FLUOROSCOPY OF MULTIPLE CORONARY ARTERY BYPASS GRAFTS USING LOW OSMOLAR CONTRAST: ICD-10-PCS | Performed by: INTERNAL MEDICINE

## 2023-03-10 PROCEDURE — 73630 X-RAY EXAM OF FOOT: CPT

## 2023-03-10 PROCEDURE — C1760 CLOSURE DEV, VASC: HCPCS | Performed by: INTERNAL MEDICINE

## 2023-03-10 PROCEDURE — 25010000002 ENOXAPARIN PER 10 MG

## 2023-03-10 PROCEDURE — 92928 PRQ TCAT PLMT NTRAC ST 1 LES: CPT | Performed by: INTERNAL MEDICINE

## 2023-03-10 PROCEDURE — 027034Z DILATION OF CORONARY ARTERY, ONE ARTERY WITH DRUG-ELUTING INTRALUMINAL DEVICE, PERCUTANEOUS APPROACH: ICD-10-PCS | Performed by: INTERNAL MEDICINE

## 2023-03-10 PROCEDURE — C1887 CATHETER, GUIDING: HCPCS | Performed by: INTERNAL MEDICINE

## 2023-03-10 PROCEDURE — 93461 R&L HRT ART/VENTRICLE ANGIO: CPT | Performed by: INTERNAL MEDICINE

## 2023-03-10 PROCEDURE — 85347 COAGULATION TIME ACTIVATED: CPT

## 2023-03-10 PROCEDURE — C1769 GUIDE WIRE: HCPCS | Performed by: INTERNAL MEDICINE

## 2023-03-10 PROCEDURE — 25010000002 MIDAZOLAM PER 1 MG: Performed by: INTERNAL MEDICINE

## 2023-03-10 PROCEDURE — 99153 MOD SED SAME PHYS/QHP EA: CPT | Performed by: INTERNAL MEDICINE

## 2023-03-10 PROCEDURE — B2151ZZ FLUOROSCOPY OF LEFT HEART USING LOW OSMOLAR CONTRAST: ICD-10-PCS | Performed by: INTERNAL MEDICINE

## 2023-03-10 PROCEDURE — 25010000002 HEPARIN (PORCINE) PER 1000 UNITS: Performed by: INTERNAL MEDICINE

## 2023-03-10 PROCEDURE — 92921 PR PRQ TRLUML CORONARY ANGIOPLASTY ADDL BRANCH: CPT | Performed by: INTERNAL MEDICINE

## 2023-03-10 PROCEDURE — C9600 PERC DRUG-EL COR STENT SING: HCPCS | Performed by: INTERNAL MEDICINE

## 2023-03-10 PROCEDURE — 25010000002 FENTANYL CITRATE (PF) 50 MCG/ML SOLUTION: Performed by: INTERNAL MEDICINE

## 2023-03-10 PROCEDURE — 85018 HEMOGLOBIN: CPT

## 2023-03-10 PROCEDURE — C1874 STENT, COATED/COV W/DEL SYS: HCPCS | Performed by: INTERNAL MEDICINE

## 2023-03-10 PROCEDURE — 82810 BLOOD GASES O2 SAT ONLY: CPT

## 2023-03-10 PROCEDURE — C1725 CATH, TRANSLUMIN NON-LASER: HCPCS | Performed by: INTERNAL MEDICINE

## 2023-03-10 PROCEDURE — C1894 INTRO/SHEATH, NON-LASER: HCPCS | Performed by: INTERNAL MEDICINE

## 2023-03-10 PROCEDURE — 80048 BASIC METABOLIC PNL TOTAL CA: CPT

## 2023-03-10 PROCEDURE — 85014 HEMATOCRIT: CPT

## 2023-03-10 PROCEDURE — 85027 COMPLETE CBC AUTOMATED: CPT | Performed by: STUDENT IN AN ORGANIZED HEALTH CARE EDUCATION/TRAINING PROGRAM

## 2023-03-10 PROCEDURE — 25510000001 IOPAMIDOL PER 1 ML: Performed by: INTERNAL MEDICINE

## 2023-03-10 PROCEDURE — 92921: CPT | Performed by: INTERNAL MEDICINE

## 2023-03-10 PROCEDURE — B2111ZZ FLUOROSCOPY OF MULTIPLE CORONARY ARTERIES USING LOW OSMOLAR CONTRAST: ICD-10-PCS | Performed by: INTERNAL MEDICINE

## 2023-03-10 DEVICE — XIENCE SKYPOINT™ EVEROLIMUS ELUTING CORONARY STENT SYSTEM 3.00 MM X 08 MM / RAPID-EXCHANGE
Type: IMPLANTABLE DEVICE | Site: CORONARY | Status: FUNCTIONAL
Brand: XIENCE SKYPOINT™

## 2023-03-10 RX ORDER — SODIUM CHLORIDE 9 MG/ML
INJECTION, SOLUTION INTRAVENOUS
Status: COMPLETED | OUTPATIENT
Start: 2023-03-10 | End: 2023-03-10

## 2023-03-10 RX ORDER — HEPARIN SODIUM 1000 [USP'U]/ML
INJECTION, SOLUTION INTRAVENOUS; SUBCUTANEOUS
Status: DISCONTINUED | OUTPATIENT
Start: 2023-03-10 | End: 2023-03-10 | Stop reason: HOSPADM

## 2023-03-10 RX ORDER — SODIUM CHLORIDE 9 MG/ML
250 INJECTION, SOLUTION INTRAVENOUS ONCE AS NEEDED
Status: DISCONTINUED | OUTPATIENT
Start: 2023-03-10 | End: 2023-03-11

## 2023-03-10 RX ORDER — LIDOCAINE HYDROCHLORIDE 20 MG/ML
INJECTION, SOLUTION INFILTRATION; PERINEURAL
Status: DISCONTINUED | OUTPATIENT
Start: 2023-03-10 | End: 2023-03-10 | Stop reason: HOSPADM

## 2023-03-10 RX ORDER — FENTANYL CITRATE 50 UG/ML
INJECTION, SOLUTION INTRAMUSCULAR; INTRAVENOUS
Status: DISCONTINUED | OUTPATIENT
Start: 2023-03-10 | End: 2023-03-10 | Stop reason: HOSPADM

## 2023-03-10 RX ORDER — ASPIRIN 81 MG/1
TABLET, CHEWABLE ORAL
Status: DISCONTINUED | OUTPATIENT
Start: 2023-03-10 | End: 2023-03-10 | Stop reason: HOSPADM

## 2023-03-10 RX ORDER — MIDAZOLAM HYDROCHLORIDE 1 MG/ML
INJECTION INTRAMUSCULAR; INTRAVENOUS
Status: DISCONTINUED | OUTPATIENT
Start: 2023-03-10 | End: 2023-03-10 | Stop reason: HOSPADM

## 2023-03-10 RX ORDER — ACETAMINOPHEN 325 MG/1
650 TABLET ORAL EVERY 4 HOURS PRN
Status: DISCONTINUED | OUTPATIENT
Start: 2023-03-10 | End: 2023-03-11

## 2023-03-10 RX ADMIN — NYSTATIN: 100000 POWDER TOPICAL at 06:37

## 2023-03-10 RX ADMIN — NYSTATIN: 100000 POWDER TOPICAL at 16:46

## 2023-03-10 RX ADMIN — FUROSEMIDE 20 MG: 10 INJECTION, SOLUTION INTRAMUSCULAR; INTRAVENOUS at 19:27

## 2023-03-10 RX ADMIN — Medication 10 ML: at 23:22

## 2023-03-10 RX ADMIN — LISINOPRIL 40 MG: 40 TABLET ORAL at 14:31

## 2023-03-10 RX ADMIN — ACETAMINOPHEN 650 MG: 325 TABLET ORAL at 23:21

## 2023-03-10 RX ADMIN — TICAGRELOR 90 MG: 90 TABLET ORAL at 23:21

## 2023-03-10 RX ADMIN — PROPRANOLOL HYDROCHLORIDE 60 MG: 20 TABLET ORAL at 23:22

## 2023-03-10 RX ADMIN — AMITRIPTYLINE HYDROCHLORIDE 10 MG: 10 TABLET, FILM COATED ORAL at 23:22

## 2023-03-10 RX ADMIN — DIVALPROEX SODIUM 500 MG: 500 TABLET, FILM COATED, EXTENDED RELEASE ORAL at 16:46

## 2023-03-10 RX ADMIN — ACETAMINOPHEN 650 MG: 325 TABLET, FILM COATED ORAL at 06:44

## 2023-03-10 RX ADMIN — NYSTATIN: 100000 POWDER TOPICAL at 23:23

## 2023-03-10 RX ADMIN — POTASSIUM CHLORIDE 20 MEQ: 750 TABLET, EXTENDED RELEASE ORAL at 16:47

## 2023-03-10 RX ADMIN — FUROSEMIDE 20 MG: 10 INJECTION, SOLUTION INTRAMUSCULAR; INTRAVENOUS at 03:06

## 2023-03-10 RX ADMIN — ENOXAPARIN SODIUM 105 MG: 150 INJECTION SUBCUTANEOUS at 03:06

## 2023-03-10 RX ADMIN — PANTOPRAZOLE SODIUM 40 MG: 40 TABLET, DELAYED RELEASE ORAL at 06:37

## 2023-03-10 NOTE — CONSULTS
Met with patient to discuss the benefits of cardiac rehab. Patient states he has a bike at home and will use it some times. Does not believe that he is interested in attending cardiac rehab at this time. Provided him with contact information if he should change his mind and like to attend,

## 2023-03-10 NOTE — DISCHARGE INSTRUCTIONS
[facility]  [address] [place] [address]    Coronary Angiogram with Stent (Femoral Approach) After Care    Refer to this sheet in the next few weeks. These instructions provide you with information on caring for yourself after your procedure. Your health care provider may also give you more specific instructions. Your treatment has been planned according to current medical practices, but problems sometimes occur. Call your health care provider if you have any problems or questions after your procedure.    WHAT TO EXPECT AFTER THE PROCEDURE    The insertion site may be tender for a few days after your procedure.  Minor discomfort or tenderness and a small bump at the catheter insertion site.  The bump should decrease in size and tenderness within 1 to 2 weeks.     HOME CARE INSTRUCTIONS      Take medicines only as directed by your health care provider. Blood thinners may be prescribed after your procedure to improve blood flow through the stent.  Metformin or any medications containing Metformin should not be taken for 48 hours after your procedure.    Cardiac Rehab may or may not be ordered.  Please consult with your physician.   Do not apply powder or lotion to the site.    Check your insertion site every day for redness, swelling, or fluid leaking from the insertion site.    Increase your fluid intake for the next 2 days.    Hold direct pressure over the site when you cough, sneeze, laugh or change positions.  Do this for the next 2 days.    Avoid strenuous activity as directed by your physician.  Follow instructions about when you can drive and return to work as directed by your physician.     Do not take baths, swim, or use a hot tub until your health care provider approves.   You may shower 24 hours after the procedure. Remove the bandage (dressing) and gently wash the site with plain soap and water.  Gently pat the site dry.  Do not rub the insertion area with a washcloth or towel.  You  may apply a band aid daily for 2 days if desired.   Limit your activity for the first 48 hours.  Do not bend, squat, or lift anything over 20lb. (9 kg) or as directed by your health care provider.  However, we recommend lifting nothing heavier than a gallon of milk.   Do not operate machinery or power tools for 24 hours.  A responsible adult should be with you for the first 24 hours after you arrive home. Do not make any important legal decisions or sign legal papers for 24 hours.  Do not drink alcohol for 24 hours.    Eat a heart-healthy diet. This should include plenty of fresh fruits and vegetables. Meat should be lean cuts. Avoid the following types of food:    Food that is high in salt.    Canned or highly processed food.    Food that is high in saturated fat or sugar.    Fried food.    Make any other lifestyle changes recommended by your health care provider. This may include:    Not using any tobacco products including cigarettes, chewing tobacco, or electronic cigarettes.   Managing your weight.    Getting regular exercise.    Managing your blood pressure.    Limiting your alcohol intake.    Managing other health problems, such as diabetes.    If you need an MRI after your heart stent was placed, be sure to tell the health care provider who orders the MRI that you have a heart stent.    Keep all follow-up visits as directed by your health care provider.      Call your doctor if:    You have heavy bleeding from the site, lie down flat, hold manual pressure and call 911 immediately.     You develop chest pain, shortness of breath, feel faint, or pass out.  You have bleeding, swelling larger than a walnut, or drainage from the catheter insertion site.  You develop pain, discoloration, coldness, numbness, tingling, or severe bruising in the leg that held the catheter.  You develop bleeding from any other place such as from the bowels.   You have a fever > 101 or chills.    Call Your Doctor If:     You have any  symptoms of a stroke.  Remember BE FAST  B-balance. Sudden trouble walking or loss of balance.  E-eyes.  Sudden changes in how you see or a sudden onset of a very bad headache.   F-face. Sudden weakness or loss of feeling of the face or facial droop on one side.   A-arms Sudden weakness or numbness in one arm.  One arm drifts down if they are both held out in front of you. This happens suddenly and usually on one side of the body.  S-speech.  Sudden trouble speaking, slurred speech or trouble understanding what people are saying.   T-time  Time to call emergency services.  Write down the symptoms and the time they started.    MAKE SURE YOU:  Understand these instructions.  Will watch your condition.  Will get help right away if you are not doing well or get worse.

## 2023-03-10 NOTE — PROGRESS NOTES
Name: Hernando Lozada ADMIT: 3/6/2023   : 1945  PCP: Milo Carrasquillo DO    MRN: [id number] LOS: 3 days   AGE/SEX: 78 y.o. male  ROOM: [facility]     Subjective   Subjective   No events overnight.  Plan for heart cath today.    Objective   Objective   Vital Signs  Temp:  [97.8 °F (36.6 °C)-98.1 °F (36.7 °C)] 97.9 °F (36.6 °C)  Heart Rate:  [68-80] 72  Resp:  [16-18] 16  BP: (116-147)/() 133/95  SpO2:  [93 %-99 %] 98 %  on   ;   Device (Oxygen Therapy): room air  Body mass index is 35.59 kg/m².  Physical Exam  Constitutional:       Appearance: He is obese.      Comments: Chronically ill-appearing   Cardiovascular:      Rate and Rhythm: Normal rate.      Heart sounds: No murmur heard.    No gallop.   Pulmonary:      Effort: Pulmonary effort is normal. No respiratory distress.      Breath sounds: Normal breath sounds. No wheezing.   Abdominal:      General: Abdomen is flat. There is no distension.      Palpations: Abdomen is soft.      Tenderness: There is no guarding.   Musculoskeletal:      Right lower leg: Edema present.      Left lower leg: Edema present.   Neurological:      General: No focal deficit present.      Mental Status: He is alert.         Results Review     I reviewed the patient's new clinical results.  Results from last 7 days   Lab Units 03/10/23  0416 03/09/23  0344 03/08/23  0421 03/07/23  0318   WBC 10*3/mm3 7.57 5.27 6.40 5.72   HEMOGLOBIN g/dL 12.6* 12.0* 12.1* 11.3*   PLATELETS 10*3/mm3 279 230 190 242     Results from last 7 days   Lab Units 03/10/23  0416 03/09/23  0344 03/08/23  0421 03/07/23  0318   SODIUM mmol/L 139 138 135* 140   POTASSIUM mmol/L 4.0 4.0 4.1 3.4*   CHLORIDE mmol/L 98 101 101 100   CO2 mmol/L 29.4* 27.0 24.0 32.0*   BUN mg/dL 14 12 12 12   CREATININE mg/dL 1.01 0.95 1.04 1.11   GLUCOSE mg/dL 105* 113* 109* 97   EGFR mL/min/1.73 77.1 83.0 74.4 68.8     Results from last 7 days   Lab Units 23  0344 23  1115   ALBUMIN g/dL 3.8 4.2   BILIRUBIN mg/dL  --   0.5   ALK PHOS U/L  --  74   AST (SGOT) U/L  --  26   ALT (SGPT) U/L  --  18     Results from last 7 days   Lab Units 03/10/23  0416 03/09/23  0344 03/08/23  0421 03/07/23  0318 03/06/23  1115   CALCIUM mg/dL 9.7 8.8 8.7 8.6 9.4   ALBUMIN g/dL  --  3.8  --   --  4.2   MAGNESIUM mg/dL  --   --   --  2.2  --      Results from last 7 days   Lab Units 03/06/23  2047 03/06/23  1508   PROCALCITONIN ng/mL  --  0.02   LACTATE mmol/L 2.0  --      Glucose   Date/Time Value Ref Range Status   03/10/2023 0551 112 70 - 130 mg/dL Final     Comment:     Meter: [id number] : [id number] Teresa Ram NA   03/09/2023 1612 94 70 - 130 mg/dL Final     Comment:     Meter: [id number] : [id number] Uziel Aly NA   03/09/2023 1108 113 70 - 130 mg/dL Final     Comment:     Meter: [id number] : [id number] Uziel Aly NA   03/09/2023 0634 130 70 - 130 mg/dL Final     Comment:     Meter: [id number] : [id number] Janice Suarez NA   03/08/2023 1613 122 70 - 130 mg/dL Final     Comment:     Meter: [id number] : [id number] Sergio Huntleyah NA   03/08/2023 1053 128 70 - 130 mg/dL Final     Comment:     Meter: [id number] : [id number] Lucila Twila CNA   03/08/2023 0546 108 70 - 130 mg/dL Final     Comment:     Meter: [id number] : [id number] Chaparro KIRBY       No radiology results for the last day  Scheduled Medications  amitriptyline, 10 mg, Oral, Nightly  aspirin, 81 mg, Oral, Daily  atorvastatin, 20 mg, Oral, Daily  divalproex, 500 mg, Oral, BID  finasteride, 5 mg, Oral, Daily  furosemide, 20 mg, Intravenous, Q8H  insulin lispro, 0-7 Units, Subcutaneous, TID AC  levothyroxine, 25 mcg, Oral, Daily  lisinopril, 40 mg, Oral, Daily  nystatin, , Topical, Q8H  pantoprazole, 40 mg, Oral, Q AM  potassium chloride, 20 mEq, Oral, TID With Meals  propranolol, 60 mg, Oral, BID  sodium chloride, 10 mL, Intravenous, Q12H  tamsulosin, 0.4 mg, Oral, Daily  ticagrelor, 90 mg, Oral, BID    Infusions   Diet  NPO Diet NPO Type: Sips with  Meds       Assessment/Plan     Active Hospital Problems    Diagnosis  POA   • **Pedal edema [R60.0]  Yes   • Chronic deep vein thrombosis (DVT) of calf muscle vein of left lower extremity (HCC) [I82.562]  Yes   • Tremor [R25.1]  Yes   • Elevated troponin [R77.8]  Yes   • Lower extremity cellulitis [L03.119]  Yes   • Tinea cruris [B35.6]  Yes   • Nonrheumatic aortic valve stenosis [I35.0]  Unknown   • CAD (coronary artery disease) [I25.10]  Yes   • Diabetes mellitus (HCC) [E11.9]  Yes   • Disease of thyroid gland [E07.9]  Yes   • Chronic brain injury [S06.9XAS]  Not Applicable   • Essential hypertension [I10]  Yes   • SOB (shortness of breath) [R06.02]  Unknown      Resolved Hospital Problems   No resolved problems to display.       78 y.o. male admitted with Pedal edema.      03/10/23  Cardiology planning right and left heart cath today, 3/10/2023.    LE edema  Chronic LLE DVT  -Bilateral lower extremity Doppler (3/7/2023): Chronic LLE DVT in mid femoral  -We will discontinue antibiotics as at this point I do not feel strongly that patient has lower extremity cellulitis.  Monitor off ABX   -Pt was started on therapeutic Lovenox  -Lasix 20mg q8h    Acute myocardial injury  CAD  Severe AS  -troponin 22 > 27 > 27  -TTE (3/7/2023): 56 to 60%; moderate to severe AS (valve area 1 cm²)  -Cardiology following  -Nuclear stress test 3/8/2023 WNL  -plan for RHC+LHC 3/10/23  -cont ASA, ticagrelor, atorvastatin    DM2  -A1c 5.5  -Metformin and Ozempic held on admit  -Has not required SSI coverage; will DC Accu-Cheks for now, check morning labs and if BG regularly elevated will resume    Hypothyroid  -Synthroid 25 mcg    History of traumatic brain injury/CVA/benign essential tremor  -continue Depakote (check level), Elavil, ASA, ticagrelor, atorvastatin    BPH  -Finasteride, tamsulosin    HLD  -Atorvastatin 20 mg    · Therapeutic Lovenox for DVT prophylaxis; ON HOLD for cath  · Full code.  · Discussed with patient.  · Anticipate  discharge return to group home when cleared by consultants      René Millan MD  [facility]  03/10/23  07:21 EST

## 2023-03-10 NOTE — SIGNIFICANT NOTE
03/10/23 1339   OTHER   Discipline physical therapist   Rehab Time/Intention   Session Not Performed other (see comments)  (Pt underwent L and R heat cath this AM. Anil has bedrest orders that will be complete after 330. PT may follow up this afternoon, time permitting, or next service date for tx.)   Recommendation   PT - Next Appointment 03/11/23

## 2023-03-11 LAB
ANION GAP SERPL CALCULATED.3IONS-SCNC: 9.9 MMOL/L (ref 5–15)
BACTERIA SPEC AEROBE CULT: NORMAL
BACTERIA SPEC AEROBE CULT: NORMAL
BUN SERPL-MCNC: 16 MG/DL (ref 8–23)
BUN/CREAT SERPL: 15.7 (ref 7–25)
CALCIUM SPEC-SCNC: 9.3 MG/DL (ref 8.6–10.5)
CHLORIDE SERPL-SCNC: 99 MMOL/L (ref 98–107)
CO2 SERPL-SCNC: 28.1 MMOL/L (ref 22–29)
CREAT SERPL-MCNC: 1.02 MG/DL (ref 0.76–1.27)
DEPRECATED RDW RBC AUTO: 46.7 FL (ref 37–54)
EGFRCR SERPLBLD CKD-EPI 2021: 76.2 ML/MIN/1.73
ERYTHROCYTE [DISTWIDTH] IN BLOOD BY AUTOMATED COUNT: 13.6 % (ref 12.3–15.4)
GLUCOSE BLDC GLUCOMTR-MCNC: 108 MG/DL (ref 70–130)
GLUCOSE BLDC GLUCOMTR-MCNC: 113 MG/DL (ref 70–130)
GLUCOSE BLDC GLUCOMTR-MCNC: 164 MG/DL (ref 70–130)
GLUCOSE BLDC GLUCOMTR-MCNC: 191 MG/DL (ref 70–130)
GLUCOSE SERPL-MCNC: 129 MG/DL (ref 65–99)
HCT VFR BLD AUTO: 35 % (ref 37.5–51)
HGB BLD-MCNC: 11.4 G/DL (ref 13–17.7)
MAGNESIUM SERPL-MCNC: 2.2 MG/DL (ref 1.6–2.4)
MCH RBC QN AUTO: 30.7 PG (ref 26.6–33)
MCHC RBC AUTO-ENTMCNC: 32.6 G/DL (ref 31.5–35.7)
MCV RBC AUTO: 94.3 FL (ref 79–97)
PLATELET # BLD AUTO: 241 10*3/MM3 (ref 140–450)
PMV BLD AUTO: 9.6 FL (ref 6–12)
POTASSIUM SERPL-SCNC: 4.4 MMOL/L (ref 3.5–5.2)
RBC # BLD AUTO: 3.71 10*6/MM3 (ref 4.14–5.8)
SODIUM SERPL-SCNC: 137 MMOL/L (ref 136–145)
WBC NRBC COR # BLD: 8.94 10*3/MM3 (ref 3.4–10.8)

## 2023-03-11 PROCEDURE — 80048 BASIC METABOLIC PNL TOTAL CA: CPT | Performed by: INTERNAL MEDICINE

## 2023-03-11 PROCEDURE — 85027 COMPLETE CBC AUTOMATED: CPT | Performed by: INTERNAL MEDICINE

## 2023-03-11 PROCEDURE — 83735 ASSAY OF MAGNESIUM: CPT | Performed by: INTERNAL MEDICINE

## 2023-03-11 PROCEDURE — 63710000001 INSULIN LISPRO (HUMAN) PER 5 UNITS: Performed by: INTERNAL MEDICINE

## 2023-03-11 PROCEDURE — 63710000001 PREDNISONE PER 1 MG: Performed by: HOSPITALIST

## 2023-03-11 PROCEDURE — 25010000002 ENOXAPARIN PER 10 MG: Performed by: HOSPITALIST

## 2023-03-11 PROCEDURE — 25010000002 FUROSEMIDE PER 20 MG: Performed by: INTERNAL MEDICINE

## 2023-03-11 PROCEDURE — 99232 SBSQ HOSP IP/OBS MODERATE 35: CPT | Performed by: INTERNAL MEDICINE

## 2023-03-11 PROCEDURE — 82962 GLUCOSE BLOOD TEST: CPT

## 2023-03-11 RX ORDER — PREDNISONE 20 MG/1
40 TABLET ORAL
Status: DISCONTINUED | OUTPATIENT
Start: 2023-03-11 | End: 2023-03-12

## 2023-03-11 RX ORDER — ENOXAPARIN SODIUM 100 MG/ML
40 INJECTION SUBCUTANEOUS EVERY 24 HOURS
Status: DISCONTINUED | OUTPATIENT
Start: 2023-03-11 | End: 2023-03-14 | Stop reason: HOSPADM

## 2023-03-11 RX ADMIN — PANTOPRAZOLE SODIUM 40 MG: 40 TABLET, DELAYED RELEASE ORAL at 04:29

## 2023-03-11 RX ADMIN — INSULIN LISPRO 2 UNITS: 100 INJECTION, SOLUTION INTRAVENOUS; SUBCUTANEOUS at 17:14

## 2023-03-11 RX ADMIN — NYSTATIN: 100000 POWDER TOPICAL at 06:38

## 2023-03-11 RX ADMIN — POTASSIUM CHLORIDE 20 MEQ: 750 TABLET, EXTENDED RELEASE ORAL at 08:25

## 2023-03-11 RX ADMIN — DIVALPROEX SODIUM 500 MG: 500 TABLET, FILM COATED, EXTENDED RELEASE ORAL at 08:25

## 2023-03-11 RX ADMIN — ATORVASTATIN CALCIUM 20 MG: 20 TABLET, FILM COATED ORAL at 08:25

## 2023-03-11 RX ADMIN — ENOXAPARIN SODIUM 40 MG: 100 INJECTION SUBCUTANEOUS at 17:14

## 2023-03-11 RX ADMIN — TICAGRELOR 90 MG: 90 TABLET ORAL at 08:24

## 2023-03-11 RX ADMIN — ASPIRIN 81 MG: 81 TABLET, COATED ORAL at 08:25

## 2023-03-11 RX ADMIN — Medication 10 ML: at 08:25

## 2023-03-11 RX ADMIN — TICAGRELOR 90 MG: 90 TABLET ORAL at 20:03

## 2023-03-11 RX ADMIN — FINASTERIDE 5 MG: 5 TABLET, FILM COATED ORAL at 08:25

## 2023-03-11 RX ADMIN — PROPRANOLOL HYDROCHLORIDE 60 MG: 20 TABLET ORAL at 20:03

## 2023-03-11 RX ADMIN — AMITRIPTYLINE HYDROCHLORIDE 10 MG: 10 TABLET, FILM COATED ORAL at 20:03

## 2023-03-11 RX ADMIN — PROPRANOLOL HYDROCHLORIDE 60 MG: 20 TABLET ORAL at 08:25

## 2023-03-11 RX ADMIN — LISINOPRIL 40 MG: 40 TABLET ORAL at 08:24

## 2023-03-11 RX ADMIN — TAMSULOSIN HYDROCHLORIDE 0.4 MG: 0.4 CAPSULE ORAL at 08:25

## 2023-03-11 RX ADMIN — NYSTATIN: 100000 POWDER TOPICAL at 14:14

## 2023-03-11 RX ADMIN — POTASSIUM CHLORIDE 20 MEQ: 750 TABLET, EXTENDED RELEASE ORAL at 17:13

## 2023-03-11 RX ADMIN — PREDNISONE 40 MG: 20 TABLET ORAL at 17:13

## 2023-03-11 RX ADMIN — POTASSIUM CHLORIDE 20 MEQ: 750 TABLET, EXTENDED RELEASE ORAL at 12:39

## 2023-03-11 RX ADMIN — ACETAMINOPHEN 650 MG: 325 TABLET, FILM COATED ORAL at 04:29

## 2023-03-11 RX ADMIN — FUROSEMIDE 20 MG: 10 INJECTION, SOLUTION INTRAMUSCULAR; INTRAVENOUS at 20:03

## 2023-03-11 RX ADMIN — FUROSEMIDE 20 MG: 10 INJECTION, SOLUTION INTRAMUSCULAR; INTRAVENOUS at 04:28

## 2023-03-11 RX ADMIN — FUROSEMIDE 20 MG: 10 INJECTION, SOLUTION INTRAMUSCULAR; INTRAVENOUS at 12:40

## 2023-03-11 RX ADMIN — ACETAMINOPHEN 650 MG: 325 TABLET, FILM COATED ORAL at 20:06

## 2023-03-11 RX ADMIN — NYSTATIN: 100000 POWDER TOPICAL at 21:30

## 2023-03-11 RX ADMIN — Medication 10 ML: at 20:03

## 2023-03-11 RX ADMIN — DIVALPROEX SODIUM 500 MG: 500 TABLET, FILM COATED, EXTENDED RELEASE ORAL at 20:03

## 2023-03-11 RX ADMIN — LEVOTHYROXINE SODIUM 25 MCG: 0.03 TABLET ORAL at 08:24
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
## 2023-03-11 NOTE — SIGNIFICANT NOTE
03/11/23 1206   OTHER   Discipline physical therapist   Rehab Time/Intention   Session Not Performed patient/family declined treatment  (pt reports he just does not feel well today, c/o significant pain in L great toe limiting ability to bear weight. notified DESMOND Charles, will check back tomorrow)   Recommendation   PT - Next Appointment 03/12/23

## 2023-03-11 NOTE — PLAN OF CARE
Goal Outcome Evaluation:  Plan of Care Reviewed With: patient        Progress: improving  Outcome Evaluation: Pt. admitted for BLE edema, elevated trop. s/p heart cath 3/10, stent placed to circ. R groin site CDI. c/o intermittent LLE pain, +1 edema, reddness. symptoms associated with arthritis vs gout. Steroids started. BLE elevated with pillows. Remains NSR on tele, freq PVCs rates 70-80s. 110-130s SBP. RA. WCTM.

## 2023-03-11 NOTE — PROGRESS NOTES
"[facility] Clinical Pharmacy Services: Enoxaparin Consult    Hernando Lozada has a pharmacy consult to dose prophylactic enoxaparin per Dr Bam Machado's request.     Indication: VTE Prophylaxis  Home Anticoagulation: ASA 81mg; Brilinta      Relevant clinical data and objective history reviewed:  78 y.o. male 175.3 cm (69\") 109 kg (241 lb)   Body mass index is 35.59 kg/m².   Results from last 7 days   Lab Units 03/11/23  0329   PLATELETS 10*3/mm3 241     Estimated Creatinine Clearance: 74.9 mL/min (by C-G formula based on SCr of 1.02 mg/dL).    Assessment/Plan    Will start patient on 40mg subcutaneous every 24 hours, adjusted for renal function. Consult order will be discontinued but pharmacy will continue to follow.     Clair Saleem, [facility]  Clinical Pharmacist    "

## 2023-03-11 NOTE — PROGRESS NOTES
Name: Hernando Lozada ADMIT: 3/6/2023   : 1945  PCP: Milo Carrasquillo DO    MRN: [id number] LOS: 4 days   AGE/SEX: 78 y.o. male  ROOM:      Subjective   Subjective   Complains only of pain in left great toe.  He says been present for 3 days.  Denies injury to his toe.  No history of gout.  No fevers prior to admission.    Objective   Objective   Vital Signs  Temp:  [96.1 °F (35.6 °C)-98.8 °F (37.1 °C)] 96.1 °F (35.6 °C)  Heart Rate:  [76-98] 76  Resp:  [16] 16  BP: (113-134)/(59-78) 118/62  SpO2:  [86 %-99 %] 86 %  on  Flow (L/min):  [2] 2;   Device (Oxygen Therapy): room air  Body mass index is 35.59 kg/m².  Physical Exam  Constitutional:       Appearance: He is obese.      Comments: Chronically ill-appearing   Cardiovascular:      Rate and Rhythm: Normal rate.      Heart sounds: No murmur heard.    No gallop.   Pulmonary:      Effort: Pulmonary effort is normal. No respiratory distress.      Breath sounds: Normal breath sounds. No wheezing.   Abdominal:      General: Abdomen is flat. There is no distension.      Palpations: Abdomen is soft.      Tenderness: There is no guarding.   Musculoskeletal:      Right lower leg: Edema present.      Left lower leg: Edema present.      Comments: R great toe mildly erythematous with significant tenderness to palpation and decreased ROM   Skin:     Comments: No cellulitis   Neurological:      General: No focal deficit present.      Mental Status: He is alert.         Results Review     I reviewed the patient's new clinical results.  Results from last 7 days   Lab Units 03/11/23  0329 03/10/23  0416 03/09/23  0344 23  0421   WBC 10*3/mm3 8.94 7.57 5.27 6.40   HEMOGLOBIN g/dL 11.4* 12.6* 12.0* 12.1*   PLATELETS 10*3/mm3 241 279 230 190     Results from last 7 days   Lab Units 23  0329 03/10/23  0416 23  0344 23  0421   SODIUM mmol/L 137 139 138 135*   POTASSIUM mmol/L 4.4 4.0 4.0 4.1   CHLORIDE mmol/L 99 98 101 101   CO2 mmol/L 28.1 29.4*  27.0 24.0   BUN mg/dL 16 14 12 12   CREATININE mg/dL 1.02 1.01 0.95 1.04   GLUCOSE mg/dL 129* 105* 113* 109*   EGFR mL/min/1.73 76.2 77.1 83.0 74.4     Results from last 7 days   Lab Units 03/09/23  0344 03/06/23  1115   ALBUMIN g/dL 3.8 4.2   BILIRUBIN mg/dL  --  0.5   ALK PHOS U/L  --  74   AST (SGOT) U/L  --  26   ALT (SGPT) U/L  --  18     Results from last 7 days   Lab Units 03/11/23  0329 03/10/23  0416 03/09/23  0344 03/08/23  0421 03/07/23  0318 03/06/23  1115   CALCIUM mg/dL 9.3 9.7 8.8 8.7 8.6 9.4   ALBUMIN g/dL  --   --  3.8  --   --  4.2   MAGNESIUM mg/dL 2.2  --   --   --  2.2  --      Results from last 7 days   Lab Units 03/06/23  2047 03/06/23  1508   PROCALCITONIN ng/mL  --  0.02   LACTATE mmol/L 2.0  --      Glucose   Date/Time Value Ref Range Status   03/11/2023 1052 108 70 - 130 mg/dL Final     Comment:     Meter: [id number] : [id number] Dilip Schrader MIRTA   03/11/2023 0748 113 70 - 130 mg/dL Final     Comment:     Meter: [id number] : [id number] Dilip Schrader    03/10/2023 2031 121 70 - 130 mg/dL Final     Comment:     Meter: [id number] : [id number] Radu Hernandez NA   03/10/2023 1556 143 (H) 70 - 130 mg/dL Final     Comment:     Meter: [id number] : [id number] Thien Maria RN   03/10/2023 0925 112 70 - 130 mg/dL Final     Comment:     Meter: [id number] : [id number] Camille Butts RN   03/10/2023 0551 112 70 - 130 mg/dL Final     Comment:     Meter: [id number] : [id number] Smyzer Leanna MIRTA   03/09/2023 1612 94 70 - 130 mg/dL Final     Comment:     Meter: [id number] : [id number] Uziel KIRBY       No radiology results for the last day  Scheduled Medications  amitriptyline, 10 mg, Oral, Nightly  aspirin, 81 mg, Oral, Daily  atorvastatin, 20 mg, Oral, Daily  divalproex, 500 mg, Oral, BID  finasteride, 5 mg, Oral, Daily  furosemide, 20 mg, Intravenous, Q8H  insulin lispro, 0-7 Units, Subcutaneous, TID AC  levothyroxine, 25 mcg, Oral, Daily  lisinopril, 40 mg, Oral,  Daily  nystatin, , Topical, Q8H  pantoprazole, 40 mg, Oral, Q AM  potassium chloride, 20 mEq, Oral, TID With Meals  propranolol, 60 mg, Oral, BID  sodium chloride, 10 mL, Intravenous, Q12H  tamsulosin, 0.4 mg, Oral, Daily  ticagrelor, 90 mg, Oral, BID    Infusions   Diet  Diet: Cardiac Diets, Diabetic Diets; Healthy Heart (2-3 Na+); Consistent Carbohydrate; Texture: Regular Texture (IDDSI 7); Fluid Consistency: Thin (IDDSI 0)       Assessment/Plan     Active Hospital Problems    Diagnosis  POA   • **Pedal edema [R60.0]  Yes   • Chronic deep vein thrombosis (DVT) of calf muscle vein of left lower extremity (HCC) [I82.562]  Yes   • Tremor [R25.1]  Yes   • Elevated troponin [R77.8]  Yes   • Lower extremity cellulitis [L03.119]  Yes   • Tinea cruris [B35.6]  Yes   • Nonrheumatic aortic valve stenosis [I35.0]  Unknown   • CAD (coronary artery disease) [I25.10]  Yes   • Diabetes mellitus (HCC) [E11.9]  Yes   • Disease of thyroid gland [E07.9]  Yes   • Chronic brain injury [S06.9XAS]  Not Applicable   • Essential hypertension [I10]  Yes   • SOB (shortness of breath) [R06.02]  Unknown      Resolved Hospital Problems   No resolved problems to display.       78 y.o. male admitted with Pedal edema.    Patient seems stable from a cardiac perspective per Dr. GRANT.  Could likely discharge when okay with them.    Etiology of left foot/toe pain unclear.  X-ray does not show fracture but does show findings consistent with osteoarthritis.  Osteomyelitis was suggested as a possibility though patient has no infectious symptoms.  Toe is exquisitely tender as frequently seen with gout or pseudogout.  He had normal CRP few days ago.  We will recheck CRP and ESR and likely give trial of steroid.  Would consider MRI to further evaluate if develops fever or shows no response to steroids.      LE edema/Chronic LLE DVT  -Bilateral lower extremity Doppler (3/7/2023): Chronic LLE DVT in mid femoral  -stable off antibiotics as at this point I do not  feel strongly that patient has lower extremity cellulitis.      Acute myocardial injury/CAD/Severe AS  -TTE (3/7/2023): 56 to 60%; moderate to severe AS (valve area 1 cm²)  -s/p successful angioplasty and stent to the proximal circumflex 80% reduced to 0% with 3.0/8 Xience stent  -cont ASA, ticagrelor, atorvastatin    DM2  -A1c 5.5  -Metformin and Ozempic held on admit  -BS stable    Hypothyroid  -Synthroid 25 mcg    History of traumatic brain injury/CVA/benign essential tremor  -continue Depakote (level in therapeutic range twice this admission), Elavil, ASA, ticagrelor, atorvastatin    BPH  -Finasteride, tamsulosin    HLD  -Atorvastatin 20 mg    · Pharmacy to dose Lovenox for DVT prophylaxis.  No acute clot seen on venous Doppler.  · Full code.  · Discussed with patient.  · Anticipate discharge return to group home when cleared by consultants      Bam Machado MD  [facility]  03/11/23  15:18 EST

## 2023-03-12 LAB
ACT BLD: 305 SECONDS (ref 82–152)
ANION GAP SERPL CALCULATED.3IONS-SCNC: 11.1 MMOL/L (ref 5–15)
BACTERIA ISLT: NORMAL
BUN SERPL-MCNC: 23 MG/DL (ref 8–23)
BUN/CREAT SERPL: 20.2 (ref 7–25)
CALCIUM SPEC-SCNC: 9.5 MG/DL (ref 8.6–10.5)
CHLORIDE SERPL-SCNC: 100 MMOL/L (ref 98–107)
CO2 SERPL-SCNC: 24.9 MMOL/L (ref 22–29)
CREAT SERPL-MCNC: 1.14 MG/DL (ref 0.76–1.27)
CRP SERPL-MCNC: 11.11 MG/DL (ref 0–0.5)
DEPRECATED RDW RBC AUTO: 44.9 FL (ref 37–54)
EGFRCR SERPLBLD CKD-EPI 2021: 66.7 ML/MIN/1.73
ERYTHROCYTE [DISTWIDTH] IN BLOOD BY AUTOMATED COUNT: 13.4 % (ref 12.3–15.4)
ERYTHROCYTE [SEDIMENTATION RATE] IN BLOOD: 50 MM/HR (ref 0–20)
GLUCOSE BLDC GLUCOMTR-MCNC: 151 MG/DL (ref 70–130)
GLUCOSE BLDC GLUCOMTR-MCNC: 172 MG/DL (ref 70–130)
GLUCOSE BLDC GLUCOMTR-MCNC: 191 MG/DL (ref 70–130)
GLUCOSE BLDC GLUCOMTR-MCNC: 224 MG/DL (ref 70–130)
GLUCOSE SERPL-MCNC: 182 MG/DL (ref 65–99)
HCT VFR BLD AUTO: 35.7 % (ref 37.5–51)
HCT VFR BLDA CALC: 38 % (ref 38–51)
HGB BLD-MCNC: 11.7 G/DL (ref 13–17.7)
HGB BLDA-MCNC: 12.9 G/DL (ref 12–17)
MCH RBC QN AUTO: 30.6 PG (ref 26.6–33)
MCHC RBC AUTO-ENTMCNC: 32.8 G/DL (ref 31.5–35.7)
MCV RBC AUTO: 93.5 FL (ref 79–97)
PLATELET # BLD AUTO: 238 10*3/MM3 (ref 140–450)
PMV BLD AUTO: 9.9 FL (ref 6–12)
POTASSIUM SERPL-SCNC: 4.8 MMOL/L (ref 3.5–5.2)
PROCALCITONIN SERPL-MCNC: 0.08 NG/ML (ref 0–0.25)
RBC # BLD AUTO: 3.82 10*6/MM3 (ref 4.14–5.8)
SAO2 % BLDA: 96 % (ref 95–98)
SODIUM SERPL-SCNC: 136 MMOL/L (ref 136–145)
URATE SERPL-MCNC: 8.1 MG/DL (ref 3.4–7)
WBC NRBC COR # BLD: 9.74 10*3/MM3 (ref 3.4–10.8)

## 2023-03-12 PROCEDURE — 85652 RBC SED RATE AUTOMATED: CPT | Performed by: HOSPITALIST

## 2023-03-12 PROCEDURE — 84550 ASSAY OF BLOOD/URIC ACID: CPT | Performed by: HOSPITALIST

## 2023-03-12 PROCEDURE — 86140 C-REACTIVE PROTEIN: CPT | Performed by: HOSPITALIST

## 2023-03-12 PROCEDURE — 63710000001 PREDNISONE PER 1 MG: Performed by: HOSPITALIST

## 2023-03-12 PROCEDURE — 99232 SBSQ HOSP IP/OBS MODERATE 35: CPT | Performed by: INTERNAL MEDICINE

## 2023-03-12 PROCEDURE — 80048 BASIC METABOLIC PNL TOTAL CA: CPT | Performed by: HOSPITALIST

## 2023-03-12 PROCEDURE — 25010000002 ENOXAPARIN PER 10 MG: Performed by: HOSPITALIST

## 2023-03-12 PROCEDURE — 84145 PROCALCITONIN (PCT): CPT | Performed by: HOSPITALIST

## 2023-03-12 PROCEDURE — 63710000001 INSULIN LISPRO (HUMAN) PER 5 UNITS: Performed by: INTERNAL MEDICINE

## 2023-03-12 PROCEDURE — 25010000002 FUROSEMIDE PER 20 MG: Performed by: INTERNAL MEDICINE

## 2023-03-12 PROCEDURE — 97530 THERAPEUTIC ACTIVITIES: CPT

## 2023-03-12 PROCEDURE — 82962 GLUCOSE BLOOD TEST: CPT

## 2023-03-12 PROCEDURE — 85027 COMPLETE CBC AUTOMATED: CPT | Performed by: HOSPITALIST

## 2023-03-12 RX ADMIN — INSULIN LISPRO 3 UNITS: 100 INJECTION, SOLUTION INTRAVENOUS; SUBCUTANEOUS at 06:36

## 2023-03-12 RX ADMIN — TICAGRELOR 90 MG: 90 TABLET ORAL at 09:15

## 2023-03-12 RX ADMIN — NYSTATIN: 100000 POWDER TOPICAL at 21:48

## 2023-03-12 RX ADMIN — FUROSEMIDE 20 MG: 10 INJECTION, SOLUTION INTRAMUSCULAR; INTRAVENOUS at 13:12

## 2023-03-12 RX ADMIN — PANTOPRAZOLE SODIUM 40 MG: 40 TABLET, DELAYED RELEASE ORAL at 06:32

## 2023-03-12 RX ADMIN — NYSTATIN: 100000 POWDER TOPICAL at 06:33

## 2023-03-12 RX ADMIN — FINASTERIDE 5 MG: 5 TABLET, FILM COATED ORAL at 09:14

## 2023-03-12 RX ADMIN — AMITRIPTYLINE HYDROCHLORIDE 10 MG: 10 TABLET, FILM COATED ORAL at 21:48

## 2023-03-12 RX ADMIN — POTASSIUM CHLORIDE 20 MEQ: 750 TABLET, EXTENDED RELEASE ORAL at 09:14

## 2023-03-12 RX ADMIN — PREDNISONE 40 MG: 20 TABLET ORAL at 09:14

## 2023-03-12 RX ADMIN — POTASSIUM CHLORIDE 20 MEQ: 750 TABLET, EXTENDED RELEASE ORAL at 13:12

## 2023-03-12 RX ADMIN — DIVALPROEX SODIUM 500 MG: 500 TABLET, FILM COATED, EXTENDED RELEASE ORAL at 09:14

## 2023-03-12 RX ADMIN — FUROSEMIDE 20 MG: 10 INJECTION, SOLUTION INTRAMUSCULAR; INTRAVENOUS at 21:48

## 2023-03-12 RX ADMIN — ASPIRIN 81 MG: 81 TABLET, COATED ORAL at 09:14

## 2023-03-12 RX ADMIN — INSULIN LISPRO 2 UNITS: 100 INJECTION, SOLUTION INTRAVENOUS; SUBCUTANEOUS at 17:04

## 2023-03-12 RX ADMIN — TAMSULOSIN HYDROCHLORIDE 0.4 MG: 0.4 CAPSULE ORAL at 09:14

## 2023-03-12 RX ADMIN — INSULIN LISPRO 2 UNITS: 100 INJECTION, SOLUTION INTRAVENOUS; SUBCUTANEOUS at 13:12

## 2023-03-12 RX ADMIN — DIVALPROEX SODIUM 500 MG: 500 TABLET, FILM COATED, EXTENDED RELEASE ORAL at 21:48

## 2023-03-12 RX ADMIN — TICAGRELOR 90 MG: 90 TABLET ORAL at 21:48

## 2023-03-12 RX ADMIN — Medication 10 ML: at 09:16

## 2023-03-12 RX ADMIN — PROPRANOLOL HYDROCHLORIDE 60 MG: 20 TABLET ORAL at 21:48

## 2023-03-12 RX ADMIN — ACETAMINOPHEN 650 MG: 325 TABLET, FILM COATED ORAL at 06:35

## 2023-03-12 RX ADMIN — FUROSEMIDE 20 MG: 10 INJECTION, SOLUTION INTRAMUSCULAR; INTRAVENOUS at 04:25

## 2023-03-12 RX ADMIN — POTASSIUM CHLORIDE 20 MEQ: 750 TABLET, EXTENDED RELEASE ORAL at 17:05

## 2023-03-12 RX ADMIN — ENOXAPARIN SODIUM 40 MG: 100 INJECTION SUBCUTANEOUS at 17:05

## 2023-03-12 RX ADMIN — ATORVASTATIN CALCIUM 20 MG: 20 TABLET, FILM COATED ORAL at 09:15

## 2023-03-12 RX ADMIN — PROPRANOLOL HYDROCHLORIDE 60 MG: 20 TABLET ORAL at 13:13

## 2023-03-12 RX ADMIN — NYSTATIN: 100000 POWDER TOPICAL at 13:13

## 2023-03-12 RX ADMIN — LEVOTHYROXINE SODIUM 25 MCG: 0.03 TABLET ORAL at 09:15

## 2023-03-12 RX ADMIN — Medication 10 ML: at 21:49

## 2023-03-12 NOTE — PLAN OF CARE
Goal Outcome Evaluation:  Plan of Care Reviewed With: patient        Progress: improving  Outcome Evaluation: Pt agreeable to therapy today, states his L foot pain is much improved, able to stand with SBA, ambulate short distance with rwx and CGA. Patient states he has a walker but it is very old, also reports he is getting a rollator upon d/c home. Recommend continued use of rwx or rollator at d/c, anticipate d/c home soon.

## 2023-03-12 NOTE — DISCHARGE PLACEMENT REQUEST
"Ronald Roberson (78 y.o. Male)     Date of Birth   1945    Social Security Number       [place]   [address] [place] [address]    Home Phone   [phone]    MRN   [id number]       [employer]   [facility]    Marital Status   Single                            Admission Date   3/6/23    Admission Type   Emergency    Admitting Provider   Jorge Banks MD    Attending Provider   Bam Machado MD    Department, Room/Bed   [facility] CARDIOVASC UNIT, 2221/1       Discharge Date       Discharge Disposition       Discharge Destination                               Attending Provider: Bam Machado MD    Allergies: No Known Allergies    Isolation: Contact   Infection: Candida Auris (rule out) (03/07/23)   Code Status: CPR    Ht: 175.3 cm (69\")   Wt: 109 kg (241 lb)    Admission Cmt: None   Principal Problem: Pedal edema [R60.0]                 Active Insurance as of 3/6/2023     Primary Coverage     Payor Plan Insurance Group Employer/Plan Group    MEDICARE MEDICARE A & B      Payor Plan Address Payor Plan Phone Number Payor Plan Fax Number Effective Dates    [address] [phone]  1/1/1992 - None Entered    [facility] [id number]       Subscriber Name Subscriber Birth Date Member ID       RONALD ROBERSON 1945 [id number]           Secondary Coverage     Payor Plan Insurance Group Employer/Plan Group    [place]      Payor Plan Address Payor Plan Phone Number Payor Plan Fax Number Effective Dates    [address] [phone]  7/11/2018 - None Entered    [place] [address]       Subscriber Name Subscriber Birth Date Member ID       RONALD ROBERSON 1945 [id number]                 Emergency Contacts      (Rel.) Home Phone Work Phone Mobile Phone    Danny Roberson (Brother) [phone] -- --    Teo Roberson (Brother) [phone] -- --    Stanford (cousin)David [phone] -- --              "

## 2023-03-12 NOTE — CASE MANAGEMENT/SOCIAL WORK
Continued Stay Note  [facility]     Patient Name: Hernando Lozada  MRN: [id number]  Today's Date: 3/12/2023    Admit Date: 3/6/2023    Plan: SNF referrals pending   Discharge Plan     Row Name 03/12/23 1138       Plan    Plan SNF referrals pending    Patient/Family in Agreement with Plan yes    Plan Comments Spoke with pt's brother Danny re: [place] plans. He is agreeable to pt going to rehab if needed at [place]. He has no preference as to which one but would like him to be in the [facility]. In basket referral to [facility], [place]. CCP to follow.......JW    Row Name 03/12/23 0533       Plan    Plan Comments Contacted by MD this AM to see when pt could return to [facility]. Called and spoke with Luz Marina Shearer and she stated pt was evaluated by their OT on Friday and pt was not at his baseline and needs rehab before he could return. Luz Marina Shearer stated pt has been to [facility] in the past and if SNF needed at [place] that is whre they would like pt to go.               Discharge Codes    No documentation.               Expected Discharge Date and Time     Expected Discharge Date Expected Discharge Time    Mar 11, 2023             Subha Arellano, RN
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
## 2023-03-12 NOTE — PROGRESS NOTES
Name: Hernando Lozada ADMIT: 3/6/2023   : 1945  PCP: Milo Carrasquillo DO    MRN: [id number] LOS: 5 days   AGE/SEX: 78 y.o. male  ROOM:      Subjective   Subjective   Pain is much improved and left foot, toe and ankle.  Able to bear weight and walk.    Objective   Objective   Vital Signs  Temp:  [97.5 °F (36.4 °C)-98.8 °F (37.1 °C)] 97.5 °F (36.4 °C)  Heart Rate:  [70-82] 70  Resp:  [16] 16  BP: ()/(62-75) 95/62  SpO2:  [95 %-97 %] 97 %  on  Flow (L/min):  [2] 2;   Device (Oxygen Therapy): room air  Body mass index is 35.59 kg/m².  Physical Exam  Constitutional:       Appearance: He is obese.      Comments: Chronically ill-appearing   Cardiovascular:      Rate and Rhythm: Normal rate.      Heart sounds: No murmur heard.    No gallop.   Pulmonary:      Effort: Pulmonary effort is normal. No respiratory distress.      Breath sounds: Normal breath sounds. No wheezing.   Abdominal:      General: Abdomen is flat. There is no distension.      Palpations: Abdomen is soft.      Tenderness: There is no guarding.   Musculoskeletal:      Right lower leg: Edema present.      Left lower leg: Edema present.      Comments: R great toe erythema resolved, improved range of motion ankle   Skin:     Comments: No cellulitis   Neurological:      General: No focal deficit present.      Mental Status: He is alert.         Results Review     I reviewed the patient's new clinical results.  Results from last 7 days   Lab Units 03/12/23  0411 03/11/23  0329 03/10/23  0829 03/10/23  0416 03/09/23  0344   WBC 10*3/mm3 9.74 8.94  --  7.57 5.27   HEMOGLOBIN g/dL 11.7* 11.4*  --  12.6* 12.0*   HEMOGLOBIN, POC g/dL  --   --  12.9  --   --    PLATELETS 10*3/mm3 238 241  --  279 230     Results from last 7 days   Lab Units 23  0411 03/11/23  0329 03/10/23  0416 03/09/23  0344   SODIUM mmol/L 136 137 139 138   POTASSIUM mmol/L 4.8 4.4 4.0 4.0   CHLORIDE mmol/L 100 99 98 101   CO2 mmol/L 24.9 28.1 29.4* 27.0   BUN mg/dL 23 16  14 12   CREATININE mg/dL 1.14 1.02 1.01 0.95   GLUCOSE mg/dL 182* 129* 105* 113*   EGFR mL/min/1.73 66.7 76.2 77.1 83.0     Results from last 7 days   Lab Units 03/09/23  0344 03/06/23  1115   ALBUMIN g/dL 3.8 4.2   BILIRUBIN mg/dL  --  0.5   ALK PHOS U/L  --  74   AST (SGOT) U/L  --  26   ALT (SGPT) U/L  --  18     Results from last 7 days   Lab Units 03/12/23  0411 03/11/23  0329 03/10/23  0416 03/09/23  0344 03/08/23  0421 03/07/23  0318 03/06/23  1115   CALCIUM mg/dL 9.5 9.3 9.7 8.8   < > 8.6 9.4   ALBUMIN g/dL  --   --   --  3.8  --   --  4.2   MAGNESIUM mg/dL  --  2.2  --   --   --  2.2  --     < > = values in this interval not displayed.     Results from last 7 days   Lab Units 03/12/23  0411 03/06/23  2047 03/06/23  1508   PROCALCITONIN ng/mL 0.08  --  0.02   LACTATE mmol/L  --  2.0  --      Results from last 7 days   Lab Units 03/12/23 0411 03/08/23  0421   SED RATE mm/hr 50*  --    CRP mg/dL 11.11* 0.38     Glucose   Date/Time Value Ref Range Status   03/12/2023 1046 151 (H) 70 - 130 mg/dL Final     Comment:     Meter: [id number] : [id number] Barbara Lyon NA   03/12/2023 0631 224 (H) 70 - 130 mg/dL Final     Comment:     Meter: [id number] : [id number] Fadi Baker RN   03/11/2023 2155 191 (H) 70 - 130 mg/dL Final     Comment:     Meter: [id number] : [id number] Dilip Schrader NA   03/11/2023 1620 164 (H) 70 - 130 mg/dL Final     Comment:     Meter: [id number] : [id number] Dilip Schrader MIRTA   03/11/2023 1052 108 70 - 130 mg/dL Final     Comment:     Meter: [id number] : [id number] Dilip Schrader MIRTA   03/11/2023 0748 113 70 - 130 mg/dL Final     Comment:     Meter: [id number] : [id number] Dilip Schrader MIRTA   03/10/2023 2031 121 70 - 130 mg/dL Final     Comment:     Meter: [id number] : [id number] Radu KIRBY       No radiology results for the last day  Scheduled Medications  amitriptyline, 10 mg, Oral, Nightly  aspirin, 81 mg, Oral, Daily  atorvastatin, 20 mg, Oral,  Daily  divalproex, 500 mg, Oral, BID  enoxaparin, 40 mg, Subcutaneous, Q24H  finasteride, 5 mg, Oral, Daily  furosemide, 20 mg, Intravenous, Q8H  insulin lispro, 0-7 Units, Subcutaneous, TID AC  levothyroxine, 25 mcg, Oral, Daily  lisinopril, 40 mg, Oral, Daily  nystatin, , Topical, Q8H  pantoprazole, 40 mg, Oral, Q AM  potassium chloride, 20 mEq, Oral, TID With Meals  predniSONE, 40 mg, Oral, Daily With Breakfast  propranolol, 60 mg, Oral, BID  sodium chloride, 10 mL, Intravenous, Q12H  tamsulosin, 0.4 mg, Oral, Daily  ticagrelor, 90 mg, Oral, BID    Infusions  Pharmacy to Dose enoxaparin (LOVENOX),     Diet  Diet: Cardiac Diets, Diabetic Diets; Healthy Heart (2-3 Na+); Consistent Carbohydrate; Texture: Soft to Chew (NDD 3); Soft to Chew: Chopped Meat; Fluid Consistency: Thin (IDDSI 0)       Assessment/Plan     Active Hospital Problems    Diagnosis  POA   • **Pedal edema [R60.0]  Yes   • Chronic deep vein thrombosis (DVT) of calf muscle vein of left lower extremity (HCC) [I82.562]  Yes   • Tremor [R25.1]  Yes   • Elevated troponin [R77.8]  Yes   • Lower extremity cellulitis [L03.119]  Yes   • Tinea cruris [B35.6]  Yes   • Nonrheumatic aortic valve stenosis [I35.0]  Unknown   • CAD (coronary artery disease) [I25.10]  Yes   • Diabetes mellitus (HCC) [E11.9]  Yes   • Disease of thyroid gland [E07.9]  Yes   • Chronic brain injury [S06.9XAS]  Not Applicable   • Essential hypertension [I10]  Yes   • SOB (shortness of breath) [R06.02]  Unknown      Resolved Hospital Problems   No resolved problems to display.       78 y.o. male admitted with Pedal edema.    Given elevation in ESR and CRP and prompt improvement of symptoms with prednisone suspect patient had inflammatory arthritis in toe most likely gout or pseudogout.  Will continue prednisone for 5 days consider further evaluation for osteomyelitis if symptoms recur after treatment or develops fever, cellulitis etc.    Patient seems stable from a cardiac perspective per  Dr. COLBERT  Could likely discharge when okay with them.    Still has quite a bit of edema.  Will continue IV Lasix for now.      LE edema/Chronic LLE DVT  -Bilateral lower extremity Doppler (3/7/2023): Chronic LLE DVT in mid femoral  -stable off antibiotics as at this point I do not feel strongly that patient has lower extremity cellulitis.      Acute myocardial injury/CAD/Severe AS  -TTE (3/7/2023): 56 to 60%; moderate to severe AS (valve area 1 cm²)  -s/p successful angioplasty and stent to the proximal circumflex 80% reduced to 0% with 3.0/8 Xience stent  -cont ASA, ticagrelor, atorvastatin    DM2  -A1c 5.5  -Metformin and Ozempic held on admit  -BS stable    Hypothyroid  -Synthroid 25 mcg    History of traumatic brain injury/CVA/benign essential tremor  -continue Depakote (level in therapeutic range twice this admission), Elavil, ASA, ticagrelor, atorvastatin    BPH  -Finasteride, tamsulosin    HLD  -Atorvastatin 20 mg      · Pharmacy to dose Lovenox for DVT prophylaxis.  No acute clot seen on venous Doppler.  · Full code.  · Discussed with patient.  · Anticipate discharge return to group home when cleared by consultants.  Discussed with CCP who is looking at options of returning back to [facility] versus SNU.      Bam Machado MD  [facility]  03/12/23  15:15 EDT
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
## 2023-03-13 LAB
ANION GAP SERPL CALCULATED.3IONS-SCNC: 11.5 MMOL/L (ref 5–15)
BUN SERPL-MCNC: 25 MG/DL (ref 8–23)
BUN/CREAT SERPL: 27.8 (ref 7–25)
CALCIUM SPEC-SCNC: 9.1 MG/DL (ref 8.6–10.5)
CHLORIDE SERPL-SCNC: 98 MMOL/L (ref 98–107)
CO2 SERPL-SCNC: 22.5 MMOL/L (ref 22–29)
CREAT SERPL-MCNC: 0.9 MG/DL (ref 0.76–1.27)
EGFRCR SERPLBLD CKD-EPI 2021: 88.5 ML/MIN/1.73
GLUCOSE BLDC GLUCOMTR-MCNC: 110 MG/DL (ref 70–130)
GLUCOSE BLDC GLUCOMTR-MCNC: 134 MG/DL (ref 70–130)
GLUCOSE BLDC GLUCOMTR-MCNC: 153 MG/DL (ref 70–130)
GLUCOSE BLDC GLUCOMTR-MCNC: 194 MG/DL (ref 70–130)
GLUCOSE SERPL-MCNC: 156 MG/DL (ref 65–99)
POTASSIUM SERPL-SCNC: 4.5 MMOL/L (ref 3.5–5.2)
SODIUM SERPL-SCNC: 132 MMOL/L (ref 136–145)
WHOLE BLOOD HOLD SPECIMEN: NORMAL

## 2023-03-13 PROCEDURE — 25010000002 ENOXAPARIN PER 10 MG: Performed by: HOSPITALIST

## 2023-03-13 PROCEDURE — 63710000001 PREDNISONE PER 5 MG: Performed by: HOSPITALIST

## 2023-03-13 PROCEDURE — 25010000002 FUROSEMIDE PER 20 MG: Performed by: INTERNAL MEDICINE

## 2023-03-13 PROCEDURE — 63710000001 PREDNISONE PER 1 MG: Performed by: HOSPITALIST

## 2023-03-13 PROCEDURE — 97530 THERAPEUTIC ACTIVITIES: CPT

## 2023-03-13 PROCEDURE — 82962 GLUCOSE BLOOD TEST: CPT

## 2023-03-13 PROCEDURE — 99232 SBSQ HOSP IP/OBS MODERATE 35: CPT | Performed by: INTERNAL MEDICINE

## 2023-03-13 PROCEDURE — 80048 BASIC METABOLIC PNL TOTAL CA: CPT | Performed by: HOSPITALIST

## 2023-03-13 RX ORDER — BLOOD-GLUCOSE METER
KIT MISCELLANEOUS
Qty: 1 EACH | Refills: 0 | Status: CANCELLED | OUTPATIENT
Start: 2023-03-13

## 2023-03-13 RX ORDER — LANCETS 30 GAUGE
EACH MISCELLANEOUS
Qty: 100 EACH | Refills: 0 | Status: CANCELLED | OUTPATIENT
Start: 2023-03-13

## 2023-03-13 RX ORDER — BLOOD SUGAR DIAGNOSTIC
STRIP MISCELLANEOUS
Qty: 100 EACH | Refills: 12 | Status: CANCELLED | OUTPATIENT
Start: 2023-03-13

## 2023-03-13 RX ADMIN — Medication 10 ML: at 21:35

## 2023-03-13 RX ADMIN — TICAGRELOR 90 MG: 90 TABLET ORAL at 08:50

## 2023-03-13 RX ADMIN — PANTOPRAZOLE SODIUM 40 MG: 40 TABLET, DELAYED RELEASE ORAL at 05:35

## 2023-03-13 RX ADMIN — DIVALPROEX SODIUM 500 MG: 500 TABLET, FILM COATED, EXTENDED RELEASE ORAL at 21:34

## 2023-03-13 RX ADMIN — FUROSEMIDE 20 MG: 10 INJECTION, SOLUTION INTRAMUSCULAR; INTRAVENOUS at 04:43

## 2023-03-13 RX ADMIN — POTASSIUM CHLORIDE 20 MEQ: 750 TABLET, EXTENDED RELEASE ORAL at 12:13

## 2023-03-13 RX ADMIN — FINASTERIDE 5 MG: 5 TABLET, FILM COATED ORAL at 08:50

## 2023-03-13 RX ADMIN — PROPRANOLOL HYDROCHLORIDE 60 MG: 20 TABLET ORAL at 21:34

## 2023-03-13 RX ADMIN — ASPIRIN 81 MG: 81 TABLET, COATED ORAL at 08:48

## 2023-03-13 RX ADMIN — AMITRIPTYLINE HYDROCHLORIDE 10 MG: 10 TABLET, FILM COATED ORAL at 21:35

## 2023-03-13 RX ADMIN — NYSTATIN: 100000 POWDER TOPICAL at 21:34

## 2023-03-13 RX ADMIN — POTASSIUM CHLORIDE 20 MEQ: 750 TABLET, EXTENDED RELEASE ORAL at 08:48

## 2023-03-13 RX ADMIN — POTASSIUM CHLORIDE 20 MEQ: 750 TABLET, EXTENDED RELEASE ORAL at 17:28

## 2023-03-13 RX ADMIN — NYSTATIN: 100000 POWDER TOPICAL at 05:37

## 2023-03-13 RX ADMIN — TAMSULOSIN HYDROCHLORIDE 0.4 MG: 0.4 CAPSULE ORAL at 08:50

## 2023-03-13 RX ADMIN — PROPRANOLOL HYDROCHLORIDE 60 MG: 20 TABLET ORAL at 08:48

## 2023-03-13 RX ADMIN — DIVALPROEX SODIUM 500 MG: 500 TABLET, FILM COATED, EXTENDED RELEASE ORAL at 08:48

## 2023-03-13 RX ADMIN — LISINOPRIL 40 MG: 40 TABLET ORAL at 08:50

## 2023-03-13 RX ADMIN — ATORVASTATIN CALCIUM 20 MG: 20 TABLET, FILM COATED ORAL at 08:48

## 2023-03-13 RX ADMIN — PREDNISONE 30 MG: 10 TABLET ORAL at 08:48

## 2023-03-13 RX ADMIN — LEVOTHYROXINE SODIUM 25 MCG: 0.03 TABLET ORAL at 08:48

## 2023-03-13 RX ADMIN — FUROSEMIDE 20 MG: 10 INJECTION, SOLUTION INTRAMUSCULAR; INTRAVENOUS at 21:34

## 2023-03-13 RX ADMIN — FUROSEMIDE 20 MG: 10 INJECTION, SOLUTION INTRAMUSCULAR; INTRAVENOUS at 12:13

## 2023-03-13 RX ADMIN — ENOXAPARIN SODIUM 40 MG: 100 INJECTION SUBCUTANEOUS at 17:28

## 2023-03-13 RX ADMIN — Medication 10 ML: at 08:50
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
## 2023-03-13 NOTE — PLAN OF CARE
Goal Outcome Evaluation: alert, oriented and pleasant. Room air. Assist x1. Scheduled for discharge tomorrow afternoon via [employer] van with an ETA of 1300.

## 2023-03-13 NOTE — CASE MANAGEMENT/SOCIAL WORK
Continued Stay Note  [facility]     Patient Name: Hernando Lozada  MRN: [id number]  Today's Date: 3/13/2023    Admit Date: 3/6/2023    Plan: [facility] via [employer] van at 1pm   Discharge Plan     Row Name 03/13/23 1540       Plan    Plan [facility] via [employer] van at 1pm    Patient/Family in Agreement with Plan yes    Plan Comments Kacie/Enrique notified CCP bed available today or tomorrow at [facility]. Kacie/Enrique also stated glucometer can be ordered thru APRN at SNF and given to pt prior to DC from SNF. APRN notified, and plans to DC tomorrow. [employer] Van transport arranged for 1pm tomorrow confirmation #[id number] and cost $107; DESMOND MALONE, Kacie/Enrique and Danny pts brother, notified of transport time. Partial packet in CCP office. Shari LOGAN/CCP               Discharge Codes    No documentation.               Expected Discharge Date and Time     Expected Discharge Date Expected Discharge Time    Mar 14, 2023             Fozia Erickson RN

## 2023-03-13 NOTE — CASE MANAGEMENT/SOCIAL WORK
Continued Stay Note  [facility]     Patient Name: Hernanod Lozada  MRN: [id number]  Today's Date: 3/13/2023    Admit Date: 3/6/2023    Plan: [facility] approved;  Pt needs new glucometer before discharges.   Discharge Plan     Row Name 03/13/23 1228       Plan    Plan [facility] approved;  Pt needs new glucometer before discharges.    Plan Comments Recevied message from FAISAL Telles advising Pt needs new glucometer, thermometer, life alert and BP cuff monitor.  Diabetes Education has been consulted.  CCP spoke with Pt at bedside to advise he could get BP cuff and thermometer from [facility].  Pt is going to discuss the life alert with Sharmin at [facility].  Pt needs packet.  CCP following............Jossy NUÑEZ/DESMOND CM               Discharge Codes    No documentation.               Expected Discharge Date and Time     Expected Discharge Date Expected Discharge Time    Mar 11, 2023             Jossy Ashby RN

## 2023-03-13 NOTE — PLAN OF CARE
Goal Outcome Evaluation:  Plan of Care Reviewed With: patient        Progress: improving  Outcome Evaluation: Pt agreeable to ambulate with PT today, distance limited 2/2 ankle pain. Pt needing VC's and TC's for safety with RW while ambulating. Pt seated UIC to perform LE ther ex at end of session. Continues to benefit from skilled PT.

## 2023-03-13 NOTE — PROGRESS NOTES
Name: Hernando Lozada ADMIT: 3/6/2023   : 1945  PCP: Milo Carrasquillo DO    MRN: [id number] LOS: 6 days   AGE/SEX: 78 y.o. male  ROOM: [facility]     Subjective   Subjective   Patient appears comfortable and in no apparent distress.  Satisfied with the care he received at [facility].  Tolerating diet and physical activity.  Reports lower extremity pain tolerated & improved.    Review of Systems   Respiratory: Negative for cough and shortness of breath.    Cardiovascular: Negative for chest pain and leg swelling.   Musculoskeletal: Positive for gait problem (due to). Negative for myalgias.        Objective   Objective   Vital Signs  Temp:  [97.7 °F (36.5 °C)-98.4 °F (36.9 °C)] 98.2 °F (36.8 °C)  Heart Rate:  [65-85] 75  Resp:  [16-18] 18  BP: (115-131)/(64-72) 119/72  SpO2:  [96 %] 96 %  on   ;   Device (Oxygen Therapy): room air  Body mass index is 35.59 kg/m².     Physical Exam  Constitutional:       General: He is not in acute distress.     Appearance: He is obese. He is not toxic-appearing.   Cardiovascular:      Rate and Rhythm: Normal rate.      Heart sounds: Normal heart sounds.   Pulmonary:      Effort: Pulmonary effort is normal.      Breath sounds: Normal breath sounds.   Abdominal:      General: Bowel sounds are normal.      Palpations: Abdomen is soft.   Musculoskeletal:         General: No tenderness.      Right lower leg: Edema (trace pitting edema, BLE) present.      Left lower leg: Edema present.   Skin:     General: Skin is warm and dry.   Neurological:      Mental Status: He is alert.       Results Review     I reviewed the patient's new clinical results.  Results from last 7 days   Lab Units 23  0411 23  0329 03/10/23  0829 03/10/23  0416 23  0344   WBC 10*3/mm3 9.74 8.94  --  7.57 5.27   HEMOGLOBIN g/dL 11.7* 11.4*  --  12.6* 12.0*   HEMOGLOBIN, POC g/dL  --   --  12.9  --   --    PLATELETS 10*3/mm3 238 241  --  279 230     Results from last 7 days   Lab Units  03/13/23  0312 03/12/23  0411 03/11/23  0329 03/10/23  0416   SODIUM mmol/L 132* 136 137 139   POTASSIUM mmol/L 4.5 4.8 4.4 4.0   CHLORIDE mmol/L 98 100 99 98   CO2 mmol/L 22.5 24.9 28.1 29.4*   BUN mg/dL 25* 23 16 14   CREATININE mg/dL 0.90 1.14 1.02 1.01   GLUCOSE mg/dL 156* 182* 129* 105*   EGFR mL/min/1.73 88.5 66.7 76.2 77.1     Results from last 7 days   Lab Units 03/09/23  0344   ALBUMIN g/dL 3.8     Results from last 7 days   Lab Units 03/13/23  0312 03/12/23  0411 03/11/23  0329 03/10/23  0416 03/09/23  0344 03/08/23  0421 03/07/23  0318   CALCIUM mg/dL 9.1 9.5 9.3 9.7 8.8   < > 8.6   ALBUMIN g/dL  --   --   --   --  3.8  --   --    MAGNESIUM mg/dL  --   --  2.2  --   --   --  2.2    < > = values in this interval not displayed.     Results from last 7 days   Lab Units 03/12/23 0411 03/06/23 2047 03/06/23  1508   PROCALCITONIN ng/mL 0.08  --  0.02   LACTATE mmol/L  --  2.0  --      Glucose   Date/Time Value Ref Range Status   03/13/2023 1124 110 70 - 130 mg/dL Final     Comment:     Meter: [id number] : [id number] Jeni Perez NA   03/13/2023 0550 134 (H) 70 - 130 mg/dL Final     Comment:     Meter: [id number] : [id number] Prince Austin NA   03/12/2023 2003 191 (H) 70 - 130 mg/dL Final     Comment:     Meter: [id number] : [id number] Caesars Geovanna NA   03/12/2023 1536 172 (H) 70 - 130 mg/dL Final     Comment:     Meter: [id number] : [id number] Barbara Lyon NA   03/12/2023 1046 151 (H) 70 - 130 mg/dL Final     Comment:     Meter: [id number] : [id number] Jimenez Sonali KIRBY   03/12/2023 0631 224 (H) 70 - 130 mg/dL Final     Comment:     Meter: [id number] : [id number] Fadi Baker RN   03/11/2023 2155 191 (H) 70 - 130 mg/dL Final     Comment:     Meter: [id number] : [id number] Dilip KIRBY       No radiology results for the last day  I have personally reviewed all medications:  Scheduled Medications  amitriptyline, 10 mg, Oral, Nightly  aspirin, 81 mg, Oral,  Daily  atorvastatin, 20 mg, Oral, Daily  divalproex, 500 mg, Oral, BID  enoxaparin, 40 mg, Subcutaneous, Q24H  finasteride, 5 mg, Oral, Daily  furosemide, 20 mg, Intravenous, Q8H  insulin lispro, 0-7 Units, Subcutaneous, TID AC  levothyroxine, 25 mcg, Oral, Daily  lisinopril, 40 mg, Oral, Daily  nystatin, , Topical, Q8H  pantoprazole, 40 mg, Oral, Q AM  potassium chloride, 20 mEq, Oral, TID With Meals  predniSONE, 30 mg, Oral, Daily With Breakfast  propranolol, 60 mg, Oral, BID  sodium chloride, 10 mL, Intravenous, Q12H  tamsulosin, 0.4 mg, Oral, Daily  ticagrelor, 90 mg, Oral, BID    Infusions   Diet  Diet: Cardiac Diets, Diabetic Diets; Healthy Heart (2-3 Na+); Consistent Carbohydrate; Texture: Soft to Chew (NDD 3); Soft to Chew: Chopped Meat; Fluid Consistency: Thin (IDDSI 0)    I have personally reviewed:  [x]  Laboratory   [x]  Microbiology   [x]  Radiology   [x]  EKG/Telemetry  [x]  Cardiology/Vascular   [x]  Pathology    [x]  Records       Assessment/Plan     Active Hospital Problems    Diagnosis  POA   • **Pedal edema [R60.0]  Yes   • Chronic deep vein thrombosis (DVT) of calf muscle vein of left lower extremity (HCC) [I82.562]  Yes   • Tremor [R25.1]  Yes   • Elevated troponin [R77.8]  Yes   • Lower extremity cellulitis [L03.119]  Yes   • Tinea cruris [B35.6]  Yes   • Nonrheumatic aortic valve stenosis [I35.0]  Unknown   • CAD (coronary artery disease) [I25.10]  Yes   • Diabetes mellitus (HCC) [E11.9]  Yes   • Disease of thyroid gland [E07.9]  Yes   • Chronic brain injury [S06.9XAS]  Not Applicable   • Essential hypertension [I10]  Yes   • SOB (shortness of breath) [R06.02]  Unknown      Resolved Hospital Problems   No resolved problems to display.       78 y.o. male admitted with Pedal edema.    Lower extremity edema/chronic LLE DVT: Bilateral lower extremity Doppler on 3/7/2023 showing chronic LLE DVT and mid femoral.  Continue to monitor patient off antibiotic given no current concern for  cellulitis.    Acute MI/CAD/severe AS: TTE on 3/7/2000 2356 to 60% ejection fraction, moderate to severe aortic stenosis.  Status post successful angioplasty and stent to proximal circumflex 80% reduced to 0% with Xience stent.  ASA, Brilinta, atorvastatin continued.    Diabetes mellitus: A1c 5.5 showing adequate control PTA.  Metformin and Ozempic held for now plan to resume at discharge.  Glucose trend acceptable today.    Hypothyroidism: Synthroid 25 mcg continued for now.    History TBI/CVA/benign essential tremor: Depakote therapeutic range noted.  Elavil, aspirin, Brilinta, atorvastatin.    BPH: Denies urinary complaints.  Finasteride and tamsulosin continued.      · Enoxaparin for DVT prophylaxis.  · Full code.  · Discussed with patient & RN.  · Anticipate discharge pending referrals to SNF      FAISAL Florentino  [facility]  03/13/23  13:08 EDT

## 2023-03-14 VITALS
RESPIRATION RATE: 18 BRPM | TEMPERATURE: 98.6 F | OXYGEN SATURATION: 99 % | HEART RATE: 69 BPM | BODY MASS INDEX: 35.04 KG/M2 | DIASTOLIC BLOOD PRESSURE: 100 MMHG | HEIGHT: 69 IN | WEIGHT: 236.55 LBS | SYSTOLIC BLOOD PRESSURE: 112 MMHG

## 2023-03-14 LAB
ANION GAP SERPL CALCULATED.3IONS-SCNC: 10.2 MMOL/L (ref 5–15)
BUN SERPL-MCNC: 23 MG/DL (ref 8–23)
BUN/CREAT SERPL: 25 (ref 7–25)
CALCIUM SPEC-SCNC: 8.9 MG/DL (ref 8.6–10.5)
CHLORIDE SERPL-SCNC: 97 MMOL/L (ref 98–107)
CO2 SERPL-SCNC: 26.8 MMOL/L (ref 22–29)
CREAT SERPL-MCNC: 0.92 MG/DL (ref 0.76–1.27)
DEPRECATED RDW RBC AUTO: 44.5 FL (ref 37–54)
EGFRCR SERPLBLD CKD-EPI 2021: 86.2 ML/MIN/1.73
ERYTHROCYTE [DISTWIDTH] IN BLOOD BY AUTOMATED COUNT: 13.3 % (ref 12.3–15.4)
GLUCOSE BLDC GLUCOMTR-MCNC: 126 MG/DL (ref 70–130)
GLUCOSE BLDC GLUCOMTR-MCNC: 132 MG/DL (ref 70–130)
GLUCOSE SERPL-MCNC: 117 MG/DL (ref 65–99)
HCT VFR BLD AUTO: 33.3 % (ref 37.5–51)
HGB BLD-MCNC: 11.3 G/DL (ref 13–17.7)
MCH RBC QN AUTO: 31.3 PG (ref 26.6–33)
MCHC RBC AUTO-ENTMCNC: 33.9 G/DL (ref 31.5–35.7)
MCV RBC AUTO: 92.2 FL (ref 79–97)
PLATELET # BLD AUTO: 278 10*3/MM3 (ref 140–450)
PMV BLD AUTO: 9.8 FL (ref 6–12)
POTASSIUM SERPL-SCNC: 4.3 MMOL/L (ref 3.5–5.2)
RBC # BLD AUTO: 3.61 10*6/MM3 (ref 4.14–5.8)
SODIUM SERPL-SCNC: 134 MMOL/L (ref 136–145)
WBC NRBC COR # BLD: 11.07 10*3/MM3 (ref 3.4–10.8)

## 2023-03-14 PROCEDURE — 63710000001 PREDNISONE PER 5 MG: Performed by: HOSPITALIST

## 2023-03-14 PROCEDURE — 82962 GLUCOSE BLOOD TEST: CPT

## 2023-03-14 PROCEDURE — 63710000001 PREDNISONE PER 1 MG: Performed by: HOSPITALIST

## 2023-03-14 PROCEDURE — 85027 COMPLETE CBC AUTOMATED: CPT | Performed by: HOSPITALIST

## 2023-03-14 PROCEDURE — 25010000002 FUROSEMIDE PER 20 MG: Performed by: INTERNAL MEDICINE

## 2023-03-14 PROCEDURE — 99232 SBSQ HOSP IP/OBS MODERATE 35: CPT

## 2023-03-14 PROCEDURE — 80048 BASIC METABOLIC PNL TOTAL CA: CPT | Performed by: HOSPITALIST

## 2023-03-14 RX ORDER — PREDNISONE 10 MG/1
30 TABLET ORAL
Qty: 3 TABLET | Refills: 0 | Status: SHIPPED | OUTPATIENT
Start: 2023-03-15 | End: 2023-03-16

## 2023-03-14 RX ORDER — POTASSIUM CHLORIDE 20 MEQ/1
20 TABLET, EXTENDED RELEASE ORAL
Qty: 90 TABLET | Refills: 0 | Status: SHIPPED | OUTPATIENT
Start: 2023-03-14 | End: 2023-04-13

## 2023-03-14 RX ORDER — LOPERAMIDE HYDROCHLORIDE 2 MG/1
2 CAPSULE ORAL 4 TIMES DAILY PRN
Status: DISCONTINUED | OUTPATIENT
Start: 2023-03-14 | End: 2023-03-14 | Stop reason: HOSPADM

## 2023-03-14 RX ORDER — FINASTERIDE 5 MG/1
5 TABLET, FILM COATED ORAL DAILY
Qty: 30 TABLET | Refills: 0 | Status: SHIPPED | OUTPATIENT
Start: 2023-03-15 | End: 2023-04-14

## 2023-03-14 RX ORDER — NYSTATIN 100000 [USP'U]/G
POWDER TOPICAL EVERY 8 HOURS SCHEDULED
Qty: 30 G | Refills: 0 | Status: SHIPPED | OUTPATIENT
Start: 2023-03-14 | End: 2023-03-24

## 2023-03-14 RX ORDER — FUROSEMIDE 20 MG/1
20 TABLET ORAL 3 TIMES DAILY
Qty: 90 TABLET | Refills: 0 | Status: SHIPPED | OUTPATIENT
Start: 2023-03-14 | End: 2023-04-13

## 2023-03-14 RX ADMIN — FUROSEMIDE 20 MG: 10 INJECTION, SOLUTION INTRAMUSCULAR; INTRAVENOUS at 12:58

## 2023-03-14 RX ADMIN — PREDNISONE 30 MG: 10 TABLET ORAL at 08:57

## 2023-03-14 RX ADMIN — TICAGRELOR 90 MG: 90 TABLET ORAL at 08:58

## 2023-03-14 RX ADMIN — Medication 10 ML: at 08:58

## 2023-03-14 RX ADMIN — POTASSIUM CHLORIDE 20 MEQ: 750 TABLET, EXTENDED RELEASE ORAL at 08:58

## 2023-03-14 RX ADMIN — PANTOPRAZOLE SODIUM 40 MG: 40 TABLET, DELAYED RELEASE ORAL at 05:50

## 2023-03-14 RX ADMIN — TICAGRELOR 90 MG: 90 TABLET ORAL at 00:03

## 2023-03-14 RX ADMIN — ATORVASTATIN CALCIUM 20 MG: 20 TABLET, FILM COATED ORAL at 08:57

## 2023-03-14 RX ADMIN — ASPIRIN 81 MG: 81 TABLET, COATED ORAL at 08:58

## 2023-03-14 RX ADMIN — PROPRANOLOL HYDROCHLORIDE 60 MG: 20 TABLET ORAL at 08:57

## 2023-03-14 RX ADMIN — NYSTATIN: 100000 POWDER TOPICAL at 05:50

## 2023-03-14 RX ADMIN — NYSTATIN: 100000 POWDER TOPICAL at 13:01

## 2023-03-14 RX ADMIN — FUROSEMIDE 20 MG: 10 INJECTION, SOLUTION INTRAMUSCULAR; INTRAVENOUS at 04:45

## 2023-03-14 RX ADMIN — TAMSULOSIN HYDROCHLORIDE 0.4 MG: 0.4 CAPSULE ORAL at 08:57

## 2023-03-14 RX ADMIN — POTASSIUM CHLORIDE 20 MEQ: 750 TABLET, EXTENDED RELEASE ORAL at 12:59

## 2023-03-14 RX ADMIN — LISINOPRIL 40 MG: 40 TABLET ORAL at 08:57

## 2023-03-14 RX ADMIN — LEVOTHYROXINE SODIUM 25 MCG: 0.03 TABLET ORAL at 08:57

## 2023-03-14 RX ADMIN — DIVALPROEX SODIUM 500 MG: 500 TABLET, FILM COATED, EXTENDED RELEASE ORAL at 08:58

## 2023-03-14 RX ADMIN — LOPERAMIDE HYDROCHLORIDE 2 MG: 2 CAPSULE ORAL at 05:50

## 2023-03-14 RX ADMIN — FINASTERIDE 5 MG: 5 TABLET, FILM COATED ORAL at 08:58

## 2023-03-14 NOTE — DISCHARGE SUMMARY
Patient Name: Hernando Lozada  : 1945  MRN: [id number]    Date of Admission: 3/6/2023  Date of Discharge:  3/14/2023  Primary Care Physician: Milo Carrasquillo DO      Chief Complaint:   Foot Swelling      Discharge Diagnoses     Active Hospital Problems    Diagnosis  POA   • **Pedal edema [R60.0]  Yes   • Chronic deep vein thrombosis (DVT) of calf muscle vein of left lower extremity (HCC) [I82.562]  Yes   • Tremor [R25.1]  Yes   • Elevated troponin [R77.8]  Yes   • Lower extremity cellulitis [L03.119]  Yes   • Tinea cruris [B35.6]  Yes   • Nonrheumatic aortic valve stenosis [I35.0]  Unknown   • CAD (coronary artery disease) [I25.10]  Yes   • Diabetes mellitus (HCC) [E11.9]  Yes   • Disease of thyroid gland [E07.9]  Yes   • Chronic brain injury [S06.9XAS]  Not Applicable   • Essential hypertension [I10]  Yes   • SOB (shortness of breath) [R06.02]  Unknown      Resolved Hospital Problems   No resolved problems to display.        Hospital Course     Mr. Lozada is a 78 y.o. male with a history of hypertension, hyperlipidemia, diabetes mellitus, GERD, obesity, CAD status post CABG and PCI, chronic brain injury from childhood with subsequent group home residence who presented to [facility] initially complaining of foot swelling.  Please see the admitting history and physical for further details.  He was found to have pedal edema, acute MI, and severe aortic stenosis and was admitted to the hospital for further evaluation and treatment.      Admitted with progressive lower extremity edema secondary to right-sided heart failure, lower extremity chronic DVT of the left confirmed on 3/7/2023 duplex study, & received empiric antibiotic initially for concerns for bilateral lower extremity cellulitis; however, edema improved with aggressive IV diuretics in the absence of additional antibiotic therapy.    Cardiology following & stress test performed.  Persistently elevated troponin in the setting of CAD  history and hypertension with right-sided heart failure; therefore, patient underwent right and left heart catheterization with stent to the ostial OM and proximal circumflex 80% blockage reduced to 0% with Xience stent on 3/10/2023.  Aspirin, Brilinta, atorvastatin continued.    Echo results indicate significant aortic stenosis and cardiology recommend medical treatment for now.      Noted controlled type 2 diabetes melitis PTA--A1c 5.5.  Resume metformin and Ozempic after discharge.    Most recent complaints of left foot pain; however, x-ray negative for fracture or malalignment.  Most likely due to chronic edema.  Plan continue Lasix 20 mg p.o. 3 times daily after discharge with recommendation for follow-up primary care provider for continued monitoring blood pressure and renal function.  Amlodipine 2.5 mg discontinued at discharge and effort to reduce bilateral lower extremity edema.    Complains of diarrhea during hospital admission described as chronic requiring Imodium 2-3 times daily PTA.    Patient tolerating diet and minimal physical activity with assistance--appears medically stable for discharge to rehab facility on 3/14/2023 and agrees with plan for follow-up as previously discussed.    Day of Discharge       Physical Exam:  Temp:  [98 °F (36.7 °C)-98.6 °F (37 °C)] 98.6 °F (37 °C)  Heart Rate:  [64-89] 69  Resp:  [18] 18  BP: (103-112)/() 112/100  Body mass index is 34.93 kg/m².      Physical Exam  Constitutional:       General: He is not in acute distress.     Appearance: He is obese. He is not toxic-appearing.   HENT:      Head: Normocephalic and atraumatic.   Eyes:      Extraocular Movements: Extraocular movements intact.      Conjunctiva/sclera: Conjunctivae normal.   Cardiovascular:      Rate and Rhythm: Normal rate.      Heart sounds: Normal heart sounds.   Pulmonary:      Effort: Pulmonary effort is normal.      Breath sounds: Normal breath sounds.   Abdominal:      General: Bowel sounds  are normal.      Palpations: Abdomen is soft.   Musculoskeletal:         General: No tenderness.      Cervical back: Normal range of motion and neck supple.      Right lower leg: Edema (trace pitting edema, BLE) present.      Left lower leg: Edema present.   Skin:     General: Skin is warm and dry.   Neurological:      Mental Status: He is alert and oriented to person, place, and time.      Cranial Nerves: No cranial nerve deficit.   Psychiatric:         Behavior: Behavior normal.         Thought Content: Thought content normal.         Consultants     Consult Orders (all) (From admission, onward)     Start     Ordered    03/13/23 1130  Inpatient Diabetes Educator Consult  Once,   Status:  Canceled        Comments: Needs at glucose monitor; states old glucose monitor not working   Provider:  (Not yet assigned)    03/13/23 1129    03/13/23 1130  Inpatient Diabetes Educator Consult  Once        Comments: Needs at glucose monitor; states old glucose monitor not working; possible DC today; Medicare   Provider:  (Not yet assigned)    03/13/23 1130    03/13/23 0743  Inpatient Case Management  Consult  Once        Provider:  (Not yet assigned)    03/13/23 0743    03/06/23 1959  Inpatient Case Management  Consult  Once        Provider:  (Not yet assigned)    03/06/23 2000 03/06/23 1319  Inpatient Cardiology Consult  Once        Specialty:  Cardiology  Provider:  Rio Miranda MD    03/06/23 1318    03/06/23 1151  [facility] (on-call MD unless specified) Details  Once,   Status:  Canceled        Specialty:  Hospitalist  Provider:  (Not yet assigned)    03/06/23 1150              Procedures     Right and Left Heart Cath, Coronary angiography, Left ventriculography, Percutaneous Coronary Intervention, Stent GLENDY coronary      Imaging Results (All)     Procedure Component Value Units Date/Time    XR Foot 3+ View Left [[id number]] Collected: 03/10/23 2035     Updated: 03/11/23 1516    Narrative:       XR FOOT 3+ VW LEFT-     HISTORY: 78-year-old male with foot pain.     FINDINGS: There are advanced osteoarthritic changes at the 1st MTP  joint. There is soft tissue swelling adjacent to the 1st MTP joint and  possibly throughout the mid foot. On the lateral view, there is  whiskering along the 1st MTP joint. The appearance may be related to  proliferative change from advanced osteoarthritis, but depending on the  clinical history, osteomyelitis is possible as well. Please correlate  clinically.     There is no fracture or malalignment.     This report was finalized on 3/11/2023 3:12 PM by Dr. Aylin Pagan M.D.       XR Chest 1 View [[id number]] Collected: 03/06/23 1016     Updated: 03/06/23 1019    Narrative:      XR CHEST 1 VW-  03/06/2023     HISTORY: Pedal edema.     Heart size is at the upper limits of normal. Lungs appear clear.  Sternotomy wires are seen. No pneumothorax is seen.       Impression:      1. Borderline cardiomegaly.  2. Lungs appear clear.     This report was finalized on 3/6/2023 10:16 AM by Dr. Mario Davis M.D.           Results for orders placed during the hospital encounter of 03/06/23    Duplex Venous Lower Extremity - Bilateral CAR    Interpretation Summary  •  Chronic left lower extremity deep vein thrombosis noted in the mid femoral.  •  All other veins appeared normal bilaterally.    Results for orders placed during the hospital encounter of 03/06/23    Adult Transthoracic Echo Complete W/ Cont if Necessary Per Protocol    Interpretation Summary  •  Left ventricular ejection fraction appears to be 56 - 60%.  •  Left ventricular diastolic function was normal.  •  Moderate to severe aortic valve stenosis is present. Aortic valve area is 1 cm2.  •  Peak velocity of the flow distal to the aortic valve is 287.8 cm/s. Aortic valve maximum pressure gradient is 33 mmHg. Aortic valve mean pressure gradient is 18 mmHg. Aortic valve dimensionless index is 0.3 .  •  Estimated right  ventricular systolic pressure from tricuspid regurgitation is normal (<35 mmHg).    Pertinent Labs     Results from last 7 days   Lab Units 03/14/23  0401 03/12/23  0411 03/11/23  0329 03/10/23  0829 03/10/23  0416   WBC 10*3/mm3 11.07* 9.74 8.94  --  7.57   HEMOGLOBIN g/dL 11.3* 11.7* 11.4*  --  12.6*   HEMOGLOBIN, POC g/dL  --   --   --  12.9  --    PLATELETS 10*3/mm3 278 238 241  --  279     Results from last 7 days   Lab Units 03/14/23 0401 03/13/23 0312 03/12/23 0411 03/11/23 0329   SODIUM mmol/L 134* 132* 136 137   POTASSIUM mmol/L 4.3 4.5 4.8 4.4   CHLORIDE mmol/L 97* 98 100 99   CO2 mmol/L 26.8 22.5 24.9 28.1   BUN mg/dL 23 25* 23 16   CREATININE mg/dL 0.92 0.90 1.14 1.02   GLUCOSE mg/dL 117* 156* 182* 129*   EGFR mL/min/1.73 86.2 88.5 66.7 76.2     Results from last 7 days   Lab Units 03/09/23  0344   ALBUMIN g/dL 3.8     Results from last 7 days   Lab Units 03/14/23  0401 03/13/23  0312 03/12/23  0411 03/11/23  0329 03/10/23  0416 03/09/23  0344   CALCIUM mg/dL 8.9 9.1 9.5 9.3   < > 8.8   ALBUMIN g/dL  --   --   --   --   --  3.8   MAGNESIUM mg/dL  --   --   --  2.2  --   --     < > = values in this interval not displayed.         Results from last 7 days   Lab Units 03/12/23 0411   URIC ACID mg/dL 8.1*         Invalid input(s): LDLCALC          Test Results Pending at Discharge     Pending Labs     Order Current Status    CANDIDA AURIS SCREEN - Swab, Axilla Right, Axilla Left and Groin Preliminary result          Discharge Details        Discharge Medications      New Medications      Instructions Start Date   finasteride 5 MG tablet  Commonly known as: PROSCAR   5 mg, Oral, Daily   Start Date: March 15, 2023     nystatin [id number] UNIT/GM powder  Commonly known as: MYCOSTATIN   Topical, Every 8 Hours Scheduled      potassium chloride 20 MEQ CR tablet  Commonly known as: K-DUR,KLOR-CON   20 mEq, Oral, 3 Times Daily With Meals      predniSONE 10 MG tablet  Commonly known as: DELTASONE   30 mg, Oral,  Daily With Breakfast   Start Date: March 15, 2023        Changes to Medications      Instructions Start Date   furosemide 20 MG tablet  Commonly known as: LASIX  What changed: when to take this   20 mg, Oral, 3 Times Daily         Continue These Medications      Instructions Start Date   acetaminophen 650 MG 8 hr tablet  Commonly known as: TYLENOL   650 mg, Oral, Every 6 Hours PRN      amitriptyline 10 MG tablet  Commonly known as: ELAVIL   10 mg, Oral, Nightly      aspirin 81 MG EC tablet   81 mg, Oral, Daily      atorvastatin 20 MG tablet  Commonly known as: LIPITOR   20 mg, Oral, Daily      Brilinta 90 MG tablet tablet  Generic drug: ticagrelor   Take 1 tablet by mouth 2 (Two) Times a Day.      cetirizine 10 MG tablet  Commonly known as: zyrTEC   10 mg, Oral, Daily      divalproex 500 MG 24 hr tablet  Commonly known as: DEPAKOTE ER   1 tablet 2 (Two) Times a Day.      docusate sodium 100 MG capsule  Commonly known as: COLACE   100 mg, Oral, Daily      fluticasone 50 MCG/ACT nasal spray  Commonly known as: FLONASE   2 sprays into the nostril(s) as directed by provider Daily.      glucosamine sulfate 500 MG capsule capsule   1,000 mg, Oral, Daily      Halls Cough Drops 5.8 MG lozenge  Generic drug: Menthol   1 lozenge, Mouth/Throat, Every 2 Hours PRN      levothyroxine 25 MCG tablet  Commonly known as: SYNTHROID, LEVOTHROID   25 mcg, Oral, Daily      levothyroxine 75 MCG tablet  Commonly known as: SYNTHROID, LEVOTHROID   37.5 mcg, Oral, Weekly, Patient receives on Sundays      lisinopril 40 MG tablet  Commonly known as: PRINIVIL,ZESTRIL   40 mg, Oral, Daily      loperamide 2 MG tablet  Commonly known as: IMODIUM A-D   2 mg, Oral, 4 Times Daily PRN      metFORMIN 500 MG tablet  Commonly known as: GLUCOPHAGE   500 mg, Oral, Daily      omeprazole 40 MG capsule  Commonly known as: priLOSEC   40 mg, Oral, Every Evening      oxybutynin XL 5 MG 24 hr tablet  Commonly known as: DITROPAN-XL   5 mg, Oral, Daily      Ozempic  (0.25 or 0.5 MG/DOSE) 2 MG/1.5ML solution pen-injector  Generic drug: Semaglutide(0.25 or 0.5MG/DOS)   0.5 mg, Subcutaneous, Weekly, Takes on Tuesdays      propranolol 20 MG tablet  Commonly known as: INDERAL   60 mg, Oral, 2 Times Daily      tamsulosin 0.4 MG capsule 24 hr capsule  Commonly known as: FLOMAX   1 capsule, Oral, Daily         Stop These Medications    amLODIPine 2.5 MG tablet  Commonly known as: NORVASC     ibuprofen 400 MG tablet  Commonly known as: ADVIL,MOTRIN     KLOR-CON 10 PO            No Known Allergies    Discharge Disposition:  Skilled Nursing Facility (DC - External)      Discharge Diet:  Diet Order   Procedures   • Diet: Cardiac Diets, Diabetic Diets; Healthy Heart (2-3 Na+); Consistent Carbohydrate; Texture: Soft to Chew (NDD 3); Soft to Chew: Chopped Meat; Fluid Consistency: Thin (IDDSI 0)       Discharge Activity:   Activity Instructions     Activity as Tolerated      Up WIth Assist            CODE STATUS:    Code Status and Medical Interventions:   Ordered at: 03/06/23 1318     Code Status (Patient has no pulse and is not breathing):    CPR (Attempt to Resuscitate)     Medical Interventions (Patient has pulse or is breathing):    Full Support       Future Appointments   Date Time Provider Department Center   4/4/2023  9:15 AM Kacie Miller APRN [facility]     Additional Instructions for the Follow-ups that You Need to Schedule     Ambulatory Referral to Cardiac Rehab   As directed      Discharge Follow-up with PCP   As directed       Currently Documented PCP:    Milo Carrasquillo DO    PCP Phone Number:    [phone]     Follow Up Details: Please call to schedule a one week or earliest available follow-up appointment PCP; BMP, BP monitoring            Contact information for follow-up providers     [facility] .    Specialty: Cardiac Rehabilitation  Contact information:  [facility] [address] [phone]            Milo Carrasquillo DO .    Specialty: Physical Medicine and Rehabilitation  Why: Please call to schedule a one week or earliest available follow-up appointment PCP; BMP, BP monitoring  Contact information:  [address]  [facility] [address]  [place] [address]  [phone]                   Contact information for after-discharge care     Destination     [facility] .    Service: Skilled Nursing  Contact information:  [address] [facility] [address] [phone]                             Additional Instructions for the Follow-ups that You Need to Schedule     Ambulatory Referral to Cardiac Rehab   As directed      Discharge Follow-up with PCP   As directed       Currently Documented PCP:    Milo Carrasquillo DO    PCP Phone Number:    [phone]     Follow Up Details: Please call to schedule a one week or earliest available follow-up appointment PCP; BMP, BP monitoring           Time Spent on Discharge:  Greater than 30 minutes      FAISAL Florentino  [facility]  03/14/23  13:23 EDT

## 2023-03-14 NOTE — CASE MANAGEMENT/SOCIAL WORK
Continued Stay Note  [facility]     Patient Name: Hernando Lozada  MRN: [id number]  Today's Date: 3/14/2023    Admit Date: 3/6/2023    Plan: [facility] at 5pm   Discharge Plan     Row Name 03/14/23 1350       Plan    Plan [facility] at 5pm    Patient/Family in Agreement with Plan yes    Plan Comments DC orders noted. WC van transport adjusted to 5PM. FAISAL, RN and Kacie/Enrique notified of transportation update. Packet given to RN. Shari RN/CCP    Final Discharge Disposition Code 03 - skilled nursing facility (SNF)    Final Note [facility] at 5pm, no additional CCP needs. EBlain RN/CCP               Discharge Codes    No documentation.               Expected Discharge Date and Time     Expected Discharge Date Expected Discharge Time    Mar 14, 2023             Fozia Erickson, DESMOND
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
## 2023-03-14 NOTE — CASE MANAGEMENT/SOCIAL WORK
Case Management Discharge Note      Final Note: [facility] via Rosalia CORRIGAN Van at 5pm, no additional CCP needs. Shari RN/CCP         Selected Continued Care - Admitted Since 3/6/2023     Destination Coordination complete. Patient indicates having no preference.    Service Provider Selected Services Address Phone Fax Patient Preferred    [facility] [address] [facility] [id number] [phone] --          Durable Medical Equipment    No services have been selected for the patient.              Dialysis/Infusion    No services have been selected for the patient.              Home Medical Care    No services have been selected for the patient.              Therapy    No services have been selected for the patient.              Community Resources    No services have been selected for the patient.              Community & DME    No services have been selected for the patient.                       Final Discharge Disposition Code: 03 - skilled nursing facility (SNF)

## 2023-03-14 NOTE — PLAN OF CARE
Problem: Adult Inpatient Plan of Care  Goal: Plan of Care Review  Outcome: Adequate for Care Transition   Goal Outcome Evaluation: Discharging this evening. ETA for SHEKHAR metz 1700.

## 2023-03-15 LAB
BACTERIA ISLT: NORMAL
HCT VFR BLDA CALC: 39 % (ref 38–51)
HGB BLDA-MCNC: 13.3 G/DL (ref 12–17)
QT INTERVAL: 415 MS
SAO2 % BLDA: 69 % (ref 95–98)

## 2023-03-24 NOTE — PROGRESS NOTES
"Enter Query Response Below      Query Response: NSTEMI Type 2 related to fixed coronary atherosclerosis  Electronically signed by FAISAL Florentino, 23, 1:44 PM EDT.               If applicable, please update the problem list.     Patient: Hernando Lozada        : 1945  Account: [id number]           Admit Date: 3/6/2023        How to Respond to this query:       a. Click New Note     b. Answer query within the yellow box.                c. Update the Problem List, if applicable.      If you have any questions about this query contact me at: [email or web address]    FAISAL Moura    Admission for progressive lower extremity edema with redness. Per History & Physical (H&P,) 3/6 EKG showed no acute ischemic changes, and patient had no complaints of chest pain.  3/6 initial HS Troponin T was 22 with subsequent HS Troponin T 27. 3/6 Troponin T Delta was 5 and 3/7 HS Troponin T was 27. Cardiology consult and subsequent cardiology progress notes document CAD and elevated troponin but no mention of myocardial infarction. Cardiology progress notes document a normal stress test and echo showing moderate to severe Aortic Valve stenosis. Discharge Summary documents 3/10 left and right heart cath with stent to ostial OM and proximal circumflex 80% blockage reduced to 0% with Xience stent. Hospitalist Progress Notes 3/8 - 3/12 document acute myocardial injury. Hospitalist 3/13 Progress Note and Discharge Summary document Acute MI. Treatment included ASA, Brilinta, Percutaneous Coronary Intervention with stent, and continued statin.    After study, can \"acute MI\" be clarified as:  NSTEMI Type 2 related to fixed coronary atherosclerosis  NSTEMI Type 2 related to other (please specify)  Other (please specify)  Unable to determine      By submitting this query, we are merely seeking further clarification of documentation to accurately reflect all conditions that you are monitoring, evaluating, treating or that extend " the hospitalization or utilize additional resources of care. Please utilize your independent clinical judgment when addressing the question(s) above.     This query and your response, once completed, will be entered into the legal medical record.    Sincerely,  Rox NUÑEZ RN. [facility]  Clinical Documentation Integrity Program   [email or web address]

## 2023-04-04 ENCOUNTER — HOSPITAL ENCOUNTER (EMERGENCY)
Facility: HOSPITAL | Age: 78
Discharge: HOME | End: 2023-04-04
Attending: EMERGENCY MEDICINE | Admitting: EMERGENCY MEDICINE
Payer: MEDICARE

## 2023-04-04 ENCOUNTER — APPOINTMENT (OUTPATIENT)
Dept: GENERAL RADIOLOGY | Facility: HOSPITAL | Age: 78
End: 2023-04-04
Payer: MEDICARE

## 2023-04-04 VITALS
BODY MASS INDEX: 34.96 KG/M2 | WEIGHT: 236 LBS | TEMPERATURE: 98.2 F | HEART RATE: 71 BPM | DIASTOLIC BLOOD PRESSURE: 67 MMHG | SYSTOLIC BLOOD PRESSURE: 118 MMHG | HEIGHT: 69 IN | RESPIRATION RATE: 14 BRPM | OXYGEN SATURATION: 97 %

## 2023-04-04 DIAGNOSIS — R60.9 PERIPHERAL EDEMA: Primary | ICD-10-CM

## 2023-04-04 LAB
ALBUMIN SERPL-MCNC: 3.6 G/DL (ref 3.5–5.2)
ALBUMIN/GLOB SERPL: 1.3 G/DL
ALP SERPL-CCNC: 75 U/L (ref 39–117)
ALT SERPL W P-5'-P-CCNC: 13 U/L (ref 1–41)
ANION GAP SERPL CALCULATED.3IONS-SCNC: 6 MMOL/L (ref 5–15)
APTT PPP: 26.4 SECONDS (ref 22.7–35.4)
AST SERPL-CCNC: 18 U/L (ref 1–40)
BASOPHILS # BLD AUTO: 0.02 10*3/MM3 (ref 0–0.2)
BASOPHILS NFR BLD AUTO: 0.4 % (ref 0–1.5)
BILIRUB SERPL-MCNC: 0.4 MG/DL (ref 0–1.2)
BUN SERPL-MCNC: 17 MG/DL (ref 8–23)
BUN/CREAT SERPL: 15.3 (ref 7–25)
CALCIUM SPEC-SCNC: 8.7 MG/DL (ref 8.6–10.5)
CHLORIDE SERPL-SCNC: 105 MMOL/L (ref 98–107)
CO2 SERPL-SCNC: 29 MMOL/L (ref 22–29)
CREAT SERPL-MCNC: 1.11 MG/DL (ref 0.76–1.27)
DEPRECATED RDW RBC AUTO: 50.6 FL (ref 37–54)
EGFRCR SERPLBLD CKD-EPI 2021: 68.8 ML/MIN/1.73
EOSINOPHIL # BLD AUTO: 0.06 10*3/MM3 (ref 0–0.4)
EOSINOPHIL NFR BLD AUTO: 1.3 % (ref 0.3–6.2)
ERYTHROCYTE [DISTWIDTH] IN BLOOD BY AUTOMATED COUNT: 14.1 % (ref 12.3–15.4)
GEN 5 2HR TROPONIN T REFLEX: 21 NG/L
GLOBULIN UR ELPH-MCNC: 2.8 GM/DL
GLUCOSE SERPL-MCNC: 114 MG/DL (ref 65–99)
HCT VFR BLD AUTO: 30.5 % (ref 37.5–51)
HGB BLD-MCNC: 9.8 G/DL (ref 13–17.7)
IMM GRANULOCYTES # BLD AUTO: 0.01 10*3/MM3 (ref 0–0.05)
IMM GRANULOCYTES NFR BLD AUTO: 0.2 % (ref 0–0.5)
INR PPP: 0.97 (ref 0.9–1.1)
LYMPHOCYTES # BLD AUTO: 1.27 10*3/MM3 (ref 0.7–3.1)
LYMPHOCYTES NFR BLD AUTO: 28.3 % (ref 19.6–45.3)
MCH RBC QN AUTO: 31.2 PG (ref 26.6–33)
MCHC RBC AUTO-ENTMCNC: 32.1 G/DL (ref 31.5–35.7)
MCV RBC AUTO: 97.1 FL (ref 79–97)
MONOCYTES # BLD AUTO: 0.63 10*3/MM3 (ref 0.1–0.9)
MONOCYTES NFR BLD AUTO: 14.1 % (ref 5–12)
NEUTROPHILS NFR BLD AUTO: 2.49 10*3/MM3 (ref 1.7–7)
NEUTROPHILS NFR BLD AUTO: 55.7 % (ref 42.7–76)
NRBC BLD AUTO-RTO: 0 /100 WBC (ref 0–0.2)
NT-PROBNP SERPL-MCNC: 1097 PG/ML (ref 0–1800)
PLATELET # BLD AUTO: 168 10*3/MM3 (ref 140–450)
PMV BLD AUTO: 9.7 FL (ref 6–12)
POTASSIUM SERPL-SCNC: 4.5 MMOL/L (ref 3.5–5.2)
PROT SERPL-MCNC: 6.4 G/DL (ref 6–8.5)
PROTHROMBIN TIME: 13 SECONDS (ref 11.7–14.2)
QT INTERVAL: 401 MS
RBC # BLD AUTO: 3.14 10*6/MM3 (ref 4.14–5.8)
SODIUM SERPL-SCNC: 140 MMOL/L (ref 136–145)
TROPONIN T DELTA: -4 NG/L
TROPONIN T SERPL HS-MCNC: 25 NG/L
VALPROATE SERPL-MCNC: 36 MCG/ML (ref 50–125)
WBC NRBC COR # BLD: 4.48 10*3/MM3 (ref 3.4–10.8)

## 2023-04-04 PROCEDURE — 84484 ASSAY OF TROPONIN QUANT: CPT | Performed by: EMERGENCY MEDICINE

## 2023-04-04 PROCEDURE — 80164 ASSAY DIPROPYLACETIC ACD TOT: CPT | Performed by: EMERGENCY MEDICINE

## 2023-04-04 PROCEDURE — 83880 ASSAY OF NATRIURETIC PEPTIDE: CPT | Performed by: EMERGENCY MEDICINE

## 2023-04-04 PROCEDURE — 93010 ELECTROCARDIOGRAM REPORT: CPT | Performed by: INTERNAL MEDICINE

## 2023-04-04 PROCEDURE — 85025 COMPLETE CBC W/AUTO DIFF WBC: CPT | Performed by: EMERGENCY MEDICINE

## 2023-04-04 PROCEDURE — 99284 EMERGENCY DEPT VISIT MOD MDM: CPT

## 2023-04-04 PROCEDURE — 85610 PROTHROMBIN TIME: CPT | Performed by: EMERGENCY MEDICINE

## 2023-04-04 PROCEDURE — 36415 COLL VENOUS BLD VENIPUNCTURE: CPT

## 2023-04-04 PROCEDURE — 93005 ELECTROCARDIOGRAM TRACING: CPT | Performed by: EMERGENCY MEDICINE

## 2023-04-04 PROCEDURE — 71045 X-RAY EXAM CHEST 1 VIEW: CPT

## 2023-04-04 PROCEDURE — 80053 COMPREHEN METABOLIC PANEL: CPT | Performed by: EMERGENCY MEDICINE

## 2023-04-04 PROCEDURE — 85730 THROMBOPLASTIN TIME PARTIAL: CPT | Performed by: EMERGENCY MEDICINE

## 2023-04-04 RX ORDER — SODIUM CHLORIDE 0.9 % (FLUSH) 0.9 %
10 SYRINGE (ML) INJECTION AS NEEDED
Status: DISCONTINUED | OUTPATIENT
Start: 2023-04-04 | End: 2023-04-04 | Stop reason: HOSPADM

## 2023-04-04 NOTE — ED NOTES
[facility] in [place] called and updated in regards to patient's discharge. Facility states they will send staff to pick pt up.

## 2023-04-04 NOTE — ED TRIAGE NOTES
Here via EMS - from neuro group home, here for BLE edema.  Pt has given EMS several different stories enroute to the hospital.  Pt's neuro is at baseline per group home and EMS who have picked him up several times in the past.  Pt called EMS himself, staff at the group home were not very helpful per EMS.  Pt has binder with him with medical history.     This RN in appropriate PPE for all patient care interactions. Pt masked and redirected for proper mask use when necessary. Hand hygiene performed before and after all patient care interactions.

## 2023-04-04 NOTE — ED PROVIDER NOTES
EMERGENCY DEPARTMENT ENCOUNTER    Room Number:  13/13  Date of encounter:  4/4/2023  PCP: Milo Carrasquillo DO  Patient Care Team:  Milo Carrasquillo DO as PCP - General (Physical Medicine and Rehabilitation)  Albert Shukla MD as Consulting Physician (Cardiology)   Independent Historians: Patient    HPI:  Chief Complaint: Edema bilateral lower extremities    A complete HPI/ROS/PMH/PSH/SH/FH are unobtainable due to: Patient with some cognitive limitations    Chronic or social conditions impacting patient care (Social Determinants of Health): None  (Financial Resource Strain / Food Insecurity / Transportation Needs / Physical Activity / Stress / Social Connections / Intimate Partner Violence / Housing Stability)    Context: Hernando Lozada is a 78 y.o. male who presents to the ED c/o edema to bilateral lower extremities.  Patient was recently hospitalized at [facility] and underwent diuresis going CHF exacerbation.  Patient states that he has not been having any chest pain or shortness of breath.  States he has had a little bit of swelling to his lower extremities but states that it is better but has been.  Patient resides at [facility] in [place].    Review of prior external notes (non-ED): I reviewed patient's discharge summary from 3/14/2023    Review of prior external test results outside of this encounter: I reviewed patient's BMP and CBC from 3/14/2023        PAST MEDICAL HISTORY  Active Ambulatory Problems     Diagnosis Date Noted   • DJD (degenerative joint disease) of knee 12/14/2017   • Essential hypertension 02/18/2019   • SOB (shortness of breath) 02/18/2019   • Leg swelling 02/18/2019   • Chest pain with high risk of acute coronary syndrome 04/29/2019   • Hyperlipidemia LDL goal <100 08/23/2019   • COVID-19 11/25/2022   • Diabetes mellitus    • GIOVANNY (obstructive sleep apnea)    • Disease of thyroid gland    • CKD (chronic kidney disease)    • Hypomagnesemia    •  Chronic brain injury    • Generalized weakness    • CAD (coronary artery disease)    • Pedal edema 03/06/2023   • Tremor 03/06/2023   • Elevated troponin 03/06/2023   • Lower extremity cellulitis 03/06/2023   • Tinea cruris 03/06/2023   • Chronic deep vein thrombosis (DVT) of calf muscle vein of left lower extremity 03/07/2023   • Nonrheumatic aortic valve stenosis 03/06/2023     Resolved Ambulatory Problems     Diagnosis Date Noted   • No Resolved Ambulatory Problems     Past Medical History:   Diagnosis Date   • Arthritis    • Bilateral leg edema    • GERD (gastroesophageal reflux disease)    • Heart murmur    • History of head injury    • [place] (hard of hearing)    • Hyperlipidemia    • Hypertension    • Irregular heart beat    • Osteoarthritis    • Past heart attack    • SOB (shortness of breath) on exertion    • Stroke    • Vasovagal episode        The patient has started, but not completed, their COVID-19 vaccination series.    PAST SURGICAL HISTORY  Past Surgical History:   Procedure Laterality Date   • CARDIAC CATHETERIZATION N/A 4/30/2019    Procedure: Coronary angiography with grafts;  Surgeon: Rio Miranda MD;  Location: [facility] CATH INVASIVE LOCATION;  Service: Cardiology   • CARDIAC CATHETERIZATION N/A 4/30/2019    Procedure: Left Heart Cath;  Surgeon: Rio Miranda MD;  Location: [facility] CATH INVASIVE LOCATION;  Service: Cardiology   • CARDIAC CATHETERIZATION N/A 4/30/2019    Procedure: Left ventriculography;  Surgeon: Rio Miranda MD;  Location: [facility] CATH INVASIVE LOCATION;  Service: Cardiology   • CARDIAC CATHETERIZATION  4/30/2019    Procedure: Saphenous Vein Graft;  Surgeon: Rio Miranda MD;  Location: [facility] CATH INVASIVE LOCATION;  Service: Cardiology   • CARDIAC CATHETERIZATION N/A 4/30/2019    Procedure: Stent GLENDY coronary;  Surgeon: Rio Miranda MD;  Location: [facility] CATH INVASIVE LOCATION;  Service: Cardiology   • CARDIAC CATHETERIZATION N/A  3/10/2023    Procedure: Right and Left Heart Cath;  Surgeon: Rio Miranda MD;  Location: [facility] CATH INVASIVE LOCATION;  Service: Cardiology;  Laterality: N/A;   • CARDIAC CATHETERIZATION N/A 3/10/2023    Procedure: Coronary angiography;  Surgeon: Rio Miranda MD;  Location: [facility] CATH INVASIVE LOCATION;  Service: Cardiology;  Laterality: N/A;   • CARDIAC CATHETERIZATION N/A 3/10/2023    Procedure: Left ventriculography;  Surgeon: Rio Miranda MD;  Location: [facility] CATH INVASIVE LOCATION;  Service: Cardiology;  Laterality: N/A;   • CARDIAC CATHETERIZATION N/A 3/10/2023    Procedure: Percutaneous Coronary Intervention;  Surgeon: Rio Miranda MD;  Location: [facility] CATH INVASIVE LOCATION;  Service: Cardiology;  Laterality: N/A;   • CARDIAC CATHETERIZATION N/A 3/10/2023    Procedure: Stent GLENDY coronary;  Surgeon: Rio Miranda MD;  Location: [facility] CATH INVASIVE LOCATION;  Service: Cardiology;  Laterality: N/A;   • CARPAL TUNNEL RELEASE Bilateral    • CATARACT EXTRACTION     • CORONARY ARTERY BYPASS GRAFT  2002   • FINGER SURGERY      MISSING LEFT POINTER FINGER TIP   • KNEE ARTHROPLASTY Right 02/15/2017   • [place] ARTHRP KNE CONDYLE&PLATU MEDIAL&LAT COMPARTMENTS Left 12/14/2017    Procedure: LT TOTAL KNEE ARTHROPLASTY;  Surgeon: Jatin Lancaster MD;  Location: [facility] OR;  Service: Orthopedics   • NJ RT/LT HEART CATHETERS N/A 4/30/2019    Procedure: Percutaneous Coronary Intervention;  Surgeon: Rio Miranda MD;  Location: [facility] CATH INVASIVE LOCATION;  Service: Cardiology         FAMILY HISTORY  Family History   Problem Relation Age of Onset   • Parkinsonism Sister    • Diabetes Brother    • Malig Hyperthermia Neg Hx          SOCIAL HISTORY  Social History     Socioeconomic History   • Marital status: Single   Tobacco Use   • Smoking status: Never     Passive exposure: Never   • Smokeless tobacco: Never   Vaping Use   • Vaping Use: Never used   Substance and  Sexual Activity   • Alcohol use: No   • Drug use: No   • Sexual activity: Defer         ALLERGIES  Patient has no known allergies.        REVIEW OF SYSTEMS  Review of Systems     All systems reviewed and negative except for those discussed in HPI.       PHYSICAL EXAM    I have reviewed the triage vital signs and nursing notes.    ED Triage Vitals [04/04/23 0117]   Temp Heart Rate Resp BP SpO2   98.2 °F (36.8 °C) 75 14 126/70 99 %      Temp src Heart Rate Source Patient Position BP Location FiO2 (%)   Oral Monitor Lying Right arm --       Physical Exam  GENERAL: alert, no acute distress  SKIN: Warm, dry  HENT: Normocephalic, atraumatic  EYES: no scleral icterus  CV: regular rhythm, regular rate, 1+ nonpitting edema bilateral lower extremities  RESPIRATORY: normal effort, lungs clear  ABDOMEN: soft, nontender, nondistended  MUSCULOSKELETAL: no deformity  NEURO: alert, moves all extremities, follows commands          LAB RESULTS  Recent Results (from the past 24 hour(s))   ECG 12 Lead Dyspnea    Collection Time: 04/04/23  1:48 AM   Result Value Ref Range    QT Interval 401 ms   Comprehensive Metabolic Panel    Collection Time: 04/04/23  2:10 AM    Specimen: Blood   Result Value Ref Range    Glucose 114 (H) 65 - 99 mg/dL    BUN 17 8 - 23 mg/dL    Creatinine 1.11 0.76 - 1.27 mg/dL    Sodium 140 136 - 145 mmol/L    Potassium 4.5 3.5 - 5.2 mmol/L    Chloride 105 98 - 107 mmol/L    CO2 29.0 22.0 - 29.0 mmol/L    Calcium 8.7 8.6 - 10.5 mg/dL    Total Protein 6.4 6.0 - 8.5 g/dL    Albumin 3.6 3.5 - 5.2 g/dL    ALT (SGPT) 13 1 - 41 U/L    AST (SGOT) 18 1 - 40 U/L    Alkaline Phosphatase 75 39 - 117 U/L    Total Bilirubin 0.4 0.0 - 1.2 mg/dL    Globulin 2.8 gm/dL    A/G Ratio 1.3 g/dL    BUN/Creatinine Ratio 15.3 7.0 - 25.0    Anion Gap 6.0 5.0 - 15.0 mmol/L    eGFR 68.8 >60.0 mL/min/1.73   Protime-INR    Collection Time: 04/04/23  2:10 AM    Specimen: Blood   Result Value Ref Range    Protime 13.0 11.7 - 14.2 Seconds    INR  0.97 0.90 - 1.10   aPTT    Collection Time: 04/04/23  2:10 AM    Specimen: Blood   Result Value Ref Range    PTT 26.4 22.7 - 35.4 seconds   BNP    Collection Time: 04/04/23  2:10 AM    Specimen: Blood   Result Value Ref Range    proBNP 1,097.0 0.0 - 1,800.0 pg/mL   High Sensitivity Troponin T    Collection Time: 04/04/23  2:10 AM    Specimen: Blood   Result Value Ref Range    HS Troponin T 25 (H) <15 ng/L   Valproic Acid Level, Total    Collection Time: 04/04/23  2:10 AM    Specimen: Blood   Result Value Ref Range    Valproic Acid 36.0 (L) 50.0 - 125.0 mcg/mL   CBC Auto Differential    Collection Time: 04/04/23  2:10 AM    Specimen: Blood   Result Value Ref Range    WBC 4.48 3.40 - 10.80 10*3/mm3    RBC 3.14 (L) 4.14 - 5.80 10*6/mm3    Hemoglobin 9.8 (L) 13.0 - 17.7 g/dL    Hematocrit 30.5 (L) 37.5 - 51.0 %    MCV 97.1 (H) 79.0 - 97.0 fL    MCH 31.2 26.6 - 33.0 pg    MCHC 32.1 31.5 - 35.7 g/dL    RDW 14.1 12.3 - 15.4 %    RDW-SD 50.6 37.0 - 54.0 fl    MPV 9.7 6.0 - 12.0 fL    Platelets 168 140 - 450 10*3/mm3    Neutrophil % 55.7 42.7 - 76.0 %    Lymphocyte % 28.3 19.6 - 45.3 %    Monocyte % 14.1 (H) 5.0 - 12.0 %    Eosinophil % 1.3 0.3 - 6.2 %    Basophil % 0.4 0.0 - 1.5 %    Immature Grans % 0.2 0.0 - 0.5 %    Neutrophils, Absolute 2.49 1.70 - 7.00 10*3/mm3    Lymphocytes, Absolute 1.27 0.70 - 3.10 10*3/mm3    Monocytes, Absolute 0.63 0.10 - 0.90 10*3/mm3    Eosinophils, Absolute 0.06 0.00 - 0.40 10*3/mm3    Basophils, Absolute 0.02 0.00 - 0.20 10*3/mm3    Immature Grans, Absolute 0.01 0.00 - 0.05 10*3/mm3    nRBC 0.0 0.0 - 0.2 /100 WBC   High Sensitivity Troponin T 2Hr    Collection Time: 04/04/23  4:35 AM    Specimen: Blood   Result Value Ref Range    HS Troponin T 21 (H) <15 ng/L    Troponin T Delta -4 (L) >=-4 - <+4 ng/L       Ordered the above labs and independently reviewed the results.        RADIOLOGY  XR Chest 1 View    Result Date: 4/4/2023  SINGLE VIEW OF THE CHEST  HISTORY: Shortness of air  COMPARISON:  03/06/2023  FINDINGS: There is cardiomegaly. Patient is status post median sternotomy with coronary artery bypass grafting. No pneumothorax or pleural effusion is seen. No acute infiltrates are identified.      No acute findings.  This report was finalized on 4/4/2023 2:27 AM by Dr. Sosa Leos M.D.        I ordered the above noted radiological studies. Reviewed by me and discussed with radiologist.  See dictation for official radiology interpretation.      PROCEDURES    Procedures      MEDICATIONS GIVEN IN ER    Medications   sodium chloride 0.9 % flush 10 mL (has no administration in time range)         ORDERS PLACED DURING THIS VISIT:  Orders Placed This Encounter   Procedures   • XR Chest 1 View   • Comprehensive Metabolic Panel   • Protime-INR   • aPTT   • BNP   • High Sensitivity Troponin T   • Valproic Acid Level, Total   • CBC Auto Differential   • High Sensitivity Troponin T 2Hr   • Monitor Blood Pressure   • Cardiac Monitoring   • Pulse Oximetry, Continuous   • Please doppler pulses bl feet  Misc Nursing Order (Specify)   • ECG 12 Lead Dyspnea   • Insert Peripheral IV   • CBC & Differential         PROGRESS, DATA ANALYSIS, CONSULTS, AND MEDICAL DECISION MAKING    All labs have been independently interpreted by me.  All radiology studies have been reviewed by me and discussed with radiologist dictating the report.   EKG's independently viewed and interpreted by me.  Discussion below represents my analysis of pertinent findings related to patient's condition, differential diagnosis, treatment plan and final disposition.    Differential diagnosis includes but is not limited to peripheral edema, CHF exacerbation, nephrotic syndrome.    ED Course as of 04/04/23 0653   Tue Apr 04, 2023   0150 EKG          EKG time: 0148  Rhythm/Rate: Sinus rhythm rate 72  P waves and FL: Normal  QRS, axis: Narrow regular  ST and T waves: Within normal limits    Interpreted Contemporaneously by me, independently viewed [TJ]    0545 Patient without signs of significant fluid overload.  I have discussed findings with the patient he is agreeable with discharge home. [TJ]   0653 I have independently reviewed patient's chest x-ray; my interpretation is negative [TJ]      ED Course User Index  [TJ] Tomas Martin MD       I interpreted the cardiac monitor rhythm and my independent interpretation is: normal sinus rhythm.     PPE: The patient wore a mask and I wore an N95 mask throughout the entire patient encounter.       AS OF 06:53 EDT VITALS:    BP - 118/67  HR - 71  TEMP - 98.2 °F (36.8 °C) (Oral)  O2 SATS - 97%        DIAGNOSIS  Final diagnoses:   Peripheral edema         DISPOSITION  ED Disposition     ED Disposition   Discharge    Condition   Stable    Comment   --                Note Disclaimer: At [facility], we believe that sharing information builds trust and better relationships. You are receiving this note because you recently visited [facility]. It is possible you will see health information before a provider has talked with you about it. This kind of information can be easy to misunderstand. To help you fully understand what it means for your health, we urge you to discuss this note with your provider.       Tomas Martin MD  04/04/23 0653

## 2023-04-05 ENCOUNTER — HOSPITAL ENCOUNTER (EMERGENCY)
Facility: HOSPITAL | Age: 78
Discharge: HOME | End: 2023-04-06
Attending: EMERGENCY MEDICINE | Admitting: EMERGENCY MEDICINE
Payer: MEDICARE

## 2023-04-05 DIAGNOSIS — S51.012A SKIN TEAR OF LEFT ELBOW WITHOUT COMPLICATION, INITIAL ENCOUNTER: Primary | ICD-10-CM

## 2023-04-05 PROCEDURE — 99284 EMERGENCY DEPT VISIT MOD MDM: CPT

## 2023-04-05 PROCEDURE — 25010000002 TETANUS-DIPHTH-ACELL PERTUSSIS 5-2.5-18.5 LF-MCG/0.5 SUSPENSION PREFILLED SYRINGE: Performed by: EMERGENCY MEDICINE

## 2023-04-05 PROCEDURE — 90471 IMMUNIZATION ADMIN: CPT | Performed by: EMERGENCY MEDICINE

## 2023-04-05 PROCEDURE — 90715 TDAP VACCINE 7 YRS/> IM: CPT | Performed by: EMERGENCY MEDICINE

## 2023-04-05 RX ADMIN — Medication 3 ML: at 23:40

## 2023-04-05 RX ADMIN — TETANUS TOXOID, REDUCED DIPHTHERIA TOXOID AND ACELLULAR PERTUSSIS VACCINE, ADSORBED 0.5 ML: 5; 2.5; 8; 8; 2.5 SUSPENSION INTRAMUSCULAR at 23:38

## 2023-04-06 VITALS
BODY MASS INDEX: 31.1 KG/M2 | TEMPERATURE: 97.8 F | WEIGHT: 210 LBS | SYSTOLIC BLOOD PRESSURE: 134 MMHG | OXYGEN SATURATION: 95 % | RESPIRATION RATE: 18 BRPM | HEART RATE: 76 BPM | DIASTOLIC BLOOD PRESSURE: 85 MMHG | HEIGHT: 69 IN

## 2023-04-06 NOTE — ED NOTES
Attempted to call caregiver David to arrange transport for patient. Call was unanswered 3x. Will attempt to call shortly.

## 2023-04-06 NOTE — ED PROVIDER NOTES
EMERGENCY DEPARTMENT ENCOUNTER    Room Number:  24/24  Date of encounter:  4/6/2023  PCP: Milo Carrasquillo DO  Historian: Patient and EMS    Patient was placed in face mask during triage process. Patient was wearing facemask when I entered the room and throughout our encounter. I wore full protective equipment throughout this patient encounter including a face mask, eye protection, and gloves. Hand hygiene was performed before donning protective equipment and again following doffing of PPE after leaving the room.    HPI:  Chief Complaint: Bleeding wound left elbow  A complete HPI/ROS/PMH/PSH/SH/FH are unobtainable due to: Patient is a limited historian likely secondary to chronic issues.  Context: Hernando Lozada is a 78 y.o. male who presents to the ED c/o injury to left elbow yesterday with continued bleeding since event.  Does not complain of significant pain or other injuries from the event.  Is unclear whether he fell or bumped into something.  Patient primarily concerned that his toenails are long and need to be addressed.      MEDICAL HISTORY REVIEW  EMR reviewed:    Patient seen here on 4/4/2023 in the ED for peripheral edema and had metabolic work-up.  ==================================  Patient had admission to the hospital 3/6/2023 for evaluation and treatment of pedal edema.    PAST MEDICAL HISTORY  Active Ambulatory Problems     Diagnosis Date Noted   • DJD (degenerative joint disease) of knee 12/14/2017   • Essential hypertension 02/18/2019   • SOB (shortness of breath) 02/18/2019   • Leg swelling 02/18/2019   • Chest pain with high risk of acute coronary syndrome 04/29/2019   • Hyperlipidemia LDL goal <100 08/23/2019   • COVID-19 11/25/2022   • Diabetes mellitus    • GIOVANNY (obstructive sleep apnea)    • Disease of thyroid gland    • CKD (chronic kidney disease)    • Hypomagnesemia    • Chronic brain injury    • Generalized weakness    • CAD (coronary artery disease)    • Pedal edema 03/06/2023   •  Tremor 03/06/2023   • Elevated troponin 03/06/2023   • Lower extremity cellulitis 03/06/2023   • Tinea cruris 03/06/2023   • Chronic deep vein thrombosis (DVT) of calf muscle vein of left lower extremity 03/07/2023   • Nonrheumatic aortic valve stenosis 03/06/2023     Resolved Ambulatory Problems     Diagnosis Date Noted   • No Resolved Ambulatory Problems     Past Medical History:   Diagnosis Date   • Arthritis    • Bilateral leg edema    • GERD (gastroesophageal reflux disease)    • Heart murmur    • History of head injury    • [place] (hard of hearing)    • Hyperlipidemia    • Hypertension    • Irregular heart beat    • Osteoarthritis    • Past heart attack    • SOB (shortness of breath) on exertion    • Stroke    • Vasovagal episode          PAST SURGICAL HISTORY  Past Surgical History:   Procedure Laterality Date   • CARDIAC CATHETERIZATION N/A 4/30/2019    Procedure: Coronary angiography with grafts;  Surgeon: Rio Miranda MD;  [facility];  Service: Cardiology   • CARDIAC CATHETERIZATION N/A 4/30/2019    Procedure: Left Heart Cath;  Surgeon: Rio Miranda MD;  [facility];  Service: Cardiology   • CARDIAC CATHETERIZATION N/A 4/30/2019    Procedure: Left ventriculography;  Surgeon: Rio Miranda MD;  [facility];  Service: Cardiology   • CARDIAC CATHETERIZATION  4/30/2019    Procedure: Saphenous Vein Graft;  Surgeon: Rio Miranda MD;  [facility];  Service: Cardiology   • CARDIAC CATHETERIZATION N/A 4/30/2019    Procedure: Stent GLENDY coronary;  Surgeon: Rio Miranda MD;  [facility];  Service: Cardiology   • CARDIAC CATHETERIZATION N/A 3/10/2023    Procedure: Right and Left Heart Cath;  Surgeon: Rio Miranda MD;  [facility];  Service: Cardiology;  Laterality: N/A;   • CARDIAC CATHETERIZATION  N/A 3/10/2023    Procedure: Coronary angiography;  Surgeon: Rio Miranda MD;  [facility];  Service: Cardiology;  Laterality: N/A;   • CARDIAC CATHETERIZATION N/A 3/10/2023    Procedure: Left ventriculography;  Surgeon: Rio Miranda MD;  [facility];  Service: Cardiology;  Laterality: N/A;   • CARDIAC CATHETERIZATION N/A 3/10/2023    Procedure: Percutaneous Coronary Intervention;  Surgeon: Rio Miranda MD;  [facility];  Service: Cardiology;  Laterality: N/A;   • CARDIAC CATHETERIZATION N/A 3/10/2023    Procedure: Stent GLENDY coronary;  Surgeon: Rio Miranda MD;  [facility];  Service: Cardiology;  Laterality: N/A;   • CARPAL TUNNEL RELEASE Bilateral    • CATARACT EXTRACTION     • CORONARY ARTERY BYPASS GRAFT  2002   • FINGER SURGERY      MISSING LEFT POINTER FINGER TIP   • KNEE ARTHROPLASTY Right 02/15/2017   • [place] ARTHRP KNE CONDYLE&PLATU MEDIAL&LAT COMPARTMENTS Left 12/14/2017    Procedure: LT TOTAL KNEE ARTHROPLASTY;  Surgeon: Jatin Lancaster MD;  Location: [facility] MAIN OR;  Service: Orthopedics   • LA RT/LT HEART CATHETERS N/A 4/30/2019    Procedure: Percutaneous Coronary Intervention;  Surgeon: Rio Miranda MD;  Location: [facility] CATH INVASIVE LOCATION;  Service: Cardiology         FAMILY HISTORY  Family History   Problem Relation Age of Onset   • Parkinsonism Sister    • Diabetes Brother    • [place]ig Hyperthermia Neg Hx          SOCIAL HISTORY  Social History     Socioeconomic History   • Marital status: Single   Tobacco Use   • Smoking status: Never     Passive exposure: Never   • Smokeless tobacco: Never   Vaping Use   • Vaping Use: Never used   Substance and Sexual Activity   • Alcohol use: No   • Drug use: No   • Sexual activity: Defer         ALLERGIES  Patient has no known allergies.        REVIEW OF SYSTEMS  Review of Systems     All systems  reviewed and negative except for those discussed in HPI.       PHYSICAL EXAM    I have reviewed the triage vital signs and nursing notes.    ED Triage Vitals   Temp Heart Rate Resp BP SpO2   04/05/23 2316 04/05/23 2315 04/05/23 2315 04/05/23 2315 04/05/23 2315   97.8 °F (36.6 °C) 72 18 111/55 96 %      Temp src Heart Rate Source Patient Position BP Location FiO2 (%)   04/05/23 2316 04/05/23 2315 -- -- --   Tympanic Monitor          Physical Exam    Physical Exam   Constitutional: No distress.   HENT:  Head: Normocephalic and atraumatic.    Cardiovascular: Pink warm and well-perfused  Pulmonary/Chest: No respiratory distress.  No tachypnea or increased work of abdominal: Soft. There is no tenderness. There is no rebound and no guarding.   Musculoskeletal: Moves all extremities equally.  Ecchymosis with skin tear left elbow that is mildly weeping.  Painless range of motion left elbow.    Chronic appearing venous stasis changes bilateral lower extremities.    Neurological: Alert.    Skin: Skin is pink, warm, and dry. No pallor.   Psychiatric: Mood and affect normal.   Nursing note and vitals reviewed.    LAB RESULTS  No results found for this or any previous visit (from the past 24 hour(s)).    Ordered the above labs and independently reviewed the results.        RADIOLOGY  No Radiology Exams Resulted Within Past 24 Hours    I ordered the above noted radiological studies. Reviewed by me and discussed with radiologist.  See dictation for official radiology interpretation.      PROCEDURES    Procedures        MEDICATIONS GIVEN IN ER    Medications   Oxidized Cellulose pads 1 each (has no administration in time range)   Lido-EPINEPHrine-Tetracaine 4-0.18-0.5 % gel 3 mL (3 mL Topical Given 4/5/23 2342)   Tetanus-Diphth-Acell Pertussis (BOOSTRIX) injection 0.5 mL (0.5 mL Intramuscular Given 4/5/23 0035)         PROGRESS, DATA ANALYSIS, CONSULTS, AND MEDICAL DECISION MAKING    My differential diagnosis of the upper extremity  pain or injury includes but is not limited to contusions of the shoulder, forearm, arm, wrist, elbow or hand, dislocations of shoulder, elbow, wrist, digits, nursemaid's elbow (age-appropriate), shoulder sprain, elbow sprain, wrist sprain, digit sprain, shoulder strain, arm strain, forearm strain, elbow strain, wrist strain, hand sprain, digit strain, lacerations of the upper extremity, fractures both closed and open of clavicle, scapula, humerus, radius, ulna, bones of the wrist, hands and digits, cellulitis or abscess, cervical radiculopathy, radial nerve palsy, neurogenic upper extremity pain, angina equivalent, SVT, DVT, arterial occlusion, compartment syndrome.      All labs have been independently reviewed by me.  All radiology studies have been reviewed by me and discussed with radiologist dictating the report.   EKG's independently viewed and interpreted by me.  Discussion below represents my analysis of pertinent findings related to patient's condition, differential diagnosis, treatment plan and final disposition.      ED Course as of 04/06/23 0013   Thu Apr 06, 2023   0011 Wound hemostatic.  Dressing applied.  Outpatient follow-up with PCP. [RS]      ED Course User Index  [RS] René Holley MD       AS OF 00:13 EDT VITALS:    BP - 111/55  HR - 72  TEMP - 97.8 °F (36.6 °C) (Tympanic)  O2 SATS - 96%        DIAGNOSIS  Final diagnoses:   Skin tear of left elbow without complication, initial encounter         DISPOSITION  DISCHARGE    Patient discharged in stable condition.    Reviewed implications of results, diagnosis, meds, responsibility to follow up, warning signs and symptoms of possible worsening, potential complications and reasons to return to ER.    Patient/Family voiced understanding of above instructions.    Discussed plan for discharge, as there is no emergent indication for admission. Patient referred to primary care provider for regular health maintenance. Pt/family is agreeable and understands  need for follow up and possible repeat testing.  Pt is aware that discharge does not mean that nothing is wrong but it indicates no emergency is present that requires admission and they must continue care with follow-up as given below or physician of their choice.     FOLLOW-UP  Milo Carrasquillo, DO  [address]  [address]  [facility] [address]  [phone]    Schedule an appointment as soon as possible for a visit in 1 week  For wound re-check         Medication List      No changes were made to your prescriptions during this visit.            René Holley MD  04/06/23 0013

## 2023-04-06 NOTE — ED TRIAGE NOTES
Pt was brought in by ems from home for fall. Pt was cleaning room when he slipped on the wooden floor and hit left arm on dresser. Pt has skin tear to left elbow. Denies hitting head.     This RN wore mask and goggles during time of contact

## 2023-04-15 ENCOUNTER — APPOINTMENT (OUTPATIENT)
Dept: GENERAL RADIOLOGY | Facility: HOSPITAL | Age: 78
End: 2023-04-15
Payer: MEDICARE

## 2023-04-15 ENCOUNTER — HOSPITAL ENCOUNTER (EMERGENCY)
Facility: HOSPITAL | Age: 78
Discharge: HOME | End: 2023-04-15
Attending: EMERGENCY MEDICINE | Admitting: EMERGENCY MEDICINE
Payer: MEDICARE

## 2023-04-15 VITALS
HEART RATE: 101 BPM | HEIGHT: 69 IN | TEMPERATURE: 98.1 F | SYSTOLIC BLOOD PRESSURE: 154 MMHG | RESPIRATION RATE: 18 BRPM | WEIGHT: 210 LBS | BODY MASS INDEX: 31.1 KG/M2 | DIASTOLIC BLOOD PRESSURE: 92 MMHG | OXYGEN SATURATION: 96 %

## 2023-04-15 DIAGNOSIS — D64.9 CHRONIC ANEMIA: ICD-10-CM

## 2023-04-15 DIAGNOSIS — R53.1 GENERALIZED WEAKNESS: Primary | ICD-10-CM

## 2023-04-15 LAB
ALBUMIN SERPL-MCNC: 3.3 G/DL (ref 3.5–5.2)
ALBUMIN/GLOB SERPL: 1.1 G/DL
ALP SERPL-CCNC: 60 U/L (ref 39–117)
ALT SERPL W P-5'-P-CCNC: 6 U/L (ref 1–41)
ANION GAP SERPL CALCULATED.3IONS-SCNC: 10.2 MMOL/L (ref 5–15)
AST SERPL-CCNC: 7 U/L (ref 1–40)
BASOPHILS # BLD AUTO: 0.01 10*3/MM3 (ref 0–0.2)
BASOPHILS NFR BLD AUTO: 0.2 % (ref 0–1.5)
BILIRUB SERPL-MCNC: 0.3 MG/DL (ref 0–1.2)
BILIRUB UR QL STRIP: NEGATIVE
BUN SERPL-MCNC: 12 MG/DL (ref 8–23)
BUN/CREAT SERPL: 14 (ref 7–25)
CALCIUM SPEC-SCNC: 9.3 MG/DL (ref 8.6–10.5)
CHLORIDE SERPL-SCNC: 101 MMOL/L (ref 98–107)
CLARITY UR: CLEAR
CO2 SERPL-SCNC: 28.8 MMOL/L (ref 22–29)
COLOR UR: YELLOW
CREAT SERPL-MCNC: 0.86 MG/DL (ref 0.76–1.27)
DEPRECATED RDW RBC AUTO: 46.6 FL (ref 37–54)
EGFRCR SERPLBLD CKD-EPI 2021: 89.7 ML/MIN/1.73
EOSINOPHIL # BLD AUTO: 0.02 10*3/MM3 (ref 0–0.4)
EOSINOPHIL NFR BLD AUTO: 0.4 % (ref 0.3–6.2)
ERYTHROCYTE [DISTWIDTH] IN BLOOD BY AUTOMATED COUNT: 13.6 % (ref 12.3–15.4)
GEN 5 2HR TROPONIN T REFLEX: 24 NG/L
GLOBULIN UR ELPH-MCNC: 2.9 GM/DL
GLUCOSE SERPL-MCNC: 114 MG/DL (ref 65–99)
GLUCOSE UR STRIP-MCNC: NEGATIVE MG/DL
HCT VFR BLD AUTO: 30.7 % (ref 37.5–51)
HGB BLD-MCNC: 10.2 G/DL (ref 13–17.7)
HGB UR QL STRIP.AUTO: NEGATIVE
HOLD SPECIMEN: NORMAL
IMM GRANULOCYTES # BLD AUTO: 0.03 10*3/MM3 (ref 0–0.05)
IMM GRANULOCYTES NFR BLD AUTO: 0.5 % (ref 0–0.5)
INR PPP: 0.98 (ref 0.9–1.1)
KETONES UR QL STRIP: NEGATIVE
LEUKOCYTE ESTERASE UR QL STRIP.AUTO: NEGATIVE
LYMPHOCYTES # BLD AUTO: 1.26 10*3/MM3 (ref 0.7–3.1)
LYMPHOCYTES NFR BLD AUTO: 22.7 % (ref 19.6–45.3)
MAGNESIUM SERPL-MCNC: 2.2 MG/DL (ref 1.6–2.4)
MCH RBC QN AUTO: 30.9 PG (ref 26.6–33)
MCHC RBC AUTO-ENTMCNC: 33.2 G/DL (ref 31.5–35.7)
MCV RBC AUTO: 93 FL (ref 79–97)
MONOCYTES # BLD AUTO: 0.68 10*3/MM3 (ref 0.1–0.9)
MONOCYTES NFR BLD AUTO: 12.3 % (ref 5–12)
NEUTROPHILS NFR BLD AUTO: 3.54 10*3/MM3 (ref 1.7–7)
NEUTROPHILS NFR BLD AUTO: 63.9 % (ref 42.7–76)
NITRITE UR QL STRIP: NEGATIVE
NRBC BLD AUTO-RTO: 0.2 /100 WBC (ref 0–0.2)
NT-PROBNP SERPL-MCNC: 1234 PG/ML (ref 0–1800)
PH UR STRIP.AUTO: 7 [PH] (ref 5–8)
PLATELET # BLD AUTO: 371 10*3/MM3 (ref 140–450)
PMV BLD AUTO: 9.1 FL (ref 6–12)
POTASSIUM SERPL-SCNC: 4 MMOL/L (ref 3.5–5.2)
PROT SERPL-MCNC: 6.2 G/DL (ref 6–8.5)
PROT UR QL STRIP: NEGATIVE
PROTHROMBIN TIME: 13.1 SECONDS (ref 11.7–14.2)
QT INTERVAL: 356 MS
RBC # BLD AUTO: 3.3 10*6/MM3 (ref 4.14–5.8)
SODIUM SERPL-SCNC: 140 MMOL/L (ref 136–145)
SP GR UR STRIP: 1.01 (ref 1–1.03)
TROPONIN T DELTA: 4 NG/L
TROPONIN T SERPL HS-MCNC: 20 NG/L
TSH SERPL DL<=0.05 MIU/L-ACNC: 4.24 UIU/ML (ref 0.27–4.2)
UROBILINOGEN UR QL STRIP: NORMAL
VALPROATE SERPL-MCNC: 42 MCG/ML (ref 50–125)
WBC NRBC COR # BLD: 5.54 10*3/MM3 (ref 3.4–10.8)
WHOLE BLOOD HOLD COAG: NORMAL
WHOLE BLOOD HOLD SPECIMEN: NORMAL

## 2023-04-15 PROCEDURE — 84443 ASSAY THYROID STIM HORMONE: CPT | Performed by: EMERGENCY MEDICINE

## 2023-04-15 PROCEDURE — 80164 ASSAY DIPROPYLACETIC ACD TOT: CPT

## 2023-04-15 PROCEDURE — 83735 ASSAY OF MAGNESIUM: CPT | Performed by: EMERGENCY MEDICINE

## 2023-04-15 PROCEDURE — 80053 COMPREHEN METABOLIC PANEL: CPT | Performed by: EMERGENCY MEDICINE

## 2023-04-15 PROCEDURE — 85025 COMPLETE CBC W/AUTO DIFF WBC: CPT | Performed by: EMERGENCY MEDICINE

## 2023-04-15 PROCEDURE — 84484 ASSAY OF TROPONIN QUANT: CPT | Performed by: EMERGENCY MEDICINE

## 2023-04-15 PROCEDURE — 71046 X-RAY EXAM CHEST 2 VIEWS: CPT

## 2023-04-15 PROCEDURE — 85610 PROTHROMBIN TIME: CPT | Performed by: EMERGENCY MEDICINE

## 2023-04-15 PROCEDURE — 81003 URINALYSIS AUTO W/O SCOPE: CPT | Performed by: EMERGENCY MEDICINE

## 2023-04-15 PROCEDURE — 93010 ELECTROCARDIOGRAM REPORT: CPT | Performed by: INTERNAL MEDICINE

## 2023-04-15 PROCEDURE — 99284 EMERGENCY DEPT VISIT MOD MDM: CPT

## 2023-04-15 PROCEDURE — 93005 ELECTROCARDIOGRAM TRACING: CPT | Performed by: EMERGENCY MEDICINE

## 2023-04-15 PROCEDURE — 83880 ASSAY OF NATRIURETIC PEPTIDE: CPT | Performed by: EMERGENCY MEDICINE

## 2023-04-15 RX ORDER — SODIUM CHLORIDE 0.9 % (FLUSH) 0.9 %
10 SYRINGE (ML) INJECTION AS NEEDED
Status: DISCONTINUED | OUTPATIENT
Start: 2023-04-15 | End: 2023-04-15 | Stop reason: HOSPADM

## 2023-04-15 NOTE — CASE MANAGEMENT/SOCIAL WORK
Spoke with Esthela with [facility] ([phone]), who states he will be here to pick patient up within the hour. JOSE GarciaN RN

## 2023-04-15 NOTE — ED PROVIDER NOTES
EMERGENCY DEPARTMENT ENCOUNTER    Room Number:  33/33  Date of encounter:  4/15/2023  PCP: Milo Carrasquillo DO  Historian: Patient  Relevant information and history provided by sources other than the patient will be included below and in the ED Course.  Review of pertinent past medical records may also be included in record below and ED Course.    HPI:  Chief Complaint: Weakness  A complete HPI/ROS/PMH/PSH/SH/FH are unobtainable due to: Patient is pleasant but is a poor historian as he is unable to give specifics about his past history or medicines.  Context: Hernando Lozada is a 78 y.o. male who presents to the ED c/o patient presents to emergency department for weakness.  This been going on for several weeks.  He states has been going on since he was discharged from the hospital.  He states he was here at [facility], [place], operated by [facility] and they put a catheter in his right groin.  Believes has been weak since that time.  He is not focally weak in an arm or leg.  Complains of generalized weakness.  He states that he is in a recliner and then has a hard time getting out of the recliner.  He is supposed to ambulate with a cane but frequently he ambulates short distances without a cane.  He denies any chest pain or shortness of breath.  Denies any orthopnea.  Denies any pain anywhere.  No headache, abdominal pain or extremity pain.  He does report that he has some swelling to his lower extremities.  When I ask him how long it has been going on he cannot provide any definitive answer.  I informed him on his last hospital admission he had swelling as well.  Again is not able to qualify the duration or if it is worse.  Denies any fevers or chills.  He states that he has been having good bowel movements with no blood in the stool.  No black stool.  Denies any visual change or speech change        Previous Episodes: I believe he is having problems with generalized weakness before.  History of pedal edema in the past.  Current Symptoms: Just  generalized weakness has been going on for several weeks.    MEDICAL HISTORY REVIEWED  I see this gentleman was seen in our emergency department for an elbow abrasion on 4/5/2023 no labs were drawn at that time.  Patient has a history of coronary artery disease, diabetes, obstructive sleep apnea, chronic kidney disease, aortic valve stenosis and history of complaint of generalized weakness in the past.  He also has hypertension.  I reviewed his medicine list.  He is on anticoagulants consist of Brilinta and aspirin is also on several other medicines.    I can see this patient was discharged from [facility] on 3/14/2023.  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  Seemed like he was admitted for potential lower extremity cellulitis..  Patient has also chronic brain injury from childhood and currently resides in a group home.  He presented with pedal edema as his main complaint on this admission patient was treated with IV antibiotics.  Cardiology saw and evaluated him for an elevated troponin patient had a right and left heart catheterization with a stent to the ostial OM for proximal circumflex 80% blockage he also does have chronic aortic stenosis.  They recommended medical treatment for his aortic stenosis..  PAST MEDICAL HISTORY  Active Ambulatory Problems     Diagnosis Date Noted   • DJD (degenerative joint disease) of knee 12/14/2017   • Essential hypertension 02/18/2019   • SOB (shortness of breath) 02/18/2019   • Leg swelling 02/18/2019   • Chest pain with high risk of acute coronary syndrome 04/29/2019   • Hyperlipidemia LDL goal <100 08/23/2019   • COVID-19 11/25/2022   • Diabetes mellitus    • GIOVANNY (obstructive sleep apnea)    • Disease of thyroid gland    • CKD (chronic kidney disease)    • Hypomagnesemia    • Chronic brain injury    • Generalized weakness    • CAD (coronary artery disease)    • Pedal edema 03/06/2023   • Tremor  03/06/2023   • Elevated troponin 03/06/2023   • Lower extremity cellulitis 03/06/2023   • Tinea cruris 03/06/2023   • Chronic deep vein thrombosis (DVT) of calf muscle vein of left lower extremity 03/07/2023   • Nonrheumatic aortic valve stenosis 03/06/2023     Resolved Ambulatory Problems     Diagnosis Date Noted   • No Resolved Ambulatory Problems     Past Medical History:   Diagnosis Date   • Arthritis    • Bilateral leg edema    • GERD (gastroesophageal reflux disease)    • Heart murmur    • History of head injury    • [place] (hard of hearing)    • Hyperlipidemia    • Hypertension    • Irregular heart beat    • Osteoarthritis    • Past heart attack    • SOB (shortness of breath) on exertion    • Stroke    • Vasovagal episode          PAST SURGICAL HISTORY  Past Surgical History:   Procedure Laterality Date   • CARDIAC CATHETERIZATION N/A 4/30/2019    Procedure: Coronary angiography with grafts;  Surgeon: Rio Miranda MD;  [facility];  Service: Cardiology   • CARDIAC CATHETERIZATION N/A 4/30/2019    Procedure: Left Heart Cath;  Surgeon: Rio Miranda MD;  [facility] LOC[facility];  Service: Cardiology   • CARDIAC CATHETERIZATION N/A 4/30/2019    Procedure: Left ventriculography;  Surgeon: Rio Miranda MD;  [facility]IVE LOC[facility];  Service: Cardiology   • CARDIAC CATHETERIZATION  4/30/2019    Procedure: Saphenous Vein Graft;  Surgeon: Rio Miranda MD;  [facility] INV[facility] LOC[facility];  Service: Cardiology   • CARDIAC CATHETERIZATION N/A 4/30/2019    Procedure: Stent GLENDY coronary;  Surgeon: Rio Miranda MD;  [facility] LOC[facility];  Service: Cardiology   • CARDIAC CATHETERIZATION N/A 3/10/2023    Procedure: Right and Left Heart Cath;  Surgeon: Rio Miranda MD;  [facility]ATH INVASIVE LOCATION;  Service: Cardiology;  Laterality: N/A;   • CARDIAC CATHETERIZATION N/A  3/10/2023    Procedure: Coronary angiography;  Surgeon: Rio Miranda MD;  Location: [facility] CATH INVASIVE LOCATION;  Service: Cardiology;  Laterality: N/A;   • CARDIAC CATHETERIZATION N/A 3/10/2023    Procedure: Left ventriculography;  Surgeon: Rio Miranda MD;  Location: [facility] CATH INVASIVE LOCATION;  Service: Cardiology;  Laterality: N/A;   • CARDIAC CATHETERIZATION N/A 3/10/2023    Procedure: Percutaneous Coronary Intervention;  Surgeon: Rio Miranda MD;  [facility];  Service: Cardiology;  Laterality: N/A;   • CARDIAC CATHETERIZATION N/A 3/10/2023    Procedure: Stent GLENDY coronary;  Surgeon: Rio Miranda MD;  [facility];  Service: Cardiology;  Laterality: N/A;   • CARPAL TUNNEL RELEASE Bilateral    • CATARACT EXTRACTION     • CORONARY ARTERY BYPASS GRAFT  2002   • FINGER SURGERY      MISSING LEFT POINTER FINGER TIP   • KNEE ARTHROPLASTY Right 02/15/2017   • ND ARTHRP KNE CONDYLE&PLATU MEDIAL&LAT COMPARTMENTS Left 12/14/2017    Procedure: LT TOTAL KNEE ARTHROPLASTY;  Surgeon: Jatin Lancaster MD;  Location: [facility] MAIN OR;  Service: Orthopedics   • ND RT/LT HEART CATHETERS N/A 4/30/2019    Procedure: Percutaneous Coronary Intervention;  Surgeon: Rio Miranda MD;  Location: [facility] CATH INVASIVE LOCATION;  Service: Cardiology         FAMILY HISTORY  Family History   Problem Relation Age of Onset   • Parkinsonism Sister    • Diabetes Brother    • Malig Hyperthermia Neg Hx          SOCIAL HISTORY  Social History     Socioeconomic History   • Marital status: Single   Tobacco Use   • Smoking status: Never     Passive exposure: Never   • Smokeless tobacco: Never   Vaping Use   • Vaping Use: Never used   Substance and Sexual Activity   • Alcohol use: No   • Drug use: No   • Sexual activity: Defer         ALLERGIES  Patient has no known allergies.        REVIEW OF SYSTEMS  Review of Systems     All systems reviewed  and negative except for those discussed in HPI.       PHYSICAL EXAM    I have reviewed the triage vital signs and nursing notes.    ED Triage Vitals [04/15/23 0319]   Temp Heart Rate Resp BP SpO2   98.1 °F (36.7 °C) 93 18 116/78 95 %      Temp src Heart Rate Source Patient Position BP Location FiO2 (%)   -- -- -- -- --       GENERAL: Elderly male that is chronically ill and frail appearing no acute distress.Vital signs on my initial evaluation on my exam blood pressure is mildly elevated.  O2 sat is 99% on room air he is afebrile and no respiratory distress  HENT: nares patent  Head/neck/ face are symmetric without gross deformity, signs of trauma, or swelling  EYES: no scleral icterus, no conjunctival pallor.  NECK: Supple, no meningismus  CV: regular rhythm, regular rate with intact distal pulses.  Harsh systolic murmur 2 out of 6  RESPIRATORY: normal effort and no respiratory distress.  Clear to auscultation bilaterally.  No respiratory distress  ABDOMEN: soft and nontender.  Morbidly obese.  MUSCULOSKELETAL: no deformity.  Patient has 1+ edema to feet and ankles and appear symmetric.  No obvious erythema or warmth or any signs of cellulitis or infection.  NEURO: alert and appropriate, moves all extremities, follows commands.  No focal motor or sensory changes  SKIN: warm, dry    Vital signs and nursing notes reviewed.        LAB RESULTS  Recent Results (from the past 24 hour(s))   Valproic Acid Level, Total    Collection Time: 04/15/23  7:47 AM    Specimen: Blood   Result Value Ref Range    Valproic Acid 42.0 (L) 50.0 - 125.0 mcg/mL   Green Top (Gel)    Collection Time: 04/15/23  7:47 AM   Result Value Ref Range    Extra Tube Hold for add-ons.    Lavender Top    Collection Time: 04/15/23  7:47 AM   Result Value Ref Range    Extra Tube hold for add-on    Gold Top - SST    Collection Time: 04/15/23  7:47 AM   Result Value Ref Range    Extra Tube Hold for add-ons.    Gray Top    Collection Time: 04/15/23  7:47 AM    Result Value Ref Range    Extra Tube Hold for add-ons.    Light Blue Top    Collection Time: 04/15/23  7:47 AM   Result Value Ref Range    Extra Tube Hold for add-ons.    Comprehensive Metabolic Panel    Collection Time: 04/15/23  7:47 AM    Specimen: Blood   Result Value Ref Range    Glucose 114 (H) 65 - 99 mg/dL    BUN 12 8 - 23 mg/dL    Creatinine 0.86 0.76 - 1.27 mg/dL    Sodium 140 136 - 145 mmol/L    Potassium 4.0 3.5 - 5.2 mmol/L    Chloride 101 98 - 107 mmol/L    CO2 28.8 22.0 - 29.0 mmol/L    Calcium 9.3 8.6 - 10.5 mg/dL    Total Protein 6.2 6.0 - 8.5 g/dL    Albumin 3.3 (L) 3.5 - 5.2 g/dL    ALT (SGPT) 6 1 - 41 U/L    AST (SGOT) 7 1 - 40 U/L    Alkaline Phosphatase 60 39 - 117 U/L    Total Bilirubin 0.3 0.0 - 1.2 mg/dL    Globulin 2.9 gm/dL    A/G Ratio 1.1 g/dL    BUN/Creatinine Ratio 14.0 7.0 - 25.0    Anion Gap 10.2 5.0 - 15.0 mmol/L    eGFR 89.7 >60.0 mL/min/1.73   Protime-INR    Collection Time: 04/15/23  7:47 AM    Specimen: Blood   Result Value Ref Range    Protime 13.1 11.7 - 14.2 Seconds    INR 0.98 0.90 - 1.10   High Sensitivity Troponin T    Collection Time: 04/15/23  7:47 AM    Specimen: Blood   Result Value Ref Range    HS Troponin T 20 (H) <15 ng/L   Magnesium    Collection Time: 04/15/23  7:47 AM    Specimen: Blood   Result Value Ref Range    Magnesium 2.2 1.6 - 2.4 mg/dL   TSH    Collection Time: 04/15/23  7:47 AM    Specimen: Blood   Result Value Ref Range    TSH 4.240 (H) 0.270 - 4.200 uIU/mL   BNP    Collection Time: 04/15/23  7:47 AM    Specimen: Blood   Result Value Ref Range    proBNP 1,234.0 0.0 - 1,800.0 pg/mL   CBC Auto Differential    Collection Time: 04/15/23  7:47 AM    Specimen: Blood   Result Value Ref Range    WBC 5.54 3.40 - 10.80 10*3/mm3    RBC 3.30 (L) 4.14 - 5.80 10*6/mm3    Hemoglobin 10.2 (L) 13.0 - 17.7 g/dL    Hematocrit 30.7 (L) 37.5 - 51.0 %    MCV 93.0 79.0 - 97.0 fL    MCH 30.9 26.6 - 33.0 pg    MCHC 33.2 31.5 - 35.7 g/dL    RDW 13.6 12.3 - 15.4 %    RDW-SD  46.6 37.0 - 54.0 fl    MPV 9.1 6.0 - 12.0 fL    Platelets 371 140 - 450 10*3/mm3    Neutrophil % 63.9 42.7 - 76.0 %    Lymphocyte % 22.7 19.6 - 45.3 %    Monocyte % 12.3 (H) 5.0 - 12.0 %    Eosinophil % 0.4 0.3 - 6.2 %    Basophil % 0.2 0.0 - 1.5 %    Immature Grans % 0.5 0.0 - 0.5 %    Neutrophils, Absolute 3.54 1.70 - 7.00 10*3/mm3    Lymphocytes, Absolute 1.26 0.70 - 3.10 10*3/mm3    Monocytes, Absolute 0.68 0.10 - 0.90 10*3/mm3    Eosinophils, Absolute 0.02 0.00 - 0.40 10*3/mm3    Basophils, Absolute 0.01 0.00 - 0.20 10*3/mm3    Immature Grans, Absolute 0.03 0.00 - 0.05 10*3/mm3    nRBC 0.2 0.0 - 0.2 /100 WBC   ECG 12 Lead Other; Generalized weakness    Collection Time: 04/15/23  8:16 AM   Result Value Ref Range    QT Interval 356 ms   Urinalysis With Microscopic If Indicated (No Culture) - Straight Cath    Collection Time: 04/15/23  9:14 AM    Specimen: Straight Cath; Urine   Result Value Ref Range    Color, UA Yellow Yellow, Straw    Appearance, UA Clear Clear    pH, UA 7.0 5.0 - 8.0    Specific Gravity, UA 1.014 1.005 - 1.030    Glucose, UA Negative Negative    Ketones, UA Negative Negative    Bilirubin, UA Negative Negative    Blood, UA Negative Negative    Protein, UA Negative Negative    Leuk Esterase, UA Negative Negative    Nitrite, UA Negative Negative    Urobilinogen, UA 1.0 E.U./dL 0.2 - 1.0 E.U./dL   High Sensitivity Troponin T 2Hr    Collection Time: 04/15/23 10:00 AM    Specimen: Blood   Result Value Ref Range    HS Troponin T 24 (H) <15 ng/L    Troponin T Delta 4 (C) >=-4 - <+4 ng/L       Ordered the above labs and independently reviewed the results.        RADIOLOGY  XR Chest 2 View    Result Date: 4/15/2023  CHEST TWO VIEWS  HISTORY: Leg swelling and weakness.  COMPARISON: Portable chest 04/04/2023.  FINDINGS:Median sternotomy wires and mediastinal clips are present. The heart size is within normal limits though there is pulmonary vascular engorgement. Lungs appear clear and there is no evidence  of pulmonary edema or pleural effusion. Cardiac monitoring leads are noted.      Pulmonary vascular engorgement without evidence for pulmonary edema.  This report was finalized on 4/15/2023 9:00 AM by Dr. Satish Mckeon M.D.        I ordered the above noted radiological studies. Reviewed by me and discussed with radiologist.  See dictation for official radiology interpretation.      PROCEDURES    Procedures      MEDICATIONS GIVEN IN ER    Medications - No data to display      All labs have been independently reviewed by me.  All radiology studies have been reviewed by me and I discussed with radiologist dictating the report when indicated below.  All EKG's independently viewed and interpreted by me.  Discussion below represents my analysis of pertinent findings related to patient's condition, differential diagnosis, treatment plan and final disposition.        PROGRESS, DATA ANALYSIS, CONSULTS, AND MEDICAL DECISION MAKING    Informed patient of the lab work that we will check.  He seems very stable.  But he is elderly and he has multiple comorbidities and is chronically ill.  No family or friend present here.  All questions answered at this time.      ED Course as of 04/15/23 1646   Sat Apr 15, 2023   0753 My shift starts at 8 AM.  This patient has been waiting in the waiting room for several hours.  No labs have been drawn as of yet. [MM]   6629 I reviewed the EKG on this gentleman.  My own and is pending interpretation of the EKG that was done at 816 revealed a rate of 95 it is sinus rhythm there is mild intraventricular conduction delay normal axis I do not appreciate any acute injury pattern there are some mild nonspecific changes  QT looks normal  I compared to the previous EKG that was done on April 4, 2023 and it looks very similar. [MM]   1125 Troponin T Delta(!!): 4  Patient's delta troponin is 4 and is not concerning    When you look at his previous troponins they have always been in the mid 20 range.   11 days ago was 21 and 25 and 1 month ago was 27. [MM]   1125 Hemoglobin(!): 10.2  Chronic anemia no change [MM]   1126 TSH Baseline(!): 4.240  1 month ago patient's TSH was 3.35 and 4 months ago 3.28.  Relatively stable.  Primary care doctor can continue to follow-up with that. [MM]   1126 Valproic Acid(!): 42.0 [MM]   1127 My own independent rotation of the chest x-ray appears unremarkable.  No acute pulmonary consolidation.  No obvious cardiomegaly.  I did review the radiologist report he did mention possibility of some mild vascular engorgement.  But no definitive pulmonary edema. [MM]   1127 proBNP: 1,234.0 [MM]   1127 I do not anticipate clinically this patient is in congestive heart failure. [MM]   1127 He has aortic valve stenosis.  His troponin is at baseline.  He has some chronic kidney disease and his BMP is unremarkable.  He has chronic pedal edema but I do not believe that is worsened and is mild on my exam.  I think he probably has a component of chronic heart failure but I do not think there is any acute heart failure. [MM]   1127 Patient was able to stand and ambulate with a walker without difficulty.  Patient states that he supposed to walk with a cane short distances but he does not walk with a cane.  I think this gentleman is good to be discharged home.  We will return him back to his group home.  All questions answered. [MM]   1130 This gentleman walked very well with a walker.  No problems.  He has a walker but he states that his primary care doctor told him not to use it because it was so old.  It had no wheels.  He would like a walker that we used here.  He feels good to be discharged home.  Informed the results of the test.  All questions answered. [MM]   1133 On my repeat evaluation.  Blood pressure is 150 systolic.  Normal heart rate.  Oxygen saturations 98% on room air.  Patient is in no distress. [MM]      ED Course User Index  [MM] Eliel Terry MD       AS OF 16:46 EDT VITALS:    BP -  154/92  HR - 101  TEMP - 98.1 °F (36.7 °C)  02 SATS - 96%    SOCIAL DETERMINANTS OF HEALTH THAT IMPACT OR LIMIT CARE (For example..Homelessness,safe discharge, inability to obtain care, follow up, or prescriptions):  Lives in group home.  A chronic head injury since childhood with patient being a vague historian.    DIAGNOSIS  Final diagnoses:   Generalized weakness   Chronic anemia         DISPOSITION  DISCHARGE    Patient discharged in stable condition.    Reviewed implications of results, diagnosis, meds, responsibility to follow up, warning signs and symptoms of possible worsening, potential complications and reasons to return to ER, including worsening of symptoms, any chest pain, shortness of breath, fever any new weakness to arms or legs, or any concerns..    Patient/Family voiced understanding of above instructions.    Discussed plan for discharge, as there is no emergent indication for admission. Pt/family is agreeable and understands need for follow up and repeat testing.  Pt is aware that discharge does not mean that nothing is wrong but it indicates no emergency is present that requires admission and they must continue care with follow-up as given below or physician of their choice.     FOLLOW-UP  Milo Carrasquillo,   [address]  [facility] [address]  [facility] [address]  [phone]      Call Monday morning April 17 to arrange follow-up this week.  Return if any pain, shortness of breath, fever, chills, any concerns.         Medication List      No changes were made to your prescriptions during this visit.                 DICTATED UTILIZING DRAGON DICTATION    Note Disclaimer: At [facility], we believe that sharing information builds trust and better relationships. You are receiving this note because you recently visited [facility]. It is possible you will see health information before a provider has talked with you about it. This kind of information can be easy to misunderstand. To help you  fully understand what it means for your health, we urge you to discuss this note with your provider.     Eliel Terry MD  04/15/23 [id number]

## 2023-04-17 ENCOUNTER — APPOINTMENT (OUTPATIENT)
Dept: GENERAL RADIOLOGY | Facility: HOSPITAL | Age: 78
End: 2023-04-17
Payer: MEDICARE

## 2023-04-17 ENCOUNTER — HOSPITAL ENCOUNTER (INPATIENT)
Facility: HOSPITAL | Age: 78
LOS: 2 days | Discharge: NURSING, REHAB OR LONG-TERM CARE | End: 2023-04-20
Attending: EMERGENCY MEDICINE | Admitting: INTERNAL MEDICINE
Payer: MEDICARE

## 2023-04-17 DIAGNOSIS — N39.0 ACUTE URINARY TRACT INFECTION: Primary | ICD-10-CM

## 2023-04-17 DIAGNOSIS — I82.562 CHRONIC DEEP VEIN THROMBOSIS (DVT) OF CALF MUSCLE VEIN OF LEFT LOWER EXTREMITY: ICD-10-CM

## 2023-04-17 DIAGNOSIS — R53.1 GENERALIZED WEAKNESS: ICD-10-CM

## 2023-04-17 DIAGNOSIS — R60.0 PEDAL EDEMA: ICD-10-CM

## 2023-04-17 DIAGNOSIS — S06.9XAS CHRONIC BRAIN INJURY: ICD-10-CM

## 2023-04-17 PROBLEM — E03.9 HYPOTHYROIDISM: Status: ACTIVE | Noted: 2023-04-17

## 2023-04-17 LAB
ALBUMIN SERPL-MCNC: 3.6 G/DL (ref 3.5–5.2)
ALBUMIN/GLOB SERPL: 1.1 G/DL
ALP SERPL-CCNC: 67 U/L (ref 39–117)
ALT SERPL W P-5'-P-CCNC: 10 U/L (ref 1–41)
ANION GAP SERPL CALCULATED.3IONS-SCNC: 9 MMOL/L (ref 5–15)
AST SERPL-CCNC: 10 U/L (ref 1–40)
BACTERIA UR QL AUTO: ABNORMAL /HPF
BASOPHILS # BLD AUTO: 0.02 10*3/MM3 (ref 0–0.2)
BASOPHILS NFR BLD AUTO: 0.2 % (ref 0–1.5)
BILIRUB SERPL-MCNC: 0.5 MG/DL (ref 0–1.2)
BILIRUB UR QL STRIP: NEGATIVE
BUN SERPL-MCNC: 9 MG/DL (ref 8–23)
BUN/CREAT SERPL: 11.3 (ref 7–25)
CALCIUM SPEC-SCNC: 8.8 MG/DL (ref 8.6–10.5)
CHLORIDE SERPL-SCNC: 97 MMOL/L (ref 98–107)
CLARITY UR: CLEAR
CO2 SERPL-SCNC: 28 MMOL/L (ref 22–29)
COLOR UR: YELLOW
CREAT SERPL-MCNC: 0.8 MG/DL (ref 0.76–1.27)
D-LACTATE SERPL-SCNC: 1.3 MMOL/L (ref 0.5–2)
DEPRECATED RDW RBC AUTO: 46.8 FL (ref 37–54)
EGFRCR SERPLBLD CKD-EPI 2021: 91.7 ML/MIN/1.73
EOSINOPHIL # BLD AUTO: 0.01 10*3/MM3 (ref 0–0.4)
EOSINOPHIL NFR BLD AUTO: 0.1 % (ref 0.3–6.2)
ERYTHROCYTE [DISTWIDTH] IN BLOOD BY AUTOMATED COUNT: 14 % (ref 12.3–15.4)
GLOBULIN UR ELPH-MCNC: 3.4 GM/DL
GLUCOSE BLDC GLUCOMTR-MCNC: 120 MG/DL (ref 70–130)
GLUCOSE SERPL-MCNC: 112 MG/DL (ref 65–99)
GLUCOSE UR STRIP-MCNC: NEGATIVE MG/DL
HCT VFR BLD AUTO: 32.5 % (ref 37.5–51)
HGB BLD-MCNC: 10.8 G/DL (ref 13–17.7)
HGB UR QL STRIP.AUTO: ABNORMAL
HYALINE CASTS UR QL AUTO: ABNORMAL /LPF
IMM GRANULOCYTES # BLD AUTO: 0.05 10*3/MM3 (ref 0–0.05)
IMM GRANULOCYTES NFR BLD AUTO: 0.4 % (ref 0–0.5)
KETONES UR QL STRIP: NEGATIVE
LEUKOCYTE ESTERASE UR QL STRIP.AUTO: ABNORMAL
LYMPHOCYTES # BLD AUTO: 1.11 10*3/MM3 (ref 0.7–3.1)
LYMPHOCYTES NFR BLD AUTO: 9 % (ref 19.6–45.3)
MAGNESIUM SERPL-MCNC: 2 MG/DL (ref 1.6–2.4)
MCH RBC QN AUTO: 30.7 PG (ref 26.6–33)
MCHC RBC AUTO-ENTMCNC: 33.2 G/DL (ref 31.5–35.7)
MCV RBC AUTO: 92.3 FL (ref 79–97)
MONOCYTES # BLD AUTO: 1.21 10*3/MM3 (ref 0.1–0.9)
MONOCYTES NFR BLD AUTO: 9.8 % (ref 5–12)
NEUTROPHILS NFR BLD AUTO: 80.5 % (ref 42.7–76)
NEUTROPHILS NFR BLD AUTO: 9.98 10*3/MM3 (ref 1.7–7)
NITRITE UR QL STRIP: POSITIVE
NRBC BLD AUTO-RTO: 0 /100 WBC (ref 0–0.2)
NT-PROBNP SERPL-MCNC: 1381 PG/ML (ref 0–1800)
PH UR STRIP.AUTO: 8 [PH] (ref 5–8)
PLATELET # BLD AUTO: 338 10*3/MM3 (ref 140–450)
PMV BLD AUTO: 8.5 FL (ref 6–12)
POTASSIUM SERPL-SCNC: 4.1 MMOL/L (ref 3.5–5.2)
PROT SERPL-MCNC: 7 G/DL (ref 6–8.5)
PROT UR QL STRIP: NEGATIVE
PSA SERPL-MCNC: 2.2 NG/ML (ref 0–4)
QT INTERVAL: 359 MS
RBC # BLD AUTO: 3.52 10*6/MM3 (ref 4.14–5.8)
RBC # UR STRIP: ABNORMAL /HPF
REF LAB TEST METHOD: ABNORMAL
SODIUM SERPL-SCNC: 134 MMOL/L (ref 136–145)
SP GR UR STRIP: 1.01 (ref 1–1.03)
SQUAMOUS #/AREA URNS HPF: ABNORMAL /HPF
TROPONIN T SERPL HS-MCNC: 24 NG/L
UROBILINOGEN UR QL STRIP: ABNORMAL
VALPROATE SERPL-MCNC: 75 MCG/ML (ref 50–125)
WBC # UR STRIP: ABNORMAL /HPF
WBC NRBC COR # BLD: 12.38 10*3/MM3 (ref 3.4–10.8)

## 2023-04-17 PROCEDURE — 81001 URINALYSIS AUTO W/SCOPE: CPT | Performed by: EMERGENCY MEDICINE

## 2023-04-17 PROCEDURE — 80053 COMPREHEN METABOLIC PANEL: CPT | Performed by: EMERGENCY MEDICINE

## 2023-04-17 PROCEDURE — 85025 COMPLETE CBC W/AUTO DIFF WBC: CPT | Performed by: EMERGENCY MEDICINE

## 2023-04-17 PROCEDURE — 82962 GLUCOSE BLOOD TEST: CPT

## 2023-04-17 PROCEDURE — 83880 ASSAY OF NATRIURETIC PEPTIDE: CPT | Performed by: EMERGENCY MEDICINE

## 2023-04-17 PROCEDURE — 99285 EMERGENCY DEPT VISIT HI MDM: CPT

## 2023-04-17 PROCEDURE — 84153 ASSAY OF PSA TOTAL: CPT | Performed by: INTERNAL MEDICINE

## 2023-04-17 PROCEDURE — 93005 ELECTROCARDIOGRAM TRACING: CPT | Performed by: EMERGENCY MEDICINE

## 2023-04-17 PROCEDURE — 87040 BLOOD CULTURE FOR BACTERIA: CPT | Performed by: EMERGENCY MEDICINE

## 2023-04-17 PROCEDURE — G0378 HOSPITAL OBSERVATION PER HR: HCPCS

## 2023-04-17 PROCEDURE — 87077 CULTURE AEROBIC IDENTIFY: CPT | Performed by: EMERGENCY MEDICINE

## 2023-04-17 PROCEDURE — 87086 URINE CULTURE/COLONY COUNT: CPT | Performed by: EMERGENCY MEDICINE

## 2023-04-17 PROCEDURE — 25010000002 ENOXAPARIN PER 10 MG: Performed by: INTERNAL MEDICINE

## 2023-04-17 PROCEDURE — 83735 ASSAY OF MAGNESIUM: CPT | Performed by: EMERGENCY MEDICINE

## 2023-04-17 PROCEDURE — 87186 SC STD MICRODIL/AGAR DIL: CPT | Performed by: EMERGENCY MEDICINE

## 2023-04-17 PROCEDURE — 93010 ELECTROCARDIOGRAM REPORT: CPT | Performed by: INTERNAL MEDICINE

## 2023-04-17 PROCEDURE — 83605 ASSAY OF LACTIC ACID: CPT | Performed by: EMERGENCY MEDICINE

## 2023-04-17 PROCEDURE — 25010000002 CEFTRIAXONE PER 250 MG: Performed by: EMERGENCY MEDICINE

## 2023-04-17 PROCEDURE — 25010000002 FUROSEMIDE PER 20 MG: Performed by: EMERGENCY MEDICINE

## 2023-04-17 PROCEDURE — 80164 ASSAY DIPROPYLACETIC ACD TOT: CPT | Performed by: EMERGENCY MEDICINE

## 2023-04-17 PROCEDURE — 84484 ASSAY OF TROPONIN QUANT: CPT | Performed by: EMERGENCY MEDICINE

## 2023-04-17 PROCEDURE — 71045 X-RAY EXAM CHEST 1 VIEW: CPT

## 2023-04-17 RX ORDER — SODIUM CHLORIDE 0.9 % (FLUSH) 0.9 %
10 SYRINGE (ML) INJECTION EVERY 12 HOURS SCHEDULED
Status: DISCONTINUED | OUTPATIENT
Start: 2023-04-17 | End: 2023-04-20 | Stop reason: HOSPADM

## 2023-04-17 RX ORDER — LISINOPRIL 40 MG/1
40 TABLET ORAL DAILY
Status: DISCONTINUED | OUTPATIENT
Start: 2023-04-17 | End: 2023-04-20

## 2023-04-17 RX ORDER — DEXTROSE MONOHYDRATE 25 G/50ML
25 INJECTION, SOLUTION INTRAVENOUS
Status: DISCONTINUED | OUTPATIENT
Start: 2023-04-17 | End: 2023-04-20 | Stop reason: HOSPADM

## 2023-04-17 RX ORDER — SODIUM CHLORIDE 0.9 % (FLUSH) 0.9 %
10 SYRINGE (ML) INJECTION AS NEEDED
Status: DISCONTINUED | OUTPATIENT
Start: 2023-04-17 | End: 2023-04-20 | Stop reason: HOSPADM

## 2023-04-17 RX ORDER — ACETAMINOPHEN 650 MG/1
650 SUPPOSITORY RECTAL EVERY 4 HOURS PRN
Status: DISCONTINUED | OUTPATIENT
Start: 2023-04-17 | End: 2023-04-20 | Stop reason: HOSPADM

## 2023-04-17 RX ORDER — PROPRANOLOL HYDROCHLORIDE 20 MG/1
60 TABLET ORAL 2 TIMES DAILY
Status: DISCONTINUED | OUTPATIENT
Start: 2023-04-17 | End: 2023-04-20

## 2023-04-17 RX ORDER — ACETAMINOPHEN 325 MG/1
650 TABLET ORAL EVERY 4 HOURS PRN
Status: DISCONTINUED | OUTPATIENT
Start: 2023-04-17 | End: 2023-04-20 | Stop reason: HOSPADM

## 2023-04-17 RX ORDER — NICOTINE POLACRILEX 4 MG
15 LOZENGE BUCCAL
Status: DISCONTINUED | OUTPATIENT
Start: 2023-04-17 | End: 2023-04-20 | Stop reason: HOSPADM

## 2023-04-17 RX ORDER — DIVALPROEX SODIUM 500 MG/1
500 TABLET, EXTENDED RELEASE ORAL 2 TIMES DAILY
Status: DISCONTINUED | OUTPATIENT
Start: 2023-04-17 | End: 2023-04-20 | Stop reason: HOSPADM

## 2023-04-17 RX ORDER — IBUPROFEN 600 MG/1
1 TABLET ORAL
Status: DISCONTINUED | OUTPATIENT
Start: 2023-04-17 | End: 2023-04-20 | Stop reason: HOSPADM

## 2023-04-17 RX ORDER — ACETAMINOPHEN 160 MG/5ML
650 SOLUTION ORAL EVERY 4 HOURS PRN
Status: DISCONTINUED | OUTPATIENT
Start: 2023-04-17 | End: 2023-04-20 | Stop reason: HOSPADM

## 2023-04-17 RX ORDER — FUROSEMIDE 10 MG/ML
80 INJECTION INTRAMUSCULAR; INTRAVENOUS ONCE
Status: COMPLETED | OUTPATIENT
Start: 2023-04-17 | End: 2023-04-17

## 2023-04-17 RX ORDER — FLUTICASONE PROPIONATE 50 MCG
2 SPRAY, SUSPENSION (ML) NASAL DAILY
Status: DISCONTINUED | OUTPATIENT
Start: 2023-04-17 | End: 2023-04-20 | Stop reason: HOSPADM

## 2023-04-17 RX ORDER — ONDANSETRON 2 MG/ML
4 INJECTION INTRAMUSCULAR; INTRAVENOUS EVERY 6 HOURS PRN
Status: DISCONTINUED | OUTPATIENT
Start: 2023-04-17 | End: 2023-04-20 | Stop reason: HOSPADM

## 2023-04-17 RX ORDER — LEVOTHYROXINE SODIUM 0.07 MG/1
37.5 TABLET ORAL WEEKLY
Status: DISCONTINUED | OUTPATIENT
Start: 2023-04-17 | End: 2023-04-20 | Stop reason: HOSPADM

## 2023-04-17 RX ORDER — DOCUSATE SODIUM 100 MG/1
100 CAPSULE, LIQUID FILLED ORAL DAILY
Status: DISCONTINUED | OUTPATIENT
Start: 2023-04-17 | End: 2023-04-20 | Stop reason: HOSPADM

## 2023-04-17 RX ORDER — PANTOPRAZOLE SODIUM 40 MG/1
40 TABLET, DELAYED RELEASE ORAL
Status: DISCONTINUED | OUTPATIENT
Start: 2023-04-18 | End: 2023-04-20 | Stop reason: HOSPADM

## 2023-04-17 RX ORDER — OXYBUTYNIN CHLORIDE 5 MG/1
5 TABLET, EXTENDED RELEASE ORAL DAILY
Status: DISCONTINUED | OUTPATIENT
Start: 2023-04-17 | End: 2023-04-19

## 2023-04-17 RX ORDER — SODIUM CHLORIDE 9 MG/ML
40 INJECTION, SOLUTION INTRAVENOUS AS NEEDED
Status: DISCONTINUED | OUTPATIENT
Start: 2023-04-17 | End: 2023-04-20 | Stop reason: HOSPADM

## 2023-04-17 RX ORDER — ATORVASTATIN CALCIUM 20 MG/1
20 TABLET, FILM COATED ORAL DAILY
Status: DISCONTINUED | OUTPATIENT
Start: 2023-04-17 | End: 2023-04-20 | Stop reason: HOSPADM

## 2023-04-17 RX ORDER — NITROGLYCERIN 0.4 MG/1
0.4 TABLET SUBLINGUAL
Status: DISCONTINUED | OUTPATIENT
Start: 2023-04-17 | End: 2023-04-18

## 2023-04-17 RX ORDER — ENOXAPARIN SODIUM 100 MG/ML
40 INJECTION SUBCUTANEOUS DAILY
Status: DISCONTINUED | OUTPATIENT
Start: 2023-04-17 | End: 2023-04-20 | Stop reason: HOSPADM

## 2023-04-17 RX ORDER — TAMSULOSIN HYDROCHLORIDE 0.4 MG/1
0.4 CAPSULE ORAL DAILY
Status: DISCONTINUED | OUTPATIENT
Start: 2023-04-17 | End: 2023-04-19

## 2023-04-17 RX ORDER — INSULIN LISPRO 100 [IU]/ML
0-9 INJECTION, SOLUTION INTRAVENOUS; SUBCUTANEOUS
Status: DISCONTINUED | OUTPATIENT
Start: 2023-04-17 | End: 2023-04-20 | Stop reason: HOSPADM

## 2023-04-17 RX ORDER — ASPIRIN 81 MG/1
81 TABLET ORAL DAILY
Status: DISCONTINUED | OUTPATIENT
Start: 2023-04-17 | End: 2023-04-20 | Stop reason: HOSPADM

## 2023-04-17 RX ADMIN — PROPRANOLOL HYDROCHLORIDE 60 MG: 20 TABLET ORAL at 20:31

## 2023-04-17 RX ADMIN — DIVALPROEX SODIUM 500 MG: 500 TABLET, EXTENDED RELEASE ORAL at 20:31

## 2023-04-17 RX ADMIN — ATORVASTATIN CALCIUM 20 MG: 20 TABLET, FILM COATED ORAL at 18:49

## 2023-04-17 RX ADMIN — ENOXAPARIN SODIUM 40 MG: 100 INJECTION SUBCUTANEOUS at 18:49

## 2023-04-17 RX ADMIN — Medication 10 ML: at 20:33

## 2023-04-17 RX ADMIN — ASPIRIN 81 MG: 81 TABLET, COATED ORAL at 18:49

## 2023-04-17 RX ADMIN — DOCUSATE SODIUM 100 MG: 100 CAPSULE, LIQUID FILLED ORAL at 18:49

## 2023-04-17 RX ADMIN — FUROSEMIDE 80 MG: 10 INJECTION, SOLUTION INTRAMUSCULAR; INTRAVENOUS at 12:45

## 2023-04-17 RX ADMIN — CEFTRIAXONE SODIUM 1 G: 1 INJECTION, POWDER, FOR SOLUTION INTRAMUSCULAR; INTRAVENOUS at 13:59

## 2023-04-17 RX ADMIN — TICAGRELOR 90 MG: 90 TABLET ORAL at 20:31
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
## 2023-04-17 NOTE — PLAN OF CARE
Goal Outcome Evaluation:    Pt from ED this afternoon with increased weakness, falls, and swelling to bilateral LE. Per pt, lives at [facility]. Pt slide from stretcher to bed. Aox4. Able to take pills whole. Temp this evening of 100.3. On room air. Purewick in place. Pt experiencing incontinence and frequency. Awaiting MD to see at this time.
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
## 2023-04-17 NOTE — ED PROVIDER NOTES
EMERGENCY DEPARTMENT ENCOUNTER    Room Number:  25/25  Date seen:  4/17/2023  PCP: Milo Carrasquillo DO  Historian: Patient      HPI:  Chief Complaint: Generalized weakness    A complete HPI/ROS/PMH/PSH/SH/FH are unobtainable due to: N/A    Context: Hernando Lozada is a 78 y.o. male who presents to the ED c/o generalized weakness.  He has fallen a few times and had difficulty getting up-he states his legs feel weak.  He denies headache.  No fevers or chills.  No cough, congestion or trouble breathing.  No abdominal pain.  No nausea or vomiting.  Chronic lower extremity edema.    Additional sources:  - Discussed/ obtained information from independent historians: Yes-paramedics confirm pertinent aspects of the HPI.    - External (non-ED) record review: Patient was last seen in this emergency department on 4/15/2023 after being evaluated for generalized weakness.  He has a prior chronic brain injury from childhood and resides in a group home.  He was last admitted in early March for progressive lower extremity edema which was felt to be secondary to right-sided heart failure and chronic DVT.  He has a prior history of CAD, hypertension, aortic stenosis, controlled type 2 diabetes.  He was discharged to rehab on 3/14/2023.    - Chronic or social conditions impacting care: He has a prior traumatic brain injury and multiple other medical problems.  He resides in a group home.      PAST MEDICAL HISTORY  Active Ambulatory Problems     Diagnosis Date Noted   • DJD (degenerative joint disease) of knee 12/14/2017   • Essential hypertension 02/18/2019   • SOB (shortness of breath) 02/18/2019   • Leg swelling 02/18/2019   • Chest pain with high risk of acute coronary syndrome 04/29/2019   • Hyperlipidemia LDL goal <100 08/23/2019   • COVID-19 11/25/2022   • Diabetes mellitus    • GIOVANNY (obstructive sleep apnea)    • Disease of thyroid gland    • CKD (chronic kidney disease)    • Hypomagnesemia    • Chronic brain injury    •  Generalized weakness    • CAD (coronary artery disease)    • Pedal edema 03/06/2023   • Tremor 03/06/2023   • Elevated troponin 03/06/2023   • Lower extremity cellulitis 03/06/2023   • Tinea cruris 03/06/2023   • Chronic deep vein thrombosis (DVT) of calf muscle vein of left lower extremity 03/07/2023   • Nonrheumatic aortic valve stenosis 03/06/2023     Resolved Ambulatory Problems     Diagnosis Date Noted   • No Resolved Ambulatory Problems     Past Medical History:   Diagnosis Date   • Arthritis    • Bilateral leg edema    • GERD (gastroesophageal reflux disease)    • Heart murmur    • History of head injury    • [place] (hard of hearing)    • Hyperlipidemia    • Hypertension    • Irregular heart beat    • Osteoarthritis    • Past heart attack    • SOB (shortness of breath) on exertion    • Stroke    • Vasovagal episode          PAST SURGICAL HISTORY  Past Surgical History:   Procedure Laterality Date   • CARDIAC CATHETERIZATION N/A 4/30/2019    Procedure: Coronary angiography with grafts;  Surgeon: Rio Miranda MD;  Location: [facility] CATH INVASIVE LOCATION;  Service: Cardiology   • CARDIAC CATHETERIZATION N/A 4/30/2019    Procedure: Left Heart Cath;  Surgeon: Rio Miranda MD;  Location: [facility] CATH INVASIVE LOCATION;  Service: Cardiology   • CARDIAC CATHETERIZATION N/A 4/30/2019    Procedure: Left ventriculography;  Surgeon: Rio Miranda MD;  Location: [facility] INVAS[facility] LOC[facility];  Service: Cardiology   • CARDIAC CATHETERIZATION  4/30/2019    Procedure: Saphenous Vein Graft;  Surgeon: Rio Miranda MD;  Location: [facility] CATH INVASIVE LOCATION;  Service: Cardiology   • CARDIAC CATHETERIZATION N/A 4/30/2019    Procedure: Stent GLENDY coronary;  Surgeon: Rio Miranda MD;  [facility];  Service: Cardiology   • CARDIAC CATHETERIZATION N/A 3/10/2023    Procedure: Right and Left Heart Cath;  Surgeon: Rio Miranda MD;  Location: [facility] CATH  INVASIVE LOCATION;  Service: Cardiology;  Laterality: N/A;   • CARDIAC CATHETERIZATION N/A 3/10/2023    Procedure: Coronary angiography;  Surgeon: Rio Miranda MD;  [facility];  Service: Cardiology;  Laterality: N/A;   • CARDIAC CATHETERIZATION N/A 3/10/2023    Procedure: Left ventriculography;  Surgeon: Rio Miranda MD;  [facility];  Service: Cardiology;  Laterality: N/A;   • CARDIAC CATHETERIZATION N/A 3/10/2023    Procedure: Percutaneous Coronary Intervention;  Surgeon: Rio Miranda MD;  [facility];  Service: Cardiology;  Laterality: N/A;   • CARDIAC CATHETERIZATION N/A 3/10/2023    Procedure: Stent GLENDY coronary;  Surgeon: Rio Miranda MD;  Location: [facility] CATH INVASIVE LOCATION;  Service: Cardiology;  Laterality: N/A;   • CARPAL TUNNEL RELEASE Bilateral    • CATARACT EXTRACTION     • CORONARY ARTERY BYPASS GRAFT  2002   • FINGER SURGERY      MISSING LEFT POINTER FINGER TIP   • KNEE ARTHROPLASTY Right 02/15/2017   • OH ARTHRP KNE CONDYLE&PLATU MEDIAL&LAT COMPARTMENTS Left 12/14/2017    Procedure: LT TOTAL KNEE ARTHROPLASTY;  Surgeon: Jatin Lancaster MD;  Location: [facility] MAIN OR;  Service: Orthopedics   • OH RT/LT HEART CATHETERS N/A 4/30/2019    Procedure: Percutaneous Coronary Intervention;  Surgeon: Rio Miranda MD;  Location: [facility] CATH INVASIVE LOCATION;  Service: Cardiology         FAMILY HISTORY  Family History   Problem Relation Age of Onset   • Parkinsonism Sister    • Diabetes Brother    • Malig Hyperthermia Neg Hx          SOCIAL HISTORY  Social History     Socioeconomic History   • Marital status: Single   Tobacco Use   • Smoking status: Never     Passive exposure: Never   • Smokeless tobacco: Never   Vaping Use   • Vaping Use: Never used   Substance and Sexual Activity   • Alcohol use: No   • Drug use: No   • Sexual activity: Defer         ALLERGIES  Patient has  no known allergies.        REVIEW OF SYSTEMS  Review of Systems   Constitutional: Negative for chills and fever.   Respiratory: Negative for cough and shortness of breath.    Cardiovascular: Positive for leg swelling. Negative for chest pain.   Gastrointestinal: Negative for abdominal pain, diarrhea, nausea and vomiting.   Neurological: Positive for weakness.            PHYSICAL EXAM  ED Triage Vitals   Temp Heart Rate Resp BP SpO2   04/17/23 1116 04/17/23 1116 04/17/23 1117 04/17/23 1116 04/17/23 1116   98.8 °F (37.1 °C) 90 18 149/80 98 %      Temp src Heart Rate Source Patient Position BP Location FiO2 (%)   04/17/23 1116 -- -- -- --   Oral           Physical Exam      Physical Exam   Constitutional: Pt. is oriented to person, place, and time.  He is well-developed, well-nourished, and in no distress.    HENT: Normocephalic and atraumatic. Pupils are equal, round, and reactive to light.   Neck: Normal range of motion. Neck supple. No JVD present. No tracheal deviation present.   Cardiovascular: Normal rate, regular rhythm and normal heart sounds.   Pulmonary/Chest: Effort normal and breath sounds normal. No stridor. No respiratory distress. No wheezes, no rales.   Abdominal: Soft.  No distension. There is no tenderness. There is no rebound and no guarding.   Musculoskeletal: Taut symmetric pedal edema, no tenderness or deformity.   Neurological: He is alert and oriented to person, place, and time.  He has a tremor in the right upper extremity that he states is chronic, otherwise has no focal neurologic deficits.  Skin: Skin is warm and dry. Pt. is not diaphoretic.  Psychiatric: Mood, affect normal. He is pleasant and cooperative.  Nursing note and vitals reviewed.            LAB RESULTS  Recent Results (from the past 24 hour(s))   Comprehensive Metabolic Panel    Collection Time: 04/17/23 11:41 AM    Specimen: Blood   Result Value Ref Range    Glucose 112 (H) 65 - 99 mg/dL    BUN 9 8 - 23 mg/dL    Creatinine 0.80  0.76 - 1.27 mg/dL    Sodium 134 (L) 136 - 145 mmol/L    Potassium 4.1 3.5 - 5.2 mmol/L    Chloride 97 (L) 98 - 107 mmol/L    CO2 28.0 22.0 - 29.0 mmol/L    Calcium 8.8 8.6 - 10.5 mg/dL    Total Protein 7.0 6.0 - 8.5 g/dL    Albumin 3.6 3.5 - 5.2 g/dL    ALT (SGPT) 10 1 - 41 U/L    AST (SGOT) 10 1 - 40 U/L    Alkaline Phosphatase 67 39 - 117 U/L    Total Bilirubin 0.5 0.0 - 1.2 mg/dL    Globulin 3.4 gm/dL    A/G Ratio 1.1 g/dL    BUN/Creatinine Ratio 11.3 7.0 - 25.0    Anion Gap 9.0 5.0 - 15.0 mmol/L    eGFR 91.7 >60.0 mL/min/1.73   BNP    Collection Time: 04/17/23 11:41 AM    Specimen: Blood   Result Value Ref Range    proBNP 1,381.0 0.0 - 1,800.0 pg/mL   Single High Sensitivity Troponin T    Collection Time: 04/17/23 11:41 AM    Specimen: Blood   Result Value Ref Range    HS Troponin T 24 (H) <15 ng/L   Magnesium    Collection Time: 04/17/23 11:41 AM    Specimen: Blood   Result Value Ref Range    Magnesium 2.0 1.6 - 2.4 mg/dL   Valproic Acid Level, Total    Collection Time: 04/17/23 11:41 AM    Specimen: Blood   Result Value Ref Range    Valproic Acid 75.0 50.0 - 125.0 mcg/mL   CBC Auto Differential    Collection Time: 04/17/23 11:41 AM    Specimen: Blood   Result Value Ref Range    WBC 12.38 (H) 3.40 - 10.80 10*3/mm3    RBC 3.52 (L) 4.14 - 5.80 10*6/mm3    Hemoglobin 10.8 (L) 13.0 - 17.7 g/dL    Hematocrit 32.5 (L) 37.5 - 51.0 %    MCV 92.3 79.0 - 97.0 fL    MCH 30.7 26.6 - 33.0 pg    MCHC 33.2 31.5 - 35.7 g/dL    RDW 14.0 12.3 - 15.4 %    RDW-SD 46.8 37.0 - 54.0 fl    MPV 8.5 6.0 - 12.0 fL    Platelets 338 140 - 450 10*3/mm3    Neutrophil % 80.5 (H) 42.7 - 76.0 %    Lymphocyte % 9.0 (L) 19.6 - 45.3 %    Monocyte % 9.8 5.0 - 12.0 %    Eosinophil % 0.1 (L) 0.3 - 6.2 %    Basophil % 0.2 0.0 - 1.5 %    Immature Grans % 0.4 0.0 - 0.5 %    Neutrophils, Absolute 9.98 (H) 1.70 - 7.00 10*3/mm3    Lymphocytes, Absolute 1.11 0.70 - 3.10 10*3/mm3    Monocytes, Absolute 1.21 (H) 0.10 - 0.90 10*3/mm3    Eosinophils, Absolute  0.01 0.00 - 0.40 10*3/mm3    Basophils, Absolute 0.02 0.00 - 0.20 10*3/mm3    Immature Grans, Absolute 0.05 0.00 - 0.05 10*3/mm3    nRBC 0.0 0.0 - 0.2 /100 WBC   ECG 12 Lead Electrolyte Imbalance    Collection Time: 04/17/23 12:18 PM   Result Value Ref Range    QT Interval 359 ms   Urinalysis With Microscopic If Indicated (No Culture) - Urine, Clean Catch    Collection Time: 04/17/23  1:03 PM    Specimen: Urine, Clean Catch   Result Value Ref Range    Color, UA Yellow Yellow, Straw    Appearance, UA Clear Clear    pH, UA 8.0 5.0 - 8.0    Specific Gravity, UA 1.010 1.005 - 1.030    Glucose, UA Negative Negative    Ketones, UA Negative Negative    Bilirubin, UA Negative Negative    Blood, UA Trace (A) Negative    Protein, UA Negative Negative    Leuk Esterase, UA Moderate (2+) (A) Negative    Nitrite, UA Positive (A) Negative    Urobilinogen, UA 1.0 E.U./dL 0.2 - 1.0 E.U./dL   Urinalysis, Microscopic Only - Urine, Clean Catch    Collection Time: 04/17/23  1:03 PM    Specimen: Urine, Clean Catch   Result Value Ref Range    RBC, UA 3-5 (A) None Seen, 0-2 /HPF    WBC, UA Too Numerous to Count (A) None Seen, 0-2 /HPF    Bacteria, UA 2+ (A) None Seen /HPF    Squamous Epithelial Cells, UA 0-2 None Seen, 0-2 /HPF    Hyaline Casts, UA 0-2 None Seen /LPF    Methodology Automated Microscopy        Ordered the above labs and reviewed the results.        RADIOLOGY  XR Chest 1 View    Result Date: 4/17/2023  XR CHEST 1 VW-  HISTORY: Male who is 78 years-old,  weakness  TECHNIQUE: Frontal view of the chest  COMPARISON: 04/15/2023  FINDINGS: The heart size is normal. Sternotomy wires are present. Aorta is calcified. Mild prominence of vascular and interstitial markings. No focal pulmonary consolidation, pleural effusion, or pneumothorax. No acute osseous process.      No focal pulmonary consolidation. Mild prominence of vascular and interstitial markings. Follow-up as clinical indications persist.  This report was finalized on  4/17/2023 12:03 PM by Dr. Chava Flores M.D.        Ordered the above noted radiological studies. Reviewed by me in PACS.        PROCEDURES  Procedures      MEDICATIONS GIVEN IN ER  Medications   sodium chloride 0.9 % flush 10 mL (has no administration in time range)   cefTRIAXone (ROCEPHIN) 1 g in sodium chloride 0.9 % 100 mL IVPB-VTB (has no administration in time range)   furosemide (LASIX) injection 80 mg (80 mg Intravenous Given 4/17/23 1245)             MEDICAL DECISION MAKING, PROGRESS, and CONSULTS    All labs have been independently reviewed by me.  All radiology studies have been reviewed by me and discussed with radiologist dictating the report.   EKG's independently viewed and interpreted by me.  Discussion below represents my analysis of pertinent findings related to patient's condition, differential diagnosis, treatment plan and final disposition.      Orders placed during this visit:  Orders Placed This Encounter   Procedures   • Blood Culture - Blood,   • Blood Culture - Blood,   • Urine Culture - Urine, Urine, Clean Catch   • XR Chest 1 View   • Comprehensive Metabolic Panel   • Urinalysis With Microscopic If Indicated (No Culture) - Urine, Clean Catch   • BNP   • Single High Sensitivity Troponin T   • Magnesium   • Valproic Acid Level, Total   • CBC Auto Differential   • Urinalysis, Microscopic Only - Urine, Clean Catch   • Lactic Acid, Plasma   • Monitor Blood Pressure   • Pulse Oximetry, Continuous   • Discontinue Cardiac Monitoring   • LHA (on-call MD unless specified) Details   • ECG 12 Lead Electrolyte Imbalance   • Insert Peripheral IV   • Initiate Observation Status   • CBC & Differential       Additional orders considered but not ordered:  N/A    Differential diagnosis:  Differential diagnosis for generalized weakness includes but is not limited to:  Neuromuscular weakness , CVA, Hemorrhagic stroke, Multiple sclerosis, Amyotrophic Lateral Sclerosis (ALS), Spinal cord disease: Infection  (Epidural abscess), Infarction/ischemia, Trauma (Spinal Cord Syndromes), Inflammation (Transverse Myelitis), Tumor, Peripheral nerve disease: Guillain-[place] syndrome, Diabetic peripheral neuropathy, NMJ disease: Myasthenia gravis crisis, Rhabdomyolysis, Alcoholic myopathy, Non-neuromuscular weakness , ACS, Arrhythmia/Syncope, Severe infection/Sepsis, Hypoglycemia, electrolyte disturbance (K, Mg, Ca), Hypokalemic periodic paralysis, hypothyroidism, Respiratory failure, Symptomatic Anemia, Severe dehydration, Polypharmacy, Malignancy      Independent interpretation of labs, radiology studies, and discussions with consultants:  ED Course as of 04/17/23 1346   Mon Apr 17, 2023   1206 WBC(!): 12.38 [WC]   1206 Hemoglobin(!): 10.8 [WC]   1206 Hematocrit(!): 32.5 [WC]   1211 Chest X-ray was independently visualized and preliminarily interpreted by me.  I see no effusions or infiltrate. CXR was discussed with/officially interpreted by Dr. Flores (radiology)-there is minimal prominence of the intravascular markings but no other acute processes are identified.  For official interpretation, see dictated report. [WC]   1217 HS Troponin T(!): 24 [WC]   1217 proBNP: 1,381.0 [WC]   1217 Magnesium: 2.0 [WC]   1217 Valproic Acid: 75.0 [WC]   1217 CMP is unremarkable. [WC]   1228 EKG performed at 1218 and interpreted by me shows sinus rhythm with a rate of 84 bpm.  There are frequent PVCs.  NM interval's, QRS complexes and ST-T segments are unremarkable.  When compared to 4/15/2023, PVCs are new. [WC]   1323 WBC, UA(!): Too Numerous to Count [WC]   1323 Bacteria, UA(!): 2+ [WC]   1323 Nitrite, UA(!): Positive [WC]   1323 Leukocytes, UA(!): Moderate (2+) [WC]   1345 Case discussed with Dr. Chan ([facility])-accepts the patient for observation status to a [facility] bed. [WC]      ED Course User Index  [WC] Randall Burnham MD              Appropriate PPE was worn by myself and the patient throughout our entire  interaction.    DIAGNOSIS  Final diagnoses:   Acute urinary tract infection   Chronic brain injury   Generalized weakness   Pedal edema   Chronic deep vein thrombosis (DVT) of calf muscle vein of left lower extremity         DISPOSITION  Observation-[facility] [place]            Latest Documented Vital Signs:  As of 13:46 EDT  BP- 168/85 HR- 98 Temp- 98.8 °F (37.1 °C) (Oral) O2 sat- 98%        --    Please note that portions of this were completed with a voice recognition program.       Note Disclaimer: At [facility], we believe that sharing information builds trust and better relationships. You are receiving this note because you are receiving care at [facility] or recently visited. It is possible you will see health information before a provider has talked with you about it. This kind of information can be easy to misunderstand. To help you fully understand what it means for your health, we urge you to discuss this note with your provider.           Randall Burnham MD  04/17/23 [id number]

## 2023-04-18 LAB
ALBUMIN SERPL-MCNC: 3.1 G/DL (ref 3.5–5.2)
ALBUMIN/GLOB SERPL: 1 G/DL
ALP SERPL-CCNC: 50 U/L (ref 39–117)
ALT SERPL W P-5'-P-CCNC: 8 U/L (ref 1–41)
ANION GAP SERPL CALCULATED.3IONS-SCNC: 8 MMOL/L (ref 5–15)
AST SERPL-CCNC: 14 U/L (ref 1–40)
BASOPHILS # BLD AUTO: 0.03 10*3/MM3 (ref 0–0.2)
BASOPHILS NFR BLD AUTO: 0.2 % (ref 0–1.5)
BILIRUB SERPL-MCNC: 0.6 MG/DL (ref 0–1.2)
BUN SERPL-MCNC: 14 MG/DL (ref 8–23)
BUN/CREAT SERPL: 17.3 (ref 7–25)
CALCIUM SPEC-SCNC: 8.3 MG/DL (ref 8.6–10.5)
CHLORIDE SERPL-SCNC: 97 MMOL/L (ref 98–107)
CO2 SERPL-SCNC: 29 MMOL/L (ref 22–29)
CREAT SERPL-MCNC: 0.81 MG/DL (ref 0.76–1.27)
DEPRECATED RDW RBC AUTO: 48.1 FL (ref 37–54)
EGFRCR SERPLBLD CKD-EPI 2021: 91.4 ML/MIN/1.73
EOSINOPHIL # BLD AUTO: 0.01 10*3/MM3 (ref 0–0.4)
EOSINOPHIL NFR BLD AUTO: 0.1 % (ref 0.3–6.2)
ERYTHROCYTE [DISTWIDTH] IN BLOOD BY AUTOMATED COUNT: 14.4 % (ref 12.3–15.4)
GLOBULIN UR ELPH-MCNC: 3 GM/DL
GLUCOSE BLDC GLUCOMTR-MCNC: 107 MG/DL (ref 70–130)
GLUCOSE BLDC GLUCOMTR-MCNC: 133 MG/DL (ref 70–130)
GLUCOSE BLDC GLUCOMTR-MCNC: 151 MG/DL (ref 70–130)
GLUCOSE SERPL-MCNC: 114 MG/DL (ref 65–99)
HBA1C MFR BLD: 5.5 % (ref 4.8–5.6)
HCT VFR BLD AUTO: 28.7 % (ref 37.5–51)
HGB BLD-MCNC: 9.6 G/DL (ref 13–17.7)
IMM GRANULOCYTES # BLD AUTO: 0.05 10*3/MM3 (ref 0–0.05)
IMM GRANULOCYTES NFR BLD AUTO: 0.4 % (ref 0–0.5)
LYMPHOCYTES # BLD AUTO: 1.24 10*3/MM3 (ref 0.7–3.1)
LYMPHOCYTES NFR BLD AUTO: 9.5 % (ref 19.6–45.3)
MCH RBC QN AUTO: 30.9 PG (ref 26.6–33)
MCHC RBC AUTO-ENTMCNC: 33.4 G/DL (ref 31.5–35.7)
MCV RBC AUTO: 92.3 FL (ref 79–97)
MONOCYTES # BLD AUTO: 1.39 10*3/MM3 (ref 0.1–0.9)
MONOCYTES NFR BLD AUTO: 10.6 % (ref 5–12)
NEUTROPHILS NFR BLD AUTO: 10.35 10*3/MM3 (ref 1.7–7)
NEUTROPHILS NFR BLD AUTO: 79.2 % (ref 42.7–76)
NRBC BLD AUTO-RTO: 0 /100 WBC (ref 0–0.2)
PLATELET # BLD AUTO: 298 10*3/MM3 (ref 140–450)
PMV BLD AUTO: 9.1 FL (ref 6–12)
POTASSIUM SERPL-SCNC: 3.8 MMOL/L (ref 3.5–5.2)
PROT SERPL-MCNC: 6.1 G/DL (ref 6–8.5)
RBC # BLD AUTO: 3.11 10*6/MM3 (ref 4.14–5.8)
SODIUM SERPL-SCNC: 134 MMOL/L (ref 136–145)
WBC NRBC COR # BLD: 13.07 10*3/MM3 (ref 3.4–10.8)

## 2023-04-18 PROCEDURE — 83036 HEMOGLOBIN GLYCOSYLATED A1C: CPT | Performed by: INTERNAL MEDICINE

## 2023-04-18 PROCEDURE — 97535 SELF CARE MNGMENT TRAINING: CPT

## 2023-04-18 PROCEDURE — 97166 OT EVAL MOD COMPLEX 45 MIN: CPT

## 2023-04-18 PROCEDURE — 80053 COMPREHEN METABOLIC PANEL: CPT | Performed by: INTERNAL MEDICINE

## 2023-04-18 PROCEDURE — 97530 THERAPEUTIC ACTIVITIES: CPT

## 2023-04-18 PROCEDURE — 85025 COMPLETE CBC W/AUTO DIFF WBC: CPT | Performed by: INTERNAL MEDICINE

## 2023-04-18 PROCEDURE — 25010000002 CEFTRIAXONE PER 250 MG: Performed by: INTERNAL MEDICINE

## 2023-04-18 PROCEDURE — 82962 GLUCOSE BLOOD TEST: CPT

## 2023-04-18 PROCEDURE — 97162 PT EVAL MOD COMPLEX 30 MIN: CPT

## 2023-04-18 RX ADMIN — ATORVASTATIN CALCIUM 20 MG: 20 TABLET, FILM COATED ORAL at 08:55

## 2023-04-18 RX ADMIN — TICAGRELOR 90 MG: 90 TABLET ORAL at 08:52

## 2023-04-18 RX ADMIN — DIVALPROEX SODIUM 500 MG: 500 TABLET, EXTENDED RELEASE ORAL at 08:52

## 2023-04-18 RX ADMIN — Medication 10 ML: at 08:53

## 2023-04-18 RX ADMIN — ASPIRIN 81 MG: 81 TABLET, COATED ORAL at 08:55

## 2023-04-18 RX ADMIN — OXYBUTYNIN CHLORIDE 5 MG: 5 TABLET, EXTENDED RELEASE ORAL at 08:52

## 2023-04-18 RX ADMIN — TICAGRELOR 90 MG: 90 TABLET ORAL at 20:11

## 2023-04-18 RX ADMIN — Medication 10 ML: at 20:11

## 2023-04-18 RX ADMIN — TAMSULOSIN HYDROCHLORIDE 0.4 MG: 0.4 CAPSULE ORAL at 08:52

## 2023-04-18 RX ADMIN — PANTOPRAZOLE SODIUM 40 MG: 40 TABLET, DELAYED RELEASE ORAL at 06:03

## 2023-04-18 RX ADMIN — PROPRANOLOL HYDROCHLORIDE 60 MG: 20 TABLET ORAL at 20:11

## 2023-04-18 RX ADMIN — DIVALPROEX SODIUM 500 MG: 500 TABLET, EXTENDED RELEASE ORAL at 20:11

## 2023-04-18 RX ADMIN — LISINOPRIL 40 MG: 40 TABLET ORAL at 08:52

## 2023-04-18 RX ADMIN — FLUTICASONE PROPIONATE 2 SPRAY: 50 SPRAY, METERED NASAL at 08:52

## 2023-04-18 RX ADMIN — PROPRANOLOL HYDROCHLORIDE 60 MG: 20 TABLET ORAL at 08:52

## 2023-04-18 RX ADMIN — CEFTRIAXONE SODIUM 1 G: 1 INJECTION, POWDER, FOR SOLUTION INTRAMUSCULAR; INTRAVENOUS at 14:53

## 2023-04-18 NOTE — PLAN OF CARE
Goal Outcome Evaluation:               Patient is alert and oriented x 4, vitals stable, normally on room air but on 2L when sleeping due to O2Sat dropping to the 80's, up with assist x 1 with a walker, purewick in place, lives at [facility], safety precautions in use, nursing will continue to monitor for the remainder of the shift.

## 2023-04-18 NOTE — PROGRESS NOTES
"    Name: Hernando Lozada ADMIT: 2023   : 1945  PCP: Milo Carrasquillo DO    MRN: [id number] LOS: 0 days   AGE/SEX: 78 y.o. male  ROOM: [facility]     Subjective   Subjective   Patient says he feels okay.  Denies any dysuria.  Still says \"my legs do not work right\".    Review of Systems     Objective   Objective   Vital Signs  Temp:  [97.1 °F (36.2 °C)-100.3 °F (37.9 °C)] 99.1 °F (37.3 °C)  Heart Rate:  [71-87] 77  Resp:  [16-20] 18  BP: ()/(65-76) 130/72  SpO2:  [95 %-99 %] 97 %  on  Flow (L/min):  [2] 2;   Device (Oxygen Therapy): nasal cannula  Body mass index is 31.01 kg/m².  Physical Exam  Vitals reviewed.   Constitutional:       General: He is not in acute distress.     Appearance: He is not ill-appearing.   HENT:      Head: Normocephalic and atraumatic.   Eyes:      General: No scleral icterus.  Cardiovascular:      Rate and Rhythm: Normal rate and regular rhythm.      Heart sounds: No murmur heard.  Pulmonary:      Effort: Pulmonary effort is normal. No respiratory distress.      Breath sounds: Normal breath sounds.   Abdominal:      General: Bowel sounds are normal. There is no distension.      Palpations: Abdomen is soft.      Tenderness: There is no abdominal tenderness.   Musculoskeletal:      Right lower leg: No edema.      Left lower leg: No edema.   Skin:     General: Skin is warm and dry.   Neurological:      Mental Status: He is alert and oriented to person, place, and time.   Psychiatric:         Mood and Affect: Mood normal.       Results Review     I reviewed the patient's new clinical results.  Results from last 7 days   Lab Units 23  0650 23  1141 04/15/23  0747   WBC 10*3/mm3 13.07* 12.38* 5.54   HEMOGLOBIN g/dL 9.6* 10.8* 10.2*   PLATELETS 10*3/mm3 298 338 371     Results from last 7 days   Lab Units 23  0650 23  1141 04/15/23  0747   SODIUM mmol/L 134* 134* 140   POTASSIUM mmol/L 3.8 4.1 4.0   CHLORIDE mmol/L 97* 97* 101   CO2 mmol/L 29.0 28.0 28.8   BUN " mg/dL 14 9 12   CREATININE mg/dL 0.81 0.80 0.86   GLUCOSE mg/dL 114* 112* 114*   EGFR mL/min/1.73 91.4 91.7 89.7     Results from last 7 days   Lab Units 04/18/23  0650 04/17/23  1141 04/15/23  0747   ALBUMIN g/dL 3.1* 3.6 3.3*   BILIRUBIN mg/dL 0.6 0.5 0.3   ALK PHOS U/L 50 67 60   AST (SGOT) U/L 14 10 7   ALT (SGPT) U/L 8 10 6     Results from last 7 days   Lab Units 04/18/23  0650 04/17/23  1141 04/15/23  0747   CALCIUM mg/dL 8.3* 8.8 9.3   ALBUMIN g/dL 3.1* 3.6 3.3*   MAGNESIUM mg/dL  --  2.0 2.2     Results from last 7 days   Lab Units 04/17/23  1353   LACTATE mmol/L 1.3     Hemoglobin A1C   Date/Time Value Ref Range Status   04/18/2023 0650 5.50 4.80 - 5.60 % Final     Glucose   Date/Time Value Ref Range Status   04/18/2023 1143 107 70 - 130 mg/dL Final     Comment:     Meter: [id number] : [id number] Merlin Youngronit KIRBY   04/18/2023 0722 151 (H) 70 - 130 mg/dL Final     Comment:     Meter: [id number] : [id number] Merlin Youngia NA   04/17/2023 1706 120 70 - 130 mg/dL Final     Comment:     Meter: [id number] : [id number] Merlin Kelley MIRTA       XR Chest 1 View    Result Date: 4/17/2023  No focal pulmonary consolidation. Mild prominence of vascular and interstitial markings. Follow-up as clinical indications persist.  This report was finalized on 4/17/2023 12:03 PM by Dr. Chava Flores M.D.      I have personally reviewed all medications:  Scheduled Medications  aspirin, 81 mg, Oral, Daily  atorvastatin, 20 mg, Oral, Daily  cefTRIAXone, 1 g, Intravenous, Q24H  divalproex, 500 mg, Oral, BID  docusate sodium, 100 mg, Oral, Daily  enoxaparin, 40 mg, Subcutaneous, Daily  fluticasone, 2 spray, Nasal, Daily  insulin lispro, 0-9 Units, Subcutaneous, TID AC  levothyroxine, 37.5 mcg, Oral, Weekly  lisinopril, 40 mg, Oral, Daily  oxybutynin XL, 5 mg, Oral, Daily  pantoprazole, 40 mg, Oral, Q AM  propranolol, 60 mg, Oral, BID  sodium chloride, 10 mL, Intravenous, Q12H  tamsulosin, 0.4 mg, Oral,  Daily  ticagrelor, 90 mg, Oral, BID    Infusions   Diet  Diet: Cardiac Diets, Diabetic Diets, Renal Diets; Healthy Heart (2-3 Na+); Consistent Carbohydrate; Low Sodium (2-3g), Low Potassium, Low Phosphorus; Texture: Regular Texture (IDDSI 7); Fluid Consistency: Thin (IDDSI 0)    I have personally reviewed:  [x]  Laboratory   [x]  Microbiology   [x]  Radiology   [x]  EKG/Telemetry  [x]  Cardiology/Vascular   []  Pathology    []  Records       Assessment/Plan     Active Hospital Problems    Diagnosis  POA   • **Acute urinary tract infection [N39.0]  Yes   • Hypothyroidism [E03.9]  Unknown   • Chronic deep vein thrombosis (DVT) of calf muscle vein of left lower extremity [I82.562]  Yes   • Nonrheumatic aortic valve stenosis [I35.0]  Yes   • Chronic brain injury [S06.9XAS]  Not Applicable   • CKD (chronic kidney disease) [N18.9]  Yes   • Diabetes mellitus [E11.9]  Yes   • Generalized weakness [R53.1]  Yes   • GIOVANNY (obstructive sleep apnea) [G47.33]  Yes   • Essential hypertension [I10]  Yes   • Leg swelling [M79.89]  Yes      Resolved Hospital Problems   No resolved problems to display.       78 y.o. male admitted with Acute urinary tract infection.    Generalized weakness probably related to UTI.  Doubt prostatitis with normal PSA.  -Urine culture growing GNR.  NGTD on blood  - Continue ceftriaxone for now  - Continue PT and OT.  Looks like he is going to be stable for discharge back to group home if they can take him.    DM2 well controlled.  A1c 5.5    Anemia slightly worse today.  Probably somewhat dilutional.  Will check anemia studies.      · Lovenox 40 mg SC daily for DVT prophylaxis.  · Disposition: Probably back to group home 1 to 2 days    Expected Discharge  Expected Discharge Date and Time     Expected Discharge Date Expected Discharge Time    Apr 19, 2023             Axel Saldivar MD  [facility]  04/18/23  16:42 EDT

## 2023-04-18 NOTE — PLAN OF CARE
Goal Outcome Evaluation:      Improving    Pt AxOx4, calm and cooperative. VSS. Takes pills whole in apple sauce, see MAR. Tolerating diet. Physical therapy worked with pt this AM, up in the chair all morning. IV rocephin give. External cath in place. SCDs on. RN will continue to monitor.

## 2023-04-18 NOTE — CASE MANAGEMENT/SOCIAL WORK
Discharge Planning Assessment  [facility]     Patient Name: Hernando Lozada  MRN: [id number]  Today's Date: 4/18/2023    Admit Date: 4/17/2023    Plan: [facility].   Discharge Needs Assessment     Row Name 04/18/23 1033       Living Environment    People in Home facility resident    Unique Family Situation [facility]    Current Living Arrangements group home    Primary Care Provided by self    Provides Primary Care For no one    Family Caregiver if Needed other relative(s);sibling(s)    Family Caregiver Names David Coyne  [phone] or Teo Lozada [phone]    Able to Return to Prior Arrangements yes       Resource/Environmental Concerns    Resource/Environmental Concerns none       Transition Planning    Patient/Family Anticipates Transition to other (see comments)  Group home    Patient/Family Anticipated Services at Transition home health care    Transportation Anticipated health plan transportation       Discharge Needs Assessment    Readmission Within the Last 30 Days no previous admission in last 30 days    Equipment Currently Used at Home walker, rolling    Concerns to be Addressed discharge planning    Anticipated Changes Related to Illness none    Equipment Needed After Discharge none               Discharge Plan     Row Name 04/18/23 3307       Plan    Plan Home to [facility].    Patient/Family in Agreement with Plan yes    Plan Comments Met with patient at bedside, introduced self and explained CCP role. Verified facesheet and PCP- Milo Carrasquillo. [facility] lives at [facility]. Spoke with Jr alexander who provided nursing line for CCP to contact [phone] ext. [phone]. CCP left  requesting call back. Patient stated he has no used HH or been to SNF in the past. Patient has a walker and CPAP at home that he does not use. Patient uses [facility] and reports no difficulty affording medications. Patient  stated [facility] would likely be able to provide a ride home at [place]. CCP to follow. Max SCHWAB RN              Continued Care and Services - Admitted Since 4/17/2023    Coordination has not been started for this encounter.     Selected Continued Care - Prior Encounters Includes continued care and service providers with selected services from prior encounters from 1/17/2023 to 4/18/2023    Discharged on 3/14/2023 Admission date: 3/6/2023 - Discharge disposition: Skilled Nursing Facility (DC - External)    Destination Patient indicates having no preference.    Service Provider Selected Services Address Phone Fax Patient Preferred    [facility] [address] [address] [phone] --                    Expected Discharge Date and Time     Expected Discharge Date Expected Discharge Time    Apr 19, 2023          Demographic Summary     Row Name 04/18/23 1032       General Information    Admission Type observation    Referral Source admission list    Reason for Consult discharge planning    Preferred Language English               Functional Status     Row Name 04/18/23 1033       Functional Status    Usual Activity Tolerance good    Current Activity Tolerance good       Functional Status, IADL    Medications independent    Meal Preparation independent    Housekeeping assistive person    Laundry assistive person    Shopping assistive person       Mental Status    General Appearance WDL WDL       Mental Status Summary    Recent Changes in Mental Status/Cognitive Functioning no changes               Psychosocial    No documentation.                Abuse/Neglect    No documentation.                Legal    No documentation.                Substance Abuse    No documentation.                Patient Forms    No documentation.                   Max Cedillo RN
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
## 2023-04-18 NOTE — PLAN OF CARE
The pt was admitted to [facility] 2/2 to falls at his facility and BLE edema. Pmhx significant for TBI at age 11, hypothyroidism, diabetes mellitus, obesity, obstructive sleep apnea, hypertension and gastroesophageal reflux disease. The pt is a facility resident and assistance is provided for ADL/IADL's at his facility. He reports using a walker for mobility. Today, Min A x2 provided for bed mobility. R lateral lean initially. He stood with Min A x2 and tolerated side steps to the bedside chair where he was able to eat his meal. He remains limited by decreased strength, balance and endurance. It is recommended he return to his facility with HH vs. OP.

## 2023-04-19 PROBLEM — N41.0 ACUTE BACTERIAL PROSTATITIS: Status: ACTIVE | Noted: 2023-04-19

## 2023-04-19 PROBLEM — R33.9 URINE RETENTION: Status: ACTIVE | Noted: 2023-04-19

## 2023-04-19 LAB
ANION GAP SERPL CALCULATED.3IONS-SCNC: 6 MMOL/L (ref 5–15)
ARTERIAL PATENCY WRIST A: POSITIVE
ATMOSPHERIC PRESS: 750.4 MMHG
BACTERIA SPEC AEROBE CULT: ABNORMAL
BASE EXCESS BLDA CALC-SCNC: 4.6 MMOL/L (ref 0–2)
BDY SITE: ABNORMAL
BUN SERPL-MCNC: 15 MG/DL (ref 8–23)
BUN/CREAT SERPL: 21.7 (ref 7–25)
CALCIUM SPEC-SCNC: 8.3 MG/DL (ref 8.6–10.5)
CHLORIDE SERPL-SCNC: 99 MMOL/L (ref 98–107)
CO2 SERPL-SCNC: 29 MMOL/L (ref 22–29)
CREAT SERPL-MCNC: 0.69 MG/DL (ref 0.76–1.27)
DEPRECATED RDW RBC AUTO: 46 FL (ref 37–54)
EGFRCR SERPLBLD CKD-EPI 2021: 95.9 ML/MIN/1.73
ERYTHROCYTE [DISTWIDTH] IN BLOOD BY AUTOMATED COUNT: 14 % (ref 12.3–15.4)
FERRITIN SERPL-MCNC: 207 NG/ML (ref 30–400)
FOLATE SERPL-MCNC: 5.45 NG/ML (ref 4.78–24.2)
GAS FLOW AIRWAY: 2 LPM
GLUCOSE BLDC GLUCOMTR-MCNC: 119 MG/DL (ref 70–130)
GLUCOSE BLDC GLUCOMTR-MCNC: 121 MG/DL (ref 70–130)
GLUCOSE BLDC GLUCOMTR-MCNC: 123 MG/DL (ref 70–130)
GLUCOSE BLDC GLUCOMTR-MCNC: 129 MG/DL (ref 70–130)
GLUCOSE BLDC GLUCOMTR-MCNC: 151 MG/DL (ref 70–130)
GLUCOSE SERPL-MCNC: 121 MG/DL (ref 65–99)
HCO3 BLDA-SCNC: 28.4 MMOL/L (ref 22–28)
HCT VFR BLD AUTO: 30.1 % (ref 37.5–51)
HGB BLD-MCNC: 10 G/DL (ref 13–17.7)
IRON 24H UR-MRATE: 19 MCG/DL (ref 59–158)
IRON SATN MFR SERPL: 6 % (ref 20–50)
MCH RBC QN AUTO: 30.3 PG (ref 26.6–33)
MCHC RBC AUTO-ENTMCNC: 33.2 G/DL (ref 31.5–35.7)
MCV RBC AUTO: 91.2 FL (ref 79–97)
MODALITY: ABNORMAL
PCO2 BLDA: 38.6 MM HG (ref 35–45)
PH BLDA: 7.47 PH UNITS (ref 7.35–7.45)
PLATELET # BLD AUTO: 289 10*3/MM3 (ref 140–450)
PMV BLD AUTO: 8.9 FL (ref 6–12)
PO2 BLDA: 75.3 MM HG (ref 80–100)
POTASSIUM SERPL-SCNC: 3.8 MMOL/L (ref 3.5–5.2)
RBC # BLD AUTO: 3.3 10*6/MM3 (ref 4.14–5.8)
SAO2 % BLDCOA: 95.9 % (ref 92–99)
SODIUM SERPL-SCNC: 134 MMOL/L (ref 136–145)
TIBC SERPL-MCNC: 298 MCG/DL (ref 298–536)
TOTAL RATE: 26 BREATHS/MINUTE
TRANSFERRIN SERPL-MCNC: 200 MG/DL (ref 200–360)
VIT B12 BLD-MCNC: 362 PG/ML (ref 211–946)
WBC NRBC COR # BLD: 11.26 10*3/MM3 (ref 3.4–10.8)

## 2023-04-19 PROCEDURE — 82803 BLOOD GASES ANY COMBINATION: CPT

## 2023-04-19 PROCEDURE — 82746 ASSAY OF FOLIC ACID SERUM: CPT | Performed by: HOSPITALIST

## 2023-04-19 PROCEDURE — 83540 ASSAY OF IRON: CPT | Performed by: HOSPITALIST

## 2023-04-19 PROCEDURE — 80048 BASIC METABOLIC PNL TOTAL CA: CPT | Performed by: HOSPITALIST

## 2023-04-19 PROCEDURE — 82728 ASSAY OF FERRITIN: CPT | Performed by: INTERNAL MEDICINE

## 2023-04-19 PROCEDURE — 25010000002 CEFTRIAXONE PER 250 MG: Performed by: INTERNAL MEDICINE

## 2023-04-19 PROCEDURE — 36600 WITHDRAWAL OF ARTERIAL BLOOD: CPT

## 2023-04-19 PROCEDURE — 85027 COMPLETE CBC AUTOMATED: CPT | Performed by: HOSPITALIST

## 2023-04-19 PROCEDURE — 84466 ASSAY OF TRANSFERRIN: CPT | Performed by: HOSPITALIST

## 2023-04-19 PROCEDURE — 82607 VITAMIN B-12: CPT | Performed by: HOSPITALIST

## 2023-04-19 PROCEDURE — 82962 GLUCOSE BLOOD TEST: CPT

## 2023-04-19 PROCEDURE — 94799 UNLISTED PULMONARY SVC/PX: CPT

## 2023-04-19 PROCEDURE — 25010000002 ENOXAPARIN PER 10 MG: Performed by: INTERNAL MEDICINE

## 2023-04-19 RX ORDER — CHOLECALCIFEROL (VITAMIN D3) 125 MCG
1000 CAPSULE ORAL DAILY
Status: DISCONTINUED | OUTPATIENT
Start: 2023-04-19 | End: 2023-04-20 | Stop reason: HOSPADM

## 2023-04-19 RX ORDER — SULFAMETHOXAZOLE AND TRIMETHOPRIM 800; 160 MG/1; MG/1
1 TABLET ORAL EVERY 12 HOURS SCHEDULED
Status: DISCONTINUED | OUTPATIENT
Start: 2023-04-19 | End: 2023-04-20 | Stop reason: HOSPADM

## 2023-04-19 RX ORDER — FERROUS SULFATE 325(65) MG
325 TABLET ORAL
Status: DISCONTINUED | OUTPATIENT
Start: 2023-04-20 | End: 2023-04-20 | Stop reason: HOSPADM

## 2023-04-19 RX ORDER — ACETAMINOPHEN 325 MG/1
650 TABLET ORAL ONCE
Status: COMPLETED | OUTPATIENT
Start: 2023-04-19 | End: 2023-04-19

## 2023-04-19 RX ORDER — TAMSULOSIN HYDROCHLORIDE 0.4 MG/1
0.8 CAPSULE ORAL DAILY
Status: DISCONTINUED | OUTPATIENT
Start: 2023-04-20 | End: 2023-04-20 | Stop reason: HOSPADM

## 2023-04-19 RX ORDER — FOLIC ACID 1 MG/1
1 TABLET ORAL DAILY
Status: DISCONTINUED | OUTPATIENT
Start: 2023-04-19 | End: 2023-04-20 | Stop reason: HOSPADM

## 2023-04-19 RX ADMIN — ENOXAPARIN SODIUM 40 MG: 100 INJECTION SUBCUTANEOUS at 09:54

## 2023-04-19 RX ADMIN — Medication 1000 MCG: at 17:05

## 2023-04-19 RX ADMIN — TICAGRELOR 90 MG: 90 TABLET ORAL at 09:54

## 2023-04-19 RX ADMIN — PROPRANOLOL HYDROCHLORIDE 60 MG: 20 TABLET ORAL at 20:45

## 2023-04-19 RX ADMIN — Medication 1 MG: at 17:05

## 2023-04-19 RX ADMIN — DIVALPROEX SODIUM 500 MG: 500 TABLET, EXTENDED RELEASE ORAL at 09:54

## 2023-04-19 RX ADMIN — ASPIRIN 81 MG: 81 TABLET, COATED ORAL at 09:55

## 2023-04-19 RX ADMIN — LISINOPRIL 40 MG: 40 TABLET ORAL at 09:54

## 2023-04-19 RX ADMIN — PANTOPRAZOLE SODIUM 40 MG: 40 TABLET, DELAYED RELEASE ORAL at 06:27

## 2023-04-19 RX ADMIN — ATORVASTATIN CALCIUM 20 MG: 20 TABLET, FILM COATED ORAL at 09:54

## 2023-04-19 RX ADMIN — ACETAMINOPHEN 650 MG: 325 TABLET, FILM COATED ORAL at 12:09

## 2023-04-19 RX ADMIN — CEFTRIAXONE SODIUM 1 G: 1 INJECTION, POWDER, FOR SOLUTION INTRAMUSCULAR; INTRAVENOUS at 09:55

## 2023-04-19 RX ADMIN — Medication 10 ML: at 09:55

## 2023-04-19 RX ADMIN — SULFAMETHOXAZOLE AND TRIMETHOPRIM 1 TABLET: 800; 160 TABLET ORAL at 20:47

## 2023-04-19 RX ADMIN — Medication 10 ML: at 20:46

## 2023-04-19 RX ADMIN — DOCUSATE SODIUM 100 MG: 100 CAPSULE, LIQUID FILLED ORAL at 09:55

## 2023-04-19 RX ADMIN — DIVALPROEX SODIUM 500 MG: 500 TABLET, EXTENDED RELEASE ORAL at 20:45

## 2023-04-19 RX ADMIN — ACETAMINOPHEN 650 MG: 325 TABLET ORAL at 17:05

## 2023-04-19 RX ADMIN — OXYBUTYNIN CHLORIDE 5 MG: 5 TABLET, EXTENDED RELEASE ORAL at 09:54

## 2023-04-19 RX ADMIN — PROPRANOLOL HYDROCHLORIDE 60 MG: 20 TABLET ORAL at 09:54

## 2023-04-19 RX ADMIN — TAMSULOSIN HYDROCHLORIDE 0.4 MG: 0.4 CAPSULE ORAL at 09:54

## 2023-04-19 RX ADMIN — TICAGRELOR 90 MG: 90 TABLET ORAL at 20:45

## 2023-04-19 RX ADMIN — FLUTICASONE PROPIONATE 2 SPRAY: 50 SPRAY, METERED NASAL at 09:54

## 2023-04-19 RX ADMIN — ACETAMINOPHEN 650 MG: 325 TABLET, FILM COATED ORAL at 23:56
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
## 2023-04-19 NOTE — PROGRESS NOTES
[facility] Medicine Services  PROGRESS NOTE    Patient Name: Hernando Lozada  : 1945  MRN: [id number]    Date of Admission: 2023  Primary Care Physician: Milo Carrasquillo DO    Subjective   Subjective     CC:  Follow-up urinary infection    Subjective:  Initially patient felt better today.  He did develop fevers.  He had episode of urinary retention last night.  Nursing reported 525 drained with straight catheterization.  Patient states he is eager to discharge home when possible.    Review of Systems  No current shortness of breath or cough  No current nausea, vomiting, or diarrhea  No current chest pain or palpitations       Objective   Objective     Vital Signs:   Temp:  [98.2 °F (36.8 °C)-101 °F (38.3 °C)] 101 °F (38.3 °C)  Heart Rate:  [77-87] 85  Resp:  [16-18] 16  BP: (102-141)/(68-78) 141/76        Physical Exam:  Constitutional:Awake, alert, elderly  HENT: NCAT, mucous membranes moist, neck supple  Respiratory: No cough or wheezes, normal respirations, nonlabored breathing  Cardiovascular: Rate is normal, normal radial pulses  Gastrointestinal:  soft, nontender, nondistended  Musculoskeletal: Normal musculature for age, mild lower extremity edema, BMI 31 obese  Psychiatric: Appropriate affect, cooperative, conversational  Neurologic: No slurred speech or facial droop, follows commands  Skin: No rashes or jaundice, warm      Results Reviewed:  Results from last 7 days   Lab Units 23  0616 23  0650 23  1141 04/15/23  0747   WBC 10*3/mm3 11.26* 13.07* 12.38* 5.54   HEMOGLOBIN g/dL 10.0* 9.6* 10.8* 10.2*   HEMATOCRIT % 30.1* 28.7* 32.5* 30.7*   PLATELETS 10*3/mm3 289 298 338 371   INR   --   --   --  0.98     Results from last 7 days   Lab Units 23  0616 23  0650 23  1141 04/15/23  1000 04/15/23  0747   SODIUM mmol/L 134* 134* 134*  --  140   POTASSIUM mmol/L 3.8 3.8 4.1  --  4.0   CHLORIDE mmol/L 99 97* 97*  --  101   CO2 mmol/L 29.0 29.0 28.0  --  28.8    BUN mg/dL 15 14 9  --  12   CREATININE mg/dL 0.69* 0.81 0.80  --  0.86   GLUCOSE mg/dL 121* 114* 112*  --  114*   CALCIUM mg/dL 8.3* 8.3* 8.8  --  9.3   ALT (SGPT) U/L  --  8 10  --  6   AST (SGOT) U/L  --  14 10  --  7   HSTROP T ng/L  --   --  24* 24* 20*   PROBNP pg/mL  --   --  1,381.0  --  1,234.0     Estimated Creatinine Clearance: 103.7 mL/min (A) (by C-G formula based on SCr of 0.69 mg/dL (L)).    Microbiology Results Abnormal     Procedure Component Value - Date/Time    Blood Culture - Blood, Arm, Left [[id number]]  (Normal) Collected: 04/17/23 1355    Lab Status: Preliminary result Specimen: Blood from Arm, Left Updated: 04/19/23 1415     Blood Culture No growth at 2 days    Blood Culture - Blood, Arm, Left [[id number]]  (Normal) Collected: 04/17/23 1353    Lab Status: Preliminary result Specimen: Blood from Arm, Left Updated: 04/19/23 1400     Blood Culture No growth at 2 days          Imaging Results (Last 24 Hours)     ** No results found for the last 24 hours. **          Results for orders placed during the hospital encounter of 03/06/23    Adult Transthoracic Echo Complete W/ Cont if Necessary Per Protocol    Interpretation Summary  •  Left ventricular ejection fraction appears to be 56 - 60%.  •  Left ventricular diastolic function was normal.  •  Moderate to severe aortic valve stenosis is present. Aortic valve area is 1 cm2.  •  Peak velocity of the flow distal to the aortic valve is 287.8 cm/s. Aortic valve maximum pressure gradient is 33 mmHg. Aortic valve mean pressure gradient is 18 mmHg. Aortic valve dimensionless index is 0.3 .  •  Estimated right ventricular systolic pressure from tricuspid regurgitation is normal (<35 mmHg).      I have reviewed the medications:  Scheduled Meds:aspirin, 81 mg, Oral, Daily  atorvastatin, 20 mg, Oral, Daily  divalproex, 500 mg, Oral, BID  docusate sodium, 100 mg, Oral, Daily  enoxaparin, 40 mg, Subcutaneous, Daily  fluticasone, 2 spray, Nasal, Daily  insulin  lispro, 0-9 Units, Subcutaneous, TID AC  levothyroxine, 37.5 mcg, Oral, Weekly  lisinopril, 40 mg, Oral, Daily  pantoprazole, 40 mg, Oral, Q AM  propranolol, 60 mg, Oral, BID  sodium chloride, 10 mL, Intravenous, Q12H  sulfamethoxazole-trimethoprim, 1 tablet, Oral, Q12H  [START ON 4/20/2023] tamsulosin, 0.8 mg, Oral, Daily  ticagrelor, 90 mg, Oral, BID      Continuous Infusions:   PRN Meds:.•  acetaminophen **OR** acetaminophen **OR** acetaminophen  •  dextrose  •  dextrose  •  glucagon (human recombinant)  •  ondansetron  •  [COMPLETED] Insert Peripheral IV **AND** sodium chloride  •  sodium chloride  •  sodium chloride    Assessment & Plan   Assessment & Plan     Active Hospital Problems    Diagnosis  POA   • **Acute urinary tract infection [N39.0]  Yes   • Urine retention [R33.9]  Yes   • Acute bacterial prostatitis [N41.0]  Yes   • Hypothyroidism [E03.9]  Yes   • Chronic deep vein thrombosis (DVT) of calf muscle vein of left lower extremity [I82.562]  Yes   • Nonrheumatic aortic valve stenosis [I35.0]  Yes   • Chronic brain injury [S06.9XAS]  Not Applicable   • CKD (chronic kidney disease) [N18.9]  Yes   • Diabetes mellitus [E11.9]  Yes   • Generalized weakness [R53.1]  Yes   • GIOVANNY (obstructive sleep apnea) [G47.33]  Yes   • Essential hypertension [I10]  Yes   • Leg swelling [M79.89]  Yes      Resolved Hospital Problems   No resolved problems to display.        Brief Hospital Course to date:  Hernando Lozada is a 78 y.o. male presents the hospital with acute cystitis secondary to Klebsiella.    Discussion/plan:  All medical problems are new under my management today.    Patient developed urinary retention.  He appears to be having prostate symptoms and there is concern for acute cystitis with acute bacterial prostatitis.  Etiology thought to be Klebsiella.  Plan to discontinue ceftriaxone and switch to Bactrim.  Fevers noted this morning.  Continue supportive care and symptom treatment.  Leukocytosis trending  down.    Consult urology for urinary retention.  They are recommending we increase Flomax.  Plan to monitor and we can straight catheterize as needed.    X-ray of the chest reviewed and negative for infiltrate.    Glucose reviewed.  Adjust insulin as needed.  A1c 5.5.    Anemia stable.  There was some drop thought to be related to dilution.  No sign of bleeding.    Anemia studies seem to show iron deficiency and likely some degree of anemia of chronic disease.  Also patient has borderline low B12 and folic acid.  Start replacement of folate, B12, and iron.  Anemia can be followed in primary care office.    DVT Prophylaxis: Lovenox      Disposition: Pending    CODE STATUS:   Code Status and Medical Interventions:   Ordered at: 04/17/23 1612     Code Status (Patient has no pulse and is not breathing):    CPR (Attempt to Resuscitate)     Medical Interventions (Patient has pulse or is breathing):    Full Support       Fercho Moreland MD  04/19/23

## 2023-04-19 NOTE — PLAN OF CARE
Goal Outcome Evaluation:         VSS. A&Ox4. Pt complaining of lower abdominal pain and trouble urinating. Bladder scan preformed. [facility] notified and agreed with straight cath pt. 525mL removed. Pt felt relief after straight cath.

## 2023-04-19 NOTE — PLAN OF CARE
Goal Outcome Evaluation:      Max temp for patient was 101.5, tylenol given twice but has not brought fever down. Notified attending physician. Will start on bactrim. Urology consulted today for urinary retention. Straight cath. Patient every 6 hours and increased flomax to 0.8mg. Discontinued oxybutin. Labs in the am. Decreased appetite today. Vitamin b ordered and given. Continue to monitor.

## 2023-04-19 NOTE — CASE MANAGEMENT/SOCIAL WORK
Continued Stay Note  [facility]     Patient Name: Hernando Lozada  MRN: [id number]  Today's Date: 4/19/2023    Admit Date: 4/17/2023    Plan: Home to [facility]   Discharge Plan     Row Name 04/19/23 0832       Plan    Plan Home to [facility]    Patient/Family in Agreement with Plan yes    Plan Comments Spoke with Jazlyn/LEN[facility] at [facility] [phone] to discuss therapy recs HH PT. Jazlyn stated she would set up their PT services to work with him when he returns to Group Home. Jazlyn stated they use [facility] pharmacy and that when patient is ready for DC, CCP is to call Jazlyn and [facility] will provide transportation home. CCP to follow. Max SCHWAB RN               Discharge Codes    No documentation.               Expected Discharge Date and Time     Expected Discharge Date Expected Discharge Time    Apr 20, 2023             Max Cedillo RN

## 2023-04-20 VITALS
RESPIRATION RATE: 16 BRPM | SYSTOLIC BLOOD PRESSURE: 102 MMHG | BODY MASS INDEX: 31.1 KG/M2 | HEART RATE: 82 BPM | WEIGHT: 210 LBS | OXYGEN SATURATION: 94 % | HEIGHT: 69 IN | DIASTOLIC BLOOD PRESSURE: 68 MMHG | TEMPERATURE: 98.7 F

## 2023-04-20 PROBLEM — R33.8 ACUTE URINARY RETENTION: Status: ACTIVE | Noted: 2023-04-19

## 2023-04-20 LAB
ANION GAP SERPL CALCULATED.3IONS-SCNC: 7 MMOL/L (ref 5–15)
BACTERIA UR QL AUTO: ABNORMAL /HPF
BILIRUB UR QL STRIP: NEGATIVE
BUN SERPL-MCNC: 15 MG/DL (ref 8–23)
BUN/CREAT SERPL: 20.8 (ref 7–25)
CALCIUM SPEC-SCNC: 8.3 MG/DL (ref 8.6–10.5)
CHLORIDE SERPL-SCNC: 101 MMOL/L (ref 98–107)
CLARITY UR: ABNORMAL
CO2 SERPL-SCNC: 27 MMOL/L (ref 22–29)
COLOR UR: ABNORMAL
CREAT SERPL-MCNC: 0.72 MG/DL (ref 0.76–1.27)
DEPRECATED RDW RBC AUTO: 47.6 FL (ref 37–54)
EGFRCR SERPLBLD CKD-EPI 2021: 94.7 ML/MIN/1.73
ERYTHROCYTE [DISTWIDTH] IN BLOOD BY AUTOMATED COUNT: 14.1 % (ref 12.3–15.4)
GLUCOSE BLDC GLUCOMTR-MCNC: 114 MG/DL (ref 70–130)
GLUCOSE BLDC GLUCOMTR-MCNC: 118 MG/DL (ref 70–130)
GLUCOSE SERPL-MCNC: 132 MG/DL (ref 65–99)
GLUCOSE UR STRIP-MCNC: NEGATIVE MG/DL
HCT VFR BLD AUTO: 28.9 % (ref 37.5–51)
HGB BLD-MCNC: 9.7 G/DL (ref 13–17.7)
HGB UR QL STRIP.AUTO: ABNORMAL
HYALINE CASTS UR QL AUTO: ABNORMAL /LPF
KETONES UR QL STRIP: ABNORMAL
LEUKOCYTE ESTERASE UR QL STRIP.AUTO: ABNORMAL
MCH RBC QN AUTO: 31 PG (ref 26.6–33)
MCHC RBC AUTO-ENTMCNC: 33.6 G/DL (ref 31.5–35.7)
MCV RBC AUTO: 92.3 FL (ref 79–97)
NITRITE UR QL STRIP: NEGATIVE
PH UR STRIP.AUTO: 5.5 [PH] (ref 5–8)
PLATELET # BLD AUTO: 245 10*3/MM3 (ref 140–450)
PMV BLD AUTO: 9.2 FL (ref 6–12)
POTASSIUM SERPL-SCNC: 3.8 MMOL/L (ref 3.5–5.2)
PROT UR QL STRIP: ABNORMAL
RBC # BLD AUTO: 3.13 10*6/MM3 (ref 4.14–5.8)
RBC # UR STRIP: ABNORMAL /HPF
REF LAB TEST METHOD: ABNORMAL
SARS-COV-2 RNA RESP QL NAA+PROBE: NOT DETECTED
SODIUM SERPL-SCNC: 135 MMOL/L (ref 136–145)
SP GR UR STRIP: 1.03 (ref 1–1.03)
SQUAMOUS #/AREA URNS HPF: ABNORMAL /HPF
UROBILINOGEN UR QL STRIP: ABNORMAL
WBC # UR STRIP: ABNORMAL /HPF
WBC NRBC COR # BLD: 9.46 10*3/MM3 (ref 3.4–10.8)

## 2023-04-20 PROCEDURE — 82962 GLUCOSE BLOOD TEST: CPT

## 2023-04-20 PROCEDURE — 81001 URINALYSIS AUTO W/SCOPE: CPT | Performed by: UROLOGY

## 2023-04-20 PROCEDURE — U0003 INFECTIOUS AGENT DETECTION BY NUCLEIC ACID (DNA OR RNA); SEVERE ACUTE RESPIRATORY SYNDROME CORONAVIRUS 2 (SARS-COV-2) (CORONAVIRUS DISEASE [COVID-19]), AMPLIFIED PROBE TECHNIQUE, MAKING USE OF HIGH THROUGHPUT TECHNOLOGIES AS DESCRIBED BY CMS-2020-01-R: HCPCS | Performed by: INTERNAL MEDICINE

## 2023-04-20 PROCEDURE — U0005 INFEC AGEN DETEC AMPLI PROBE: HCPCS | Performed by: INTERNAL MEDICINE

## 2023-04-20 PROCEDURE — 85027 COMPLETE CBC AUTOMATED: CPT | Performed by: INTERNAL MEDICINE

## 2023-04-20 PROCEDURE — 97530 THERAPEUTIC ACTIVITIES: CPT

## 2023-04-20 PROCEDURE — 80048 BASIC METABOLIC PNL TOTAL CA: CPT | Performed by: INTERNAL MEDICINE

## 2023-04-20 PROCEDURE — 25010000002 ENOXAPARIN PER 10 MG: Performed by: INTERNAL MEDICINE

## 2023-04-20 PROCEDURE — 97110 THERAPEUTIC EXERCISES: CPT | Performed by: OCCUPATIONAL THERAPIST

## 2023-04-20 PROCEDURE — 87086 URINE CULTURE/COLONY COUNT: CPT | Performed by: UROLOGY

## 2023-04-20 RX ORDER — ACETAMINOPHEN 325 MG/1
650 TABLET ORAL EVERY 6 HOURS PRN
Start: 2023-04-20

## 2023-04-20 RX ORDER — LEVOTHYROXINE SODIUM 0.03 MG/1
TABLET ORAL
Start: 2023-04-20

## 2023-04-20 RX ORDER — LISINOPRIL 5 MG/1
5 TABLET ORAL DAILY
Status: DISCONTINUED | OUTPATIENT
Start: 2023-04-20 | End: 2023-04-20 | Stop reason: HOSPADM

## 2023-04-20 RX ORDER — FERROUS SULFATE 325(65) MG
325 TABLET ORAL
Start: 2023-04-21

## 2023-04-20 RX ORDER — TAMSULOSIN HYDROCHLORIDE 0.4 MG/1
0.8 CAPSULE ORAL DAILY
Qty: 30 CAPSULE
Start: 2023-04-21

## 2023-04-20 RX ORDER — SULFAMETHOXAZOLE AND TRIMETHOPRIM 800; 160 MG/1; MG/1
1 TABLET ORAL EVERY 12 HOURS SCHEDULED
Qty: 52 TABLET | Refills: 0
Start: 2023-04-20 | End: 2023-05-16

## 2023-04-20 RX ORDER — PROPRANOLOL HYDROCHLORIDE 20 MG/1
40 TABLET ORAL 2 TIMES DAILY
Status: DISCONTINUED | OUTPATIENT
Start: 2023-04-20 | End: 2023-04-20 | Stop reason: HOSPADM

## 2023-04-20 RX ORDER — LISINOPRIL 5 MG/1
5 TABLET ORAL DAILY
Start: 2023-04-20

## 2023-04-20 RX ORDER — PROPRANOLOL HYDROCHLORIDE 40 MG/1
40 TABLET ORAL 2 TIMES DAILY
Start: 2023-04-20

## 2023-04-20 RX ORDER — FOLIC ACID 1 MG/1
1 TABLET ORAL DAILY
Start: 2023-04-21

## 2023-04-20 RX ORDER — LEVOTHYROXINE SODIUM 0.07 MG/1
TABLET ORAL
Start: 2023-04-20

## 2023-04-20 RX ADMIN — Medication 10 ML: at 10:23

## 2023-04-20 RX ADMIN — FLUTICASONE PROPIONATE 2 SPRAY: 50 SPRAY, METERED NASAL at 10:22

## 2023-04-20 RX ADMIN — DOCUSATE SODIUM 100 MG: 100 CAPSULE, LIQUID FILLED ORAL at 10:22

## 2023-04-20 RX ADMIN — SULFAMETHOXAZOLE AND TRIMETHOPRIM 1 TABLET: 800; 160 TABLET ORAL at 10:19

## 2023-04-20 RX ADMIN — ATORVASTATIN CALCIUM 20 MG: 20 TABLET, FILM COATED ORAL at 10:20

## 2023-04-20 RX ADMIN — Medication 1000 MCG: at 10:20

## 2023-04-20 RX ADMIN — ACETAMINOPHEN 650 MG: 325 TABLET, FILM COATED ORAL at 10:22

## 2023-04-20 RX ADMIN — PROPRANOLOL HYDROCHLORIDE 40 MG: 20 TABLET ORAL at 10:19

## 2023-04-20 RX ADMIN — TAMSULOSIN HYDROCHLORIDE 0.8 MG: 0.4 CAPSULE ORAL at 10:19

## 2023-04-20 RX ADMIN — TICAGRELOR 90 MG: 90 TABLET ORAL at 10:20

## 2023-04-20 RX ADMIN — ENOXAPARIN SODIUM 40 MG: 100 INJECTION SUBCUTANEOUS at 10:21

## 2023-04-20 RX ADMIN — ASPIRIN 81 MG: 81 TABLET, COATED ORAL at 10:20

## 2023-04-20 RX ADMIN — FERROUS SULFATE TAB 325 MG (65 MG ELEMENTAL FE) 325 MG: 325 (65 FE) TAB at 10:20

## 2023-04-20 RX ADMIN — PANTOPRAZOLE SODIUM 40 MG: 40 TABLET, DELAYED RELEASE ORAL at 06:39

## 2023-04-20 RX ADMIN — Medication 1 MG: at 10:20

## 2023-04-20 RX ADMIN — DIVALPROEX SODIUM 500 MG: 500 TABLET, EXTENDED RELEASE ORAL at 10:21

## 2023-04-20 NOTE — CASE MANAGEMENT/SOCIAL WORK
Continued Stay Note  [facility]     Patient Name: Hernando Lozada  MRN: [id number]  Today's Date: 4/20/2023    Admit Date: 4/17/2023    Plan: SNF- referral pending   Discharge Plan     Row Name 04/20/23 1000       Plan    Plan SNF- referral pending    Patient/Family in Agreement with Plan yes    Plan Comments CCP notified by PT that patient would benefit from SNF prior to returning to [facility]. Spoke with Jazlyn/[facility] who stated their patients typically go to [facility] for SNF but they can go anywhere for SNF. Spoke with patient at bedside to discuss PT recs SNF. Referral sent to [facility] per patient request. Spoke with Grace/[facility] who stated she would review. Referral pending. Patient will need transportation at [place]. CCP to follow. Max SCHWAB RN               Discharge Codes    No documentation.               Expected Discharge Date and Time     Expected Discharge Date Expected Discharge Time    Apr 21, 2023             Max Cedillo RN

## 2023-04-20 NOTE — CASE MANAGEMENT/SOCIAL WORK
Continued Stay Note  [facility]     Patient Name: Hernando Lozada  MRN: [id number]  Today's Date: 4/20/2023    Admit Date: 4/17/2023    Plan: [facility]. bed available today. [facility] WC van will provide transport at 1600.   Discharge Plan     Row Name 04/20/23 1325       Plan    [facility]. bed available today. [facility] WC van will provide transport at 1600.    Patient/Family in Agreement with Plan yes    Plan Comments CCP notified by MD that patient is ready for DC today. Updated Grace/Jameson Kumari who stated WC van could  patient at 1600 today pending negative covid test. Updated patient at bedside who is agreeable. Updated [facility] and RN. Packet placed in [place]. CCP to follow. Max SCHWAB RN    Row Name 04/20/23 1115       Plan    Plan [facility], bed available today (4/20).    Patient/Family in Agreement with Plan yes    Plan Comments Spoke with Grace/Jameson Kumari who stated patient is accepted and bed is available at [facility] today. Patient will need negative covid test prior to DC. Requested Covid test and request info on DC date through [facility]. Per Grace if patient is ready today [facility] has WC van that will provide transportation at DC. CCP to follow. Max SCHWAB RN               Discharge Codes    No documentation.               Expected Discharge Date and Time     Expected Discharge Date Expected Discharge Time    Apr 21, 2023             Max Cedillo RN
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
## 2023-04-20 NOTE — CASE MANAGEMENT/SOCIAL WORK
Continued Stay Note  [facility]     Patient Name: Hernando Lozada  MRN: [id number]  Today's Date: 4/20/2023    Admit Date: 4/17/2023    Plan: [facility]- SNF, bed available today (4/20).   Discharge Plan     Row Name 04/20/23 1115       Plan    Plan [facility]- SNF, bed available today (4/20).    Patient/Family in Agreement with Plan yes    Plan Comments Spoke with Terry Kumari who stated patient is accepted and bed is available at [facility] today. Patient will need negative covid test prior to DC. Requested Covid test and request info on DC date through [facility]. Per Grace if patient is ready today [facility] CCP to follow. Max SCHWAB RN    Row Name 04/20/23 1000       Plan    Plan SNF- referral pending    Patient/Family in Agreement with Plan yes    Plan Comments CCP notified by PT that patient would benefit from SNF prior to returning to [facility]. Spoke with Jazlyn/[facility] who stated their patients typically go to [facility] for SNF but they can go anywhere for SNF. Spoke with patient at bedside to discuss PT recs SNF. Referral sent to [facility] per patient request. Spoke with Terry Kumari who stated she would review. Referral pending. [facility]. CCP to follow. Max SCHWAB RN               Discharge Codes    No documentation.               Expected Discharge Date and Time     Expected Discharge Date Expected Discharge Time    Apr 21, 2023             Max Cedillo RN

## 2023-04-20 NOTE — DISCHARGE SUMMARY
[facility] Medicine Services  DISCHARGE SUMMARY    Patient Name: Hernando Lozada  : 1945  MRN: [id number]    Date of Admission: 2023 11:20 AM  Date of Discharge: 2023  Primary Care Physician: Milo Carrasquillo DO    Consults     Date and Time Order Name Status Description    2023  6:37 AM Inpatient Urology Consult Completed     2023  1:25 PM A (on-call MD unless specified) Details            Hospital Course       Active Hospital Problems    Diagnosis  POA   • **Acute urinary tract infection [N39.0]  Yes   • Acute urinary retention [R33.8]  Yes   • Acute bacterial prostatitis [N41.0]  Yes   • Hypothyroidism [E03.9]  Yes   • Chronic deep vein thrombosis (DVT) of calf muscle vein of left lower extremity [I82.562]  Yes   • Nonrheumatic aortic valve stenosis [I35.0]  Yes   • Chronic brain injury [S06.9XAS]  Not Applicable   • CKD (chronic kidney disease) [N18.9]  Yes   • Diabetes mellitus [E11.9]  Yes   • Generalized weakness [R53.1]  Yes   • GIOVANNY (obstructive sleep apnea) [G47.33]  Yes   • Essential hypertension [I10]  Yes   • Leg swelling [M79.89]  Yes      Resolved Hospital Problems   No resolved problems to display.          Hospital Course:  Hernando Lozada is a 78 y.o. male presents the hospital with acute cystitis and prostatitis secondary to Klebsiella.    Patient was treated with IV antibiotics initially.  He received supportive care and symptomatic treatment.  He had fevers related to his infection which have now resolved.  He may still have some low-grade fevers and would recommend Tylenol while he is responding to his treatment.  Urology saw him and felt that his urinary infection also included prostatitis and recommended to switch him to oral Bactrim.  I spoke with urologist Dr. Jamin Holley today who recommended approximately 28-day course of Bactrim from start date of .      Patient had generalized weakness related to his infection.  He saw physical therapy and is transferring  to skilled nursing facility for subacute rehabilitation at discharge.  His weakness is starting to improve.    Patient had urinary retention from his prostatitis.  Urology recommended straight catheterizations.  He is tolerating these well.  I would recommend straight catheterizations every 8 hours at a skilled facility.  His Flomax dose has been increased and urology recommend he follow-up in urology clinic in approximately 2 weeks for follow-up of prostatitis and urinary retention.  If there are issues with straight catheterization please contact [facility] clinic for questions.    Please note  due to acute illness patient's blood pressure medications were adjusted and decreased while is acutely ill.  He will need to follow-up with primary care provider within 1 week after he discharges from SNF for blood pressure check and to further adjust.  The remainder of patient's medical problems are reasonably stable.  Patient is agreement with plan to transfer today.    At the time of discharge take all medications as prescribed, keep all follow-up appointments, and call your doctor or return to the hospital if you have any worsening or concerning symptoms.    Please note that this note was made using Dragon voice recognition software      Day of Discharge     Subjective: Patient still having some prostate region pain.  He is still frustrated by his difficulty urinating but is tolerating straight catheterizations reasonably well.  He is eager to go to a skilled facility for subacute rehabilitation.  He feels much better than yesterday.  No other new complaints.    ROS:  No current fevers or chills  No current shortness of breath or cough  No current nausea, vomiting, or diarrhea  No current chest pain or palpitations    Vital Signs:   Temp:  [97.1 °F (36.2 °C)-101.5 °F (38.6 °C)] 97.7 °F (36.5 °C)  Heart Rate:  [70-92] 76  Resp:  [16] 16  BP: ()/(58-80) 142/80     Physical Exam:  Constitutional:Awake, alert,  elderly  HENT: NCAT, mucous membranes moist, neck supple  Respiratory: No cough or wheezes, normal respirations, nonlabored breathing  Cardiovascular: Rate is normal, normal radial pulses  Gastrointestinal:  soft, nontender, nondistended  Musculoskeletal: Normal musculature for age, mild lower extremity edema, BMI 31 obese  Psychiatric: Appropriate affect, cooperative, conversational  Neurologic: No slurred speech or facial droop, follows commands  Skin: No rashes or jaundice, warm      Pertinent  and/or Most Recent Results     Results from last 7 days   Lab Units 04/20/23  0541 04/19/23  0616 04/18/23  0650 04/17/23  1141 04/15/23  0747   WBC 10*3/mm3 9.46 11.26* 13.07* 12.38* 5.54   HEMOGLOBIN g/dL 9.7* 10.0* 9.6* 10.8* 10.2*   HEMATOCRIT % 28.9* 30.1* 28.7* 32.5* 30.7*   PLATELETS 10*3/mm3 245 289 298 338 371   SODIUM mmol/L 135* 134* 134* 134* 140   POTASSIUM mmol/L 3.8 3.8 3.8 4.1 4.0   CHLORIDE mmol/L 101 99 97* 97* 101   CO2 mmol/L 27.0 29.0 29.0 28.0 28.8   BUN mg/dL 15 15 14 9 12   CREATININE mg/dL 0.72* 0.69* 0.81 0.80 0.86   GLUCOSE mg/dL 132* 121* 114* 112* 114*   CALCIUM mg/dL 8.3* 8.3* 8.3* 8.8 9.3     Results from last 7 days   Lab Units 04/18/23  0650 04/17/23  1141 04/15/23  0747   BILIRUBIN mg/dL 0.6 0.5 0.3   ALK PHOS U/L 50 67 60   ALT (SGPT) U/L 8 10 6   AST (SGOT) U/L 14 10 7   PROTIME Seconds  --   --  13.1   INR   --   --  0.98           Invalid input(s): TG, LDLCALC, LDLREALC  Results from last 7 days   Lab Units 04/18/23  0650 04/17/23  1353 04/17/23  1141 04/15/23  1000 04/15/23  0747   TSH uIU/mL  --   --   --   --  4.240*   HEMOGLOBIN A1C % 5.50  --   --   --   --    PROBNP pg/mL  --   --  1,381.0  --  1,234.0   HSTROP T ng/L  --   --  24* 24* 20*   LACTATE mmol/L  --  1.3  --   --   --        Brief Urine Lab Results  (Last result in the past 365 days)      Color   Clarity   Blood   Leuk Est   Nitrite   Protein   CREAT   Urine HCG        04/20/23 0974 Dark Yellow   Cloudy   Large (3+)    Small (1+)   Negative   30 mg/dL (1+)                 Microbiology Results Abnormal     Procedure Component Value - Date/Time    Blood Culture - Blood, Arm, Left [[id number]]  (Normal) Collected: 04/17/23 1355    Lab Status: Preliminary result Specimen: Blood from Arm, Left Updated: 04/20/23 1415     Blood Culture No growth at 3 days    Blood Culture - Blood, Arm, Left [[id number]]  (Normal) Collected: 04/17/23 1353    Lab Status: Preliminary result Specimen: Blood from Arm, Left Updated: 04/20/23 1401     Blood Culture No growth at 3 days         Klebsiella aerogenes     YANET     Cefazolin <=4  Resistant     Cefepime <=1  Susceptible     Ceftazidime <=1  Susceptible     Ceftriaxone <=1  Susceptible     Gentamicin <=1  Susceptible     Levofloxacin <=0.12  Susceptible     Nitrofurantoin <=16  Susceptible     Piperacillin + Tazobactam <=4  Susceptible     Trimethoprim + Sulfamethoxazole <=20  Susceptible          Imaging Results (All)     Procedure Component Value Units Date/Time    XR Chest 1 View [[id number]] Collected: 04/17/23 1203     Updated: 04/17/23 1207    Narrative:      XR CHEST 1 VW-     HISTORY: Male who is 78 years-old,  weakness     TECHNIQUE: Frontal view of the chest     COMPARISON: 04/15/2023     FINDINGS: The heart size is normal. Sternotomy wires are present. Aorta  is calcified. Mild prominence of vascular and interstitial markings. No  focal pulmonary consolidation, pleural effusion, or pneumothorax. No  acute osseous process.       Impression:      No focal pulmonary consolidation. Mild prominence of  vascular and interstitial markings. Follow-up as clinical indications  persist.     This report was finalized on 4/17/2023 12:03 PM by Dr. Chava Flores M.D.                 Discharge Details        Discharge Medications      New Medications      Instructions Start Date   cyanocobalamin 1000 MCG tablet  Commonly known as: VITAMIN B-12   1,000 mcg, Oral, Daily   Start Date: April 21, 2023      ferrous sulfate 325 (65 FE) MG tablet   325 mg, Oral, Daily With Breakfast   Start Date: April 21, 2023     folic acid 1 MG tablet  Commonly known as: FOLVITE   1 mg, Oral, Daily   Start Date: April 21, 2023     sulfamethoxazole-trimethoprim 800-160 MG per tablet  Commonly known as: BACTRIM DS,SEPTRA DS   1 tablet, Oral, Every 12 Hours Scheduled, Take for 26 more days         Changes to Medications      Instructions Start Date   acetaminophen 650 MG 8 hr tablet  Commonly known as: TYLENOL  What changed: Another medication with the same name was added. Make sure you understand how and when to take each.   650 mg, Oral, Every 6 Hours PRN      acetaminophen 325 MG tablet  Commonly known as: TYLENOL  What changed: You were already taking a medication with the same name, and this prescription was added. Make sure you understand how and when to take each.   650 mg, Oral, Every 6 Hours PRN      levothyroxine 25 MCG tablet  Commonly known as: SYNTHROID, LEVOTHROID  What changed:   · how much to take  · how to take this  · when to take this  · additional instructions   25 mcg on Monday through Saturday.      levothyroxine 75 MCG tablet  Commonly known as: SYNTHROID, LEVOTHROID  What changed:   · how much to take  · how to take this  · when to take this  · additional instructions   Take 0.5 tablet which is 37.5 mcg, patient receives on Sundays      lisinopril 5 MG tablet  Commonly known as: PRINIVILZESTRIL  What changed:   · medication strength  · how much to take   5 mg, Oral, Daily      propranolol 40 MG tablet  Commonly known as: INDERAL  What changed:   · medication strength  · how much to take   40 mg, Oral, 2 Times Daily      tamsulosin 0.4 MG capsule 24 hr capsule  Commonly known as: FLOMAX  What changed: how much to take   0.8 mg, Oral, Daily   Start Date: April 21, 2023        Continue These Medications      Instructions Start Date   aspirin 81 MG EC tablet   81 mg, Oral, Daily      atorvastatin 20 MG  tablet  Commonly known as: LIPITOR   20 mg, Oral, Daily      Brilinta 90 MG tablet tablet  Generic drug: ticagrelor   Take 1 tablet by mouth 2 (Two) Times a Day.      divalproex 500 MG 24 hr tablet  Commonly known as: DEPAKOTE ER   1 tablet 2 (Two) Times a Day.      docusate sodium 100 MG capsule  Commonly known as: COLACE   100 mg, Oral, Daily      fluticasone 50 MCG/ACT nasal spray  Commonly known as: FLONASE   2 sprays into the nostril(s) as directed by provider Daily.      furosemide 20 MG tablet  Commonly known as: LASIX   20 mg, Oral, 3 Times Daily      glucosamine sulfate 500 MG capsule capsule   1,000 mg, Oral, Daily      Halls Cough Drops 5.8 MG lozenge  Generic drug: Menthol   1 lozenge, Mouth/Throat, Every 2 Hours PRN      metFORMIN 500 MG tablet  Commonly known as: GLUCOPHAGE   500 mg, Oral, Daily      omeprazole 40 MG capsule  Commonly known as: priLOSEC   40 mg, Oral, Every Evening      oxybutynin XL 5 MG 24 hr tablet  Commonly known as: DITROPAN-XL   5 mg, Oral, Daily      Ozempic (0.25 or 0.5 MG/DOSE) 2 MG/1.5ML solution pen-injector  Generic drug: Semaglutide(0.25 or 0.5MG/DOS)   0.5 mg, Subcutaneous, Weekly, Takes on Tuesdays         Stop These Medications    amitriptyline 10 MG tablet  Commonly known as: ELAVIL     cetirizine 10 MG tablet  Commonly known as: zyrTEC     loperamide 2 MG tablet  Commonly known as: IMODIUM A-D            No Known Allergies      Discharge Disposition:  Skilled Nursing Facility (DC - External)    Diet:  Hospital:  Diet Order   Procedures   • Diet: Cardiac Diets, Diabetic Diets, Renal Diets; Healthy Heart (2-3 Na+); Consistent Carbohydrate; Low Sodium (2-3g), Low Potassium, Low Phosphorus; Texture: Regular Texture (IDDSI 7); Fluid Consistency: Thin (IDDSI 0)       Activity:  Activity Instructions     Activity as Tolerated      Up WIth Assist                 CODE STATUS:    Code Status and Medical Interventions:   Ordered at: 04/17/23 2432     Code Status (Patient has no  pulse and is not breathing):    CPR (Attempt to Resuscitate)     Medical Interventions (Patient has pulse or is breathing):    Full Support       Future Appointments   Date Time Provider Department Center   5/5/2023 11:45 AM Cierra Hernández APRN [facility]         Additional Instructions for the Follow-ups that You Need to Schedule     Discharge Follow-up with PCP   As directed       Currently Documented PCP:    Milo Carrasquillo DO    PCP Phone Number:    [phone]     Follow Up Details: Recommend primary care provider follow-up within 1 week after discharge from skilled facility for general hospital follow-up.         Discharge Follow-up with Specified Provider: Follow-up in [facility] with Dr. Jamin Holley in 2 to 3 weeks.  For follow-up of urine retention   As directed      To: Follow-up in [facility] with Dr. Jamin Holley in 2 to 3 weeks.  For follow-up of urine retention            Contact information for follow-up providers     Milo Carrasquillo DO .    Specialty: Physical Medicine and Rehabilitation  Why: Recommend primary care provider follow-up within 1 week after discharge from skilled facility for general hospital follow-up.  Contact information:  [address] [facility] [address]  [facility] [id number] [phone]             Albert Holley MD Follow up in 2 week(s).    Specialty: Urology  Contact information:  [address]  [facility] [address]  [phone]                   Contact information for after-discharge care     Destination     [facility] .    Service: Skilled Nursing  Contact information:  [address] [facility] [address]  [phone]                                 Fercho Moreland MD  04/20/23      Time Spent on Discharge:  I spent greater than 40 minutes on this discharge activity which included: face-to-face encounter with the patient, reviewing the data in the system, coordination of the care with the  nursing staff as well as consultants, documentation, and entering orders.

## 2023-04-20 NOTE — CODE DOCUMENTATION
Pt is slightly lethargic but able to stay awake and answer questions, a/o x4, moves all extremities equally, pt does complain of being tired, abg normal  Low grade fever, will send another urine culture and call urology and update  VSS

## 2023-04-20 NOTE — NURSING NOTE
PT increasingly lethargic. LHA notfied of the lethargy. Unable to put spoon to mouth. Or stay awake for extended periods of time. Pt able to answer orientation questions. VSS. Blood sugar stable. Pt straight cath with 350ml out, dark with some blood noted. Rapid called and completed. call placed to urology.

## 2023-04-20 NOTE — PLAN OF CARE
Goal Outcome Evaluation:         Pt more alert. VSS. Tylenol given. Straight cath x2. Pt straining to urinate, with no result. Straight cath successful with urine return, and relief of abdomen pressure. Urine sample sent to lab.

## 2023-04-20 NOTE — PLAN OF CARE
Goal Outcome Evaluation:  Plan of Care Reviewed With: patient           Outcome Evaluation: Pt participated with PT this morning. He is agreeable to mobilize and sit up in the chair to eat his breakfast. He required min A x1 for supine>sit, mod A for STS and min A to take a few steps to the chair today using a walker. Pace very slow. He reports B toe pain with weight bearing that he believes is due to swelling. May benefit from SNF stay before returning to group home. Left pt reclined in chair with all known needs met.Notified CCP of recs.

## 2023-04-20 NOTE — DISCHARGE PLACEMENT REQUEST
"Ronald Roberson (78 y.o. Male)     Date of Birth   1945    Social Security Number       Address   [address] [place] [address]  [place] [address]    Home Phone   [phone]    MRN   [id number]       [facility]    Marital Status   Single                            Admission Date   4/17/23    Admission Type   Emergency    Admitting Provider   Jeanne Chan MD    Attending Provider   Fercho Moreland MD    Department, Room/Bed   [address], P387/1       Discharge Date       Discharge Disposition       Discharge Destination                               Attending Provider: Fercho Moreland MD    Allergies: No Known Allergies    Isolation: None   Infection: None   Code Status: CPR    Ht: 175.3 cm (69\")   Wt: 95.3 kg (210 lb)    Admission Cmt: None   Principal Problem: Acute urinary tract infection [N39.0]                 Active Insurance as of 4/17/2023     Primary Coverage     Payor Plan Insurance Group Employer/Plan Group    MEDICARE MEDICARE A & B      Payor Plan Address Payor Plan Phone Number Payor Plan Fax Number Effective Dates    [address] [phone]  1/1/1992 - None Entered    [facility] [id number]       Subscriber Name Subscriber Birth Date Member ID       RONALD ROBERSON 1945 [id number]           Secondary Coverage     Payor Plan Insurance Group Employer/Plan Group    [place]      Payor Plan Address Payor Plan Phone Number Payor Plan Fax Number Effective Dates    [address] [phone]  7/11/2018 - None Entered    [place] [address]       Subscriber Name Subscriber Birth Date Member ID       RONALD ROBERSON 1945 [id number]                 Emergency Contacts      (Rel.) Home Phone Work Phone Mobile Phone    David Coyne (Caregiver) (Relative) [phone] -- [phone]    Teo Roberson (Brother) [phone] -- [phone]    Danny Roberson (Brother) [phone] -- [phone]            "
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
## 2023-04-20 NOTE — CODE DOCUMENTATION
Patient Name:  Hernando Lozada  YOB: 1945  MRN:  [id number]  Admit Date:  4/17/2023    Visit Diagnoses:     ICD-10-CM ICD-9-CM   1. Acute urinary tract infection  N39.0 599.0   2. Chronic brain injury  S06.9XAS 907.0   3. Generalized weakness  R53.1 780.79   4. Pedal edema  R60.0 782.3   5. Chronic deep vein thrombosis (DVT) of calf muscle vein of left lower extremity  I82.562 453.52       Reason For Rapid:   lethargic  RN Communicated With:  Rapid team, primary RN, patient, will update urology    Rapid Outcome:  Remain on unit  Communication From Rapid Team:   Monitor VS, send urine culture, call and update urology, call with anymore questions    Most Recent Vital Signs  Temp:  [98.8 °F (37.1 °C)-101.5 °F (38.6 °C)] 99.7 °F (37.6 °C)  Heart Rate:  [70-92] 70  Resp:  [16] 16  BP: ()/(58-78) 110/68  SpO2:  [90 %-97 %] 96 %  on  Flow (L/min):  [2] 2;   Device (Oxygen Therapy): nasal cannula    Labs:      Glucose   Date/Time Value Ref Range Status   04/19/2023 2338 123 70 - 130 mg/dL Final     Comment:     Meter: [id number] : [id number] [place] RN   04/19/2023 2118 119 70 - 130 mg/dL Final     Comment:     Meter: [id number] : [id number] Malina Hernandez RN   04/19/2023 1650 121 70 - 130 mg/dL Final     Comment:     Meter: [id number] : [id number] Merlin Kelley NA   04/19/2023 1127 151 (H) 70 - 130 mg/dL Final     Comment:     Meter: [id number] : [id number] Merlin Kelley NA   04/19/2023 0736 129 70 - 130 mg/dL Final     Comment:     Meter: [id number] : [id number] Merlin Kelley NA   04/18/2023 1646 133 (H) 70 - 130 mg/dL Final     Comment:     Meter: [id number] : [id number] Merlin KIRBY   04/18/2023 1143 107 70 - 130 mg/dL Final     Comment:     Meter: [id number] : [id number] Merlin KIRBY     Site   Date Value Ref Range Status   04/19/2023 Arterial: right radial  Final     Boby's Test   Date Value Ref Range Status   04/19/2023 Positive  Final     pH, Arterial    Date Value Ref Range Status   04/19/2023 7.475 (H) 7.350 - 7.450 pH units Final     pCO2, Arterial   Date Value Ref Range Status   04/19/2023 38.6 35.0 - 45.0 mm Hg Final     pO2, Arterial   Date Value Ref Range Status   04/19/2023 75.3 (L) 80.0 - 100.0 mm Hg Final     HCO3, Arterial   Date Value Ref Range Status   04/19/2023 28.4 (H) 22.0 - 28.0 mmol/L Final     Base Excess, Arterial   Date Value Ref Range Status   04/19/2023 4.6 (H) 0.0 - 2.0 mmol/L Final     Barometric Pressure for Blood Gas   Date Value Ref Range Status   04/19/2023 750.4 mmHg Final     Modality   Date Value Ref Range Status   04/19/2023 Cannula  Final     Results from last 7 days   Lab Units 04/19/23  0616 04/18/23  0650 04/17/23  1141 04/15/23  0747   WBC 10*3/mm3 11.26* 13.07* 12.38* 5.54   HEMOGLOBIN g/dL 10.0* 9.6* 10.8* 10.2*   PLATELETS 10*3/mm3 289 298 338 371     Results from last 7 days   Lab Units 04/19/23  0616 04/18/23  0650 04/17/23  1141 04/15/23  0747   SODIUM mmol/L 134* 134* 134* 140   POTASSIUM mmol/L 3.8 3.8 4.1 4.0   CHLORIDE mmol/L 99 97* 97* 101   CO2 mmol/L 29.0 29.0 28.0 28.8   BUN mg/dL 15 14 9 12   CREATININE mg/dL 0.69* 0.81 0.80 0.86   GLUCOSE mg/dL 121* 114* 112* 114*   ALBUMIN g/dL  --  3.1* 3.6 3.3*   BILIRUBIN mg/dL  --  0.6 0.5 0.3   ALK PHOS U/L  --  50 67 60   AST (SGOT) U/L  --  14 10 7   ALT (SGPT) U/L  --  8 10 6   Estimated Creatinine Clearance: 103.7 mL/min (A) (by C-G formula based on SCr of 0.69 mg/dL (L)).  Results from last 7 days   Lab Units 04/17/23  1141 04/15/23  1000 04/15/23  0747   HSTROP T ng/L 24* 24* 20*   PROBNP pg/mL 1,381.0  --  1,234.0     Results from last 7 days   Lab Units 04/17/23  1353   LACTATE mmol/L 1.3     Results from last 7 days   Lab Units 04/19/23  2349   PH, ARTERIAL pH units 7.475*   PO2 ART mm Hg 75.3*   PCO2, ARTERIAL mm Hg 38.6   HCO3 ART mmol/L 28.4*   MODALITY  Cannula   No results found for: STREPPNEUAG, LEGANTIGENUR  Results from last 7 days   Lab Units  04/17/23  1355   BLOODCX  No growth at 2 days         NIH Stroke Scale:                                                         Please refer to full rapid documentation on summary page under Index / Code Timeline

## 2023-04-20 NOTE — PLAN OF CARE
Goal Outcome Evaluation:  Plan of Care Reviewed With: patient        Progress: no change  Outcome Evaluation: pt worked w OT in tx session. pt completed AROM ex w UE and AAROM B shoulder ex 10x2 to incr strength/endurance. pt completed sit to stand w max x2 and tsf to chair max x2 VC and pt very weak and unable to take steps this date. pt cont to benefit from OT to incr ADL, strength, balance, and tsf.

## 2023-04-21 LAB — BACTERIA SPEC AEROBE CULT: NO GROWTH

## 2023-04-21 NOTE — CASE MANAGEMENT/SOCIAL WORK
Case Management Discharge Note      Final Note: DC'd to [facility] SN[facility] via [facility]. Max SCHWAB RN         Selected Continued Care - Discharged on 4/20/2023 Admission date: 4/17/2023 - Discharge disposition: Skilled Nursing Facility (DC - External)    Destination Coordination complete.    Service Provider Selected Services Address Phone Fax Patient Preferred    [facility] [address] [address] [phone] --          Durable Medical Equipment    No services have been selected for the patient.              Dialysis/Infusion    No services have been selected for the patient.              Home Medical Care    No services have been selected for the patient.              Therapy    No services have been selected for the patient.              Community Resources    No services have been selected for the patient.              Community & DME    No services have been selected for the patient.                Selected Continued Care - Prior Encounters Includes continued care and service providers with selected services from prior encounters from 1/17/2023 to 4/20/2023    Discharged on 3/14/2023 Admission date: 3/6/2023 - Discharge disposition: Skilled Nursing Facility (DC - External)    Destination Patient indicates having no preference.    Service Provider Selected Services Address Phone Fax Patient Preferred    [facility] [address] [address] [phone] --                    Transportation Services  W/C Van: Skilled Nursing Facility [facility]    Final Discharge Disposition Code: 03 - skilled nursing facility (SNF)

## 2023-04-21 NOTE — PROGRESS NOTES
Enter Query Response Below      Query Response:   Probably not CKD  Electronically signed by Fercho Moreland MD, 23, 4:51 PM EDT.               If applicable, please update the problem list.   Patient: Hernando Lozada        : 1945  Account: [id number]           Admit Date: 2023        How to Respond to this query:       a. Click New Note     b. Answer query within the yellow box.                c. Update the Problem List, if applicable.      If you have any questions about this query contact me at: [email or web address]    ,  Patient has a diagnosis of CKD in the chart without stage. During this admission the patient's GFR levels were as follows:  -91.7  -91.4  -95.9  -94.7    Please clarify the stage of the patients CKD.    KDIGO CKD staging [email or web address]  Stage 1    GFR > 90  Stage 2    GFR 60-89  Stage 3a  GFR 45-59  Stage 3b  GFR 30-44  Stage 4    GFR 15-29  Stage 5    GFR <15      By submitting this query, we are merely seeking further clarification of documentation to accurately reflect all conditions that you are monitoring, evaluating, treating or that extend the hospitalization or utilize additional resources of care. Please utilize your independent clinical judgment when addressing the question(s) above.     This query and your response, once completed, will be entered into the legal medical record.    Sincerely,  Bianca Xavier  Clinical Documentation Integrity Program

## 2023-04-22 LAB
BACTERIA SPEC AEROBE CULT: NORMAL
BACTERIA SPEC AEROBE CULT: NORMAL

## 2023-05-05 ENCOUNTER — OFFICE VISIT (OUTPATIENT)
Dept: CARDIOLOGY | Facility: CLINIC | Age: 78
End: 2023-05-05
Payer: MEDICARE

## 2023-05-05 VITALS
SYSTOLIC BLOOD PRESSURE: 96 MMHG | WEIGHT: 227 LBS | HEART RATE: 69 BPM | BODY MASS INDEX: 33.62 KG/M2 | HEIGHT: 69 IN | DIASTOLIC BLOOD PRESSURE: 62 MMHG

## 2023-05-05 DIAGNOSIS — Z09 HOSPITAL DISCHARGE FOLLOW-UP: Primary | ICD-10-CM

## 2023-05-05 DIAGNOSIS — I25.118 CORONARY ARTERY DISEASE OF NATIVE ARTERY OF NATIVE HEART WITH STABLE ANGINA PECTORIS: ICD-10-CM

## 2023-05-05 DIAGNOSIS — I10 PRIMARY HYPERTENSION: ICD-10-CM

## 2023-05-05 DIAGNOSIS — G47.33 OSA (OBSTRUCTIVE SLEEP APNEA): ICD-10-CM

## 2023-05-05 DIAGNOSIS — E78.5 HYPERLIPIDEMIA, UNSPECIFIED HYPERLIPIDEMIA TYPE: ICD-10-CM

## 2023-05-05 RX ORDER — ATORVASTATIN CALCIUM 20 MG/1
40 TABLET, FILM COATED ORAL DAILY
Qty: 90 TABLET | Refills: 3 | Status: SHIPPED | OUTPATIENT
Start: 2023-05-05

## 2023-05-05 RX ORDER — CHOLECALCIFEROL (VITAMIN D3) 125 MCG
CAPSULE ORAL
COMMUNITY
End: 2023-05-05
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
## 2023-05-29 ENCOUNTER — HOSPITAL ENCOUNTER (EMERGENCY)
Facility: HOSPITAL | Age: 78
Discharge: NURSING, REHAB OR LONG-TERM CARE | End: 2023-05-29
Attending: EMERGENCY MEDICINE | Admitting: EMERGENCY MEDICINE

## 2023-05-29 VITALS
RESPIRATION RATE: 16 BRPM | DIASTOLIC BLOOD PRESSURE: 132 MMHG | BODY MASS INDEX: 33.62 KG/M2 | WEIGHT: 227 LBS | SYSTOLIC BLOOD PRESSURE: 168 MMHG | OXYGEN SATURATION: 97 % | HEART RATE: 75 BPM | TEMPERATURE: 98.2 F | HEIGHT: 69 IN

## 2023-05-29 DIAGNOSIS — R25.1 TREMOR: ICD-10-CM

## 2023-05-29 DIAGNOSIS — G47.00 INSOMNIA, UNSPECIFIED TYPE: Primary | ICD-10-CM

## 2023-05-29 LAB
ALBUMIN SERPL-MCNC: 3.6 G/DL (ref 3.5–5.2)
ALBUMIN/GLOB SERPL: 1.2 G/DL
ALP SERPL-CCNC: 52 U/L (ref 39–117)
ALT SERPL W P-5'-P-CCNC: 10 U/L (ref 1–41)
ANION GAP SERPL CALCULATED.3IONS-SCNC: 9.2 MMOL/L (ref 5–15)
AST SERPL-CCNC: 10 U/L (ref 1–40)
BASOPHILS # BLD AUTO: 0.02 10*3/MM3 (ref 0–0.2)
BASOPHILS NFR BLD AUTO: 0.3 % (ref 0–1.5)
BILIRUB SERPL-MCNC: 0.6 MG/DL (ref 0–1.2)
BILIRUB UR QL STRIP: NEGATIVE
BUN SERPL-MCNC: 11 MG/DL (ref 8–23)
BUN/CREAT SERPL: 13.3 (ref 7–25)
CALCIUM SPEC-SCNC: 9.3 MG/DL (ref 8.6–10.5)
CHLORIDE SERPL-SCNC: 97 MMOL/L (ref 98–107)
CLARITY UR: CLEAR
CO2 SERPL-SCNC: 30.8 MMOL/L (ref 22–29)
COLOR UR: YELLOW
CREAT SERPL-MCNC: 0.83 MG/DL (ref 0.76–1.27)
DEPRECATED RDW RBC AUTO: 50.7 FL (ref 37–54)
EGFRCR SERPLBLD CKD-EPI 2021: 90.7 ML/MIN/1.73
EOSINOPHIL # BLD AUTO: 0.03 10*3/MM3 (ref 0–0.4)
EOSINOPHIL NFR BLD AUTO: 0.5 % (ref 0.3–6.2)
ERYTHROCYTE [DISTWIDTH] IN BLOOD BY AUTOMATED COUNT: 15.1 % (ref 12.3–15.4)
GLOBULIN UR ELPH-MCNC: 3 GM/DL
GLUCOSE BLDC GLUCOMTR-MCNC: 106 MG/DL (ref 70–130)
GLUCOSE SERPL-MCNC: 112 MG/DL (ref 65–99)
GLUCOSE UR STRIP-MCNC: NEGATIVE MG/DL
HCT VFR BLD AUTO: 34.8 % (ref 37.5–51)
HGB BLD-MCNC: 11.2 G/DL (ref 13–17.7)
HGB UR QL STRIP.AUTO: NEGATIVE
IMM GRANULOCYTES # BLD AUTO: 0.01 10*3/MM3 (ref 0–0.05)
IMM GRANULOCYTES NFR BLD AUTO: 0.2 % (ref 0–0.5)
KETONES UR QL STRIP: NEGATIVE
LEUKOCYTE ESTERASE UR QL STRIP.AUTO: NEGATIVE
LYMPHOCYTES # BLD AUTO: 1.31 10*3/MM3 (ref 0.7–3.1)
LYMPHOCYTES NFR BLD AUTO: 20.7 % (ref 19.6–45.3)
MCH RBC QN AUTO: 29.6 PG (ref 26.6–33)
MCHC RBC AUTO-ENTMCNC: 32.2 G/DL (ref 31.5–35.7)
MCV RBC AUTO: 91.8 FL (ref 79–97)
MONOCYTES # BLD AUTO: 0.64 10*3/MM3 (ref 0.1–0.9)
MONOCYTES NFR BLD AUTO: 10.1 % (ref 5–12)
NEUTROPHILS NFR BLD AUTO: 4.32 10*3/MM3 (ref 1.7–7)
NEUTROPHILS NFR BLD AUTO: 68.2 % (ref 42.7–76)
NITRITE UR QL STRIP: NEGATIVE
NRBC BLD AUTO-RTO: 0 /100 WBC (ref 0–0.2)
PH UR STRIP.AUTO: 7.5 [PH] (ref 5–8)
PLATELET # BLD AUTO: 201 10*3/MM3 (ref 140–450)
PMV BLD AUTO: 9.9 FL (ref 6–12)
POTASSIUM SERPL-SCNC: 3.5 MMOL/L (ref 3.5–5.2)
PROT SERPL-MCNC: 6.6 G/DL (ref 6–8.5)
PROT UR QL STRIP: NEGATIVE
RBC # BLD AUTO: 3.79 10*6/MM3 (ref 4.14–5.8)
SODIUM SERPL-SCNC: 137 MMOL/L (ref 136–145)
SP GR UR STRIP: 1.01 (ref 1–1.03)
UROBILINOGEN UR QL STRIP: NORMAL
VALPROATE SERPL-MCNC: 50 MCG/ML (ref 50–125)
WBC NRBC COR # BLD: 6.33 10*3/MM3 (ref 3.4–10.8)

## 2023-05-29 PROCEDURE — 81003 URINALYSIS AUTO W/O SCOPE: CPT | Performed by: EMERGENCY MEDICINE

## 2023-05-29 PROCEDURE — 82948 REAGENT STRIP/BLOOD GLUCOSE: CPT

## 2023-05-29 PROCEDURE — 80164 ASSAY DIPROPYLACETIC ACD TOT: CPT | Performed by: EMERGENCY MEDICINE

## 2023-05-29 PROCEDURE — 99284 EMERGENCY DEPT VISIT MOD MDM: CPT

## 2023-05-29 PROCEDURE — 85025 COMPLETE CBC W/AUTO DIFF WBC: CPT | Performed by: EMERGENCY MEDICINE

## 2023-05-29 PROCEDURE — P9612 CATHETERIZE FOR URINE SPEC: HCPCS

## 2023-05-29 PROCEDURE — 80053 COMPREHEN METABOLIC PANEL: CPT | Performed by: EMERGENCY MEDICINE

## 2023-05-29 RX ORDER — SODIUM CHLORIDE 0.9 % (FLUSH) 0.9 %
10 SYRINGE (ML) INJECTION AS NEEDED
Status: DISCONTINUED | OUTPATIENT
Start: 2023-05-29 | End: 2023-05-29 | Stop reason: HOSPADM

## 2023-05-29 RX ORDER — LORAZEPAM 1 MG/1
0.5 TABLET ORAL ONCE
Status: COMPLETED | OUTPATIENT
Start: 2023-05-29 | End: 2023-05-29

## 2023-05-29 RX ADMIN — LORAZEPAM 0.5 MG: 1 TABLET ORAL at 07:56

## 2023-05-29 NOTE — CASE MANAGEMENT/SOCIAL WORK
Discharge Planning Assessment  [facility]     Patient Name: Hernando Lozada  MRN: [id number]  Today's Date: 5/29/2023    Admit Date: 5/29/2023        Discharge Needs Assessment    No documentation.                Discharge Plan     Row Name 05/29/23 1236       Plan    Plan Comments Contacted [facility] ( for neurorestorative) to  patient per  request- [phone]    Row Name 05/29/23 1229       Plan    Plan Comments Call placed to caregiver listed David Coyne per primary RN request to obtain med list secondary to possible missed meds-  referred me to family- call placed to family Teo Kierra who referred to  Jen Mota at ([phone])  advised patient has had history of tremor and attention seeking at times; she noted she saw him on Wed. of last week and he was fine- requested Depakote level be complete- provided fax number for ER for patient med list-  also provided number for ride for patient once d/c- [facility] at [phone]- Updated primary RN and ER provider- never received med list from               Continued Care and Services - Admitted Since 5/29/2023    Coordination has not been started for this encounter.     Selected Continued Care - Prior Encounters Includes continued care and service providers with selected services from prior encounters from 2/28/2023 to 5/29/2023    Discharged on 4/20/2023 Admission date: 4/17/2023 - Discharge disposition: Skilled Nursing Facility (DC - External)    Destination     Service Provider Selected Services Address Phone Fax Patient Preferred    [facility] [address] [place], [place] [id number] [phone] --                Discharged on 3/14/2023 Admission date: 3/6/2023 - Discharge disposition: Skilled Nursing Facility (DC - External)    Destination Patient indicates having no preference.    Service Provider Selected Services Address Phone Fax  Patient Preferred    [facility] [address] [place] [id number] [phone] --                       Demographic Summary    No documentation.                Functional Status    No documentation.                Psychosocial    No documentation.                Abuse/Neglect    No documentation.                Legal    No documentation.                Substance Abuse    No documentation.                Patient Forms    No documentation.                   Sangeeta Romero RN

## 2023-05-29 NOTE — DISCHARGE INSTRUCTIONS
Return to the emergency department with worsening symptoms, uncontrolled pain, inability to tolerate oral liquids, fever greater than 105°F not controlled by Tylenol and ibuprofen or as needed with emergent concerns.

## 2023-05-29 NOTE — ED PROVIDER NOTES
EMERGENCY DEPARTMENT ENCOUNTER    Room Number:  09/09  Date of encounter:  5/29/2023  PCP: Milo Carrasquillo DO  Historian: Patient    Patient was placed in face mask during triage process. Patient was wearing facemask when I entered the room and throughout our encounter. I wore full protective equipment throughout this patient encounter including a face mask, eye protection, and gloves. Hand hygiene was performed before donning protective equipment and again following doffing of PPE after leaving the room.    HPI:  Chief Complaint: Insomnia and right upper extremity tremor  A complete HPI/ROS/PMH/PSH/SH/FH are unobtainable due to: N/A   Context: Hernando Lozada is a 78 y.o. male who presents to the ED c/o insomnia x1 week and right upper extremity tremor.  Patient reports that his primary care physician prescribed him a new medication for sleep but he could not get any to help him rest last night.  He is unaware of what the name of that medication is and is not accompanied by an MAR from the nursing home today.  Patient denies headache, chest pain, shortness of breath, abdominal pain, nausea vomiting or diarrhea.      MEDICAL HISTORY REVIEW  EMR reviewed:    Discharge summary 4/20/2023 by Dr. Moreland reviewed: Patient was admitted and treated for acute UTI with acute urinary retention and acute bacterial prostatitis.  He has a history of chronic DVT, aortic valve stenosis, chronic brain injury and CKD as well as diabetes and sleep apnea    PAST MEDICAL HISTORY  Active Ambulatory Problems     Diagnosis Date Noted   • DJD (degenerative joint disease) of knee 12/14/2017   • Essential hypertension 02/18/2019   • SOB (shortness of breath) 02/18/2019   • Leg swelling 02/18/2019   • Chest pain with high risk of acute coronary syndrome 04/29/2019   • Hyperlipidemia LDL goal <100 08/23/2019   • COVID-19 11/25/2022   • Diabetes mellitus    • GIOVANNY (obstructive sleep apnea)    • Disease of thyroid gland    • CKD (chronic kidney  disease)    • Hypomagnesemia    • Chronic brain injury    • Generalized weakness    • CAD (coronary artery disease)    • Pedal edema 03/06/2023   • Tremor 03/06/2023   • Elevated troponin 03/06/2023   • Lower extremity cellulitis 03/06/2023   • Tinea cruris 03/06/2023   • Chronic deep vein thrombosis (DVT) of calf muscle vein of left lower extremity 03/07/2023   • Nonrheumatic aortic valve stenosis 03/06/2023   • Acute urinary tract infection 04/17/2023   • Hypothyroidism 04/17/2023   • Acute urinary retention 04/19/2023   • Acute bacterial prostatitis 04/19/2023     Resolved Ambulatory Problems     Diagnosis Date Noted   • No Resolved Ambulatory Problems     Past Medical History:   Diagnosis Date   • Arthritis    • Bilateral leg edema    • GERD (gastroesophageal reflux disease)    • Heart murmur    • History of head injury    • [place] (hard of hearing)    • Hyperlipidemia    • Hypertension    • Irregular heart beat    • Osteoarthritis    • Past heart attack    • SOB (shortness of breath) on exertion    • Stroke    • Vasovagal episode          PAST SURGICAL HISTORY  Past Surgical History:   Procedure Laterality Date   • CARDIAC CATHETERIZATION N/A 4/30/2019    Procedure: Coronary angiography with grafts;  Surgeon: Rio Miranda MD;  Location: [facility] CATH INVASIVE LOCATION;  Service: Cardiology   • CARDIAC CATHETERIZATION N/A 4/30/2019    Procedure: Left Heart Cath;  Surgeon: Rio Miranda MD;  [facility];  Service: Cardiology   • CARDIAC CATHETERIZATION N/A 4/30/2019    Procedure: Left ventriculography;  Surgeon: Rio Miranda MD;  Location: [facility] CATH INVASIVE LOCATION;  Service: Cardiology   • CARDIAC CATHETERIZATION  4/30/2019    Procedure: Saphenous Vein Graft;  Surgeon: Rio Miranda MD;  Location: [facility] CATH INVASIVE LOCATION;  Service: Cardiology   • CARDIAC CATHETERIZATION N/A 4/30/2019    Procedure: Stent GLENDY coronary;  Surgeon: Ruth  MD Rio;  [facility];  Service: Cardiology   • CARDIAC CATHETERIZATION N/A 3/10/2023    Procedure: Right and Left Heart Cath;  Surgeon: Rio Miranda MD;  [facility];  Service: Cardiology;  Laterality: N/A;   • CARDIAC CATHETERIZATION N/A 3/10/2023    Procedure: Coronary angiography;  Surgeon: Rio Miranda MD;  [facility];  Service: Cardiology;  Laterality: N/A;   • CARDIAC CATHETERIZATION N/A 3/10/2023    Procedure: Left ventriculography;  Surgeon: Rio Miranda MD;  [facility];  Service: Cardiology;  Laterality: N/A;   • CARDIAC CATHETERIZATION N/A 3/10/2023    Procedure: Percutaneous Coronary Intervention;  Surgeon: Rio Miranda MD;  [facility];  Service: Cardiology;  Laterality: N/A;   • CARDIAC CATHETERIZATION N/A 3/10/2023    Procedure: Stent GLENDY coronary;  Surgeon: Rio Miranda MD;  Location: [facility] CATH INVASIVE LOCATION;  Service: Cardiology;  Laterality: N/A;   • CARPAL TUNNEL RELEASE Bilateral    • CATARACT EXTRACTION     • CORONARY ARTERY BYPASS GRAFT  2002   • FINGER SURGERY      MISSING LEFT POINTER FINGER TIP   • KNEE ARTHROPLASTY Right 02/15/2017   • OR ARTHRP KNE CONDYLE&PLATU MEDIAL&LAT COMPARTMENTS Left 12/14/2017    Procedure: LT TOTAL KNEE ARTHROPLASTY;  Surgeon: Jatin Lancaster MD;  Location: [facility];  Service: Orthopedics   • OR RT/LT HEART CATHETERS N/A 4/30/2019    Procedure: Percutaneous Coronary Intervention;  Surgeon: Rio Miranda MD;  Location: [facility] CATH INVASIVE LOCATION;  Service: Cardiology         FAMILY HISTORY  Family History   Problem Relation Age of Onset   • Parkinsonism Sister    • Diabetes Brother    • Malig Hyperthermia Neg Hx          SOCIAL HISTORY  Social History     Socioeconomic History   • Marital status: Single   Tobacco Use   • Smoking status: Never      Passive exposure: Never   • Smokeless tobacco: Never   Vaping Use   • Vaping Use: Never used   Substance and Sexual Activity   • Alcohol use: No   • Drug use: No   • Sexual activity: Defer         ALLERGIES  Patient has no known allergies.        REVIEW OF SYSTEMS  Review of Systems     All systems reviewed and negative except for those discussed in HPI.       PHYSICAL EXAM    I have reviewed the triage vital signs and nursing notes.    ED Triage Vitals [05/29/23 0602]   Temp Heart Rate Resp BP SpO2   98.2 °F (36.8 °C) 85 16 141/63 96 %      Temp src Heart Rate Source Patient Position BP Location FiO2 (%)   Tympanic -- Sitting Right arm --       Physical Exam    Physical Exam   Constitutional: No distress.  Pleasant.  Intelligible and fluent speech  HENT:  Head: Normocephalic and atraumatic.   Oropharynx: Mucous membranes are moist.   Eyes: No scleral icterus. No conjunctival pallor.  Neck: Painless range of motion noted. Neck supple.   Cardiovascular: Normal rate, regular rhythm and intact distal pulses.  Loud systolic murmur  Pulmonary/Chest: No respiratory distress.  No tachypnea or increased work of breathing   abdominal: Soft. There is no tenderness. There is no rebound and no guarding.   Musculoskeletal: Moves all extremities equally.   neurological: Alert.  Fluent speech.  Moves all extremities equally.  Resting and intention tremor right upper extremity noted.  Skin: Skin is pink, warm, and dry. No pallor.   Psychiatric: Mood and affect normal.   Nursing note and vitals reviewed.    LAB RESULTS  Recent Results (from the past 24 hour(s))   POC Glucose Once    Collection Time: 05/29/23  7:04 AM    Specimen: Blood   Result Value Ref Range    Glucose 106 70 - 130 mg/dL   Comprehensive Metabolic Panel    Collection Time: 05/29/23  7:28 AM    Specimen: Blood   Result Value Ref Range    Glucose 112 (H) 65 - 99 mg/dL    BUN 11 8 - 23 mg/dL    Creatinine 0.83 0.76 - 1.27 mg/dL    Sodium 137 136 - 145 mmol/L     Potassium 3.5 3.5 - 5.2 mmol/L    Chloride 97 (L) 98 - 107 mmol/L    CO2 30.8 (H) 22.0 - 29.0 mmol/L    Calcium 9.3 8.6 - 10.5 mg/dL    Total Protein 6.6 6.0 - 8.5 g/dL    Albumin 3.6 3.5 - 5.2 g/dL    ALT (SGPT) 10 1 - 41 U/L    AST (SGOT) 10 1 - 40 U/L    Alkaline Phosphatase 52 39 - 117 U/L    Total Bilirubin 0.6 0.0 - 1.2 mg/dL    Globulin 3.0 gm/dL    A/G Ratio 1.2 g/dL    BUN/Creatinine Ratio 13.3 7.0 - 25.0    Anion Gap 9.2 5.0 - 15.0 mmol/L    eGFR 90.7 >60.0 mL/min/1.73   Valproic Acid Level, Total    Collection Time: 05/29/23  7:28 AM    Specimen: Blood   Result Value Ref Range    Valproic Acid 50.0 50.0 - 125.0 mcg/mL   CBC Auto Differential    Collection Time: 05/29/23  7:28 AM    Specimen: Blood   Result Value Ref Range    WBC 6.33 3.40 - 10.80 10*3/mm3    RBC 3.79 (L) 4.14 - 5.80 10*6/mm3    Hemoglobin 11.2 (L) 13.0 - 17.7 g/dL    Hematocrit 34.8 (L) 37.5 - 51.0 %    MCV 91.8 79.0 - 97.0 fL    MCH 29.6 26.6 - 33.0 pg    MCHC 32.2 31.5 - 35.7 g/dL    RDW 15.1 12.3 - 15.4 %    RDW-SD 50.7 37.0 - 54.0 fl    MPV 9.9 6.0 - 12.0 fL    Platelets 201 140 - 450 10*3/mm3    Neutrophil % 68.2 42.7 - 76.0 %    Lymphocyte % 20.7 19.6 - 45.3 %    Monocyte % 10.1 5.0 - 12.0 %    Eosinophil % 0.5 0.3 - 6.2 %    Basophil % 0.3 0.0 - 1.5 %    Immature Grans % 0.2 0.0 - 0.5 %    Neutrophils, Absolute 4.32 1.70 - 7.00 10*3/mm3    Lymphocytes, Absolute 1.31 0.70 - 3.10 10*3/mm3    Monocytes, Absolute 0.64 0.10 - 0.90 10*3/mm3    Eosinophils, Absolute 0.03 0.00 - 0.40 10*3/mm3    Basophils, Absolute 0.02 0.00 - 0.20 10*3/mm3    Immature Grans, Absolute 0.01 0.00 - 0.05 10*3/mm3    nRBC 0.0 0.0 - 0.2 /100 WBC   Urinalysis With Microscopic If Indicated (No Culture) - Straight Cath    Collection Time: 05/29/23  8:55 AM    Specimen: Straight Cath; Urine   Result Value Ref Range    Color, UA Yellow Yellow, Straw    Appearance, UA Clear Clear    pH, UA 7.5 5.0 - 8.0    Specific Gravity, UA 1.012 1.005 - 1.030    Glucose, UA  Negative Negative    Ketones, UA Negative Negative    Bilirubin, UA Negative Negative    Blood, UA Negative Negative    Protein, UA Negative Negative    Leuk Esterase, UA Negative Negative    Nitrite, UA Negative Negative    Urobilinogen, UA 1.0 E.U./dL 0.2 - 1.0 E.U./dL       Ordered the above labs and independently reviewed the results.        RADIOLOGY  No Radiology Exams Resulted Within Past 24 Hours    I ordered the above noted radiological studies. Reviewed by me and discussed with radiologist.  See dictation for official radiology interpretation.      PROCEDURES    Procedures        MEDICATIONS GIVEN IN ER    Medications   sodium chloride 0.9 % flush 10 mL (has no administration in time range)   LORazepam (ATIVAN) tablet 0.5 mg (0.5 mg Oral Given 5/29/23 0756)         PROGRESS, DATA ANALYSIS, CONSULTS, AND MEDICAL DECISION MAKING    My differential diagnosis of the upper extremity pain or injury includes but is not limited to contusions of the shoulder, forearm, arm, wrist, elbow or hand, dislocations of shoulder, elbow, wrist, digits, nursemaid's elbow (age-appropriate), shoulder sprain, elbow sprain, wrist sprain, digit sprain, shoulder strain, arm strain, forearm strain, elbow strain, wrist strain, hand sprain, digit strain, lacerations of the upper extremity, fractures both closed and open of clavicle, scapula, humerus, radius, ulna, bones of the wrist, hands and digits, cellulitis or abscess, cervical radiculopathy, radial nerve palsy, neurogenic upper extremity pain, angina equivalent, SVT, DVT, arterial occlusion, compartment syndrome.      All labs have been independently reviewed by me.  All radiology studies have been reviewed by me and discussed with radiologist dictating the report.   EKG's independently viewed and interpreted by me.  Discussion below represents my analysis of pertinent findings related to patient's condition, differential diagnosis, treatment plan and final disposition.      ED  Course as of 05/29/23 1130   Mon May 29, 2023   0713 Glucose: 106 [RS]   0846 Valproic Acid: 50.0 [RS]   0846 Glucose(!): 112 [RS]   0846 BUN: 11 [RS]   0846 Creatinine: 0.83 [RS]   0846 Sodium: 137 [RS]   0846 Potassium: 3.5 [RS]   0846 WBC: 6.33 [RS]   0846 Hemoglobin(!): 11.2 [RS]   0847 Platelets: 201 [RS]   0921 Leukocytes, UA: Negative [RS]   0921 Nitrite, UA: Negative [RS]   0921 No acute life threats as yet identified. [RS]   0942 Patient feels improved at this time after Ativan.  He is resting and his tremor while still present is less significant.  We are trying to establish what medication he was recently prescribed from his primary care provider for sleep for disposition. [RS]   1050 Case management has spoken with the patient's  through the neuro restorative care program with which he is involved.  He has known tremor and is established with primary care.  We are attempting to contact a ride for him to go home. [RS]   1129 CONSULT        Provider: Dr. Carrasquillo-PCP    Discussion: He is very familiar with the patient and is aware of the situation.  Patient has melatonin ordered.  No other work-up or initiation of new medications requested.  He is agreeable to follow-up.    Agreeable c treatment and planned disposition.         [RS]      ED Course User Index  [RS] René Holley MD       AS OF 11:30 EDT VITALS:    BP - (!) 168/132  HR - 75  TEMP - 98.2 °F (36.8 °C) (Tympanic)  O2 SATS - 97%        DIAGNOSIS  Final diagnoses:   Insomnia, unspecified type   Tremor-right arm         DISPOSITION  DISCHARGE    Patient discharged in stable condition.    Reviewed implications of results, diagnosis, meds, responsibility to follow up, warning signs and symptoms of possible worsening, potential complications and reasons to return to ER.    Patient/Family voiced understanding of above instructions.    Discussed plan for discharge, as there is no emergent indication for admission. Patient referred to  primary care provider for regular health maintenance. Pt/family is agreeable and understands need for follow up and possible repeat testing.  Pt is aware that discharge does not mean that nothing is wrong but it indicates no emergency is present that requires admission and they must continue care with follow-up as given below or physician of their choice.     FOLLOW-UP  Milo Carrasquillo,   [address]  [address]  [place] [address]  [phone]    Schedule an appointment as soon as possible for a visit in 2 days           Medication List      Changed    levothyroxine 25 MCG tablet  Commonly known as: SYNTHROID, LEVOTHROID  25 mcg on Monday through Saturday.  What changed: how much to take               René Holley MD  05/29/23 1051       René Holley MD  05/29/23 1135

## 2023-05-29 NOTE — ED NOTES
Pt arrived via [facility] from [facility]. EMS reports the call was for hyperglycemia. When EMS arrived on scene pt c/o about tremors and insomonia x7days. Pt has hx of tremors and TBI.

## 2023-05-29 NOTE — CASE MANAGEMENT/SOCIAL WORK
Discharge Planning Assessment  [facility]     Patient Name: Hernando Lozada  MRN: [id number]  Today's Date: 5/29/2023    Admit Date: 5/29/2023        Discharge Needs Assessment    No documentation.                Discharge Plan     Row Name 05/29/23 1229       Plan    Plan Comments Call placed to caregiver listed David Coyne per primary RN request to obtain med list secondary to possible missed meds-  referred me to family- call placed to family Teo Lozada who referred to  Jen Mota at ([phone])  advised patient has had history of tremor and attention seeking at times; she noted she saw him on Wed. of last week and he was fine- requested Depakote level be complete- provided fax number for ER for patient med list-  also provided number for ride for patient once d/c- [facility] at [phone]- Updated primary RN and ER provider- never received med list from               Continued Care and Services - Admitted Since 5/29/2023    Coordination has not been started for this encounter.     Selected Continued Care - Prior Encounters Includes continued care and service providers with selected services from prior encounters from 2/28/2023 to 5/29/2023    Discharged on 4/20/2023 Admission date: 4/17/2023 - Discharge disposition: Skilled Nursing Facility (DC - External)    Destination     Service Provider Selected Services Address Phone Fax Patient Preferred    [facility] [address] [id number] [phone] --                Discharged on 3/14/2023 Admission date: 3/6/2023 - Discharge disposition: Skilled Nursing Facility (DC - External)    Destination Patient indicates having no preference.    Service Provider Selected Services Address Phone Fax Patient Preferred    [facility] [address] [address] [phone] --                        Demographic Summary    No documentation.                Functional Status    No documentation.                Psychosocial    No documentation.                Abuse/Neglect    No documentation.                Legal    No documentation.                Substance Abuse    No documentation.                Patient Forms    No documentation.                   Sangeeta Romero RN

## 2023-06-07 ENCOUNTER — APPOINTMENT (OUTPATIENT)
Dept: CT IMAGING | Facility: HOSPITAL | Age: 78
End: 2023-06-07
Payer: MEDICARE

## 2023-06-07 ENCOUNTER — APPOINTMENT (OUTPATIENT)
Dept: GENERAL RADIOLOGY | Facility: HOSPITAL | Age: 78
End: 2023-06-07
Payer: MEDICARE

## 2023-06-07 ENCOUNTER — HOSPITAL ENCOUNTER (EMERGENCY)
Facility: HOSPITAL | Age: 78
Discharge: HOME | End: 2023-06-07
Attending: EMERGENCY MEDICINE | Admitting: EMERGENCY MEDICINE
Payer: MEDICARE

## 2023-06-07 VITALS
BODY MASS INDEX: 33.62 KG/M2 | RESPIRATION RATE: 16 BRPM | WEIGHT: 227 LBS | OXYGEN SATURATION: 96 % | HEIGHT: 69 IN | SYSTOLIC BLOOD PRESSURE: 130 MMHG | DIASTOLIC BLOOD PRESSURE: 76 MMHG | TEMPERATURE: 98 F | HEART RATE: 75 BPM
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
## 2023-06-07 DIAGNOSIS — S52.124A CLOSED NONDISPLACED FRACTURE OF HEAD OF RIGHT RADIUS, INITIAL ENCOUNTER: Primary | ICD-10-CM

## 2023-06-07 LAB
ALBUMIN SERPL-MCNC: 3.8 G/DL (ref 3.5–5.2)
ALBUMIN/GLOB SERPL: 1.2 G/DL
ALP SERPL-CCNC: 56 U/L (ref 39–117)
ALT SERPL W P-5'-P-CCNC: 13 U/L (ref 1–41)
ANION GAP SERPL CALCULATED.3IONS-SCNC: 9.7 MMOL/L (ref 5–15)
AST SERPL-CCNC: 10 U/L (ref 1–40)
BASOPHILS # BLD AUTO: 0.03 10*3/MM3 (ref 0–0.2)
BASOPHILS NFR BLD AUTO: 0.4 % (ref 0–1.5)
BILIRUB SERPL-MCNC: 0.4 MG/DL (ref 0–1.2)
BUN SERPL-MCNC: 13 MG/DL (ref 8–23)
BUN/CREAT SERPL: 13.5 (ref 7–25)
CALCIUM SPEC-SCNC: 9.3 MG/DL (ref 8.6–10.5)
CHLORIDE SERPL-SCNC: 99 MMOL/L (ref 98–107)
CO2 SERPL-SCNC: 30.3 MMOL/L (ref 22–29)
CREAT SERPL-MCNC: 0.96 MG/DL (ref 0.76–1.27)
DEPRECATED RDW RBC AUTO: 49.5 FL (ref 37–54)
EGFRCR SERPLBLD CKD-EPI 2021: 81.9 ML/MIN/1.73
EOSINOPHIL # BLD AUTO: 0.03 10*3/MM3 (ref 0–0.4)
EOSINOPHIL NFR BLD AUTO: 0.4 % (ref 0.3–6.2)
ERYTHROCYTE [DISTWIDTH] IN BLOOD BY AUTOMATED COUNT: 15.1 % (ref 12.3–15.4)
GLOBULIN UR ELPH-MCNC: 3.1 GM/DL
GLUCOSE SERPL-MCNC: 95 MG/DL (ref 65–99)
HCT VFR BLD AUTO: 33 % (ref 37.5–51)
HGB BLD-MCNC: 11.1 G/DL (ref 13–17.7)
HOLD SPECIMEN: NORMAL
HOLD SPECIMEN: NORMAL
IMM GRANULOCYTES # BLD AUTO: 0.02 10*3/MM3 (ref 0–0.05)
IMM GRANULOCYTES NFR BLD AUTO: 0.3 % (ref 0–0.5)
LYMPHOCYTES # BLD AUTO: 2.03 10*3/MM3 (ref 0.7–3.1)
LYMPHOCYTES NFR BLD AUTO: 27.5 % (ref 19.6–45.3)
MAGNESIUM SERPL-MCNC: 2 MG/DL (ref 1.6–2.4)
MCH RBC QN AUTO: 30.7 PG (ref 26.6–33)
MCHC RBC AUTO-ENTMCNC: 33.6 G/DL (ref 31.5–35.7)
MCV RBC AUTO: 91.2 FL (ref 79–97)
MONOCYTES # BLD AUTO: 0.67 10*3/MM3 (ref 0.1–0.9)
MONOCYTES NFR BLD AUTO: 9.1 % (ref 5–12)
NEUTROPHILS NFR BLD AUTO: 4.6 10*3/MM3 (ref 1.7–7)
NEUTROPHILS NFR BLD AUTO: 62.3 % (ref 42.7–76)
NRBC BLD AUTO-RTO: 0 /100 WBC (ref 0–0.2)
PLATELET # BLD AUTO: 239 10*3/MM3 (ref 140–450)
PMV BLD AUTO: 9.8 FL (ref 6–12)
POTASSIUM SERPL-SCNC: 3.5 MMOL/L (ref 3.5–5.2)
PROT SERPL-MCNC: 6.9 G/DL (ref 6–8.5)
QT INTERVAL: 439 MS
RBC # BLD AUTO: 3.62 10*6/MM3 (ref 4.14–5.8)
SODIUM SERPL-SCNC: 139 MMOL/L (ref 136–145)
TROPONIN T SERPL HS-MCNC: 25 NG/L
TROPONIN T SERPL HS-MCNC: 26 NG/L
WBC NRBC COR # BLD: 7.38 10*3/MM3 (ref 3.4–10.8)
WHOLE BLOOD HOLD COAG: NORMAL
WHOLE BLOOD HOLD SPECIMEN: NORMAL

## 2023-06-07 PROCEDURE — 84484 ASSAY OF TROPONIN QUANT: CPT

## 2023-06-07 PROCEDURE — 73070 X-RAY EXAM OF ELBOW: CPT

## 2023-06-07 PROCEDURE — 93010 ELECTROCARDIOGRAM REPORT: CPT | Performed by: STUDENT IN AN ORGANIZED HEALTH CARE EDUCATION/TRAINING PROGRAM

## 2023-06-07 PROCEDURE — 80053 COMPREHEN METABOLIC PANEL: CPT

## 2023-06-07 PROCEDURE — 36415 COLL VENOUS BLD VENIPUNCTURE: CPT

## 2023-06-07 PROCEDURE — 93005 ELECTROCARDIOGRAM TRACING: CPT

## 2023-06-07 PROCEDURE — 83735 ASSAY OF MAGNESIUM: CPT

## 2023-06-07 PROCEDURE — 71045 X-RAY EXAM CHEST 1 VIEW: CPT

## 2023-06-07 PROCEDURE — 72125 CT NECK SPINE W/O DYE: CPT

## 2023-06-07 PROCEDURE — 85025 COMPLETE CBC W/AUTO DIFF WBC: CPT

## 2023-06-07 PROCEDURE — 99285 EMERGENCY DEPT VISIT HI MDM: CPT

## 2023-06-07 PROCEDURE — 84484 ASSAY OF TROPONIN QUANT: CPT | Performed by: EMERGENCY MEDICINE

## 2023-06-07 PROCEDURE — 70450 CT HEAD/BRAIN W/O DYE: CPT

## 2023-06-07 RX ORDER — FLUTICASONE PROPIONATE AND SALMETEROL 500; 50 UG/1; UG/1
50 POWDER RESPIRATORY (INHALATION)
COMMUNITY

## 2023-06-07 RX ORDER — CETIRIZINE HYDROCHLORIDE 10 MG/1
10 TABLET ORAL DAILY
COMMUNITY

## 2023-06-07 RX ORDER — POTASSIUM CHLORIDE 750 MG/1
10 TABLET, FILM COATED, EXTENDED RELEASE ORAL 2 TIMES DAILY
COMMUNITY

## 2023-06-07 RX ORDER — SODIUM CHLORIDE 0.9 % (FLUSH) 0.9 %
10 SYRINGE (ML) INJECTION AS NEEDED
Status: DISCONTINUED | OUTPATIENT
Start: 2023-06-07 | End: 2023-06-08 | Stop reason: HOSPADM

## 2023-06-07 RX ORDER — IBUPROFEN 200 MG
200 TABLET ORAL EVERY 6 HOURS PRN
COMMUNITY

## 2023-06-07 NOTE — ED PROVIDER NOTES
EMERGENCY DEPARTMENT ENCOUNTER    Room Number:  36/36  Date seen:  6/7/2023  PCP: Milo Carrasquillo DO      HPI:  Chief Complaint: Fall  A complete HPI/ROS/PMH/PSH/SH/FH are unobtainable due to: None  Context: Hernando Lozada is a 78 y.o. male who presents to the ED c/o fall.  He states that he was walking today down waiting for time for his medicine.  He states that his legs gave out.  He fell onto his right side and use his elbow to break his fall so he would not hit his head.  He states that he did not hit his head.  He complains of bleeding to the right elbow.  He really has no pain at elbow, however.          PAST MEDICAL HISTORY  Active Ambulatory Problems     Diagnosis Date Noted    DJD (degenerative joint disease) of knee 12/14/2017    Essential hypertension 02/18/2019    SOB (shortness of breath) 02/18/2019    Leg swelling 02/18/2019    Chest pain with high risk of acute coronary syndrome 04/29/2019    Hyperlipidemia LDL goal <100 08/23/2019    COVID-19 11/25/2022    Diabetes mellitus     GIOVANNY (obstructive sleep apnea)     Disease of thyroid gland     CKD (chronic kidney disease)     Hypomagnesemia     Chronic brain injury     Generalized weakness     CAD (coronary artery disease)     Pedal edema 03/06/2023    Tremor 03/06/2023    Elevated troponin 03/06/2023    Lower extremity cellulitis 03/06/2023    Tinea cruris 03/06/2023    Chronic deep vein thrombosis (DVT) of calf muscle vein of left lower extremity 03/07/2023    Nonrheumatic aortic valve stenosis 03/06/2023    Acute urinary tract infection 04/17/2023    Hypothyroidism 04/17/2023    Acute urinary retention 04/19/2023    Acute bacterial prostatitis 04/19/2023     Resolved Ambulatory Problems     Diagnosis Date Noted    No Resolved Ambulatory Problems     Past Medical History:   Diagnosis Date    Arthritis     Bilateral leg edema     GERD (gastroesophageal reflux disease)     Heart murmur     History of head injury     [place] (hard of hearing)      Hyperlipidemia     Hypertension     Irregular heart beat     Osteoarthritis     Past heart attack     SOB (shortness of breath) on exertion     Stroke     Vasovagal episode          PAST SURGICAL HISTORY  Past Surgical History:   Procedure Laterality Date    CARDIAC CATHETERIZATION N/A 4/30/2019    Procedure: Coronary angiography with grafts;  Surgeon: Rio Miranda MD;  Location[facility];  Service: Cardiology    CARDIAC CATHETERIZATION N/A 4/30/2019    Procedure: Left Heart Cath;  Surgeon: Rio Miranda MD;  [facility];  Service: Cardiology    CARDIAC CATHETERIZATION N/A 4/30/2019    Procedure: Left ventriculography;  Surgeon: Rio Miranda MD;  [facility];  Service: Cardiology    CARDIAC CATHETERIZATION  4/30/2019    Procedure: Saphenous Vein Graft;  Surgeon: Rio Miranda MD;  Location: [facility] CATH INVASIVE LOCATION;  Service: Cardiology    CARDIAC CATHETERIZATION N/A 4/30/2019    Procedure: Stent GLENDY coronary;  Surgeon: Rio Miranda MD;  Location: [facility] CATH INVASIVE LOCATION;  Service: Cardiology    CARDIAC CATHETERIZATION N/A 3/10/2023    Procedure: Right and Left Heart Cath;  Surgeon: Rio Miranda MD;  Location: [facility] CATH INVASIVE LOCATION;  Service: Cardiology;  Laterality: N/A;    CARDIAC CATHETERIZATION N/A 3/10/2023    Procedure: Coronary angiography;  Surgeon: Rio Miranda MD;  Location: [facility] CATH INVASIVE LOCATION;  Service: Cardiology;  Laterality: N/A;    CARDIAC CATHETERIZATION N/A 3/10/2023    Procedure: Left ventriculography;  Surgeon: Rio Miranda MD;  Location: [facility] CATH INVASIVE LOCATION;  Service: Cardiology;  Laterality: N/A;    CARDIAC CATHETERIZATION N/A 3/10/2023    Procedure: Percutaneous Coronary Intervention;  Surgeon: Rio Miranda MD;  Location: [facility] CATH INVASIVE LOCATION;  Service: Cardiology;  Laterality: N/A;     CARDIAC CATHETERIZATION N/A 3/10/2023    Procedure: Stent GLENDY coronary;  Surgeon: Rio Miranda MD;  Location:  [facility] CATH INVASIVE LOC[facility];  Service: Cardiology;  Laterality: N/A;    CARPAL TUNNEL RELEASE Bilateral     CATARACT EXTRACTION      CORONARY ARTERY BYPASS GRAFT  2002    FINGER SURGERY      MISSING LEFT POINTER FINGER TIP    KNEE ARTHROPLASTY Right 02/15/2017    WI ARTHRP KNE CONDYLE&PLATU MEDIAL&LAT COMPARTMENTS Left 12/14/2017    Procedure: LT TOTAL KNEE ARTHROPLASTY;  Surgeon: Jatin Lancaster MD;  Location:  [facility];  Service: Orthopedics    WI RT/LT HEART CATHETERS N/A 4/30/2019    Procedure: Percutaneous Coronary Intervention;  Surgeon: Rio Miranda MD;  Location:  [facility] CATH INVASIVE LOCATION;  Service: Cardiology         FAMILY HISTORY  Family History   Problem Relation Age of Onset    Parkinsonism Sister     Diabetes Brother     Malig Hyperthermia Neg Hx          SOCIAL HISTORY  Social History     Socioeconomic History    Marital status: Single   Tobacco Use    Smoking status: Never     Passive exposure: Never    Smokeless tobacco: Never   Vaping Use    Vaping Use: Never used   Substance and Sexual Activity    Alcohol use: No    Drug use: No    Sexual activity: Defer         ALLERGIES  Patient has no known allergies.        REVIEW OF SYSTEMS  Review of Systems     All systems reviewed and negative except for those discussed in HPI.       PHYSICAL EXAM  ED Triage Vitals [06/07/23 1740]   Temp Heart Rate Resp BP SpO2   98 °F (36.7 °C) 54 18 126/65 99 %      Temp src Heart Rate Source Patient Position BP Location FiO2 (%)   Oral -- Lying Right arm --       Physical Exam      GENERAL: no acute distress  HENT: nares patent, scalp is atraumatic  EYES: no scleral icterus  CV: regular rhythm, normal rate  RESPIRATORY: normal effort  ABDOMEN: soft, nontender  MUSCULOSKELETAL: no deformity, no C/T/L-spine tenderness, no pain palpation of the 4 extremities, full range of motion  of the right elbow including flexion, extension, supination, and pronation  NEURO: alert, moves all extremities, follows commands  PSYCH:  calm, cooperative  SKIN: Skin tear to the right elbow    Vital signs and nursing notes reviewed.          LAB RESULTS  Recent Results (from the past 24 hour(s))   ECG 12 Lead ED Triage Standing Order; Weak / Dizzy / AMS    Collection Time: 06/07/23  6:34 PM   Result Value Ref Range    QT Interval 439 ms       Ordered the above labs and reviewed the results.        RADIOLOGY  XR Chest 1 View    Result Date: 6/7/2023  Emergency portable view of the chest on 06/07/2023  CLINICAL HISTORY: Patient has weakness, dizziness and altered mental status  This correlated to a prior portable chest x-ray on 04/17/2023.  FINDINGS: There are multiple sternal wires and mediastinal markers from previous cardiac surgery. The cardiomediastinal silhouette is upper limits of normal in size. The pulmonary vasculature is within normal limits. The lungs are clear. The costophrenic angles are sharp. There is a 5 cm hiatal hernia posterior to the heart.      1. No active disease is seen in the chest with no significant change when compared to prior portable chest x-ray on 04/17/2023. The etiology of this patient's weakness, dizziness and altered mental status is not established on this exam 2. Previous median sternotomy and there is a 5 cm hiatal hernia posterior to the heart.  This report was finalized on 6/7/2023 6:39 PM by Dr. Primo Joya M.D.       Ordered the above noted radiological studies. Reviewed by me in PACS.          PROCEDURES  Procedures          MEDICATIONS GIVEN IN ER  Medications   sodium chloride 0.9 % flush 10 mL (has no administration in time range)         MEDICAL DECISION MAKING, PROGRESS, and CONSULTS    Discussion below represents my analysis of pertinent findings related to patient's condition, differential diagnosis, treatment plan and final disposition.      Orders placed during  this visit:  Orders Placed This Encounter   Procedures    XR Chest 1 View    CT Cervical Spine Without Contrast    CT Head Without Contrast    XR Elbow 2 View Right    [place] Draw    Comprehensive Metabolic Panel    Single High Sensitivity Troponin T    Magnesium    CBC Auto Differential    NPO Diet NPO Type: Strict NPO    Undress & Gown    Continuous Pulse Oximetry    Vital Signs    Orthostatic Blood Pressure    Oxygen Therapy- Nasal Cannula; Titrate 1-6 LPM Per SpO2; 90 - 95%    POC Glucose Once    ECG 12 Lead ED Triage Standing Order; Weak / Dizzy / AMS    Insert Peripheral IV    Fall Precautions    CBC & Differential    Green Top (Gel)    Lavender Top    Gold Top - SST    Light Blue Top           Differential diagnosis:    Intracranial hemorrhage, spine fracture, elbow dislocation or fracture    After initial evaluation, I considered the need for admission given his fall with possible ICH. Therefore, I ordered CT head and c-spine.            Independent interpretation of labs, radiology studies, and discussions with consultants:  ED Course as of 06/08/23 1437   Wed Jun 07, 2023   1848 EKG independently interpreted by myself.  Time 6:34 PM.  Sinus rhythm.  Heart rate 53.  Normal intervals and axis.  No acute ST abnormality. [TD]   2114 HS Troponin T(!): 26 [TD]   2114 Creatinine: 0.96 [TD]   2114 Sodium: 139 [TD]   2114 HS Troponin T(!): 26 [TD]   2208 HS Troponin T(!): 25  stable [TD]   2209 X-ray of the right elbow shows a nondisplaced fracture of the right radial head. [TD]      ED Course User Index  [TD] Tomas Gusman II, MD         Pain is very well controlled. He says he's not having pain to elbow although there is a suspected fracture of radial head. Discharge home with sling and ortho follow up.     No other acute injuries.    I believe his fall was mechanical in nature. Troponin is stable.      DIAGNOSIS  Final diagnoses:   Closed nondisplaced fracture of head of right radius, initial encounter    Fall, initial encounter         DISPOSITION  DISCHARGE    FOLLOW-UP  [facility]  [address]  [facility] [address]  [phone]  Schedule an appointment as soon as possible for a visit in 3 days      Milo Carrasquillo DO  [address]  [address]  [facility] [address]  [phone]    Schedule an appointment as soon as possible for a visit   As needed         Medication List        Changed      levothyroxine 25 MCG tablet  Commonly known as: SYNTHROID, LEVOTHROID  25 mcg on Monday through Saturday.  What changed: how much to take                  Latest Documented Vital Signs:  As of 18:55 EDT  BP- 126/65 HR- 54 Temp- 98 °F (36.7 °C) (Oral) O2 sat- 99%      --    Please note that portions of this were completed with a voice recognition program.       Note Disclaimer: At [facility], we believe that sharing information builds trust and better relationships. You are receiving this note because you are receiving care at [facility] or recently visited. It is possible you will see health information before a provider has talked with you about it. This kind of information can be easy to misunderstand. To help you fully understand what it means for your health, we urge you to discuss this note with your provider.         Tomas Gusman II, MD  06/08/23 2689

## 2023-06-07 NOTE — ED NOTES
Pt to ed from [facility] via EMS    Pt had fall. Pt was dizzy then fell. Pt did hit head. Pt is on blood thinners and pt denies LOC. Pt hx TBI. Skin tear r elbow.

## 2023-06-08 NOTE — ED NOTES
Spoke with Sirena at [facility] ([phone]).  She stated Cory will  the patient within the hour.

## 2023-08-30 ENCOUNTER — OFFICE VISIT (OUTPATIENT)
Dept: CARDIOLOGY | Facility: CLINIC | Age: 78
End: 2023-08-30
Payer: MEDICARE

## 2023-08-30 VITALS
WEIGHT: 223 LBS | DIASTOLIC BLOOD PRESSURE: 82 MMHG | SYSTOLIC BLOOD PRESSURE: 138 MMHG | OXYGEN SATURATION: 98 % | HEIGHT: 69 IN | BODY MASS INDEX: 33.03 KG/M2 | HEART RATE: 61 BPM

## 2023-08-30 DIAGNOSIS — I10 PRIMARY HYPERTENSION: ICD-10-CM

## 2023-08-30 DIAGNOSIS — I49.1 PAC (PREMATURE ATRIAL CONTRACTION): Primary | ICD-10-CM

## 2023-08-30 DIAGNOSIS — I25.118 CORONARY ARTERY DISEASE OF NATIVE ARTERY OF NATIVE HEART WITH STABLE ANGINA PECTORIS: ICD-10-CM

## 2023-08-30 PROCEDURE — 99214 OFFICE O/P EST MOD 30 MIN: CPT | Performed by: NURSE PRACTITIONER

## 2023-08-30 PROCEDURE — 3075F SYST BP GE 130 - 139MM HG: CPT | Performed by: NURSE PRACTITIONER

## 2023-08-30 PROCEDURE — 1159F MED LIST DOCD IN RCRD: CPT | Performed by: NURSE PRACTITIONER

## 2023-08-30 PROCEDURE — 1160F RVW MEDS BY RX/DR IN RCRD: CPT | Performed by: NURSE PRACTITIONER

## 2023-08-30 PROCEDURE — 3079F DIAST BP 80-89 MM HG: CPT | Performed by: NURSE PRACTITIONER

## 2023-08-30 RX ORDER — OXYBUTYNIN CHLORIDE 5 MG/1
5 TABLET, EXTENDED RELEASE ORAL DAILY
COMMUNITY
Start: 2023-08-04
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
## 2023-09-14 ENCOUNTER — OFFICE VISIT (OUTPATIENT)
Dept: FAMILY MEDICINE CLINIC | Facility: CLINIC | Age: 78
End: 2023-09-14
Payer: MEDICARE

## 2023-09-14 VITALS
HEIGHT: 69 IN | RESPIRATION RATE: 16 BRPM | TEMPERATURE: 97.7 F | OXYGEN SATURATION: 97 % | BODY MASS INDEX: 34.13 KG/M2 | SYSTOLIC BLOOD PRESSURE: 106 MMHG | DIASTOLIC BLOOD PRESSURE: 66 MMHG | WEIGHT: 230.4 LBS | HEART RATE: 99 BPM

## 2023-09-14 DIAGNOSIS — E03.9 HYPOTHYROIDISM, UNSPECIFIED TYPE: ICD-10-CM

## 2023-09-14 DIAGNOSIS — G47.33 OSA (OBSTRUCTIVE SLEEP APNEA): ICD-10-CM

## 2023-09-14 DIAGNOSIS — I25.10 CORONARY ARTERY DISEASE WITHOUT ANGINA PECTORIS, UNSPECIFIED VESSEL OR LESION TYPE, UNSPECIFIED WHETHER NATIVE OR TRANSPLANTED HEART: ICD-10-CM

## 2023-09-14 DIAGNOSIS — I10 ESSENTIAL HYPERTENSION: Primary | ICD-10-CM

## 2023-09-14 DIAGNOSIS — E78.5 HYPERLIPIDEMIA LDL GOAL <100: ICD-10-CM

## 2023-09-14 DIAGNOSIS — S06.9XAS CHRONIC BRAIN INJURY: ICD-10-CM

## 2023-09-14 DIAGNOSIS — N18.9 CHRONIC KIDNEY DISEASE, UNSPECIFIED CKD STAGE: ICD-10-CM

## 2023-09-14 PROCEDURE — 99214 OFFICE O/P EST MOD 30 MIN: CPT | Performed by: NURSE PRACTITIONER

## 2023-09-14 PROCEDURE — 3074F SYST BP LT 130 MM HG: CPT | Performed by: NURSE PRACTITIONER

## 2023-09-14 PROCEDURE — 3078F DIAST BP <80 MM HG: CPT | Performed by: NURSE PRACTITIONER

## 2023-09-14 NOTE — PROGRESS NOTES
Chief Complaint  Establish Care (New pt )    Lazarus Villagomez presents to [facility]   as a 78-year-old male to establish care and to follow-up on hypertension and hyperlipidemia.    He does have a history of CAD, hypothyroidism, GERD, hyperlipidemia, hypertension, sleep apnea and history of a stroke.  He is stable on all his medications.  And has no acute complaints at today  He does have a cardiologist and a neurologist    His last follow-up with cardiology was 7/2023  Note from that visit  HPI     This is a 78-year-old male who is current with this provider.  He has a history of CAD, hypothyroidism, GERD, hyperlipidemia, hypertension, sleep apnea and history of stroke.  He is here today with his caregiver giver a blood pressure check.  He went to the emergency room today due to a fall after his legs gave out while awaiting to have his medications given to him.  X-ray showed no active disease with no significant change from prior study.  He hit his elbow and the x-ray showed septal nondisplaced fracture of radial head and with possible mild impaction as shown in epic.  He is to follow-up with orthopedics.     Echo revealed EF 56 to 60%, LV diastolic function was normal.  Moderate to severe aortic valve stenosis is present.  Aortic valve area is 1 cm 2.  See full report as below.  Recent stress test is negative.     already a cath in 2019 revealed angioplasty and stent to the obtuse marginal branch 80% reduced to 0%.  % occluded with the LIMA to the LAD patent with normal distal flow.  Circumflex artery had a OM 80% reduced to 0%.  RCA dominant with minimum diffuse irregularity.       Test remain relevant    Plan:   1. Hypertension blood pressure check.  When he went to the emergency room today his blood pressure was stable on current medications.  No changes.       Educated patient on exercising for at least 30 minutes a day for 2 to 3 days a week. Importance of  controlling hypertension and blood pressure checkup on the regular basis has been explained. Hypertension as a silent killer has been discussed. Risk reduction of the weight and regular exercises to control the hypertension has been explained.        2.  Recent fall: We will have a Holter monitor for 2 weeks and follow-up for his results.     2.  CAD: History of cardiac stent.  He denies any chest pain.  Ischemic work-up as above.  Continue Brilinta, propanolol, atorvastatin and aspirin.      Risk reduction for the coronary artery disease, controlling the blood pressure, blood sugar management, cholesterol management, exercise, stress management, and proper compliance with medications and follow-up has been discussed     3.  Hyperlipidemia: His primary care physician manages his labs and cholesterol.  Labs reviewed.  Continue statin.       Risk of the hyperlipidemia, importance of the treatment has been explained. Pros and cons of the statins has been explained. Regular blood workup as well as side effects including the liver failure, myelopathy death has been explained.     4.  GIOVANNY: CPAP compliant     5.  Aortic stenosis: Asymptomatic, monitor.    Blood pressure has been stable.  He has no acute complaints of today.  He denies any headaches no blurred vision no dizziness no flushing no chest pain or pressure  He does go to a brain injury rehab facility and was accompanied today with his worker     They do refill all of his medications      He does have a slight tremor-he has discussed this with neurologist and states that they have adjusted his medications recently.        Specialist has obtained labs within the last couple of months    He has no other acute complaints at today    The following portions of the patient's history were reviewed and updated as appropriate: allergies, current medications, past family history, past medical history, past social history, past surgical history, and problem list          "      Review of Systems   Constitutional:  Negative for chills, fatigue and fever.   Eyes:  Negative for visual disturbance.   Respiratory:  Negative for cough, shortness of breath and wheezing.    Cardiovascular:  Negative for chest pain, palpitations and leg swelling.   Gastrointestinal:  Negative for abdominal pain, diarrhea, nausea and vomiting.   Skin:  Negative for rash.   Neurological:  Positive for tremors. Negative for dizziness and light-headedness.   Psychiatric/Behavioral:  Negative for self-injury and suicidal ideas.       Objective   Vital Signs:   Vitals:    09/14/23 1252   BP: 106/66   BP Location: Left arm   Patient Position: Sitting   Pulse: 99   Resp: 16   Temp: 97.7 °F (36.5 °C)   TempSrc: Oral   SpO2: 97%   Weight: 105 kg (230 lb 6.4 oz)   Height: 175.3 cm (69\")        BMI is >= 30 and <35. (Class 1 Obesity). The following options were offered after discussion;: weight loss educational material (shared in after visit summary)        Physical Exam  Vitals reviewed.   Constitutional:       General: He is not in acute distress.  Eyes:      Conjunctiva/sclera: Conjunctivae normal.   Neck:      Thyroid: No thyromegaly.      Vascular: No carotid bruit.   Cardiovascular:      Rate and Rhythm: Normal rate and regular rhythm.      Heart sounds: Normal heart sounds.   Pulmonary:      Effort: Pulmonary effort is normal. No respiratory distress.      Breath sounds: Normal breath sounds. No stridor. No wheezing, rhonchi or rales.   Chest:      Chest wall: No tenderness.   Lymphadenopathy:      Cervical: No cervical adenopathy.   Neurological:      Mental Status: He is alert and oriented to person, place, and time.   Psychiatric:         Attention and Perception: Attention normal.         Mood and Affect: Mood normal.        Result Review :     The following data was reviewed by: FAISAL Castañeda on 09/14/2023:      CBC & Differential (06/07/2023 18:40) H&H 11.1/33.0  Comprehensive Metabolic Panel " (06/07/2023 18:40) glucose 95 GFR 81 AST and ALT normal   Hemoglobin A1c (04/18/2023 06:50) 5.5    TSH (04/15/2023 07:47) TSH 4.2  Assessment and Plan    Diagnoses and all orders for this visit:    1. Essential hypertension (Primary)  Comments:  Controlled continue current management  Monitor blood pressure at home bring log to next visit    2. Hyperlipidemia LDL goal <100  Comments:  Recheck lipid panel with next labs continue to work on lower cholesterol diet trying to remain active    3. Coronary artery disease without angina pectoris, unspecified vessel or lesion type, unspecified whether native or transplanted heart  Comments:  Keep follow-up with cardiology as directed continue current management report any changes in symptoms immediately    4. Chronic kidney disease, unspecified CKD stage  Comments:  GFR normal 6/2023  Recheck labs with next visit    5. GIOVANNY (obstructive sleep apnea)  Comments:  Continue CPAP    6. Chronic brain injury  Comments:  Continue current management follow-up with neurology as directed    7. Hypothyroidism, unspecified type  Comments:  Recheck thyroid labs with next visit continue levothyroxine take in a.m. 30 minutes separate from any other medication.  Do not miss doses        Follow Up   Return in about 6 months (around 3/14/2024) for Annual physical, Medicare Wellness.  Patient was given instructions and counseling regarding his condition or for health maintenance advice. Please see specific information pulled into the AVS if appropriate.

## 2023-11-30 DIAGNOSIS — I47.29 VENTRICULAR TACHYCARDIA (PAROXYSMAL): ICD-10-CM

## 2023-11-30 DIAGNOSIS — I10 ESSENTIAL HYPERTENSION: Primary | ICD-10-CM

## 2023-12-20 ENCOUNTER — OFFICE VISIT (OUTPATIENT)
Dept: CARDIOLOGY | Facility: CLINIC | Age: 78
End: 2023-12-20
Payer: MEDICARE

## 2023-12-20 VITALS
WEIGHT: 223 LBS | DIASTOLIC BLOOD PRESSURE: 80 MMHG | HEIGHT: 69 IN | BODY MASS INDEX: 33.03 KG/M2 | SYSTOLIC BLOOD PRESSURE: 130 MMHG | HEART RATE: 58 BPM

## 2023-12-20 DIAGNOSIS — G47.33 OSA (OBSTRUCTIVE SLEEP APNEA): ICD-10-CM

## 2023-12-20 DIAGNOSIS — I10 PRIMARY HYPERTENSION: ICD-10-CM

## 2023-12-20 DIAGNOSIS — I25.118 CORONARY ARTERY DISEASE OF NATIVE ARTERY OF NATIVE HEART WITH STABLE ANGINA PECTORIS: ICD-10-CM

## 2023-12-20 DIAGNOSIS — I35.0 AORTIC VALVE STENOSIS, ETIOLOGY OF CARDIAC VALVE DISEASE UNSPECIFIED: Primary | ICD-10-CM

## 2023-12-20 PROCEDURE — 1160F RVW MEDS BY RX/DR IN RCRD: CPT | Performed by: NURSE PRACTITIONER

## 2023-12-20 PROCEDURE — 3075F SYST BP GE 130 - 139MM HG: CPT | Performed by: NURSE PRACTITIONER

## 2023-12-20 PROCEDURE — 99214 OFFICE O/P EST MOD 30 MIN: CPT | Performed by: NURSE PRACTITIONER

## 2023-12-20 PROCEDURE — 3079F DIAST BP 80-89 MM HG: CPT | Performed by: NURSE PRACTITIONER

## 2023-12-20 PROCEDURE — 1159F MED LIST DOCD IN RCRD: CPT | Performed by: NURSE PRACTITIONER

## 2023-12-20 RX ORDER — AMANTADINE HYDROCHLORIDE 100 MG/1
CAPSULE, GELATIN COATED ORAL
COMMUNITY
Start: 2023-12-04
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
## 2024-01-02 ENCOUNTER — APPOINTMENT (OUTPATIENT)
Dept: GENERAL RADIOLOGY | Facility: HOSPITAL | Age: 79
End: 2024-01-02
Payer: MEDICARE

## 2024-01-02 ENCOUNTER — HOSPITAL ENCOUNTER (INPATIENT)
Facility: HOSPITAL | Age: 79
LOS: 1 days | Discharge: HOME | End: 2024-01-04
Attending: STUDENT IN AN ORGANIZED HEALTH CARE EDUCATION/TRAINING PROGRAM | Admitting: INTERNAL MEDICINE
Payer: MEDICARE

## 2024-01-02 DIAGNOSIS — R07.2 PRECORDIAL PAIN: ICD-10-CM

## 2024-01-02 DIAGNOSIS — I20.0 UNSTABLE ANGINA PECTORIS: ICD-10-CM

## 2024-01-02 DIAGNOSIS — I25.10 CORONARY ARTERY DISEASE WITHOUT ANGINA PECTORIS, UNSPECIFIED VESSEL OR LESION TYPE, UNSPECIFIED WHETHER NATIVE OR TRANSPLANTED HEART: ICD-10-CM

## 2024-01-02 DIAGNOSIS — R07.9 CHEST PAIN, UNSPECIFIED TYPE: Primary | ICD-10-CM

## 2024-01-02 DIAGNOSIS — R07.9 CHEST PAIN WITH HIGH RISK OF ACUTE CORONARY SYNDROME: ICD-10-CM

## 2024-01-02 LAB
ALBUMIN SERPL-MCNC: 3.9 G/DL (ref 3.5–5.2)
ALBUMIN/GLOB SERPL: 1.6 G/DL
ALP SERPL-CCNC: 76 U/L (ref 39–117)
ALT SERPL W P-5'-P-CCNC: 15 U/L (ref 1–41)
ANION GAP SERPL CALCULATED.3IONS-SCNC: 13.1 MMOL/L (ref 5–15)
AST SERPL-CCNC: 13 U/L (ref 1–40)
BASOPHILS # BLD AUTO: 0.03 10*3/MM3 (ref 0–0.2)
BASOPHILS NFR BLD AUTO: 0.5 % (ref 0–1.5)
BILIRUB SERPL-MCNC: 1 MG/DL (ref 0–1.2)
BUN SERPL-MCNC: 16 MG/DL (ref 8–23)
BUN/CREAT SERPL: 15.5 (ref 7–25)
CALCIUM SPEC-SCNC: 8.8 MG/DL (ref 8.6–10.5)
CHLORIDE SERPL-SCNC: 99 MMOL/L (ref 98–107)
CO2 SERPL-SCNC: 25.9 MMOL/L (ref 22–29)
CREAT SERPL-MCNC: 1.03 MG/DL (ref 0.76–1.27)
DEPRECATED RDW RBC AUTO: 48.6 FL (ref 37–54)
EGFRCR SERPLBLD CKD-EPI 2021: 75.3 ML/MIN/1.73
EOSINOPHIL # BLD AUTO: 0.01 10*3/MM3 (ref 0–0.4)
EOSINOPHIL NFR BLD AUTO: 0.2 % (ref 0.3–6.2)
ERYTHROCYTE [DISTWIDTH] IN BLOOD BY AUTOMATED COUNT: 13.8 % (ref 12.3–15.4)
GEN 5 2HR TROPONIN T REFLEX: 29 NG/L
GLOBULIN UR ELPH-MCNC: 2.5 GM/DL
GLUCOSE BLDC GLUCOMTR-MCNC: 200 MG/DL (ref 70–130)
GLUCOSE SERPL-MCNC: 129 MG/DL (ref 65–99)
HCT VFR BLD AUTO: 39.7 % (ref 37.5–51)
HGB BLD-MCNC: 13.3 G/DL (ref 13–17.7)
HOLD SPECIMEN: NORMAL
HOLD SPECIMEN: NORMAL
IMM GRANULOCYTES # BLD AUTO: 0.03 10*3/MM3 (ref 0–0.05)
IMM GRANULOCYTES NFR BLD AUTO: 0.5 % (ref 0–0.5)
LYMPHOCYTES # BLD AUTO: 1.79 10*3/MM3 (ref 0.7–3.1)
LYMPHOCYTES NFR BLD AUTO: 29 % (ref 19.6–45.3)
MCH RBC QN AUTO: 31.7 PG (ref 26.6–33)
MCHC RBC AUTO-ENTMCNC: 33.5 G/DL (ref 31.5–35.7)
MCV RBC AUTO: 94.5 FL (ref 79–97)
MONOCYTES # BLD AUTO: 0.67 10*3/MM3 (ref 0.1–0.9)
MONOCYTES NFR BLD AUTO: 10.8 % (ref 5–12)
NEUTROPHILS NFR BLD AUTO: 3.65 10*3/MM3 (ref 1.7–7)
NEUTROPHILS NFR BLD AUTO: 59 % (ref 42.7–76)
NRBC BLD AUTO-RTO: 0 /100 WBC (ref 0–0.2)
PLATELET # BLD AUTO: 165 10*3/MM3 (ref 140–450)
PMV BLD AUTO: 10 FL (ref 6–12)
POTASSIUM SERPL-SCNC: 3.1 MMOL/L (ref 3.5–5.2)
PROT SERPL-MCNC: 6.4 G/DL (ref 6–8.5)
QT INTERVAL: 420 MS
QTC INTERVAL: 409 MS
RBC # BLD AUTO: 4.2 10*6/MM3 (ref 4.14–5.8)
SODIUM SERPL-SCNC: 138 MMOL/L (ref 136–145)
TROPONIN T DELTA: 4 NG/L
TROPONIN T SERPL HS-MCNC: 25 NG/L
WBC NRBC COR # BLD AUTO: 6.18 10*3/MM3 (ref 3.4–10.8)
WHOLE BLOOD HOLD COAG: NORMAL
WHOLE BLOOD HOLD SPECIMEN: NORMAL

## 2024-01-02 PROCEDURE — 80053 COMPREHEN METABOLIC PANEL: CPT

## 2024-01-02 PROCEDURE — G0378 HOSPITAL OBSERVATION PER HR: HCPCS

## 2024-01-02 PROCEDURE — 82948 REAGENT STRIP/BLOOD GLUCOSE: CPT

## 2024-01-02 PROCEDURE — 85025 COMPLETE CBC W/AUTO DIFF WBC: CPT

## 2024-01-02 PROCEDURE — 93005 ELECTROCARDIOGRAM TRACING: CPT

## 2024-01-02 PROCEDURE — 99285 EMERGENCY DEPT VISIT HI MDM: CPT

## 2024-01-02 PROCEDURE — 93010 ELECTROCARDIOGRAM REPORT: CPT | Performed by: INTERNAL MEDICINE

## 2024-01-02 PROCEDURE — 84484 ASSAY OF TROPONIN QUANT: CPT

## 2024-01-02 PROCEDURE — 36415 COLL VENOUS BLD VENIPUNCTURE: CPT

## 2024-01-02 PROCEDURE — 63710000001 INSULIN LISPRO (HUMAN) PER 5 UNITS: Performed by: INTERNAL MEDICINE

## 2024-01-02 PROCEDURE — 71045 X-RAY EXAM CHEST 1 VIEW: CPT

## 2024-01-02 PROCEDURE — 84484 ASSAY OF TROPONIN QUANT: CPT | Performed by: STUDENT IN AN ORGANIZED HEALTH CARE EDUCATION/TRAINING PROGRAM

## 2024-01-02 PROCEDURE — 93005 ELECTROCARDIOGRAM TRACING: CPT | Performed by: STUDENT IN AN ORGANIZED HEALTH CARE EDUCATION/TRAINING PROGRAM

## 2024-01-02 RX ORDER — LISINOPRIL 5 MG/1
5 TABLET ORAL DAILY
Status: DISCONTINUED | OUTPATIENT
Start: 2024-01-03 | End: 2024-01-04 | Stop reason: HOSPADM

## 2024-01-02 RX ORDER — IBUPROFEN 600 MG/1
1 TABLET ORAL
Status: DISCONTINUED | OUTPATIENT
Start: 2024-01-02 | End: 2024-01-04 | Stop reason: HOSPADM

## 2024-01-02 RX ORDER — NICOTINE POLACRILEX 4 MG
15 LOZENGE BUCCAL
Status: DISCONTINUED | OUTPATIENT
Start: 2024-01-02 | End: 2024-01-04 | Stop reason: HOSPADM

## 2024-01-02 RX ORDER — DEXTROSE MONOHYDRATE 25 G/50ML
25 INJECTION, SOLUTION INTRAVENOUS
Status: DISCONTINUED | OUTPATIENT
Start: 2024-01-02 | End: 2024-01-04 | Stop reason: HOSPADM

## 2024-01-02 RX ORDER — POLYETHYLENE GLYCOL 3350 17 G/17G
17 POWDER, FOR SOLUTION ORAL DAILY PRN
Status: DISCONTINUED | OUTPATIENT
Start: 2024-01-02 | End: 2024-01-04 | Stop reason: HOSPADM

## 2024-01-02 RX ORDER — UREA 10 %
3 LOTION (ML) TOPICAL NIGHTLY PRN
Status: DISCONTINUED | OUTPATIENT
Start: 2024-01-02 | End: 2024-01-04 | Stop reason: HOSPADM

## 2024-01-02 RX ORDER — CHOLECALCIFEROL (VITAMIN D3) 125 MCG
1000 CAPSULE ORAL DAILY
Status: DISCONTINUED | OUTPATIENT
Start: 2024-01-03 | End: 2024-01-04 | Stop reason: HOSPADM

## 2024-01-02 RX ORDER — BISACODYL 5 MG/1
5 TABLET, DELAYED RELEASE ORAL DAILY PRN
Status: DISCONTINUED | OUTPATIENT
Start: 2024-01-02 | End: 2024-01-04 | Stop reason: HOSPADM

## 2024-01-02 RX ORDER — LEVOTHYROXINE SODIUM 0.05 MG/1
50 TABLET ORAL
Status: DISCONTINUED | OUTPATIENT
Start: 2024-01-03 | End: 2024-01-04 | Stop reason: HOSPADM

## 2024-01-02 RX ORDER — INSULIN LISPRO 100 [IU]/ML
2-7 INJECTION, SOLUTION INTRAVENOUS; SUBCUTANEOUS
Status: DISCONTINUED | OUTPATIENT
Start: 2024-01-02 | End: 2024-01-04 | Stop reason: HOSPADM

## 2024-01-02 RX ORDER — ONDANSETRON 2 MG/ML
4 INJECTION INTRAMUSCULAR; INTRAVENOUS EVERY 6 HOURS PRN
Status: DISCONTINUED | OUTPATIENT
Start: 2024-01-02 | End: 2024-01-04 | Stop reason: HOSPADM

## 2024-01-02 RX ORDER — AMANTADINE HYDROCHLORIDE 100 MG/1
100 CAPSULE, GELATIN COATED ORAL DAILY
Status: DISCONTINUED | OUTPATIENT
Start: 2024-01-03 | End: 2024-01-04 | Stop reason: HOSPADM

## 2024-01-02 RX ORDER — OXYBUTYNIN CHLORIDE 5 MG/1
5 TABLET, EXTENDED RELEASE ORAL DAILY
Status: DISCONTINUED | OUTPATIENT
Start: 2024-01-03 | End: 2024-01-04 | Stop reason: HOSPADM

## 2024-01-02 RX ORDER — ASPIRIN 325 MG
325 TABLET ORAL ONCE
Status: COMPLETED | OUTPATIENT
Start: 2024-01-02 | End: 2024-01-02

## 2024-01-02 RX ORDER — AMOXICILLIN 250 MG
2 CAPSULE ORAL 2 TIMES DAILY
Status: DISCONTINUED | OUTPATIENT
Start: 2024-01-02 | End: 2024-01-04 | Stop reason: HOSPADM

## 2024-01-02 RX ORDER — DIVALPROEX SODIUM 500 MG/1
500 TABLET, EXTENDED RELEASE ORAL 2 TIMES DAILY
Status: DISCONTINUED | OUTPATIENT
Start: 2024-01-03 | End: 2024-01-04 | Stop reason: HOSPADM

## 2024-01-02 RX ORDER — ONDANSETRON 4 MG/1
4 TABLET, ORALLY DISINTEGRATING ORAL EVERY 6 HOURS PRN
Status: DISCONTINUED | OUTPATIENT
Start: 2024-01-02 | End: 2024-01-04 | Stop reason: HOSPADM

## 2024-01-02 RX ORDER — FOLIC ACID 1 MG/1
1 TABLET ORAL DAILY
Status: DISCONTINUED | OUTPATIENT
Start: 2024-01-03 | End: 2024-01-04 | Stop reason: HOSPADM

## 2024-01-02 RX ORDER — ATORVASTATIN CALCIUM 20 MG/1
40 TABLET, FILM COATED ORAL DAILY
Status: DISCONTINUED | OUTPATIENT
Start: 2024-01-03 | End: 2024-01-04 | Stop reason: HOSPADM

## 2024-01-02 RX ORDER — BUDESONIDE AND FORMOTEROL FUMARATE DIHYDRATE 160; 4.5 UG/1; UG/1
2 AEROSOL RESPIRATORY (INHALATION)
Status: DISCONTINUED | OUTPATIENT
Start: 2024-01-03 | End: 2024-01-04 | Stop reason: HOSPADM

## 2024-01-02 RX ORDER — BISACODYL 10 MG
10 SUPPOSITORY, RECTAL RECTAL DAILY PRN
Status: DISCONTINUED | OUTPATIENT
Start: 2024-01-02 | End: 2024-01-04 | Stop reason: HOSPADM

## 2024-01-02 RX ORDER — DOCUSATE SODIUM 100 MG/1
100 CAPSULE, LIQUID FILLED ORAL DAILY
Status: DISCONTINUED | OUTPATIENT
Start: 2024-01-03 | End: 2024-01-04 | Stop reason: HOSPADM

## 2024-01-02 RX ORDER — ACETAMINOPHEN 325 MG/1
650 TABLET ORAL EVERY 4 HOURS PRN
Status: DISCONTINUED | OUTPATIENT
Start: 2024-01-02 | End: 2024-01-04 | Stop reason: HOSPADM

## 2024-01-02 RX ORDER — POTASSIUM CHLORIDE 750 MG/1
40 TABLET, FILM COATED, EXTENDED RELEASE ORAL EVERY 4 HOURS
Status: COMPLETED | OUTPATIENT
Start: 2024-01-03 | End: 2024-01-03

## 2024-01-02 RX ORDER — TAMSULOSIN HYDROCHLORIDE 0.4 MG/1
0.8 CAPSULE ORAL DAILY
Status: DISCONTINUED | OUTPATIENT
Start: 2024-01-03 | End: 2024-01-04 | Stop reason: HOSPADM

## 2024-01-02 RX ORDER — ASPIRIN 81 MG/1
81 TABLET ORAL DAILY
Status: DISCONTINUED | OUTPATIENT
Start: 2024-01-03 | End: 2024-01-04 | Stop reason: HOSPADM

## 2024-01-02 RX ORDER — PROPRANOLOL HYDROCHLORIDE 40 MG/1
40 TABLET ORAL 2 TIMES DAILY
Status: DISCONTINUED | OUTPATIENT
Start: 2024-01-03 | End: 2024-01-04 | Stop reason: HOSPADM

## 2024-01-02 RX ORDER — PANTOPRAZOLE SODIUM 40 MG/1
40 TABLET, DELAYED RELEASE ORAL
Status: DISCONTINUED | OUTPATIENT
Start: 2024-01-03 | End: 2024-01-04 | Stop reason: HOSPADM

## 2024-01-02 RX ORDER — FERROUS SULFATE 325(65) MG
325 TABLET ORAL
Status: DISCONTINUED | OUTPATIENT
Start: 2024-01-03 | End: 2024-01-04 | Stop reason: HOSPADM

## 2024-01-02 RX ORDER — SODIUM CHLORIDE 0.9 % (FLUSH) 0.9 %
10 SYRINGE (ML) INJECTION AS NEEDED
Status: DISCONTINUED | OUTPATIENT
Start: 2024-01-02 | End: 2024-01-04 | Stop reason: HOSPADM

## 2024-01-02 RX ADMIN — ASPIRIN 325 MG: 325 TABLET ORAL at 18:33

## 2024-01-02 RX ADMIN — POTASSIUM CHLORIDE 40 MEQ: 750 TABLET, EXTENDED RELEASE ORAL at 23:26

## 2024-01-02 RX ADMIN — INSULIN LISPRO 2 UNITS: 100 INJECTION, SOLUTION INTRAVENOUS; SUBCUTANEOUS at 23:26

## 2024-01-02 NOTE — Clinical Note
First balloon inflation max pressure = 6 vesna. First balloon inflation duration = 5 seconds. Second inflation of balloon - Max pressure = 12 vesna. 2nd Inflation of balloon - Duration = 10 seconds. 2nd inflation was done at 11:42 EST. The balloon is positioned in the Mid segment of the vessel.

## 2024-01-02 NOTE — Clinical Note
First balloon inflation max pressure = 12 vesna. First balloon inflation duration = 10 seconds. Second inflation of balloon - Max pressure = 12 vesna. 2nd Inflation of balloon - Duration = 10 seconds. 2nd inflation was done at 11:46 EST. The balloon is positioned in the Proximal segment of the vessel. Third inflation of balloon - Max pressure = 16 vesna. 3rd Inflation of balloon - Duration = 12 seconds. 3rd inflation was done at 11:46 EST. Th e balloon is positioned in the Proximal segment of the vessel.

## 2024-01-02 NOTE — Clinical Note
From: Horacio Route  To: Ellen Rincon CNM  Sent: 3/6/2019 11:46 AM CST  Subject: Non-Urgent Medical Question    Hi   I just had a couple questions. For the last week or so Iâve had this pain in my stomach. And every time I eat I have to go to the bathroom (Ester Chaudhary).  Is that normal or should I come in? Interventional Guidewire removed without incident

## 2024-01-03 ENCOUNTER — APPOINTMENT (OUTPATIENT)
Dept: NUCLEAR MEDICINE | Facility: HOSPITAL | Age: 79
End: 2024-01-03
Payer: MEDICARE

## 2024-01-03 LAB
ANION GAP SERPL CALCULATED.3IONS-SCNC: 11.5 MMOL/L (ref 5–15)
BUN SERPL-MCNC: 17 MG/DL (ref 8–23)
BUN/CREAT SERPL: 20 (ref 7–25)
CALCIUM SPEC-SCNC: 8.7 MG/DL (ref 8.6–10.5)
CHLORIDE SERPL-SCNC: 104 MMOL/L (ref 98–107)
CO2 SERPL-SCNC: 27.5 MMOL/L (ref 22–29)
CREAT SERPL-MCNC: 0.85 MG/DL (ref 0.76–1.27)
DEPRECATED RDW RBC AUTO: 46.9 FL (ref 37–54)
EGFRCR SERPLBLD CKD-EPI 2021: 90.1 ML/MIN/1.73
ERYTHROCYTE [DISTWIDTH] IN BLOOD BY AUTOMATED COUNT: 13.8 % (ref 12.3–15.4)
GLUCOSE BLDC GLUCOMTR-MCNC: 149 MG/DL (ref 70–130)
GLUCOSE BLDC GLUCOMTR-MCNC: 172 MG/DL (ref 70–130)
GLUCOSE BLDC GLUCOMTR-MCNC: 76 MG/DL (ref 70–130)
GLUCOSE SERPL-MCNC: 91 MG/DL (ref 65–99)
HCT VFR BLD AUTO: 36.9 % (ref 37.5–51)
HGB BLD-MCNC: 12.5 G/DL (ref 13–17.7)
MCH RBC QN AUTO: 31.3 PG (ref 26.6–33)
MCHC RBC AUTO-ENTMCNC: 33.9 G/DL (ref 31.5–35.7)
MCV RBC AUTO: 92.5 FL (ref 79–97)
PLATELET # BLD AUTO: 143 10*3/MM3 (ref 140–450)
PMV BLD AUTO: 10.5 FL (ref 6–12)
POTASSIUM SERPL-SCNC: 3.5 MMOL/L (ref 3.5–5.2)
POTASSIUM SERPL-SCNC: 3.7 MMOL/L (ref 3.5–5.2)
RBC # BLD AUTO: 3.99 10*6/MM3 (ref 4.14–5.8)
SODIUM SERPL-SCNC: 143 MMOL/L (ref 136–145)
TROPONIN T SERPL HS-MCNC: 27 NG/L
WBC NRBC COR # BLD AUTO: 5.33 10*3/MM3 (ref 3.4–10.8)

## 2024-01-03 PROCEDURE — 92920 PRQ TRLUML C ANGIOP 1ART&/BR: CPT | Performed by: INTERNAL MEDICINE

## 2024-01-03 PROCEDURE — C1769 GUIDE WIRE: HCPCS | Performed by: INTERNAL MEDICINE

## 2024-01-03 PROCEDURE — 99153 MOD SED SAME PHYS/QHP EA: CPT | Performed by: INTERNAL MEDICINE

## 2024-01-03 PROCEDURE — C1760 CLOSURE DEV, VASC: HCPCS | Performed by: INTERNAL MEDICINE

## 2024-01-03 PROCEDURE — 25010000002 HEPARIN (PORCINE) PER 1000 UNITS: Performed by: INTERNAL MEDICINE

## 2024-01-03 PROCEDURE — C1725 CATH, TRANSLUMIN NON-LASER: HCPCS | Performed by: INTERNAL MEDICINE

## 2024-01-03 PROCEDURE — B2181ZZ FLUOROSCOPY OF LEFT INTERNAL MAMMARY BYPASS GRAFT USING LOW OSMOLAR CONTRAST: ICD-10-PCS | Performed by: INTERNAL MEDICINE

## 2024-01-03 PROCEDURE — 99152 MOD SED SAME PHYS/QHP 5/>YRS: CPT | Performed by: INTERNAL MEDICINE

## 2024-01-03 PROCEDURE — 82948 REAGENT STRIP/BLOOD GLUCOSE: CPT

## 2024-01-03 PROCEDURE — 93459 L HRT ART/GRFT ANGIO: CPT | Performed by: INTERNAL MEDICINE

## 2024-01-03 PROCEDURE — B2121ZZ FLUOROSCOPY OF SINGLE CORONARY ARTERY BYPASS GRAFT USING LOW OSMOLAR CONTRAST: ICD-10-PCS | Performed by: INTERNAL MEDICINE

## 2024-01-03 PROCEDURE — 25010000002 FENTANYL CITRATE (PF) 50 MCG/ML SOLUTION: Performed by: INTERNAL MEDICINE

## 2024-01-03 PROCEDURE — 4A023N7 MEASUREMENT OF CARDIAC SAMPLING AND PRESSURE, LEFT HEART, PERCUTANEOUS APPROACH: ICD-10-PCS | Performed by: INTERNAL MEDICINE

## 2024-01-03 PROCEDURE — 84132 ASSAY OF SERUM POTASSIUM: CPT | Performed by: INTERNAL MEDICINE

## 2024-01-03 PROCEDURE — 25010000002 MIDAZOLAM PER 1 MG: Performed by: INTERNAL MEDICINE

## 2024-01-03 PROCEDURE — 80048 BASIC METABOLIC PNL TOTAL CA: CPT | Performed by: INTERNAL MEDICINE

## 2024-01-03 PROCEDURE — G0378 HOSPITAL OBSERVATION PER HR: HCPCS

## 2024-01-03 PROCEDURE — C1894 INTRO/SHEATH, NON-LASER: HCPCS | Performed by: INTERNAL MEDICINE

## 2024-01-03 PROCEDURE — 94640 AIRWAY INHALATION TREATMENT: CPT

## 2024-01-03 PROCEDURE — B2111ZZ FLUOROSCOPY OF MULTIPLE CORONARY ARTERIES USING LOW OSMOLAR CONTRAST: ICD-10-PCS | Performed by: INTERNAL MEDICINE

## 2024-01-03 PROCEDURE — B2151ZZ FLUOROSCOPY OF LEFT HEART USING LOW OSMOLAR CONTRAST: ICD-10-PCS | Performed by: INTERNAL MEDICINE

## 2024-01-03 PROCEDURE — 85027 COMPLETE CBC AUTOMATED: CPT | Performed by: INTERNAL MEDICINE

## 2024-01-03 PROCEDURE — 02703ZZ DILATION OF CORONARY ARTERY, ONE ARTERY, PERCUTANEOUS APPROACH: ICD-10-PCS | Performed by: INTERNAL MEDICINE

## 2024-01-03 PROCEDURE — 25510000001 IOPAMIDOL PER 1 ML: Performed by: INTERNAL MEDICINE

## 2024-01-03 PROCEDURE — C1887 CATHETER, GUIDING: HCPCS | Performed by: INTERNAL MEDICINE

## 2024-01-03 PROCEDURE — 99214 OFFICE O/P EST MOD 30 MIN: CPT | Performed by: INTERNAL MEDICINE

## 2024-01-03 PROCEDURE — 36415 COLL VENOUS BLD VENIPUNCTURE: CPT | Performed by: INTERNAL MEDICINE

## 2024-01-03 PROCEDURE — 84484 ASSAY OF TROPONIN QUANT: CPT | Performed by: INTERNAL MEDICINE

## 2024-01-03 PROCEDURE — 25010000002 HYDRALAZINE PER 20 MG: Performed by: INTERNAL MEDICINE

## 2024-01-03 RX ORDER — ACETAMINOPHEN 325 MG/1
650 TABLET ORAL EVERY 4 HOURS PRN
Status: DISCONTINUED | OUTPATIENT
Start: 2024-01-03 | End: 2024-01-04 | Stop reason: HOSPADM

## 2024-01-03 RX ORDER — LIDOCAINE HYDROCHLORIDE 20 MG/ML
INJECTION, SOLUTION INFILTRATION; PERINEURAL
Status: DISCONTINUED | OUTPATIENT
Start: 2024-01-03 | End: 2024-01-03 | Stop reason: HOSPADM

## 2024-01-03 RX ORDER — SODIUM CHLORIDE 9 MG/ML
250 INJECTION, SOLUTION INTRAVENOUS ONCE AS NEEDED
Status: DISCONTINUED | OUTPATIENT
Start: 2024-01-03 | End: 2024-01-04 | Stop reason: HOSPADM

## 2024-01-03 RX ORDER — HEPARIN SODIUM 1000 [USP'U]/ML
INJECTION, SOLUTION INTRAVENOUS; SUBCUTANEOUS
Status: DISCONTINUED | OUTPATIENT
Start: 2024-01-03 | End: 2024-01-03 | Stop reason: HOSPADM

## 2024-01-03 RX ORDER — NITROGLYCERIN 0.4 MG/1
0.4 TABLET SUBLINGUAL
Status: DISCONTINUED | OUTPATIENT
Start: 2024-01-03 | End: 2024-01-04 | Stop reason: HOSPADM

## 2024-01-03 RX ORDER — HYDRALAZINE HYDROCHLORIDE 20 MG/ML
INJECTION INTRAMUSCULAR; INTRAVENOUS
Status: DISCONTINUED | OUTPATIENT
Start: 2024-01-03 | End: 2024-01-03 | Stop reason: HOSPADM

## 2024-01-03 RX ORDER — FENTANYL CITRATE 50 UG/ML
INJECTION, SOLUTION INTRAMUSCULAR; INTRAVENOUS
Status: DISCONTINUED | OUTPATIENT
Start: 2024-01-03 | End: 2024-01-03 | Stop reason: HOSPADM

## 2024-01-03 RX ORDER — MIDAZOLAM HYDROCHLORIDE 1 MG/ML
INJECTION INTRAMUSCULAR; INTRAVENOUS
Status: DISCONTINUED | OUTPATIENT
Start: 2024-01-03 | End: 2024-01-03 | Stop reason: HOSPADM

## 2024-01-03 RX ORDER — SODIUM CHLORIDE 9 MG/ML
INJECTION, SOLUTION INTRAVENOUS
Status: COMPLETED | OUTPATIENT
Start: 2024-01-03 | End: 2024-01-03

## 2024-01-03 RX ADMIN — TICAGRELOR 90 MG: 90 TABLET ORAL at 21:20

## 2024-01-03 RX ADMIN — FOLIC ACID 1 MG: 1 TABLET ORAL at 09:39

## 2024-01-03 RX ADMIN — SENNOSIDES AND DOCUSATE SODIUM 2 TABLET: 50; 8.6 TABLET ORAL at 09:39

## 2024-01-03 RX ADMIN — TICAGRELOR 90 MG: 90 TABLET ORAL at 10:52

## 2024-01-03 RX ADMIN — TAMSULOSIN HYDROCHLORIDE 0.8 MG: 0.4 CAPSULE ORAL at 09:38

## 2024-01-03 RX ADMIN — DIVALPROEX SODIUM 500 MG: 500 TABLET, EXTENDED RELEASE ORAL at 09:39

## 2024-01-03 RX ADMIN — ASPIRIN 81 MG: 81 TABLET, COATED ORAL at 09:39

## 2024-01-03 RX ADMIN — Medication 1000 MCG: at 09:38

## 2024-01-03 RX ADMIN — POTASSIUM CHLORIDE 40 MEQ: 750 TABLET, EXTENDED RELEASE ORAL at 04:30

## 2024-01-03 RX ADMIN — PROPRANOLOL HYDROCHLORIDE 40 MG: 40 TABLET ORAL at 09:39

## 2024-01-03 RX ADMIN — LISINOPRIL 5 MG: 10 TABLET ORAL at 09:38

## 2024-01-03 RX ADMIN — PROPRANOLOL HYDROCHLORIDE 40 MG: 40 TABLET ORAL at 21:20

## 2024-01-03 RX ADMIN — PANTOPRAZOLE SODIUM 40 MG: 40 TABLET, DELAYED RELEASE ORAL at 04:30

## 2024-01-03 RX ADMIN — DOCUSATE SODIUM 100 MG: 100 CAPSULE, LIQUID FILLED ORAL at 09:39

## 2024-01-03 RX ADMIN — OXYBUTYNIN CHLORIDE 5 MG: 5 TABLET, EXTENDED RELEASE ORAL at 09:38

## 2024-01-03 RX ADMIN — LEVOTHYROXINE SODIUM 50 MCG: 50 TABLET ORAL at 04:30

## 2024-01-03 RX ADMIN — POTASSIUM CHLORIDE 40 MEQ: 750 TABLET, EXTENDED RELEASE ORAL at 09:38

## 2024-01-03 RX ADMIN — ATORVASTATIN CALCIUM 40 MG: 20 TABLET, FILM COATED ORAL at 09:38

## 2024-01-03 RX ADMIN — AMANTADINE HYDROCHLORIDE 100 MG: 100 CAPSULE ORAL at 09:39

## 2024-01-03 RX ADMIN — FERROUS SULFATE TAB 325 MG (65 MG ELEMENTAL FE) 325 MG: 325 (65 FE) TAB at 09:39

## 2024-01-03 RX ADMIN — DIVALPROEX SODIUM 500 MG: 500 TABLET, EXTENDED RELEASE ORAL at 21:19

## 2024-01-03 NOTE — PROGRESS NOTES
Name: Hernando Lozada ADMIT: 2024   : 1945  PCP: Sammie Gambino APRN    MRN: [id number] LOS: 0 days   AGE/SEX: 78 y.o. male  ROOM:      Subjective   Subjective   Patient seen at bedside.       Objective   Objective   Vital Signs  Temp:  [97.5 °F (36.4 °C)-98.6 °F (37 °C)] 98 °F (36.7 °C)  Heart Rate:  [51-77] 60  Resp:  [11-20] 16  BP: (123-171)/() 141/78  SpO2:  [90 %-100 %] 96 %  on   ;   Device (Oxygen Therapy): room air  Body mass index is 32.33 kg/m².  Physical Exam  General, awake and alert.  Head and ENT, normocephalic and atraumatic.  Lungs, symmetric expansion, equal air entry bilaterally.  Heart, regular rate and rhythm.  Abdomen, soft and nontender.  Extremities, no clubbing or cyanosis.  Neuro, no focal deficits.  Skin: Warm and no rash.  Psych, normal mood and affect.  Musculoskeletal, joint examination is grossly normal.      Results Review     I reviewed the patient's new clinical results.  Results from last 7 days   Lab Units 24  0807 24  1611   WBC 10*3/mm3 5.33 6.18   HEMOGLOBIN g/dL 12.5* 13.3   PLATELETS 10*3/mm3 143 165     Results from last 7 days   Lab Units 24  0807 24  1611   SODIUM mmol/L 143 138   POTASSIUM mmol/L 3.5 3.1*   CHLORIDE mmol/L 104 99   CO2 mmol/L 27.5 25.9   BUN mg/dL 17 16   CREATININE mg/dL 0.85 1.03   GLUCOSE mg/dL 91 129*   EGFR mL/min/1.73 90.1 75.3     Results from last 7 days   Lab Units 24  1611   ALBUMIN g/dL 3.9   BILIRUBIN mg/dL 1.0   ALK PHOS U/L 76   AST (SGOT) U/L 13   ALT (SGPT) U/L 15     Results from last 7 days   Lab Units 24  0807 24  1611   CALCIUM mg/dL 8.7 8.8   ALBUMIN g/dL  --  3.9       Glucose   Date/Time Value Ref Range Status   2024 0544 76 70 - 130 mg/dL Final   2024 2316 200 (H) 70 - 130 mg/dL Final       No radiology results for the last day    I have personally reviewed all medications:  Scheduled Medications  amantadine, 100 mg, Oral, Daily  aspirin, 81 mg, Oral,  Daily  atorvastatin, 40 mg, Oral, Daily  budesonide-formoterol, 2 puff, Inhalation, BID - RT  divalproex, 500 mg, Oral, BID  docusate sodium, 100 mg, Oral, Daily  ferrous sulfate, 325 mg, Oral, Daily With Breakfast  folic acid, 1 mg, Oral, Daily  [MAR Hold] insulin lispro, 2-7 Units, Subcutaneous, 4x Daily AC & at Bedtime  levothyroxine, 50 mcg, Oral, Q AM  lisinopril, 5 mg, Oral, Daily  oxybutynin XL, 5 mg, Oral, Daily  pantoprazole, 40 mg, Oral, Q AM  propranolol, 40 mg, Oral, BID  [MAR Hold] senna-docusate sodium, 2 tablet, Oral, BID  tamsulosin, 0.8 mg, Oral, Daily  ticagrelor, 90 mg, Oral, BID  cyanocobalamin, 1,000 mcg, Oral, Daily    Infusions   Diet  No diet orders on file    I have personally reviewed:  [x]  Laboratory   [x]  Microbiology   [x]  Radiology   [x]  EKG/Telemetry  [x]  Cardiology/Vascular   []  Pathology    []  Records       Assessment/Plan     Active Hospital Problems    Diagnosis  POA    **Chest pain [R07.9]  Yes    CAD (coronary artery disease) [I25.10]  Unknown    Chest pain with high risk of acute coronary syndrome [R07.9]  Unknown      Resolved Hospital Problems   No resolved problems to display.       78 y.o. male admitted with Chest pain.      Assessment and plan  78-year-old male with history of coronary disease, hypothyroidism, GERD, got admitted to the medicine service, with chest pain, cardiology has been consulted.  Upon further evaluation, plans have been made by cardiology service for further workup.  Will await recommendations.      Rigoberto Kirk MD  [facility]  01/03/24  15:42 EST

## 2024-01-03 NOTE — ED NOTES
Nursing report ED to floor  Hernando Lozada  78 y.o.  male    HPI :   Chief Complaint   Patient presents with    Chest Pain       Admitting doctor:   Shonna Stovall MD    Admitting diagnosis:   There were no encounter diagnoses.    Code status:   Current Code Status       Date Active Code Status Order ID Comments User Context       1/2/2024 1942 CPR (Attempt to Resuscitate) [id number]  Shonna Stovall MD ED        Question Answer    Code Status (Patient has no pulse and is not breathing) CPR (Attempt to Resuscitate)    Medical Interventions (Patient has pulse or is breathing) Full                    Allergies:   Patient has no known allergies.    Isolation:   No active isolations    Intake and Output  No intake or output data in the 24 hours ending 01/02/24 2037    Weight:   There were no vitals filed for this visit.    Most recent vitals:   Vitals:    01/02/24 1555 01/02/24 1931 01/02/24 2001 01/02/24 2031   BP: 142/86 147/79 166/87 171/81   Pulse: 64 51 51 55   Resp: 15      Temp: 98.6 °F (37 °C)      TempSrc: Tympanic      SpO2: 95% 92% 94% 92%       Active LDAs/IV Access:   Lines, Drains & Airways       Active LDAs       Name Placement date Placement time Site Days    Peripheral IV 01/02/24 1557 Left Antecubital 01/02/24 1557  Antecubital  less than 1                    Labs (abnormal labs have a star):   Labs Reviewed   COMPREHENSIVE METABOLIC PANEL - Abnormal; Notable for the following components:       Result Value    Glucose 129 (*)     Potassium 3.1 (*)     All other components within normal limits    Narrative:     GFR Normal >60  Chronic Kidney Disease <60  Kidney Failure <15    The GFR formula is only valid for adults with stable renal function between ages 18 and 70.   TROPONIN - Abnormal; Notable for the following components:    HS Troponin T 25 (*)     All other components within normal limits    Narrative:     High Sensitive Troponin T Reference Range:  <14.0 ng/L- Negative Female for  AMI  <22.0 ng/L- Negative Male for AMI  >=14 - Abnormal Female indicating possible myocardial injury.  >=22 - Abnormal Male indicating possible myocardial injury.   Clinicians would have to utilize clinical acumen, EKG, Troponin, and serial changes to determine if it is an Acute Myocardial Infarction or myocardial injury due to an underlying chronic condition.        CBC WITH AUTO DIFFERENTIAL - Abnormal; Notable for the following components:    Eosinophil % 0.2 (*)     All other components within normal limits   HIGH SENSITIVITIY TROPONIN T 2HR - Abnormal; Notable for the following components:    HS Troponin T 29 (*)     Troponin T Delta 4 (*)     All other components within normal limits    Narrative:     High Sensitive Troponin T Reference Range:  <14.0 ng/L- Negative Female for AMI  <22.0 ng/L- Negative Male for AMI  >=14 - Abnormal Female indicating possible myocardial injury.  >=22 - Abnormal Male indicating possible myocardial injury.   Clinicians would have to utilize clinical acumen, EKG, Troponin, and serial changes to determine if it is an Acute Myocardial Infarction or myocardial injury due to an underlying chronic condition.        RAINBOW DRAW    Narrative:     The following orders were created for panel order [place] Draw.  Procedure                               Abnormality         Status                     ---------                               -----------         ------                     Green Top (Gel)[[id number]]                                  Final result               Lavender Top[[id number]]                                     Final result               Gold Top - SST[[id number]]                                   Final result               Light Blue Top[[id number]]                                   Final result                 Please view results for these tests on the individual orders.   CBC AND DIFFERENTIAL    Narrative:     The following orders were created for panel order CBC &  Differential.  Procedure                               Abnormality         Status                     ---------                               -----------         ------                     CBC Auto Differential[[id number]]        Abnormal            Final result                 Please view results for these tests on the individual orders.   GREEN TOP   LAVENDER TOP   GOLD TOP - SST   LIGHT BLUE TOP       EKG:   ECG 12 Lead ED Triage Standing Order; Chest Pain   Final Result   HEART RATE= 57  bpm   RR Interval= 1053  ms   NV Interval= 189  ms   P Horizontal Axis= -6  deg   P Front Axis= 17  deg   QRSD Interval= 96  ms   QT Interval= 420  ms   QTcB= 409  ms   QRS Axis= -2  deg   T Wave Axis= 135  deg   - ABNORMAL ECG -   Sinus rhythm   Nonspecific T abnormalities, lateral leads   Rate has improved   Electronically Signed By: Mary Ann Mustafa ([facility]) 02-Jan-2024 16:24:30   Date and Time of Study: 2024-01-02 16:09:40          Meds given in ED:   Medications   sodium chloride 0.9 % flush 10 mL (has no administration in time range)   acetaminophen (TYLENOL) tablet 650 mg (has no administration in time range)   sennosides-docusate (PERICOLACE) 8.6-50 MG per tablet 2 tablet (has no administration in time range)     And   polyethylene glycol (MIRALAX) packet 17 g (has no administration in time range)     And   bisacodyl (DULCOLAX) EC tablet 5 mg (has no administration in time range)     And   bisacodyl (DULCOLAX) suppository 10 mg (has no administration in time range)   ondansetron ODT (ZOFRAN-ODT) disintegrating tablet 4 mg (has no administration in time range)     Or   ondansetron (ZOFRAN) injection 4 mg (has no administration in time range)   melatonin tablet 3 mg (has no administration in time range)   aspirin tablet 325 mg (325 mg Oral Given 1/2/24 1833)       Imaging results:  No radiology results for the last day    Ambulatory status:   - assist x 1. walker    Social issues:   Social History     Socioeconomic History     Marital status: Single   Tobacco Use    Smoking status: Never     Passive exposure: Never    Smokeless tobacco: Never   Vaping Use    Vaping Use: Never used   Substance and Sexual Activity    Alcohol use: No    Drug use: No    Sexual activity: Defer       NIH Stroke Scale:       Jaylin Crocker RN  01/02/24 20:37 EST
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
## 2024-01-03 NOTE — CONSULTS
Met with patient to discuss the benefits of cardiac rehab. Provided phase II information along with the contact information for cardiac rehab here at [facility]. Pt reports that he lives in a group home with two other individuals that have neurological issues. States that there is someone that is able to drive them to appointments. Will speak with them about the program.
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
## 2024-01-03 NOTE — ED PROVIDER NOTES
EMERGENCY DEPARTMENT ENCOUNTER    Room Number:  25/25  PCP: Sammie Gambino APRN  Historian: Patient      HPI:  Chief Complaint: Chest pain  Context: Hernando Lozada is a 78 y.o. male who presents to the ED c/o chest pain.  Patient states initially had pain yesterday in his left chest and neck.  Symptoms improved on their own.  Patient states pain recurred today and noted to be secondary to exertion.  Patient states he has a history of bypass and stents.  Patient is currently asymptomatic.          PAST MEDICAL HISTORY  Active Ambulatory Problems     Diagnosis Date Noted    DJD (degenerative joint disease) of knee 12/14/2017    Essential hypertension 02/18/2019    SOB (shortness of breath) 02/18/2019    Leg swelling 02/18/2019    Chest pain with high risk of acute coronary syndrome 04/29/2019    Hyperlipidemia LDL goal <100 08/23/2019    COVID-19 11/25/2022    Diabetes mellitus     GIOVANNY (obstructive sleep apnea)     Disease of thyroid gland     CKD (chronic kidney disease)     Hypomagnesemia     Chronic brain injury     Generalized weakness     CAD (coronary artery disease)     Pedal edema 03/06/2023    Tremor 03/06/2023    Elevated troponin 03/06/2023    Lower extremity cellulitis 03/06/2023    Tinea cruris 03/06/2023    Chronic deep vein thrombosis (DVT) of calf muscle vein of left lower extremity 03/07/2023    Nonrheumatic aortic valve stenosis 03/06/2023    Acute urinary tract infection 04/17/2023    Hypothyroidism 04/17/2023    Acute urinary retention 04/19/2023    Acute bacterial prostatitis 04/19/2023     Resolved Ambulatory Problems     Diagnosis Date Noted    No Resolved Ambulatory Problems     Past Medical History:   Diagnosis Date    Arthritis     Bilateral leg edema     GERD (gastroesophageal reflux disease)     Heart murmur     History of head injury     [place] (hard of hearing)     Hyperlipidemia     Hypertension     Irregular heart beat     Osteoarthritis     Past heart attack     SOB (shortness of breath)  on exertion     Stroke     Vasovagal episode          PAST SURGICAL HISTORY  Past Surgical History:   Procedure Laterality Date    CARDIAC CATHETERIZATION N/A 4/30/2019    Procedure: Coronary angiography with grafts;  Surgeon: Rio Miranda MD;  [facility];  Service: Cardiology    CARDIAC CATHETERIZATION N/A 4/30/2019    Procedure: Left Heart Cath;  Surgeon: Rio Miranda MD;  Location: [facility] CATH INVASIVE LOCATION;  Service: Cardiology    CARDIAC CATHETERIZATION N/A 4/30/2019    Procedure: Left ventriculography;  Surgeon: Rio Miranda MD;  Location: [facility] CATH INVASIVE LOCATION;  Service: Cardiology    CARDIAC CATHETERIZATION  4/30/2019    Procedure: Saphenous Vein Graft;  Surgeon: Rio Miranda MD;  Location: [facility] CATH INVASIVE LOCATION;  Service: Cardiology    CARDIAC CATHETERIZATION N/A 4/30/2019    Procedure: Stent GLENDY coronary;  Surgeon: Rio Miranda MD;  Location: [facility] CATH INVASIVE LOCATION;  Service: Cardiology    CARDIAC CATHETERIZATION N/A 3/10/2023    Procedure: Right and Left Heart Cath;  Surgeon: Rio Miranda MD;  Location: [facility] CATH INVASIVE LOCATION;  Service: Cardiology;  Laterality: N/A;    CARDIAC CATHETERIZATION N/A 3/10/2023    Procedure: Coronary angiography;  Surgeon: Rio Miranda MD;  Location: [facility] CATH INVASIVE LOCATION;  Service: Cardiology;  Laterality: N/A;    CARDIAC CATHETERIZATION N/A 3/10/2023    Procedure: Left ventriculography;  Surgeon: Rio Miranda MD;  Location: [facility] CATH INVASIVE LOCATION;  Service: Cardiology;  Laterality: N/A;    CARDIAC CATHETERIZATION N/A 3/10/2023    Procedure: Percutaneous Coronary Intervention;  Surgeon: Rio Miranda MD;  Location: [facility] CATH INVASIVE LOCATION;  Service: Cardiology;  Laterality: N/A;    CARDIAC CATHETERIZATION N/A 3/10/2023    Procedure: Stent GLENDY coronary;  Surgeon: Rio Miranda MD;  Location: [facility]  CATH INVASIVE LOCATION;  Service: Cardiology;  Laterality: N/A;    CARPAL TUNNEL RELEASE Bilateral     CATARACT EXTRACTION      CORONARY ARTERY BYPASS GRAFT  2002    FINGER SURGERY      MISSING LEFT POINTER FINGER TIP    KNEE ARTHROPLASTY Right 02/15/2017    [place] ARTHRP KNE CONDYLE&PLATU MEDIAL&LAT COMPARTMENTS Left 12/14/2017    Procedure: LT TOTAL KNEE ARTHROPLASTY;  Surgeon: Jatin Lancaster MD;  Location: [facility] MAIN OR;  Service: Orthopedics    [place] RT/LT HEART CATHETERS N/A 4/30/2019    Procedure: Percutaneous Coronary Intervention;  Surgeon: Rio Miranda MD;  Location: [facility] CATH INVASIVE LOCATION;  Service: Cardiology         FAMILY HISTORY  Family History   Problem Relation Age of Onset    Parkinsonism Sister     Diabetes Brother     Malig Hyperthermia Neg Hx          SOCIAL HISTORY  Social History     Socioeconomic History    Marital status: Single   Tobacco Use    Smoking status: Never     Passive exposure: Never    Smokeless tobacco: Never   Vaping Use    Vaping Use: Never used   Substance and Sexual Activity    Alcohol use: No    Drug use: No    Sexual activity: Defer         ALLERGIES  Patient has no known allergies.        REVIEW OF SYSTEMS  Review of Systems   Cardiovascular:  Positive for chest pain.        PHYSICAL EXAM  ED Triage Vitals [01/02/24 1555]   Temp Heart Rate Resp BP SpO2   98.6 °F (37 °C) 64 15 142/86 95 %      Temp src Heart Rate Source Patient Position BP Location FiO2 (%)   Tympanic -- -- -- --       Physical Exam      GENERAL: no acute distress  HENT: nares patent  EYES: no scleral icterus  CV: regular rhythm, normal rate  RESPIRATORY: normal effort  ABDOMEN: soft  MUSCULOSKELETAL: no deformity  NEURO: alert, moves all extremities, follows commands  PSYCH:  calm, cooperative  SKIN: warm, dry    Vital signs and nursing notes reviewed.          LAB RESULTS  Recent Results (from the past 24 hour(s))   ECG 12 Lead ED Triage Standing Order; Chest Pain    Collection Time: 01/02/24   4:09 PM   Result Value Ref Range    QT Interval 420 ms    QTC Interval 409 ms   Comprehensive Metabolic Panel    Collection Time: 01/02/24  4:11 PM    Specimen: Blood   Result Value Ref Range    Glucose 129 (H) 65 - 99 mg/dL    BUN 16 8 - 23 mg/dL    Creatinine 1.03 0.76 - 1.27 mg/dL    Sodium 138 136 - 145 mmol/L    Potassium 3.1 (L) 3.5 - 5.2 mmol/L    Chloride 99 98 - 107 mmol/L    CO2 25.9 22.0 - 29.0 mmol/L    Calcium 8.8 8.6 - 10.5 mg/dL    Total Protein 6.4 6.0 - 8.5 g/dL    Albumin 3.9 3.5 - 5.2 g/dL    ALT (SGPT) 15 1 - 41 U/L    AST (SGOT) 13 1 - 40 U/L    Alkaline Phosphatase 76 39 - 117 U/L    Total Bilirubin 1.0 0.0 - 1.2 mg/dL    Globulin 2.5 gm/dL    A/G Ratio 1.6 g/dL    BUN/Creatinine Ratio 15.5 7.0 - 25.0    Anion Gap 13.1 5.0 - 15.0 mmol/L    eGFR 75.3 >60.0 mL/min/1.73   High Sensitivity Troponin T    Collection Time: 01/02/24  4:11 PM    Specimen: Blood   Result Value Ref Range    HS Troponin T 25 (H) <22 ng/L   Green Top (Gel)    Collection Time: 01/02/24  4:11 PM   Result Value Ref Range    Extra Tube Hold for add-ons.    Lavender Top    Collection Time: 01/02/24  4:11 PM   Result Value Ref Range    Extra Tube hold for add-on    Gold Top - SST    Collection Time: 01/02/24  4:11 PM   Result Value Ref Range    Extra Tube Hold for add-ons.    Light Blue Top    Collection Time: 01/02/24  4:11 PM   Result Value Ref Range    Extra Tube Hold for add-ons.    CBC Auto Differential    Collection Time: 01/02/24  4:11 PM    Specimen: Blood   Result Value Ref Range    WBC 6.18 3.40 - 10.80 10*3/mm3    RBC 4.20 4.14 - 5.80 10*6/mm3    Hemoglobin 13.3 13.0 - 17.7 g/dL    Hematocrit 39.7 37.5 - 51.0 %    MCV 94.5 79.0 - 97.0 fL    MCH 31.7 26.6 - 33.0 pg    MCHC 33.5 31.5 - 35.7 g/dL    RDW 13.8 12.3 - 15.4 %    RDW-SD 48.6 37.0 - 54.0 fl    MPV 10.0 6.0 - 12.0 fL    Platelets 165 140 - 450 10*3/mm3    Neutrophil % 59.0 42.7 - 76.0 %    Lymphocyte % 29.0 19.6 - 45.3 %    Monocyte % 10.8 5.0 - 12.0 %     Eosinophil % 0.2 (L) 0.3 - 6.2 %    Basophil % 0.5 0.0 - 1.5 %    Immature Grans % 0.5 0.0 - 0.5 %    Neutrophils, Absolute 3.65 1.70 - 7.00 10*3/mm3    Lymphocytes, Absolute 1.79 0.70 - 3.10 10*3/mm3    Monocytes, Absolute 0.67 0.10 - 0.90 10*3/mm3    Eosinophils, Absolute 0.01 0.00 - 0.40 10*3/mm3    Basophils, Absolute 0.03 0.00 - 0.20 10*3/mm3    Immature Grans, Absolute 0.03 0.00 - 0.05 10*3/mm3    nRBC 0.0 0.0 - 0.2 /100 WBC   High Sensitivity Troponin T 2Hr    Collection Time: 01/02/24  6:38 PM    Specimen: Blood   Result Value Ref Range    HS Troponin T 29 (H) <22 ng/L    Troponin T Delta 4 (C) >=-4 - <+4 ng/L       Ordered the above labs and reviewed the results.        RADIOLOGY  XR Chest 1 View    Result Date: 1/2/2024  XR CHEST 1 VW-  Clinical: Chest pain protocol  COMPARISON examination 6/7/2023  FINDINGS: There are median sternotomy wires in position as before. The cardiomediastinal silhouette is stable. No effusion, edema or acute airspace disease has developed.  CONCLUSION: No active disease of the chest  This report was finalized on 1/2/2024 4:33 PM by Dr. Theo Delarosa M.D on Workstation: [id number]       Ordered the above noted radiological studies. Reviewed by me in PACS.            MEDICATIONS GIVEN IN ER  Medications   sodium chloride 0.9 % flush 10 mL (has no administration in time range)   acetaminophen (TYLENOL) tablet 650 mg (has no administration in time range)   sennosides-docusate (PERICOLACE) 8.6-50 MG per tablet 2 tablet (has no administration in time range)     And   polyethylene glycol (MIRALAX) packet 17 g (has no administration in time range)     And   bisacodyl (DULCOLAX) EC tablet 5 mg (has no administration in time range)     And   bisacodyl (DULCOLAX) suppository 10 mg (has no administration in time range)   ondansetron ODT (ZOFRAN-ODT) disintegrating tablet 4 mg (has no administration in time range)     Or   ondansetron (ZOFRAN) injection 4 mg (has no administration in time  range)   melatonin tablet 3 mg (has no administration in time range)   aspirin tablet 325 mg (325 mg Oral Given 1/2/24 1833)                   MEDICAL DECISION MAKING, PROGRESS, and CONSULTS    All labs have been independently reviewed by me.  All radiology studies have been reviewed by me and I have also reviewed the radiology report.   EKG's independently viewed and interpreted by me.  Discussion below represents my analysis of pertinent findings related to patient's condition, differential diagnosis, treatment plan and final disposition.      Additional sources:      - External (non-ED) record review: Discharge summary from 4/20/2023 reviewed and notable for admission secondary to his cystitis and prostatitis.  Patient received IV antibiotics and eventually able to be discharged home    - Chronic or social conditions impacting care: History of CAD    - Shared decision making: Utilizing shared decision-making techniques we elected to proceed with inpatient management      Orders placed during this visit:  Orders Placed This Encounter   Procedures    XR Chest 1 View    [place] Draw    Comprehensive Metabolic Panel    High Sensitivity Troponin T    CBC Auto Differential    High Sensitivity Troponin T 2Hr    Basic Metabolic Panel    CBC (No Diff)    Diet: Cardiac Diets; Healthy Heart (2-3 Na+); Texture: Regular Texture (IDDSI 7); Fluid Consistency: Thin (IDDSI 0)    Undress & Gown    Continuous Pulse Oximetry    Vital Signs    Up with assistance    Daily Weights    Strict Intake & Output    Oral Care    Place Sequential Compression Device    Maintain Sequential Compression Device    Code Status and Medical Interventions:    Oxygen Therapy- Nasal Cannula; Titrate 1-6 LPM Per SpO2; 90 - 95%    ECG 12 Lead ED Triage Standing Order; Chest Pain    ECG 12 Lead ED Triage Standing Order; Chest Pain    Insert Peripheral IV    Initiate Observation Status    CBC & Differential    Green Top (Gel)    Lavender Top    Gold Top -  SST    Light Blue Top           Differential diagnosis includes but is not limited to:    ACS, MSK causes, reflux      Independent interpretation of labs, radiology studies, and discussions with consultants:  ED Course as of 01/02/24 2211 Tue Jan 02, 2024 2210 Chest x-ray interpreted by me and demonstrates no evidence of infiltrate or consolidation [MW]   2210 EKG interpreted by me demonstrates sinus rhythm, rate of 57, no TN/QT prolongation, no ST elevation [MW]   2211 Discussed with Dr. Stovall who agrees to admit [MW]      ED Course User Index  [MW] Axel Najera MD           DIAGNOSIS  Final diagnoses:   Chest pain, unspecified type         DISPOSITION  ED Disposition       ED Disposition   Decision to Admit    Condition   --    Comment   Level of Care: Telemetry [5]   Diagnosis: Chest pain [[id number]]   Admitting Physician: MIQUEL STOVALL [[id number]]   Attending Physician: MIQUEL STOVALL [[id number]]                           Latest Documented Vital Signs:  As of 22:11 EST  BP- 140/80 HR- 74 Temp- 98.6 °F (37 °C) (Tympanic) O2 sat- 92%              --    Please note that portions of this were completed with a voice recognition program.       Note Disclaimer: At [facility], we believe that sharing information builds trust and better relationships. You are receiving this note because you are receiving care at [facility] or recently visited. It is possible you will see health information before a provider has talked with you about it. This kind of information can be easy to misunderstand. To help you fully understand what it means for your health, we urge you to discuss this note with your provider.             Axel Najera MD  01/02/24 2212

## 2024-01-03 NOTE — PLAN OF CARE
Goal Outcome Evaluation:  Plan of Care Reviewed With: patient        Progress: no change  Outcome Evaluation: New admit, denies chest pain. Stand by assist to bathroom. Can be incontinent at times.

## 2024-01-04 ENCOUNTER — READMISSION MANAGEMENT (OUTPATIENT)
Dept: CALL CENTER | Facility: HOSPITAL | Age: 79
End: 2024-01-04
Payer: MEDICARE

## 2024-01-04 VITALS
RESPIRATION RATE: 18 BRPM | HEIGHT: 69 IN | TEMPERATURE: 98.6 F | WEIGHT: 229.94 LBS | OXYGEN SATURATION: 96 % | HEART RATE: 61 BPM | DIASTOLIC BLOOD PRESSURE: 93 MMHG | BODY MASS INDEX: 34.06 KG/M2 | SYSTOLIC BLOOD PRESSURE: 144 MMHG

## 2024-01-04 PROBLEM — R07.9 CHEST PAIN WITH HIGH RISK OF ACUTE CORONARY SYNDROME: Status: RESOLVED | Noted: 2019-04-29 | Resolved: 2024-01-04

## 2024-01-04 PROBLEM — R07.9 CHEST PAIN: Status: RESOLVED | Noted: 2024-01-02 | Resolved: 2024-01-04

## 2024-01-04 PROBLEM — R07.9 CHEST PAIN: Status: ACTIVE | Noted: 2024-01-04

## 2024-01-04 PROBLEM — R07.9 CHEST PAIN: Status: RESOLVED | Noted: 2024-01-04 | Resolved: 2024-01-04

## 2024-01-04 LAB
ANION GAP SERPL CALCULATED.3IONS-SCNC: 9 MMOL/L (ref 5–15)
BUN SERPL-MCNC: 13 MG/DL (ref 8–23)
BUN/CREAT SERPL: 10.7 (ref 7–25)
CALCIUM SPEC-SCNC: 8.5 MG/DL (ref 8.6–10.5)
CHLORIDE SERPL-SCNC: 108 MMOL/L (ref 98–107)
CO2 SERPL-SCNC: 24 MMOL/L (ref 22–29)
CREAT SERPL-MCNC: 1.21 MG/DL (ref 0.76–1.27)
EGFRCR SERPLBLD CKD-EPI 2021: 62.1 ML/MIN/1.73
GLUCOSE BLDC GLUCOMTR-MCNC: 102 MG/DL (ref 70–130)
GLUCOSE BLDC GLUCOMTR-MCNC: 107 MG/DL (ref 70–130)
GLUCOSE SERPL-MCNC: 113 MG/DL (ref 65–99)
POTASSIUM SERPL-SCNC: 3.6 MMOL/L (ref 3.5–5.2)
QT INTERVAL: 403 MS
QTC INTERVAL: 396 MS
SODIUM SERPL-SCNC: 141 MMOL/L (ref 136–145)

## 2024-01-04 PROCEDURE — 82948 REAGENT STRIP/BLOOD GLUCOSE: CPT

## 2024-01-04 PROCEDURE — 93005 ELECTROCARDIOGRAM TRACING: CPT | Performed by: INTERNAL MEDICINE

## 2024-01-04 PROCEDURE — 99232 SBSQ HOSP IP/OBS MODERATE 35: CPT

## 2024-01-04 PROCEDURE — 94761 N-INVAS EAR/PLS OXIMETRY MLT: CPT

## 2024-01-04 PROCEDURE — 80048 BASIC METABOLIC PNL TOTAL CA: CPT | Performed by: INTERNAL MEDICINE

## 2024-01-04 PROCEDURE — 94799 UNLISTED PULMONARY SVC/PX: CPT

## 2024-01-04 PROCEDURE — 94664 DEMO&/EVAL PT USE INHALER: CPT

## 2024-01-04 RX ORDER — LEVOTHYROXINE SODIUM 0.03 MG/1
50 TABLET ORAL
Start: 2024-01-04 | End: 2024-01-04 | Stop reason: SDUPTHER

## 2024-01-04 RX ORDER — FLUTICASONE PROPIONATE 50 MCG
2 SPRAY, SUSPENSION (ML) NASAL DAILY
Qty: 16 G | Refills: 0 | Status: SHIPPED | OUTPATIENT
Start: 2024-01-04

## 2024-01-04 RX ORDER — NITROGLYCERIN 0.4 MG/1
0.4 TABLET SUBLINGUAL
Qty: 100 TABLET | Refills: 0
Start: 2024-01-04 | End: 2024-01-04 | Stop reason: SDUPTHER

## 2024-01-04 RX ORDER — LEVOTHYROXINE SODIUM 0.03 MG/1
25 TABLET ORAL
Qty: 30 TABLET | Refills: 0 | Status: SHIPPED | OUTPATIENT
Start: 2024-01-04

## 2024-01-04 RX ORDER — POTASSIUM CHLORIDE 750 MG/1
40 TABLET, FILM COATED, EXTENDED RELEASE ORAL EVERY 4 HOURS
Status: COMPLETED | OUTPATIENT
Start: 2024-01-04 | End: 2024-01-04

## 2024-01-04 RX ORDER — FLUTICASONE PROPIONATE AND SALMETEROL 500; 50 UG/1; UG/1
1 POWDER RESPIRATORY (INHALATION)
Qty: 1 EACH | Refills: 0 | Status: SHIPPED | OUTPATIENT
Start: 2024-01-04

## 2024-01-04 RX ORDER — NITROGLYCERIN 0.4 MG/1
0.4 TABLET SUBLINGUAL
Start: 2024-01-04 | End: 2024-01-04 | Stop reason: SDUPTHER

## 2024-01-04 RX ORDER — FLUTICASONE PROPIONATE 50 MCG
2 SPRAY, SUSPENSION (ML) NASAL DAILY
Start: 2024-01-04 | End: 2024-01-04 | Stop reason: SDUPTHER

## 2024-01-04 RX ORDER — FLUTICASONE PROPIONATE AND SALMETEROL 500; 50 UG/1; UG/1
1 POWDER RESPIRATORY (INHALATION)
Start: 2024-01-04 | End: 2024-01-04 | Stop reason: SDUPTHER

## 2024-01-04 RX ORDER — NITROGLYCERIN 0.4 MG/1
0.4 TABLET SUBLINGUAL
Qty: 30 TABLET | Refills: 0 | Status: SHIPPED | OUTPATIENT
Start: 2024-01-04

## 2024-01-04 RX ADMIN — TAMSULOSIN HYDROCHLORIDE 0.8 MG: 0.4 CAPSULE ORAL at 08:17

## 2024-01-04 RX ADMIN — POTASSIUM CHLORIDE 40 MEQ: 750 TABLET, EXTENDED RELEASE ORAL at 08:23

## 2024-01-04 RX ADMIN — ASPIRIN 81 MG: 81 TABLET, COATED ORAL at 08:17

## 2024-01-04 RX ADMIN — FERROUS SULFATE TAB 325 MG (65 MG ELEMENTAL FE) 325 MG: 325 (65 FE) TAB at 08:17

## 2024-01-04 RX ADMIN — BUDESONIDE AND FORMOTEROL FUMARATE DIHYDRATE 2 PUFF: 160; 4.5 AEROSOL RESPIRATORY (INHALATION) at 07:05

## 2024-01-04 RX ADMIN — LISINOPRIL 5 MG: 10 TABLET ORAL at 08:17

## 2024-01-04 RX ADMIN — PROPRANOLOL HYDROCHLORIDE 40 MG: 40 TABLET ORAL at 08:17

## 2024-01-04 RX ADMIN — ATORVASTATIN CALCIUM 40 MG: 20 TABLET, FILM COATED ORAL at 08:16

## 2024-01-04 RX ADMIN — POTASSIUM CHLORIDE 40 MEQ: 750 TABLET, EXTENDED RELEASE ORAL at 06:49

## 2024-01-04 RX ADMIN — TICAGRELOR 90 MG: 90 TABLET ORAL at 08:22

## 2024-01-04 RX ADMIN — PANTOPRAZOLE SODIUM 40 MG: 40 TABLET, DELAYED RELEASE ORAL at 06:49

## 2024-01-04 RX ADMIN — DOCUSATE SODIUM 100 MG: 100 CAPSULE, LIQUID FILLED ORAL at 08:17

## 2024-01-04 RX ADMIN — ACETAMINOPHEN 650 MG: 325 TABLET, FILM COATED ORAL at 08:56

## 2024-01-04 RX ADMIN — Medication 1000 MCG: at 08:17

## 2024-01-04 RX ADMIN — OXYBUTYNIN CHLORIDE 5 MG: 5 TABLET, EXTENDED RELEASE ORAL at 08:17

## 2024-01-04 RX ADMIN — DIVALPROEX SODIUM 500 MG: 500 TABLET, EXTENDED RELEASE ORAL at 08:17

## 2024-01-04 RX ADMIN — FOLIC ACID 1 MG: 1 TABLET ORAL at 08:18

## 2024-01-04 RX ADMIN — LEVOTHYROXINE SODIUM 50 MCG: 50 TABLET ORAL at 08:18

## 2024-01-04 RX ADMIN — AMANTADINE HYDROCHLORIDE 100 MG: 100 CAPSULE ORAL at 08:18

## 2024-01-04 NOTE — CASE MANAGEMENT/SOCIAL WORK
Continued Stay Note  [facility]     Patient Name: Hernando Lozada  MRN: [id number]  Today's Date: 1/4/2024    Admit Date: 1/2/2024        Discharge Plan       Row Name 01/04/24 1606       Plan    Plan Comments Pt is from a neurorestorative group home.  [facility] called Pt /Frieda Mota [phone] and she advised Pt is to return there at discharge and they (group home) will provide the transportation.  RN called report to nursing at [phone]................Jossy NUÑEZ/NINO      Row Name 01/04/24 1605       Plan    Final Discharge Disposition Code 01 - home or self-care    Final Note Pt discharged home……..Jossy NUÑEZ/DESMOND OG                   Discharge Codes    No documentation.                 Expected Discharge Date and Time       Expected Discharge Date Expected Discharge Time    Jan 4, 2024               Jossy Ashby RN

## 2024-01-04 NOTE — PAYOR COMM NOTE
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 believe that sharing information builds trust and better relationships. You are receiving this note because you are receiving care at [facility] or recently visited. It is possible you will see health information before a provider has talked with you about it. This kind of information can be easy to misunderstand. To help you fully understand what it means for your health, we urge you to discuss this note with your provider.             Axel Najera MD  01/02/24 2212      Electronically signed by Axel Najera MD at 01/02/24 2212       Jaylin Crocker, RN at 01/02/24 2037          Nursing report ED to floor  Hern[place] VERONICA Lozada  78 y.o.  male    HPI :   Chief Complaint   Patient presents  with    Chest Pain       Admitting doctor:   Shonna Stovall MD    Admitting diagnosis:   There were no encounter diagnoses.    Code status:   Current Code Status       Date Active Code Status Order ID Comments User Context       1/2/2024 1942 CPR (Attempt to Resuscitate) [id number]  Shonna Stovall MD ED        Question Answer    Code Status (Patient has no pulse and is not breathing) CPR (Attempt to Resuscitate)    Medical Interventions (Patient has pulse or is breathing) Full                    Allergies:   Patient has no known allergies.    Isolation:   No active isolations    Intake and Output  No intake or output data in the 24 hours ending 01/02/24 2037    Weight:   There were no vitals filed for this visit.    Most recent vitals:   Vitals:    01/02/24 1555 01/02/24 1931 01/02/24 2001 01/02/24 2031   BP: 142/86 147/79 166/87 171/81   Pulse: 64 51 51 55   Resp: 15      Temp: 98.6 °F (37 °C)      TempSrc: Tympanic      SpO2: 95% 92% 94% 92%       Active LDAs/IV Access:   Lines, Drains & Airways       Active LDAs       Name Placement date Placement time Site Days    Peripheral IV 01/02/24 1557 Left Antecubital 01/02/24 1557  Antecubital  less than 1                    Labs (abnormal labs have a star):   Labs Reviewed   COMPREHENSIVE METABOLIC PANEL - Abnormal; Notable for the following components:       Result Value    Glucose 129 (*)     Potassium 3.1 (*)     All other components within normal limits    Narrative:     GFR Normal >60  Chronic Kidney Disease <60  Kidney Failure <15    The GFR formula is only valid for adults with stable renal function between ages 18 and 70.   TROPONIN - Abnormal; Notable for the following components:    HS Troponin T 25 (*)     All other components within normal limits    Narrative:     High Sensitive Troponin T Reference Range:  <14.0 ng/L- Negative Female for AMI  <22.0 ng/L- Negative Male for AMI  >=14 - Abnormal Female indicating possible myocardial  injury.  >=22 - Abnormal Male indicating possible myocardial injury.   Clinicians would have to utilize clinical acumen, EKG, Troponin, and serial changes to determine if it is an Acute Myocardial Infarction or myocardial injury due to an underlying chronic condition.        CBC WITH AUTO DIFFERENTIAL - Abnormal; Notable for the following components:    Eosinophil % 0.2 (*)     All other components within normal limits   HIGH SENSITIVITIY TROPONIN T 2HR - Abnormal; Notable for the following components:    HS Troponin T 29 (*)     Troponin T Delta 4 (*)     All other components within normal limits    Narrative:     High Sensitive Troponin T Reference Range:  <14.0 ng/L- Negative Female for AMI  <22.0 ng/L- Negative Male for AMI  >=14 - Abnormal Female indicating possible myocardial injury.  >=22 - Abnormal Male indicating possible myocardial injury.   Clinicians would have to utilize clinical acumen, EKG, Troponin, and serial changes to determine if it is an Acute Myocardial Infarction or myocardial injury due to an underlying chronic condition.        RAINBOW DRAW    Narrative:     The following orders were created for panel order [place] Draw.  Procedure                               Abnormality         Status                     ---------                               -----------         ------                     Green Top (Gel)[[id number]]                                  Final result               Lavender Top[[id number]]                                     Final result               Gold Top - SST[[id number]]                                   Final result               Light Blue Top[[id number]]                                   Final result                 Please view results for these tests on the individual orders.   CBC AND DIFFERENTIAL    Narrative:     The following orders were created for panel order CBC & Differential.  Procedure                               Abnormality         Status                      ---------                               -----------         ------                     CBC Auto Differential[[id number]]        Abnormal            Final result                 Please view results for these tests on the individual orders.   GREEN TOP   LAVENDER TOP   GOLD TOP - SST   LIGHT BLUE TOP       EKG:   ECG 12 Lead ED Triage Standing Order; Chest Pain   Final Result   HEART RATE= 57  bpm   RR Interval= 1053  ms   NH Interval= 189  ms   P Horizontal Axis= -6  deg   P Front Axis= 17  deg   QRSD Interval= 96  ms   QT Interval= 420  ms   QTcB= 409  ms   QRS Axis= -2  deg   T Wave Axis= 135  deg   - ABNORMAL ECG -   Sinus rhythm   Nonspecific T abnormalities, lateral leads   Rate has improved   Electronically Signed By: Mary Ann Mustafa ([facility]) 02-Jan-2024 16:24:30   Date and Time of Study: 2024-01-02 16:09:40          Meds given in ED:   Medications   sodium chloride 0.9 % flush 10 mL (has no administration in time range)   acetaminophen (TYLENOL) tablet 650 mg (has no administration in time range)   sennosides-docusate (PERICOLACE) 8.6-50 MG per tablet 2 tablet (has no administration in time range)     And   polyethylene glycol (MIRALAX) packet 17 g (has no administration in time range)     And   bisacodyl (DULCOLAX) EC tablet 5 mg (has no administration in time range)     And   bisacodyl (DULCOLAX) suppository 10 mg (has no administration in time range)   ondansetron ODT (ZOFRAN-ODT) disintegrating tablet 4 mg (has no administration in time range)     Or   ondansetron (ZOFRAN) injection 4 mg (has no administration in time range)   melatonin tablet 3 mg (has no administration in time range)   aspirin tablet 325 mg (325 mg Oral Given 1/2/24 8393)       Imaging results:  No radiology results for the last day    Ambulatory status:   - assist x 1. walker    Social issues:   Social History     Socioeconomic History    Marital status: Single   Tobacco Use    Smoking status: Never     Passive exposure: Never     Smokeless tobacco: Never   Vaping Use    Vaping Use: Never used   Substance and Sexual Activity    Alcohol use: No    Drug use: No    Sexual activity: Defer       NIH Stroke Scale:       Jaylin Crocker RN  01/02/24 20:37 EST          Electronically signed by Jaylin Crocker, RN at 01/02/24 2040       Jazlyn Beard, RN at 01/02/24 1555          C/O CP onset last PM that radiates to LT arm. 324 ASA given per EMS    Electronically signed by Jazlyn Beard, RN at 01/02/24 1558       Vital Signs (last 7 days)       Date/Time Temp Temp src Pulse Resp BP Patient Position SpO2    01/04/24 1224 98.6 (37) Oral -- 18 144/93 Lying --    01/04/24 0801 97.6 (36.4) Oral 61 18 137/73 Sitting --    01/04/24 0706 -- -- 55 18 -- -- 96    01/04/24 0705 -- -- 53 18 -- -- 97    01/04/24 0334 98.1 (36.7) Oral 63 16 134/89 Lying 98    01/04/24 0049 98 (36.7) Oral 64 16 118/75 Lying 96    01/03/24 2120 -- -- -- -- 114/65 -- --    01/03/24 1954 98.2 (36.8) Oral 65 16 113/67 Lying 94    01/03/24 1600 98.3 (36.8) Oral 69 16 95/62 Lying 95    01/03/24 1400 -- -- 60 -- 141/78 -- 96    01/03/24 1330 -- -- 54 -- 123/85 -- 99    01/03/24 1300 98 (36.7) Oral 59 -- 140/66 Lying 99    01/03/24 1230 -- -- 60 -- 150/69 -- 97    01/03/24 1220 -- -- 57 -- 156/85 -- --    01/03/24 1219 -- -- -- -- -- Lying --    01/03/24 1215 -- -- 65 -- 156/123 -- 100    01/03/24 11:59:27 -- -- -- 16 -- -- --    01/03/24 11:54:29 -- -- -- 20 -- -- --    01/03/24 11:49:53 -- -- -- 11 -- -- --    01/03/24 11:45:05 -- -- -- 16 -- -- --    01/03/24 11:40:53 -- -- -- 16 -- -- --    01/03/24 11:36:34 -- -- -- 14 -- -- --    01/03/24 11:32:13 -- -- -- 14 -- -- --    01/03/24 11:27:18 -- -- -- 14 -- -- 93 01/03/24 11:22:38 -- -- -- 14 -- -- 92 01/03/24 11:17:25 -- -- -- 16 -- -- --    01/03/24 11:12:10 -- -- 76 17 -- -- 92 01/03/24 0759 97.5 (36.4) Oral 51 16 126/62 Lying --    01/02/24 2303 98.1 (36.7) Oral 71 16 127/95 Lying 96    01/02/24 2201 -- -- 77  -- 126/74 -- 90    01/02/24 2131 -- -- 74 -- 140/80 -- 92    01/02/24 2102 -- -- 66 -- -- -- --    01/02/24 2101 -- -- -- -- 150/97 -- --    01/02/24 2031 -- -- 55 -- 171/81 -- 92    01/02/24 2001 -- -- 51 -- 166/87 -- 94    01/02/24 1931 -- -- 51 -- 147/79 -- 92    01/02/24 1555 98.6 (37) Tympanic 64 15 142/86 -- 95          Oxygen Therapy (last 7 days)       Date/Time SpO2 Device (Oxygen Therapy) Flow (L/min) Oxygen Concentration (%) ETCO2 (mmHg)    01/04/24 0817 -- room air -- -- --    01/04/24 0706 96 room air -- -- --    01/04/24 0705 97 room air -- -- --    01/04/24 0334 98 -- -- -- --    01/04/24 0049 96 -- -- -- --    01/04/24 0030 -- room air -- -- --    01/03/24 2212 -- room air -- -- --    01/03/24 1954 94 -- -- -- --    01/03/24 1605 -- room air -- -- --    01/03/24 1600 95 -- -- -- --    01/03/24 1400 96 -- -- -- --    01/03/24 1330 99 -- -- -- --    01/03/24 1300 99 room air -- -- --    01/03/24 1230 97 -- -- -- --    01/03/24 1219 -- room air -- -- --    01/03/24 1215 100 -- -- -- --    01/03/24 11:40:53 -- -- -- -- 23 01/03/24 11:36:34 -- -- -- -- 22 01/03/24 11:32:13 -- -- -- -- 21 01/03/24 11:27:18 93 -- -- -- 25    01/03/24 11:22:38 92 -- -- -- 23    01/03/24 11:17:25 -- -- -- -- 21    01/03/24 11:12:10 92 -- -- -- --    01/02/24 2312 -- room air -- -- --    01/02/24 2303 96 room air -- -- --    01/02/24 2201 90 -- -- -- --    01/02/24 2131 92 -- -- -- --    01/02/24 2031 92 -- -- -- --    01/02/24 2001 94 -- -- -- --    01/02/24 1931 92 -- -- -- --    01/02/24 1555 95 room air -- -- --          Facility-Administered Medications as of 1/4/2024   Medication Dose Route Frequency Provider Last Rate Last Admin    [MAR Hold] acetaminophen (TYLENOL) tablet 650 mg  650 mg Oral Q4H PRN Shonna Stovall MD        acetaminophen (TYLENOL) tablet 650 mg  650 mg Oral Q4H PRN Rio Miranda MD   650 mg at 01/04/24 0856    amantadine (SYMMETREL) capsule 100 mg  100 mg Oral Daily Stingl,  Shonna Philip MD   100 mg at 01/04/24 0818    aspirin EC tablet 81 mg  81 mg Oral Daily Shonna Stovall MD   81 mg at 01/04/24 0817    [COMPLETED] aspirin tablet 325 mg  325 mg Oral Once Axel Najera MD   325 mg at 01/02/24 1833    atorvastatin (LIPITOR) tablet 40 mg  40 mg Oral Daily Shonna Stovall MD   40 mg at 01/04/24 0816    atropine sulfate injection 0.5 mg  0.5 mg Intravenous Q5 Min PRN Rio Miranda MD        [MAR Hold] sennosides-docusate (PERICOLACE) 8.6-50 MG per tablet 2 tablet  2 tablet Oral BID Shonna Stovall MD   2 tablet at 01/03/24 0939    And    [MAR Hold] polyethylene glycol (MIRALAX) packet 17 g  17 g Oral Daily PRN Shonna Stovall MD        And    [MAR Hold] bisacodyl (DULCOLAX) EC tablet 5 mg  5 mg Oral Daily PRN Shonna Stovall MD        And    [MAR Hold] bisacodyl (DULCOLAX) suppository 10 mg  10 mg Rectal Daily PRN Shonna Stovall MD        budesonide-formoterol (SYMBICORT) 160-4.5 MCG/ACT inhaler 2 puff  2 puff Inhalation BID - RT Shonna Stovall MD   2 puff at 01/04/24 0705    [MAR Hold] Calcium Replacement - Follow Nurse / BPA Driven Protocol   Does not apply PRN Shonna Stovall MD        [MAR Hold] dextrose (D50W) (25 g/50 mL) IV injection 25 g  25 g Intravenous Q15 Min PRN Shonna Stovall MD        [MAR Hold] dextrose (GLUTOSE) oral gel 15 g  15 g Oral Q15 Min PRN Shonna Stovall MD        divalproex (DEPAKOTE ER) 24 hr tablet 500 mg  500 mg Oral BID Shonna Stovall MD   500 mg at 01/04/24 0817    docusate sodium (COLACE) capsule 100 mg  100 mg Oral Daily Shonna Stovall MD   100 mg at 01/04/24 0817    ferrous sulfate tablet 325 mg  325 mg Oral Daily With Breakfast Shonna Stovall MD   325 mg at 01/04/24 0817    folic acid (FOLVITE) tablet 1 mg  1 mg Oral Daily Shonna Stovall MD   1 mg at 01/04/24 0818    [MAR Hold] glucagon (GLUCAGEN) injection 1 mg  1 mg Intramuscular  Q15 Min PRN Shonna Stovall MD        [MAR Hold] insulin lispro (HUMALOG/ADMELOG) injection 2-7 Units  2-7 Units Subcutaneous 4x Daily AC & at Bedtime Shonna Stovall MD   2 Units at 01/02/24 2326    levothyroxine (SYNTHROID, LEVOTHROID) tablet 50 mcg  50 mcg Oral Q AM Shonna Stovall MD   50 mcg at 01/04/24 0818    lisinopril (PRINIVIL,ZESTRIL) tablet 5 mg  5 mg Oral Daily Shonna Stovall MD   5 mg at 01/04/24 0817    [MAR Hold] Magnesium Standard Dose Replacement - Follow Nurse / BPA Driven Protocol   Does not apply PRN Shonna Stovall MD        [MAR Hold] melatonin tablet 3 mg  3 mg Oral Nightly PRN Shonna Stovall MD        nitroglycerin (NITROSTAT) SL tablet 0.4 mg  0.4 mg Sublingual Q5 Min PRN Rio Miranda MD        [MAR Hold] ondansetron ODT (ZOFRAN-ODT) disintegrating tablet 4 mg  4 mg Oral Q6H PRN Shonna Stovall MD        Or    [MAR Hold] ondansetron (ZOFRAN) injection 4 mg  4 mg Intravenous Q6H PRN Shonna Stovall MD        oxybutynin XL (DITROPAN-XL) 24 hr tablet 5 mg  5 mg Oral Daily Shonna Stovall MD   5 mg at 01/04/24 0817    pantoprazole (PROTONIX) EC tablet 40 mg  40 mg Oral Q AM Shonna Stovall MD   40 mg at 01/04/24 0649    [MAR Hold] Phosphorus Replacement - Follow Nurse / BPA Driven Protocol   Does not apply PRN Shonna Stovall MD        [COMPLETED] potassium chloride (K-DUR,KLOR-CON) ER tablet 40 mEq  40 mEq Oral Q4H Shonna Stovall MD   40 mEq at 01/03/24 0938    [COMPLETED] potassium chloride (K-DUR,KLOR-CON) ER tablet 40 mEq  40 mEq Oral Q4H Rio Miranda MD   40 mEq at 01/04/24 0823    Potassium Replacement - Follow Nurse / BPA Driven Protocol   Does not apply PRN Shonna Stovall MD        propranolol (INDERAL) tablet 40 mg  40 mg Oral BID Shonna Stovall MD   40 mg at 01/04/24 0817    [MAR Hold] sodium chloride 0.9 % flush 10 mL  10 mL Intravenous PRN Axel Najera MD         sodium chloride 0.9 % infusion 250 mL  250 mL Intravenous Once PRN Rio Miranda MD        [COMPLETED] sodium chloride 0.9 % infusion    Code / Trauma / Sedation Continuous Med Rio Miranda MD   Stopped at 01/03/24 1335    tamsulosin (FLOMAX) 24 hr capsule 0.8 mg  0.8 mg Oral Daily Shonna Stovall MD   0.8 mg at 01/04/24 0817    ticagrelor (BRILINTA) tablet 90 mg  90 mg Oral BID Shonna Stovall MD   90 mg at 01/04/24 0822    vitamin B-12 (CYANOCOBALAMIN) tablet 1,000 mcg  1,000 mcg Oral Daily Shonna Stovall MD   1,000 mcg at 01/04/24 0817     Orders (last 7 days)        Start     Ordered    01/04/24 1358  Potassium  Timed         01/04/24 0457    01/04/24 1134  POC Glucose Once  PROCEDURE ONCE        Comments: Complete no more than 45 minutes prior to patient eating      01/04/24 1132    01/04/24 1024  Discharge patient  Once         01/04/24 1025    01/04/24 1023  Inpatient Admission  Once         01/04/24 1022    01/04/24 1022  Initiate Observation Status  Once         01/04/24 1021    01/04/24 0846  Discontinue IV  Once         01/04/24 0846    01/04/24 0846  Obtain Discharge Clearance  Until Discontinued         01/04/24 0846    01/04/24 0844  Discharge patient  Once,   Status:  Canceled         01/04/24 0846    01/04/24 0826  ECG 12 Lead Bradycardia  STAT         01/04/24 0825    01/04/24 0613  POC Glucose Once  PROCEDURE ONCE        Comments: Complete no more than 45 minutes prior to patient eating      01/04/24 0610    01/04/24 0600  Basic Metabolic Panel  Morning Draw         01/03/24 1229    01/04/24 0545  potassium chloride (K-DUR,KLOR-CON) ER tablet 40 mEq  Every 4 Hours         01/04/24 0457    01/04/24 0000  Activity as Tolerated         01/04/24 0846    01/04/24 0000  Diet: Cardiac Diets; Healthy Heart (2-3 Na+); Regular Texture (IDDSI 7); Thin (IDDSI 0)         01/04/24 0846    01/04/24 0000  Fluticasone-Salmeterol (ADVAIR/WIXELA) 500-50 MCG/ACT DISKUS  2  Times Daily - RT         01/04/24 0846    01/03/24 2009  POC Glucose Once  PROCEDURE ONCE        Comments: Complete no more than 45 minutes prior to patient eating      01/03/24 1956    01/03/24 1746  Diet: Cardiac Diets; Healthy Heart (2-3 Na+); Texture: Regular Texture (IDDSI 7); Fluid Consistency: Thin (IDDSI 0)  Diet Effective Now         01/03/24 1746    01/03/24 1611  POC Glucose Once  PROCEDURE ONCE        Comments: Complete no more than 45 minutes prior to patient eating      01/03/24 1609    01/03/24 1600  Strict Intake & Output  Every 4 Hours       01/03/24 1229    01/03/24 1542  Discontinue IV  Once,   Status:  Canceled         01/03/24 1541    01/03/24 1400  Incentive Spirometry  Every 2 Hours While Awake       01/03/24 1229    01/03/24 1230  Vital Signs, Site Check & Distal Extremity Check for Warmth, Color, Sensation & Pulses  Per Order Details         01/03/24 1229    01/03/24 1230  Continuous Cardiac Monitoring  Continuous        Comments: Follow Standing Orders As Outlined in Process Instructions (Open Order Report to View Full Instructions)    01/03/24 1229    01/03/24 1230  Telemetry - Maintain IV Access  Continuous         01/03/24 1229    01/03/24 1230  Telemetry - Place Orders & Notify Provider of Results When Patient Experiences Acute Chest Pain, Dysrhythmia or Respiratory Distress  Until Discontinued         01/03/24 1229    01/03/24 1230  May Be Off Telemetry for Tests  Continuous         01/03/24 1229    01/03/24 1230  Encourage Fluids  Until Discontinued         01/03/24 1229    01/03/24 1230  Continuous Pulse Oximetry  Continuous         01/03/24 1229    01/03/24 1230  Advance Diet As Tolerated -  Until Discontinued         01/03/24 1229    01/03/24 1230  Notify MD if platelet count is less than 100,000, is less than 1/2 baseline, or if Hgb drops by more than 3mg/dl.  Until Discontinued         01/03/24 1229    01/03/24 1230  Notify MD of hypotension (SBP less than 95), bleeding, or  dysrythmia and follow Sheath Removal Policy if needed.  Continuous         01/03/24 1229    01/03/24 1230  Hold metFORMIN (GLUCOPHAGE) for 48 Hours  Continuous         01/03/24 1229    01/03/24 1230  Assess Puncture Site, Vital SIgns, Distal Pulses & Observe Site for Bleeding or Swelling  Per Order Details        Comments: Every 15 Minutes x4, Every 30 Minutes x4, & Every 1 Hour x2    01/03/24 1229    01/03/24 1230  Remove Femoral / Brachial Sheaths  Once         01/03/24 1229    01/03/24 1230  Apply Femostop  Per Order Details        Comments: For Groin Bleeding, Notify MD or PA If Applied    01/03/24 1229    01/03/24 1229  acetaminophen (TYLENOL) tablet 650 mg  Every 4 Hours PRN         01/03/24 1229    01/03/24 1229  atropine sulfate injection 0.5 mg  Every 5 Minutes PRN         01/03/24 1229    01/03/24 1229  sodium chloride 0.9 % infusion 250 mL  Once As Needed         01/03/24 1229    01/03/24 1229  nitroglycerin (NITROSTAT) SL tablet 0.4 mg  Every 5 Minutes PRN         01/03/24 1229    01/03/24 1203  Potassium  Timed         01/02/24 2302    01/03/24 1203  Discharge patient  Once,   Status:  Canceled        Comments: Hold metformin 48 hrs  See us 2 wks    01/03/24 1206    01/03/24 1159  ticagrelor (BRILINTA) tablet  Code / Trauma / Sedation Medication,   Status:  Discontinued         01/03/24 1159    01/03/24 1152  iopamidol (ISOVUE-370) 76 % injection  Code / Trauma / Sedation Medication,   Status:  Discontinued         01/03/24 1152    01/03/24 1131  heparin (porcine) injection  Code / Trauma / Sedation Medication,   Status:  Discontinued         01/03/24 1132    01/03/24 1124  hydrALAZINE (APRESOLINE) injection  Code / Trauma / Sedation Medication,   Status:  Discontinued         01/03/24 1124    01/03/24 1115  lidocaine (XYLOCAINE) 2% injection  Code / Trauma / Sedation Medication,   Status:  Discontinued         01/03/24 1116    01/03/24 1114  fentaNYL citrate (PF) (SUBLIMAZE) injection  Code / Trauma /  Sedation Medication,   Status:  Discontinued         01/03/24 1117    01/03/24 1114  midazolam (VERSED) injection  Code / Trauma / Sedation Medication,   Status:  Discontinued         01/03/24 1117    01/03/24 1105  sodium chloride 0.9 % infusion  Code / Trauma / Sedation Continuous Med         01/03/24 1157    01/03/24 0900  amantadine (SYMMETREL) capsule 100 mg  Daily         01/02/24 2300 01/03/24 0900  aspirin EC tablet 81 mg  Daily         01/02/24 2300 01/03/24 0900  atorvastatin (LIPITOR) tablet 40 mg  Daily         01/02/24 2300 01/03/24 0900  ticagrelor (BRILINTA) tablet 90 mg  2 Times Daily         01/02/24 2300 01/03/24 0900  divalproex (DEPAKOTE ER) 24 hr tablet 500 mg  2 Times Daily         01/02/24 2300 01/03/24 0900  docusate sodium (COLACE) capsule 100 mg  Daily         01/02/24 2300 01/03/24 0900  folic acid (FOLVITE) tablet 1 mg  Daily         01/02/24 2300 01/03/24 0900  lisinopril (PRINIVIL,ZESTRIL) tablet 5 mg  Daily         01/02/24 2300 01/03/24 0900  oxybutynin XL (DITROPAN-XL) 24 hr tablet 5 mg  Daily         01/02/24 2300 01/03/24 0900  propranolol (INDERAL) tablet 40 mg  2 Times Daily         01/02/24 2300 01/03/24 0900  tamsulosin (FLOMAX) 24 hr capsule 0.8 mg  Daily         01/02/24 2300 01/03/24 0900  vitamin B-12 (CYANOCOBALAMIN) tablet 1,000 mcg  Daily         01/02/24 2300 01/03/24 0827  Cardiac Catheterization/Vascular Study  Once         01/03/24 0826 01/03/24 0823  NPO Diet NPO Type: Sips with Meds  Diet Effective Now,   Status:  Canceled         01/03/24 0822 01/03/24 0823  Obtain Informed Consent  Once         01/03/24 0823 01/03/24 0822  Case Request Cath Lab: Left Heart Cath  Once         01/03/24 0822 01/03/24 0803  Stress Test With Myocardial Perfusion One Day  Once,   Status:  Canceled         01/03/24 0803 01/03/24 0800  Oral Care  2 Times Daily       01/02/24 1942 01/03/24 0800  ferrous sulfate tablet 325 mg  Daily  With Breakfast         01/02/24 2300 01/03/24 0700  POC Glucose 4x Daily Before Meals & at Bedtime  4 Times Daily Before Meals & at Bedtime      Comments: Complete no more than 45 minutes prior to patient eating      01/02/24 2221 01/03/24 0600  Basic Metabolic Panel  Morning Draw         01/02/24 1942 01/03/24 0600  CBC (No Diff)  Morning Draw         01/02/24 1942 01/03/24 0600  levothyroxine (SYNTHROID, LEVOTHROID) tablet 50 mcg  Every Early Morning         01/02/24 2300    01/03/24 0600  pantoprazole (PROTONIX) EC tablet 40 mg  Every Early Morning         01/02/24 2300    01/03/24 0547  POC Glucose Once  PROCEDURE ONCE        Comments: Complete no more than 45 minutes prior to patient eating      01/03/24 0544    01/03/24 0000  budesonide-formoterol (SYMBICORT) 160-4.5 MCG/ACT inhaler 2 puff  2 Times Daily - RT         01/02/24 2300 01/03/24 0000  potassium chloride (K-DUR,KLOR-CON) ER tablet 40 mEq  Every 4 Hours         01/02/24 2302    01/03/24 0000  Ambulatory Referral to Cardiac Rehab         01/03/24 1206    01/03/24 0000  Discharge Follow-up with PCP         01/03/24 1541    01/02/24 2323  High Sensitivity Troponin T  Once         01/02/24 2322    01/02/24 2318  POC Glucose Once  PROCEDURE ONCE        Comments: Complete no more than 45 minutes prior to patient eating      01/02/24 2316    01/02/24 2237  [MAR Hold]  insulin lispro (HUMALOG/ADMELOG) injection 2-7 Units  4 Times Daily Before Meals & Nightly        (MAR Hold since Wed 1/3/2024 at 1102.Hold Reason: Unreviewed Transfer Orders)    01/02/24 2221 01/02/24 2221  [MAR Hold]  dextrose (GLUTOSE) oral gel 15 g  Every 15 Minutes PRN        (MAR Hold since Wed 1/3/2024 at 1102.Hold Reason: Unreviewed Transfer Orders)    01/02/24 2221 01/02/24 2221  [MAR Hold]  dextrose (D50W) (25 g/50 mL) IV injection 25 g  Every 15 Minutes PRN        (MAR Hold since Wed 1/3/2024 at 1102.Hold Reason: Unreviewed Transfer Orders)    01/02/24 3605     01/02/24 2221  [MAR Hold]  glucagon (GLUCAGEN) injection 1 mg  Every 15 Minutes PRN        (MAR Hold since Wed 1/3/2024 at 1102.Hold Reason: Unreviewed Transfer Orders)    01/02/24 2221 01/02/24 2220  Inpatient Cardiology Consult  Once        Specialty:  Cardiology  Provider:  Rio Miranda MD    01/02/24 2219 01/02/24 2219  Potassium Replacement - Follow Nurse / BPA Driven Protocol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 Monday through Saturday.)       lisinopril (PRINIVIL,ZESTRIL) 5 MG tablet Take 1 tablet by mouth Daily.       metFORMIN (GLUCOPHAGE) 500 MG tablet Take 1 tablet by mouth Daily.       omeprazole (priLOSEC) 40 MG capsule Take 1 capsule by mouth Every Evening.       oxybutynin XL (DITROPAN-XL) 5  MG 24 hr tablet Take 1 tablet by mouth Daily.       propranolol (INDERAL) 40 MG tablet Take 1 tablet by mouth 2 (Two) Times a Day.       tamsulosin (FLOMAX) 0.4 MG capsule 24 hr capsule Take 2 capsules by mouth Daily. 30 capsule      vitamin B-12 (VITAMIN B-12) 1000 MCG tablet Take 1 tablet by mouth Daily.       acetaminophen (TYLENOL) 325 MG tablet Take 2 tablets by mouth Every 6 (Six) Hours As Needed for Mild Pain.       glucose (DEX4) 4 GM chewable tablet Chew 4 tablets As Needed for Low Blood Sugar.       Menthol (Halls Cough Drops) 5.8 MG lozenge Dissolve 1 lozenge in the mouth Every 2 (Two) Hours As Needed (cough).        Current Meds:   Current Facility-Administered Medications   Medication Dose Route Frequency Provider Last Rate Last Admin    acetaminophen (TYLENOL) tablet 650 mg  650 mg Oral Q4H PRN Shonna Stovall MD        amantadine (SYMMETREL) capsule 100 mg  100 mg Oral Daily Shonna Stovall MD        aspirin EC tablet 81 mg  81 mg Oral Daily Shonna Stovall MD        atorvastatin (LIPITOR) tablet 40 mg  40 mg Oral Daily Shonna Stovall MD        sennosides-docusate (PERICOLACE) 8.6-50 MG per tablet 2 tablet  2 tablet Oral BID Shonna Stovall MD        And    polyethylene glycol (MIRALAX) packet 17 g  17 g Oral Daily PRN Shonna Stovall MD        And    bisacodyl (DULCOLAX) EC tablet 5 mg  5 mg Oral Daily PRN Shonna Stovall MD        And    bisacodyl (DULCOLAX) suppository 10 mg  10 mg Rectal Daily PRN Shonna Stovall MD        budesonide-formoterol (SYMBICORT) 160-4.5 MCG/ACT inhaler 2 puff  2 puff Inhalation BID - RT Shonna Stovall MD   2 puff at 01/03/24 0834    Calcium Replacement - Follow Nurse / BPA Driven Protocol   Does not apply PRN Shonna Stovall MD        dextrose (D50W) (25 g/50 mL) IV injection 25 g  25 g Intravenous Q15 Min PRN Shonna Stovall MD        dextrose (GLUTOSE) oral gel 15 g  15 g Oral Q15 Min PRN  Shonna Stovall MD        divalproex (DEPAKOTE ER) 24 hr tablet 500 mg  500 mg Oral BID Shonna Stovall MD        docusate sodium (COLACE) capsule 100 mg  100 mg Oral Daily Shonna Stovall MD        ferrous sulfate tablet 325 mg  325 mg Oral Daily With Breakfast Shonna Stovall MD        folic acid (FOLVITE) tablet 1 mg  1 mg Oral Daily Shonna Stovall MD        glucagon (GLUCAGEN) injection 1 mg  1 mg Intramuscular Q15 Min PRN Shonna Stovall MD        insulin lispro (HUMALOG/ADMELOG) injection 2-7 Units  2-7 Units Subcutaneous 4x Daily AC & at Bedtime Shonna Stovall MD   2 Units at 01/02/24 2326    levothyroxine (SYNTHROID, LEVOTHROID) tablet 50 mcg  50 mcg Oral Q AM Shonna Stovall MD   50 mcg at 01/03/24 0430    lisinopril (PRINIVIL,ZESTRIL) tablet 5 mg  5 mg Oral Daily Shonna Stovall MD        Magnesium Standard Dose Replacement - Follow Nurse / BPA Driven Protocol   Does not apply PRN Shonna Stovall MD        melatonin tablet 3 mg  3 mg Oral Nightly PRN Shonna Stovall MD        ondansetron ODT (ZOFRAN-ODT) disintegrating tablet 4 mg  4 mg Oral Q6H PRN Shonna Stovall MD        Or    ondansetron (ZOFRAN) injection 4 mg  4 mg Intravenous Q6H PRN Shonna Stovall MD        oxybutynin XL (DITROPAN-XL) 24 hr tablet 5 mg  5 mg Oral Daily Shonna Stovall MD        pantoprazole (PROTONIX) EC tablet 40 mg  40 mg Oral Q AM Shonna Stovall MD   40 mg at 01/03/24 0430    Phosphorus Replacement - Follow Nurse / BPA Driven Protocol   Does not apply PRN Shonna Stovall MD        potassium chloride (K-DUR,KLOR-CON) ER tablet 40 mEq  40 mEq Oral Q4H Shonna Stovall MD   40 mEq at 01/03/24 0430    Potassium Replacement - Follow Nurse / BPA Driven Protocol   Does not apply PRN Shonna Stovall MD        propranolol (INDERAL) tablet 40 mg  40 mg Oral BID Shonna Stovall MD        sodium chloride  "0.9 % flush 10 mL  10 mL Intravenous PRN Axel Najera MD        tamsulosin (FLOMAX) 24 hr capsule 0.8 mg  0.8 mg Oral Daily Shonna Stovall MD        ticagrelor (BRILINTA) tablet 90 mg  90 mg Oral BID Shonna Stovall MD        vitamin B-12 (CYANOCOBALAMIN) tablet 1,000 mcg  1,000 mcg Oral Daily Shonna Stovall MD           Social History:   Social History     Tobacco Use    Smoking status: Never     Passive exposure: Never    Smokeless tobacco: Never   Substance Use Topics    Alcohol use: No      Family History:  Family History   Problem Relation Age of Onset    Parkinsonism Sister     Diabetes Brother     Malig Hyperthermia Neg Hx         Review of Systems  Constitutional: No wt loss, fever   Gastrointestinal: No nausea , abdominal pain  Behavioral/Psych: No insomnia or anxiety   Cardiovascular ----positive for chest pain. All other systems reviewed and are negative            Constitutional:  Temp:  [97.5 °F (36.4 °C)-98.6 °F (37 °C)] 97.5 °F (36.4 °C)  Heart Rate:  [51-77] 51  Resp:  [15-16] 16  BP: (126-171)/(62-97) 126/62    Physical Exam           Objective Physical Exam  /62 (BP Location: Right arm, Patient Position: Lying)   Pulse 51   Temp 97.5 °F (36.4 °C) (Oral)   Resp 16   Ht 175.3 cm (69\")   Wt 99.3 kg (218 lb 14.4 oz)   SpO2 96%   BMI 32.33 kg/m²     General appearance: No acute changes   Eyes: Sclerae conjunctivae normal, pupils reactive   HENT: Atraumatic; oropharynx clear with moist mucous membranes and no mucosal ulcerations;  Neck: Trachea midline; NECK, supple, no thyromegaly or lymphadenopathy   Lungs: Normal size and shape, normal breath sounds, equal distribution of air, no rales and rhonchi   CV: S1-S2 regular, no murmurs, no rub, no gallop   Abdomen: Soft, nontender; no masses , no abnormal abdominal sounds   Extremities: No deformity , normal color , no peripheral edema   Skin: Normal temperature, turgor and texture; no rash, ulcers  Psych: " Appropriate affect, alert and oriented to person, place and time                   Cardiographics  ECG:               Echocardiogram:   3/2023  Interpretation Summary         Left ventricular ejection fraction appears to be 56 - 60%.    Left ventricular diastolic function was normal.    Moderate to severe aortic valve stenosis is present. Aortic valve area is 1 cm2.    Peak velocity of the flow distal to the aortic valve is 287.8 cm/s. Aortic valve maximum pressure gradient is 33 mmHg. Aortic valve mean pressure gradient is 18 mmHg. Aortic valve dimensionless index is 0.3 .    Estimated right ventricular systolic pressure from tricuspid regurgitation is normal (<35 mmHg).    Stress test 3/2023   Interpretation Summary         Findings consistent with a normal ECG stress test.    Left ventricular ejection fraction is normal (Calculated EF = 60%).    Myocardial perfusion imaging indicates a normal myocardial perfusion study with no evidence of ischemia.    Impressions are consistent with a low risk study.    Cath/PCI/GLENDY 3/2023  HEMODYNAMIC / ANGIOGRAPHIC DATA:   Pulmonary capillary wedge pressure was 8.   Pulmonary artery systolic pressure was 31/10.  Right atrial pressure was 8.  Cardiac index was 2.46.  Left ventricular end diastolic pressure was 10 mmHg.  Left ventriculography revealed the EF to be 60%.  The left main is normal   the LAD is .100% occluded with a LIMA to the LAD patent with normal distal flow.  Circumflex artery, first OM large vessel ostial 90% reduced to 0% with 2.0/12 trek balloon, proximal circumflex was angioplastied and stented with 3.0/8 Xience stent  The right coronary artery is dominant with minimal irregularity PDA small vessel 70% ostial stenosis.  Gradient across aortic valve 20 mmHg  Moderate aortic stenosis with a valve area of 1.01    RECOMMENDATIONS: Post-procedure care will focus on prevention of any ischemic events and congestive complications.       Imaging  Chest X-ray:     Lab  Review   Results from last 7 days   Lab Units 01/03/24  0040 01/02/24  1838 01/02/24  1611   HSTROP T ng/L 27* 29* 25*         Results from last 7 days   Lab Units 01/02/24  1611   SODIUM mmol/L 138   POTASSIUM mmol/L 3.1*   BUN mg/dL 16   CREATININE mg/dL 1.03   CALCIUM mg/dL 8.8     @LABRCNTIPbnp@  Results from last 7 days   Lab Units 01/02/24  1611   WBC 10*3/mm3 6.18   HEMOGLOBIN g/dL 13.3   HEMATOCRIT % 39.7   PLATELETS 10*3/mm3 165             Assessment:  Chest pain  Coronary artery disease  Hypertension  Hyperlipidemia  Hypokalemia    Recommendations / Plan:   This is a 8-year-old male who is well-known with our service admitted for chest pain.  Workup in with high-sensitivity troponin mildly elevated max 29.  Workup in ER relatively couple, hypokalemia 3.1, replaced.  He had balloon angioplasty to the OM in March 2023 as well as GLENDY to the proximal circumflex as well in March 2023.  He has been maintained on DAPT with aspirin and Brilinta.  Will proceed with left heart catheterization.    Procedures, risks, and options of cardiac cath explained to patient, including but not limited to MI, Stroke, death, infections, hemorrhage. Patient understands well and agrees, all questions answered.      Kacie CARLOS EDUARDO Miller, APRN  1/3/2024, 08:18 EST  Patient with significant unstable angina with previous stent we will proceed with a diagnostic heart catheterization procedure risk and options have been explained    Rio Miranda MD      EMR Dragon/Transcription:   Dictated utilizing Dragon dictation        Electronically signed by Rio Miranda MD at 01/03/24 7152

## 2024-01-04 NOTE — PLAN OF CARE
Goal Outcome Evaluation:              Outcome Evaluation: Pt s/p heart cath, rt fem site,clean, dry, intact.  VSS, no c/o pain, assist x1. Will update POC as needed.

## 2024-01-04 NOTE — OUTREACH NOTE
Prep Survey      Flowsheet Row Responses   [facility] facility patient discharged from? [place]   Is LACE score < 7 ? Yes   Eligibility [facility]   Date of Admission 01/02/24   Date of Discharge 01/04/24   Discharge Disposition Home or Self Care   Discharge diagnosis Chest pain-left heart cath this visit   Does the patient have one of the following disease processes/diagnoses(primary or secondary)? Other   Does the patient have Home health ordered? No   Is there a DME ordered? No   Prep survey completed? Yes            TRUDY XIONG - Registered Nurse
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
## 2024-01-04 NOTE — DISCHARGE SUMMARY
Patient Name: Hernando Lozada  : 1945  MRN: [id number]    Date of Admission: 2024  Date of Discharge:  2024  Primary Care Physician: Sammie Gambino APRN      Chief Complaint:   Chest Pain      Discharge Diagnoses     Active Hospital Problems    Diagnosis  POA    CAD (coronary artery disease) [I25.10]  Unknown      Resolved Hospital Problems    Diagnosis Date Resolved POA    **Chest pain [R07.9] 2024 Yes    Chest pain [R07.9] 2024 Yes    Chest pain with high risk of acute coronary syndrome [R07.9] 2024 Unknown        Admitting HPI     78 year old gentleman who presented to the emergency room with chest pain which started last night; he denies any at the present time but also had it earlier today; he was sent in from a nursing facility; he has a history of cad and cabg twenty years ago; no family is present with him; he does not remember the name of his cardiologist; no nausea or vomiting; no shortness of air     Hospital Course     Pt admitted for chest pain.  Seen in consultation with cardiology.  Troponin mildly elevated on admission, though repeat flat.  Given his significant cardiac history he did undergo heart cath on 1/3/2024 with balloon angioplasty to the ostial OM.  He will continue on DAPT and follow-up with cardiology on .  He had no recurrence of chest pain and felt back to his baseline.  He is stable for discharge with close follow-up.    Day of Discharge     Physical Exam:  Temp:  [97.6 °F (36.4 °C)-98.6 °F (37 °C)] 98.6 °F (37 °C)  Heart Rate:  [53-69] 61  Resp:  [16-18] 18  BP: ()/(62-93) 144/93  Body mass index is 33.96 kg/m².  Physical Exam  Constitutional:       General: He is not in acute distress.     Appearance: Normal appearance.   Cardiovascular:      Rate and Rhythm: Normal rate and regular rhythm.   Pulmonary:      Effort: Pulmonary effort is normal. No respiratory distress.   Abdominal:      General: Abdomen is flat. There is no  distension.      Palpations: Abdomen is soft.      Tenderness: There is no abdominal tenderness.   Musculoskeletal:         General: No swelling.   Skin:     General: Skin is warm.      Comments: Catheterization site in right groin covered with dressing, C/D/I   Neurological:      General: No focal deficit present.      Mental Status: He is alert and oriented to person, place, and time.   Psychiatric:         Mood and Affect: Mood normal.         Behavior: Behavior normal.         Consultants     Consult Orders (all) (From admission, onward)       Start     Ordered    01/02/24 2220  Inpatient Cardiology Consult  Once        Specialty:  Cardiology  Provider:  Rio Miranda MD    01/02/24 2219                  Procedures     Left Heart Cath, Coronary angiography, Percutaneous Coronary Intervention, Left ventriculography, Native mammary injection      Imaging Results (All)       Procedure Component Value Units Date/Time    XR Chest 1 View [[id number]] Collected: 01/02/24 1631     Updated: 01/02/24 1636    Narrative:      XR CHEST 1 VW-     Clinical: Chest pain protocol     COMPARISON examination 6/7/2023     FINDINGS: There are median sternotomy wires in position as before. The  cardiomediastinal silhouette is stable. No effusion, edema or acute  airspace disease has developed.     CONCLUSION: No active disease of the chest     This report was finalized on 1/2/2024 4:33 PM by Dr. Theo Delarosa M.D  on Workstation: [id number]             Results for orders placed during the hospital encounter of 03/06/23    Duplex Venous Lower Extremity - Bilateral CAR    Interpretation Summary    Chronic left lower extremity deep vein thrombosis noted in the mid femoral.    All other veins appeared normal bilaterally.    Results for orders placed during the hospital encounter of 03/06/23    Adult Transthoracic Echo Complete W/ Cont if Necessary Per Protocol    Interpretation Summary    Left ventricular ejection fraction appears  "to be 56 - 60%.    Left ventricular diastolic function was normal.    Moderate to severe aortic valve stenosis is present. Aortic valve area is 1 cm2.    Peak velocity of the flow distal to the aortic valve is 287.8 cm/s. Aortic valve maximum pressure gradient is 33 mmHg. Aortic valve mean pressure gradient is 18 mmHg. Aortic valve dimensionless index is 0.3 .    Estimated right ventricular systolic pressure from tricuspid regurgitation is normal (<35 mmHg).    Pertinent Labs     Results from last 7 days   Lab Units 01/03/24  0807 01/02/24  1611   WBC 10*3/mm3 5.33 6.18   HEMOGLOBIN g/dL 12.5* 13.3   PLATELETS 10*3/mm3 143 165     Results from last 7 days   Lab Units 01/04/24  0340 01/03/24  1658 01/03/24  0807 01/02/24  1611   SODIUM mmol/L 141  --  143 138   POTASSIUM mmol/L 3.6 3.7 3.5 3.1*   CHLORIDE mmol/L 108*  --  104 99   CO2 mmol/L 24.0  --  27.5 25.9   BUN mg/dL 13  --  17 16   CREATININE mg/dL 1.21  --  0.85 1.03   GLUCOSE mg/dL 113*  --  91 129*   EGFR mL/min/1.73 62.1  --  90.1 75.3     Results from last 7 days   Lab Units 01/02/24  1611   ALBUMIN g/dL 3.9   BILIRUBIN mg/dL 1.0   ALK PHOS U/L 76   AST (SGOT) U/L 13   ALT (SGPT) U/L 15     Results from last 7 days   Lab Units 01/04/24  0340 01/03/24  0807 01/02/24  1611   CALCIUM mg/dL 8.5* 8.7 8.8   ALBUMIN g/dL  --   --  3.9       Results from last 7 days   Lab Units 01/03/24  0040 01/02/24  1838 01/02/24  1611   HSTROP T ng/L 27* 29* 25*           Invalid input(s): \"LDLCALC\"          Test Results Pending at Discharge       Discharge Details        Discharge Medications        New Medications        Instructions Start Date   fluticasone 50 MCG/ACT nasal spray  Commonly known as: FLONASE   2 sprays, Nasal, Daily      nitroglycerin 0.4 MG SL tablet  Commonly known as: NITROSTAT   0.4 mg, Sublingual, Every 5 Minutes PRN, Take no more than 3 doses in 15 minutes.             Changes to Medications        Instructions Start Date   Fluticasone-Salmeterol " 500-50 MCG/ACT DISKUS  Commonly known as: ADVAIR/WIXELA  What changed: how much to take   1 puff, Inhalation, 2 Times Daily - RT, 50 mcg      levothyroxine 25 MCG tablet  Commonly known as: SYNTHROID, LEVOTHROID  What changed:   how much to take  how to take this  when to take this   50 mcg, Oral, Every Early Morning, 25 mcg on Monday through Saturday.             Continue These Medications        Instructions Start Date   acetaminophen 325 MG tablet  Commonly known as: TYLENOL   650 mg, Oral, Every 6 Hours PRN      amantadine 100 MG capsule  Commonly known as: SYMMETREL       aspirin 81 MG EC tablet   81 mg, Oral, Daily      atorvastatin 20 MG tablet  Commonly known as: LIPITOR   40 mg, Oral, Daily      Brilinta 90 MG tablet tablet  Generic drug: ticagrelor   Take 1 tablet by mouth 2 (Two) Times a Day.      cyanocobalamin 1000 MCG tablet  Commonly known as: VITAMIN B-12   1,000 mcg, Oral, Daily      divalproex 500 MG 24 hr tablet  Commonly known as: DEPAKOTE ER   1 tablet 2 (Two) Times a Day.      docusate sodium 100 MG capsule  Commonly known as: COLACE   100 mg, Oral, Daily      ferrous sulfate 325 (65 FE) MG tablet   325 mg, Oral, Daily With Breakfast      folic acid 1 MG tablet  Commonly known as: FOLVITE   1 mg, Oral, Daily      furosemide 20 MG tablet  Commonly known as: LASIX   20 mg, Oral, 3 Times Daily      glucosamine sulfate 500 MG capsule capsule   1,000 mg, Oral, Daily      glucose 4 GM chewable tablet  Commonly known as: DEX4   16 g, Oral, As Needed      Halls Cough Drops 5.8 MG lozenge  Generic drug: Menthol   1 lozenge, Mouth/Throat, Every 2 Hours PRN      lisinopril 5 MG tablet  Commonly known as: PRINIVIL,ZESTRIL   5 mg, Oral, Daily      metFORMIN 500 MG tablet  Commonly known as: GLUCOPHAGE  Notes to patient: RESTART METFORMIN FRIDAY 1/5/23   500 mg, Oral, Daily      omeprazole 40 MG capsule  Commonly known as: priLOSEC   40 mg, Oral, Every Evening      oxybutynin XL 5 MG 24 hr tablet  Commonly  known as: DITROPAN-XL   5 mg, Oral, Daily      propranolol 40 MG tablet  Commonly known as: INDERAL   40 mg, Oral, 2 Times Daily      tamsulosin 0.4 MG capsule 24 hr capsule  Commonly known as: FLOMAX   0.8 mg, Oral, Daily               No Known Allergies    Discharge Disposition:  Home or Self Care      Discharge Diet:  Diet Order   Procedures    Diet: Cardiac Diets; Healthy Heart (2-3 Na+); Texture: Regular Texture (IDDSI 7); Fluid Consistency: Thin (IDDSI 0)       Discharge Activity:   Activity Instructions       Activity as Tolerated              CODE STATUS:    Code Status and Medical Interventions:   Ordered at: 01/02/24 1942     Code Status (Patient has no pulse and is not breathing):    CPR (Attempt to Resuscitate)     Medical Interventions (Patient has pulse or is breathing):    Full       Future Appointments   Date Time Provider Department Center   1/19/2024  2:00 PM Cierra Hernández APRN [facility]   3/14/2024  1:30 PM Sammie Gambino APRN [id number]   6/17/2024  1:30 PM [facility] ECHO [facility]   6/17/2024  2:15 PM Rio Miranda MD [facility]     Additional Instructions for the Follow-ups that You Need to Schedule       Ambulatory Referral to Cardiac Rehab   As directed      Discharge Follow-up with PCP   As directed       Currently Documented PCP:    Sammie Gambino APRN    PCP Phone Number:    [phone]     Follow Up Details: Follow up with PCP in 7 days.               Follow-up Information       [facility] .    Specialty: Cardiac Rehabilitation  Contact information:  [address] [facility] [address] [phone]             Sammie Gambino APRN .    Specialties: Nurse Practitioner, Family Medicine  Contact information:  [address] [address] [id number] [phone]               Sammie Gambino APRN .    Specialties: Nurse Practitioner, Family Medicine  Why: Follow up with PCP in 7  days.  Contact information:  [address]  [facility] [address]  [phone]               Kacie Miller APRN Follow up on 1/19/2024.    Specialties: Nurse Practitioner, Cardiology  Contact information:  [address]  [facility] [id number] [phone]                             Additional Instructions for the Follow-ups that You Need to Schedule       Ambulatory Referral to Cardiac Rehab   As directed      Discharge Follow-up with PCP   As directed       Currently Documented PCP:    Sammie Gambino APRN    PCP Phone Number:    [phone]     Follow Up Details: Follow up with PCP in 7 days.            Time Spent on Discharge:  Greater than 30 minutes spent on discharge management including final examination, discussion of hospital stay and patient education, preparation of records, medication reconciliation, follow up planning      René Millan MD  [facility]  01/04/24  08:46 EST

## 2024-01-04 NOTE — CASE MANAGEMENT/SOCIAL WORK
Case Management Discharge Note      Final Note: Pt discharged home……..Jossy NUÑEZ/DESMOND CM         Selected Continued Care - Discharged on 1/4/2024 Admission date: 1/2/2024 - Discharge disposition: Home or Self Care      Destination    No services have been selected for the patient.                Durable Medical Equipment    No services have been selected for the patient.                Dialysis/Infusion    No services have been selected for the patient.                Home Medical Care    No services have been selected for the patient.                Therapy    No services have been selected for the patient.                Community Resources    No services have been selected for the patient.                Community & DME    No services have been selected for the patient.                         Final Discharge Disposition Code: 01 - home or self-care

## 2024-01-05 ENCOUNTER — TRANSITIONAL CARE MANAGEMENT TELEPHONE ENCOUNTER (OUTPATIENT)
Dept: CALL CENTER | Facility: HOSPITAL | Age: 79
End: 2024-01-05
Payer: MEDICARE

## 2024-01-05 NOTE — OUTREACH NOTE
Call Center TCM Note      Flowsheet Row Responses   [facility] patient discharged from? [place]   Does the patient have one of the following disease processes/diagnoses(primary or secondary)? Other   TCM attempt successful? Yes   Call start time 1435   Call end time 1443   Discharge diagnosis Chest pain-left heart cath this visit   Person spoke with today (if not patient) and relationship Nurse Betty at [facility]   Meds reviewed with patient/caregiver? --  [Nurse reports that they have already clarified patients discharge medications and they administer patient meds.]   Is the patient taking all medications as directed (includes completed medication regime)? Yes   Comments PCP Sammie MALONE. Hospital follow up appt with PCP arranged with group home RN for 1/18/24  10am with PCP   Does the patient have an appointment with their PCP within 7-14 days of discharge? No   Nursing Interventions Assisted patient with making appointment per protocol, Routed TCM call to PCP office   Has home health visited the patient within 72 hours of discharge? N/A   Psychosocial comments Resided at group home.   Did the patient receive a copy of their discharge instructions? Yes   What is the patient's perception of their health status since discharge? Improving   Is the patient/caregiver able to teach back signs and symptoms related to disease process for when to call PCP? Yes   If the patient is a current smoker, are they able to teach back resources for cessation? Not a smoker   TCM call completed? Yes   Call end time 1443   Would this patient benefit from a Referral to Amb Social Work? No   Is the patient interested in additional calls from an ambulatory ? No            Sangeeta Sultana RN    1/5/2024, 14:47 EST

## 2024-01-15 LAB
QT INTERVAL: 403 MS
QTC INTERVAL: 396 MS

## 2024-01-19 ENCOUNTER — OFFICE VISIT (OUTPATIENT)
Dept: CARDIOLOGY | Facility: CLINIC | Age: 79
End: 2024-01-19
Payer: MEDICARE

## 2024-01-19 VITALS
BODY MASS INDEX: 32.58 KG/M2 | SYSTOLIC BLOOD PRESSURE: 135 MMHG | DIASTOLIC BLOOD PRESSURE: 77 MMHG | HEART RATE: 52 BPM | WEIGHT: 220 LBS | HEIGHT: 69 IN

## 2024-01-19 DIAGNOSIS — Z09 HOSPITAL DISCHARGE FOLLOW-UP: Primary | ICD-10-CM

## 2024-01-19 DIAGNOSIS — I35.0 AORTIC VALVE STENOSIS, ETIOLOGY OF CARDIAC VALVE DISEASE UNSPECIFIED: ICD-10-CM

## 2024-01-19 DIAGNOSIS — G47.33 OSA (OBSTRUCTIVE SLEEP APNEA): ICD-10-CM

## 2024-01-19 DIAGNOSIS — I25.118 CORONARY ARTERY DISEASE OF NATIVE ARTERY OF NATIVE HEART WITH STABLE ANGINA PECTORIS: ICD-10-CM

## 2024-01-19 DIAGNOSIS — I10 ESSENTIAL HYPERTENSION: ICD-10-CM
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
## 2024-01-26 ENCOUNTER — OFFICE VISIT (OUTPATIENT)
Dept: FAMILY MEDICINE CLINIC | Facility: CLINIC | Age: 79
End: 2024-01-26
Payer: MEDICARE

## 2024-01-26 VITALS
TEMPERATURE: 97 F | OXYGEN SATURATION: 98 % | RESPIRATION RATE: 16 BRPM | DIASTOLIC BLOOD PRESSURE: 62 MMHG | HEIGHT: 69 IN | HEART RATE: 59 BPM | WEIGHT: 219.8 LBS | SYSTOLIC BLOOD PRESSURE: 104 MMHG | BODY MASS INDEX: 32.56 KG/M2

## 2024-01-26 DIAGNOSIS — G47.33 OSA (OBSTRUCTIVE SLEEP APNEA): ICD-10-CM

## 2024-01-26 DIAGNOSIS — I25.10 CORONARY ARTERY DISEASE WITHOUT ANGINA PECTORIS, UNSPECIFIED VESSEL OR LESION TYPE, UNSPECIFIED WHETHER NATIVE OR TRANSPLANTED HEART: ICD-10-CM

## 2024-01-26 DIAGNOSIS — Z09 HOSPITAL DISCHARGE FOLLOW-UP: Primary | ICD-10-CM

## 2024-01-26 DIAGNOSIS — N18.9 CHRONIC KIDNEY DISEASE, UNSPECIFIED CKD STAGE: ICD-10-CM

## 2024-01-26 DIAGNOSIS — E78.5 HYPERLIPIDEMIA LDL GOAL <100: ICD-10-CM

## 2024-01-26 DIAGNOSIS — S06.9XAS CHRONIC BRAIN INJURY: ICD-10-CM

## 2024-01-26 DIAGNOSIS — R07.9 CHEST PAIN, UNSPECIFIED TYPE: ICD-10-CM

## 2024-01-26 DIAGNOSIS — I10 ESSENTIAL HYPERTENSION: ICD-10-CM

## 2024-01-26 DIAGNOSIS — E03.9 HYPOTHYROIDISM, UNSPECIFIED TYPE: ICD-10-CM

## 2024-01-26 RX ORDER — KETOCONAZOLE 20 MG/ML
SHAMPOO TOPICAL
COMMUNITY
End: 2024-01-26

## 2024-02-06 ENCOUNTER — APPOINTMENT (OUTPATIENT)
Dept: GENERAL RADIOLOGY | Facility: HOSPITAL | Age: 79
End: 2024-02-06
Payer: MEDICARE

## 2024-02-06 ENCOUNTER — APPOINTMENT (OUTPATIENT)
Dept: CT IMAGING | Facility: HOSPITAL | Age: 79
End: 2024-02-06
Payer: MEDICARE

## 2024-02-06 ENCOUNTER — HOSPITAL ENCOUNTER (EMERGENCY)
Facility: HOSPITAL | Age: 79
Discharge: HOME | End: 2024-02-06
Attending: STUDENT IN AN ORGANIZED HEALTH CARE EDUCATION/TRAINING PROGRAM | Admitting: STUDENT IN AN ORGANIZED HEALTH CARE EDUCATION/TRAINING PROGRAM
Payer: MEDICARE

## 2024-02-06 VITALS
BODY MASS INDEX: 33.33 KG/M2 | SYSTOLIC BLOOD PRESSURE: 128 MMHG | RESPIRATION RATE: 17 BRPM | HEIGHT: 69 IN | OXYGEN SATURATION: 91 % | DIASTOLIC BLOOD PRESSURE: 85 MMHG | WEIGHT: 225 LBS | HEART RATE: 62 BPM | TEMPERATURE: 98.6 F

## 2024-02-06 DIAGNOSIS — S09.90XA CLOSED HEAD INJURY, INITIAL ENCOUNTER: Primary | ICD-10-CM

## 2024-02-06 DIAGNOSIS — M54.9 UPPER BACK PAIN ON RIGHT SIDE: ICD-10-CM

## 2024-02-06 DIAGNOSIS — R10.31 RIGHT GROIN PAIN: ICD-10-CM

## 2024-02-06 LAB
ALBUMIN SERPL-MCNC: 4 G/DL (ref 3.5–5.2)
ALBUMIN/GLOB SERPL: 1.4 G/DL
ALP SERPL-CCNC: 87 U/L (ref 39–117)
ALT SERPL W P-5'-P-CCNC: 13 U/L (ref 1–41)
ANION GAP SERPL CALCULATED.3IONS-SCNC: 13.2 MMOL/L (ref 5–15)
APTT PPP: 27.2 SECONDS (ref 22.7–35.4)
AST SERPL-CCNC: 13 U/L (ref 1–40)
BASOPHILS # BLD AUTO: 0.02 10*3/MM3 (ref 0–0.2)
BASOPHILS NFR BLD AUTO: 0.3 % (ref 0–1.5)
BILIRUB SERPL-MCNC: 0.8 MG/DL (ref 0–1.2)
BILIRUB UR QL STRIP: NEGATIVE
BUN SERPL-MCNC: 13 MG/DL (ref 8–23)
BUN/CREAT SERPL: 15.1 (ref 7–25)
CALCIUM SPEC-SCNC: 8.8 MG/DL (ref 8.6–10.5)
CHLORIDE SERPL-SCNC: 98 MMOL/L (ref 98–107)
CLARITY UR: ABNORMAL
CO2 SERPL-SCNC: 26.8 MMOL/L (ref 22–29)
COLOR UR: YELLOW
CREAT SERPL-MCNC: 0.86 MG/DL (ref 0.76–1.27)
DEPRECATED RDW RBC AUTO: 50.8 FL (ref 37–54)
EGFRCR SERPLBLD CKD-EPI 2021: 89.7 ML/MIN/1.73
EOSINOPHIL # BLD AUTO: 0.06 10*3/MM3 (ref 0–0.4)
EOSINOPHIL NFR BLD AUTO: 0.9 % (ref 0.3–6.2)
ERYTHROCYTE [DISTWIDTH] IN BLOOD BY AUTOMATED COUNT: 14.5 % (ref 12.3–15.4)
GLOBULIN UR ELPH-MCNC: 2.8 GM/DL
GLUCOSE SERPL-MCNC: 97 MG/DL (ref 65–99)
GLUCOSE UR STRIP-MCNC: NEGATIVE MG/DL
HCT VFR BLD AUTO: 40.2 % (ref 37.5–51)
HGB BLD-MCNC: 13.2 G/DL (ref 13–17.7)
HGB UR QL STRIP.AUTO: NEGATIVE
IMM GRANULOCYTES # BLD AUTO: 0.03 10*3/MM3 (ref 0–0.05)
IMM GRANULOCYTES NFR BLD AUTO: 0.5 % (ref 0–0.5)
INR PPP: 1 (ref 0.9–1.1)
KETONES UR QL STRIP: NEGATIVE
LEUKOCYTE ESTERASE UR QL STRIP.AUTO: NEGATIVE
LYMPHOCYTES # BLD AUTO: 1.6 10*3/MM3 (ref 0.7–3.1)
LYMPHOCYTES NFR BLD AUTO: 24.1 % (ref 19.6–45.3)
MCH RBC QN AUTO: 31.1 PG (ref 26.6–33)
MCHC RBC AUTO-ENTMCNC: 32.8 G/DL (ref 31.5–35.7)
MCV RBC AUTO: 94.8 FL (ref 79–97)
MONOCYTES # BLD AUTO: 0.54 10*3/MM3 (ref 0.1–0.9)
MONOCYTES NFR BLD AUTO: 8.1 % (ref 5–12)
NEUTROPHILS NFR BLD AUTO: 4.39 10*3/MM3 (ref 1.7–7)
NEUTROPHILS NFR BLD AUTO: 66.1 % (ref 42.7–76)
NITRITE UR QL STRIP: NEGATIVE
NRBC BLD AUTO-RTO: 0 /100 WBC (ref 0–0.2)
PH UR STRIP.AUTO: 7 [PH] (ref 5–8)
PLATELET # BLD AUTO: 153 10*3/MM3 (ref 140–450)
PMV BLD AUTO: 9.9 FL (ref 6–12)
POTASSIUM SERPL-SCNC: 4.2 MMOL/L (ref 3.5–5.2)
PROT SERPL-MCNC: 6.8 G/DL (ref 6–8.5)
PROT UR QL STRIP: NEGATIVE
PROTHROMBIN TIME: 13.3 SECONDS (ref 11.7–14.2)
RBC # BLD AUTO: 4.24 10*6/MM3 (ref 4.14–5.8)
SODIUM SERPL-SCNC: 138 MMOL/L (ref 136–145)
SP GR UR STRIP: 1.01 (ref 1–1.03)
UROBILINOGEN UR QL STRIP: ABNORMAL
VALPROATE SERPL-MCNC: 75 MCG/ML (ref 50–125)
WBC NRBC COR # BLD AUTO: 6.64 10*3/MM3 (ref 3.4–10.8)

## 2024-02-06 PROCEDURE — 80164 ASSAY DIPROPYLACETIC ACD TOT: CPT | Performed by: STUDENT IN AN ORGANIZED HEALTH CARE EDUCATION/TRAINING PROGRAM

## 2024-02-06 PROCEDURE — 85610 PROTHROMBIN TIME: CPT | Performed by: STUDENT IN AN ORGANIZED HEALTH CARE EDUCATION/TRAINING PROGRAM

## 2024-02-06 PROCEDURE — 85025 COMPLETE CBC W/AUTO DIFF WBC: CPT | Performed by: STUDENT IN AN ORGANIZED HEALTH CARE EDUCATION/TRAINING PROGRAM

## 2024-02-06 PROCEDURE — 70450 CT HEAD/BRAIN W/O DYE: CPT

## 2024-02-06 PROCEDURE — 81003 URINALYSIS AUTO W/O SCOPE: CPT | Performed by: STUDENT IN AN ORGANIZED HEALTH CARE EDUCATION/TRAINING PROGRAM

## 2024-02-06 PROCEDURE — 80053 COMPREHEN METABOLIC PANEL: CPT | Performed by: STUDENT IN AN ORGANIZED HEALTH CARE EDUCATION/TRAINING PROGRAM

## 2024-02-06 PROCEDURE — 36415 COLL VENOUS BLD VENIPUNCTURE: CPT

## 2024-02-06 PROCEDURE — 72125 CT NECK SPINE W/O DYE: CPT

## 2024-02-06 PROCEDURE — 85730 THROMBOPLASTIN TIME PARTIAL: CPT | Performed by: STUDENT IN AN ORGANIZED HEALTH CARE EDUCATION/TRAINING PROGRAM

## 2024-02-06 PROCEDURE — 99284 EMERGENCY DEPT VISIT MOD MDM: CPT

## 2024-02-06 PROCEDURE — 71045 X-RAY EXAM CHEST 1 VIEW: CPT

## 2024-02-06 NOTE — ED PROVIDER NOTES
EMERGENCY DEPARTMENT ENCOUNTER    Room Number:  15/15  PCP: Milo Carrasquillo DO  History obtained from: Patient      HPI:  Chief Complaint: Fall  A complete HPI/ROS/PMH/PSH/SH/FH are unobtainable due to: Not applicable  Context: Hernando Lozada is a 78 y.o. male who presents to the ED c/o fall.  Fell several days ago and hit his head.  Has some bruises from this event.  Also has some pain when he is up and walking on his right leg.  No other recent illness, fever, chills.  No abdominal pain.  No chest pain or shortness of breath.  No nausea.            PAST MEDICAL HISTORY  Active Ambulatory Problems     Diagnosis Date Noted    DJD (degenerative joint disease) of knee 12/14/2017    Essential hypertension 02/18/2019    SOB (shortness of breath) 02/18/2019    Leg swelling 02/18/2019    Hyperlipidemia LDL goal <100 08/23/2019    COVID-19 11/25/2022    Diabetes mellitus     GIOVANNY (obstructive sleep apnea)     Disease of thyroid gland     CKD (chronic kidney disease)     Hypomagnesemia     Chronic brain injury     Generalized weakness     CAD (coronary artery disease)     Pedal edema 03/06/2023    Tremor 03/06/2023    Elevated troponin 03/06/2023    Lower extremity cellulitis 03/06/2023    Tinea cruris 03/06/2023    Chronic deep vein thrombosis (DVT) of calf muscle vein of left lower extremity 03/07/2023    Nonrheumatic aortic valve stenosis 03/06/2023    Acute urinary tract infection 04/17/2023    Hypothyroidism 04/17/2023    Acute urinary retention 04/19/2023    Acute bacterial prostatitis 04/19/2023     Resolved Ambulatory Problems     Diagnosis Date Noted    Chest pain with high risk of acute coronary syndrome 04/29/2019    Chest pain 01/02/2024    Chest pain 01/04/2024     Past Medical History:   Diagnosis Date    Arthritis     Bilateral leg edema     GERD (gastroesophageal reflux disease)     Heart murmur     History of head injury     [place] (hard of hearing)     Hyperlipidemia     Hypertension     Irregular heart beat      Osteoarthritis     Past heart attack     SOB (shortness of breath) on exertion     Stroke     Vasovagal episode          PAST SURGICAL HISTORY  Past Surgical History:   Procedure Laterality Date    CARDIAC CATHETERIZATION N/A 4/30/2019    Procedure: Coronary angiography with grafts;  Surgeon: Rio Miranda MD;  Location: [facility] CATH [facility]VASIVE LOC[facility];  Service: Cardiology    CARDIAC CATHETERIZATION N/A 4/30/2019    Procedure: Left Heart Cath;  Surgeon: Rio Miranda MD;  Location: [facility] CATH [facility] LOC[facility];  Service: Cardiology    CARDIAC CATHETERIZATION N/A 4/30/2019    Procedure: Left ventriculography;  Surgeon: Rio Miranda MD;  Location: [facility] CATH [facility] LOC[facility];  Service: Cardiology    CARDIAC CATHETERIZATION  4/30/2019    Procedure: Saphenous Vein Graft;  Surgeon: Rio Miranda MD;  Location: [facility] CATH [facility] LOC[facility];  Service: Cardiology    CARDIAC CATHETERIZATION N/A 4/30/2019    Procedure: Stent GLENDY coronary;  Surgeon: Rio Miranda MD;  Location: [facility] CATH [facility] LOC[facility];  Service: Cardiology    CARDIAC CATHETERIZATION N/A 3/10/2023    Procedure: Right and Left Heart Cath;  Surgeon: Rio Miranda MD;  Location: [facility] CATH [facility]AS[facility] LOCATION;  Service: Cardiology;  Laterality: N/A;    CARDIAC CATHETERIZATION N/A 3/10/2023    Procedure: Coronary angiography;  Surgeon: Rio Miranda MD;  Location: [facility] CATH INVASIVE LOCATION;  Service: Cardiology;  Laterality: N/A;    CARDIAC CATHETERIZATION N/A 3/10/2023    Procedure: Left ventriculography;  Surgeon: Rio Miranda MD;  Location: [facility] CATH INVASIVE LOCATION;  Service: Cardiology;  Laterality: N/A;    CARDIAC CATHETERIZATION N/A 3/10/2023    Procedure: Percutaneous Coronary Intervention;  Surgeon: Rio Miranda MD;  Location: [facility] C[facility];  Service: Cardiology;  Laterality: N/A;    CARDIAC CATHETERIZATION N/A 3/10/2023    Procedure:  Stent GLENDY coronary;  Surgeon: Rio Miranda MD;  [facility];  Service: Cardiology;  Laterality: N/A;    CARDIAC CATHETERIZATION N/A 1/3/2024    Procedure: Left Heart Cath;  Surgeon: Rio Miranda MD;  [facility];  Service: Cardiovascular;  Laterality: N/A;    CARDIAC CATHETERIZATION N/A 1/3/2024    Procedure: Coronary angiography;  Surgeon: Rio Miranda MD;  [facility];  Service: Cardiovascular;  Laterality: N/A;    CARDIAC CATHETERIZATION N/A 1/3/2024    Procedure: Percutaneous Coronary Intervention;  Surgeon: Rio Miranda MD;  Location: [facility];  Service: Cardiovascular;  Laterality: N/A;    CARDIAC CATHETERIZATION N/A 1/3/2024    Procedure: Left ventriculography;  Surgeon: Rio Miranda MD;  Location: [facility] C[facility];  Service: Cardiovascular;  Laterality: N/A;    CARDIAC CATHETERIZATION Left 1/3/2024    Procedure: Native mammary injection;  Surgeon: Rio Miranda MD;  Location: [facility] CATH INVASIVE LOCATION;  Service: Cardiovascular;  Laterality: Left;    CARPAL TUNNEL RELEASE Bilateral     CATARACT EXTRACTION      CORONARY ARTERY BYPASS GRAFT  2002    FINGER SURGERY      MISSING LEFT POINTER FINGER TIP    KNEE ARTHROPLASTY Right 02/15/2017    IN ARTHRP KNE CONDYLE&PLATU MEDIAL&LAT COMPARTMENTS Left 12/14/2017    Procedure: LT TOTAL KNEE ARTHROPLASTY;  Surgeon: Jatin Lancaster MD;  Location: [facility] MAIN OR;  Service: Orthopedics    IN RT/LT HEART CATHETERS N/A 4/30/2019    Procedure: Percutaneous Coronary Intervention;  Surgeon: Rio Miranda MD;  Location: [facility];  Service: Cardiology         FAMILY HISTORY  Family History   Problem Relation Age of Onset    Parkinsonism Sister     Diabetes Brother     Malig Hyperthermia Neg Hx          SOCIAL HISTORY  Social History     Socioeconomic History    Marital  status: Single   Tobacco Use    Smoking status: Never     Passive exposure: Never    Smokeless tobacco: Never   Vaping Use    Vaping Use: Never used   Substance and Sexual Activity    Alcohol use: No    Drug use: No    Sexual activity: Defer         ALLERGIES  Patient has no known allergies.        REVIEW OF SYSTEMS    As per HPI      PHYSICAL EXAM  ED Triage Vitals [02/06/24 1108]   Temp Heart Rate Resp BP SpO2   98.6 °F (37 °C) 59 17 155/81 95 %      Temp src Heart Rate Source Patient Position BP Location FiO2 (%)   -- -- -- -- --       Physical Exam  Constitutional:       General: He is not in acute distress.  HENT:      Head: Normocephalic.      Comments: Contusion over the right frontal scalp  Cardiovascular:      Rate and Rhythm: Normal rate and regular rhythm.   Pulmonary:      Effort: No respiratory distress.   Abdominal:      General: There is no distension.      Tenderness: There is no abdominal tenderness.   Musculoskeletal:         General: No swelling or deformity.   Skin:     General: Skin is warm and dry.   Neurological:      General: No focal deficit present.      Mental Status: He is alert. Mental status is at baseline.      Comments: Diffuse tremor in bilateral upper extremities           Vital signs and nursing notes reviewed.          LAB RESULTS  Recent Results (from the past 24 hour(s))   Comprehensive Metabolic Panel    Collection Time: 02/06/24 11:34 AM    Specimen: Blood   Result Value Ref Range    Glucose 97 65 - 99 mg/dL    BUN 13 8 - 23 mg/dL    Creatinine 0.86 0.76 - 1.27 mg/dL    Sodium 138 136 - 145 mmol/L    Potassium 4.2 3.5 - 5.2 mmol/L    Chloride 98 98 - 107 mmol/L    CO2 26.8 22.0 - 29.0 mmol/L    Calcium 8.8 8.6 - 10.5 mg/dL    Total Protein 6.8 6.0 - 8.5 g/dL    Albumin 4.0 3.5 - 5.2 g/dL    ALT (SGPT) 13 1 - 41 U/L    AST (SGOT) 13 1 - 40 U/L    Alkaline Phosphatase 87 39 - 117 U/L    Total Bilirubin 0.8 0.0 - 1.2 mg/dL    Globulin 2.8 gm/dL    A/G Ratio 1.4 g/dL     BUN/Creatinine Ratio 15.1 7.0 - 25.0    Anion Gap 13.2 5.0 - 15.0 mmol/L    eGFR 89.7 >60.0 mL/min/1.73   Valproic Acid Level, Total    Collection Time: 02/06/24 11:34 AM    Specimen: Blood   Result Value Ref Range    Valproic Acid 75.0 50.0 - 125.0 mcg/mL   Protime-INR    Collection Time: 02/06/24 11:34 AM    Specimen: Blood   Result Value Ref Range    Protime 13.3 11.7 - 14.2 Seconds    INR 1.00 0.90 - 1.10   aPTT    Collection Time: 02/06/24 11:34 AM    Specimen: Blood   Result Value Ref Range    PTT 27.2 22.7 - 35.4 seconds   CBC Auto Differential    Collection Time: 02/06/24 11:34 AM    Specimen: Blood   Result Value Ref Range    WBC 6.64 3.40 - 10.80 10*3/mm3    RBC 4.24 4.14 - 5.80 10*6/mm3    Hemoglobin 13.2 13.0 - 17.7 g/dL    Hematocrit 40.2 37.5 - 51.0 %    MCV 94.8 79.0 - 97.0 fL    MCH 31.1 26.6 - 33.0 pg    MCHC 32.8 31.5 - 35.7 g/dL    RDW 14.5 12.3 - 15.4 %    RDW-SD 50.8 37.0 - 54.0 fl    MPV 9.9 6.0 - 12.0 fL    Platelets 153 140 - 450 10*3/mm3    Neutrophil % 66.1 42.7 - 76.0 %    Lymphocyte % 24.1 19.6 - 45.3 %    Monocyte % 8.1 5.0 - 12.0 %    Eosinophil % 0.9 0.3 - 6.2 %    Basophil % 0.3 0.0 - 1.5 %    Immature Grans % 0.5 0.0 - 0.5 %    Neutrophils, Absolute 4.39 1.70 - 7.00 10*3/mm3    Lymphocytes, Absolute 1.60 0.70 - 3.10 10*3/mm3    Monocytes, Absolute 0.54 0.10 - 0.90 10*3/mm3    Eosinophils, Absolute 0.06 0.00 - 0.40 10*3/mm3    Basophils, Absolute 0.02 0.00 - 0.20 10*3/mm3    Immature Grans, Absolute 0.03 0.00 - 0.05 10*3/mm3    nRBC 0.0 0.0 - 0.2 /100 WBC   Urinalysis With Microscopic If Indicated (No Culture) - Urine, Clean Catch    Collection Time: 02/06/24 12:45 PM    Specimen: Urine, Clean Catch   Result Value Ref Range    Color, UA Yellow Yellow, Straw    Appearance, UA Cloudy (A) Clear    pH, UA 7.0 5.0 - 8.0    Specific Gravity, UA 1.014 1.005 - 1.030    Glucose, UA Negative Negative    Ketones, UA Negative Negative    Bilirubin, UA Negative Negative    Blood, UA Negative  Negative    Protein, UA Negative Negative    Leuk Esterase, UA Negative Negative    Nitrite, UA Negative Negative    Urobilinogen, UA 1.0 E.U./dL 0.2 - 1.0 E.U./dL       Ordered the above labs and reviewed the results.        RADIOLOGY  CT Head Without Contrast, CT Cervical Spine Without Contrast    Result Date: 2/6/2024  CT HEAD AND CERVICAL SPINE WITHOUT CONTRAST  CLINICAL HISTORY: Fall with head injury. Headache and neck pain.  TECHNIQUE: CT scan of the head was obtained with 3 mm axial soft tissue and 2 mm bone algorithm images. No intravenous contrast was administered. Sagittal and coronal reconstructions were obtained.  COMPARISON: CT head dated 06/07/2023.  FINDINGS:   There is no evidence for a calvarial fracture or an acute extra-axial hemorrhage.  The ventricles, sulci, and cisterns are unchanged in size since the prior exam but are suggestive of mild diffuse parenchymal atrophic change. The basal ganglia and thalami are unremarkable. There is a small chronic infarct within the posterolateral aspect of the right cerebellar hemisphere measuring up to 12 x 5 mm in greatest axial dimensions that is within the right PICA distribution. This finding was also evident on the prior study. Atherosclerotic calcifications are incidentally appreciated within the intracranial vasculature.  Mucosal thickening and secretions are noted within the left maxillary sinus.       No evidence for acute traumatic intracranial pathology.  Mild changes of chronic small vessel ischemic phenomena, diffuse parenchymal atrophy, and a small chronic infarct within the right cerebellar hemisphere are incidentally noted.   TECHNIQUE: CT scan of the cervical spine was obtained with 1 mm axial bone algorithm and 2 mm axial soft tissue algorithm images. Sagittal and coronal reconstructed images were obtained.  FINDINGS:  There is no evidence for acute fracture or bony malalignment involving the cervical spine. No significant degree of bony  canal stenosis is appreciated. Foraminal stenotic changes are identified within the cervical spine and most prominently seen at the level of the C3-4 and C6-7 disc spaces.  IMPRESSION:  No evidence for acute fracture or bony malalignment involving the cervical spine.   Radiation dose reduction techniques were utilized, including automated exposure control and exposure modulation based on body size.      XR Chest 1 View    Result Date: 2/6/2024  XR CHEST 1 VW-2/6/2024  HISTORY: Elevated blood pressure. Diabetes. Patient fell.  Heart size is within normal limits. Lungs appear free of acute infiltrates. Sternotomy wires are seen. Bony structures appear unremarkable.  There are minor degenerative changes in the spine.      1. No acute process.   This report was finalized on 2/6/2024 12:39 PM by Dr. Mario Davis M.D on Workstation: [id number]       Ordered the above noted radiological studies. Reviewed by me in PACS.          MEDICATIONS GIVEN IN ER  Medications - No data to display            MEDICAL DECISION MAKING, PROGRESS, and CONSULTS    MDM: Patient presented emergency department status post fall, nonsyncopal.  Otherwise well-appearing, vitals otherwise stable.  Labs and imaging reassuring in the ER.  No evidence of significant traumatic injury at this time.  Groin pain unable to be reproduced on exam, suspect muscular in etiology.  Will discharge with supportive care and outpatient follow-up.    All labs have been independently reviewed by me.  All radiology studies have been reviewed by me and I have also reviewed the radiology report.   EKG's independently viewed and interpreted by me.  Discussion below represents my analysis of pertinent findings related to patient's condition, differential diagnosis, treatment plan and final disposition.      Additional sources:  - Discussed/ obtained information from independent historians:      - External (non-ED) record review:     - Chronic or social conditions  impacting care: Brain injury    - Shared decision making: Discussed plan for discharge, patient agrees      Orders placed during this visit:  Orders Placed This Encounter   Procedures    XR Chest 1 View    CT Head Without Contrast    CT Cervical Spine Without Contrast    Comprehensive Metabolic Panel    Valproic Acid Level, Total    Protime-INR    aPTT    Urinalysis With Microscopic If Indicated (No Culture) - Urine, Clean Catch    CBC Auto Differential    CBC & Differential         Additional orders considered but not ordered:  Considered pelvis x-ray however no tenderness elicited on exam        Differential diagnosis includes but is not limited to:    Fracture, contusion, intracranial hemorrhage, closed head injury, electrolyte abnormality, infection      Independent interpretation of labs, radiology studies, and discussions with consultants:  ED Course as of 02/06/24 1518   Tue Feb 06, 2024   1204 CT head interpreted myself:  No large hemorrhage or midline shift [FS]   1220 Valproic Acid: 75.0 [FS]   1220 Chest x-ray interpreted myself:  No pneumothorax [FS]      ED Course User Index  [FS] Kory Langston MD           DIAGNOSIS  Final diagnoses:   Closed head injury, initial encounter   Upper back pain on right side   Right groin pain         DISPOSITION  Discharged home        Latest Documented Vital Signs:  As of 15:18 EST  BP- 128/85 HR- 62 Temp- 98.6 °F (37 °C) O2 sat- 91%              --    Please note that portions of this were completed with a voice recognition program.       Note Disclaimer: At [facility], we believe that sharing information builds trust and better relationships. You are receiving this note because you are receiving care at [facility] or recently visited. It is possible you will see health information before a provider has talked with you about it. This kind of information can be easy to misunderstand. To help you fully understand what it means for your health, we urge you to  discuss this note with your provider.             Kory Langston MD  02/06/24 5528

## 2024-03-14 ENCOUNTER — OFFICE VISIT (OUTPATIENT)
Dept: FAMILY MEDICINE CLINIC | Facility: CLINIC | Age: 79
End: 2024-03-14
Payer: MEDICARE

## 2024-03-14 VITALS
BODY MASS INDEX: 32.91 KG/M2 | WEIGHT: 222.2 LBS | OXYGEN SATURATION: 97 % | SYSTOLIC BLOOD PRESSURE: 102 MMHG | TEMPERATURE: 98 F | HEIGHT: 69 IN | DIASTOLIC BLOOD PRESSURE: 64 MMHG | HEART RATE: 65 BPM

## 2024-03-14 DIAGNOSIS — Z79.4 TYPE 2 DIABETES MELLITUS WITH HYPOGLYCEMIA WITHOUT COMA, WITH LONG-TERM CURRENT USE OF INSULIN: ICD-10-CM

## 2024-03-14 DIAGNOSIS — E11.649 TYPE 2 DIABETES MELLITUS WITH HYPOGLYCEMIA WITHOUT COMA, WITH LONG-TERM CURRENT USE OF INSULIN: ICD-10-CM

## 2024-03-14 DIAGNOSIS — I10 ESSENTIAL HYPERTENSION: ICD-10-CM

## 2024-03-14 DIAGNOSIS — N18.9 CHRONIC KIDNEY DISEASE, UNSPECIFIED CKD STAGE: ICD-10-CM

## 2024-03-14 DIAGNOSIS — E03.9 HYPOTHYROIDISM, UNSPECIFIED TYPE: ICD-10-CM

## 2024-03-14 DIAGNOSIS — S06.9XAS CHRONIC BRAIN INJURY: ICD-10-CM

## 2024-03-14 DIAGNOSIS — I25.10 CORONARY ARTERY DISEASE WITHOUT ANGINA PECTORIS, UNSPECIFIED VESSEL OR LESION TYPE, UNSPECIFIED WHETHER NATIVE OR TRANSPLANTED HEART: Primary | ICD-10-CM

## 2024-03-14 DIAGNOSIS — E78.5 HYPERLIPIDEMIA LDL GOAL <100: ICD-10-CM

## 2024-03-14 RX ORDER — PROPRANOLOL HYDROCHLORIDE 40 MG/1
40 TABLET ORAL DAILY
COMMUNITY
Start: 2024-02-02

## 2024-03-14 NOTE — PROGRESS NOTES
"Chief Complaint  6month follow up    Subjective        HPI   Hernando presents to [facility] as a 79-year-old male for follow-up on chronic conditions, to order labs, review medications.  Overall doing okay    Neuro restorative rehab  Visit today     He does have a history of CAD, hypothyroidism, GERD, hyperlipidemia, hypertension, sleep apnea and history of a stroke.  He is stable on all his medications.  And has no acute complaints at today  He does have a cardiologist and a neurologist    Denies any headaches no vision no dizziness no chest pain or pressure    Taking all medication as directed    He has been working on his diet and exercise with the visit dietitian    He is not fasting today and will return for labs    There is no acute complaints from him or his assistant today    The following portions of the patient's history were reviewed and updated as appropriate: allergies, current medications, past family history, past medical history, past social history, past surgical history, and problem list        Review of Systems   Constitutional:  Negative for chills, fatigue and fever.   Eyes:  Negative for visual disturbance.   Respiratory:  Negative for cough, shortness of breath, wheezing and stridor.    Cardiovascular:  Negative for chest pain, palpitations and leg swelling.   Neurological:  Negative for dizziness.   Psychiatric/Behavioral:  Negative for self-injury, sleep disturbance and suicidal ideas. The patient is not nervous/anxious.         Objective   Vital Signs:   Vitals:    03/14/24 1313   BP: 102/64   Pulse: 65   Temp: 98 °F (36.7 °C)   SpO2: 97%   Weight: 101 kg (222 lb 3.2 oz)   Height: 175.3 cm (69.02\")            3/14/2024     1:29 PM   PHQ-2/PHQ-9 Depression Screening   Little Interest or Pleasure in Doing Things 0-->not at all   Feeling Down, Depressed or Hopeless 0-->not at all   PHQ-9: Brief Depression Severity Measure Score 0                 Physical Exam  Vitals " reviewed.   Constitutional:       General: He is not in acute distress.  Eyes:      Conjunctiva/sclera: Conjunctivae normal.   Neck:      Thyroid: No thyromegaly.      Vascular: No carotid bruit.   Cardiovascular:      Rate and Rhythm: Normal rate and regular rhythm.      Heart sounds: Normal heart sounds.   Pulmonary:      Effort: Pulmonary effort is normal.      Breath sounds: Normal breath sounds.   Musculoskeletal:      Right lower leg: No edema.      Left lower leg: No edema.   Neurological:      Mental Status: He is alert.   Psychiatric:         Attention and Perception: Attention normal.         Mood and Affect: Mood normal.          Result Review :     The following data was reviewed by: FAISAL Castañeda on 03/14/2024:           Assessment and Plan    Assessment & Plan  Coronary artery disease without angina pectoris, unspecified vessel or lesion type, unspecified whether native or transplanted heart  Continue current management follow-up with any changes immediately  Essential hypertension  Controlled continue current management  Monitor BP at home bring log to next visit    Hyperlipidemia LDL goal <100  Recommend continuing atorvastatin  Needs lipid panel with labs   Continue to work on lower cholesterol diet and exercise.  Goal is greater than 150 minutes moderate intensity weekly     Chronic kidney disease, unspecified CKD stage  Monitoring kidney function with labs  Continue current management  Chronic brain injury  Keep follow-up with specialist as directed as directed  Hypothyroidism, unspecified type  Need repeat thyroid labs  Not currently taking (  Type 2 diabetes mellitus with hypoglycemia without coma, with long-term current use of insulin  Last hemoglobin A1c was 5.9  Not currently on any medication other than metformin  Watching carb and sugar intake closely        Orders Placed This Encounter   Procedures    Comprehensive metabolic panel    Lipid panel    TSH    T4, Free    Hemoglobin  A1c    CBC and Differential             Follow Up   Return in about 6 months (around 9/14/2024) for Annual physical.  Patient was given instructions and counseling regarding his condition or for health maintenance advice. Please see specific information pulled into the AVS if appropriate.

## 2024-04-26 ENCOUNTER — OFFICE VISIT (OUTPATIENT)
Dept: FAMILY MEDICINE CLINIC | Facility: CLINIC | Age: 79
End: 2024-04-26
Payer: MEDICARE

## 2024-04-26 VITALS
TEMPERATURE: 98 F | HEIGHT: 69 IN | DIASTOLIC BLOOD PRESSURE: 86 MMHG | BODY MASS INDEX: 32.53 KG/M2 | OXYGEN SATURATION: 95 % | SYSTOLIC BLOOD PRESSURE: 118 MMHG | WEIGHT: 219.6 LBS | HEART RATE: 67 BPM

## 2024-04-26 DIAGNOSIS — I25.10 CORONARY ARTERY DISEASE WITHOUT ANGINA PECTORIS, UNSPECIFIED VESSEL OR LESION TYPE, UNSPECIFIED WHETHER NATIVE OR TRANSPLANTED HEART: Primary | ICD-10-CM

## 2024-04-26 DIAGNOSIS — E11.649 TYPE 2 DIABETES MELLITUS WITH HYPOGLYCEMIA WITHOUT COMA, WITH LONG-TERM CURRENT USE OF INSULIN: ICD-10-CM

## 2024-04-26 DIAGNOSIS — G47.33 OSA (OBSTRUCTIVE SLEEP APNEA): ICD-10-CM

## 2024-04-26 DIAGNOSIS — E03.9 HYPOTHYROIDISM, UNSPECIFIED TYPE: ICD-10-CM

## 2024-04-26 DIAGNOSIS — Z79.4 TYPE 2 DIABETES MELLITUS WITH HYPOGLYCEMIA WITHOUT COMA, WITH LONG-TERM CURRENT USE OF INSULIN: ICD-10-CM

## 2024-04-26 DIAGNOSIS — N18.9 CHRONIC KIDNEY DISEASE, UNSPECIFIED CKD STAGE: ICD-10-CM

## 2024-04-26 DIAGNOSIS — I10 ESSENTIAL HYPERTENSION: ICD-10-CM

## 2024-04-26 DIAGNOSIS — E78.5 HYPERLIPIDEMIA LDL GOAL <100: ICD-10-CM

## 2024-04-26 DIAGNOSIS — S06.9XAS CHRONIC BRAIN INJURY: ICD-10-CM

## 2024-04-26 RX ORDER — LEVOTHYROXINE SODIUM 0.03 MG/1
25 TABLET ORAL DAILY
COMMUNITY
Start: 2024-04-02

## 2024-04-26 RX ORDER — DOCUSATE SODIUM 100 MG/1
100 CAPSULE, LIQUID FILLED ORAL 2 TIMES DAILY
COMMUNITY
Start: 2024-04-02

## 2024-04-26 RX ORDER — ASPIRIN 81 MG
81 TABLET,CHEWABLE ORAL DAILY
COMMUNITY
Start: 2024-04-02

## 2024-04-26 RX ORDER — DIVALPROEX SODIUM 500 MG/1
500 TABLET, DELAYED RELEASE ORAL 2 TIMES DAILY
COMMUNITY
Start: 2024-04-02

## 2024-04-26 RX ORDER — TICAGRELOR 90 MG/1
90 TABLET ORAL 2 TIMES DAILY
COMMUNITY
Start: 2024-04-02

## 2024-04-26 RX ORDER — FERROUS SULFATE 325(65) MG
325 TABLET ORAL
COMMUNITY
Start: 2024-04-02

## 2024-04-26 NOTE — ASSESSMENT & PLAN NOTE
Continue to work on lower cholesterol diet and exercise.  Goal is greater than 150 minutes moderate intensity weekly   Continue atorvastatin  Check lipid panel

## 2024-04-26 NOTE — PROGRESS NOTES
Chief Complaint  Follow-up    Subjective        THAIS Villagomez presents to [facility]     as a 78-year-old male to establish care and to follow-up on hypertension and hyperlipidemia.     He does have a history of CAD, hypothyroidism, GERD, hyperlipidemia, hypertension, sleep apnea and history of a stroke.  He is stable on all his medications.  And has no acute complaints at today  He does have a cardiologist and a neurologist             Blood pressure has been stable.  He has no acute complaints of today.  He denies any headaches no blurred vision no dizziness no flushing no chest pain or pressure  He does go to a brain injury rehab facility and was accompanied today with his worker      They do refill all of his medications at his neuro rehab facility        He does have a slight tremor-he has discussed this with neurologist and states that they have adjusted his medications recently.           He does follow-up with neurologist and psychiatrist they do manage his medications         He has no other acute complaints at today    States he is fasting and agreeable to labs     The following portions of the patient's history were reviewed and updated as appropriate: allergies, current medications, past family history, past medical history, past social history, past surgical history, and problem list  Review of Systems   Constitutional:  Negative for chills, fatigue and fever.   Eyes:  Negative for visual disturbance.   Respiratory:  Negative for cough, shortness of breath, wheezing and stridor.    Cardiovascular:  Negative for chest pain, palpitations and leg swelling.   Gastrointestinal:  Negative for abdominal pain, diarrhea, nausea and vomiting.   Endocrine: Negative for polydipsia, polyphagia and polyuria.   Neurological:  Negative for dizziness.   Psychiatric/Behavioral:  Negative for self-injury, sleep disturbance and suicidal ideas. The patient is not nervous/anxious.         Objective  "  Vital Signs:   Vitals:    04/26/24 1501   BP: 118/86   Pulse: 67   Temp: 98 °F (36.7 °C)   SpO2: 95%   Weight: 99.6 kg (219 lb 9.6 oz)   Height: 175.3 cm (69.02\")            4/26/2024     3:07 PM   PHQ-2/PHQ-9 Depression Screening   Little Interest or Pleasure in Doing Things 0-->not at all   Feeling Down, Depressed or Hopeless 0-->not at all   PHQ-9: Brief Depression Severity Measure Score 0                 Physical Exam  Vitals reviewed.   Constitutional:       General: He is not in acute distress.  Eyes:      Conjunctiva/sclera: Conjunctivae normal.   Neck:      Thyroid: No thyromegaly.      Vascular: No carotid bruit.   Cardiovascular:      Rate and Rhythm: Normal rate and regular rhythm.      Heart sounds: Normal heart sounds. No murmur heard.  Pulmonary:      Effort: Pulmonary effort is normal. No respiratory distress.      Breath sounds: Normal breath sounds. No stridor. No wheezing, rhonchi or rales.   Chest:      Chest wall: No tenderness.   Lymphadenopathy:      Cervical: No cervical adenopathy.   Neurological:      Mental Status: He is alert and oriented to person, place, and time.   Psychiatric:         Attention and Perception: Attention normal.         Mood and Affect: Mood normal.          Result Review :     The following data was reviewed by: FAISAL Castañeda on 04/26/2024:      Echo revealed EF 56 to 60%, LV diastolic function was normal.  Moderate to severe aortic valve stenosis is present.  Aortic valve area is 1 cm 2.  See full report as below.  Recent stress test is negative.     already a cath in 2019 revealed angioplasty and stent to the obtuse marginal branch 80% reduced to 0%.  % occluded with the LIMA to the LAD patent with normal distal flow.  Circumflex artery had a OM 80% reduced to 0%.  RCA dominant with minimum diffuse irregularity.      Hemoglobin A1c (03/14/2024 13:52)  T4, Free (03/14/2024 13:52)  TSH (03/14/2024 13:52)  CBC and Differential (03/14/2024 13:52)  Lipid " panel (03/14/2024 13:52)  Comprehensive metabolic panel (03/14/2024 13:52)         Last hemoglobin A1c 4/2023 5.5 well-controlled      Assessment and Plan    Assessment & Plan  Coronary artery disease without angina pectoris, unspecified vessel or lesion type, unspecified whether native or transplanted heart  Keep follow-up with cardiology as directed continue current management  Essential hypertension  Hypertension is stable and controlled  Continue current treatment regimen.  Blood pressure will be reassessed in 6 months.  Hyperlipidemia LDL goal <100   Continue to work on lower cholesterol diet and exercise.  Goal is greater than 150 minutes moderate intensity weekly   Continue atorvastatin  Check lipid panel  Chronic kidney disease, unspecified CKD stage  Renal condition is stable.  Continue current treatment regimen.  Renal condition will be reassessed in 6 months.  Chronic brain injury  Keep follow-up with specialist as directed  Hypothyroidism, unspecified type  Continue levothyroxine recheck  thyroid panel  Type 2 diabetes mellitus with hypoglycemia without coma, with long-term current use of insulin  Recheck hemoglobin A1c stable on last check  Continue metformin  Watch carb and sugar intake  GIOVANNY (obstructive sleep apnea)  Continue to use CPAP             Follow Up   Return for Annual physical.  Patient was given instructions and counseling regarding his condition or for health maintenance advice. Please see specific information pulled into the AVS if appropriate.

## 2024-04-27 LAB
ALBUMIN SERPL-MCNC: 4.3 G/DL (ref 3.5–5.2)
ALBUMIN/GLOB SERPL: 2 G/DL
ALP SERPL-CCNC: 76 U/L (ref 39–117)
ALT SERPL-CCNC: 17 U/L (ref 1–41)
AST SERPL-CCNC: 18 U/L (ref 1–40)
BASOPHILS # BLD AUTO: 0.01 10*3/MM3 (ref 0–0.2)
BASOPHILS NFR BLD AUTO: 0.1 % (ref 0–1.5)
BILIRUB SERPL-MCNC: 0.6 MG/DL (ref 0–1.2)
BUN SERPL-MCNC: 12 MG/DL (ref 8–23)
BUN/CREAT SERPL: 13.2 (ref 7–25)
CALCIUM SERPL-MCNC: 8.9 MG/DL (ref 8.6–10.5)
CHLORIDE SERPL-SCNC: 99 MMOL/L (ref 98–107)
CHOLEST SERPL-MCNC: 132 MG/DL (ref 0–200)
CO2 SERPL-SCNC: 34 MMOL/L (ref 22–29)
CREAT SERPL-MCNC: 0.91 MG/DL (ref 0.76–1.27)
EGFRCR SERPLBLD CKD-EPI 2021: 86.8 ML/MIN/1.73
EOSINOPHIL # BLD AUTO: 0.01 10*3/MM3 (ref 0–0.4)
EOSINOPHIL NFR BLD AUTO: 0.1 % (ref 0.3–6.2)
ERYTHROCYTE [DISTWIDTH] IN BLOOD BY AUTOMATED COUNT: 13.8 % (ref 12.3–15.4)
GLOBULIN SER CALC-MCNC: 2.1 GM/DL
GLUCOSE SERPL-MCNC: 102 MG/DL (ref 65–99)
HBA1C MFR BLD: 5.5 % (ref 4.8–5.6)
HCT VFR BLD AUTO: 38.8 % (ref 37.5–51)
HDLC SERPL-MCNC: 50 MG/DL (ref 40–60)
HGB BLD-MCNC: 12.7 G/DL (ref 13–17.7)
IMM GRANULOCYTES # BLD AUTO: 0.04 10*3/MM3 (ref 0–0.05)
IMM GRANULOCYTES NFR BLD AUTO: 0.5 % (ref 0–0.5)
LDLC SERPL CALC-MCNC: 61 MG/DL (ref 0–100)
LYMPHOCYTES # BLD AUTO: 2.26 10*3/MM3 (ref 0.7–3.1)
LYMPHOCYTES NFR BLD AUTO: 29.5 % (ref 19.6–45.3)
MCH RBC QN AUTO: 32.2 PG (ref 26.6–33)
MCHC RBC AUTO-ENTMCNC: 32.7 G/DL (ref 31.5–35.7)
MCV RBC AUTO: 98.5 FL (ref 79–97)
MONOCYTES # BLD AUTO: 0.77 10*3/MM3 (ref 0.1–0.9)
MONOCYTES NFR BLD AUTO: 10.1 % (ref 5–12)
NEUTROPHILS # BLD AUTO: 4.57 10*3/MM3 (ref 1.7–7)
NEUTROPHILS NFR BLD AUTO: 59.7 % (ref 42.7–76)
NRBC BLD AUTO-RTO: 0 /100 WBC (ref 0–0.2)
PLATELET # BLD AUTO: 195 10*3/MM3 (ref 140–450)
POTASSIUM SERPL-SCNC: 3.8 MMOL/L (ref 3.5–5.2)
PROT SERPL-MCNC: 6.4 G/DL (ref 6–8.5)
RBC # BLD AUTO: 3.94 10*6/MM3 (ref 4.14–5.8)
SODIUM SERPL-SCNC: 141 MMOL/L (ref 136–145)
T4 FREE SERPL-MCNC: 0.97 NG/DL (ref 0.93–1.7)
TRIGL SERPL-MCNC: 116 MG/DL (ref 0–150)
TSH SERPL DL<=0.005 MIU/L-ACNC: 5.21 UIU/ML (ref 0.27–4.2)
VLDLC SERPL CALC-MCNC: 21 MG/DL (ref 5–40)
WBC # BLD AUTO: 7.66 10*3/MM3 (ref 3.4–10.8)

## 2024-04-30 DIAGNOSIS — E03.9 HYPOTHYROIDISM, UNSPECIFIED TYPE: Primary | ICD-10-CM

## 2024-04-30 RX ORDER — LEVOTHYROXINE SODIUM 0.05 MG/1
50 TABLET ORAL DAILY
Qty: 90 TABLET | Refills: 1 | Status: SHIPPED | OUTPATIENT
Start: 2024-04-30

## 2024-05-02 DIAGNOSIS — E03.9 HYPOTHYROIDISM, UNSPECIFIED TYPE: ICD-10-CM

## 2024-05-02 NOTE — TELEPHONE ENCOUNTER
Caller: EMMANUEL ( [facility])    Relationship:     Best call back number: [phone]    Requested Prescriptions:   Requested Prescriptions     Pending Prescriptions Disp Refills    levothyroxine (SYNTHROID, LEVOTHROID) 50 MCG tablet 90 tablet 1     Sig: Take 1 tablet by mouth Daily.        Pharmacy where request should be sent: [facility] [address] [facility]. - [phone]  - [phone]     Last office visit with prescribing clinician: 4/26/2024   Last telemedicine visit with prescribing clinician: Visit date not found   Next office visit with prescribing clinician: Visit date not found     Additional details provided by patient:  CALLER STATES THAT DOCTOR IS SUPPOSED TO BE INCREASING PATIENT'S DOSAGE. PLEASE ALL AND ADVISE.    Does the patient have less than a 3 day supply:  [] Yes  [] No    Would you like a call back once the refill request has been completed: [] Yes [] No    If the office needs to give you a call back, can they leave a voicemail: [] Yes [] No    Rodrick Jason Rep   05/02/24 10:48 EDT

## 2024-05-03 RX ORDER — LEVOTHYROXINE SODIUM 0.05 MG/1
50 TABLET ORAL DAILY
Qty: 90 TABLET | Refills: 1 | OUTPATIENT
Start: 2024-05-03

## 2024-05-07 DIAGNOSIS — E03.9 HYPOTHYROIDISM, UNSPECIFIED TYPE: ICD-10-CM

## 2024-05-08 RX ORDER — LEVOTHYROXINE SODIUM 0.05 MG/1
50 TABLET ORAL DAILY
Qty: 90 TABLET | Refills: 1 | OUTPATIENT
Start: 2024-05-08

## 2024-05-13 ENCOUNTER — TELEPHONE (OUTPATIENT)
Dept: FAMILY MEDICINE CLINIC | Facility: CLINIC | Age: 79
End: 2024-05-13
Payer: MEDICARE

## 2024-05-13 DIAGNOSIS — E03.9 HYPOTHYROIDISM, UNSPECIFIED TYPE: ICD-10-CM

## 2024-05-13 RX ORDER — LEVOTHYROXINE SODIUM 0.05 MG/1
50 TABLET ORAL DAILY
Qty: 90 TABLET | Refills: 1 | Status: SHIPPED | OUTPATIENT
Start: 2024-05-13

## 2024-05-13 NOTE — TELEPHONE ENCOUNTER
Caller: EMMANUEL- [facility]    Best call back number: [phone]     Requested Prescriptions:   Requested Prescriptions     Pending Prescriptions Disp Refills    levothyroxine (SYNTHROID, LEVOTHROID) 50 MCG tablet 90 tablet 1     Sig: Take 1 tablet by mouth Daily.        Pharmacy where request should be sent: [facility] [address] - [phone]  - [phone]     Last office visit with prescribing clinician: 4/26/2024   Last telemedicine visit with prescribing clinician: Visit date not found   Next office visit with prescribing clinician: Visit date not found     Additional details provided by patient: PHARMACY NEVER RECEIVED THIS PRESCRIPTION. PLEASE RESEND    Does the patient have less than a 3 day supply:  [] Yes  [x] No    Would you like a call back once the refill request has been completed: [] Yes [x] No      Rodrick Stewart Rep   05/13/24 11:17 EDT

## 2024-06-17 ENCOUNTER — OFFICE VISIT (OUTPATIENT)
Dept: CARDIOLOGY | Facility: CLINIC | Age: 79
End: 2024-06-17
Payer: MEDICARE

## 2024-06-17 ENCOUNTER — HOSPITAL ENCOUNTER (OUTPATIENT)
Dept: CARDIOLOGY | Facility: HOSPITAL | Age: 79
Discharge: HOME | End: 2024-06-17
Admitting: INTERNAL MEDICINE
Payer: MEDICARE

## 2024-06-17 VITALS
SYSTOLIC BLOOD PRESSURE: 138 MMHG | DIASTOLIC BLOOD PRESSURE: 78 MMHG | BODY MASS INDEX: 32.44 KG/M2 | HEART RATE: 58 BPM | HEIGHT: 69 IN | WEIGHT: 219 LBS

## 2024-06-17 VITALS
WEIGHT: 221.8 LBS | HEIGHT: 69 IN | DIASTOLIC BLOOD PRESSURE: 74 MMHG | BODY MASS INDEX: 32.85 KG/M2 | SYSTOLIC BLOOD PRESSURE: 113 MMHG

## 2024-06-17 DIAGNOSIS — I25.118 CORONARY ARTERY DISEASE OF NATIVE ARTERY OF NATIVE HEART WITH STABLE ANGINA PECTORIS: ICD-10-CM

## 2024-06-17 DIAGNOSIS — I35.0 AORTIC STENOSIS, SEVERE: Primary | ICD-10-CM

## 2024-06-17 DIAGNOSIS — I10 ESSENTIAL HYPERTENSION: ICD-10-CM

## 2024-06-17 LAB
AORTIC DIMENSIONLESS INDEX: 0.3 (DI)
ASCENDING AORTA: 3.4 CM
BH CV ECHO MEAS - ACS: 1.07 CM
BH CV ECHO MEAS - AI P1/2T: 461.5 MSEC
BH CV ECHO MEAS - AO MAX PG: 40.5 MMHG
BH CV ECHO MEAS - AO MEAN PG: 22.8 MMHG
BH CV ECHO MEAS - AO ROOT DIAM: 3.8 CM
BH CV ECHO MEAS - AO V2 MAX: 318.1 CM/SEC
BH CV ECHO MEAS - AO V2 VTI: 82.1 CM
BH CV ECHO MEAS - AVA(I,D): 0.86 CM2
BH CV ECHO MEAS - EDV(CUBED): 201 ML
BH CV ECHO MEAS - EDV(MOD-SP2): 106 ML
BH CV ECHO MEAS - EDV(MOD-SP4): 109 ML
BH CV ECHO MEAS - EF(MOD-BP): 69.8 %
BH CV ECHO MEAS - EF(MOD-SP2): 66 %
BH CV ECHO MEAS - EF(MOD-SP4): 72.5 %
BH CV ECHO MEAS - ESV(CUBED): 30.2 ML
BH CV ECHO MEAS - ESV(MOD-SP2): 36 ML
BH CV ECHO MEAS - ESV(MOD-SP4): 30 ML
BH CV ECHO MEAS - FS: 46.9 %
BH CV ECHO MEAS - IVS/LVPW: 1.02 CM
BH CV ECHO MEAS - IVSD: 1.22 CM
BH CV ECHO MEAS - LAT PEAK E' VEL: 5.1 CM/SEC
BH CV ECHO MEAS - LV DIASTOLIC VOL/BSA (35-75): 50.8 CM2
BH CV ECHO MEAS - LV MASS(C)D: 303.6 GRAMS
BH CV ECHO MEAS - LV MAX PG: 3.2 MMHG
BH CV ECHO MEAS - LV MEAN PG: 1.94 MMHG
BH CV ECHO MEAS - LV SYSTOLIC VOL/BSA (12-30): 14 CM2
BH CV ECHO MEAS - LV V1 MAX: 89.6 CM/SEC
BH CV ECHO MEAS - LV V1 VTI: 22.8 CM
BH CV ECHO MEAS - LVIDD: 5.9 CM
BH CV ECHO MEAS - LVIDS: 3.1 CM
BH CV ECHO MEAS - LVOT AREA: 3.1 CM2
BH CV ECHO MEAS - LVOT DIAM: 1.98 CM
BH CV ECHO MEAS - LVPWD: 1.19 CM
BH CV ECHO MEAS - MED PEAK E' VEL: 3.7 CM/SEC
BH CV ECHO MEAS - MR MAX PG: 67.9 MMHG
BH CV ECHO MEAS - MR MAX VEL: 412.1 CM/SEC
BH CV ECHO MEAS - MV A DUR: 0.12 SEC
BH CV ECHO MEAS - MV A MAX VEL: 76.4 CM/SEC
BH CV ECHO MEAS - MV DEC SLOPE: 545.6 CM/SEC2
BH CV ECHO MEAS - MV DEC TIME: 0.27 SEC
BH CV ECHO MEAS - MV E MAX VEL: 182 CM/SEC
BH CV ECHO MEAS - MV E/A: 2.38
BH CV ECHO MEAS - MV MAX PG: 11.8 MMHG
BH CV ECHO MEAS - MV MEAN PG: 4.8 MMHG
BH CV ECHO MEAS - MV P1/2T: 93.3 MSEC
BH CV ECHO MEAS - MV V2 VTI: 60.5 CM
BH CV ECHO MEAS - MVA(P1/2T): 2.36 CM2
BH CV ECHO MEAS - MVA(VTI): 1.16 CM2
BH CV ECHO MEAS - PA ACC TIME: 0.16 SEC
BH CV ECHO MEAS - PA V2 MAX: 86.1 CM/SEC
BH CV ECHO MEAS - PULM A REVS DUR: 0.14 SEC
BH CV ECHO MEAS - PULM A REVS VEL: 21.4 CM/SEC
BH CV ECHO MEAS - PULM DIAS VEL: 68 CM/SEC
BH CV ECHO MEAS - PULM S/D: 0.88
BH CV ECHO MEAS - PULM SYS VEL: 60.1 CM/SEC
BH CV ECHO MEAS - RAP SYSTOLE: 3 MMHG
BH CV ECHO MEAS - RV MAX PG: 2.1 MMHG
BH CV ECHO MEAS - RV V1 MAX: 72.4 CM/SEC
BH CV ECHO MEAS - RV V1 VTI: 16.9 CM
BH CV ECHO MEAS - RVSP: 30 MMHG
BH CV ECHO MEAS - SV(LVOT): 70.4 ML
BH CV ECHO MEAS - SV(MOD-SP2): 70 ML
BH CV ECHO MEAS - SV(MOD-SP4): 79 ML
BH CV ECHO MEAS - SVI(LVOT): 32.8 ML/M2
BH CV ECHO MEAS - SVI(MOD-SP2): 32.6 ML/M2
BH CV ECHO MEAS - SVI(MOD-SP4): 36.8 ML/M2
BH CV ECHO MEAS - TAPSE (>1.6): 2.03 CM
BH CV ECHO MEAS - TR MAX PG: 26.5 MMHG
BH CV ECHO MEAS - TR MAX VEL: 257.3 CM/SEC
BH CV ECHO MEASUREMENTS AVERAGE E/E' RATIO: 41.36
BH CV XLRA - TDI S': 10.1 CM/SEC
LEFT ATRIUM VOLUME INDEX: 42 ML/M2
SINUS: 3 CM
STJ: 2.8 CM

## 2024-06-17 PROCEDURE — 93306 TTE W/DOPPLER COMPLETE: CPT | Performed by: INTERNAL MEDICINE

## 2024-06-17 PROCEDURE — 3074F SYST BP LT 130 MM HG: CPT | Performed by: INTERNAL MEDICINE

## 2024-06-17 PROCEDURE — 93306 TTE W/DOPPLER COMPLETE: CPT

## 2024-06-17 PROCEDURE — 3078F DIAST BP <80 MM HG: CPT | Performed by: INTERNAL MEDICINE

## 2024-06-17 PROCEDURE — 99214 OFFICE O/P EST MOD 30 MIN: CPT | Performed by: INTERNAL MEDICINE

## 2024-06-17 PROCEDURE — 25510000001 PERFLUTREN (DEFINITY) 8.476 MG IN SODIUM CHLORIDE (PF) 0.9 % 10 ML INJECTION: Performed by: INTERNAL MEDICINE

## 2024-06-17 RX ORDER — SODIUM PHOSPHATE,MONO-DIBASIC 19G-7G/118
500 ENEMA (ML) RECTAL 2 TIMES DAILY WITH MEALS
COMMUNITY

## 2024-06-17 RX ORDER — UREA 10 %
5 LOTION (ML) TOPICAL
COMMUNITY

## 2024-06-17 RX ORDER — FOLIC ACID 1 MG/1
1 TABLET ORAL DAILY
COMMUNITY
Start: 2024-04-30

## 2024-06-17 RX ORDER — FUROSEMIDE 20 MG/1
20 TABLET ORAL 2 TIMES DAILY
COMMUNITY

## 2024-06-17 RX ADMIN — SODIUM CHLORIDE 3 ML: 9 INJECTION INTRAMUSCULAR; INTRAVENOUS; SUBCUTANEOUS at 14:01
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
## 2024-06-20 ENCOUNTER — HOSPITAL ENCOUNTER (EMERGENCY)
Facility: HOSPITAL | Age: 79
Discharge: HOME | End: 2024-06-20
Attending: EMERGENCY MEDICINE
Payer: MEDICARE

## 2024-06-20 ENCOUNTER — APPOINTMENT (OUTPATIENT)
Dept: GENERAL RADIOLOGY | Facility: HOSPITAL | Age: 79
End: 2024-06-20
Payer: MEDICARE

## 2024-06-20 VITALS
HEART RATE: 63 BPM | TEMPERATURE: 98.4 F | RESPIRATION RATE: 18 BRPM | OXYGEN SATURATION: 94 % | DIASTOLIC BLOOD PRESSURE: 49 MMHG | SYSTOLIC BLOOD PRESSURE: 111 MMHG

## 2024-06-20 DIAGNOSIS — R07.2 PRECORDIAL CHEST PAIN: Primary | ICD-10-CM

## 2024-06-20 LAB
ALBUMIN SERPL-MCNC: 4.1 G/DL (ref 3.5–5.2)
ALBUMIN/GLOB SERPL: 1.5 G/DL
ALP SERPL-CCNC: 68 U/L (ref 39–117)
ALT SERPL W P-5'-P-CCNC: 9 U/L (ref 1–41)
ANION GAP SERPL CALCULATED.3IONS-SCNC: 10.6 MMOL/L (ref 5–15)
AST SERPL-CCNC: 12 U/L (ref 1–40)
BASOPHILS # BLD AUTO: 0.03 10*3/MM3 (ref 0–0.2)
BASOPHILS NFR BLD AUTO: 0.4 % (ref 0–1.5)
BILIRUB SERPL-MCNC: 0.6 MG/DL (ref 0–1.2)
BUN SERPL-MCNC: 10 MG/DL (ref 8–23)
BUN/CREAT SERPL: 11.5 (ref 7–25)
CALCIUM SPEC-SCNC: 9 MG/DL (ref 8.6–10.5)
CHLORIDE SERPL-SCNC: 100 MMOL/L (ref 98–107)
CO2 SERPL-SCNC: 29.4 MMOL/L (ref 22–29)
CREAT SERPL-MCNC: 0.87 MG/DL (ref 0.76–1.27)
DEPRECATED RDW RBC AUTO: 46 FL (ref 37–54)
EGFRCR SERPLBLD CKD-EPI 2021: 88.9 ML/MIN/1.73
EOSINOPHIL # BLD AUTO: 0.03 10*3/MM3 (ref 0–0.4)
EOSINOPHIL NFR BLD AUTO: 0.4 % (ref 0.3–6.2)
ERYTHROCYTE [DISTWIDTH] IN BLOOD BY AUTOMATED COUNT: 13 % (ref 12.3–15.4)
GEN 5 2HR TROPONIN T REFLEX: 34 NG/L
GLOBULIN UR ELPH-MCNC: 2.7 GM/DL
GLUCOSE SERPL-MCNC: 100 MG/DL (ref 65–99)
HCT VFR BLD AUTO: 37.8 % (ref 37.5–51)
HGB BLD-MCNC: 12.5 G/DL (ref 13–17.7)
IMM GRANULOCYTES # BLD AUTO: 0.02 10*3/MM3 (ref 0–0.05)
IMM GRANULOCYTES NFR BLD AUTO: 0.3 % (ref 0–0.5)
LIPASE SERPL-CCNC: 17 U/L (ref 13–60)
LYMPHOCYTES # BLD AUTO: 2.16 10*3/MM3 (ref 0.7–3.1)
LYMPHOCYTES NFR BLD AUTO: 30 % (ref 19.6–45.3)
MCH RBC QN AUTO: 31.9 PG (ref 26.6–33)
MCHC RBC AUTO-ENTMCNC: 33.1 G/DL (ref 31.5–35.7)
MCV RBC AUTO: 96.4 FL (ref 79–97)
MONOCYTES # BLD AUTO: 0.56 10*3/MM3 (ref 0.1–0.9)
MONOCYTES NFR BLD AUTO: 7.8 % (ref 5–12)
NEUTROPHILS NFR BLD AUTO: 4.41 10*3/MM3 (ref 1.7–7)
NEUTROPHILS NFR BLD AUTO: 61.1 % (ref 42.7–76)
NRBC BLD AUTO-RTO: 0 /100 WBC (ref 0–0.2)
NT-PROBNP SERPL-MCNC: 1083 PG/ML (ref 0–1800)
PLATELET # BLD AUTO: 165 10*3/MM3 (ref 140–450)
PMV BLD AUTO: 9.7 FL (ref 6–12)
POTASSIUM SERPL-SCNC: 3.9 MMOL/L (ref 3.5–5.2)
PROT SERPL-MCNC: 6.8 G/DL (ref 6–8.5)
RBC # BLD AUTO: 3.92 10*6/MM3 (ref 4.14–5.8)
SODIUM SERPL-SCNC: 140 MMOL/L (ref 136–145)
TROPONIN T DELTA: -5 NG/L
TROPONIN T SERPL HS-MCNC: 39 NG/L
WBC NRBC COR # BLD AUTO: 7.21 10*3/MM3 (ref 3.4–10.8)

## 2024-06-20 PROCEDURE — 83880 ASSAY OF NATRIURETIC PEPTIDE: CPT | Performed by: EMERGENCY MEDICINE

## 2024-06-20 PROCEDURE — 80053 COMPREHEN METABOLIC PANEL: CPT | Performed by: EMERGENCY MEDICINE

## 2024-06-20 PROCEDURE — 83690 ASSAY OF LIPASE: CPT | Performed by: EMERGENCY MEDICINE

## 2024-06-20 PROCEDURE — 85025 COMPLETE CBC W/AUTO DIFF WBC: CPT | Performed by: EMERGENCY MEDICINE

## 2024-06-20 PROCEDURE — 71045 X-RAY EXAM CHEST 1 VIEW: CPT

## 2024-06-20 PROCEDURE — 93010 ELECTROCARDIOGRAM REPORT: CPT | Performed by: INTERNAL MEDICINE

## 2024-06-20 PROCEDURE — 99284 EMERGENCY DEPT VISIT MOD MDM: CPT

## 2024-06-20 PROCEDURE — 84484 ASSAY OF TROPONIN QUANT: CPT | Performed by: EMERGENCY MEDICINE

## 2024-06-20 PROCEDURE — 36415 COLL VENOUS BLD VENIPUNCTURE: CPT

## 2024-06-20 PROCEDURE — 25810000003 SODIUM CHLORIDE 0.9 % SOLUTION: Performed by: EMERGENCY MEDICINE

## 2024-06-20 PROCEDURE — 93005 ELECTROCARDIOGRAM TRACING: CPT | Performed by: EMERGENCY MEDICINE

## 2024-06-20 RX ORDER — ASPIRIN 81 MG/1
324 TABLET, CHEWABLE ORAL ONCE
Status: COMPLETED | OUTPATIENT
Start: 2024-06-20 | End: 2024-06-20

## 2024-06-20 RX ORDER — ISOSORBIDE MONONITRATE 30 MG/1
30 TABLET, EXTENDED RELEASE ORAL DAILY
Qty: 30 TABLET | Refills: 0 | Status: SHIPPED | OUTPATIENT
Start: 2024-06-20 | End: 2024-07-20

## 2024-06-20 RX ORDER — SODIUM CHLORIDE 0.9 % (FLUSH) 0.9 %
10 SYRINGE (ML) INJECTION AS NEEDED
Status: DISCONTINUED | OUTPATIENT
Start: 2024-06-20 | End: 2024-06-20 | Stop reason: HOSPADM

## 2024-06-20 RX ADMIN — SODIUM CHLORIDE 500 ML: 9 INJECTION, SOLUTION INTRAVENOUS at 14:08

## 2024-06-20 RX ADMIN — ASPIRIN 324 MG: 81 TABLET, CHEWABLE ORAL at 14:08

## 2024-06-20 NOTE — ED PROVIDER NOTES
EMERGENCY DEPARTMENT ENCOUNTER    Room Number:  26/26  PCP: Sammie Gambino APRN    HPI:  Chief Complaint: chest pain  A complete HPI/ROS/PMH/PSH/SH/FH are unobtainable due to: Poor historian  Context: Hernando Lozada is a 79 y.o. male who presents to the ED c/o acute chest pain.  He describes this as chest tightness that began this morning.  He feels radiation to his left arm.  Seems to be worsening drinks water.  He is currently pain-free.        PAST MEDICAL HISTORY  Active Ambulatory Problems     Diagnosis Date Noted    DJD (degenerative joint disease) of knee 12/14/2017    Essential hypertension 02/18/2019    SOB (shortness of breath) 02/18/2019    Leg swelling 02/18/2019    Hyperlipidemia LDL goal <100 08/23/2019    COVID-19 11/25/2022    Diabetes mellitus     GIOVANNY (obstructive sleep apnea)     Disease of thyroid gland     CKD (chronic kidney disease)     Hypomagnesemia     Chronic brain injury     Generalized weakness     CAD (coronary artery disease)     Pedal edema 03/06/2023    Tremor 03/06/2023    Elevated troponin 03/06/2023    Lower extremity cellulitis 03/06/2023    Tinea cruris 03/06/2023    Chronic deep vein thrombosis (DVT) of calf muscle vein of left lower extremity 03/07/2023    Aortic stenosis, severe 03/06/2023    Acute urinary tract infection 04/17/2023    Hypothyroidism 04/17/2023    Acute urinary retention 04/19/2023    Acute bacterial prostatitis 04/19/2023     Resolved Ambulatory Problems     Diagnosis Date Noted    Chest pain with high risk of acute coronary syndrome 04/29/2019    Chest pain 01/02/2024    Chest pain 01/04/2024     Past Medical History:   Diagnosis Date    Arthritis     Bilateral leg edema     GERD (gastroesophageal reflux disease)     Heart murmur     History of head injury     [place] (hard of hearing)     Hyperlipidemia     Hypertension     Irregular heart beat     Osteoarthritis     Past heart attack     SOB (shortness of breath) on exertion     Stroke     Vasovagal episode           PAST SURGICAL HISTORY  Past Surgical History:   Procedure Laterality Date    CARDIAC CATHETERIZATION N/A 4/30/2019    Procedure: Coronary angiography with grafts;  Surgeon: Rio Miranda MD;  Location: [facility] CATH [facility]VASIVE LOCATION;  Service: Cardiology    CARDIAC CATHETERIZATION N/A 4/30/2019    Procedure: Left Heart Cath;  Surgeon: Rio Miranda MD;  Location: [facility] CATH [facility]ASIVE LOCATION;  Service: Cardiology    CARDIAC CATHETERIZATION N/A 4/30/2019    Procedure: Left ventriculography;  Surgeon: Rio Miranda MD;  Location: [facility] CATH [facility]VASIVE LOCATION;  Service: Cardiology    CARDIAC CATHETERIZATION  4/30/2019    Procedure: Saphenous Vein Graft;  Surgeon: Rio Miranda MD;  Location: [facility] CATH [facility]VASIVE LOCATION;  Service: Cardiology    CARDIAC CATHETERIZATION N/A 4/30/2019    Procedure: Stent GLENDY coronary;  Surgeon: Rio Miranda MD;  Location: [facility] CATH INVASIVE LOCATION;  Service: Cardiology    CARDIAC CATHETERIZATION N/A 3/10/2023    Procedure: Right and Left Heart Cath;  Surgeon: Rio Miranda MD;  Location: [facility] CATH INVASIVE LOCATION;  Service: Cardiology;  Laterality: N/A;    CARDIAC CATHETERIZATION N/A 3/10/2023    Procedure: Coronary angiography;  Surgeon: Rio Miranda MD;  Location: [facility] CATH INVASIVE LOCATION;  Service: Cardiology;  Laterality: N/A;    CARDIAC CATHETERIZATION N/A 3/10/2023    Procedure: Left ventriculography;  Surgeon: Rio Miranda MD;  Location: [facility] CATH INVASIVE LOCATION;  Service: Cardiology;  Laterality: N/A;    CARDIAC CATHETERIZATION N/A 3/10/2023    Procedure: Percutaneous Coronary Intervention;  Surgeon: Rio Miranda MD;  Location: [facility] CATH INVASIVE LOCATION;  Service: Cardiology;  Laterality: N/A;    CARDIAC CATHETERIZATION N/A 3/10/2023    Procedure: Stent GLENDY coronary;  Surgeon: Rio Miranda MD;  Location: [facility] CATH INVASIVE LOCATION;  Service: Cardiology;   Laterality: N/A;    CARDIAC CATHETERIZATION N/A 1/3/2024    Procedure: Left Heart Cath;  Surgeon: Rio Miranda MD;  [facility];  Service: Cardiovascular;  Laterality: N/A;    CARDIAC CATHETERIZATION N/A 1/3/2024    Procedure: Coronary angiography;  Surgeon: Rio Miranda MD;  [facility];  Service: Cardiovascular;  Laterality: N/A;    CARDIAC CATHETERIZATION N/A 1/3/2024    Procedure: Percutaneous Coronary Intervention;  Surgeon: Rio Miranda MD;  [facility];  Service: Cardiovascular;  Laterality: N/A;    CARDIAC CATHETERIZATION N/A 1/3/2024    Procedure: Left ventriculography;  Surgeon: Rio Miranda MD;  [facility];  Service: Cardiovascular;  Laterality: N/A;    CARDIAC CATHETERIZATION Left 1/3/2024    Procedure: Native mammary injection;  Surgeon: Rio Miranda MD;  Location: [facility] CATH INVASIVE LOCATION;  Service: Cardiovascular;  Laterality: Left;    CARPAL TUNNEL RELEASE Bilateral     CATARACT EXTRACTION      CORONARY ARTERY BYPASS GRAFT  2002    FINGER SURGERY      MISSING LEFT POINTER FINGER TIP    KNEE ARTHROPLASTY Right 02/15/2017    RI ARTHRP KNE CONDYLE&PLATU MEDIAL&LAT COMPARTMENTS Left 12/14/2017    Procedure: LT TOTAL KNEE ARTHROPLASTY;  Surgeon: Jatin Lancaster MD;  Location: [facility] MAIN OR;  Service: Orthopedics    RI RT/LT HEART CATHETERS N/A 4/30/2019    Procedure: Percutaneous Coronary Intervention;  Surgeon: Rio Miranda MD;  Location: [facility] CATH INVASIVE LOCATION;  Service: Cardiology         FAMILY HISTORY  Family History   Problem Relation Age of Onset    Parkinsonism Sister     Diabetes Brother     Malig Hyperthermia Neg Hx          SOCIAL HISTORY  Social History     Socioeconomic History    Marital status: Single   Tobacco Use    Smoking status: Never     Passive exposure: Never    Smokeless tobacco: Never   Vaping  Use    Vaping status: Never Used   Substance and Sexual Activity    Alcohol use: No    Drug use: No    Sexual activity: Defer         ALLERGIES  Patient has no known allergies.        REVIEW OF SYSTEMS  Review of Systems     Included in HPI  All systems reviewed and negative except for those discussed in HPI.       PHYSICAL EXAM  ED Triage Vitals [06/20/24 1226]   Temp Heart Rate Resp BP SpO2   98.4 °F (36.9 °C) 57 18 131/63 98 %      Temp src Heart Rate Source Patient Position BP Location FiO2 (%)   -- -- -- -- --       Physical Exam      GENERAL: no acute distress  HENT: nares patent  EYES: no scleral icterus  CV: regular rhythm, normal rate, 2+ radial pulses bilaterally  RESPIRATORY: normal effort, clear to auscultation bilaterally  ABDOMEN: soft, nontender  MUSCULOSKELETAL: no deformity, no lower extremity edema or tenderness  NEURO: alert, moves all extremities, follows commands  PSYCH:  calm, cooperative  SKIN: warm, dry    Vital signs and nursing notes reviewed.          LAB RESULTS  No results found for this or any previous visit (from the past 24 hour(s)).    Ordered the above labs and reviewed the results.        RADIOLOGY  No Radiology Exams Resulted Within Past 24 Hours    Ordered the above noted radiological studies. Reviewed by me in PACS.        MEDICATIONS GIVEN IN ER  Medications   sodium chloride 0.9 % flush 10 mL (has no administration in time range)         ORDERS PLACED DURING THIS VISIT:  Orders Placed This Encounter   Procedures    XR Chest 1 View    Comprehensive Metabolic Panel    Lipase    High Sensitivity Troponin T    BNP    CBC Auto Differential    Monitor Blood Pressure    Continuous Pulse Oximetry    ECG 12 Lead Chest Pain    Insert Peripheral IV    CBC & Differential         OUTPATIENT MEDICATION MANAGEMENT:  Current Facility-Administered Medications Ordered in Epic   Medication Dose Route Frequency Provider Last Rate Last Admin    sodium chloride 0.9 % flush 10 mL  10 mL Intravenous  MARLON Gusman, Tomas Rivero II, MD         Current Outpatient Medications Ordered in Epic   Medication Sig Dispense Refill    Aspirin Low Dose 81 MG chewable tablet Chew 1 tablet Daily.      atorvastatin (LIPITOR) 20 MG tablet Take 2 tablets by mouth Daily. 90 tablet 3    carbidopa-levodopa (SINEMET)  MG per tablet Take 2 tablets by mouth 3 (Three) Times a Day. (Patient not taking: Reported on 6/17/2024)      divalproex (DEPAKOTE) 500 MG DR tablet Take 1 tablet by mouth 2 (Two) Times a Day.      docusate sodium (COLACE) 100 MG capsule Take 1 capsule by mouth 2 (Two) Times a Day.      ferrous sulfate 325 (65 FE) MG tablet Take 1 tablet by mouth Daily With Breakfast.      folic acid (FOLVITE) 1 MG tablet Take 1 tablet by mouth Daily.      furosemide (LASIX) 20 MG tablet Take 1 tablet by mouth 2 (Two) Times a Day.      glucosamine sulfate 500 MG capsule capsule Take 1 capsule by mouth 2 (Two) Times a Day With Meals.      levothyroxine (SYNTHROID, LEVOTHROID) 50 MCG tablet Take 1 tablet by mouth Daily. 90 tablet 1    lisinopril (PRINIVIL,ZESTRIL) 5 MG tablet Take 1 tablet by mouth Daily.      melatonin 5 MG tablet tablet Take 1 tablet by mouth.      metFORMIN (GLUCOPHAGE) 500 MG tablet Take 1 tablet by mouth Daily.      nitroglycerin (NITROSTAT) 0.4 MG SL tablet Place 1 tablet under the tongue Every 5 (Five) Minutes As Needed for Chest Pain (Systolic BP Greater Than 100). Take no more than 3 doses in 15 minutes. 30 tablet 0    omeprazole (priLOSEC) 40 MG capsule Take 1 capsule by mouth Every Evening.      oxybutynin XL (DITROPAN-XL) 5 MG 24 hr tablet Take 1 tablet by mouth Daily.      propranolol (INDERAL) 40 MG tablet Take 1 tablet by mouth Daily.      tamsulosin (FLOMAX) 0.4 MG capsule 24 hr capsule Take 2 capsules by mouth Daily. 30 capsule     vitamin B-12 (VITAMIN B-12) 1000 MCG tablet Take 1 tablet by mouth Daily.         PROCEDURES  Procedures          MEDICAL DECISION MAKING, PROGRESS, and  CONSULTS    Discussion below represents my analysis of pertinent findings related to patient's condition, differential diagnosis, treatment plan and final disposition.              Differential diagnosis:    DDx includes but is not limited to acute coronary syndrome, pulmonary embolism, thoracic aortic dissection, pneumonia, pneumothorax, musculoskeletal pain, GERD or esophageal spasm, anxiety, myocarditis/pericarditis, esophageal rupture, pancreatitis.                   After initial evaluation, I considered admission given chest pain. Therefore, I consulted patient's cardiologist.       Independent interpretation of labs, radiology studies, and discussions with consultants:  ED Course as of 06/22/24 2303   Thu Jun 20, 2024   1257 Chest x-ray independently interpreted by myself.  No evidence of pneumonia. [TD]   1308 EKG independently interpreted by myself.  Time 12:58 PM.  Sinus rhythm.  Heart rate 58.  Normal axis.  No acute ST abnormality. [TD]   1330 Lipase: 17 [TD]   1330 proBNP: 1,083.0 [TD]   1350 I discussed the case with Dr. Miranda, patient's cardiologist.  We reviewed the patient's labs and history.  He recommends a second troponin if this is stable and patient be discharged home on Imdur 30 mg. [TD]      ED Course User Index  [TD] Tomas Gusman II, MD             DIAGNOSIS  Final diagnoses:   Precordial chest pain         DISPOSITION  DISCHARGE    FOLLOW-UP  Rio Miranda MD  [address] [facility] [id number] [phone]    Schedule an appointment as soon as possible for a visit on 6/24/2024      [facility] EMERGENCY DEPARTMENT  [address] [address] [phone]  Go to   If symptoms worsen    Your PCP    Schedule an appointment as soon as possible for a visit in 2 days  even if well    PATIENT CONNECTION - [facility] [address]  [phone]    if you are unable to follow up with your PCP         Medication List         New Prescriptions      isosorbide mononitrate 30 MG 24 hr tablet  Commonly known as: IMDUR  Take 1 tablet by mouth Daily for 30 days.               Where to Get Your Medications        These medications were sent to [facility] - [place], [place] - [address] [address]. - [phone]  - [phone] FX  [address] [facility] [address]      Phone: [phone]   isosorbide mononitrate 30 MG 24 hr tablet             Latest Documented Vital Signs:  As of 12:40 EDT  BP- 131/63 HR- 57 Temp- 98.4 °F (36.9 °C) O2 sat- 98%      --    Please note that portions of this were completed with a voice recognition program.       Note Disclaimer: At [facility], we believe that sharing information builds trust and better relationships. You are receiving this note because you are receiving care at [facility] or recently visited. It is possible you will see health information before a provider has talked with you about it. This kind of information can be easy to misunderstand. To help you fully understand what it means for your health, we urge you to discuss this note with your provider.         Tomsa Gusman II, MD  06/22/24 2676

## 2024-06-20 NOTE — ED TRIAGE NOTES
Patient reports chest discomfort that started last night, describes as pain across his chest that radiates into left arm. States pain is worsened when he drinks water. Describes as burning.

## 2024-06-21 LAB
QT INTERVAL: 460 MS
QTC INTERVAL: 452 MS

## 2024-06-24 ENCOUNTER — TELEPHONE (OUTPATIENT)
Dept: CARDIOLOGY | Facility: CLINIC | Age: 79
End: 2024-06-24

## 2024-06-24 NOTE — TELEPHONE ENCOUNTER
Caller: KARL    Relationship to patient: Nursing Home    Best call back number: [phone]    Type of visit: FOLLOW UP     Additional notes: KARL FROM FACILITY IS CALLING TO SCHEDULE APPT FOR PT AFTER ER VISIT. PLEASE ADVISE ON SCHEDULING. THERE IS NO AVAILABILITY ON 6/24/24.

## 2024-06-25 ENCOUNTER — APPOINTMENT (OUTPATIENT)
Dept: GENERAL RADIOLOGY | Facility: HOSPITAL | Age: 79
End: 2024-06-25
Payer: MEDICARE

## 2024-06-25 ENCOUNTER — HOSPITAL ENCOUNTER (OUTPATIENT)
Facility: HOSPITAL | Age: 79
Setting detail: OBSERVATION
Discharge: HOME | End: 2024-06-27
Attending: STUDENT IN AN ORGANIZED HEALTH CARE EDUCATION/TRAINING PROGRAM | Admitting: EMERGENCY MEDICINE
Payer: MEDICARE

## 2024-06-25 ENCOUNTER — APPOINTMENT (OUTPATIENT)
Dept: MRI IMAGING | Facility: HOSPITAL | Age: 79
End: 2024-06-25
Payer: MEDICARE

## 2024-06-25 DIAGNOSIS — R07.9 CHEST PAIN, UNSPECIFIED TYPE: Primary | ICD-10-CM

## 2024-06-25 DIAGNOSIS — R07.2 PRECORDIAL PAIN: ICD-10-CM

## 2024-06-25 DIAGNOSIS — I25.10 CORONARY ARTERY DISEASE WITHOUT ANGINA PECTORIS, UNSPECIFIED VESSEL OR LESION TYPE, UNSPECIFIED WHETHER NATIVE OR TRANSPLANTED HEART: ICD-10-CM

## 2024-06-25 DIAGNOSIS — R79.89 ELEVATED TROPONIN: ICD-10-CM

## 2024-06-25 DIAGNOSIS — I35.0 AORTIC STENOSIS, SEVERE: ICD-10-CM

## 2024-06-25 LAB
ALBUMIN SERPL-MCNC: 3.7 G/DL (ref 3.5–5.2)
ALBUMIN/GLOB SERPL: 1.6 G/DL
ALP SERPL-CCNC: 67 U/L (ref 39–117)
ALT SERPL W P-5'-P-CCNC: 12 U/L (ref 1–41)
ANION GAP SERPL CALCULATED.3IONS-SCNC: 9 MMOL/L (ref 5–15)
AST SERPL-CCNC: 14 U/L (ref 1–40)
BASOPHILS # BLD AUTO: 0.03 10*3/MM3 (ref 0–0.2)
BASOPHILS NFR BLD AUTO: 0.5 % (ref 0–1.5)
BILIRUB SERPL-MCNC: 0.5 MG/DL (ref 0–1.2)
BUN SERPL-MCNC: 11 MG/DL (ref 8–23)
BUN/CREAT SERPL: 13.6 (ref 7–25)
CALCIUM SPEC-SCNC: 8.6 MG/DL (ref 8.6–10.5)
CHLORIDE SERPL-SCNC: 100 MMOL/L (ref 98–107)
CHOLEST SERPL-MCNC: 125 MG/DL (ref 0–200)
CO2 SERPL-SCNC: 32 MMOL/L (ref 22–29)
CREAT SERPL-MCNC: 0.81 MG/DL (ref 0.76–1.27)
DEPRECATED RDW RBC AUTO: 46.1 FL (ref 37–54)
EGFRCR SERPLBLD CKD-EPI 2021: 90.8 ML/MIN/1.73
EOSINOPHIL # BLD AUTO: 0.04 10*3/MM3 (ref 0–0.4)
EOSINOPHIL NFR BLD AUTO: 0.6 % (ref 0.3–6.2)
ERYTHROCYTE [DISTWIDTH] IN BLOOD BY AUTOMATED COUNT: 13 % (ref 12.3–15.4)
GEN 5 2HR TROPONIN T REFLEX: 18 NG/L
GLOBULIN UR ELPH-MCNC: 2.3 GM/DL
GLUCOSE BLDC GLUCOMTR-MCNC: 100 MG/DL (ref 70–130)
GLUCOSE BLDC GLUCOMTR-MCNC: 92 MG/DL (ref 70–130)
GLUCOSE SERPL-MCNC: 112 MG/DL (ref 65–99)
HCT VFR BLD AUTO: 36 % (ref 37.5–51)
HDLC SERPL-MCNC: 52 MG/DL (ref 40–60)
HGB BLD-MCNC: 11.5 G/DL (ref 13–17.7)
HOLD SPECIMEN: NORMAL
HOLD SPECIMEN: NORMAL
IMM GRANULOCYTES # BLD AUTO: 0.02 10*3/MM3 (ref 0–0.05)
IMM GRANULOCYTES NFR BLD AUTO: 0.3 % (ref 0–0.5)
LDLC SERPL CALC-MCNC: 57 MG/DL (ref 0–100)
LDLC/HDLC SERPL: 1.1 {RATIO}
LYMPHOCYTES # BLD AUTO: 1.83 10*3/MM3 (ref 0.7–3.1)
LYMPHOCYTES NFR BLD AUTO: 29.5 % (ref 19.6–45.3)
MCH RBC QN AUTO: 31.4 PG (ref 26.6–33)
MCHC RBC AUTO-ENTMCNC: 31.9 G/DL (ref 31.5–35.7)
MCV RBC AUTO: 98.4 FL (ref 79–97)
MONOCYTES # BLD AUTO: 0.64 10*3/MM3 (ref 0.1–0.9)
MONOCYTES NFR BLD AUTO: 10.3 % (ref 5–12)
NEUTROPHILS NFR BLD AUTO: 3.64 10*3/MM3 (ref 1.7–7)
NEUTROPHILS NFR BLD AUTO: 58.8 % (ref 42.7–76)
NRBC BLD AUTO-RTO: 0 /100 WBC (ref 0–0.2)
PLATELET # BLD AUTO: 147 10*3/MM3 (ref 140–450)
PMV BLD AUTO: 10 FL (ref 6–12)
POTASSIUM SERPL-SCNC: 3.9 MMOL/L (ref 3.5–5.2)
PROT SERPL-MCNC: 6 G/DL (ref 6–8.5)
QT INTERVAL: 443 MS
QTC INTERVAL: 432 MS
RBC # BLD AUTO: 3.66 10*6/MM3 (ref 4.14–5.8)
SODIUM SERPL-SCNC: 141 MMOL/L (ref 136–145)
TRIGL SERPL-MCNC: 80 MG/DL (ref 0–150)
TROPONIN T DELTA: 1 NG/L
TROPONIN T SERPL HS-MCNC: 17 NG/L
VLDLC SERPL-MCNC: 16 MG/DL (ref 5–40)
WBC NRBC COR # BLD AUTO: 6.2 10*3/MM3 (ref 3.4–10.8)
WHOLE BLOOD HOLD COAG: NORMAL
WHOLE BLOOD HOLD SPECIMEN: NORMAL

## 2024-06-25 PROCEDURE — 36415 COLL VENOUS BLD VENIPUNCTURE: CPT

## 2024-06-25 PROCEDURE — A9577 INJ MULTIHANCE: HCPCS | Performed by: EMERGENCY MEDICINE

## 2024-06-25 PROCEDURE — 93005 ELECTROCARDIOGRAM TRACING: CPT | Performed by: STUDENT IN AN ORGANIZED HEALTH CARE EDUCATION/TRAINING PROGRAM

## 2024-06-25 PROCEDURE — G0378 HOSPITAL OBSERVATION PER HR: HCPCS

## 2024-06-25 PROCEDURE — 93010 ELECTROCARDIOGRAM REPORT: CPT | Performed by: INTERNAL MEDICINE

## 2024-06-25 PROCEDURE — 85025 COMPLETE CBC W/AUTO DIFF WBC: CPT | Performed by: STUDENT IN AN ORGANIZED HEALTH CARE EDUCATION/TRAINING PROGRAM

## 2024-06-25 PROCEDURE — 70549 MR ANGIOGRAPH NECK W/O&W/DYE: CPT

## 2024-06-25 PROCEDURE — 0 GADOBENATE DIMEGLUMINE 529 MG/ML SOLUTION: Performed by: EMERGENCY MEDICINE

## 2024-06-25 PROCEDURE — 80053 COMPREHEN METABOLIC PANEL: CPT | Performed by: STUDENT IN AN ORGANIZED HEALTH CARE EDUCATION/TRAINING PROGRAM

## 2024-06-25 PROCEDURE — 99213 OFFICE O/P EST LOW 20 MIN: CPT | Performed by: INTERNAL MEDICINE

## 2024-06-25 PROCEDURE — 82948 REAGENT STRIP/BLOOD GLUCOSE: CPT

## 2024-06-25 PROCEDURE — 84484 ASSAY OF TROPONIN QUANT: CPT | Performed by: STUDENT IN AN ORGANIZED HEALTH CARE EDUCATION/TRAINING PROGRAM

## 2024-06-25 PROCEDURE — 99285 EMERGENCY DEPT VISIT HI MDM: CPT

## 2024-06-25 PROCEDURE — 80061 LIPID PANEL: CPT | Performed by: NURSE PRACTITIONER

## 2024-06-25 PROCEDURE — 71045 X-RAY EXAM CHEST 1 VIEW: CPT

## 2024-06-25 RX ORDER — ASPIRIN 81 MG/1
81 TABLET, CHEWABLE ORAL DAILY
Status: DISCONTINUED | OUTPATIENT
Start: 2024-06-26 | End: 2024-06-27 | Stop reason: HOSPADM

## 2024-06-25 RX ORDER — FLUTICASONE PROPIONATE AND SALMETEROL 50; 500 UG/1; UG/1
1 POWDER RESPIRATORY (INHALATION)
COMMUNITY
Start: 2024-03-24

## 2024-06-25 RX ORDER — ATORVASTATIN CALCIUM 20 MG/1
40 TABLET, FILM COATED ORAL DAILY
Status: DISCONTINUED | OUTPATIENT
Start: 2024-06-26 | End: 2024-06-27 | Stop reason: HOSPADM

## 2024-06-25 RX ORDER — OXYBUTYNIN CHLORIDE 5 MG/1
5 TABLET, EXTENDED RELEASE ORAL DAILY
Status: DISCONTINUED | OUTPATIENT
Start: 2024-06-26 | End: 2024-06-27 | Stop reason: HOSPADM

## 2024-06-25 RX ORDER — ROPINIROLE 0.5 MG/1
0.25 TABLET, FILM COATED ORAL 3 TIMES DAILY
Status: DISCONTINUED | OUTPATIENT
Start: 2024-06-25 | End: 2024-06-27 | Stop reason: HOSPADM

## 2024-06-25 RX ORDER — AMOXICILLIN 250 MG
2 CAPSULE ORAL 2 TIMES DAILY
Status: DISCONTINUED | OUTPATIENT
Start: 2024-06-25 | End: 2024-06-27 | Stop reason: HOSPADM

## 2024-06-25 RX ORDER — INSULIN LISPRO 100 [IU]/ML
2-9 INJECTION, SOLUTION INTRAVENOUS; SUBCUTANEOUS
Status: DISCONTINUED | OUTPATIENT
Start: 2024-06-25 | End: 2024-06-27 | Stop reason: HOSPADM

## 2024-06-25 RX ORDER — FOLIC ACID 1 MG/1
1 TABLET ORAL DAILY
Status: DISCONTINUED | OUTPATIENT
Start: 2024-06-26 | End: 2024-06-27 | Stop reason: HOSPADM

## 2024-06-25 RX ORDER — BISACODYL 5 MG/1
5 TABLET, DELAYED RELEASE ORAL DAILY PRN
Status: DISCONTINUED | OUTPATIENT
Start: 2024-06-25 | End: 2024-06-27 | Stop reason: HOSPADM

## 2024-06-25 RX ORDER — IPRATROPIUM BROMIDE AND ALBUTEROL SULFATE 2.5; .5 MG/3ML; MG/3ML
3 SOLUTION RESPIRATORY (INHALATION) EVERY 6 HOURS PRN
Status: DISCONTINUED | OUTPATIENT
Start: 2024-06-25 | End: 2024-06-27 | Stop reason: HOSPADM

## 2024-06-25 RX ORDER — DEXTROSE MONOHYDRATE 25 G/50ML
25 INJECTION, SOLUTION INTRAVENOUS
Status: DISCONTINUED | OUTPATIENT
Start: 2024-06-25 | End: 2024-06-27 | Stop reason: HOSPADM

## 2024-06-25 RX ORDER — ISOSORBIDE MONONITRATE 30 MG/1
30 TABLET, EXTENDED RELEASE ORAL DAILY
Status: DISCONTINUED | OUTPATIENT
Start: 2024-06-26 | End: 2024-06-27 | Stop reason: HOSPADM

## 2024-06-25 RX ORDER — BUDESONIDE AND FORMOTEROL FUMARATE DIHYDRATE 160; 4.5 UG/1; UG/1
2 AEROSOL RESPIRATORY (INHALATION)
Status: DISCONTINUED | OUTPATIENT
Start: 2024-06-25 | End: 2024-06-27 | Stop reason: HOSPADM

## 2024-06-25 RX ORDER — FUROSEMIDE 20 MG/1
20 TABLET ORAL
Status: DISCONTINUED | OUTPATIENT
Start: 2024-06-25 | End: 2024-06-27 | Stop reason: HOSPADM

## 2024-06-25 RX ORDER — UREA 10 %
1000 LOTION (ML) TOPICAL DAILY
Status: DISCONTINUED | OUTPATIENT
Start: 2024-06-26 | End: 2024-06-27 | Stop reason: HOSPADM

## 2024-06-25 RX ORDER — IBUPROFEN 600 MG/1
1 TABLET ORAL
Status: DISCONTINUED | OUTPATIENT
Start: 2024-06-25 | End: 2024-06-27 | Stop reason: HOSPADM

## 2024-06-25 RX ORDER — NICOTINE POLACRILEX 4 MG
15 LOZENGE BUCCAL
Status: DISCONTINUED | OUTPATIENT
Start: 2024-06-25 | End: 2024-06-27 | Stop reason: HOSPADM

## 2024-06-25 RX ORDER — DIVALPROEX SODIUM 500 MG/1
500 TABLET, DELAYED RELEASE ORAL 2 TIMES DAILY
Status: DISCONTINUED | OUTPATIENT
Start: 2024-06-25 | End: 2024-06-27 | Stop reason: HOSPADM

## 2024-06-25 RX ORDER — FERROUS SULFATE 325(65) MG
325 TABLET ORAL
Status: DISCONTINUED | OUTPATIENT
Start: 2024-06-26 | End: 2024-06-27 | Stop reason: HOSPADM

## 2024-06-25 RX ORDER — POLYETHYLENE GLYCOL 3350 17 G/17G
17 POWDER, FOR SOLUTION ORAL DAILY PRN
Status: DISCONTINUED | OUTPATIENT
Start: 2024-06-25 | End: 2024-06-27 | Stop reason: HOSPADM

## 2024-06-25 RX ORDER — LISINOPRIL 5 MG/1
5 TABLET ORAL DAILY
Status: DISCONTINUED | OUTPATIENT
Start: 2024-06-26 | End: 2024-06-27 | Stop reason: HOSPADM

## 2024-06-25 RX ORDER — UREA 10 %
5 LOTION (ML) TOPICAL NIGHTLY PRN
Status: DISCONTINUED | OUTPATIENT
Start: 2024-06-25 | End: 2024-06-27 | Stop reason: HOSPADM

## 2024-06-25 RX ORDER — HYDROXYZINE HYDROCHLORIDE 25 MG/1
25 TABLET, FILM COATED ORAL 3 TIMES DAILY PRN
Status: DISCONTINUED | OUTPATIENT
Start: 2024-06-25 | End: 2024-06-27 | Stop reason: HOSPADM

## 2024-06-25 RX ORDER — TAMSULOSIN HYDROCHLORIDE 0.4 MG/1
0.8 CAPSULE ORAL DAILY
Status: DISCONTINUED | OUTPATIENT
Start: 2024-06-26 | End: 2024-06-27 | Stop reason: HOSPADM

## 2024-06-25 RX ORDER — PANTOPRAZOLE SODIUM 40 MG/1
40 TABLET, DELAYED RELEASE ORAL
Status: DISCONTINUED | OUTPATIENT
Start: 2024-06-26 | End: 2024-06-27 | Stop reason: HOSPADM

## 2024-06-25 RX ORDER — ROPINIROLE 0.25 MG/1
0.25 TABLET, FILM COATED ORAL 3 TIMES DAILY
COMMUNITY
Start: 2024-05-31

## 2024-06-25 RX ORDER — ASPIRIN 325 MG
325 TABLET ORAL ONCE
Status: DISCONTINUED | OUTPATIENT
Start: 2024-06-25 | End: 2024-06-25

## 2024-06-25 RX ORDER — BISACODYL 10 MG
10 SUPPOSITORY, RECTAL RECTAL DAILY PRN
Status: DISCONTINUED | OUTPATIENT
Start: 2024-06-25 | End: 2024-06-27 | Stop reason: HOSPADM

## 2024-06-25 RX ORDER — NITROGLYCERIN 0.4 MG/1
0.4 TABLET SUBLINGUAL
Status: DISCONTINUED | OUTPATIENT
Start: 2024-06-25 | End: 2024-06-26 | Stop reason: SDUPTHER

## 2024-06-25 RX ORDER — LEVOTHYROXINE SODIUM 0.05 MG/1
50 TABLET ORAL
Status: DISCONTINUED | OUTPATIENT
Start: 2024-06-26 | End: 2024-06-27 | Stop reason: HOSPADM

## 2024-06-25 RX ORDER — DOCUSATE SODIUM 100 MG/1
100 CAPSULE, LIQUID FILLED ORAL 2 TIMES DAILY
Status: DISCONTINUED | OUTPATIENT
Start: 2024-06-25 | End: 2024-06-27 | Stop reason: HOSPADM

## 2024-06-25 RX ORDER — SODIUM CHLORIDE 0.9 % (FLUSH) 0.9 %
10 SYRINGE (ML) INJECTION AS NEEDED
Status: DISCONTINUED | OUTPATIENT
Start: 2024-06-25 | End: 2024-06-27 | Stop reason: HOSPADM

## 2024-06-25 RX ADMIN — DIVALPROEX SODIUM 500 MG: 500 TABLET, DELAYED RELEASE ORAL at 23:59

## 2024-06-25 RX ADMIN — GADOBENATE DIMEGLUMINE 20 ML: 529 INJECTION, SOLUTION INTRAVENOUS at 20:46

## 2024-06-25 RX ADMIN — Medication 10 ML: at 11:07

## 2024-06-25 RX ADMIN — HYDROXYZINE HYDROCHLORIDE 25 MG: 25 TABLET ORAL at 23:13

## 2024-06-25 RX ADMIN — Medication 5 MG: at 23:13

## 2024-06-25 RX ADMIN — FUROSEMIDE 20 MG: 20 TABLET ORAL at 23:13

## 2024-06-25 RX ADMIN — CARBIDOPA AND LEVODOPA 2 TABLET: 25; 100 TABLET ORAL at 23:13

## 2024-06-25 RX ADMIN — ROPINIROLE HYDROCHLORIDE 0.25 MG: 0.5 TABLET, FILM COATED ORAL at 23:59

## 2024-06-25 NOTE — ED PROVIDER NOTES
EMERGENCY DEPARTMENT ENCOUNTER  Room Number:  19/19  PCP: Sammie Gambino APRN  Independent Historians: Patient      HPI:  Chief Complaint: had concerns including Chest Pain.     Context: Hernando Lozada is a 79 y.o. male with a medical history of CAD, HLD, diabetes, GIOVANNY, CKD, DVT who presents to the ED c/o acute chest pain.  Pain is located in the left mid chest with extension to the left shoulder.  Patient states this feels similar to when he presented last January and required balloon angioplasty.  Patient states symptoms been waxing and waning since last night.  He describes the pain as squeezing in nature and unrelenting.  Patient states at the moment he feels no pain.      Review of prior external notes (non-ED) -and- Review of prior external test results outside of this encounter: Discharge summary from 1/4/2024 reviewed and notable for admission secondary to chest pain.  Patient seen cardiology and underwent heart cath with balloon angioplasty plan to continue on dual antiplatelet therapy and follow-up with cardiology.    Prescription drug monitoring program review:         PAST MEDICAL HISTORY  Active Ambulatory Problems     Diagnosis Date Noted    DJD (degenerative joint disease) of knee 12/14/2017    Essential hypertension 02/18/2019    SOB (shortness of breath) 02/18/2019    Leg swelling 02/18/2019    Hyperlipidemia LDL goal <100 08/23/2019    COVID-19 11/25/2022    Diabetes mellitus     GIOVANNY (obstructive sleep apnea)     Disease of thyroid gland     CKD (chronic kidney disease)     Hypomagnesemia     Chronic brain injury     Generalized weakness     CAD (coronary artery disease)     Pedal edema 03/06/2023    Tremor 03/06/2023    Elevated troponin 03/06/2023    Lower extremity cellulitis 03/06/2023    Tinea cruris 03/06/2023    Chronic deep vein thrombosis (DVT) of calf muscle vein of left lower extremity 03/07/2023    Aortic stenosis, severe 03/06/2023    Acute urinary tract infection 04/17/2023     Hypothyroidism 04/17/2023    Acute urinary retention 04/19/2023    Acute bacterial prostatitis 04/19/2023     Resolved Ambulatory Problems     Diagnosis Date Noted    Chest pain with high risk of acute coronary syndrome 04/29/2019    Chest pain 01/02/2024    Chest pain 01/04/2024     Past Medical History:   Diagnosis Date    Arthritis     Bilateral leg edema     GERD (gastroesophageal reflux disease)     Heart murmur     History of head injury     [place]     Hyperlipidemia     Hypertension     Irregular heart beat     Osteoarthritis     Past heart attack     SOB (shortness of breath) on exertion     Stroke     Vasovagal episode          PAST SURGICAL HISTORY  Past Surgical History:   Procedure Laterality Date    CARDIAC CATHETERIZATION N/A 4/30/2019    Procedure: Coronary angiography with grafts;  Surgeon: Rio Miranda MD;  [facility];  Service: Cardiology    CARDIAC CATHETERIZATION N/A 4/30/2019    Procedure: Left Heart Cath;  Surgeon: Rio Miranda MD;  Location: [facility] CATH [facility]AS[facility] LOC[facility];  Service: Cardiology    CARDIAC CATHETERIZATION N/A 4/30/2019    Procedure: Left ventriculography;  Surgeon: Rio Miranda MD;  Location: [facility] CATH [facility]AS[facility] LOC[facility];  Service: Cardiology    CARDIAC CATHETERIZATION  4/30/2019    Procedure: Saphenous Vein Graft;  Surgeon: Rio Miranda MD;  Location: [facility] CATH [facility]VAS[facility] LOCATION;  Service: Cardiology    CARDIAC CATHETERIZATION N/A 4/30/2019    Procedure: Stent GLENDY coronary;  Surgeon: Rio Miranda MD;  Location: [facility] CATH [facility] LOC[facility];  Service: Cardiology    CARDIAC CATHETERIZATION N/A 3/10/2023    Procedure: Right and Left Heart Cath;  Surgeon: Rio Miranda MD;  Location: [facility] CATH [facility]VAS[facility] LOCATION;  Service: Cardiology;  Laterality: N/A;    CARDIAC CATHETERIZATION N/A 3/10/2023    Procedure: Coronary angiography;  Surgeon: Rio Miranda MD;  Location:   [facility] CATH INVASIVE LOCATION;  Service: Cardiology;  Laterality: N/A;    CARDIAC CATHETERIZATION N/A 3/10/2023    Procedure: Left ventriculography;  Surgeon: Rio Miranda MD;  [facility];  Service: Cardiology;  Laterality: N/A;    CARDIAC CATHETERIZATION N/A 3/10/2023    Procedure: Percutaneous Coronary Intervention;  Surgeon: Rio Miranda MD;  [facility];  Service: Cardiology;  Laterality: N/A;    CARDIAC CATHETERIZATION N/A 3/10/2023    Procedure: Stent GLENDY coronary;  Surgeon: Rio Miranda MD;  [facility];  Service: Cardiology;  Laterality: N/A;    CARDIAC CATHETERIZATION N/A 1/3/2024    Procedure: Left Heart Cath;  Surgeon: Rio Miranda MD;  Location: [facility] CATH INVASIVE LOCATION;  Service: Cardiovascular;  Laterality: N/A;    CARDIAC CATHETERIZATION N/A 1/3/2024    Procedure: Coronary angiography;  Surgeon: Rio Miranda MD;  Location: [facility] CATH INVASIVE LOCATION;  Service: Cardiovascular;  Laterality: N/A;    CARDIAC CATHETERIZATION N/A 1/3/2024    Procedure: Percutaneous Coronary Intervention;  Surgeon: Rio Miranda MD;  Location: [facility] CATH INVASIVE LOCATION;  Service: Cardiovascular;  Laterality: N/A;    CARDIAC CATHETERIZATION N/A 1/3/2024    Procedure: Left ventriculography;  Surgeon: Rio Miranda MD;  Location: [facility] CATH INVASIVE LOCATION;  Service: Cardiovascular;  Laterality: N/A;    CARDIAC CATHETERIZATION Left 1/3/2024    Procedure: Native mammary injection;  Surgeon: Rio Miranda MD;  Location: [facility] CATH INVASIVE LOCATION;  Service: Cardiovascular;  Laterality: Left;    CARPAL TUNNEL RELEASE Bilateral     CATARACT EXTRACTION      CORONARY ARTERY BYPASS GRAFT  2002    FINGER SURGERY      MISSING LEFT POINTER FINGER TIP    KNEE ARTHROPLASTY Right 02/15/2017    DE ARTHRP KNE CONDYLE&PLATU MEDIAL&LAT COMPARTMENTS Left 12/14/2017     Procedure: LT TOTAL KNEE ARTHROPLASTY;  Surgeon: Jatin Lancaster MD;  Location: [facility] MAIN OR;  Service: Orthopedics    MT RT/LT HEART CATHETERS N/A 4/30/2019    Procedure: Percutaneous Coronary Intervention;  Surgeon: Rio Miranda MD;  Location: [facility] CATH INVASIVE LOCATION;  Service: Cardiology         FAMILY HISTORY  Family History   Problem Relation Age of Onset    Parkinsonism Sister     Diabetes Brother     Malmisty Hyperthermia Neg Hx          SOCIAL HISTORY  Social History     Socioeconomic History    Marital status: Single   Tobacco Use    Smoking status: Never     Passive exposure: Never    Smokeless tobacco: Never   Vaping Use    Vaping status: Never Used   Substance and Sexual Activity    Alcohol use: No    Drug use: No    Sexual activity: Defer         ALLERGIES  Patient has no known allergies.      REVIEW OF SYSTEMS  Review of Systems  Included in HPI  All systems reviewed and negative except for those discussed in HPI.      PHYSICAL EXAM    I have reviewed the triage vital signs and nursing notes.    ED Triage Vitals [06/25/24 1026]   Temp Heart Rate Resp BP SpO2   98.7 °F (37.1 °C) 56 16 118/68 96 %      Temp src Heart Rate Source Patient Position BP Location FiO2 (%)   -- -- -- -- --       Physical Exam  GENERAL: alert, no acute distress  SKIN: Warm, dry  HENT: Normocephalic, atraumatic  EYES: no scleral icterus  CV: regular rhythm, regular rate  RESPIRATORY: normal effort, lungs clear  ABDOMEN: soft, nontender, nondistended  MUSCULOSKELETAL: no deformity  NEURO: alert, moves all extremities, follows commands            LAB RESULTS  Recent Results (from the past 24 hour(s))   Comprehensive Metabolic Panel    Collection Time: 06/25/24 11:08 AM    Specimen: Blood   Result Value Ref Range    Glucose 112 (H) 65 - 99 mg/dL    BUN 11 8 - 23 mg/dL    Creatinine 0.81 0.76 - 1.27 mg/dL    Sodium 141 136 - 145 mmol/L    Potassium 3.9 3.5 - 5.2 mmol/L    Chloride 100 98 - 107 mmol/L    CO2 32.0 (H)  22.0 - 29.0 mmol/L    Calcium 8.6 8.6 - 10.5 mg/dL    Total Protein 6.0 6.0 - 8.5 g/dL    Albumin 3.7 3.5 - 5.2 g/dL    ALT (SGPT) 12 1 - 41 U/L    AST (SGOT) 14 1 - 40 U/L    Alkaline Phosphatase 67 39 - 117 U/L    Total Bilirubin 0.5 0.0 - 1.2 mg/dL    Globulin 2.3 gm/dL    A/G Ratio 1.6 g/dL    BUN/Creatinine Ratio 13.6 7.0 - 25.0    Anion Gap 9.0 5.0 - 15.0 mmol/L    eGFR 90.8 >60.0 mL/min/1.73   High Sensitivity Troponin T    Collection Time: 06/25/24 11:08 AM    Specimen: Blood   Result Value Ref Range    HS Troponin T 17 <22 ng/L   Green Top (Gel)    Collection Time: 06/25/24 11:08 AM   Result Value Ref Range    Extra Tube Hold for add-ons.    Lavender Top    Collection Time: 06/25/24 11:08 AM   Result Value Ref Range    Extra Tube hold for add-on    Gold Top - SST    Collection Time: 06/25/24 11:08 AM   Result Value Ref Range    Extra Tube Hold for add-ons.    Light Blue Top    Collection Time: 06/25/24 11:08 AM   Result Value Ref Range    Extra Tube Hold for add-ons.    CBC Auto Differential    Collection Time: 06/25/24 11:08 AM    Specimen: Blood   Result Value Ref Range    WBC 6.20 3.40 - 10.80 10*3/mm3    RBC 3.66 (L) 4.14 - 5.80 10*6/mm3    Hemoglobin 11.5 (L) 13.0 - 17.7 g/dL    Hematocrit 36.0 (L) 37.5 - 51.0 %    MCV 98.4 (H) 79.0 - 97.0 fL    MCH 31.4 26.6 - 33.0 pg    MCHC 31.9 31.5 - 35.7 g/dL    RDW 13.0 12.3 - 15.4 %    RDW-SD 46.1 37.0 - 54.0 fl    MPV 10.0 6.0 - 12.0 fL    Platelets 147 140 - 450 10*3/mm3    Neutrophil % 58.8 42.7 - 76.0 %    Lymphocyte % 29.5 19.6 - 45.3 %    Monocyte % 10.3 5.0 - 12.0 %    Eosinophil % 0.6 0.3 - 6.2 %    Basophil % 0.5 0.0 - 1.5 %    Immature Grans % 0.3 0.0 - 0.5 %    Neutrophils, Absolute 3.64 1.70 - 7.00 10*3/mm3    Lymphocytes, Absolute 1.83 0.70 - 3.10 10*3/mm3    Monocytes, Absolute 0.64 0.10 - 0.90 10*3/mm3    Eosinophils, Absolute 0.04 0.00 - 0.40 10*3/mm3    Basophils, Absolute 0.03 0.00 - 0.20 10*3/mm3    Immature Grans, Absolute 0.02 0.00 - 0.05  10*3/mm3    nRBC 0.0 0.0 - 0.2 /100 WBC   ECG 12 Lead ED Triage Standing Order; Chest Pain    Collection Time: 06/25/24 11:42 AM   Result Value Ref Range    QT Interval 443 ms    QTC Interval 432 ms         RADIOLOGY  XR Chest 1 View    Result Date: 6/25/2024  ONE-VIEW PORTABLE CHEST  HISTORY: Chest pain.  FINDINGS: The lungs are well expanded and clear. The heart size is normal with sternal wires and previous cardiac surgery. There is no acute disease or change from 6/20/2024.  This report was finalized on 6/25/2024 11:21 AM by Dr. Ankur Cherry M.D on Workstation: [id number]         MEDICATIONS GIVEN IN ER  Medications   sodium chloride 0.9 % flush 10 mL (10 mL Intravenous Given 6/25/24 1107)         ORDERS PLACED DURING THIS VISIT:  Orders Placed This Encounter   Procedures    XR Chest 1 View    [place] Draw    Comprehensive Metabolic Panel    High Sensitivity Troponin T    CBC Auto Differential    High Sensitivity Troponin T 2Hr    NPO Diet NPO Type: Strict NPO    Undress & Gown    Continuous Pulse Oximetry    Oxygen Therapy- Nasal Cannula; Titrate 1-6 LPM Per SpO2; 90 - 95%    ECG 12 Lead ED Triage Standing Order; Chest Pain    ECG 12 Lead ED Triage Standing Order; Chest Pain    Insert Peripheral IV    Initiate ED Observation Status    CBC & Differential    Green Top (Gel)    Lavender Top    Gold Top - SST    Light Blue Top         OUTPATIENT MEDICATION MANAGEMENT:  Current Facility-Administered Medications Ordered in Epic   Medication Dose Route Frequency Provider Last Rate Last Admin    sodium chloride 0.9 % flush 10 mL  10 mL Intravenous PRN Axel Najera MD   10 mL at 06/25/24 1107     Current Outpatient Medications Ordered in Epic   Medication Sig Dispense Refill    Aspirin Low Dose 81 MG chewable tablet Chew 1 tablet Daily.      atorvastatin (LIPITOR) 20 MG tablet Take 2 tablets by mouth Daily. 90 tablet 3    carbidopa-levodopa (SINEMET)  MG per tablet Take 2 tablets by mouth 3 (Three) Times a  Day. (Patient not taking: Reported on 6/17/2024)      divalproex (DEPAKOTE) 500 MG DR tablet Take 1 tablet by mouth 2 (Two) Times a Day.      docusate sodium (COLACE) 100 MG capsule Take 1 capsule by mouth 2 (Two) Times a Day.      ferrous sulfate 325 (65 FE) MG tablet Take 1 tablet by mouth Daily With Breakfast.      folic acid (FOLVITE) 1 MG tablet Take 1 tablet by mouth Daily.      furosemide (LASIX) 20 MG tablet Take 1 tablet by mouth 2 (Two) Times a Day.      glucosamine sulfate 500 MG capsule capsule Take 1 capsule by mouth 2 (Two) Times a Day With Meals.      isosorbide mononitrate (IMDUR) 30 MG 24 hr tablet Take 1 tablet by mouth Daily for 30 days. 30 tablet 0    levothyroxine (SYNTHROID, LEVOTHROID) 50 MCG tablet Take 1 tablet by mouth Daily. 90 tablet 1    lisinopril (PRINIVIL,ZESTRIL) 5 MG tablet Take 1 tablet by mouth Daily.      melatonin 5 MG tablet tablet Take 1 tablet by mouth.      metFORMIN (GLUCOPHAGE) 500 MG tablet Take 1 tablet by mouth Daily.      nitroglycerin (NITROSTAT) 0.4 MG SL tablet Place 1 tablet under the tongue Every 5 (Five) Minutes As Needed for Chest Pain (Systolic BP Greater Than 100). Take no more than 3 doses in 15 minutes. 30 tablet 0    omeprazole (priLOSEC) 40 MG capsule Take 1 capsule by mouth Every Evening.      oxybutynin XL (DITROPAN-XL) 5 MG 24 hr tablet Take 1 tablet by mouth Daily.      propranolol (INDERAL) 40 MG tablet Take 1 tablet by mouth Daily.      tamsulosin (FLOMAX) 0.4 MG capsule 24 hr capsule Take 2 capsules by mouth Daily. 30 capsule     vitamin B-12 (VITAMIN B-12) 1000 MCG tablet Take 1 tablet by mouth Daily.           PROCEDURES  Procedures            PROGRESS, DATA ANALYSIS, CONSULTS, AND MEDICAL DECISION MAKING  All labs have been independently interpreted by me.  All radiology studies have been reviewed by me. All EKG's have been independently viewed and interpreted by me.  Discussion below represents my analysis of pertinent findings related to  patient's condition, differential diagnosis, treatment plan and final disposition.    Differential diagnosis includes but is not limited to ACS, PE, pneumonia.    Clinical Scores: HEART Score: 5                   ED Course as of 06/25/24 1210   Tue Jun 25, 2024   1105 Chest x-ray interpreted by me and demonstrates no evidence of consolidation [MW]   1145 EKG interpreted by me demonstrates atrial fibrillation rate of 57, QT prolongation, no ST elevation, multiple PVCs noted [MW]   1156 Workup in the emergency department is overall unremarkable with a an unchanged EKG and a troponin within normal limits.  Believe patient will benefit for admission and further evaluation of chest pain. [MW]   1209 Discussed with [employer] BRANDI with [facility] who agrees to admit [MW]      ED Course User Index  [MW] Axel Najera MD             AS OF 12:10 EDT VITALS:    BP - 131/88  HR - 56  TEMP - 98.7 °F (37.1 °C)  O2 SATS - 95%    COMPLEXITY OF CARE  The patient requires admission.      DIAGNOSIS  Final diagnoses:   Chest pain, unspecified type         DISPOSITION  ED Disposition       ED Disposition   Decision to Admit    Condition   --    Comment   --                Please note that portions of this document were completed with a voice recognition program.    Note Disclaimer: At [facility], we believe that sharing information builds trust and better relationships. You are receiving this note because you recently visited [facility]. It is possible you will see health information before a provider has talked with you about it. This kind of information can be easy to misunderstand. To help you fully understand what it means for your health, we urge you to discuss this note with your provider.         Axel Najera MD  06/25/24 1210

## 2024-06-25 NOTE — ED NOTES
"Nursing report ED to floor  Hernando Lozada  79 y.o.  male    HPI :  HPI (Adult)  Stated Reason for Visit: chest pain    Chief Complaint  Chief Complaint   Patient presents with    Chest Pain       Admitting doctor:   Tomas Gusman II, MD    Admitting diagnosis:   The encounter diagnosis was Chest pain, unspecified type.    Code status:   Current Code Status       Date Active Code Status Order ID Comments User Context       6/25/2024 1210 CPR (Attempt to Resuscitate) [id number]  Kalpesh Dunham APRN ED        Question Answer    Code Status (Patient has no pulse and is not breathing) CPR (Attempt to Resuscitate)    Medical Interventions (Patient has pulse or is breathing) Full Support    Level Of Support Discussed With Patient                    Allergies:   Patient has no known allergies.    Isolation:   No active isolations    Intake and Output  No intake or output data in the 24 hours ending 06/25/24 1311    Weight:       06/25/24  1129   Weight: 102 kg (224 lb 13.9 oz)       Most recent vitals:   Vitals:    06/25/24 1026 06/25/24 1129 06/25/24 1131 06/25/24 1231   BP: 118/68  131/88 110/71   Pulse: 56  56 52   Resp: 16  16 16   Temp: 98.7 °F (37.1 °C)      SpO2: 96%  95% 96%   Weight:  102 kg (224 lb 13.9 oz)     Height:  175.3 cm (69\")         Active LDAs/IV Access:   Lines, Drains & Airways       Active LDAs       Name Placement date Placement time Site Days    Peripheral IV 06/25/24 1105 Right Antecubital 06/25/24  1105  Antecubital  less than 1                    Labs (abnormal labs have a star):   Labs Reviewed   COMPREHENSIVE METABOLIC PANEL - Abnormal; Notable for the following components:       Result Value    Glucose 112 (*)     CO2 32.0 (*)     All other components within normal limits    Narrative:     GFR Normal >60  Chronic Kidney Disease <60  Kidney Failure <15    The GFR formula is only valid for adults with stable renal function between ages 18 and 70.   CBC WITH AUTO DIFFERENTIAL - Abnormal; " Notable for the following components:    RBC 3.66 (*)     Hemoglobin 11.5 (*)     Hematocrit 36.0 (*)     MCV 98.4 (*)     All other components within normal limits   TROPONIN - Normal    Narrative:     High Sensitive Troponin T Reference Range:  <14.0 ng/L- Negative Female for AMI  <22.0 ng/L- Negative Male for AMI  >=14 - Abnormal Female indicating possible myocardial injury.  >=22 - Abnormal Male indicating possible myocardial injury.   Clinicians would have to utilize clinical acumen, EKG, Troponin, and serial changes to determine if it is an Acute Myocardial Infarction or myocardial injury due to an underlying chronic condition.        RAINBOW DRAW    Narrative:     The following orders were created for panel order [place] Draw.  Procedure                               Abnormality         Status                     ---------                               -----------         ------                     Green Top (Gel)[[id number]]                                  Final result               Lavender Top[[id number]]                                     Final result               Gold Top - SST[[id number]]                                   Final result               Light Blue Top[[id number]]                                   Final result                 Please view results for these tests on the individual orders.   LIPID PANEL    Narrative:     Cholesterol Reference Ranges  (U.S. Department of Health and Human Services ATP III Classifications)    Desirable          <200 mg/dL  Borderline High    200-239 mg/dL  High Risk          >240 mg/dL      Triglyceride Reference Ranges  (U.S. Department of Health and Human Services ATP III Classifications)    Normal           <150 mg/dL  Borderline High  150-199 mg/dL  High             200-499 mg/dL  Very High        >500 mg/dL    HDL Reference Ranges  (U.S. Department of Health and Human Services ATP III Classifications)    Low     <40 mg/dl (major risk factor for CHD)  High     >60 mg/dl ('negative' risk factor for CHD)        LDL Reference Ranges  (U.S. Department of Health and Human Services ATP III Classifications)    Optimal          <100 mg/dL  Near Optimal     100-129 mg/dL  Borderline High  130-159 mg/dL  High             160-189 mg/dL  Very High        >189 mg/dL   HIGH SENSITIVITIY TROPONIN T 2HR   CBC AND DIFFERENTIAL    Narrative:     The following orders were created for panel order CBC & Differential.  Procedure                               Abnormality         Status                     ---------                               -----------         ------                     CBC Auto Differential[[id number]]        Abnormal            Final result                 Please view results for these tests on the individual orders.   GREEN TOP   LAVENDER TOP   GOLD TOP - SST   LIGHT BLUE TOP       EKG:   ECG 12 Lead ED Triage Standing Order; Chest Pain   Preliminary Result   HEART RATE=57  bpm   RR Ysvzkljf=9543  ms   HI Interval=  ms   P Horizontal Axis=  deg   P Front Axis=  deg   QRSD Ikqbwqve=511  ms   QT Unfllate=836  ms   HPrG=769  ms   QRS Axis=-13  deg   T Wave Axis=139  deg   - ABNORMAL ECG -   Atrial fibrillation   Probable LVH with secondary repol abnrm   Date and Time of Study:2024-06-25 11:42:44          Meds given in ED:   Medications   sodium chloride 0.9 % flush 10 mL (10 mL Intravenous Given 6/25/24 1107)   nitroglycerin (NITROSTAT) SL tablet 0.4 mg (has no administration in time range)   sennosides-docusate (PERICOLACE) 8.6-50 MG per tablet 2 tablet (has no administration in time range)     And   polyethylene glycol (MIRALAX) packet 17 g (has no administration in time range)     And   bisacodyl (DULCOLAX) EC tablet 5 mg (has no administration in time range)     And   bisacodyl (DULCOLAX) suppository 10 mg (has no administration in time range)       Imaging results:  No radiology results for the last day    Ambulatory status:   - with assist     Social issues:   Social  History     Socioeconomic History    Marital status: Single   Tobacco Use    Smoking status: Never     Passive exposure: Never    Smokeless tobacco: Never   Vaping Use    Vaping status: Never Used   Substance and Sexual Activity    Alcohol use: No    Drug use: No    Sexual activity: Defer       Peripheral Neurovascular  Peripheral Neurovascular (Adult)  Peripheral Neurovascular WDL: WDL    Neuro Cognitive  Neuro Cognitive (Adult)  Cognitive/Neuro/Behavioral WDL: WDL    Learning  Learning Assessment (Adult)  Learning Readiness and Ability: no barriers identified    Respiratory  Respiratory (Adult)  Airway WDL: WDL  Respiratory WDL  Respiratory WDL: WDL    Abdominal Pain       Pain Assessments  Pain (Adult)  (0-10) Pain Rating: Rest: 0    NIH Stroke Scale       Jennifer Linda RN  06/25/24 13:11 EDT

## 2024-06-25 NOTE — Clinical Note
Occupational Therapy    Visit Type: treatment  SUBJECTIVE  Patient agreed to participate in therapy this date.  \"I suppose I can do that\"  Patient / Family Goal: return home    Pain   Patient does not demonstrate pain behaviors.    OBJECTIVE       Strength  (out of 5 unless noted, standard test position unless noted)   WFL: SHMUEL STEWART      Sitting Balance  (MARY = base of support)  Static      - Trial 1 details: independent  Dynamic      - Trial 1 details: independent       Bed Mobility  - Supine to sit: modified independent  Transfers  Assistive devices: gait belt, 2-wheeled walker  - Sit to stand: supervision  - Stand to sit: supervision  - Toilet: minimal assist      Functional Ambulation  - Assistance: supervision (pt requiring second person to assist with IV pole and wound vac)  - Assistive device: 2-wheeled walker  - Distance (ft):50  - Surface: even  Activities of Daily Living (ADLs)  Grooming/Oral Hygiene:   - Grooming assist: set up and modified independent       - Oral hygiene assist: modified independent and set up  - Position: chair  Toileting:   - Toilet transfer:        - Assist: minimal assist  - Assist: maximal assist  Interventions    Skilled input: verbal instruction/cues  Verbal Consent: Writer verbally educated and received verbal consent for hand placement, positioning of patient, and techniques to be performed today from patient for therapist position for techniques as described above and how they are pertinent to the patient's plan of care.         Education:   - Present and ready to learn: patient  Education provided during session:  - Results of above outlined education: Needs reinforcement    ASSESSMENT   Progress: progressing toward goals    Discharge needs based on today's assessment:  - Current level of function: slightly below baseline level of function  - Therapy needs at discharge: therapy 5 or more times per week  - Activities of daily living (ADLs) requiring support at discharge:  catheter removed  over the wire. ambulation, dressing, bathing and toileting  - Impairments that require further therapy intervention: strength and activity tolerance  AM-PAC  - Prior Level of Function: IND/MOD I ([facility] 22-24)       Key: MOD A=moderate assistance, IND/MOD I=independent/modified independent  - Generalized Current Level of Function     - Current Self-Cares: 19       Scoring Key= >21 Modified Independent; 20-21 Supervision; 18-19 Minimal assist; 13-17 Moderate assist; 9-12 Max assist; <9 Total assist        PLAN (while hospitalized)  Suggestions for next session as indicated: LB dressing    OT Frequency: 3-5 x per week      PT/OT Mobility Equipment for Discharge: has 2ww, cane, wc  PT/OT ADL Equipment for Discharge: TBD  Agreement to plan and goals: patient agrees with goals and treatment plan      GOALS  Review Date: 10/26/2023  Long Term Goals: (to be met by time of discharge from hospital)  Grooming: Patient will complete grooming tasks modified independent.  Upper body dressing: Patient will complete upper body dressing modified independent.  Lower body dressing: Patient will complete lower body dressing modified independent.  Toileting: Patient will complete toileting modified independent.  Bathing: Patient will complete bathingmodified independent Toilet transfer: Patient will complete toilet transfer with modified independent.         Documented in the chart in the following areas: Assessment/Plan.    Patient at End of Session:   Location: in chair  Safety measures: alarm system in place/re-engaged, call light within reach and lines intact  Handoff to: nurse assistant      Therapy procedure time and total treatment time can be found documented on the Time Entry flowsheet

## 2024-06-25 NOTE — Clinical Note
First balloon inflation max pressure = 12 vesna. First balloon inflation duration = 8 seconds. Second inflation of balloon - Max pressure = 12 vesna. 2nd Inflation of balloon - Duration = 8 seconds. Third inflation of balloon - Max pressure = 12 vesna. 3rd Inflation of balloon - Duration = 8 seconds.

## 2024-06-25 NOTE — Clinical Note
A 5 fr sheath was successfully inserted using micropuncture technique with ultrasound guidance into the right femoral vein.

## 2024-06-25 NOTE — Clinical Note
The DP pulses are +2 bilaterally. The PT pulses are +1 bilaterally. The femoral pulses are +2 bilaterally. 60

## 2024-06-25 NOTE — ED NOTES
Patient to ER via ems from [facility] for chest pain and R arm pain     Patient was here for same yesterday  Patient reports on and off
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
## 2024-06-25 NOTE — PLAN OF CARE
Problem: Adult Inpatient Plan of Care  Goal: Plan of Care Review  Flowsheets (Taken 6/25/2024 1701)  Outcome Evaluation: VSS, AOX4,Room air, MRI  ordered, Assist X1, Urinal with in reach, no c/o pain or SOA. Bed alarm on.   Goal Outcome Evaluation:              Outcome Evaluation: VSS, AOX4,Room air, MRI  ordered, Assist X1, Urinal with in reach, no c/o pain or SOA. Bed alarm on.

## 2024-06-25 NOTE — Clinical Note
First balloon inflation max pressure = 12 vesna. First balloon inflation duration = 8 seconds. Second inflation of balloon - Max pressure = 12 vesna. 2nd Inflation of balloon - Duration = 8 seconds. 2nd inflation was done at 15:04 EDT.
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
## 2024-06-25 NOTE — Clinical Note
Hemostasis started on the right femoral artery. Perclose was used in achieving hemostasis. Closure device deployed in the vessel. Hemostasis achieved successfully. Closure device additional comment: 4x4 and tegaderm applied

## 2024-06-26 ENCOUNTER — APPOINTMENT (OUTPATIENT)
Dept: CARDIOLOGY | Facility: HOSPITAL | Age: 79
End: 2024-06-26
Payer: MEDICARE

## 2024-06-26 LAB
ANION GAP SERPL CALCULATED.3IONS-SCNC: 8.5 MMOL/L (ref 5–15)
BUN SERPL-MCNC: 13 MG/DL (ref 8–23)
BUN/CREAT SERPL: 18.8 (ref 7–25)
CALCIUM SPEC-SCNC: 8.8 MG/DL (ref 8.6–10.5)
CHLORIDE SERPL-SCNC: 103 MMOL/L (ref 98–107)
CO2 SERPL-SCNC: 29.5 MMOL/L (ref 22–29)
CREAT SERPL-MCNC: 0.69 MG/DL (ref 0.76–1.27)
DEPRECATED RDW RBC AUTO: 45.8 FL (ref 37–54)
EGFRCR SERPLBLD CKD-EPI 2021: 95.3 ML/MIN/1.73
ERYTHROCYTE [DISTWIDTH] IN BLOOD BY AUTOMATED COUNT: 12.9 % (ref 12.3–15.4)
GLUCOSE BLDC GLUCOMTR-MCNC: 107 MG/DL (ref 70–130)
GLUCOSE BLDC GLUCOMTR-MCNC: 111 MG/DL (ref 70–130)
GLUCOSE BLDC GLUCOMTR-MCNC: 164 MG/DL (ref 70–130)
GLUCOSE BLDC GLUCOMTR-MCNC: 88 MG/DL (ref 70–130)
GLUCOSE BLDC GLUCOMTR-MCNC: 93 MG/DL (ref 70–130)
GLUCOSE SERPL-MCNC: 99 MG/DL (ref 65–99)
HCT VFR BLD AUTO: 34.9 % (ref 37.5–51)
HGB BLD-MCNC: 11.6 G/DL (ref 13–17.7)
MAGNESIUM SERPL-MCNC: 1.9 MG/DL (ref 1.6–2.4)
MCH RBC QN AUTO: 31.9 PG (ref 26.6–33)
MCHC RBC AUTO-ENTMCNC: 33.2 G/DL (ref 31.5–35.7)
MCV RBC AUTO: 95.9 FL (ref 79–97)
PLATELET # BLD AUTO: 148 10*3/MM3 (ref 140–450)
PMV BLD AUTO: 10.5 FL (ref 6–12)
POTASSIUM SERPL-SCNC: 3.6 MMOL/L (ref 3.5–5.2)
QT INTERVAL: 455 MS
QTC INTERVAL: 451 MS
RBC # BLD AUTO: 3.64 10*6/MM3 (ref 4.14–5.8)
SODIUM SERPL-SCNC: 141 MMOL/L (ref 136–145)
TROPONIN T SERPL HS-MCNC: 52 NG/L
WBC NRBC COR # BLD AUTO: 7.43 10*3/MM3 (ref 3.4–10.8)

## 2024-06-26 PROCEDURE — 82810 BLOOD GASES O2 SAT ONLY: CPT

## 2024-06-26 PROCEDURE — 25010000002 HEPARIN (PORCINE) PER 1000 UNITS: Performed by: INTERNAL MEDICINE

## 2024-06-26 PROCEDURE — 82948 REAGENT STRIP/BLOOD GLUCOSE: CPT

## 2024-06-26 PROCEDURE — C1753 CATH, INTRAVAS ULTRASOUND: HCPCS | Performed by: INTERNAL MEDICINE

## 2024-06-26 PROCEDURE — A9270 NON-COVERED ITEM OR SERVICE: HCPCS | Performed by: INTERNAL MEDICINE

## 2024-06-26 PROCEDURE — 93005 ELECTROCARDIOGRAM TRACING: CPT | Performed by: NURSE PRACTITIONER

## 2024-06-26 PROCEDURE — C1725 CATH, TRANSLUMIN NON-LASER: HCPCS | Performed by: INTERNAL MEDICINE

## 2024-06-26 PROCEDURE — 25510000001 IOPAMIDOL PER 1 ML: Performed by: INTERNAL MEDICINE

## 2024-06-26 PROCEDURE — 63710000001 TICAGRELOR 90 MG TABLET: Performed by: INTERNAL MEDICINE

## 2024-06-26 PROCEDURE — 83735 ASSAY OF MAGNESIUM: CPT

## 2024-06-26 PROCEDURE — C1887 CATHETER, GUIDING: HCPCS | Performed by: INTERNAL MEDICINE

## 2024-06-26 PROCEDURE — C1769 GUIDE WIRE: HCPCS | Performed by: INTERNAL MEDICINE

## 2024-06-26 PROCEDURE — 94760 N-INVAS EAR/PLS OXIMETRY 1: CPT

## 2024-06-26 PROCEDURE — G0378 HOSPITAL OBSERVATION PER HR: HCPCS

## 2024-06-26 PROCEDURE — 25010000002 PHENYLEPHRINE 10 MG/ML SOLUTION: Performed by: INTERNAL MEDICINE

## 2024-06-26 PROCEDURE — 85027 COMPLETE CBC AUTOMATED: CPT

## 2024-06-26 PROCEDURE — C1874 STENT, COATED/COV W/DEL SYS: HCPCS | Performed by: INTERNAL MEDICINE

## 2024-06-26 PROCEDURE — 92978 ENDOLUMINL IVUS OCT C 1ST: CPT | Performed by: INTERNAL MEDICINE

## 2024-06-26 PROCEDURE — 25810000003 SODIUM CHLORIDE 0.9 % SOLUTION

## 2024-06-26 PROCEDURE — 85018 HEMOGLOBIN: CPT

## 2024-06-26 PROCEDURE — 85014 HEMATOCRIT: CPT

## 2024-06-26 PROCEDURE — 94761 N-INVAS EAR/PLS OXIMETRY MLT: CPT

## 2024-06-26 PROCEDURE — 92928 PRQ TCAT PLMT NTRAC ST 1 LES: CPT | Performed by: INTERNAL MEDICINE

## 2024-06-26 PROCEDURE — 84484 ASSAY OF TROPONIN QUANT: CPT | Performed by: NURSE PRACTITIONER

## 2024-06-26 PROCEDURE — 63710000001 FUROSEMIDE 20 MG TABLET: Performed by: INTERNAL MEDICINE

## 2024-06-26 PROCEDURE — C9600 PERC DRUG-EL COR STENT SING: HCPCS | Performed by: INTERNAL MEDICINE

## 2024-06-26 PROCEDURE — C1760 CLOSURE DEV, VASC: HCPCS | Performed by: INTERNAL MEDICINE

## 2024-06-26 PROCEDURE — 25010000002 FENTANYL CITRATE (PF) 50 MCG/ML SOLUTION: Performed by: INTERNAL MEDICINE

## 2024-06-26 PROCEDURE — 94640 AIRWAY INHALATION TREATMENT: CPT

## 2024-06-26 PROCEDURE — C1894 INTRO/SHEATH, NON-LASER: HCPCS | Performed by: INTERNAL MEDICINE

## 2024-06-26 PROCEDURE — 63710000001 DIVALPROEX 500 MG TABLET DELAYED-RELEASE: Performed by: INTERNAL MEDICINE

## 2024-06-26 PROCEDURE — 25810000003 SODIUM CHLORIDE 0.9 % SOLUTION: Performed by: INTERNAL MEDICINE

## 2024-06-26 PROCEDURE — 85347 COAGULATION TIME ACTIVATED: CPT

## 2024-06-26 PROCEDURE — 63710000001 ROPINIROLE 0.5 MG TABLET: Performed by: INTERNAL MEDICINE

## 2024-06-26 PROCEDURE — 94799 UNLISTED PULMONARY SVC/PX: CPT

## 2024-06-26 PROCEDURE — 63710000001 INSULIN LISPRO (HUMAN) PER 5 UNITS: Performed by: INTERNAL MEDICINE

## 2024-06-26 PROCEDURE — 93461 R&L HRT ART/VENTRICLE ANGIO: CPT | Performed by: INTERNAL MEDICINE

## 2024-06-26 PROCEDURE — 25010000002 MIDAZOLAM PER 1 MG: Performed by: INTERNAL MEDICINE

## 2024-06-26 PROCEDURE — 63710000001 CARBIDOPA-LEVODOPA 25-100 MG TABLET: Performed by: INTERNAL MEDICINE

## 2024-06-26 PROCEDURE — 80048 BASIC METABOLIC PNL TOTAL CA: CPT

## 2024-06-26 DEVICE — XIENCE SKYPOINT™ EVEROLIMUS ELUTING CORONARY STENT SYSTEM 3.50 MM X 12 MM / RAPID-EXCHANGE
Type: IMPLANTABLE DEVICE | Site: HEART | Status: FUNCTIONAL
Brand: XIENCE SKYPOINT™

## 2024-06-26 RX ORDER — HEPARIN SODIUM 1000 [USP'U]/ML
INJECTION, SOLUTION INTRAVENOUS; SUBCUTANEOUS
Status: DISCONTINUED | OUTPATIENT
Start: 2024-06-26 | End: 2024-06-26 | Stop reason: HOSPADM

## 2024-06-26 RX ORDER — LIDOCAINE HYDROCHLORIDE 20 MG/ML
INJECTION, SOLUTION INFILTRATION; PERINEURAL
Status: DISCONTINUED | OUTPATIENT
Start: 2024-06-26 | End: 2024-06-26 | Stop reason: HOSPADM

## 2024-06-26 RX ORDER — SODIUM CHLORIDE 9 MG/ML
75 INJECTION, SOLUTION INTRAVENOUS CONTINUOUS
Status: DISCONTINUED | OUTPATIENT
Start: 2024-06-26 | End: 2024-06-27 | Stop reason: HOSPADM

## 2024-06-26 RX ORDER — NITROGLYCERIN 0.4 MG/1
0.4 TABLET SUBLINGUAL
Status: DISCONTINUED | OUTPATIENT
Start: 2024-06-26 | End: 2024-06-27 | Stop reason: HOSPADM

## 2024-06-26 RX ORDER — ACETAMINOPHEN 325 MG/1
650 TABLET ORAL EVERY 4 HOURS PRN
Status: DISCONTINUED | OUTPATIENT
Start: 2024-06-26 | End: 2024-06-27 | Stop reason: HOSPADM

## 2024-06-26 RX ORDER — MIDAZOLAM HYDROCHLORIDE 1 MG/ML
INJECTION INTRAMUSCULAR; INTRAVENOUS
Status: DISCONTINUED | OUTPATIENT
Start: 2024-06-26 | End: 2024-06-26 | Stop reason: HOSPADM

## 2024-06-26 RX ORDER — FENTANYL CITRATE 50 UG/ML
INJECTION, SOLUTION INTRAMUSCULAR; INTRAVENOUS
Status: DISCONTINUED | OUTPATIENT
Start: 2024-06-26 | End: 2024-06-26 | Stop reason: HOSPADM

## 2024-06-26 RX ORDER — SODIUM CHLORIDE 9 MG/ML
250 INJECTION, SOLUTION INTRAVENOUS ONCE AS NEEDED
Status: DISCONTINUED | OUTPATIENT
Start: 2024-06-26 | End: 2024-06-27 | Stop reason: HOSPADM

## 2024-06-26 RX ORDER — PHENYLEPHRINE HYDROCHLORIDE 10 MG/ML
INJECTION INTRAVENOUS
Status: DISCONTINUED | OUTPATIENT
Start: 2024-06-26 | End: 2024-06-26 | Stop reason: HOSPADM

## 2024-06-26 RX ADMIN — ASPIRIN 81 MG: 81 TABLET, CHEWABLE ORAL at 11:15

## 2024-06-26 RX ADMIN — ROPINIROLE HYDROCHLORIDE 0.25 MG: 0.5 TABLET, FILM COATED ORAL at 11:16

## 2024-06-26 RX ADMIN — DIVALPROEX SODIUM 500 MG: 500 TABLET, DELAYED RELEASE ORAL at 22:56

## 2024-06-26 RX ADMIN — PANTOPRAZOLE SODIUM 40 MG: 40 TABLET, DELAYED RELEASE ORAL at 11:28

## 2024-06-26 RX ADMIN — SODIUM CHLORIDE 75 ML/HR: 9 INJECTION, SOLUTION INTRAVENOUS at 13:01

## 2024-06-26 RX ADMIN — CARBIDOPA AND LEVODOPA 2 TABLET: 25; 100 TABLET ORAL at 11:17

## 2024-06-26 RX ADMIN — SODIUM CHLORIDE 250 ML: 900 INJECTION, SOLUTION INTRAVENOUS at 16:37

## 2024-06-26 RX ADMIN — LEVOTHYROXINE SODIUM 50 MCG: 50 TABLET ORAL at 11:28

## 2024-06-26 RX ADMIN — ATORVASTATIN CALCIUM 40 MG: 20 TABLET, FILM COATED ORAL at 11:14

## 2024-06-26 RX ADMIN — CARBIDOPA AND LEVODOPA 2 TABLET: 25; 100 TABLET ORAL at 21:59

## 2024-06-26 RX ADMIN — Medication 1000 MCG: at 11:15

## 2024-06-26 RX ADMIN — SODIUM CHLORIDE 75 ML/HR: 9 INJECTION, SOLUTION INTRAVENOUS at 22:07

## 2024-06-26 RX ADMIN — DIVALPROEX SODIUM 500 MG: 500 TABLET, DELAYED RELEASE ORAL at 11:17

## 2024-06-26 RX ADMIN — FERROUS SULFATE TAB 325 MG (65 MG ELEMENTAL FE) 325 MG: 325 (65 FE) TAB at 11:12

## 2024-06-26 RX ADMIN — FUROSEMIDE 20 MG: 20 TABLET ORAL at 21:59

## 2024-06-26 RX ADMIN — FOLIC ACID 1 MG: 1 TABLET ORAL at 11:15

## 2024-06-26 RX ADMIN — ISOSORBIDE MONONITRATE 30 MG: 30 TABLET, EXTENDED RELEASE ORAL at 11:16

## 2024-06-26 RX ADMIN — FUROSEMIDE 20 MG: 20 TABLET ORAL at 11:14

## 2024-06-26 RX ADMIN — BUDESONIDE AND FORMOTEROL FUMARATE DIHYDRATE 2 PUFF: 160; 4.5 AEROSOL RESPIRATORY (INHALATION) at 07:14

## 2024-06-26 RX ADMIN — CARBIDOPA AND LEVODOPA 2 TABLET: 25; 100 TABLET ORAL at 17:25

## 2024-06-26 RX ADMIN — INSULIN LISPRO 2 UNITS: 100 INJECTION, SOLUTION INTRAVENOUS; SUBCUTANEOUS at 22:01

## 2024-06-26 RX ADMIN — ROPINIROLE HYDROCHLORIDE 0.25 MG: 0.5 TABLET, FILM COATED ORAL at 22:56

## 2024-06-26 RX ADMIN — LISINOPRIL 5 MG: 10 TABLET ORAL at 11:13

## 2024-06-26 RX ADMIN — OXYBUTYNIN CHLORIDE 5 MG: 5 TABLET, EXTENDED RELEASE ORAL at 11:16

## 2024-06-26 RX ADMIN — TAMSULOSIN HYDROCHLORIDE 0.8 MG: 0.4 CAPSULE ORAL at 11:13

## 2024-06-26 RX ADMIN — ROPINIROLE HYDROCHLORIDE 0.25 MG: 0.5 TABLET, FILM COATED ORAL at 17:24

## 2024-06-26 NOTE — PLAN OF CARE
Goal Outcome Evaluation:  Plan of Care Reviewed With: patient        Progress: improving  Outcome Evaluation: NSR with ectopy, room air, blood presures soft initially but last pressure 120s systollicaly. R/L groin site c/d/s. Hematoma on right side from aterial sheath, 10 minutes manual presure applied. No c/o pain. Now awake, alert and eating. Hopefully home tomorrow.

## 2024-06-26 NOTE — CASE MANAGEMENT/SOCIAL WORK
Discharge Planning Assessment  [facility]     Patient Name: Hernando Lozada  MRN: [id number]  Today's Date: 6/26/2024    Admit Date: 6/25/2024    Plan: Plans to return to [facility] at Jennifer Villafana RN   Discharge Needs Assessment       Row Name 06/26/24 1019       Living Environment    People in Home other (see comments)  resident at [facility]    Current Living Arrangements residential facility    Potentially Unsafe Housing Conditions none    In the past 12 months has the electric, gas, oil, or water company threatened to shut off services in your home? No    Primary Care Provided by self    Provides Primary Care For no one    Family Caregiver if Needed other (see comments)  caregiver at [facility]    Able to Return to Prior Arrangements yes       Resource/Environmental Concerns    Resource/Environmental Concerns none    Transportation Concerns none       Transportation Needs    In the past 12 months, has lack of transportation kept you from medical appointments or from getting medications? no       Food Insecurity    Within the past 12 months, you worried that your food would run out before you got the money to buy more. Never true    Within the past 12 months, the food you bought just didn't last and you didn't have money to get more. Never true       Transition Planning    Patient/Family Anticipates Transition to other (see comments)  return to [facility]    Patient/Family Anticipated Services at Transition none    Transportation Anticipated other (see comments)  [facility] will provide       Discharge Needs Assessment    Equipment Currently Used at Home none    Concerns to be Addressed no discharge needs identified    Anticipated Changes Related to Illness none    Equipment Needed After Discharge none    Provided Post Acute Provider List? N/A    Provided Post Acute Provider Quality & Resource List? N/A                   Discharge Plan       Row Name 06/26/24 1021       Plan    Plan  Plans to return to [facility] at d/c-JENNIFER Villafana RN    Patient/Family in Agreement with Plan yes    Provided Post Acute Provider List? N/A    Provided Post Acute Provider Quality & Resource List? N/A    Plan Comments Spoke w/ pt at bedside w/ his permission. Introduced self and explained role. All info on facehseet verified. [phone]. Independent w/ ADLs. No assistive devices used. denies need for any DME or community resources at d/c. Facility uses [facility], and pt is able to obtain and pay for meds. To return to [facility] at d/c, w/ staff assist as needed; [facility] will provide transport; agrreable w/ plan. CM will continue to follow-JENNIFER Villafana RN                  Continued Care and Services - Admitted Since 6/25/2024    No active coordination exists for this encounter.          Demographic Summary       Row Name 06/26/24 1017       General Information    Admission Type observation    Arrived From long-term [facility]  [facility]    Required Notices Provided Observation Status Notice    Referral Source admission list;nursing    Reason for Consult discharge planning    Preferred Language English       Contact Information    Permission Granted to Share Info With                    Functional Status       Row Name 06/26/24 1018       Functional Status    Usual Activity Tolerance good    Current Activity Tolerance good       Functional Status, IADL    IADL Comments resident at [facility], so they assist w/ or provide services listed       Mental Status    General Appearance WDL WDL       Mental Status Summary    Recent Changes in Mental Status/Cognitive Functioning no changes                   Psychosocial       Row Name 06/26/24 1019       Behavior WDL    Behavior WDL WDL       Emotion Mood WDL    Emotion/Mood/Affect WDL WDL       Speech WDL    Speech WDL WDL       Perceptual State WDL    Perceptual State WDL WDL        Thought Process WDL    Thought Process WDL WDL       Intellectual Performance WDL    Intellectual Performance WDL WDL                   Abuse/Neglect    No documentation.                  Legal    No documentation.                  Substance Abuse    No documentation.                  Patient Forms    No documentation.                     Ana Villafana RN

## 2024-06-26 NOTE — CONSULTS
Met with patient to discuss the benefits of cardiac rehab. Pt has declined program at this time. Reports that he has a stationary bike at home and rides it 5 miles a day.
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
## 2024-06-26 NOTE — PLAN OF CARE
Goal Outcome Evaluation:      Pt awake and alert x 4. Vital signs stable. Denies chest pain overnight. Cardiology consulted. NPO post midnight.MRI of the head and neck completed and resulted. No signs of acute distress. Will continue to monitor

## 2024-06-26 NOTE — NURSING NOTE
Resides at a small group home w/ [facility] [phone]. Report given to cath lab nurse and explained they needed to call this number to arrange for transport and for report. Patient remains alert and oriented x's 4, sats are in the 90s on RA. Patient transported VIA w/c to cath lab. Consent signed and on chart.

## 2024-06-26 NOTE — PROGRESS NOTES
MD ATTESTATION NOTE - Observation progress    The BRANDI and I have discussed this patient's history, physical exam, and treatment plan.  I have reviewed the documentation and personally had a face to face interaction with the patient. I affirm the documentation and agree with the treatment and plan.  The attached note describes my personal findings.        SHARED APC FACE TO FACE: I performed a substantive part of the MDM during the patient's E/M visit. I personally evaluated and examined the patient. I personally made or approved the documented management plan and acknowledge its risk of complications.        Brief HPI: Patient with chest pain and arm pain.  Patient states the pain is resolved.  Patient was seen by cardiology service and MR angiogram was ordered.  No stenosis.  Cervical disks are not commented.  Patient did have troponin elevated last night.            GENERAL: no acute distress  HENT: nares patent  EYES: no scleral icterus  CV: regular rhythm, normal rate  RESPIRATORY: normal effort  ABDOMEN: soft  MUSCULOSKELETAL: no deformity  NEURO: alert, moves all extremities, follows commands  PSYCH:  calm, cooperative  SKIN: warm, dry    Vital signs and nursing notes reviewed.        Plan: Troponin went from 18-52.  Cardiology following.

## 2024-06-27 ENCOUNTER — READMISSION MANAGEMENT (OUTPATIENT)
Dept: CALL CENTER | Facility: HOSPITAL | Age: 79
End: 2024-06-27
Payer: MEDICARE

## 2024-06-27 ENCOUNTER — APPOINTMENT (OUTPATIENT)
Dept: CARDIOLOGY | Facility: HOSPITAL | Age: 79
End: 2024-06-27
Payer: MEDICARE

## 2024-06-27 VITALS
HEIGHT: 69 IN | OXYGEN SATURATION: 63 % | DIASTOLIC BLOOD PRESSURE: 70 MMHG | BODY MASS INDEX: 33.5 KG/M2 | WEIGHT: 226.19 LBS | TEMPERATURE: 98.4 F | SYSTOLIC BLOOD PRESSURE: 134 MMHG | HEART RATE: 68 BPM | RESPIRATION RATE: 18 BRPM

## 2024-06-27 LAB
ACT BLD: 281 SECONDS (ref 82–152)
ANION GAP SERPL CALCULATED.3IONS-SCNC: 8 MMOL/L (ref 5–15)
BUN SERPL-MCNC: 15 MG/DL (ref 8–23)
BUN/CREAT SERPL: 14.9 (ref 7–25)
CALCIUM SPEC-SCNC: 8.6 MG/DL (ref 8.6–10.5)
CHLORIDE SERPL-SCNC: 103 MMOL/L (ref 98–107)
CO2 SERPL-SCNC: 30 MMOL/L (ref 22–29)
CREAT SERPL-MCNC: 1.01 MG/DL (ref 0.76–1.27)
EGFRCR SERPLBLD CKD-EPI 2021: 76.6 ML/MIN/1.73
GLUCOSE BLDC GLUCOMTR-MCNC: 127 MG/DL (ref 70–130)
GLUCOSE BLDC GLUCOMTR-MCNC: 96 MG/DL (ref 70–130)
GLUCOSE SERPL-MCNC: 130 MG/DL (ref 65–99)
HCT VFR BLDA CALC: 37 % (ref 38–51)
HCT VFR BLDA CALC: 37 % (ref 38–51)
HGB BLDA-MCNC: 12.6 G/DL (ref 12–17)
HGB BLDA-MCNC: 12.6 G/DL (ref 12–17)
POTASSIUM SERPL-SCNC: 3.5 MMOL/L (ref 3.5–5.2)
SAO2 % BLDA: 73 % (ref 95–98)
SAO2 % BLDA: 94 % (ref 95–98)
SODIUM SERPL-SCNC: 141 MMOL/L (ref 136–145)

## 2024-06-27 PROCEDURE — A9270 NON-COVERED ITEM OR SERVICE: HCPCS | Performed by: INTERNAL MEDICINE

## 2024-06-27 PROCEDURE — A9270 NON-COVERED ITEM OR SERVICE: HCPCS | Performed by: NURSE PRACTITIONER

## 2024-06-27 PROCEDURE — 63710000001 ASPIRIN 81 MG CHEWABLE TABLET: Performed by: INTERNAL MEDICINE

## 2024-06-27 PROCEDURE — 63710000001 TAMSULOSIN 0.4 MG CAPSULE: Performed by: INTERNAL MEDICINE

## 2024-06-27 PROCEDURE — 63710000001 VITAMIN B-12 500 MCG TABLET: Performed by: INTERNAL MEDICINE

## 2024-06-27 PROCEDURE — 82948 REAGENT STRIP/BLOOD GLUCOSE: CPT

## 2024-06-27 PROCEDURE — 63710000001 OXYBUTYNIN XL 5 MG TABLET SUSTAINED-RELEASE 24 HOUR: Performed by: INTERNAL MEDICINE

## 2024-06-27 PROCEDURE — 63710000001 DIVALPROEX 500 MG TABLET DELAYED-RELEASE: Performed by: INTERNAL MEDICINE

## 2024-06-27 PROCEDURE — 80048 BASIC METABOLIC PNL TOTAL CA: CPT | Performed by: NURSE PRACTITIONER

## 2024-06-27 PROCEDURE — 63710000001 FUROSEMIDE 20 MG TABLET: Performed by: INTERNAL MEDICINE

## 2024-06-27 PROCEDURE — 63710000001 ATORVASTATIN 20 MG TABLET: Performed by: INTERNAL MEDICINE

## 2024-06-27 PROCEDURE — 99238 HOSP IP/OBS DSCHRG MGMT 30/<: CPT | Performed by: NURSE PRACTITIONER

## 2024-06-27 PROCEDURE — 63710000001 FERROUS SULFATE 325 (65 FE) MG TABLET: Performed by: INTERNAL MEDICINE

## 2024-06-27 PROCEDURE — 63710000001 FOLIC ACID 1 MG TABLET: Performed by: INTERNAL MEDICINE

## 2024-06-27 PROCEDURE — 63710000001 PANTOPRAZOLE 40 MG TABLET DELAYED-RELEASE: Performed by: INTERNAL MEDICINE

## 2024-06-27 PROCEDURE — 63710000001 ROPINIROLE 0.5 MG TABLET: Performed by: INTERNAL MEDICINE

## 2024-06-27 PROCEDURE — 25810000003 SODIUM CHLORIDE 0.9 % SOLUTION: Performed by: INTERNAL MEDICINE

## 2024-06-27 PROCEDURE — 63710000001 CARBIDOPA-LEVODOPA 25-100 MG TABLET: Performed by: INTERNAL MEDICINE

## 2024-06-27 PROCEDURE — 63710000001 TICAGRELOR 90 MG TABLET: Performed by: NURSE PRACTITIONER

## 2024-06-27 PROCEDURE — 63710000001 ISOSORBIDE MONONITRATE 30 MG TABLET SUSTAINED-RELEASE 24 HOUR: Performed by: INTERNAL MEDICINE

## 2024-06-27 PROCEDURE — G0378 HOSPITAL OBSERVATION PER HR: HCPCS

## 2024-06-27 PROCEDURE — 63710000001 LEVOTHYROXINE 50 MCG TABLET: Performed by: INTERNAL MEDICINE

## 2024-06-27 PROCEDURE — 63710000001 LISINOPRIL 5 MG TABLET: Performed by: INTERNAL MEDICINE

## 2024-06-27 RX ADMIN — Medication 1000 MCG: at 09:49

## 2024-06-27 RX ADMIN — ISOSORBIDE MONONITRATE 30 MG: 30 TABLET, EXTENDED RELEASE ORAL at 09:50

## 2024-06-27 RX ADMIN — FERROUS SULFATE TAB 325 MG (65 MG ELEMENTAL FE) 325 MG: 325 (65 FE) TAB at 09:49

## 2024-06-27 RX ADMIN — LISINOPRIL 5 MG: 10 TABLET ORAL at 09:50

## 2024-06-27 RX ADMIN — FOLIC ACID 1 MG: 1 TABLET ORAL at 09:50

## 2024-06-27 RX ADMIN — PANTOPRAZOLE SODIUM 40 MG: 40 TABLET, DELAYED RELEASE ORAL at 06:27

## 2024-06-27 RX ADMIN — LEVOTHYROXINE SODIUM 50 MCG: 50 TABLET ORAL at 06:20

## 2024-06-27 RX ADMIN — TAMSULOSIN HYDROCHLORIDE 0.8 MG: 0.4 CAPSULE ORAL at 09:50

## 2024-06-27 RX ADMIN — ATORVASTATIN CALCIUM 40 MG: 20 TABLET, FILM COATED ORAL at 09:49

## 2024-06-27 RX ADMIN — ROPINIROLE HYDROCHLORIDE 0.25 MG: 0.5 TABLET, FILM COATED ORAL at 09:50

## 2024-06-27 RX ADMIN — SODIUM CHLORIDE 75 ML/HR: 9 INJECTION, SOLUTION INTRAVENOUS at 06:29

## 2024-06-27 RX ADMIN — CARBIDOPA AND LEVODOPA 2 TABLET: 25; 100 TABLET ORAL at 09:50

## 2024-06-27 RX ADMIN — TICAGRELOR 90 MG: 90 TABLET ORAL at 12:04

## 2024-06-27 RX ADMIN — ASPIRIN 81 MG: 81 TABLET, CHEWABLE ORAL at 09:49

## 2024-06-27 RX ADMIN — DIVALPROEX SODIUM 500 MG: 500 TABLET, DELAYED RELEASE ORAL at 09:50

## 2024-06-27 RX ADMIN — OXYBUTYNIN CHLORIDE 5 MG: 5 TABLET, EXTENDED RELEASE ORAL at 09:50

## 2024-06-27 RX ADMIN — FUROSEMIDE 20 MG: 20 TABLET ORAL at 09:49

## 2024-06-27 NOTE — CASE MANAGEMENT/SOCIAL WORK
Continued Stay Note  [facility]     Patient Name: Hernando Lozada  MRN: [id number]  Today's Date: 6/27/2024    Admit Date: 6/25/2024    Plan: Return to [facility] via their transportation; RN to call when Pt ready to go ([phone]).   Discharge Plan       Row Name 06/27/24 1201       Plan    Plan Return to [facility] via their transportation; RN to call when Pt ready to go ([phone]).    Plan Comments Spoke with Betty at [facility] ([phone]) to advise Pt discharging today after 3PM.  Betty advised to have the nurse call to give report and then they will send transport to pick him up.  Packet given to RN..........Jossy NUÑEZ/NINO                   Discharge Codes    No documentation.                 Expected Discharge Date and Time       Expected Discharge Date Expected Discharge Time    Jun 27, 2024               Jossy Ashby RN

## 2024-06-27 NOTE — DISCHARGE SUMMARY
[facility]  Physician Discharge Summary    Patient Identification:  Name: Hernando Lozada  Age: 79 y.o.  Sex: male  :  1945  MRN: [id number]    Admit date: 2024    Discharge date and time: 2024 at 1400      Admitting Physician: Dr. Rio Miranda    Discharge Physician: FAISAL Hicks    Discharge Diagnoses:   Active Hospital Problems    Diagnosis     Chest pain     Aortic stenosis, severe     Elevated troponin        Discharged Condition: stable    Hospital Course:       Please refer to H&P for full details.  Hernando Lozada is a 79-year-old male who presented to [facility] with atypical chest pains and history of aortic stenosis, and history of CAD.  He underwent a cardiac cath with angioplasty and stent to the proximal circumflex artery 99% reduced to 0% with 3.5/12 Xience stent.  Intravascular ultrasound.  Cardiac cath as below.  He is doing well status post cardiac cath.  He will need to hold his metformin for 48 hours status post cardiac cath and new medication will be Brilinta 90 mg twice daily.  Continue aspirin, statin, Brilinta and beta-blockade.  At discharge stable.  Discussed with nurse and CCP for arrangement to return to his facility.          Cardiac cath  24  HEMODYNAMIC / ANGIOGRAPHIC DATA:   Pulmonary capillary wedge pressure was 10.   Pulmonary artery systolic pressure was 31/8.  Right atrial pressure was 8.  Cardiac index was 4.1.  Gradient across aortic valve 27 mmHg with valve area of 1.5  Left ventricular end diastolic pressure was 10 mmHg.  Left ventriculography revealed the EF to be 50%.  The left main is normal left main.  The LAD is , Left anterior descending 100% occluded with the LIMA to the LAD patent with normal distal flow  Circumflex artery proximal near the ostial 99% reduced to 0% with 3.5/12 Xience stent, first large OM has been jailed  Saphenous vein graft to the OM has been closed  The right coronary artery is dominant  with early atherosclerotic plaque.    Angioplasty result    Preangioplasty 99% proximal circumflex artery reduced to 0% with 3.5/12 Xience stent    Intravascular ultrasound    Intravascular ultrasound was performed shows no edge dissection and well apposed and well-expanded stent    RECOMMENDATIONS: Post-procedure care will focus on prevention of any ischemic events and congestive complications.   consults:   IP CONSULT TO CARDIOLOGY  IP CONSULT TO CASE MANAGEMENT     Discharge Exam:  General: No acute distress   Skin: Warm and dry, no diaphoresis noted.  Site with no signs or symptoms of infection or hematoma noted.   EYES: EOM normal and no conjunctival drainage.   HEENT: external ear and nose normal; oral mucosa moist   Neck: Supple; no carotid bruits; no JVD   Heart: S1S2 regular rate and rhythm; no murmurs; no gallop or rub appreciated   Pulmonary: Respirations regular, unlabored at rest, bilateral breath sounds have good air entry throughout all lung fields; no crackles, rubs or wheezes auscultated.     GI: Soft, non-tender, non-distended, positive bowel sounds  No hepatosplenomegaly   Extremities: Bilateral lower extremities have no pre-tibial pitting edema; DP/PT pulses are palpable   Neurological: Alert and oriented x 3; no neuro deficits          LABS:  Results from last 7 days   Lab Units 06/26/24  0334 06/25/24  1311 06/25/24  1108   HSTROP T ng/L 52* 18 17     Results from last 7 days   Lab Units 06/26/24  0334   MAGNESIUM mg/dL 1.9     Results from last 7 days   Lab Units 06/26/24  0334   SODIUM mmol/L 141   POTASSIUM mmol/L 3.6   BUN mg/dL 13   CREATININE mg/dL 0.69*   CALCIUM mg/dL 8.8     @LABRCNTbnp@  Results from last 7 days   Lab Units 06/26/24  1444 06/26/24  0334 06/25/24  1108 06/20/24  1245   WBC 10*3/mm3  --  7.43 6.20 7.21   HEMOGLOBIN g/dL  --  11.6* 11.5* 12.5*   HEMOGLOBIN, POC g/dL 12.6  --   --   --    HEMATOCRIT %  --  34.9* 36.0* 37.8   HEMATOCRIT POC % 37*  --   --    --    PLATELETS 10*3/mm3  --  148 147 165         Results from last 7 days   Lab Units 06/25/24  1108   CHOLESTEROL mg/dL 125   TRIGLYCERIDES mg/dL 80   HDL CHOL mg/dL 52   LDL CHOL mg/dL 57     Disposition:  Skilled nursing facility    Discharge Medications:      Discharge Medications        New Medications        Instructions Start Date   ticagrelor 90 MG tablet tablet  Commonly known as: BRILINTA   90 mg, Oral, 2 Times Daily             Continue These Medications        Instructions Start Date   Advair Diskus 500-50 MCG/ACT DISKUS  Generic drug: Fluticasone-Salmeterol   1 puff, Inhalation, 2 Times Daily - RT      Aspirin Low Dose 81 MG chewable tablet  Generic drug: aspirin   81 mg, Oral, Daily      atorvastatin 20 MG tablet  Commonly known as: LIPITOR   40 mg, Oral, Daily      cyanocobalamin 1000 MCG tablet  Commonly known as: VITAMIN B-12   1,000 mcg, Oral, Daily      divalproex 500 MG DR tablet  Commonly known as: DEPAKOTE   500 mg, Oral, 2 Times Daily      docusate sodium 100 MG capsule  Commonly known as: COLACE   100 mg, Oral, 2 Times Daily      ferrous sulfate 325 (65 FE) MG tablet   325 mg, Oral, Daily With Breakfast      folic acid 1 MG tablet  Commonly known as: FOLVITE   1 mg, Oral, Daily      furosemide 20 MG tablet  Commonly known as: LASIX   20 mg, Oral, 2 Times Daily      glucosamine sulfate 500 MG capsule capsule   500 mg, Oral, 2 Times Daily With Meals      isosorbide mononitrate 30 MG 24 hr tablet  Commonly known as: IMDUR   30 mg, Oral, Daily      levothyroxine 50 MCG tablet  Commonly known as: SYNTHROID, LEVOTHROID   50 mcg, Oral, Daily      lisinopril 5 MG tablet  Commonly known as: PRINIVIL,ZESTRIL   5 mg, Oral, Daily      melatonin 5 MG tablet tablet   5 mg, Oral      metFORMIN 500 MG tablet  Commonly known as: GLUCOPHAGE   500 mg, Oral, Daily      nitroglycerin 0.4 MG SL tablet  Commonly known as: NITROSTAT   0.4 mg, Sublingual, Every 5 Minutes PRN, Take no more than 3 doses in 15  minutes.      omeprazole 40 MG capsule  Commonly known as: priLOSEC   40 mg, Oral, Every Evening      oxybutynin XL 5 MG 24 hr tablet  Commonly known as: DITROPAN-XL   5 mg, Oral, Daily      propranolol 40 MG tablet  Commonly known as: INDERAL   40 mg, Oral, Daily      rOPINIRole 0.25 MG tablet  Commonly known as: REQUIP   0.25 mg, Oral, 3 Times Daily      tamsulosin 0.4 MG capsule 24 hr capsule  Commonly known as: FLOMAX   0.8 mg, Oral, Daily             ASK your doctor about these medications        Instructions Start Date   carbidopa-levodopa  MG per tablet  Commonly known as: SINEMET   2 tablets, 3 Times Daily                 Discharge Home Instructions:  1. Follow-up with Dr. Miranda on July 11 at 2 PM.  2.  Follow-up with your primary care physician in 1 week.  Please call for an appointment.  3.  Hold metformin for 48 hours status post cardiac cath.  Take all medications as prescribed.  4. The importance of taking dual antiplatelets for one year  has been explained, risk of stent thrombosis leading to the acute MI, which carries high morbidity and mortality has been explained. Discontinuation or interruptions of these medications should be under the strict guidance of appropriate health professional.    5. Routine post cardiac catheterization/PCI discharge home care instructions:    1. No submerging procedure site below water for 7-10 days.  2. No lifting objects greater than 1 lbs for 3 days.  3. If groin site used, avoid climbing several flights of stairs or sitting for longer than 2 hours at a time for the next 24 hours.   4. Monitor puncture site for bleeding and/or knots;. If bleeding should occur at the groin site: lie flat, apply pressure and return to the ER. If bleeding should occur at the wrist site, apply pressure and return to the ER.  5.  You may apply a DRY Band-Aid over the puncture site if needed. Do not apply any lotions, salves or ointments to site.  6. No driving for 3 days.  7.  "Return to ER for recurrent symptoms.  8. No smoking.  9. Take all medications as prescribed.    Signed:  Cierra Hernández, FAISAL  6/27/2024  10:52 EDT      EMR Dragon/Transcription:   \"Dictated utilizing Dragon dictation\".     "

## 2024-06-27 NOTE — PROGRESS NOTES
[facility] Clinical Pharmacy Services: Pharmacy Education - Antiplatelet - Brilinta    Hernando Lozada has been ordered Brilinta s/p stent placement.     Counseling points included the following:  Brilinta's indication, patient's need for the medication, and dosing/frequency of this medication.  Enforced the importance of taking their medication as instructed every day and the reason why the medication is dosed that way.  Explained possible side effects of Brilinta, including increased risk of bleeding, and s/sx of bleeding. Also talked about ways to control bleeding for minor cuts and scrapes.  Emphasized the importance of going to the emergency room if any of the following occur: Falling and hitting your head; noticing bright red blood in urine or dark/tarry stools; vomiting up blood or vomit has a coffee-ground like texture; coughing up blood.  Discussed the importance of informing any physician or dentist that they have been started on Brilinta, in case they need to be taken off for a procedure.  Discussed all important drug interactions, including over-the-counter medications and supplements.  Instructed the patient not to begin or discontinue any medications without informing his physician.     Patient expressed understanding and had no further questions.      Manda Medina, Pharm.D., [facility]   Clinical Pharmacist   Phone Extension #[phone]

## 2024-06-27 NOTE — OUTREACH NOTE
Prep Survey      Flowsheet Row Responses   [facility] facility patient discharged from? [place]   Is LACE score < 7 ? No   Eligibility [facility]   Date of Admission 06/25/24   Date of Discharge 06/27/24   Discharge Disposition Home or Self Care   Discharge diagnosis Elevated troponin-Aortic stenosis, severe-  CP   Does the patient have one of the following disease processes/diagnoses(primary or secondary)? Other   Does the patient have Home health ordered? No   Is there a DME ordered? No   Prep survey completed? Yes            DELPHINE MARTIN - Registered Nurse

## 2024-06-27 NOTE — PROGRESS NOTES
[facility] Clinical Pharmacy Services: National Cardiology Data Registry (NCDR) Medication Review    Hernando Lozada is s/p PCI with drug-eluting stent placement for NSTEMI . Pharmacy to review discharge medications to make sure appropriate medications have been prescribed.    Patient has been discharged on the following:  Aspirin 81 mg daily  High Intensity Statin: Atorvastatin 40 mg (20 mg tablets x 2) daily  Beta-blocker: Propranolol 40 mg daily  P2Y12 Inhibitor: Ticagrelor 90 mg twice daily  LVEF=66-70%: Lisinopril 5 mg daily    These medications meet the requirements for NCDR discharge medication for chest pain and MI.    Manda Medina, Pharm.D., [facility]   Clinical Pharmacist   Phone Extension #[phone]

## 2024-06-28 ENCOUNTER — TRANSITIONAL CARE MANAGEMENT TELEPHONE ENCOUNTER (OUTPATIENT)
Dept: CALL CENTER | Facility: HOSPITAL | Age: 79
End: 2024-06-28
Payer: MEDICARE

## 2024-06-28 NOTE — OUTREACH NOTE
Call Center TCM Note      Flowsheet Row Responses   [facility] patient discharged from? [place]   Does the patient have one of the following disease processes/diagnoses(primary or secondary)? Other   TCM attempt successful? No   Unsuccessful attempts Attempt 1  [Patients phone # is for [facility].]            Sangeeta Sultana RN    6/28/2024, 15:36 EDT

## 2024-06-28 NOTE — OUTREACH NOTE
Call Center TCM Note      Flowsheet Row Responses   [facility] patient discharged from? [place]   Does the patient have one of the following disease processes/diagnoses(primary or secondary)? Other   TCM attempt successful? No   Unsuccessful attempts Attempt 2  [Patients phone # is for [facility].]            Sangeeta Sultana RN    6/28/2024, 16:32 EDT

## 2024-06-29 ENCOUNTER — TRANSITIONAL CARE MANAGEMENT TELEPHONE ENCOUNTER (OUTPATIENT)
Dept: CALL CENTER | Facility: HOSPITAL | Age: 79
End: 2024-06-29
Payer: MEDICARE

## 2024-06-29 NOTE — OUTREACH NOTE
Call Center TCM Note      Flowsheet Row Responses   [facility] patient discharged from? [place]   Does the patient have one of the following disease processes/diagnoses(primary or secondary)? Other   TCM attempt successful? No   Unsuccessful attempts Attempt 3  [No verbal release on file from PCP group]            Christina Vences RN    6/29/2024, 12:37 EDT

## 2024-07-11 ENCOUNTER — OFFICE VISIT (OUTPATIENT)
Dept: CARDIOLOGY | Facility: CLINIC | Age: 79
End: 2024-07-11
Payer: MEDICARE

## 2024-07-11 VITALS
DIASTOLIC BLOOD PRESSURE: 62 MMHG | HEART RATE: 65 BPM | SYSTOLIC BLOOD PRESSURE: 110 MMHG | BODY MASS INDEX: 31.84 KG/M2 | WEIGHT: 215 LBS | HEIGHT: 69 IN

## 2024-07-11 DIAGNOSIS — I10 ESSENTIAL HYPERTENSION: ICD-10-CM

## 2024-07-11 DIAGNOSIS — I35.0 AORTIC STENOSIS, SEVERE: Primary | ICD-10-CM

## 2024-07-11 DIAGNOSIS — I10 PRIMARY HYPERTENSION: ICD-10-CM

## 2024-07-11 DIAGNOSIS — I25.118 CORONARY ARTERY DISEASE OF NATIVE ARTERY OF NATIVE HEART WITH STABLE ANGINA PECTORIS: ICD-10-CM

## 2024-07-11 RX ORDER — ASPIRIN 81 MG/1
81 TABLET, CHEWABLE ORAL DAILY
COMMUNITY

## 2024-07-11 RX ORDER — ATORVASTATIN CALCIUM 40 MG/1
40 TABLET, FILM COATED ORAL NIGHTLY
COMMUNITY
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
## 2024-07-23 ENCOUNTER — APPOINTMENT (OUTPATIENT)
Dept: GENERAL RADIOLOGY | Facility: HOSPITAL | Age: 79
End: 2024-07-23
Payer: MEDICARE

## 2024-07-23 ENCOUNTER — HOSPITAL ENCOUNTER (EMERGENCY)
Facility: HOSPITAL | Age: 79
Discharge: HOME | End: 2024-07-23
Attending: EMERGENCY MEDICINE
Payer: MEDICARE

## 2024-07-23 VITALS
SYSTOLIC BLOOD PRESSURE: 111 MMHG | HEART RATE: 91 BPM | RESPIRATION RATE: 14 BRPM | OXYGEN SATURATION: 96 % | DIASTOLIC BLOOD PRESSURE: 67 MMHG | TEMPERATURE: 98 F

## 2024-07-23 DIAGNOSIS — R07.9 CHEST PAIN, UNSPECIFIED TYPE: Primary | ICD-10-CM

## 2024-07-23 LAB
ALBUMIN SERPL-MCNC: 3.8 G/DL (ref 3.5–5.2)
ALBUMIN/GLOB SERPL: 1.4 G/DL
ALP SERPL-CCNC: 59 U/L (ref 39–117)
ALT SERPL W P-5'-P-CCNC: 14 U/L (ref 1–41)
ANION GAP SERPL CALCULATED.3IONS-SCNC: 10.4 MMOL/L (ref 5–15)
AST SERPL-CCNC: 23 U/L (ref 1–40)
BASOPHILS # BLD AUTO: 0.02 10*3/MM3 (ref 0–0.2)
BASOPHILS NFR BLD AUTO: 0.3 % (ref 0–1.5)
BILIRUB SERPL-MCNC: 0.6 MG/DL (ref 0–1.2)
BUN SERPL-MCNC: 11 MG/DL (ref 8–23)
BUN/CREAT SERPL: 14.7 (ref 7–25)
CALCIUM SPEC-SCNC: 8.6 MG/DL (ref 8.6–10.5)
CHLORIDE SERPL-SCNC: 99 MMOL/L (ref 98–107)
CO2 SERPL-SCNC: 27.6 MMOL/L (ref 22–29)
CREAT SERPL-MCNC: 0.75 MG/DL (ref 0.76–1.27)
DEPRECATED RDW RBC AUTO: 44.9 FL (ref 37–54)
EGFRCR SERPLBLD CKD-EPI 2021: 92.9 ML/MIN/1.73
EOSINOPHIL # BLD AUTO: 0.03 10*3/MM3 (ref 0–0.4)
EOSINOPHIL NFR BLD AUTO: 0.5 % (ref 0.3–6.2)
ERYTHROCYTE [DISTWIDTH] IN BLOOD BY AUTOMATED COUNT: 13 % (ref 12.3–15.4)
GEN 5 2HR TROPONIN T REFLEX: 21 NG/L
GLOBULIN UR ELPH-MCNC: 2.7 GM/DL
GLUCOSE SERPL-MCNC: 98 MG/DL (ref 65–99)
HCT VFR BLD AUTO: 36 % (ref 37.5–51)
HGB BLD-MCNC: 12 G/DL (ref 13–17.7)
IMM GRANULOCYTES # BLD AUTO: 0.01 10*3/MM3 (ref 0–0.05)
IMM GRANULOCYTES NFR BLD AUTO: 0.2 % (ref 0–0.5)
LIPASE SERPL-CCNC: 16 U/L (ref 13–60)
LYMPHOCYTES # BLD AUTO: 1.72 10*3/MM3 (ref 0.7–3.1)
LYMPHOCYTES NFR BLD AUTO: 27.7 % (ref 19.6–45.3)
MCH RBC QN AUTO: 31.7 PG (ref 26.6–33)
MCHC RBC AUTO-ENTMCNC: 33.3 G/DL (ref 31.5–35.7)
MCV RBC AUTO: 95.2 FL (ref 79–97)
MONOCYTES # BLD AUTO: 0.57 10*3/MM3 (ref 0.1–0.9)
MONOCYTES NFR BLD AUTO: 9.2 % (ref 5–12)
NEUTROPHILS NFR BLD AUTO: 3.85 10*3/MM3 (ref 1.7–7)
NEUTROPHILS NFR BLD AUTO: 62.1 % (ref 42.7–76)
NRBC BLD AUTO-RTO: 0 /100 WBC (ref 0–0.2)
PLATELET # BLD AUTO: 156 10*3/MM3 (ref 140–450)
PMV BLD AUTO: 9.6 FL (ref 6–12)
POTASSIUM SERPL-SCNC: 3.2 MMOL/L (ref 3.5–5.2)
PROT SERPL-MCNC: 6.5 G/DL (ref 6–8.5)
QT INTERVAL: 412 MS
QTC INTERVAL: 464 MS
RBC # BLD AUTO: 3.78 10*6/MM3 (ref 4.14–5.8)
SODIUM SERPL-SCNC: 137 MMOL/L (ref 136–145)
TROPONIN T DELTA: 2 NG/L
TROPONIN T SERPL HS-MCNC: 19 NG/L
WBC NRBC COR # BLD AUTO: 6.2 10*3/MM3 (ref 3.4–10.8)

## 2024-07-23 PROCEDURE — 71045 X-RAY EXAM CHEST 1 VIEW: CPT

## 2024-07-23 PROCEDURE — 93005 ELECTROCARDIOGRAM TRACING: CPT | Performed by: EMERGENCY MEDICINE

## 2024-07-23 PROCEDURE — 93010 ELECTROCARDIOGRAM REPORT: CPT | Performed by: INTERNAL MEDICINE

## 2024-07-23 PROCEDURE — 85025 COMPLETE CBC W/AUTO DIFF WBC: CPT | Performed by: EMERGENCY MEDICINE

## 2024-07-23 PROCEDURE — 84484 ASSAY OF TROPONIN QUANT: CPT | Performed by: EMERGENCY MEDICINE

## 2024-07-23 PROCEDURE — 80053 COMPREHEN METABOLIC PANEL: CPT | Performed by: EMERGENCY MEDICINE

## 2024-07-23 PROCEDURE — 93005 ELECTROCARDIOGRAM TRACING: CPT

## 2024-07-23 PROCEDURE — 36415 COLL VENOUS BLD VENIPUNCTURE: CPT

## 2024-07-23 PROCEDURE — 83690 ASSAY OF LIPASE: CPT | Performed by: EMERGENCY MEDICINE

## 2024-07-23 PROCEDURE — 99284 EMERGENCY DEPT VISIT MOD MDM: CPT

## 2024-07-23 RX ORDER — ACETAMINOPHEN 325 MG/1
650 TABLET ORAL EVERY 6 HOURS PRN
COMMUNITY

## 2024-07-23 NOTE — PROGRESS NOTES
Clinical Pharmacy Services: Medication History    Hernando Lozada is a 79 y.o. male presenting to [facility] for   Chief Complaint   Patient presents with    Chest Pain       He  has a past medical history of Arthritis, Bilateral leg edema, CAD (coronary artery disease), Diabetes mellitus, Disease of thyroid gland, GERD (gastroesophageal reflux disease), Heart murmur, History of head injury, [place] (hard of hearing), Hyperlipidemia, Hypertension, Irregular heart beat, GIOVANNY (obstructive sleep apnea), Osteoarthritis, Past heart attack, SOB (shortness of breath) on exertion, Stroke, and Vasovagal episode.    Allergies as of 07/23/2024    (No Known Allergies)       Medication information was obtained from: senior care Paperwork   Pharmacy and Phone Number:     Prior to Admission Medications       Prescriptions Last Dose Informant Patient Reported? Taking?    acetaminophen (TYLENOL) 325 MG tablet  Nursing Home Yes Yes    Take 2 tablets by mouth Every 6 (Six) Hours As Needed for Mild Pain.    Advair Diskus 500-50 MCG/ACT DISKUS  Nursing Home Yes Yes    Inhale 1 puff 2 (Two) Times a Day.    aspirin 81 MG chewable tablet  Nursing Home Yes Yes    Chew 1 tablet Daily.    atorvastatin (LIPITOR) 40 MG tablet  Nursing Home Yes Yes    Take 1 tablet by mouth Every Night.    divalproex (DEPAKOTE) 500 MG DR tablet  Nursing Home Yes Yes    Take 1 tablet by mouth 2 (Two) Times a Day.    docusate sodium (COLACE) 100 MG capsule  Nursing Home Yes Yes    Take 1 capsule by mouth Daily.    ferrous sulfate 325 (65 FE) MG tablet  Nursing Home Yes Yes    Take 1 tablet by mouth Daily With Breakfast.    folic acid (FOLVITE) 1 MG tablet  Nursing Home Yes Yes    Take 1 tablet by mouth Daily.    furosemide (LASIX) 20 MG tablet  Nursing Home Yes Yes    Take 1 tablet by mouth 3 (Three) Times a Day.    glucosamine sulfate 500 MG capsule capsule  Nursing Home Yes Yes    Take 2 capsules by mouth Daily.    levothyroxine (SYNTHROID, LEVOTHROID) 50  MCG tablet  Nursing Home No Yes    Take 1 tablet by mouth Daily.    lisinopril (PRINIVIL,ZESTRIL) 5 MG tablet  Nursing Home No Yes    Take 1 tablet by mouth Daily.    Melatonin 10 MG tablet  Nursing Home Yes Yes    Take 1 tablet by mouth Every Night.    metFORMIN (GLUCOPHAGE) 500 MG tablet  Nursing Home Yes Yes    Take 1 tablet by mouth Daily.    nitroglycerin (NITROSTAT) 0.4 MG SL tablet  Nursing Home No Yes    Place 1 tablet under the tongue Every 5 (Five) Minutes As Needed for Chest Pain (Systolic BP Greater Than 100). Take no more than 3 doses in 15 minutes.    omeprazole (priLOSEC) 40 MG capsule  Nursing Home Yes Yes    Take 1 capsule by mouth Every Evening.    oxybutynin XL (DITROPAN-XL) 5 MG 24 hr tablet  Nursing Home Yes Yes    Take 1 tablet by mouth Daily.    propranolol (INDERAL) 40 MG tablet  Nursing Home Yes Yes    Take 1 tablet by mouth 2 (Two) Times a Day.    rOPINIRole (REQUIP) 0.25 MG tablet  Nursing Home Yes Yes    Take 1 tablet by mouth 3 (Three) Times a Day.    tamsulosin (FLOMAX) 0.4 MG capsule 24 hr capsule  Nursing Home No Yes    Take 2 capsules by mouth Daily.    ticagrelor (BRILINTA) 90 MG tablet tablet  Nursing Home No Yes    Take 1 tablet by mouth 2 (Two) Times a Day.    vitamin B-12 (VITAMIN B-12) 1000 MCG tablet  Nursing Home No Yes    Take 1 tablet by mouth Daily.    isosorbide mononitrate (IMDUR) 30 MG 24 hr tablet   No No    Take 1 tablet by mouth Daily for 30 days.              Medication notes:     This medication list is complete to the best of my knowledge as of 7/23/2024    Please call if questions.    Nunu Lawrence  Medication History Technician   [phone]    7/23/2024 19:20 EDT

## 2024-07-23 NOTE — ED PROVIDER NOTES
EMERGENCY DEPARTMENT ENCOUNTER    Room Number:  30/30  PCP: Sammie Gambino APRN  History obtained from: Patient      HPI:  Chief Complaint: Chest pain  A complete HPI/ROS/PMH/PSH/SH/FH are unobtainable due to: N/A  Context: Hernando Lozada is a 79 y.o. male who presents to the ED c/o chest pain.  Had 1 episode 2 days ago that started after he was eating pizza, lasted several hours.  Had another episode today which was also after eating.  Some radiation to the left arm.  Currently asymptomatic.  No shortness of breath, nausea, vomiting.  Patient thinks it is food related.  Had a recent heart cath with 2 stents placed.            PAST MEDICAL HISTORY  Active Ambulatory Problems     Diagnosis Date Noted    DJD (degenerative joint disease) of knee 12/14/2017    Essential hypertension 02/18/2019    SOB (shortness of breath) 02/18/2019    Leg swelling 02/18/2019    Hyperlipidemia LDL goal <100 08/23/2019    COVID-19 11/25/2022    Diabetes mellitus     GIOVANNY (obstructive sleep apnea)     Disease of thyroid gland     CKD (chronic kidney disease)     Hypomagnesemia     Chronic brain injury     Generalized weakness     CAD (coronary artery disease)     Pedal edema 03/06/2023    Tremor 03/06/2023    Elevated troponin 03/06/2023    Lower extremity cellulitis 03/06/2023    Tinea cruris 03/06/2023    Chronic deep vein thrombosis (DVT) of calf muscle vein of left lower extremity 03/07/2023    Aortic stenosis, severe 03/06/2023    Acute urinary tract infection 04/17/2023    Hypothyroidism 04/17/2023    Acute urinary retention 04/19/2023    Acute bacterial prostatitis 04/19/2023    Chest pain 06/25/2024     Resolved Ambulatory Problems     Diagnosis Date Noted    Chest pain with high risk of acute coronary syndrome 04/29/2019    Chest pain 01/02/2024    Chest pain 01/04/2024     Past Medical History:   Diagnosis Date    Arthritis     Bilateral leg edema     GERD (gastroesophageal reflux disease)     Heart murmur     History of head  injury     [place] (hard of hearing)     Hyperlipidemia     Hypertension     Irregular heart beat     Osteoarthritis     Past heart attack     SOB (shortness of breath) on exertion     Stroke     Vasovagal episode          PAST SURGICAL HISTORY  Past Surgical History:   Procedure Laterality Date    CARDIAC CATHETERIZATION N/A 4/30/2019    Procedure: Coronary angiography with grafts;  Surgeon: Rio Miranda MD;  Location: [facility] CATH INVASIVE LOCATION;  Service: Cardiology    CARDIAC CATHETERIZATION N/A 4/30/2019    Procedure: Left Heart Cath;  Surgeon: Rio Miranda MD;  [facility];  Service: Cardiology    CARDIAC CATHETERIZATION N/A 4/30/2019    Procedure: Left ventriculography;  Surgeon: Rio Miranda MD;  Location: [facility] CATH INVASIVE LOCATION;  Service: Cardiology    CARDIAC CATHETERIZATION  4/30/2019    Procedure: Saphenous Vein Graft;  Surgeon: Rio Miranda MD;  Location: [facility] CATH INVASIVE LOCATION;  Service: Cardiology    CARDIAC CATHETERIZATION N/A 4/30/2019    Procedure: Stent GLENDY coronary;  Surgeon: Rio Miranda MD;  Location: [facility] CATH INVASIVE LOCATION;  Service: Cardiology    CARDIAC CATHETERIZATION N/A 3/10/2023    Procedure: Right and Left Heart Cath;  Surgeon: Rio Miranda MD;  Location: [facility] CATH INVASIVE LOCATION;  Service: Cardiology;  Laterality: N/A;    CARDIAC CATHETERIZATION N/A 3/10/2023    Procedure: Coronary angiography;  Surgeon: Rio Miranda MD;  Location: [facility] CATH INVASIVE LOCATION;  Service: Cardiology;  Laterality: N/A;    CARDIAC CATHETERIZATION N/A 3/10/2023    Procedure: Left ventriculography;  Surgeon: Rio Miranda MD;  Location: [facility] CATH INVASIVE LOCATION;  Service: Cardiology;  Laterality: N/A;    CARDIAC CATHETERIZATION N/A 3/10/2023    Procedure: Percutaneous Coronary Intervention;  Surgeon: Rio Miranda MD;  Location: [facility] CATH INVASIVE LOCATION;   Service: Cardiology;  Laterality: N/A;    CARDIAC CATHETERIZATION N/A 3/10/2023    Procedure: Stent GLENDY coronary;  Surgeon: Rio Miranda MD;  [facility];  Service: Cardiology;  Laterality: N/A;    CARDIAC CATHETERIZATION N/A 1/3/2024    Procedure: Left Heart Cath;  Surgeon: Rio Miranda MD;  [facility];  Service: Cardiovascular;  Laterality: N/A;    CARDIAC CATHETERIZATION N/A 1/3/2024    Procedure: Coronary angiography;  Surgeon: Rio Miranda MD;  Location:  [facility] CATH INVASIVE LOC[facility];  Service: Cardiovascular;  Laterality: N/A;    CARDIAC CATHETERIZATION N/A 1/3/2024    Procedure: Percutaneous Coronary Intervention;  Surgeon: Rio Miranda MD;  [facility];  Service: Cardiovascular;  Laterality: N/A;    CARDIAC CATHETERIZATION N/A 1/3/2024    Procedure: Left ventriculography;  Surgeon: Rio Miranda MD;  [facility];  Service: Cardiovascular;  Laterality: N/A;    CARDIAC CATHETERIZATION Left 1/3/2024    Procedure: Native mammary injection;  Surgeon: Rio Miranda MD;  [facility];  Service: Cardiovascular;  Laterality: Left;    CARDIAC CATHETERIZATION N/A 6/26/2024    Procedure: Right and Left Heart Cath;  Surgeon: Rio Miranda MD;  [facility];  Service: Cardiovascular;  Laterality: N/A;    CARDIAC CATHETERIZATION N/A 6/26/2024    Procedure: Percutaneous Coronary Intervention;  Surgeon: Rio Miranda MD;  Location: [facility] CATH INVASIVE LOCATION;  Service: Cardiovascular;  Laterality: N/A;    CARDIAC CATHETERIZATION N/A 6/26/2024    Procedure: Left ventriculography;  Surgeon: Rio Miranda MD;  [facility];  Service: Cardiovascular;  Laterality: N/A;    CARDIAC CATHETERIZATION N/A 6/26/2024    Procedure: Coronary angiography;   Surgeon: Rio Miranda MD;  Location:  [facility] CATH INVASIVE LOCATION;  Service: Cardiovascular;  Laterality: N/A;    CARDIAC CATHETERIZATION  6/26/2024    Procedure: Saphenous Vein Graft;  Surgeon: Rio Miranda MD;  Location:  [facility] CATH INVASIVE LOCATION;  Service: Cardiovascular;;    CARDIAC CATHETERIZATION N/A 6/26/2024    Procedure: Stent GLENDY coronary;  Surgeon: Rio Miranda MD;  Location:  [facility] CATH INVASIVE LOCATION;  Service: Cardiovascular;  Laterality: N/A;    CARPAL TUNNEL RELEASE Bilateral     CATARACT EXTRACTION      CORONARY ARTERY BYPASS GRAFT  2002    FINGER SURGERY      MISSING LEFT POINTER FINGER TIP    INTERVENTIONAL RADIOLOGY PROCEDURE N/A 6/26/2024    Procedure: Intravascular Ultrasound;  Surgeon: Rio Miranda MD;  Location:  [facility] CATH INVASIVE LOCATION;  Service: Cardiovascular;  Laterality: N/A;    KNEE ARTHROPLASTY Right 02/15/2017    CA ARTHRP KNE CONDYLE&PLATU MEDIAL&LAT COMPARTMENTS Left 12/14/2017    Procedure: LT TOTAL KNEE ARTHROPLASTY;  Surgeon: Jatin Lancaster MD;  Location: [facility] MAIN OR;  Service: Orthopedics    CA RT/LT HEART CATHETERS N/A 4/30/2019    Procedure: Percutaneous Coronary Intervention;  Surgeon: Rio Miranda MD;  Location:  [facility] CATH INVASIVE LOCATION;  Service: Cardiology         FAMILY HISTORY  Family History   Problem Relation Age of Onset    Parkinsonism Sister     Diabetes Brother     Malig Hyperthermia Neg Hx          SOCIAL HISTORY  Social History     Socioeconomic History    Marital status: Single   Tobacco Use    Smoking status: Never     Passive exposure: Never    Smokeless tobacco: Never   Vaping Use    Vaping status: Never Used   Substance and Sexual Activity    Alcohol use: No    Drug use: No    Sexual activity: Defer         ALLERGIES  Patient has no known allergies.        REVIEW OF SYSTEMS    As per HPI      PHYSICAL EXAM  ED Triage Vitals [07/23/24 1445]   Temp Heart Rate Resp BP SpO2   98 °F (36.7  °C) 86 14 140/78 99 %      Temp src Heart Rate Source Patient Position BP Location FiO2 (%)   -- -- -- -- --       Physical Exam  Constitutional:       General: He is not in acute distress.  HENT:      Head: Normocephalic and atraumatic.   Cardiovascular:      Rate and Rhythm: Normal rate and regular rhythm.      Heart sounds: Murmur heard.      Comments: 2 out of 6 systolic ejection murmur  Pulmonary:      Effort: No respiratory distress.   Abdominal:      General: There is no distension.      Tenderness: There is no abdominal tenderness.   Musculoskeletal:         General: No swelling or deformity.   Skin:     General: Skin is warm and dry.   Neurological:      General: No focal deficit present.      Mental Status: He is alert. Mental status is at baseline.           Vital signs and nursing notes reviewed.          LAB RESULTS  Recent Results (from the past 24 hour(s))   ECG 12 Lead Chest Pain    Collection Time: 07/23/24  3:20 PM   Result Value Ref Range    QT Interval 412 ms    QTC Interval 464 ms   High Sensitivity Troponin T    Collection Time: 07/23/24  3:33 PM    Specimen: Blood   Result Value Ref Range    HS Troponin T 19 <22 ng/L   Comprehensive Metabolic Panel    Collection Time: 07/23/24  3:33 PM    Specimen: Blood   Result Value Ref Range    Glucose 98 65 - 99 mg/dL    BUN 11 8 - 23 mg/dL    Creatinine 0.75 (L) 0.76 - 1.27 mg/dL    Sodium 137 136 - 145 mmol/L    Potassium 3.2 (L) 3.5 - 5.2 mmol/L    Chloride 99 98 - 107 mmol/L    CO2 27.6 22.0 - 29.0 mmol/L    Calcium 8.6 8.6 - 10.5 mg/dL    Total Protein 6.5 6.0 - 8.5 g/dL    Albumin 3.8 3.5 - 5.2 g/dL    ALT (SGPT) 14 1 - 41 U/L    AST (SGOT) 23 1 - 40 U/L    Alkaline Phosphatase 59 39 - 117 U/L    Total Bilirubin 0.6 0.0 - 1.2 mg/dL    Globulin 2.7 gm/dL    A/G Ratio 1.4 g/dL    BUN/Creatinine Ratio 14.7 7.0 - 25.0    Anion Gap 10.4 5.0 - 15.0 mmol/L    eGFR 92.9 >60.0 mL/min/1.73   Lipase    Collection Time: 07/23/24  3:33 PM    Specimen: Blood    Result Value Ref Range    Lipase 16 13 - 60 U/L   CBC Auto Differential    Collection Time: 07/23/24  3:33 PM    Specimen: Blood   Result Value Ref Range    WBC 6.20 3.40 - 10.80 10*3/mm3    RBC 3.78 (L) 4.14 - 5.80 10*6/mm3    Hemoglobin 12.0 (L) 13.0 - 17.7 g/dL    Hematocrit 36.0 (L) 37.5 - 51.0 %    MCV 95.2 79.0 - 97.0 fL    MCH 31.7 26.6 - 33.0 pg    MCHC 33.3 31.5 - 35.7 g/dL    RDW 13.0 12.3 - 15.4 %    RDW-SD 44.9 37.0 - 54.0 fl    MPV 9.6 6.0 - 12.0 fL    Platelets 156 140 - 450 10*3/mm3    Neutrophil % 62.1 42.7 - 76.0 %    Lymphocyte % 27.7 19.6 - 45.3 %    Monocyte % 9.2 5.0 - 12.0 %    Eosinophil % 0.5 0.3 - 6.2 %    Basophil % 0.3 0.0 - 1.5 %    Immature Grans % 0.2 0.0 - 0.5 %    Neutrophils, Absolute 3.85 1.70 - 7.00 10*3/mm3    Lymphocytes, Absolute 1.72 0.70 - 3.10 10*3/mm3    Monocytes, Absolute 0.57 0.10 - 0.90 10*3/mm3    Eosinophils, Absolute 0.03 0.00 - 0.40 10*3/mm3    Basophils, Absolute 0.02 0.00 - 0.20 10*3/mm3    Immature Grans, Absolute 0.01 0.00 - 0.05 10*3/mm3    nRBC 0.0 0.0 - 0.2 /100 WBC   High Sensitivity Troponin T 2Hr    Collection Time: 07/23/24  5:24 PM    Specimen: Blood   Result Value Ref Range    HS Troponin T 21 <22 ng/L    Troponin T Delta 2 >=-4 - <+4 ng/L       Ordered the above labs and reviewed the results.        RADIOLOGY  XR Chest 1 View    Result Date: 7/23/2024  XR CHEST 1 VW-  INDICATION: Chest pain  COMPARISON: Chest radiographs dating back to 3/24/2015      No focal consolidation. No pleural effusion or pneumothorax.  Normal size cardiomediastinal silhouette with postsurgical changes of CABG.  No focal osseous abnormality.   This report was finalized on 7/23/2024 3:42 PM by Dr. Axel Raymundo M.D on Workstation: [id number]       Ordered the above noted radiological studies. Reviewed by me in PACS.        HEART Score: 4        MEDICATIONS GIVEN IN ER  Medications - No data to display            MEDICAL DECISION MAKING, PROGRESS, and CONSULTS    MDM:  Patient presented emergency department with chest pain, unclear etiology.  Otherwise well-appearing, vitals otherwise stable.  Currently asymptomatic.  Labs reassuring in the ER.  Unclear etiology, possible GI source however patient does have significant cardiac disease.  Discussed the case with his cardiologist, given recent heart cath and normal testing today will plan for discharge with outpatient follow-up.  Encouraged return for any new or worsening symptoms.  Given return precautions and discharged home.    All labs have been independently reviewed by me.  All radiology studies have been reviewed by me and I have also reviewed the radiology report.   EKG's independently viewed and interpreted by me.  Discussion below represents my analysis of pertinent findings related to patient's condition, differential diagnosis, treatment plan and final disposition.      Additional sources:  - Discussed/ obtained information from independent historians:      - External (non-ED) record review: Reviewed recent heart cath, patient had 2 stents placed in late June.    - Chronic or social conditions impacting care: Aortic stenosis, coronary artery disease    - Shared decision making: Discussed plan for discharge, patient agrees.      Orders placed during this visit:  Orders Placed This Encounter   Procedures    XR Chest 1 View    High Sensitivity Troponin T    Comprehensive Metabolic Panel    Lipase    CBC Auto Differential    High Sensitivity Troponin T 2Hr    Cardiology (on-call MD unless specified)    ECG 12 Lead Chest Pain    CBC & Differential         Additional orders considered but not ordered:  Considered CT PE however given the patient is currently asymptomatic will defer at this visit.        Differential diagnosis includes but is not limited to:    Acute coronary syndrome, angina, unstable angina, esophagitis/gastritis, pulmonary embolism, aortic dissection, Dressler syndrome, pericarditis      Independent  interpretation of labs, radiology studies, and discussions with consultants:  ED Course as of 07/23/24 1839   e Jul 23, 2024   1526 15:26 EDT  ED first look.  Patient presents for evaluation of chest pain.  Patient states he was recently admitted for cardiac chest pain and had intervention.  Patient states symptoms happen again today.  Currently chest pain-free.  Lab evaluation EKG chest x-ray ordered for oncoming physician [SL]   1556 Chest x-ray interpreted myself:  No infiltrate or pneumothorax [FS]   1556 EKG interpreted myself:  1528, sinus rhythm rate of 76, no acute ST segment changes or T wave inversions. [FS]   1623 HS Troponin T: 19 [FS]   1836 Discussed case with Dr. Talbert, agree that patient appears appropriate for discharge home.  Just had a recent heart cath and is currently pain-free.  Patient seems to think that the pain is related to reflux, will review his acid medications. [FS]      ED Course User Index  [FS] Kory Langston MD  [SL] Gavin Little MD           DIAGNOSIS  Final diagnoses:   Chest pain, unspecified type         DISPOSITION  Discharged home        Latest Documented Vital Signs:  As of 18:39 EDT  BP- 105/96 HR- 96 Temp- 98 °F (36.7 °C) O2 sat- 95%              --    Please note that portions of this were completed with a voice recognition program.       Note Disclaimer: At [facility], we believe that sharing information builds trust and better relationships. You are receiving this note because you are receiving care at [facility] or recently visited. It is possible you will see health information before a provider has talked with you about it. This kind of information can be easy to misunderstand. To help you fully understand what it means for your health, we urge you to discuss this note with your provider.             Kory Langston MD  07/23/24 [id number]

## 2024-07-24 ENCOUNTER — TELEPHONE (OUTPATIENT)
Dept: FAMILY MEDICINE CLINIC | Facility: CLINIC | Age: 79
End: 2024-07-24
Payer: MEDICARE

## 2024-07-24 ENCOUNTER — TELEPHONE (OUTPATIENT)
Dept: CARDIOLOGY | Facility: CLINIC | Age: 79
End: 2024-07-24
Payer: MEDICARE

## 2024-07-24 NOTE — TELEPHONE ENCOUNTER
Left voicemail informing Sharmin that patient provider Sammie Gambino does not have a sooner appt available  before 8/5 advise to contact our office to scheduled with another provider in office for a possible sooner appt.     HUB TO RELAY

## 2024-07-24 NOTE — TELEPHONE ENCOUNTER
UNABLE TO GET A HOLD OF PRACTICE.   Caller: KARL     Relationship to patient: NURSING ASSISTANT    Best call back number: [phone]     Type of visit: PATIENT IS NEEDING A HOSPITAL FOLLOW UP. IN ER 07/22/24-07/23/24 FOR CHEST PAINS. NEEDING AN APPOINTMENT WITHIN 7 DAYS      Additional notes: SCHEDULED AN APPT FOR 08/0524 TO HAVE HIM ON THE SCHEDULE, PLEASE CALL AND ADVISE IF A DAY IS SOONER.

## 2024-07-24 NOTE — TELEPHONE ENCOUNTER
Caller: [facility] [employer]    Relationship to patient: NURSE     Best call back number: [phone]    Additional notes:PATIENT WAS SEEN IN HOSPITAL. NOTES STATE HE NEEDS A 1 WEEK F/U. NEXT AVAILABLE IS AUG.22. PLEASE CALL BACK TO ADVISE.

## 2024-08-05 ENCOUNTER — OFFICE VISIT (OUTPATIENT)
Dept: CARDIOLOGY | Facility: CLINIC | Age: 79
End: 2024-08-05
Payer: MEDICARE

## 2024-08-05 ENCOUNTER — OFFICE VISIT (OUTPATIENT)
Dept: FAMILY MEDICINE CLINIC | Facility: CLINIC | Age: 79
End: 2024-08-05
Payer: MEDICARE

## 2024-08-05 VITALS
OXYGEN SATURATION: 97 % | DIASTOLIC BLOOD PRESSURE: 58 MMHG | HEART RATE: 88 BPM | SYSTOLIC BLOOD PRESSURE: 88 MMHG | BODY MASS INDEX: 32.32 KG/M2 | TEMPERATURE: 97.8 F | HEIGHT: 69 IN | WEIGHT: 218.2 LBS

## 2024-08-05 VITALS
OXYGEN SATURATION: 100 % | DIASTOLIC BLOOD PRESSURE: 64 MMHG | WEIGHT: 218.2 LBS | TEMPERATURE: 98.2 F | HEART RATE: 68 BPM | BODY MASS INDEX: 32.32 KG/M2 | SYSTOLIC BLOOD PRESSURE: 94 MMHG | HEIGHT: 69 IN

## 2024-08-05 DIAGNOSIS — I25.118 CORONARY ARTERY DISEASE OF NATIVE ARTERY OF NATIVE HEART WITH STABLE ANGINA PECTORIS: ICD-10-CM

## 2024-08-05 DIAGNOSIS — I10 ESSENTIAL HYPERTENSION: ICD-10-CM

## 2024-08-05 DIAGNOSIS — I25.10 CORONARY ARTERY DISEASE WITHOUT ANGINA PECTORIS, UNSPECIFIED VESSEL OR LESION TYPE, UNSPECIFIED WHETHER NATIVE OR TRANSPLANTED HEART: ICD-10-CM

## 2024-08-05 DIAGNOSIS — N18.9 CHRONIC KIDNEY DISEASE, UNSPECIFIED CKD STAGE: ICD-10-CM

## 2024-08-05 DIAGNOSIS — Z79.4 TYPE 2 DIABETES MELLITUS WITH HYPOGLYCEMIA WITHOUT COMA, WITH LONG-TERM CURRENT USE OF INSULIN: ICD-10-CM

## 2024-08-05 DIAGNOSIS — E11.649 TYPE 2 DIABETES MELLITUS WITH HYPOGLYCEMIA WITHOUT COMA, WITH LONG-TERM CURRENT USE OF INSULIN: ICD-10-CM

## 2024-08-05 DIAGNOSIS — I10 PRIMARY HYPERTENSION: ICD-10-CM

## 2024-08-05 DIAGNOSIS — S06.9XAS CHRONIC BRAIN INJURY: ICD-10-CM

## 2024-08-05 DIAGNOSIS — M25.562 ACUTE PAIN OF LEFT KNEE: ICD-10-CM

## 2024-08-05 DIAGNOSIS — R07.9 CHEST PAIN, UNSPECIFIED TYPE: ICD-10-CM

## 2024-08-05 DIAGNOSIS — M79.642 LEFT HAND PAIN: ICD-10-CM

## 2024-08-05 DIAGNOSIS — Z09 HOSPITAL DISCHARGE FOLLOW-UP: Primary | ICD-10-CM
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
## 2024-08-05 DIAGNOSIS — E78.5 HYPERLIPIDEMIA LDL GOAL <100: ICD-10-CM

## 2024-08-05 DIAGNOSIS — E03.9 HYPOTHYROIDISM, UNSPECIFIED TYPE: ICD-10-CM

## 2024-08-05 DIAGNOSIS — I35.0 AORTIC STENOSIS, SEVERE: Primary | ICD-10-CM

## 2024-08-05 PROCEDURE — 99214 OFFICE O/P EST MOD 30 MIN: CPT | Performed by: NURSE PRACTITIONER

## 2024-08-05 PROCEDURE — 3078F DIAST BP <80 MM HG: CPT | Performed by: NURSE PRACTITIONER

## 2024-08-05 PROCEDURE — 3074F SYST BP LT 130 MM HG: CPT | Performed by: NURSE PRACTITIONER

## 2024-08-05 PROCEDURE — 1126F AMNT PAIN NOTED NONE PRSNT: CPT | Performed by: NURSE PRACTITIONER

## 2024-08-05 RX ORDER — LOPERAMIDE HYDROCHLORIDE 2 MG/1
1 TABLET ORAL EVERY 6 HOURS PRN
COMMUNITY
Start: 2024-07-17
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
## 2024-08-05 NOTE — PROGRESS NOTES
Chief Complaint  Hospital Follow Up Visit, Fall, and Hand Pain (left)    Subjective        HPI   Hernando presents to [facility] as a 79 year old male for follow up after being seen in the ED on 07/23/2024.      Note from that visit  HPI:  Chief Complaint: Chest pain  A complete HPI/ROS/PMH/PSH/SH/FH are unobtainable due to: N/A  Context: Hernando Lozada is a 79 y.o. male who presents to the ED c/o chest pain.  Had 1 episode 2 days ago that started after he was eating pizza, lasted several hours.  Had another episode today which was also after eating.  Some radiation to the left arm.  Currently asymptomatic.  No shortness of breath, nausea, vomiting.  Patient thinks it is food related.  Had a recent heart cath with 2 stents placed.  ED Course as of 07/23/24 1839 Tue Jul 23, 2024   1526 15:26 EDT  ED first look.  Patient presents for evaluation of chest pain.  Patient states he was recently admitted for cardiac chest pain and had intervention.  Patient states symptoms happen again today.  Currently chest pain-free.  Lab evaluation EKG chest x-ray ordered for oncoming physician [SL]   1556 Chest x-ray interpreted myself:  No infiltrate or pneumothorax [FS]   1556 EKG interpreted myself:  1528, sinus rhythm rate of 76, no acute ST segment changes or T wave inversions. [FS]   1623 HS Troponin T: 19 [FS]   1836 Discussed case with Dr. Talbert, agree that patient appears appropriate for discharge home.  Just had a recent heart cath and is currently pain-free.  Patient seems to think that the pain is related to reflux, will review his acid medications. [   DIAGNOSIS  Final diagnoses:   Chest pain, unspecified type      DISPOSITION  Discharged home      Overall he has been doing very well since discharge.  He has followed up with cardiology.  He does have an appointment with Dr. Miranda  today 1145  He did have some dizziness yesterday when standing/putting his laundry away  He did fall on  his left knee and left hand  Does have a small abrasion to his left knee  No bruising or swelling noted to left hand  states he has some pain when he makes a fist-feels like he jammed his fingers  Has improved over the last 24 hours  He is able to bear weight and walk with no problem  He declines any x-rays today  He does continue to ice     Denies any headaches no blurred vision no dizziness no flushing no chest pain or pressure in office today.  No shortness of breath    He is concerned about some tremors.  States that they are changing his medication around with his psychiatry/Dr. Carrasquillo    Type 2 diabetes  ]  Since the last visit, he has overall felt well.  he has been compliant with current meds.  he denies medication side effects.  Glucose control with medication is well controlled   The last HgbA1C result was   Lab Results   Component Value Date    HGBA1C 5.50 04/26/2024   .  The last dilated eye exam was 2023  He does check his blood sugar regularly.  Takes his medication as directed.  Fasting blood sugar this morning was 118 per patient/staff  Denies any polys.  No open ulcerations or changes in numbness and tingling      He takes all of his medication as directed    He has no other acute complaints today    \The following portions of the patient's history were reviewed and updated as appropriate: allergies, current medications, past family history, past medical history, past social history, past surgical history, and problem list  .      Review of Systems   Constitutional:  Negative for chills, fatigue and fever.   Eyes:  Negative for visual disturbance.   Respiratory:  Negative for cough, shortness of breath and wheezing.    Cardiovascular:  Negative for chest pain and leg swelling.   Gastrointestinal:  Negative for abdominal pain, diarrhea, nausea and vomiting.   Musculoskeletal:  Positive for arthralgias.   Neurological:  Positive for tremors. Negative for dizziness.   Psychiatric/Behavioral:  Negative  "for self-injury, sleep disturbance and suicidal ideas. The patient is not nervous/anxious.         Objective   Vital Signs:   Vitals:    08/05/24 0956   BP: (!) 88/58   Pulse: 88   Temp: 97.8 °F (36.6 °C)   SpO2: 97%   Weight: 99 kg (218 lb 3.2 oz)   Height: 175.3 cm (69.02\")   PainSc: 0-No pain            4/26/2024     3:07 PM   PHQ-2/PHQ-9 Depression Screening   Little Interest or Pleasure in Doing Things 0-->not at all   Feeling Down, Depressed or Hopeless 0-->not at all   PHQ-9: Brief Depression Severity Measure Score 0                 Physical Exam  Vitals reviewed.   Constitutional:       General: He is not in acute distress.  Eyes:      Conjunctiva/sclera: Conjunctivae normal.   Neck:      Thyroid: No thyromegaly.      Vascular: No carotid bruit.   Cardiovascular:      Rate and Rhythm: Normal rate and regular rhythm.      Heart sounds: Normal heart sounds. No murmur heard.  Pulmonary:      Effort: Pulmonary effort is normal. No respiratory distress.      Breath sounds: Normal breath sounds. No stridor. No wheezing, rhonchi or rales.   Chest:      Chest wall: No tenderness.   Musculoskeletal:      Right hand: Normal.      Left hand: No swelling, deformity, lacerations, tenderness or bony tenderness. Normal range of motion.        Arms:       Right knee: Normal.      Left knee: Normal. No swelling, deformity, effusion, erythema, ecchymosis, bony tenderness or crepitus. Normal range of motion. No tenderness.        Legs:       Comments: Small skin abrasion to left knee    No bruising/abrasions/tenderness/swelling to left fingers/digits/wrist/hand     Lymphadenopathy:      Cervical: No cervical adenopathy.   Neurological:      Mental Status: He is alert and oriented to person, place, and time.   Psychiatric:         Attention and Perception: Attention normal.         Mood and Affect: Mood normal.          Result Review :     The following data was reviewed by: FAISAL Castañeda on 08/05/2024:      High " Sensitivity Troponin T 2Hr (07/23/2024 17:24) normal  Lipase (07/23/2024 15:33) normal  Comprehensive Metabolic Panel (07/23/2024 15:33) glucose normal potassium 3.2 GFR 92 normal liver  CBC & Differential (07/23/2024 15:33) H&H 12.0/36.0 no significant changes  High Sensitivity Troponin T (07/23/2024 15:33) normal  XR Chest 1 View (07/23/2024 15:38)   IMPRESSION:  No focal consolidation. No pleural effusion or pneumothorax.   Normal size cardiomediastinal silhouette with postsurgical changes of  CABG.  No focal osseous abnormality.       Assessment and Plan    Assessment & Plan  Hospital discharge follow-up  [facility] emergency department 7/23/2024  Chest pain    All symptoms have resolved  Keep follow-up with cardiology    Type 2 diabetes mellitus with hypoglycemia without coma, with long-term current use of insulin  Controlled continue current management  Hypothyroidism, unspecified type  Continue levothyroxine  Due for repeat thyroid labs  Take medication in the a.m. 30 minutes separate from any other medication  Do not miss doses    Coronary artery disease without angina pectoris, unspecified vessel or lesion type, unspecified whether native or transplanted heart  Keep follow-up with cardiology  Essential hypertension  Discussed blood pressure/dizziness with cardiology has an appointment today at 1145  Fall yesterday  BP in office 88/58  No acute symptoms /no dizziness in office  Hyperlipidemia LDL goal <100   Continue to work on lower cholesterol diet and exercise.  Goal is greater than 150 minutes moderate intensity weekly     Chronic kidney disease, unspecified CKD stage  Stable continue current management  Chronic brain injury  Stable continue current management  Chest pain, unspecified type  No acute chest pain in office today keep follow-up with cardiology  Fall, initial encounter  Fall 2 days ago  Left hand left knee pain  Declines x-rays  Follow-up with any worsening or no  improvement  Left hand pain  RICE  Follow-up with any worsening or no improvement  Acute pain of left knee  Small abrasion to left knee  Follow-up with any worsening or no improvement  RICE             Follow Up   Return in about 3 months (around 11/5/2024) for Medicare Wellness, Annual physical.  Patient was given instructions and counseling regarding his condition or for health maintenance advice. Please see specific information pulled into the AVS if appropriate.

## 2024-08-05 NOTE — ASSESSMENT & PLAN NOTE
Discussed blood pressure/dizziness with cardiology has an appointment today at 1145  Fall yesterday  BP in office 88/58  No acute symptoms /no dizziness in office

## 2024-08-05 NOTE — ASSESSMENT & PLAN NOTE
Continue levothyroxine  Due for repeat thyroid labs  Take medication in the a.m. 30 minutes separate from any other medication  Do not miss doses     home management/self-care

## 2024-08-08 ENCOUNTER — HOSPITAL ENCOUNTER (OUTPATIENT)
Facility: HOSPITAL | Age: 79
Setting detail: OBSERVATION
Discharge: HOME | End: 2024-08-09
Attending: STUDENT IN AN ORGANIZED HEALTH CARE EDUCATION/TRAINING PROGRAM | Admitting: INTERNAL MEDICINE
Payer: MEDICARE

## 2024-08-08 ENCOUNTER — APPOINTMENT (OUTPATIENT)
Dept: GENERAL RADIOLOGY | Facility: HOSPITAL | Age: 79
End: 2024-08-08
Payer: MEDICARE

## 2024-08-08 ENCOUNTER — APPOINTMENT (OUTPATIENT)
Dept: CT IMAGING | Facility: HOSPITAL | Age: 79
End: 2024-08-08
Payer: MEDICARE

## 2024-08-08 DIAGNOSIS — M25.562 ACUTE PAIN OF LEFT KNEE: ICD-10-CM

## 2024-08-08 DIAGNOSIS — E87.6 HYPOKALEMIA: ICD-10-CM

## 2024-08-08 DIAGNOSIS — Z79.01 ANTICOAGULATED: ICD-10-CM

## 2024-08-08 DIAGNOSIS — R29.6 MULTIPLE FALLS: Primary | ICD-10-CM

## 2024-08-08 DIAGNOSIS — S09.90XA CLOSED HEAD INJURY, INITIAL ENCOUNTER: ICD-10-CM

## 2024-08-08 DIAGNOSIS — M79.642 LEFT HAND PAIN: ICD-10-CM

## 2024-08-08 LAB
ALBUMIN SERPL-MCNC: 3.8 G/DL (ref 3.5–5.2)
ALBUMIN/GLOB SERPL: 1.8 G/DL
ALP SERPL-CCNC: 53 U/L (ref 39–117)
ALT SERPL W P-5'-P-CCNC: 19 U/L (ref 1–41)
ANION GAP SERPL CALCULATED.3IONS-SCNC: 14.6 MMOL/L (ref 5–15)
APTT PPP: 28.6 SECONDS (ref 22.7–35.4)
AST SERPL-CCNC: 28 U/L (ref 1–40)
BASOPHILS # BLD AUTO: 0.02 10*3/MM3 (ref 0–0.2)
BASOPHILS NFR BLD AUTO: 0.4 % (ref 0–1.5)
BILIRUB SERPL-MCNC: 0.8 MG/DL (ref 0–1.2)
BUN SERPL-MCNC: 12 MG/DL (ref 8–23)
BUN/CREAT SERPL: 13.8 (ref 7–25)
CALCIUM SPEC-SCNC: 8.5 MG/DL (ref 8.6–10.5)
CHLORIDE SERPL-SCNC: 98 MMOL/L (ref 98–107)
CO2 SERPL-SCNC: 27.4 MMOL/L (ref 22–29)
CREAT SERPL-MCNC: 0.87 MG/DL (ref 0.76–1.27)
DEPRECATED RDW RBC AUTO: 44.6 FL (ref 37–54)
EGFRCR SERPLBLD CKD-EPI 2021: 88.9 ML/MIN/1.73
EOSINOPHIL # BLD AUTO: 0.03 10*3/MM3 (ref 0–0.4)
EOSINOPHIL NFR BLD AUTO: 0.5 % (ref 0.3–6.2)
ERYTHROCYTE [DISTWIDTH] IN BLOOD BY AUTOMATED COUNT: 13 % (ref 12.3–15.4)
GEN 5 2HR TROPONIN T REFLEX: 28 NG/L
GLOBULIN UR ELPH-MCNC: 2.1 GM/DL
GLUCOSE SERPL-MCNC: 97 MG/DL (ref 65–99)
HCT VFR BLD AUTO: 33.5 % (ref 37.5–51)
HGB BLD-MCNC: 11.3 G/DL (ref 13–17.7)
IMM GRANULOCYTES # BLD AUTO: 0.03 10*3/MM3 (ref 0–0.05)
IMM GRANULOCYTES NFR BLD AUTO: 0.5 % (ref 0–0.5)
INR PPP: 1.17 (ref 0.9–1.1)
LYMPHOCYTES # BLD AUTO: 1.44 10*3/MM3 (ref 0.7–3.1)
LYMPHOCYTES NFR BLD AUTO: 25.8 % (ref 19.6–45.3)
MAGNESIUM SERPL-MCNC: 1.5 MG/DL (ref 1.6–2.4)
MCH RBC QN AUTO: 31.7 PG (ref 26.6–33)
MCHC RBC AUTO-ENTMCNC: 33.7 G/DL (ref 31.5–35.7)
MCV RBC AUTO: 94.1 FL (ref 79–97)
MONOCYTES # BLD AUTO: 0.58 10*3/MM3 (ref 0.1–0.9)
MONOCYTES NFR BLD AUTO: 10.4 % (ref 5–12)
NEUTROPHILS NFR BLD AUTO: 3.48 10*3/MM3 (ref 1.7–7)
NEUTROPHILS NFR BLD AUTO: 62.4 % (ref 42.7–76)
NRBC BLD AUTO-RTO: 0 /100 WBC (ref 0–0.2)
PHOSPHATE SERPL-MCNC: 3.2 MG/DL (ref 2.5–4.5)
PLATELET # BLD AUTO: 173 10*3/MM3 (ref 140–450)
PMV BLD AUTO: 9.5 FL (ref 6–12)
POTASSIUM SERPL-SCNC: 2.9 MMOL/L (ref 3.5–5.2)
PROT SERPL-MCNC: 5.9 G/DL (ref 6–8.5)
PROTHROMBIN TIME: 15.1 SECONDS (ref 11.7–14.2)
RBC # BLD AUTO: 3.56 10*6/MM3 (ref 4.14–5.8)
SODIUM SERPL-SCNC: 140 MMOL/L (ref 136–145)
TROPONIN T DELTA: 2 NG/L
TROPONIN T SERPL HS-MCNC: 26 NG/L
TROPONIN T SERPL HS-MCNC: 28 NG/L
VALPROATE SERPL-MCNC: 71 MCG/ML (ref 50–125)
WBC NRBC COR # BLD AUTO: 5.58 10*3/MM3 (ref 3.4–10.8)

## 2024-08-08 PROCEDURE — 80164 ASSAY DIPROPYLACETIC ACD TOT: CPT | Performed by: STUDENT IN AN ORGANIZED HEALTH CARE EDUCATION/TRAINING PROGRAM

## 2024-08-08 PROCEDURE — 83735 ASSAY OF MAGNESIUM: CPT | Performed by: STUDENT IN AN ORGANIZED HEALTH CARE EDUCATION/TRAINING PROGRAM

## 2024-08-08 PROCEDURE — 84100 ASSAY OF PHOSPHORUS: CPT | Performed by: INTERNAL MEDICINE

## 2024-08-08 PROCEDURE — G0378 HOSPITAL OBSERVATION PER HR: HCPCS

## 2024-08-08 PROCEDURE — 99285 EMERGENCY DEPT VISIT HI MDM: CPT

## 2024-08-08 PROCEDURE — 96366 THER/PROPH/DIAG IV INF ADDON: CPT

## 2024-08-08 PROCEDURE — 85025 COMPLETE CBC W/AUTO DIFF WBC: CPT | Performed by: PHYSICIAN ASSISTANT

## 2024-08-08 PROCEDURE — 72125 CT NECK SPINE W/O DYE: CPT

## 2024-08-08 PROCEDURE — 36415 COLL VENOUS BLD VENIPUNCTURE: CPT | Performed by: INTERNAL MEDICINE

## 2024-08-08 PROCEDURE — 70450 CT HEAD/BRAIN W/O DYE: CPT

## 2024-08-08 PROCEDURE — 73130 X-RAY EXAM OF HAND: CPT

## 2024-08-08 PROCEDURE — 85610 PROTHROMBIN TIME: CPT | Performed by: PHYSICIAN ASSISTANT

## 2024-08-08 PROCEDURE — 94640 AIRWAY INHALATION TREATMENT: CPT

## 2024-08-08 PROCEDURE — 85730 THROMBOPLASTIN TIME PARTIAL: CPT | Performed by: PHYSICIAN ASSISTANT

## 2024-08-08 PROCEDURE — 73560 X-RAY EXAM OF KNEE 1 OR 2: CPT

## 2024-08-08 PROCEDURE — 84484 ASSAY OF TROPONIN QUANT: CPT | Performed by: INTERNAL MEDICINE

## 2024-08-08 PROCEDURE — 25010000002 MAGNESIUM SULFATE 2 GM/50ML SOLUTION: Performed by: INTERNAL MEDICINE

## 2024-08-08 PROCEDURE — 96365 THER/PROPH/DIAG IV INF INIT: CPT

## 2024-08-08 PROCEDURE — 80053 COMPREHEN METABOLIC PANEL: CPT | Performed by: PHYSICIAN ASSISTANT

## 2024-08-08 PROCEDURE — 94799 UNLISTED PULMONARY SVC/PX: CPT

## 2024-08-08 RX ORDER — PROPRANOLOL HYDROCHLORIDE 40 MG/1
40 TABLET ORAL 2 TIMES DAILY
Status: DISCONTINUED | OUTPATIENT
Start: 2024-08-08 | End: 2024-08-08

## 2024-08-08 RX ORDER — BUDESONIDE AND FORMOTEROL FUMARATE DIHYDRATE 160; 4.5 UG/1; UG/1
2 AEROSOL RESPIRATORY (INHALATION)
Status: DISCONTINUED | OUTPATIENT
Start: 2024-08-08 | End: 2024-08-09 | Stop reason: HOSPADM

## 2024-08-08 RX ORDER — FUROSEMIDE 20 MG/1
20 TABLET ORAL 3 TIMES DAILY
Status: DISCONTINUED | OUTPATIENT
Start: 2024-08-08 | End: 2024-08-09 | Stop reason: HOSPADM

## 2024-08-08 RX ORDER — OXYBUTYNIN CHLORIDE 5 MG/1
5 TABLET, EXTENDED RELEASE ORAL DAILY
Status: DISCONTINUED | OUTPATIENT
Start: 2024-08-09 | End: 2024-08-09 | Stop reason: HOSPADM

## 2024-08-08 RX ORDER — ISOSORBIDE MONONITRATE 30 MG/1
30 TABLET, EXTENDED RELEASE ORAL DAILY
Status: DISCONTINUED | OUTPATIENT
Start: 2024-08-09 | End: 2024-08-09 | Stop reason: HOSPADM

## 2024-08-08 RX ORDER — ROPINIROLE 0.5 MG/1
0.25 TABLET, FILM COATED ORAL 3 TIMES DAILY
Status: DISCONTINUED | OUTPATIENT
Start: 2024-08-08 | End: 2024-08-09 | Stop reason: HOSPADM

## 2024-08-08 RX ORDER — UREA 10 %
1000 LOTION (ML) TOPICAL DAILY
Status: DISCONTINUED | OUTPATIENT
Start: 2024-08-09 | End: 2024-08-09 | Stop reason: HOSPADM

## 2024-08-08 RX ORDER — FERROUS SULFATE 325(65) MG
325 TABLET ORAL
Status: DISCONTINUED | OUTPATIENT
Start: 2024-08-09 | End: 2024-08-09 | Stop reason: HOSPADM

## 2024-08-08 RX ORDER — PROPRANOLOL HYDROCHLORIDE 20 MG/1
20 TABLET ORAL 2 TIMES DAILY
Status: DISCONTINUED | OUTPATIENT
Start: 2024-08-08 | End: 2024-08-09 | Stop reason: HOSPADM

## 2024-08-08 RX ORDER — POTASSIUM CHLORIDE 1.5 G/1.58G
40 POWDER, FOR SOLUTION ORAL EVERY 4 HOURS
Status: COMPLETED | OUTPATIENT
Start: 2024-08-08 | End: 2024-08-09

## 2024-08-08 RX ORDER — DIVALPROEX SODIUM 500 MG/1
500 TABLET, DELAYED RELEASE ORAL 2 TIMES DAILY
Status: DISCONTINUED | OUTPATIENT
Start: 2024-08-08 | End: 2024-08-09 | Stop reason: HOSPADM

## 2024-08-08 RX ORDER — MAGNESIUM SULFATE HEPTAHYDRATE 40 MG/ML
2 INJECTION, SOLUTION INTRAVENOUS
Status: COMPLETED | OUTPATIENT
Start: 2024-08-08 | End: 2024-08-09

## 2024-08-08 RX ORDER — FOLIC ACID 1 MG/1
1 TABLET ORAL DAILY
Status: DISCONTINUED | OUTPATIENT
Start: 2024-08-09 | End: 2024-08-09 | Stop reason: HOSPADM

## 2024-08-08 RX ORDER — TAMSULOSIN HYDROCHLORIDE 0.4 MG/1
0.8 CAPSULE ORAL DAILY
Status: DISCONTINUED | OUTPATIENT
Start: 2024-08-09 | End: 2024-08-09 | Stop reason: HOSPADM

## 2024-08-08 RX ORDER — ASPIRIN 81 MG/1
81 TABLET, CHEWABLE ORAL DAILY
Status: DISCONTINUED | OUTPATIENT
Start: 2024-08-09 | End: 2024-08-09 | Stop reason: HOSPADM

## 2024-08-08 RX ORDER — ATORVASTATIN CALCIUM 20 MG/1
40 TABLET, FILM COATED ORAL NIGHTLY
Status: DISCONTINUED | OUTPATIENT
Start: 2024-08-08 | End: 2024-08-09 | Stop reason: HOSPADM

## 2024-08-08 RX ORDER — LISINOPRIL 5 MG/1
5 TABLET ORAL DAILY
Status: DISCONTINUED | OUTPATIENT
Start: 2024-08-09 | End: 2024-08-09 | Stop reason: HOSPADM

## 2024-08-08 RX ORDER — DOCUSATE SODIUM 100 MG/1
100 CAPSULE, LIQUID FILLED ORAL DAILY
Status: DISCONTINUED | OUTPATIENT
Start: 2024-08-09 | End: 2024-08-09 | Stop reason: HOSPADM

## 2024-08-08 RX ORDER — PANTOPRAZOLE SODIUM 40 MG/1
40 TABLET, DELAYED RELEASE ORAL
Status: DISCONTINUED | OUTPATIENT
Start: 2024-08-09 | End: 2024-08-09 | Stop reason: HOSPADM

## 2024-08-08 RX ADMIN — TICAGRELOR 90 MG: 90 TABLET ORAL at 23:33

## 2024-08-08 RX ADMIN — MAGNESIUM SULFATE HEPTAHYDRATE 2 G: 40 INJECTION, SOLUTION INTRAVENOUS at 18:19

## 2024-08-08 RX ADMIN — MAGNESIUM SULFATE HEPTAHYDRATE 2 G: 40 INJECTION, SOLUTION INTRAVENOUS at 16:26

## 2024-08-08 RX ADMIN — BUDESONIDE AND FORMOTEROL FUMARATE DIHYDRATE 2 PUFF: 160; 4.5 AEROSOL RESPIRATORY (INHALATION) at 23:06

## 2024-08-08 RX ADMIN — DIVALPROEX SODIUM 500 MG: 500 TABLET, DELAYED RELEASE ORAL at 23:48

## 2024-08-08 RX ADMIN — POTASSIUM CHLORIDE 40 MEQ: 1.5 POWDER, FOR SOLUTION ORAL at 22:14

## 2024-08-08 RX ADMIN — ROPINIROLE HYDROCHLORIDE 0.25 MG: 0.5 TABLET, FILM COATED ORAL at 23:32

## 2024-08-08 RX ADMIN — PROPRANOLOL HYDROCHLORIDE 20 MG: 20 TABLET ORAL at 23:33

## 2024-08-08 RX ADMIN — MAGNESIUM SULFATE HEPTAHYDRATE 2 G: 40 INJECTION, SOLUTION INTRAVENOUS at 22:14

## 2024-08-08 RX ADMIN — ATORVASTATIN CALCIUM 40 MG: 20 TABLET, FILM COATED ORAL at 23:32

## 2024-08-08 RX ADMIN — POTASSIUM CHLORIDE 40 MEQ: 1.5 POWDER, FOR SOLUTION ORAL at 16:07

## 2024-08-08 NOTE — ED NOTES
"Nursing report ED to floor  Hernando Lozada  79 y.o.  male    HPI :  HPI (Adult)  Stated Reason for Visit: fall  History Obtained From: patient    Chief Complaint  Chief Complaint   Patient presents with    Fall       Admitting doctor:   Sally Puente MD    Admitting diagnosis:   The primary encounter diagnosis was Multiple falls. Diagnoses of Closed head injury, initial encounter, Left hand pain, Acute pain of left knee, Anticoagulated, and Hypokalemia were also pertinent to this visit.    Code status:   Current Code Status       Date Active Code Status Order ID Comments User Context       Prior            Allergies:   Patient has no known allergies.    Isolation:   No active isolations    Intake and Output  No intake or output data in the 24 hours ending 08/08/24 1654    Weight:       08/08/24  1306   Weight: 97.5 kg (215 lb)       Most recent vitals:   Vitals:    08/08/24 1256 08/08/24 1306 08/08/24 1436 08/08/24 1537   BP: 125/80  125/80 112/72   Pulse: 116  93 68   Resp: 16      Temp:       TempSrc:       SpO2: 98%   95%   Weight:  97.5 kg (215 lb)     Height:  175.3 cm (69\")         Active LDAs/IV Access:   Lines, Drains & Airways       Active LDAs       Name Placement date Placement time Site Days    Peripheral IV 08/08/24 1515 Left;Anterior Forearm 08/08/24  1515  Forearm  less than 1                    Labs (abnormal labs have a star):   Labs Reviewed   COMPREHENSIVE METABOLIC PANEL - Abnormal; Notable for the following components:       Result Value    Potassium 2.9 (*)     Calcium 8.5 (*)     Total Protein 5.9 (*)     All other components within normal limits    Narrative:     GFR Normal >60  Chronic Kidney Disease <60  Kidney Failure <15    The GFR formula is only valid for adults with stable renal function between ages 18 and 70.   PROTIME-INR - Abnormal; Notable for the following components:    Protime 15.1 (*)     INR 1.17 (*)     All other components within normal limits   CBC WITH AUTO " DIFFERENTIAL - Abnormal; Notable for the following components:    RBC 3.56 (*)     Hemoglobin 11.3 (*)     Hematocrit 33.5 (*)     All other components within normal limits   MAGNESIUM - Abnormal; Notable for the following components:    Magnesium 1.5 (*)     All other components within normal limits   APTT - Normal   VALPROIC ACID LEVEL, TOTAL - Normal    Narrative:     Therapeutic Ranges for Valproic Acid    Epilepsy:       mcg/ml  Bipolar/Tatiana  up to 125 mcg/ml     PHOSPHORUS - Normal   URINALYSIS W/ MICROSCOPIC IF INDICATED (NO CULTURE)   CBC AND DIFFERENTIAL    Narrative:     The following orders were created for panel order CBC & Differential.  Procedure                               Abnormality         Status                     ---------                               -----------         ------                     CBC Auto Differential[[id number]]        Abnormal            Final result                 Please view results for these tests on the individual orders.       EKG:   No orders to display       Meds given in ED:   Medications   potassium chloride (KLOR-CON) packet 40 mEq (40 mEq Oral Given 8/8/24 1607)   Potassium Replacement - Follow Nurse / BPA Driven Protocol (has no administration in time range)   Magnesium Standard Dose Replacement - Follow Nurse / BPA Driven Protocol (has no administration in time range)   Phosphorus Replacement - Follow Nurse / BPA Driven Protocol (has no administration in time range)   Calcium Replacement - Follow Nurse / BPA Driven Protocol (has no administration in time range)   magnesium sulfate 2g/50 mL (PREMIX) infusion (2 g Intravenous New Bag 8/8/24 1626)       Imaging results:  CT Head Without Contrast    Result Date: 8/8/2024   No evidence for acute traumatic intracranial pathology.  Incidental note is made of mild changes of chronic small vessel ischemic phenomena and a small chronic infarct within the right cerebellar hemisphere within the right PICA  distribution.  Mild changes of inflammatory paranasal sinus disease are also incidentally appreciated.   TECHNIQUE: CT scan of the cervical spine was obtained with 1 mm axial bone algorithm and 2 mm axial soft tissue algorithm images. Sagittal and coronal reconstructed images were obtained.  FINDINGS:  There is no evidence for acute fracture or bony malalignment involving the cervical spine.  A disc osteophyte complex at C6-7 results in a mild degree of canal narrowing. Foraminal stenotic changes are identified at the C2-3, C3-4, and C6-7 levels.  IMPRESSION:  No evidence for acute fracture or bony malalignment involving the cervical spine.  Incidental degenerative phenomena as discussed above.    Radiation dose reduction techniques were utilized, including automated exposure control and exposure modulation based on body size.  This report was finalized on 8/8/2024 3:12 PM by Dr. Humberto Ramirez M.D on Workstation: [id number]      CT Cervical Spine Without Contrast    Result Date: 8/8/2024   No evidence for acute traumatic intracranial pathology.  Incidental note is made of mild changes of chronic small vessel ischemic phenomena and a small chronic infarct within the right cerebellar hemisphere within the right PICA distribution.  Mild changes of inflammatory paranasal sinus disease are also incidentally appreciated.   TECHNIQUE: CT scan of the cervical spine was obtained with 1 mm axial bone algorithm and 2 mm axial soft tissue algorithm images. Sagittal and coronal reconstructed images were obtained.  FINDINGS:  There is no evidence for acute fracture or bony malalignment involving the cervical spine.  A disc osteophyte complex at C6-7 results in a mild degree of canal narrowing. Foraminal stenotic changes are identified at the C2-3, C3-4, and C6-7 levels.  IMPRESSION:  No evidence for acute fracture or bony malalignment involving the cervical spine.  Incidental degenerative phenomena as discussed above.     Radiation dose reduction techniques were utilized, including automated exposure control and exposure modulation based on body size.  This report was finalized on 8/8/2024 3:12 PM by Dr. Humberto Ramirez M.D on Workstation: [id number]      XR Knee 1 or 2 View Left    Result Date: 8/8/2024   As described.    This report was finalized on 8/8/2024 1:45 PM by Dr. Chava Flores M.D on Workstation: [id number]       Ambulatory status:   - assist x1    Social issues:   Social History     Socioeconomic History    Marital status: Single   Tobacco Use    Smoking status: Never     Passive exposure: Never    Smokeless tobacco: Never   Vaping Use    Vaping status: Never Used   Substance and Sexual Activity    Alcohol use: No    Drug use: No    Sexual activity: Defer       Peripheral Neurovascular  Peripheral Neurovascular (Adult)  Peripheral Neurovascular WDL: WDL, pulse assessment  Pulse Assessment: posterior tibial    Neuro Cognitive  Neuro Cognitive (Adult)  Cognitive/Neuro/Behavioral WDL: WDL, level of consciousness, orientation  Level of Consciousness: Alert  Orientation: oriented x 4    Learning  Learning Assessment (Adult)  Learning Readiness and Ability: no barriers identified  Education Provided  Person Taught: patient  Teaching Method: verbal instruction  Teaching Focus: symptom/problem overview  Education Outcome Evaluation: eager to learn, acceptance expressed, verbalizes understanding    Respiratory  Respiratory WDL  Respiratory WDL: WDL    Abdominal Pain       Pain Assessments  Pain (Adult)  (0-10) Pain Rating: Rest: 5  Pain Location: hand  Pain Side/Orientation: left  Pain Onset/Duration: started last night when he fell  Pain Description: intermittent, throbbing  Pain Assessment Additional Detail  Pain Onset/Duration: started last night when he fell    NIH Stroke Scale       Lashawn Momin RN  08/08/24 16:54 EDT

## 2024-08-08 NOTE — ED PROVIDER NOTES
EMERGENCY DEPARTMENT ENCOUNTER  Room Number:  35/35  PCP: Sammie Gambino APRN  Independent Historians: Patient      HPI:  Chief Complaint: had concerns including Fall.     A complete HPI/ROS/PMH/PSH/SH/FH are unobtainable due to: None    Chronic or social conditions impacting patient care (Social Determinants of Health): None      Context: Hernando Lozada is a 79 y.o. male with a medical history of hypertension, hyperlipidemia, diabetes, CKD, GIOVANNY, DVT, and stroke who presents to the ED c/o acute fall.  Patient reports he recently got a new walker and has had several falls over the last couple days.  He did hit his head but denies LOC.  He currently complains of left finger pain and left knee pain.  He was brought in for evaluation due to his multiple falls.  Patient is anticoagulated on Eliquis.  Patient denies vision loss, vomiting, chest pain, dyspnea, abdominal pain, or any other systemic complaints.      Review of prior external notes (non-ED) -and- Review of prior external test results outside of this encounter:  Patient seen in office on 8/5/2024 for hospital discharge follow-up.  Reviewed assessment and plan.  Patient to continue current medications, will follow-up with cardiology, will follow-up in 3 months.  Reviewed labs collected on 7/23/2024.  CBC with hemoglobin 12.0, CMP with creatinine 0.75.    Prescription drug monitoring program review:     N/A    PAST MEDICAL HISTORY  Active Ambulatory Problems     Diagnosis Date Noted   • DJD (degenerative joint disease) of knee 12/14/2017   • Essential hypertension 02/18/2019   • SOB (shortness of breath) 02/18/2019   • Leg swelling 02/18/2019   • Hyperlipidemia LDL goal <100 08/23/2019   • COVID-19 11/25/2022   • Diabetes mellitus    • GIOVANNY (obstructive sleep apnea)    • Disease of thyroid gland    • CKD (chronic kidney disease)    • Hypomagnesemia    • Chronic brain injury    • Generalized weakness    • CAD (coronary artery disease)    • Pedal edema 03/06/2023    • Tremor 03/06/2023   • Elevated troponin 03/06/2023   • Lower extremity cellulitis 03/06/2023   • Tinea cruris 03/06/2023   • Chronic deep vein thrombosis (DVT) of calf muscle vein of left lower extremity 03/07/2023   • Aortic stenosis, severe 03/06/2023   • Acute urinary tract infection 04/17/2023   • Hypothyroidism 04/17/2023   • Acute urinary retention 04/19/2023   • Acute bacterial prostatitis 04/19/2023   • Chest pain 06/25/2024     Resolved Ambulatory Problems     Diagnosis Date Noted   • Chest pain with high risk of acute coronary syndrome 04/29/2019   • Chest pain 01/02/2024   • Chest pain 01/04/2024     Past Medical History:   Diagnosis Date   • Arthritis    • Bilateral leg edema    • GERD (gastroesophageal reflux disease)    • Heart murmur    • History of head injury    • [place] (hard of hearing)    • Hyperlipidemia    • Hypertension    • Irregular heart beat    • Osteoarthritis    • Past heart attack    • SOB (shortness of breath) on exertion    • Stroke    • Vasovagal episode          PAST SURGICAL HISTORY  Past Surgical History:   Procedure Laterality Date   • CARDIAC CATHETERIZATION N/A 4/30/2019    Procedure: Coronary angiography with grafts;  Surgeon: Rio Miranda MD;  Location: [facility] CATH INVASIVE LOCATION;  Service: Cardiology   • CARDIAC CATHETERIZATION N/A 4/30/2019    Procedure: Left Heart Cath;  Surgeon: Rio Miranda MD;  Location: [facility] CATH INVASIVE LOCATION;  Service: Cardiology   • CARDIAC CATHETERIZATION N/A 4/30/2019    Procedure: Left ventriculography;  Surgeon: Rio Miranda MD;  Location: [facility] CATH INVASIVE LOCATION;  Service: Cardiology   • CARDIAC CATHETERIZATION  4/30/2019    Procedure: Saphenous Vein Graft;  Surgeon: Rio Miranda MD;  Location: [facility] CATH INVASIVE LOCATION;  Service: Cardiology   • CARDIAC CATHETERIZATION N/A 4/30/2019    Procedure: Stent GLENDY coronary;  Surgeon: Rio Miranda MD;  Location: [facility] CATH INVASIVE  LOCATION;  Service: Cardiology   • CARDIAC CATHETERIZATION N/A 3/10/2023    Procedure: Right and Left Heart Cath;  Surgeon: Rio Miranda MD;  [facility];  Service: Cardiology;  Laterality: N/A;   • CARDIAC CATHETERIZATION N/A 3/10/2023    Procedure: Coronary angiography;  Surgeon: Rio Miranda MD;  [facility];  Service: Cardiology;  Laterality: N/A;   • CARDIAC CATHETERIZATION N/A 3/10/2023    Procedure: Left ventriculography;  Surgeon: Rio Miranda MD;  [facility];  Service: Cardiology;  Laterality: N/A;   • CARDIAC CATHETERIZATION N/A 3/10/2023    Procedure: Percutaneous Coronary Intervention;  Surgeon: Rio Miranda MD;  [facility];  Service: Cardiology;  Laterality: N/A;   • CARDIAC CATHETERIZATION N/A 3/10/2023    Procedure: Stent GLENDY coronary;  Surgeon: Rio Miranda MD;  [facility];  Service: Cardiology;  Laterality: N/A;   • CARDIAC CATHETERIZATION N/A 1/3/2024    Procedure: Left Heart Cath;  Surgeon: Rio Miranda MD;  [facility];  Service: Cardiovascular;  Laterality: N/A;   • CARDIAC CATHETERIZATION N/A 1/3/2024    Procedure: Coronary angiography;  Surgeon: Rio Miranda MD;  Location: [facility] CATH INVASIVE LOCATION;  Service: Cardiovascular;  Laterality: N/A;   • CARDIAC CATHETERIZATION N/A 1/3/2024    Procedure: Percutaneous Coronary Intervention;  Surgeon: Rio Miranda MD;  [facility];  Service: Cardiovascular;  Laterality: N/A;   • CARDIAC CATHETERIZATION N/A 1/3/2024    Procedure: Left ventriculography;  Surgeon: Rio Miranda MD;  [facility];  Service: Cardiovascular;  Laterality: N/A;   • CARDIAC CATHETERIZATION Left 1/3/2024    Procedure: Native mammary injection;  Surgeon: Ruth  MD Rio;  Location: [facility] CATH INVASIVE LOCATION;  Service: Cardiovascular;  Laterality: Left;   • CARDIAC CATHETERIZATION N/A 6/26/2024    Procedure: Right and Left Heart Cath;  Surgeon: Rio Miranda MD;  [facility];  Service: Cardiovascular;  Laterality: N/A;   • CARDIAC CATHETERIZATION N/A 6/26/2024    Procedure: Percutaneous Coronary Intervention;  Surgeon: Rio Miranda MD;  Location: [facility] CATH INVASIVE LOCATION;  Service: Cardiovascular;  Laterality: N/A;   • CARDIAC CATHETERIZATION N/A 6/26/2024    Procedure: Left ventriculography;  Surgeon: Rio Miranda MD;  [facility];  Service: Cardiovascular;  Laterality: N/A;   • CARDIAC CATHETERIZATION N/A 6/26/2024    Procedure: Coronary angiography;  Surgeon: Rio Miranda MD;  Location: [facility] CATH INVASIVE LOCATION;  Service: Cardiovascular;  Laterality: N/A;   • CARDIAC CATHETERIZATION  6/26/2024    Procedure: Saphenous Vein Graft;  Surgeon: Rio Miranda MD;  Location: [facility] CATH INVASIVE LOCATION;  Service: Cardiovascular;;   • CARDIAC CATHETERIZATION N/A 6/26/2024    Procedure: Stent GLENDY coronary;  Surgeon: Rio Miranda MD;  Location: [facility] CATH INVASIVE LOCATION;  Service: Cardiovascular;  Laterality: N/A;   • CARPAL TUNNEL RELEASE Bilateral    • CATARACT EXTRACTION     • CORONARY ARTERY BYPASS GRAFT  2002   • FINGER SURGERY      MISSING LEFT POINTER FINGER TIP   • INTERVENTIONAL RADIOLOGY PROCEDURE N/A 6/26/2024    Procedure: Intravascular Ultrasound;  Surgeon: Rio Miranda MD;  Location: [facility] CATH INVASIVE LOCATION;  Service: Cardiovascular;  Laterality: N/A;   • KNEE ARTHROPLASTY Right 02/15/2017   • AK ARTHRP KNE CONDYLE&PLATU MEDIAL&LAT COMPARTMENTS Left 12/14/2017    Procedure: LT TOTAL KNEE ARTHROPLASTY;  Surgeon: Jatin Lancaster MD;  Location: [facility] OR;  Service: Orthopedics   • AK RT/LT HEART CATHETERS N/A  4/30/2019    Procedure: Percutaneous Coronary Intervention;  Surgeon: Rio Miranda MD;  Location: [facility] INVASIVE LOCATION;  Service: Cardiology         FAMILY HISTORY  Family History   Problem Relation Age of Onset   • Parkinsonism Sister    • Diabetes Brother    • Malig Hyperthermia Neg Hx          SOCIAL HISTORY  Social History     Socioeconomic History   • Marital status: Single   Tobacco Use   • Smoking status: Never     Passive exposure: Never   • Smokeless tobacco: Never   Vaping Use   • Vaping status: Never Used   Substance and Sexual Activity   • Alcohol use: No   • Drug use: No   • Sexual activity: Defer         ALLERGIES  Patient has no known allergies.      REVIEW OF SYSTEMS  Review of Systems   Constitutional:  Negative for chills and fever.   HENT:  Negative for ear pain and sore throat.    Respiratory:  Negative for cough and shortness of breath.    Cardiovascular:  Negative for chest pain and palpitations.   Gastrointestinal:  Negative for abdominal pain and vomiting.   Genitourinary:  Negative for dysuria and hematuria.   Musculoskeletal:  Positive for arthralgias. Negative for joint swelling.   Skin:  Negative for pallor and rash.   Neurological:  Negative for syncope and headaches.   Psychiatric/Behavioral:  Negative for confusion and hallucinations.      Included in HPI  All systems reviewed and negative except for those discussed in HPI.      PHYSICAL EXAM    I have reviewed the triage vital signs and nursing notes.    ED Triage Vitals   Temp Heart Rate Resp BP SpO2   08/08/24 1241 08/08/24 1241 08/08/24 1241 08/08/24 1256 08/08/24 1241   97.6 °F (36.4 °C) 96 16 125/80 99 %      Temp src Heart Rate Source Patient Position BP Location FiO2 (%)   08/08/24 1241 08/08/24 1241 -- -- --   Tympanic Monitor          Physical Exam  Constitutional:       General: He is not in acute distress.     Appearance: Normal appearance.   HENT:      Head: Normocephalic and atraumatic.      Nose: Nose  normal.      Mouth/Throat:      Mouth: Mucous membranes are moist.   Eyes:      Conjunctiva/sclera: Conjunctivae normal.      Pupils: Pupils are equal, round, and reactive to light.   Cardiovascular:      Rate and Rhythm: Normal rate and regular rhythm.      Pulses: Normal pulses.      Heart sounds: Normal heart sounds.      Comments: Distal pulses intact  Pulmonary:      Effort: Pulmonary effort is normal.      Breath sounds: Normal breath sounds.   Abdominal:      General: There is no distension.   Musculoskeletal:         General: Normal range of motion.      Cervical back: Normal range of motion and neck supple.      Comments: Patient has some mild tenderness over the left fingers and the left knee.   Skin:     General: Skin is warm.      Capillary Refill: Capillary refill takes less than 2 seconds.      Comments: Scattered contusions over bilateral forearms   Neurological:      General: No focal deficit present.      Mental Status: He is alert and oriented to person, place, and time.   Psychiatric:         Mood and Affect: Mood normal.             RADIOLOGY  CT Head Without Contrast, CT Cervical Spine Without Contrast    Result Date: 8/8/2024  CT HEAD AND CERVICAL SPINE WITHOUT CONTRAST  CLINICAL HISTORY: Multiple falls, head injury, on Eliquis. Headache and neck pain.  TECHNIQUE: CT scan of the head was obtained with 3 mm axial soft tissue and 2 mm bone algorithm images. No intravenous contrast was administered. Sagittal and coronal reconstructions were obtained.  COMPARISON: CT head dated 02/06/2024  FINDINGS:   There is no evidence for an acute extra-axial hemorrhage or a calvarial fracture.  Mild changes of chronic small vessel ischemic phenomena are noted. The ventricles, sulci, and cisterns are age-appropriate. The gray-white matter differentiation is within normal limits. The basal ganglia and thalami are unremarkable in appearance. Incidental note is made of a chronic infarct within the right cerebellar  hemisphere within the right PICA distribution measuring up to 12 x 5 mm in greatest axial dimensions. Atherosclerotic vascular calcifications are incidentally appreciated.  Incidental note is made of partial opacification of the left maxillary sinus with mucous retention cyst and mucosal thickening. Mild mucosal thickening is noted within the ethmoid air cells.       No evidence for acute traumatic intracranial pathology.  Incidental note is made of mild changes of chronic small vessel ischemic phenomena and a small chronic infarct within the right cerebellar hemisphere within the right PICA distribution.  Mild changes of inflammatory paranasal sinus disease are also incidentally appreciated.   TECHNIQUE: CT scan of the cervical spine was obtained with 1 mm axial bone algorithm and 2 mm axial soft tissue algorithm images. Sagittal and coronal reconstructed images were obtained.  FINDINGS:  There is no evidence for acute fracture or bony malalignment involving the cervical spine.  A disc osteophyte complex at C6-7 results in a mild degree of canal narrowing. Foraminal stenotic changes are identified at the C2-3, C3-4, and C6-7 levels.  IMPRESSION:  No evidence for acute fracture or bony malalignment involving the cervical spine.  Incidental degenerative phenomena as discussed above.    Radiation dose reduction techniques were utilized, including automated exposure control and exposure modulation based on body size.  This report was finalized on 8/8/2024 3:12 PM by Dr. Humberto Ramirez M.D on Workstation: [id number]      XR Knee 1 or 2 View Left    Result Date: 8/8/2024  XR KNEE 1 OR 2 VW LEFT-  INDICATIONS: Trauma.  TECHNIQUE: 2 VIEWS OF THE LEFT KNEE  COMPARISON: 2/16/2023   FINDINGS:  Left knee arthroplasty hardware appears intact. No acute fracture, erosion, or dislocation is identified. Arterial calcifications are noted. Minimal knee effusion is suggested. Follow-up/further evaluation can be obtained as  indications persist.       As described.    This report was finalized on 8/8/2024 1:45 PM by Dr. Chava Flores M.D on Workstation: [id number]      XR Hand 3+ View Left    Result Date: 8/8/2024  XR HAND 3+ VW LEFT-  Clinical: Fell, pain  FINDINGS: No fracture or dislocation demonstrated. Amputation of the tip index finger with a small metallic foreign body seen within the soft tissues. Mild to moderate joint degeneration seen.  CONCLUSION: No acute osseous abnormality.  This report was finalized on 8/8/2024 1:43 PM by Dr. Theo Delarosa M.D on Workstation: [id number]         MEDICATIONS GIVEN IN ER  Medications - No data to display      ORDERS PLACED DURING THIS VISIT:  Orders Placed This Encounter   Procedures   • CT Head Without Contrast   • CT Cervical Spine Without Contrast   • XR Hand 3+ View Left   • XR Knee 1 or 2 View Left   • Comprehensive Metabolic Panel   • Protime-INR   • aPTT   • CBC Auto Differential   • Urinalysis With Microscopic If Indicated (No Culture) - Urine, Clean Catch   • Valproic Acid Level, Total   • CBC & Differential         OUTPATIENT MEDICATION MANAGEMENT:  No current Epic-ordered facility-administered medications on file.     Current Outpatient Medications Ordered in Epic   Medication Sig Dispense Refill   • acetaminophen (TYLENOL) 325 MG tablet Take 2 tablets by mouth Every 6 (Six) Hours As Needed for Mild Pain.     • Advair Diskus 500-50 MCG/ACT DISKUS Inhale 1 puff 2 (Two) Times a Day.     • aspirin 81 MG chewable tablet Chew 1 tablet Daily.     • atorvastatin (LIPITOR) 40 MG tablet Take 1 tablet by mouth Every Night.     • divalproex (DEPAKOTE) 500 MG DR tablet Take 1 tablet by mouth 2 (Two) Times a Day.     • docusate sodium (COLACE) 100 MG capsule Take 1 capsule by mouth Daily.     • ferrous sulfate 325 (65 FE) MG tablet Take 1 tablet by mouth Daily With Breakfast.     • folic acid (FOLVITE) 1 MG tablet Take 1 tablet by mouth Daily.     • furosemide (LASIX) 20 MG tablet Take 1  tablet by mouth 3 (Three) Times a Day.     • glucosamine sulfate 500 MG capsule capsule Take 2 capsules by mouth Daily.     • isosorbide mononitrate (IMDUR) 30 MG 24 hr tablet Take 1 tablet by mouth Daily for 30 days. 30 tablet 0   • levothyroxine (SYNTHROID, LEVOTHROID) 50 MCG tablet Take 1 tablet by mouth Daily. 90 tablet 1   • lisinopril (PRINIVIL,ZESTRIL) 5 MG tablet Take 1 tablet by mouth Daily.     • loperamide (IMODIUM A-D) 2 MG tablet Take 1 tablet by mouth Every 6 (Six) Hours As Needed.     • Melatonin 10 MG tablet Take 1 tablet by mouth Every Night.     • metFORMIN (GLUCOPHAGE) 500 MG tablet Take 1 tablet by mouth Daily.     • nitroglycerin (NITROSTAT) 0.4 MG SL tablet Place 1 tablet under the tongue Every 5 (Five) Minutes As Needed for Chest Pain (Systolic BP Greater Than 100). Take no more than 3 doses in 15 minutes. 30 tablet 0   • omeprazole (priLOSEC) 40 MG capsule Take 1 capsule by mouth Every Evening.     • oxybutynin XL (DITROPAN-XL) 5 MG 24 hr tablet Take 1 tablet by mouth Daily.     • propranolol (INDERAL) 40 MG tablet Take 1 tablet by mouth 2 (Two) Times a Day.     • rOPINIRole (REQUIP) 0.25 MG tablet Take 1 tablet by mouth 3 (Three) Times a Day.     • tamsulosin (FLOMAX) 0.4 MG capsule 24 hr capsule Take 2 capsules by mouth Daily. 30 capsule    • ticagrelor (BRILINTA) 90 MG tablet tablet Take 1 tablet by mouth 2 (Two) Times a Day. 60 tablet 5   • vitamin B-12 (VITAMIN B-12) 1000 MCG tablet Take 1 tablet by mouth Daily.             PROGRESS, DATA ANALYSIS, CONSULTS, AND MEDICAL DECISION MAKING  All labs have been independently interpreted by me.  All radiology studies have been reviewed by me. All EKG's have been independently viewed and interpreted by me.  Discussion below represents my analysis of pertinent findings related to patient's condition, differential diagnosis, treatment plan and final disposition.    Differential diagnosis includes but is not limited to closed head injury,  intracranial bleed, knee fracture.        ED Course as of 08/08/24 1616   u Aug 08, 2024   1341 X-ray of left knee independently interpreted by me as no fracture [MP]   1500 Pt care assumed by Dr. Najera [MP]   1613 Laboratory evaluation notable for mild hypokalemia.  Radiological imaging is overall unremarkable.  Patient will be admitted due to recurrent falls as he requires PT OT evaluation [MW]   1614 Discussed with Dr. Boucher who agrees to admit [MW]      ED Course User Index  [MP] Nurys Perales PA-C  [MW] Axel Najera MD             AS OF 15:19 EDT VITALS:    BP - 125/80  HR - 93  TEMP - 97.6 °F (36.4 °C) (Tympanic)  O2 SATS - 98%    COMPLEXITY OF CARE  The patient requires admission.      DIAGNOSIS  Final diagnoses:   Multiple falls   Closed head injury, initial encounter   Left hand pain   Acute pain of left knee   Anticoagulated         DISPOSITION  ED Disposition       ED Disposition   Intended Admit    Condition   --    Comment   --                Please note that portions of this document were completed with a voice recognition program.    Note Disclaimer: At [facility], we believe that sharing information builds trust and better relationships. You are receiving this note because you recently visited [facility]. It is possible you will see health information before a provider has talked with you about it. This kind of information can be easy to misunderstand. To help you fully understand what it means for your health, we urge you to discuss this note with your provider.         Nurys Perales PA-C  08/08/24 1520       Nurys Perales PA-C  08/08/24 1616

## 2024-08-08 NOTE — ED NOTES
Pt states he fell last night. Pt's caregiver is at bedside and states that this is the fourth time this week that he has fallen. Pt states he gets really weak all over and falls. Last night he fell and reports that he caught himself with his hands and knees, but feels like he broke his hand because he heard all of his knuckles pop. He also says his knee is very bruised and feels like it is fractured, but not broken. He utilized an ice pack last night to ease the pain.

## 2024-08-08 NOTE — ED PROVIDER NOTES
EMERGENCY DEPARTMENT MD ATTESTATION NOTE    Room Number:  35/35  PCP: Sammie Gambino APRN  Independent Historians: Patient    HPI:    Context: Hernando Lozada is a 79 y.o. male with a medical history of HTN, HLD, diabetes, CKD, GIOVANNY, DVT, CVA who presents to the ED c/o acute fall.  Patient has had multiple falls over the last several days.  Patient states he has gotten a new walker recently and thinks this could be related.  Patient has pain in his left hand and left knee.  Patient is on Eliquis.        Review of prior external notes (non-ED) -and- Review of prior external test results outside of this encounter: Office visit with family medicine from 8/5/2024 reviewed and notable for hospital discharge follow-up.  Plan was for patient to follow-up with cardiology, continue current antidiabetic regimen, continue levothyroxine.    Prescription drug monitoring program review:           PHYSICAL EXAM    I have reviewed the triage vital signs and nursing notes.    ED Triage Vitals   Temp Heart Rate Resp BP SpO2   08/08/24 1241 08/08/24 1241 08/08/24 1241 08/08/24 1256 08/08/24 1241   97.6 °F (36.4 °C) 96 16 125/80 99 %      Temp src Heart Rate Source Patient Position BP Location FiO2 (%)   08/08/24 1241 08/08/24 1241 -- -- --   Tympanic Monitor          Physical Exam  GENERAL: alert, no acute distress  SKIN: Warm, dry  HENT: Normocephalic, atraumatic  EYES: no scleral icterus  CV: regular rhythm, regular rate  RESPIRATORY: normal effort, lungs clear  ABDOMEN: soft, nontender, nondistended  MUSCULOSKELETAL: no deformity  NEURO: alert, moves all extremities, follows commands            MEDICATIONS GIVEN IN ER  Medications - No data to display      ORDERS PLACED DURING THIS VISIT:  Orders Placed This Encounter   Procedures    CT Head Without Contrast    CT Cervical Spine Without Contrast    XR Hand 3+ View Left    XR Knee 1 or 2 View Left         PROCEDURES  Procedures            PROGRESS, DATA ANALYSIS, CONSULTS, AND  MEDICAL DECISION MAKING  All labs have been independently interpreted by me.  All radiology studies have been reviewed by me. All EKG's have been independently viewed and interpreted by me.  Discussion below represents my analysis of pertinent findings related to patient's condition, differential diagnosis, treatment plan and final disposition.    Differential diagnosis includes but is not limited to recurrent falls, head injury, intracranial hemorrhage, hand fracture.    Clinical Scores:                   ED Course as of 08/08/24 1615   Thu Aug 08, 2024   1341 X-ray of left knee independently interpreted by me as no fracture [MP]   1500 Pt care assumed by Dr. Najera [MP]   1613 Laboratory evaluation notable for mild hypokalemia.  Radiological imaging is overall unremarkable.  Patient will be admitted due to recurrent falls as he requires PT OT evaluation [MW]   1614 Discussed with Dr. Boucher who agrees to admit [MW]      ED Course User Index  [MP] Nurys Perales PA-C  [MW] Axel Najera MD       MDM: 79-year-old male presenting for evaluation after recurrent falls.  Laboratory evaluation notable for mild hypokalemia.  Will proceed with admission for further evaluation management, PT and OT consults.      COMPLEXITY OF CARE  The patient requires admission.    Please note that portions of this document were completed with a voice recognition program.    Note Disclaimer: At [facility], we believe that sharing information builds trust and better relationships. You are receiving this note because you recently visited [facility]. It is possible you will see health information before a provider has talked with you about it. This kind of information can be easy to misunderstand. To help you fully understand what it means for your health, we urge you to discuss this note with your provider.         Axel Najera MD  08/08/24 1616

## 2024-08-08 NOTE — ED NOTES
[facility] [place], Sharmin EM, RN [phone]  Please contact upon discharge to assist with patient transport.

## 2024-08-09 ENCOUNTER — READMISSION MANAGEMENT (OUTPATIENT)
Dept: CALL CENTER | Facility: HOSPITAL | Age: 79
End: 2024-08-09
Payer: MEDICARE

## 2024-08-09 ENCOUNTER — TELEPHONE (OUTPATIENT)
Dept: FAMILY MEDICINE CLINIC | Facility: CLINIC | Age: 79
End: 2024-08-09
Payer: MEDICARE

## 2024-08-09 VITALS
HEART RATE: 69 BPM | SYSTOLIC BLOOD PRESSURE: 114 MMHG | OXYGEN SATURATION: 97 % | BODY MASS INDEX: 36.08 KG/M2 | TEMPERATURE: 98.1 F | WEIGHT: 243.61 LBS | HEIGHT: 69 IN | DIASTOLIC BLOOD PRESSURE: 91 MMHG | RESPIRATION RATE: 16 BRPM

## 2024-08-09 PROBLEM — E87.6 HYPOKALEMIA: Status: ACTIVE | Noted: 2024-08-09

## 2024-08-09 LAB
ALBUMIN SERPL-MCNC: 3.6 G/DL (ref 3.5–5.2)
ALBUMIN/GLOB SERPL: 1.6 G/DL
ALP SERPL-CCNC: 80 U/L (ref 39–117)
ALT SERPL W P-5'-P-CCNC: 17 U/L (ref 1–41)
ANION GAP SERPL CALCULATED.3IONS-SCNC: 11 MMOL/L (ref 5–15)
AST SERPL-CCNC: 30 U/L (ref 1–40)
BACTERIA UR QL AUTO: ABNORMAL /HPF
BASOPHILS # BLD AUTO: 0.02 10*3/MM3 (ref 0–0.2)
BASOPHILS NFR BLD AUTO: 0.4 % (ref 0–1.5)
BILIRUB SERPL-MCNC: 0.6 MG/DL (ref 0–1.2)
BILIRUB UR QL STRIP: NEGATIVE
BUN SERPL-MCNC: 10 MG/DL (ref 8–23)
BUN/CREAT SERPL: 14.5 (ref 7–25)
CALCIUM SPEC-SCNC: 8.6 MG/DL (ref 8.6–10.5)
CHLORIDE SERPL-SCNC: 101 MMOL/L (ref 98–107)
CK SERPL-CCNC: 264 U/L (ref 20–200)
CLARITY UR: CLEAR
CO2 SERPL-SCNC: 25 MMOL/L (ref 22–29)
COLOR UR: YELLOW
CREAT SERPL-MCNC: 0.69 MG/DL (ref 0.76–1.27)
DEPRECATED RDW RBC AUTO: 45.9 FL (ref 37–54)
EGFRCR SERPLBLD CKD-EPI 2021: 95.3 ML/MIN/1.73
EOSINOPHIL # BLD AUTO: 0.06 10*3/MM3 (ref 0–0.4)
EOSINOPHIL NFR BLD AUTO: 1.1 % (ref 0.3–6.2)
ERYTHROCYTE [DISTWIDTH] IN BLOOD BY AUTOMATED COUNT: 13.3 % (ref 12.3–15.4)
GLOBULIN UR ELPH-MCNC: 2.3 GM/DL
GLUCOSE SERPL-MCNC: 110 MG/DL (ref 65–99)
GLUCOSE UR STRIP-MCNC: NEGATIVE MG/DL
HCT VFR BLD AUTO: 36.3 % (ref 37.5–51)
HGB BLD-MCNC: 12 G/DL (ref 13–17.7)
HGB UR QL STRIP.AUTO: NEGATIVE
HYALINE CASTS UR QL AUTO: ABNORMAL /LPF
IMM GRANULOCYTES # BLD AUTO: 0.02 10*3/MM3 (ref 0–0.05)
IMM GRANULOCYTES NFR BLD AUTO: 0.4 % (ref 0–0.5)
KETONES UR QL STRIP: NEGATIVE
LEUKOCYTE ESTERASE UR QL STRIP.AUTO: ABNORMAL
LYMPHOCYTES # BLD AUTO: 1.36 10*3/MM3 (ref 0.7–3.1)
LYMPHOCYTES NFR BLD AUTO: 25.5 % (ref 19.6–45.3)
MAGNESIUM SERPL-MCNC: 2.6 MG/DL (ref 1.6–2.4)
MCH RBC QN AUTO: 31.2 PG (ref 26.6–33)
MCHC RBC AUTO-ENTMCNC: 33.1 G/DL (ref 31.5–35.7)
MCV RBC AUTO: 94.3 FL (ref 79–97)
MONOCYTES # BLD AUTO: 0.48 10*3/MM3 (ref 0.1–0.9)
MONOCYTES NFR BLD AUTO: 9 % (ref 5–12)
NEUTROPHILS NFR BLD AUTO: 3.39 10*3/MM3 (ref 1.7–7)
NEUTROPHILS NFR BLD AUTO: 63.6 % (ref 42.7–76)
NITRITE UR QL STRIP: NEGATIVE
NRBC BLD AUTO-RTO: 0 /100 WBC (ref 0–0.2)
PH UR STRIP.AUTO: 6 [PH] (ref 5–8)
PLATELET # BLD AUTO: 190 10*3/MM3 (ref 140–450)
PMV BLD AUTO: 9.9 FL (ref 6–12)
POTASSIUM SERPL-SCNC: 3.9 MMOL/L (ref 3.5–5.2)
PROT SERPL-MCNC: 5.9 G/DL (ref 6–8.5)
PROT UR QL STRIP: NEGATIVE
RBC # BLD AUTO: 3.85 10*6/MM3 (ref 4.14–5.8)
RBC # UR STRIP: ABNORMAL /HPF
REF LAB TEST METHOD: ABNORMAL
SODIUM SERPL-SCNC: 137 MMOL/L (ref 136–145)
SP GR UR STRIP: 1.01 (ref 1–1.03)
SQUAMOUS #/AREA URNS HPF: ABNORMAL /HPF
TSH SERPL DL<=0.05 MIU/L-ACNC: 2.63 UIU/ML (ref 0.27–4.2)
UROBILINOGEN UR QL STRIP: ABNORMAL
WBC # UR STRIP: ABNORMAL /HPF
WBC NRBC COR # BLD AUTO: 5.33 10*3/MM3 (ref 3.4–10.8)

## 2024-08-09 PROCEDURE — 97162 PT EVAL MOD COMPLEX 30 MIN: CPT

## 2024-08-09 PROCEDURE — 80053 COMPREHEN METABOLIC PANEL: CPT | Performed by: INTERNAL MEDICINE

## 2024-08-09 PROCEDURE — 97530 THERAPEUTIC ACTIVITIES: CPT

## 2024-08-09 PROCEDURE — 82550 ASSAY OF CK (CPK): CPT | Performed by: INTERNAL MEDICINE

## 2024-08-09 PROCEDURE — 97535 SELF CARE MNGMENT TRAINING: CPT

## 2024-08-09 PROCEDURE — 81001 URINALYSIS AUTO W/SCOPE: CPT | Performed by: STUDENT IN AN ORGANIZED HEALTH CARE EDUCATION/TRAINING PROGRAM

## 2024-08-09 PROCEDURE — G0378 HOSPITAL OBSERVATION PER HR: HCPCS

## 2024-08-09 PROCEDURE — 83735 ASSAY OF MAGNESIUM: CPT | Performed by: INTERNAL MEDICINE

## 2024-08-09 PROCEDURE — 85025 COMPLETE CBC W/AUTO DIFF WBC: CPT | Performed by: INTERNAL MEDICINE

## 2024-08-09 PROCEDURE — 84443 ASSAY THYROID STIM HORMONE: CPT | Performed by: INTERNAL MEDICINE

## 2024-08-09 PROCEDURE — 97166 OT EVAL MOD COMPLEX 45 MIN: CPT

## 2024-08-09 RX ORDER — POLYETHYLENE GLYCOL 3350 17 G/17G
17 POWDER, FOR SOLUTION ORAL DAILY PRN
Status: DISCONTINUED | OUTPATIENT
Start: 2024-08-09 | End: 2024-08-09 | Stop reason: HOSPADM

## 2024-08-09 RX ORDER — AMOXICILLIN 250 MG
2 CAPSULE ORAL 2 TIMES DAILY PRN
Status: DISCONTINUED | OUTPATIENT
Start: 2024-08-09 | End: 2024-08-09 | Stop reason: HOSPADM

## 2024-08-09 RX ORDER — SODIUM CHLORIDE 0.9 % (FLUSH) 0.9 %
10 SYRINGE (ML) INJECTION EVERY 12 HOURS SCHEDULED
Status: DISCONTINUED | OUTPATIENT
Start: 2024-08-09 | End: 2024-08-09 | Stop reason: HOSPADM

## 2024-08-09 RX ORDER — NITROGLYCERIN 0.4 MG/1
0.4 TABLET SUBLINGUAL
Status: DISCONTINUED | OUTPATIENT
Start: 2024-08-09 | End: 2024-08-09 | Stop reason: HOSPADM

## 2024-08-09 RX ORDER — ACETAMINOPHEN 325 MG/1
650 TABLET ORAL EVERY 4 HOURS PRN
Status: DISCONTINUED | OUTPATIENT
Start: 2024-08-09 | End: 2024-08-09 | Stop reason: HOSPADM

## 2024-08-09 RX ORDER — ONDANSETRON 4 MG/1
4 TABLET, ORALLY DISINTEGRATING ORAL EVERY 6 HOURS PRN
Status: DISCONTINUED | OUTPATIENT
Start: 2024-08-09 | End: 2024-08-09 | Stop reason: HOSPADM

## 2024-08-09 RX ORDER — ONDANSETRON 2 MG/ML
4 INJECTION INTRAMUSCULAR; INTRAVENOUS EVERY 6 HOURS PRN
Status: DISCONTINUED | OUTPATIENT
Start: 2024-08-09 | End: 2024-08-09 | Stop reason: HOSPADM

## 2024-08-09 RX ORDER — SODIUM CHLORIDE 0.9 % (FLUSH) 0.9 %
10 SYRINGE (ML) INJECTION AS NEEDED
Status: DISCONTINUED | OUTPATIENT
Start: 2024-08-09 | End: 2024-08-09 | Stop reason: HOSPADM

## 2024-08-09 RX ORDER — BISACODYL 10 MG
10 SUPPOSITORY, RECTAL RECTAL DAILY PRN
Status: DISCONTINUED | OUTPATIENT
Start: 2024-08-09 | End: 2024-08-09 | Stop reason: HOSPADM

## 2024-08-09 RX ORDER — CALCIUM CARBONATE 500 MG/1
2 TABLET, CHEWABLE ORAL 2 TIMES DAILY PRN
Status: DISCONTINUED | OUTPATIENT
Start: 2024-08-09 | End: 2024-08-09 | Stop reason: HOSPADM

## 2024-08-09 RX ORDER — ACETAMINOPHEN 160 MG/5ML
650 SOLUTION ORAL EVERY 4 HOURS PRN
Status: DISCONTINUED | OUTPATIENT
Start: 2024-08-09 | End: 2024-08-09 | Stop reason: HOSPADM

## 2024-08-09 RX ORDER — BISACODYL 5 MG/1
5 TABLET, DELAYED RELEASE ORAL DAILY PRN
Status: DISCONTINUED | OUTPATIENT
Start: 2024-08-09 | End: 2024-08-09 | Stop reason: HOSPADM

## 2024-08-09 RX ORDER — SODIUM CHLORIDE 9 MG/ML
40 INJECTION, SOLUTION INTRAVENOUS AS NEEDED
Status: DISCONTINUED | OUTPATIENT
Start: 2024-08-09 | End: 2024-08-09 | Stop reason: HOSPADM

## 2024-08-09 RX ORDER — ACETAMINOPHEN 650 MG/1
650 SUPPOSITORY RECTAL EVERY 4 HOURS PRN
Status: DISCONTINUED | OUTPATIENT
Start: 2024-08-09 | End: 2024-08-09 | Stop reason: HOSPADM

## 2024-08-09 RX ORDER — TROLAMINE SALICYLATE 10 G/100G
1 CREAM TOPICAL 4 TIMES DAILY PRN
Status: DISCONTINUED | OUTPATIENT
Start: 2024-08-09 | End: 2024-08-09 | Stop reason: HOSPADM

## 2024-08-09 RX ADMIN — ROPINIROLE HYDROCHLORIDE 0.25 MG: 0.5 TABLET, FILM COATED ORAL at 08:11

## 2024-08-09 RX ADMIN — FERROUS SULFATE TAB 325 MG (65 MG ELEMENTAL FE) 325 MG: 325 (65 FE) TAB at 08:11

## 2024-08-09 RX ADMIN — PANTOPRAZOLE SODIUM 40 MG: 40 TABLET, DELAYED RELEASE ORAL at 05:08

## 2024-08-09 RX ADMIN — FOLIC ACID 1 MG: 1 TABLET ORAL at 08:11

## 2024-08-09 RX ADMIN — TAMSULOSIN HYDROCHLORIDE 0.8 MG: 0.4 CAPSULE ORAL at 08:11

## 2024-08-09 RX ADMIN — Medication 10 ML: at 02:32

## 2024-08-09 RX ADMIN — DOCUSATE SODIUM 100 MG: 100 CAPSULE, LIQUID FILLED ORAL at 08:11

## 2024-08-09 RX ADMIN — LISINOPRIL 5 MG: 5 TABLET ORAL at 08:11

## 2024-08-09 RX ADMIN — ISOSORBIDE MONONITRATE 30 MG: 30 TABLET, EXTENDED RELEASE ORAL at 08:11

## 2024-08-09 RX ADMIN — Medication 10 ML: at 08:12

## 2024-08-09 RX ADMIN — Medication 1000 MCG: at 08:11

## 2024-08-09 RX ADMIN — PROPRANOLOL HYDROCHLORIDE 20 MG: 20 TABLET ORAL at 08:11

## 2024-08-09 RX ADMIN — OXYBUTYNIN CHLORIDE 5 MG: 5 TABLET, EXTENDED RELEASE ORAL at 08:11

## 2024-08-09 RX ADMIN — POTASSIUM CHLORIDE 40 MEQ: 1.5 POWDER, FOR SOLUTION ORAL at 02:32

## 2024-08-09 RX ADMIN — ASPIRIN 81 MG: 81 TABLET, CHEWABLE ORAL at 08:11

## 2024-08-09 RX ADMIN — DIVALPROEX SODIUM 500 MG: 500 TABLET, DELAYED RELEASE ORAL at 08:11

## 2024-08-09 RX ADMIN — TICAGRELOR 90 MG: 90 TABLET ORAL at 08:11

## 2024-08-09 NOTE — DISCHARGE SUMMARY
Patient Name: Hernando Lozada  : 1945  MRN: [id number]    Date of Admission: 2024  Date of Discharge:  2024  Primary Care Physician: Sammie Gambino APRN      Chief Complaint:   Fall      Discharge Diagnoses     Active Hospital Problems    Diagnosis  POA    **Recurrent falls [R29.6]  Not Applicable    Hypokalemia [E87.6]  Unknown    Hypothyroidism [E03.9]  Yes    Diabetes mellitus [E11.9]  Yes    CKD (chronic kidney disease) [N18.9]  Yes    Hyperlipidemia LDL goal <100 [E78.5]  Yes    Essential hypertension [I10]  Yes      Resolved Hospital Problems   No resolved problems to display.        Hospital Course     Mr. Lozada is a 79 y.o. male with a history of traumatic brain injury, hypothyroidism, diabetes, chronic kidney disease and hypertension who presented to [facility] initially complaining of weakness and falls.  Please see the admitting history and physical for further details.  He was found to have weakness and falls and was admitted to the hospital for further evaluation and treatment.  It was felt that his weakness was probably related to underlying debility.  It is recommended that he continue to work with PT and OT following discharge.  He was seen using a normal walker here in the hospital and did quite well.  I do not think he is capable of using the rollator walker and should probably get a different walker for home.  Otherwise at this time he did excellent with only standby assist with physical therapy.  He is stable to discharge back to his group home with home health physical and Occupational Therapy    Day of Discharge     Subjective:  Denies new issues or complaints.  Feels weak and feels like he needs more therapy.    Physical Exam:  Temp:  [97.5 °F (36.4 °C)-98.1 °F (36.7 °C)] 98.1 °F (36.7 °C)  Heart Rate:  [] 69  Resp:  [16] 16  BP: (102-125)/(63-91) 114/91  Body mass index is 35.97 kg/m².  Physical Exam  Vitals reviewed.   Constitutional:       General: He  is not in acute distress.     Appearance: He is obese. He is ill-appearing.   Cardiovascular:      Rate and Rhythm: Normal rate and regular rhythm.   Pulmonary:      Effort: No respiratory distress.      Breath sounds: Normal breath sounds.   Neurological:      General: No focal deficit present.      Mental Status: He is alert. Mental status is at baseline.         Consultants     Consult Orders (all) (From admission, onward)       Start     Ordered    08/09/24 0116  Inpatient Consult to Case Management   Once        Provider:  (Not yet assigned)    08/09/24 0115    08/08/24 1600  [facility] (on-call MD unless specified) Details  Once        Specialty:  Hospitalist  Provider:  Sally Puente MD    08/08/24 1559                  Procedures     * Surgery not found *    Imaging Results (All)       Procedure Component Value Units Date/Time    CT Head Without Contrast [[id number]] Collected: 08/08/24 1508     Updated: 08/08/24 1515    Narrative:      CT HEAD AND CERVICAL SPINE WITHOUT CONTRAST     CLINICAL HISTORY: Multiple falls, head injury, on Eliquis. Headache and  neck pain.     TECHNIQUE: CT scan of the head was obtained with 3 mm axial soft tissue  and 2 mm bone algorithm images. No intravenous contrast was  administered. Sagittal and coronal reconstructions were obtained.     COMPARISON: CT head dated 02/06/2024     FINDINGS:       There is no evidence for an acute extra-axial hemorrhage or a calvarial  fracture.     Mild changes of chronic small vessel ischemic phenomena are noted. The  ventricles, sulci, and cisterns are age-appropriate. The gray-white  matter differentiation is within normal limits. The basal ganglia and  thalami are unremarkable in appearance. Incidental note is made of a  chronic infarct within the right cerebellar hemisphere within the right  PICA distribution measuring up to 12 x 5 mm in greatest axial  dimensions. Atherosclerotic vascular calcifications are  incidentally  appreciated.     Incidental note is made of partial opacification of the left maxillary  sinus with mucous retention cyst and mucosal thickening. Mild mucosal  thickening is noted within the ethmoid air cells.       Impression:         No evidence for acute traumatic intracranial pathology.     Incidental note is made of mild changes of chronic small vessel ischemic  phenomena and a small chronic infarct within the right cerebellar  hemisphere within the right PICA distribution.     Mild changes of inflammatory paranasal sinus disease are also  incidentally appreciated.        TECHNIQUE: CT scan of the cervical spine was obtained with 1 mm axial  bone algorithm and 2 mm axial soft tissue algorithm images. Sagittal and  coronal reconstructed images were obtained.     FINDINGS:     There is no evidence for acute fracture or bony malalignment involving  the cervical spine.     A disc osteophyte complex at C6-7 results in a mild degree of canal  narrowing. Foraminal stenotic changes are identified at the C2-3, C3-4,  and C6-7 levels.      IMPRESSION:     No evidence for acute fracture or bony malalignment involving the  cervical spine.     Incidental degenerative phenomena as discussed above.           Radiation dose reduction techniques were utilized, including automated  exposure control and exposure modulation based on body size.     This report was finalized on 8/8/2024 3:12 PM by Dr. Humberto Ramirez M.D  on Workstation: [id number]       CT Cervical Spine Without Contrast [[id number]] Collected: 08/08/24 1508     Updated: 08/08/24 1515    Narrative:      CT HEAD AND CERVICAL SPINE WITHOUT CONTRAST     CLINICAL HISTORY: Multiple falls, head injury, on Eliquis. Headache and  neck pain.     TECHNIQUE: CT scan of the head was obtained with 3 mm axial soft tissue  and 2 mm bone algorithm images. No intravenous contrast was  administered. Sagittal and coronal reconstructions were obtained.     COMPARISON: CT  head dated 02/06/2024     FINDINGS:       There is no evidence for an acute extra-axial hemorrhage or a calvarial  fracture.     Mild changes of chronic small vessel ischemic phenomena are noted. The  ventricles, sulci, and cisterns are age-appropriate. The gray-white  matter differentiation is within normal limits. The basal ganglia and  thalami are unremarkable in appearance. Incidental note is made of a  chronic infarct within the right cerebellar hemisphere within the right  PICA distribution measuring up to 12 x 5 mm in greatest axial  dimensions. Atherosclerotic vascular calcifications are incidentally  appreciated.     Incidental note is made of partial opacification of the left maxillary  sinus with mucous retention cyst and mucosal thickening. Mild mucosal  thickening is noted within the ethmoid air cells.       Impression:         No evidence for acute traumatic intracranial pathology.     Incidental note is made of mild changes of chronic small vessel ischemic  phenomena and a small chronic infarct within the right cerebellar  hemisphere within the right PICA distribution.     Mild changes of inflammatory paranasal sinus disease are also  incidentally appreciated.        TECHNIQUE: CT scan of the cervical spine was obtained with 1 mm axial  bone algorithm and 2 mm axial soft tissue algorithm images. Sagittal and  coronal reconstructed images were obtained.     FINDINGS:     There is no evidence for acute fracture or bony malalignment involving  the cervical spine.     A disc osteophyte complex at C6-7 results in a mild degree of canal  narrowing. Foraminal stenotic changes are identified at the C2-3, C3-4,  and C6-7 levels.      IMPRESSION:     No evidence for acute fracture or bony malalignment involving the  cervical spine.     Incidental degenerative phenomena as discussed above.           Radiation dose reduction techniques were utilized, including automated  exposure control and exposure modulation  based on body size.     This report was finalized on 8/8/2024 3:12 PM by Dr. Humberto Ramirez M.D  on Workstation: [id number]       XR Knee 1 or 2 View Left [[id number]] Collected: 08/08/24 1343     Updated: 08/08/24 1348    Narrative:      XR KNEE 1 OR 2 VW LEFT-     INDICATIONS: Trauma.     TECHNIQUE: 2 VIEWS OF THE LEFT KNEE     COMPARISON: 2/16/2023        FINDINGS:     Left knee arthroplasty hardware appears intact. No acute fracture,  erosion, or dislocation is identified. Arterial calcifications are  noted. Minimal knee effusion is suggested. Follow-up/further evaluation  can be obtained as indications persist.       Impression:         As described.           This report was finalized on 8/8/2024 1:45 PM by Dr. Chava Flores M.D on Workstation: [id number]       XR Hand 3+ View Left [[id number]] Collected: 08/08/24 1341     Updated: 08/08/24 1346    Narrative:      XR HAND 3+ VW LEFT-     Clinical: Fell, pain     FINDINGS: No fracture or dislocation demonstrated. Amputation of the tip  index finger with a small metallic foreign body seen within the soft  tissues. Mild to moderate joint degeneration seen.     CONCLUSION: No acute osseous abnormality.     This report was finalized on 8/8/2024 1:43 PM by Dr. Theo Delarosa M.D  on Workstation: [id number]             Results for orders placed during the hospital encounter of 03/06/23    Duplex Venous Lower Extremity - Bilateral CAR    Interpretation Summary    Chronic left lower extremity deep vein thrombosis noted in the mid femoral.    All other veins appeared normal bilaterally.    Results for orders placed in visit on 12/20/23    Adult Transthoracic Echo Complete W/ Cont if Necessary Per Protocol    Interpretation Summary    Left ventricular systolic function is normal. Calculated left ventricular EF = 69.8% Left ventricular ejection fraction appears to be 66 - 70%.    Left ventricular diastolic function is consistent with (grade I) impaired relaxation.     "The left atrial cavity is mild to moderately dilated.    The right atrial cavity is mildly  dilated.    Severe aortic valve stenosis is present.    Estimated right ventricular systolic pressure from tricuspid regurgitation is normal (<35 mmHg).    Pertinent Labs     Results from last 7 days   Lab Units 08/09/24  0501 08/08/24  1515   WBC 10*3/mm3 5.33 5.58   HEMOGLOBIN g/dL 12.0* 11.3*   PLATELETS 10*3/mm3 190 173     Results from last 7 days   Lab Units 08/09/24  0501 08/08/24  1515   SODIUM mmol/L 137 140   POTASSIUM mmol/L 3.9 2.9*   CHLORIDE mmol/L 101 98   CO2 mmol/L 25.0 27.4   BUN mg/dL 10 12   CREATININE mg/dL 0.69* 0.87   GLUCOSE mg/dL 110* 97   EGFR mL/min/1.73 95.3 88.9     Results from last 7 days   Lab Units 08/09/24  0501 08/08/24  1515   ALBUMIN g/dL 3.6 3.8   BILIRUBIN mg/dL 0.6 0.8   ALK PHOS U/L 80 53   AST (SGOT) U/L 30 28   ALT (SGPT) U/L 17 19     Results from last 7 days   Lab Units 08/09/24  0501 08/08/24  1515   CALCIUM mg/dL 8.6 8.5*   ALBUMIN g/dL 3.6 3.8   MAGNESIUM mg/dL 2.6* 1.5*   PHOSPHORUS mg/dL  --  3.2       Results from last 7 days   Lab Units 08/09/24  0501 08/08/24  1933 08/08/24  1515   CK TOTAL U/L 264*  --   --    HSTROP T ng/L  --  28*  28* 26*           Invalid input(s): \"LDLCALC\"          Test Results Pending at Discharge       Discharge Details        Discharge Medications        Continue These Medications        Instructions Start Date   acetaminophen 325 MG tablet  Commonly known as: TYLENOL   650 mg, Oral, Every 6 Hours PRN      Advair Diskus 500-50 MCG/ACT DISKUS  Generic drug: Fluticasone-Salmeterol   1 puff, Inhalation, 2 Times Daily - RT      aspirin 81 MG chewable tablet   81 mg, Oral, Daily      atorvastatin 40 MG tablet  Commonly known as: LIPITOR   40 mg, Oral, Nightly      cyanocobalamin 1000 MCG tablet  Commonly known as: VITAMIN B-12   1,000 mcg, Oral, Daily      divalproex 500 MG DR tablet  Commonly known as: DEPAKOTE   500 mg, Oral, 2 Times Daily    "   docusate sodium 100 MG capsule  Commonly known as: COLACE   100 mg, Oral, Daily      ferrous sulfate 325 (65 FE) MG tablet   325 mg, Oral, Daily With Breakfast      folic acid 1 MG tablet  Commonly known as: FOLVITE   1 mg, Oral, Daily      furosemide 20 MG tablet  Commonly known as: LASIX   20 mg, Oral, 3 Times Daily      glucosamine sulfate 500 MG capsule capsule   1,000 mg, Oral, Daily      isosorbide mononitrate 30 MG 24 hr tablet  Commonly known as: IMDUR   30 mg, Oral, Daily      levothyroxine 50 MCG tablet  Commonly known as: SYNTHROID, LEVOTHROID   50 mcg, Oral, Daily      lisinopril 5 MG tablet  Commonly known as: PRINIVIL,ZESTRIL   5 mg, Oral, Daily      loperamide 2 MG tablet  Commonly known as: IMODIUM A-D   1 tablet, Oral, Every 6 Hours PRN      Melatonin 10 MG tablet   10 mg, Oral, Nightly      metFORMIN 500 MG tablet  Commonly known as: GLUCOPHAGE   500 mg, Oral, Daily      nitroglycerin 0.4 MG SL tablet  Commonly known as: NITROSTAT   0.4 mg, Sublingual, Every 5 Minutes PRN, Take no more than 3 doses in 15 minutes.      omeprazole 40 MG capsule  Commonly known as: priLOSEC   40 mg, Oral, Every Evening      oxybutynin XL 5 MG 24 hr tablet  Commonly known as: DITROPAN-XL   5 mg, Oral, Daily      propranolol 40 MG tablet  Commonly known as: INDERAL   40 mg, Oral, 2 Times Daily      rOPINIRole 0.25 MG tablet  Commonly known as: REQUIP   0.25 mg, Oral, 3 Times Daily      tamsulosin 0.4 MG capsule 24 hr capsule  Commonly known as: FLOMAX   0.8 mg, Oral, Daily      ticagrelor 90 MG tablet tablet  Commonly known as: BRILINTA   90 mg, Oral, 2 Times Daily               No Known Allergies    Discharge Disposition:  Home or Self Care      Discharge Diet:  Diet Order   Procedures    Diet: Cardiac, Diabetic; Healthy Heart (2-3 Na+); Consistent Carbohydrate; Texture: Soft to Chew (NDD 3); Soft to Chew: Chopped Meat; Fluid Consistency: Thin (IDDSI 0)       Discharge Activity:   Activity Instructions       Activity  as Tolerated              CODE STATUS:    Code Status and Medical Interventions: CPR (Attempt to Resuscitate); Full   Ordered at: 08/08/24 1739     Code Status (Patient has no pulse and is not breathing):    CPR (Attempt to Resuscitate)     Medical Interventions (Patient has pulse or is breathing):    Full       Future Appointments   Date Time Provider Department Center   11/5/2024  2:15 PM Sammie Gambino APRN [id number]   8/11/2025 11:45 AM Rio Miranda MD [facility]     Additional Instructions for the Follow-ups that You Need to Schedule       Ambulatory Referral to Home Health   As directed      Face to Face Visit Date: 8/9/2024   Follow-up provider for Plan of Care?: I treated the patient in an acute care facility and will not continue treatment after discharge.   Follow-up provider: SAMMIE GAMBINO [[id number]]   Reason/Clinical Findings: weakness gait instability and falls   Describe mobility limitations that make leaving home difficult: weakness gait instability and falls   Nursing/Therapeutic Services Requested: Physical Therapy Occupational Therapy   Frequency: 1 Week 1        Discharge Follow-up with PCP   As directed       Currently Documented PCP:    Sammie Gambino APRN    PCP Phone Number:    [phone]     Follow Up Details: 2-3 weeks               Follow-up Information       Sammie Gambino APRN .    Specialties: Nurse Practitioner, Family Medicine  Why: 2-3 weeks  Contact information:  [id number] [id number]  [phone]                             Additional Instructions for the Follow-ups that You Need to Schedule       Ambulatory Referral to Home Health   As directed      Face to Face Visit Date: 8/9/2024   Follow-up provider for Plan of Care?: I treated the patient in an acute care facility and will not continue treatment after discharge.   Follow-up provider: SAMMIE GAMBINO [[id number]]   Reason/Clinical Findings: weakness gait  instability and falls   Describe mobility limitations that make leaving home difficult: weakness gait instability and falls   Nursing/Therapeutic Services Requested: Physical Therapy Occupational Therapy   Frequency: 1 Week 1        Discharge Follow-up with PCP   As directed       Currently Documented PCP:    Sammie Gambino APRN    PCP Phone Number:    [phone]     Follow Up Details: 2-3 weeks            Time Spent on Discharge:  Greater than 30 minutes      Fercho Valdez MD  [facility]  08/09/24  11:36 EDT
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
## 2024-08-09 NOTE — PLAN OF CARE
Goal Outcome Evaluation:  Plan of Care Reviewed With: patient        Progress: no change  Outcome Evaluation: 79 y.o. male admitted to [facility] complaining of weakness and falls with a history of traumatic brain injury, hypothyroidism, diabetes, chronic kidney disease and hypertension. [facility] however has self admittedly attempted to perform func mob without use of AD and has had multiple falls. Pt. at present time presents with decreased act sebastian and strength impacting his independence with self care. Pt. was able complete bed mob SBA, STS CGA and func mob CGA with RW. Pt. will benefit from skilled OT services to address the deficits mentioned above and rec DC back to living arrangement      Anticipated Discharge Disposition (OT): other (see comments) ([facility])

## 2024-08-09 NOTE — PLAN OF CARE
Goal Outcome Evaluation:     Pt is a 80 yo male admitted to the floor 08/08/2024 due tp recurrent falls.   Pt is A/Ox2 to self and place, PWD, PMSx4 global weakness noted, PERRL, - JVD, trachea midline, = rise and fall of chest wall with respirations, CEBBS, pt free from complaints at the present time.  Orders received to DC the pt back to his group home, education done, discharge teaching done, safety rounds done every 2 hours sooner if needed.

## 2024-08-09 NOTE — TELEPHONE ENCOUNTER
Spoke with Jaqueline she requested that the patient hospital follow-up be 8/29. Pt was scheduled with the requested date
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
## 2024-08-09 NOTE — TELEPHONE ENCOUNTER
Caller: XENIA    Relationship to patient:     Best call back number: [phone]    New or established patient?  [] New  [x] Established    Date of discharge: 8/9/24    Facility discharged from: [facility]    Diagnosis/Symptoms:PATIENT HAD 3-4 FALLS AND WAS ADMITTED FOR OBSERVATION    Length of stay (If applicable): 1 DAY    Specialty Only: Did you see a [facility] provider?    [] Yes  [] No  If so, who?     THERE WAS NO AVAILABILITY WITHIN 7 DAYS AND [facility]UB WAS UNABLE TO WARM TRANSFER. XENIA STATED THAT THE DISCHARGE PAPERWORK SAID FOR PATIENT TO FOLLOW UP WITHIN 2 WEEKS AND IS REQUESTING A  CALLBACK TO GET SCHEDULED.
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
## 2024-08-09 NOTE — OUTREACH NOTE
Prep Survey      Flowsheet Row Responses   [facility] patient discharged from? [place]   Is LACE score < 7 ? Yes   Eligibility [facility]   Date of Admission 08/08/24   Date of Discharge 08/09/24   Discharge diagnosis Recurrent falls   Does the patient have one of the following disease processes/diagnoses(primary or secondary)? Other   Prep survey completed? Yes            Nicole EM - Registered Nurse

## 2024-08-09 NOTE — PLAN OF CARE
Goal Outcome Evaluation:  Plan of Care Reviewed With: patient           Outcome Evaluation: Pt is a 78 yo male admitted with generalized weakness and falls. Hx of TBI. Pt reports he is part of the neurorestorative program and has 24/7 caregiver assist. He uses a walker for functional mobility but reports not using it recently and contributes that to his falls. Today, pt demo generalized weakness, impaired balance, decreased activity tolerance. He completed bed mobility sba, STS with cga and ambulated 90' cga with the walker. Pt likely is functioning close to his baseline but may benefit from skilled PT to maintain level of mobility and address balance deficits. Anticipate return to his previous living arrangements.      Anticipated Discharge Disposition (PT): home with assist

## 2024-08-09 NOTE — CASE MANAGEMENT/SOCIAL WORK
Case Management Discharge Note      Final Note: Returned to [facility] via their transport.         Selected Continued Care - Discharged on 8/9/2024 Admission date: 8/8/2024 - Discharge disposition: Home or Self Care      Destination    No services have been selected for the patient.                Durable Medical Equipment    No services have been selected for the patient.                Dialysis/Infusion    No services have been selected for the patient.                Home Medical Care    No services have been selected for the patient.                Therapy    No services have been selected for the patient.                Community Resources    No services have been selected for the patient.                Community & DME    No services have been selected for the patient.                         Final Discharge Disposition Code: 01 - home or self-care

## 2024-08-09 NOTE — PLAN OF CARE
Goal Outcome Evaluation:  Plan of Care Reviewed With: patient      Pt pleasant, alert and oriented x 4. Pt able to ambulate to bathroom with standby assist. Replacements for Mag and Potassium given. SCD's applied. UA sent to lab. Pt denies pain. Vitals WDL's.

## 2024-08-12 ENCOUNTER — TRANSITIONAL CARE MANAGEMENT TELEPHONE ENCOUNTER (OUTPATIENT)
Dept: CALL CENTER | Facility: HOSPITAL | Age: 79
End: 2024-08-12
Payer: MEDICARE

## 2024-08-12 NOTE — OUTREACH NOTE
Call Center TCM Note      Flowsheet Row Responses   [facility] patient discharged from? [place]   Does the patient have one of the following disease processes/diagnoses(primary or secondary)? Other   TCM attempt successful? No   Unsuccessful attempts Attempt 1            Sangeeta Horvath MA    8/12/2024, 08:48 EDT

## 2024-08-12 NOTE — OUTREACH NOTE
Call Center TCM Note      Flowsheet Row Responses   [facility] patient discharged from? [place]   Does the patient have one of the following disease processes/diagnoses(primary or secondary)? Other   TCM attempt successful? No   Unsuccessful attempts Attempt 2   Change in Health Status Moved to LTC/SNF/Hospice  [[facility] re[facility]]            Nicole Garcia RN    8/12/2024, 15:57 EDT

## 2024-08-27 ENCOUNTER — APPOINTMENT (OUTPATIENT)
Dept: GENERAL RADIOLOGY | Facility: HOSPITAL | Age: 79
End: 2024-08-27
Payer: MEDICARE

## 2024-08-27 ENCOUNTER — HOSPITAL ENCOUNTER (EMERGENCY)
Facility: HOSPITAL | Age: 79
Discharge: HOME | End: 2024-08-27
Attending: EMERGENCY MEDICINE
Payer: MEDICARE

## 2024-08-27 ENCOUNTER — APPOINTMENT (OUTPATIENT)
Dept: CT IMAGING | Facility: HOSPITAL | Age: 79
End: 2024-08-27
Payer: MEDICARE

## 2024-08-27 VITALS
HEART RATE: 78 BPM | BODY MASS INDEX: 35.99 KG/M2 | OXYGEN SATURATION: 97 % | WEIGHT: 243 LBS | RESPIRATION RATE: 15 BRPM | DIASTOLIC BLOOD PRESSURE: 65 MMHG | HEIGHT: 69 IN | TEMPERATURE: 98.1 F | SYSTOLIC BLOOD PRESSURE: 133 MMHG

## 2024-08-27 DIAGNOSIS — S09.90XA INJURY OF HEAD, INITIAL ENCOUNTER: ICD-10-CM

## 2024-08-27 DIAGNOSIS — I48.91 NEW ONSET A-FIB: Primary | ICD-10-CM

## 2024-08-27 LAB
ALBUMIN SERPL-MCNC: 4 G/DL (ref 3.5–5.2)
ALBUMIN/GLOB SERPL: 1.7 G/DL
ALP SERPL-CCNC: 55 U/L (ref 39–117)
ALT SERPL W P-5'-P-CCNC: 9 U/L (ref 1–41)
ANION GAP SERPL CALCULATED.3IONS-SCNC: 8.2 MMOL/L (ref 5–15)
AST SERPL-CCNC: 13 U/L (ref 1–40)
BASOPHILS # BLD AUTO: 0.02 10*3/MM3 (ref 0–0.2)
BASOPHILS NFR BLD AUTO: 0.3 % (ref 0–1.5)
BILIRUB SERPL-MCNC: 0.8 MG/DL (ref 0–1.2)
BILIRUB UR QL STRIP: NEGATIVE
BUN SERPL-MCNC: 16 MG/DL (ref 8–23)
BUN/CREAT SERPL: 18 (ref 7–25)
CALCIUM SPEC-SCNC: 8.9 MG/DL (ref 8.6–10.5)
CHLORIDE SERPL-SCNC: 96 MMOL/L (ref 98–107)
CLARITY UR: ABNORMAL
CO2 SERPL-SCNC: 30.8 MMOL/L (ref 22–29)
COLOR UR: YELLOW
CREAT SERPL-MCNC: 0.89 MG/DL (ref 0.76–1.27)
DEPRECATED RDW RBC AUTO: 53.5 FL (ref 37–54)
EGFRCR SERPLBLD CKD-EPI 2021: 88.3 ML/MIN/1.73
EOSINOPHIL # BLD AUTO: 0 10*3/MM3 (ref 0–0.4)
EOSINOPHIL NFR BLD AUTO: 0 % (ref 0.3–6.2)
ERYTHROCYTE [DISTWIDTH] IN BLOOD BY AUTOMATED COUNT: 14.7 % (ref 12.3–15.4)
GLOBULIN UR ELPH-MCNC: 2.3 GM/DL
GLUCOSE SERPL-MCNC: 112 MG/DL (ref 65–99)
GLUCOSE UR STRIP-MCNC: NEGATIVE MG/DL
HCT VFR BLD AUTO: 34.3 % (ref 37.5–51)
HGB BLD-MCNC: 11 G/DL (ref 13–17.7)
HGB UR QL STRIP.AUTO: NEGATIVE
IMM GRANULOCYTES # BLD AUTO: 0.02 10*3/MM3 (ref 0–0.05)
IMM GRANULOCYTES NFR BLD AUTO: 0.3 % (ref 0–0.5)
KETONES UR QL STRIP: NEGATIVE
LEUKOCYTE ESTERASE UR QL STRIP.AUTO: NEGATIVE
LYMPHOCYTES # BLD AUTO: 1.45 10*3/MM3 (ref 0.7–3.1)
LYMPHOCYTES NFR BLD AUTO: 23.3 % (ref 19.6–45.3)
MCH RBC QN AUTO: 31 PG (ref 26.6–33)
MCHC RBC AUTO-ENTMCNC: 32.1 G/DL (ref 31.5–35.7)
MCV RBC AUTO: 96.6 FL (ref 79–97)
MONOCYTES # BLD AUTO: 0.58 10*3/MM3 (ref 0.1–0.9)
MONOCYTES NFR BLD AUTO: 9.3 % (ref 5–12)
NEUTROPHILS NFR BLD AUTO: 4.14 10*3/MM3 (ref 1.7–7)
NEUTROPHILS NFR BLD AUTO: 66.8 % (ref 42.7–76)
NITRITE UR QL STRIP: NEGATIVE
PH UR STRIP.AUTO: 5.5 [PH] (ref 5–8)
PLATELET # BLD AUTO: 182 10*3/MM3 (ref 140–450)
PMV BLD AUTO: 9.5 FL (ref 6–12)
POTASSIUM SERPL-SCNC: 3.3 MMOL/L (ref 3.5–5.2)
PROT SERPL-MCNC: 6.3 G/DL (ref 6–8.5)
PROT UR QL STRIP: NEGATIVE
RBC # BLD AUTO: 3.55 10*6/MM3 (ref 4.14–5.8)
SODIUM SERPL-SCNC: 135 MMOL/L (ref 136–145)
SP GR UR STRIP: 1.02 (ref 1–1.03)
TROPONIN T SERPL HS-MCNC: 30 NG/L
UROBILINOGEN UR QL STRIP: ABNORMAL
WBC NRBC COR # BLD AUTO: 6.21 10*3/MM3 (ref 3.4–10.8)

## 2024-08-27 PROCEDURE — 84484 ASSAY OF TROPONIN QUANT: CPT | Performed by: NURSE PRACTITIONER

## 2024-08-27 PROCEDURE — 71045 X-RAY EXAM CHEST 1 VIEW: CPT

## 2024-08-27 PROCEDURE — 93005 ELECTROCARDIOGRAM TRACING: CPT | Performed by: NURSE PRACTITIONER

## 2024-08-27 PROCEDURE — 36415 COLL VENOUS BLD VENIPUNCTURE: CPT

## 2024-08-27 PROCEDURE — 99284 EMERGENCY DEPT VISIT MOD MDM: CPT | Performed by: NURSE PRACTITIONER

## 2024-08-27 PROCEDURE — 93010 ELECTROCARDIOGRAM REPORT: CPT | Performed by: INTERNAL MEDICINE

## 2024-08-27 PROCEDURE — 80053 COMPREHEN METABOLIC PANEL: CPT | Performed by: NURSE PRACTITIONER

## 2024-08-27 PROCEDURE — 70450 CT HEAD/BRAIN W/O DYE: CPT

## 2024-08-27 PROCEDURE — 81003 URINALYSIS AUTO W/O SCOPE: CPT | Performed by: NURSE PRACTITIONER

## 2024-08-27 PROCEDURE — 85025 COMPLETE CBC W/AUTO DIFF WBC: CPT | Performed by: NURSE PRACTITIONER

## 2024-08-27 PROCEDURE — 99284 EMERGENCY DEPT VISIT MOD MDM: CPT

## 2024-08-27 RX ORDER — SODIUM CHLORIDE 0.9 % (FLUSH) 0.9 %
10 SYRINGE (ML) INJECTION AS NEEDED
Status: DISCONTINUED | OUTPATIENT
Start: 2024-08-27 | End: 2024-08-27 | Stop reason: HOSPADM
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
## 2024-08-28 LAB
QT INTERVAL: 417 MS
QTC INTERVAL: 457 MS

## 2024-08-29 ENCOUNTER — OFFICE VISIT (OUTPATIENT)
Dept: FAMILY MEDICINE CLINIC | Facility: CLINIC | Age: 79
End: 2024-08-29
Payer: MEDICARE

## 2024-08-29 VITALS
DIASTOLIC BLOOD PRESSURE: 74 MMHG | SYSTOLIC BLOOD PRESSURE: 124 MMHG | WEIGHT: 220 LBS | TEMPERATURE: 96.7 F | HEART RATE: 74 BPM | BODY MASS INDEX: 32.58 KG/M2 | HEIGHT: 69 IN | OXYGEN SATURATION: 98 %

## 2024-08-29 DIAGNOSIS — I48.91 ATRIAL FIBRILLATION, NEW ONSET: ICD-10-CM

## 2024-08-29 DIAGNOSIS — I10 ESSENTIAL HYPERTENSION: ICD-10-CM

## 2024-08-29 DIAGNOSIS — Z79.4 TYPE 2 DIABETES MELLITUS WITH HYPOGLYCEMIA WITHOUT COMA, WITH LONG-TERM CURRENT USE OF INSULIN: ICD-10-CM

## 2024-08-29 DIAGNOSIS — I25.10 CORONARY ARTERY DISEASE WITHOUT ANGINA PECTORIS, UNSPECIFIED VESSEL OR LESION TYPE, UNSPECIFIED WHETHER NATIVE OR TRANSPLANTED HEART: ICD-10-CM

## 2024-08-29 DIAGNOSIS — S06.9XAS CHRONIC BRAIN INJURY: ICD-10-CM

## 2024-08-29 DIAGNOSIS — E03.9 HYPOTHYROIDISM, UNSPECIFIED TYPE: ICD-10-CM

## 2024-08-29 DIAGNOSIS — E78.5 HYPERLIPIDEMIA LDL GOAL <100: ICD-10-CM

## 2024-08-29 DIAGNOSIS — R25.1 TREMOR: ICD-10-CM

## 2024-08-29 DIAGNOSIS — E11.649 TYPE 2 DIABETES MELLITUS WITH HYPOGLYCEMIA WITHOUT COMA, WITH LONG-TERM CURRENT USE OF INSULIN: ICD-10-CM

## 2024-08-29 DIAGNOSIS — Z09 HOSPITAL DISCHARGE FOLLOW-UP: Primary | ICD-10-CM

## 2024-08-29 DIAGNOSIS — S09.90XD INJURY OF HEAD, SUBSEQUENT ENCOUNTER: ICD-10-CM
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
## 2024-08-29 PROCEDURE — 1126F AMNT PAIN NOTED NONE PRSNT: CPT | Performed by: NURSE PRACTITIONER

## 2024-08-29 PROCEDURE — 3078F DIAST BP <80 MM HG: CPT | Performed by: NURSE PRACTITIONER

## 2024-08-29 PROCEDURE — 3074F SYST BP LT 130 MM HG: CPT | Performed by: NURSE PRACTITIONER

## 2024-08-29 PROCEDURE — 99214 OFFICE O/P EST MOD 30 MIN: CPT | Performed by: NURSE PRACTITIONER

## 2024-08-29 NOTE — PROGRESS NOTES
"Chief Complaint  Transitional Care Management    Subjective        HPI   Hernando presents to [facility] as a 79 year old male for follow up after being seen in the ED on 08/27/2024    Note from that Visit   Subjective  History of Present Illness  Patient is 79-year-old male who presents with caretaker to be evaluated status post fall last night.  States \"my mattress wants down my bed and when I went to lay down it threw me on the floor\".  States he did hit his head and is on aspirin and Brilinta.  He denies feeling dizzy or lightheaded, but states he has had multiple falls in the last week due to feeling lightheaded.  States \"my doctor tells me it is because I stand up too fast and the blood rushes from my brain\".  Denies chest pain or shortness of breath.  Denies any focal weakness.        Also seen at [facility]   Date of Admission: 8/8/2024  Date of Discharge:  8/9/2024  Primary Care Physician: Sammie Gambino APRN        Chief Complaint:   Fall        Discharge Diagnoses            Active Hospital Problems     Diagnosis   POA    **Recurrent falls [R29.6]   Not Applicable    Hypokalemia [E87.6]   Unknown    Hypothyroidism [E03.9]   Yes    Diabetes mellitus [E11.9]   Yes    CKD (chronic kidney disease) [N18.9]   Yes    Hyperlipidemia LDL goal <100 [E78.5]   Yes    Essential hypertension [I10]   Yes       Resolved Hospital Problems   No resolved problems to display.         Hospital Course      Mr. Lozada is a 79 y.o. male with a history of traumatic brain injury, hypothyroidism, diabetes, chronic kidney disease and hypertension who presented to [facility] initially complaining of weakness and falls.  Please see the admitting history and physical for further details.  He was found to have weakness and falls and was admitted to the hospital for further evaluation and treatment.  It was felt that his weakness was probably related to underlying debility.  It is recommended that " he continue to work with PT and OT following discharge.  He was seen using a normal walker here in the hospital and did quite well.  I do not think he is capable of using the rollator walker and should probably get a different walker for home.  Otherwise at this time he did excellent with only standby assist with physical therapy.  He is stable to discharge back to his group home with home health physical and Occupational Therapy            Overall he has been doing okay since discharge.  There was some discrepancies on medication  He has an upcoming appointment with cardiology and does plan to discuss it further  At this time we will continue with the recommendations by the hospital to start Xarelto, DC aspirin and Brilinta until discussed further with cardiology    No falls since discharge    He denies any chest pain or pressure, no headache blurred vision dizziness in office today no ROSALES    He is using a walker and does have a tremor  He did have an aide/assistant with him today from the facility    They do not report any new complaints      Last labs and test from 2 days ago discussed and reviewed with patient    The following portions of the patient's history were reviewed and updated as appropriate: allergies, current medications, past family history, past medical history, past social history, past surgical history, and problem list            Review of Systems   Constitutional:  Negative for chills, fatigue and fever.   Eyes:  Negative for visual disturbance.   Respiratory:  Negative for cough, shortness of breath and wheezing.    Cardiovascular:  Negative for chest pain and leg swelling.   Gastrointestinal:  Negative for abdominal pain, diarrhea, nausea and vomiting.   Genitourinary:  Negative for dysuria.   Skin:  Negative for rash.   Neurological:  Positive for tremors. Negative for dizziness.   Psychiatric/Behavioral:  Negative for self-injury, sleep disturbance and suicidal ideas. The patient is not  "nervous/anxious.         Objective   Vital Signs:   Vitals:    08/29/24 0954   BP: 124/74   Pulse: 74   Temp: 96.7 °F (35.9 °C)   SpO2: 98%   Weight: 99.8 kg (220 lb)   Height: 175.3 cm (69.02\")   PainSc: 0-No pain            4/26/2024     3:07 PM   PHQ-2/PHQ-9 Depression Screening   Little Interest or Pleasure in Doing Things 0-->not at all   Feeling Down, Depressed or Hopeless 0-->not at all   PHQ-9: Brief Depression Severity Measure Score 0                 Physical Exam  Vitals reviewed.   Constitutional:       General: He is not in acute distress.  Eyes:      Conjunctiva/sclera: Conjunctivae normal.   Neck:      Thyroid: No thyromegaly.      Vascular: No carotid bruit.   Cardiovascular:      Rate and Rhythm: Normal rate. Rhythm irregular.      Heart sounds: Normal heart sounds. No murmur heard.  Pulmonary:      Effort: Pulmonary effort is normal. No respiratory distress.      Breath sounds: Normal breath sounds. No stridor. No wheezing, rhonchi or rales.   Chest:      Chest wall: No tenderness.   Lymphadenopathy:      Cervical: No cervical adenopathy.   Neurological:      Mental Status: He is alert and oriented to person, place, and time.   Psychiatric:         Attention and Perception: Attention normal.         Mood and Affect: Mood normal.          Result Review :     The following data was reviewed by: FAISAL Castañeda on 08/29/2024:      Urinalysis without microscopic (no culture) - Urine, Clean Catch (08/27/2024 18:22) negative-  slightly cloudy  Single High Sensitivity Troponin T (08/27/2024 16:41) 30  Comprehensive Metabolic Panel (08/27/2024 16:41) 112  K+ 3.3 GFR normal  CBC & Differential (08/27/2024 16:41)WBC normal, H&H 11.0/34.3     CT Head Without Contrast (08/27/2024 17:22)  FINDINGS: There is diffuse atrophy, similar appearances compared to the  prior study. Moderate vascular calcifications noted. There is no  evidence of intracranial hemorrhage, hydrocephalus or of a focal area " of  decreased attenuation to suggest acute infarction. Bone windows showed  no evidence of a calvarial fracture. A very small scalp hematoma is  present in the supraorbital region on the left.      XR Chest 1 View (08/27/2024 17:06)  FINDINGS: A single view of the chest demonstrates the heart to be within  normal limits in size. There is mild prominence of the pulmonary  vasculature and interstitium in the parahilar regions bilaterally with  mild cephalization. These findings are new versus 7/23/2024 and  suggestive of mild congestive changes. A superimposed interstitial  infiltrate would be difficult to exclude. There is no evidence of  consolidation or effusion.    ECG 12 Lead Other; Lightheaded (08/27/2024 16:59)     HEART RATE=72  bpm  RR Rinrutuh=083  ms  WA Interval=  ms  P Horizontal Axis=  deg  P Front Axis=  deg  QRSD Kwozmbjb=458  ms  QT Hogifdtw=595  ms  GOlW=224  ms  QRS Axis=-12  deg  T Wave Axis=71  deg  - ABNORMAL ECG -  Atrial fibrillation  Abnormal lateral Q waves  Minimal ST depression, lateral leads  When compared with ECG of 23-Jul-2024 15:20:49,  Atrial fibrillation has replaced sinus rhythm    ECG Scan (08/27/2024)   Afib      Pertinent Labs            Results from last 7 days   Lab Units 08/09/24  0501 08/08/24  1515   WBC 10*3/mm3 5.33 5.58   HEMOGLOBIN g/dL 12.0* 11.3*   PLATELETS 10*3/mm3 190 173            Results from last 7 days   Lab Units 08/09/24  0501 08/08/24  1515   SODIUM mmol/L 137 140   POTASSIUM mmol/L 3.9 2.9*   CHLORIDE mmol/L 101 98   CO2 mmol/L 25.0 27.4   BUN mg/dL 10 12   CREATININE mg/dL 0.69* 0.87   GLUCOSE mg/dL 110* 97   EGFR mL/min/1.73 95.3 88.9            Results from last 7 days   Lab Units 08/09/24  0501 08/08/24  1515   ALBUMIN g/dL 3.6 3.8   BILIRUBIN mg/dL 0.6 0.8   ALK PHOS U/L 80 53   AST (SGOT) U/L 30 28   ALT (SGPT) U/L 17 19            Results from last 7 days   Lab Units 08/09/24  0501 08/08/24  1515   CALCIUM mg/dL 8.6 8.5*   ALBUMIN g/dL 3.6 3.8    MAGNESIUM mg/dL 2.6* 1.5*   PHOSPHORUS mg/dL  --  3.2              Results from last 7 days   Lab Units 08/09/24  0501 08/08/24  1933 08/08/24  1515   CK TOTAL U/L 264*  --   --    HSTROP T ng/L  --  28*  28* 26*       Procedures      * Surgery not found *     Imaging Results (All)         Procedure Component Value Units Date/Time     CT Head Without Contrast [[id number]] Collected: 08/08/24 1508       Updated: 08/08/24 1515     Narrative:       CT HEAD AND CERVICAL SPINE WITHOUT CONTRAST     CLINICAL HISTORY: Multiple falls, head injury, on Eliquis. Headache and  neck pain.     TECHNIQUE: CT scan of the head was obtained with 3 mm axial soft tissue  and 2 mm bone algorithm images. No intravenous contrast was  administered. Sagittal and coronal reconstructions were obtained.     COMPARISON: CT head dated 02/06/2024     FINDINGS:       There is no evidence for an acute extra-axial hemorrhage or a calvarial  fracture.     Mild changes of chronic small vessel ischemic phenomena are noted. The  ventricles, sulci, and cisterns are age-appropriate. The gray-white  matter differentiation is within normal limits. The basal ganglia and  thalami are unremarkable in appearance. Incidental note is made of a  chronic infarct within the right cerebellar hemisphere within the right  PICA distribution measuring up to 12 x 5 mm in greatest axial  dimensions. Atherosclerotic vascular calcifications are incidentally  appreciated.     Incidental note is made of partial opacification of the left maxillary  sinus with mucous retention cyst and mucosal thickening. Mild mucosal  thickening is noted within the ethmoid air cells.        Impression:          No evidence for acute traumatic intracranial pathology.     Incidental note is made of mild changes of chronic small vessel ischemic  phenomena and a small chronic infarct within the right cerebellar  hemisphere within the right PICA distribution.     Mild changes of inflammatory  paranasal sinus disease are also  incidentally appreciated.        TECHNIQUE: CT scan of the cervical spine was obtained with 1 mm axial  bone algorithm and 2 mm axial soft tissue algorithm images. Sagittal and  coronal reconstructed images were obtained.     FINDINGS:     There is no evidence for acute fracture or bony malalignment involving  the cervical spine.     A disc osteophyte complex at C6-7 results in a mild degree of canal  narrowing. Foraminal stenotic changes are identified at the C2-3, C3-4,  and C6-7 levels.      IMPRESSION:     No evidence for acute fracture or bony malalignment involving the  cervical spine.     Incidental degenerative phenomena as discussed above.           Radiation dose reduction techniques were utilized, including automated  exposure control and exposure modulation based on body size.     This report was finalized on 8/8/2024 3:12 PM by Dr. Humberto Ramirez M.D  on Workstation: [id number]        CT Cervical Spine Without Contrast [[id number]] Collected: 08/08/24 1508       Updated: 08/08/24 1515     Narrative:       CT HEAD AND CERVICAL SPINE WITHOUT CONTRAST     CLINICAL HISTORY: Multiple falls, head injury, on Eliquis. Headache and  neck pain.     TECHNIQUE: CT scan of the head was obtained with 3 mm axial soft tissue  and 2 mm bone algorithm images. No intravenous contrast was  administered. Sagittal and coronal reconstructions were obtained.     COMPARISON: CT head dated 02/06/2024     FINDINGS:       There is no evidence for an acute extra-axial hemorrhage or a calvarial  fracture.     Mild changes of chronic small vessel ischemic phenomena are noted. The  ventricles, sulci, and cisterns are age-appropriate. The gray-white  matter differentiation is within normal limits. The basal ganglia and  thalami are unremarkable in appearance. Incidental note is made of a  chronic infarct within the right cerebellar hemisphere within the right  PICA distribution measuring up to 12 x 5  mm in greatest axial  dimensions. Atherosclerotic vascular calcifications are incidentally  appreciated.     Incidental note is made of partial opacification of the left maxillary  sinus with mucous retention cyst and mucosal thickening. Mild mucosal  thickening is noted within the ethmoid air cells.        Impression:          No evidence for acute traumatic intracranial pathology.     Incidental note is made of mild changes of chronic small vessel ischemic  phenomena and a small chronic infarct within the right cerebellar  hemisphere within the right PICA distribution.     Mild changes of inflammatory paranasal sinus disease are also  incidentally appreciated.        TECHNIQUE: CT scan of the cervical spine was obtained with 1 mm axial  bone algorithm and 2 mm axial soft tissue algorithm images. Sagittal and  coronal reconstructed images were obtained.     FINDINGS:     There is no evidence for acute fracture or bony malalignment involving  the cervical spine.     A disc osteophyte complex at C6-7 results in a mild degree of canal  narrowing. Foraminal stenotic changes are identified at the C2-3, C3-4,  and C6-7 levels.      IMPRESSION:     No evidence for acute fracture or bony malalignment involving the  cervical spine.     Incidental degenerative phenomena as discussed above.           Radiation dose reduction techniques were utilized, including automated  exposure control and exposure modulation based on body size.     This report was finalized on 8/8/2024 3:12 PM by Dr. Humberto Ramirez M.D  on Workstation: [id number]        XR Knee 1 or 2 View Left [[id number]] Collected: 08/08/24 1343       Updated: 08/08/24 1348     Narrative:       XR KNEE 1 OR 2 VW LEFT-     INDICATIONS: Trauma.     TECHNIQUE: 2 VIEWS OF THE LEFT KNEE     COMPARISON: 2/16/2023        FINDINGS:     Left knee arthroplasty hardware appears intact. No acute fracture,  erosion, or dislocation is identified. Arterial calcifications are  noted.  Minimal knee effusion is suggested. Follow-up/further evaluation  can be obtained as indications persist.        Impression:          As described.           This report was finalized on 8/8/2024 1:45 PM by Dr. Chava Flores M.D on Workstation: [id number]        XR Hand 3+ View Left [[id number]] Collected: 08/08/24 1341       Updated: 08/08/24 1346     Narrative:       XR HAND 3+ VW LEFT-     Clinical: Fell, pain     FINDINGS: No fracture or dislocation demonstrated. Amputation of the tip  index finger with a small metallic foreign body seen within the soft  tissues. Mild to moderate joint degeneration seen.     CONCLUSION: No acute osseous abnormality.     This report was finalized on 8/8/2024 1:43 PM by Dr. Theo Delarosa M.D  on Workstation: [id number]                Results for orders placed during the hospital encounter of 03/06/23     Duplex Venous Lower Extremity - Bilateral CAR     Interpretation Summary    Chronic left lower extremity deep vein thrombosis noted in the mid femoral.    All other veins appeared normal bilaterally.     Results for orders placed in visit on 12/20/23     Adult Transthoracic Echo Complete W/ Cont if Necessary Per Protocol     Interpretation Summary    Left ventricular systolic function is normal. Calculated left ventricular EF = 69.8% Left ventricular ejection fraction appears to be 66 - 70%.    Left ventricular diastolic function is consistent with (grade I) impaired relaxation.    The left atrial cavity is mild to moderately dilated.    The right atrial cavity is mildly  dilated.    Severe aortic valve stenosis is present.    Estimated right ventricular systolic pressure from tricuspid regurgitation is normal (<35 mmHg).      Assessment and Plan    Assessment & Plan  Hospital discharge follow-up  2 visits since last seen in the office [facility] 8/8/2024 to 8/9/2024  Recurrent falls    ED 8/27/2024 [facility]  Fall  Acute head  injury          Doing well since discharge no falls  Fall, initial encounter  At rehab facility  Uses a walker-has tremors and new onset A-fib    No falls since discharge  Injury of head, subsequent encounter  Denies any headache no blurred vision no dizziness   Changes from baseline  Atrial fibrillation, new onset  Keep follow-up with cardiology has appointment 9/6/2024  Xarelto as prescribed    Hold Brilinta and aspirin until discussed with cardiology  Type 2 diabetes mellitus with hypoglycemia without coma, with long-term current use of insulin  Diabetes is stable.   Continue current treatment regimen.  Diabetes will be reassessed in 6 months  Coronary artery disease without angina pectoris, unspecified vessel or lesion type, unspecified whether native or transplanted heart  Coronary Artery Disease (OPTIONAL): Coronary artery disease is stable.  Continue current treatment regimen.  Cardiac status will be reassessed in 6 months.  Essential hypertension  Hypertension is stable and controlled  Continue current treatment regimen.  Blood pressure will be reassessed in 6 months.  Hypothyroidism, unspecified type  Continue levothyroxine  Due for repeat thyroid labs  Take medication in the a.m. 30 minutes separate from any other medication  Do not miss doses    Chronic brain injury  Stable-  no changes  Hyperlipidemia LDL goal <100   Lipid abnormalities are stable    Plan:  Continue same medication/s without change.      Discussed medication dosage, use, side effects, and goals of treatment in detail.    Counseled patient on lifestyle modifications to help control hyperlipidemia.     Patient Treatment Goals:   LDL goal is less than 70    Followup in 6 months.  Tremor  Discuss with specialist-  neuro/cardiology-  may be increasing falls                Keep follow up on 9/6/2024 with cardiology Kacie Preevey         Follow Up   Return in about 3 months (around 11/29/2024) for Next scheduled follow up.  Patient was given  instructions and counseling regarding his condition or for health maintenance advice. Please see specific information pulled into the AVS if appropriate.

## 2024-09-06 ENCOUNTER — OFFICE VISIT (OUTPATIENT)
Dept: CARDIOLOGY | Facility: CLINIC | Age: 79
End: 2024-09-06
Payer: MEDICARE

## 2024-09-06 VITALS
SYSTOLIC BLOOD PRESSURE: 103 MMHG | WEIGHT: 221 LBS | HEART RATE: 72 BPM | DIASTOLIC BLOOD PRESSURE: 65 MMHG | HEIGHT: 69 IN | BODY MASS INDEX: 32.73 KG/M2

## 2024-09-06 DIAGNOSIS — I25.10 CORONARY ARTERY DISEASE INVOLVING NATIVE CORONARY ARTERY OF NATIVE HEART WITHOUT ANGINA PECTORIS: ICD-10-CM

## 2024-09-06 DIAGNOSIS — E78.5 HYPERLIPIDEMIA LDL GOAL <100: ICD-10-CM

## 2024-09-06 DIAGNOSIS — I48.0 PAROXYSMAL ATRIAL FIBRILLATION: ICD-10-CM

## 2024-09-06 DIAGNOSIS — I10 PRIMARY HYPERTENSION: Primary | ICD-10-CM

## 2024-09-06 PROCEDURE — 99214 OFFICE O/P EST MOD 30 MIN: CPT

## 2024-09-06 PROCEDURE — 3074F SYST BP LT 130 MM HG: CPT

## 2024-09-06 PROCEDURE — 93000 ELECTROCARDIOGRAM COMPLETE: CPT

## 2024-09-06 PROCEDURE — 3078F DIAST BP <80 MM HG: CPT

## 2024-09-06 RX ORDER — CLOPIDOGREL BISULFATE 75 MG/1
TABLET ORAL
Qty: 90 TABLET | Refills: 3 | Status: SHIPPED | OUTPATIENT
Start: 2024-09-06

## 2024-09-26 ENCOUNTER — HOSPITAL ENCOUNTER (EMERGENCY)
Facility: HOSPITAL | Age: 79
Discharge: HOME | End: 2024-09-26
Attending: EMERGENCY MEDICINE
Payer: MEDICARE

## 2024-09-26 ENCOUNTER — APPOINTMENT (OUTPATIENT)
Dept: GENERAL RADIOLOGY | Facility: HOSPITAL | Age: 79
End: 2024-09-26
Payer: MEDICARE

## 2024-09-26 ENCOUNTER — TELEPHONE (OUTPATIENT)
Dept: CARDIOLOGY | Facility: CLINIC | Age: 79
End: 2024-09-26

## 2024-09-26 VITALS
WEIGHT: 222 LBS | TEMPERATURE: 98.6 F | DIASTOLIC BLOOD PRESSURE: 82 MMHG | BODY MASS INDEX: 32.88 KG/M2 | HEART RATE: 81 BPM | HEIGHT: 69 IN | SYSTOLIC BLOOD PRESSURE: 127 MMHG | RESPIRATION RATE: 18 BRPM | OXYGEN SATURATION: 94 %

## 2024-09-26 DIAGNOSIS — S90.415A TOE ABRASION, LEFT, INITIAL ENCOUNTER: Primary | ICD-10-CM

## 2024-09-26 DIAGNOSIS — Z79.01 ANTICOAGULATED: ICD-10-CM

## 2024-09-26 PROCEDURE — 73660 X-RAY EXAM OF TOE(S): CPT

## 2024-09-26 PROCEDURE — 99283 EMERGENCY DEPT VISIT LOW MDM: CPT
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
## 2024-09-26 NOTE — TELEPHONE ENCOUNTER
Caller: XENIA    Relationship:     Best call back number:  [phone]      CALLED TO RESCHEDULE 9.26.24 APPT BECAUSE THE PATIENT WAS IN THE HOSPITAL.  NEXT AVAILABLE IS NOT UNTIL DECEMBER  PLEASE REACH OUT TO SCHEDULE

## 2024-09-26 NOTE — ED TRIAGE NOTES
To ER via EMS from [facility].  To ER for bleeding from wound on left great toe.  Unknown cause of injury.  Dressing in place per EMS on arrival to ER.  Pt is on Plavix and Xarelto.   Pt also has skin tear on rt forearm.  Injured yesterday when he lost his balance and struck arm on doorframe.

## 2024-10-02 ENCOUNTER — TRANSCRIBE ORDERS (OUTPATIENT)
Dept: ADMINISTRATIVE | Facility: HOSPITAL | Age: 79
End: 2024-10-02
Payer: MEDICARE

## 2024-10-02 ENCOUNTER — HOSPITAL ENCOUNTER (OUTPATIENT)
Dept: CARDIOLOGY | Facility: HOSPITAL | Age: 79
Discharge: HOME | End: 2024-10-02
Admitting: NURSE PRACTITIONER
Payer: MEDICARE

## 2024-10-02 ENCOUNTER — OFFICE VISIT (OUTPATIENT)
Dept: WOUND CARE | Facility: HOSPITAL | Age: 79
End: 2024-10-02
Payer: MEDICARE

## 2024-10-02 DIAGNOSIS — M79.662 PAIN IN BOTH LOWER LEGS: Primary | ICD-10-CM

## 2024-10-02 DIAGNOSIS — M79.661 PAIN IN BOTH LOWER LEGS: Primary | ICD-10-CM

## 2024-10-02 DIAGNOSIS — M79.662 PAIN IN BOTH LOWER LEGS: ICD-10-CM

## 2024-10-02 DIAGNOSIS — M79.661 PAIN IN BOTH LOWER LEGS: ICD-10-CM

## 2024-10-02 LAB

## 2024-10-02 PROCEDURE — 93970 EXTREMITY STUDY: CPT

## 2024-10-03 ENCOUNTER — OFFICE VISIT (OUTPATIENT)
Dept: CARDIOLOGY | Facility: CLINIC | Age: 79
End: 2024-10-03
Payer: MEDICARE

## 2024-10-03 VITALS
RESPIRATION RATE: 19 BRPM | BODY MASS INDEX: 35.1 KG/M2 | HEIGHT: 69 IN | DIASTOLIC BLOOD PRESSURE: 91 MMHG | SYSTOLIC BLOOD PRESSURE: 138 MMHG | WEIGHT: 237 LBS | HEART RATE: 94 BPM

## 2024-10-03 DIAGNOSIS — I10 PRIMARY HYPERTENSION: Primary | ICD-10-CM

## 2024-10-03 DIAGNOSIS — I35.0 AORTIC VALVE STENOSIS, ETIOLOGY OF CARDIAC VALVE DISEASE UNSPECIFIED: ICD-10-CM

## 2024-10-03 DIAGNOSIS — E78.5 HYPERLIPIDEMIA LDL GOAL <100: ICD-10-CM

## 2024-10-03 DIAGNOSIS — I25.10 CORONARY ARTERY DISEASE INVOLVING NATIVE CORONARY ARTERY OF NATIVE HEART WITHOUT ANGINA PECTORIS: ICD-10-CM

## 2024-10-03 PROCEDURE — 3080F DIAST BP >= 90 MM HG: CPT | Performed by: INTERNAL MEDICINE

## 2024-10-03 PROCEDURE — 99214 OFFICE O/P EST MOD 30 MIN: CPT | Performed by: INTERNAL MEDICINE

## 2024-10-03 PROCEDURE — 3075F SYST BP GE 130 - 139MM HG: CPT | Performed by: INTERNAL MEDICINE

## 2024-10-03 RX ORDER — DOXYCYCLINE 100 MG/1
CAPSULE ORAL
COMMUNITY
Start: 2024-10-02 | End: 2024-10-11 | Stop reason: HOSPADM

## 2024-10-03 RX ORDER — ASPIRIN 81 MG
TABLET,CHEWABLE ORAL
COMMUNITY
Start: 2024-09-27 | End: 2024-10-11 | Stop reason: HOSPADM
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
## 2024-10-06 ENCOUNTER — APPOINTMENT (OUTPATIENT)
Dept: GENERAL RADIOLOGY | Facility: HOSPITAL | Age: 79
DRG: 292 | End: 2024-10-06
Payer: MEDICARE

## 2024-10-06 ENCOUNTER — HOSPITAL ENCOUNTER (INPATIENT)
Facility: HOSPITAL | Age: 79
LOS: 4 days | Discharge: HOME | DRG: 292 | End: 2024-10-11
Attending: EMERGENCY MEDICINE | Admitting: STUDENT IN AN ORGANIZED HEALTH CARE EDUCATION/TRAINING PROGRAM
Payer: MEDICARE

## 2024-10-06 DIAGNOSIS — D64.9 ANEMIA, UNSPECIFIED TYPE: ICD-10-CM

## 2024-10-06 DIAGNOSIS — R60.0 PEDAL EDEMA: ICD-10-CM

## 2024-10-06 DIAGNOSIS — I50.9 CONGESTIVE HEART FAILURE OF UNKNOWN ETIOLOGY: Primary | ICD-10-CM

## 2024-10-06 DIAGNOSIS — R09.02 HYPOXIA: ICD-10-CM

## 2024-10-06 PROBLEM — I50.33 ACUTE ON CHRONIC DIASTOLIC HEART FAILURE: Status: ACTIVE | Noted: 2024-10-06

## 2024-10-06 PROBLEM — I50.23 ACUTE ON CHRONIC SYSTOLIC CHF (CONGESTIVE HEART FAILURE): Status: ACTIVE | Noted: 2024-10-06

## 2024-10-06 LAB
ALBUMIN SERPL-MCNC: 3.4 G/DL (ref 3.5–5.2)
ALBUMIN/GLOB SERPL: 1.3 G/DL
ALP SERPL-CCNC: 63 U/L (ref 39–117)
ALT SERPL W P-5'-P-CCNC: 13 U/L (ref 1–41)
ANION GAP SERPL CALCULATED.3IONS-SCNC: 7.3 MMOL/L (ref 5–15)
AST SERPL-CCNC: 14 U/L (ref 1–40)
BASOPHILS # BLD AUTO: 0.04 10*3/MM3 (ref 0–0.2)
BASOPHILS NFR BLD AUTO: 0.5 % (ref 0–1.5)
BILIRUB SERPL-MCNC: 0.7 MG/DL (ref 0–1.2)
BUN SERPL-MCNC: 16 MG/DL (ref 8–23)
BUN/CREAT SERPL: 18.2 (ref 7–25)
CALCIUM SPEC-SCNC: 8.8 MG/DL (ref 8.6–10.5)
CHLORIDE SERPL-SCNC: 97 MMOL/L (ref 98–107)
CO2 SERPL-SCNC: 30.7 MMOL/L (ref 22–29)
CREAT SERPL-MCNC: 0.88 MG/DL (ref 0.76–1.27)
DEPRECATED RDW RBC AUTO: 46.5 FL (ref 37–54)
EGFRCR SERPLBLD CKD-EPI 2021: 88.6 ML/MIN/1.73
EOSINOPHIL # BLD AUTO: 0.02 10*3/MM3 (ref 0–0.4)
EOSINOPHIL NFR BLD AUTO: 0.2 % (ref 0.3–6.2)
ERYTHROCYTE [DISTWIDTH] IN BLOOD BY AUTOMATED COUNT: 13.7 % (ref 12.3–15.4)
FERRITIN SERPL-MCNC: 131 NG/ML (ref 30–400)
GLOBULIN UR ELPH-MCNC: 2.7 GM/DL
GLUCOSE BLDC GLUCOMTR-MCNC: 95 MG/DL (ref 70–130)
GLUCOSE SERPL-MCNC: 106 MG/DL (ref 65–99)
HCT VFR BLD AUTO: 32 % (ref 37.5–51)
HGB BLD-MCNC: 10 G/DL (ref 13–17.7)
HOLD SPECIMEN: NORMAL
HOLD SPECIMEN: NORMAL
IMM GRANULOCYTES # BLD AUTO: 0.03 10*3/MM3 (ref 0–0.05)
IMM GRANULOCYTES NFR BLD AUTO: 0.4 % (ref 0–0.5)
IRON 24H UR-MRATE: 50 MCG/DL (ref 59–158)
IRON SATN MFR SERPL: 17 % (ref 20–50)
LYMPHOCYTES # BLD AUTO: 1.09 10*3/MM3 (ref 0.7–3.1)
LYMPHOCYTES NFR BLD AUTO: 13.4 % (ref 19.6–45.3)
MAGNESIUM SERPL-MCNC: 1.6 MG/DL (ref 1.6–2.4)
MCH RBC QN AUTO: 29.2 PG (ref 26.6–33)
MCHC RBC AUTO-ENTMCNC: 31.3 G/DL (ref 31.5–35.7)
MCV RBC AUTO: 93.3 FL (ref 79–97)
MONOCYTES # BLD AUTO: 0.95 10*3/MM3 (ref 0.1–0.9)
MONOCYTES NFR BLD AUTO: 11.7 % (ref 5–12)
NEUTROPHILS NFR BLD AUTO: 6.02 10*3/MM3 (ref 1.7–7)
NEUTROPHILS NFR BLD AUTO: 73.8 % (ref 42.7–76)
NRBC BLD AUTO-RTO: 0 /100 WBC (ref 0–0.2)
NT-PROBNP SERPL-MCNC: 2462 PG/ML (ref 0–1800)
PLATELET # BLD AUTO: 224 10*3/MM3 (ref 140–450)
PMV BLD AUTO: 9.1 FL (ref 6–12)
POTASSIUM SERPL-SCNC: 4.1 MMOL/L (ref 3.5–5.2)
PROCALCITONIN SERPL-MCNC: 0.04 NG/ML (ref 0–0.25)
PROT SERPL-MCNC: 6.1 G/DL (ref 6–8.5)
QT INTERVAL: 390 MS
QTC INTERVAL: 511 MS
RBC # BLD AUTO: 3.43 10*6/MM3 (ref 4.14–5.8)
SODIUM SERPL-SCNC: 135 MMOL/L (ref 136–145)
TIBC SERPL-MCNC: 298 MCG/DL (ref 298–536)
TRANSFERRIN SERPL-MCNC: 200 MG/DL (ref 200–360)
TROPONIN T SERPL HS-MCNC: 31 NG/L
VALPROATE SERPL-MCNC: 70 MCG/ML (ref 50–125)
WBC NRBC COR # BLD AUTO: 8.15 10*3/MM3 (ref 3.4–10.8)
WHOLE BLOOD HOLD COAG: NORMAL
WHOLE BLOOD HOLD SPECIMEN: NORMAL

## 2024-10-06 PROCEDURE — 99285 EMERGENCY DEPT VISIT HI MDM: CPT

## 2024-10-06 PROCEDURE — 82948 REAGENT STRIP/BLOOD GLUCOSE: CPT

## 2024-10-06 PROCEDURE — 85025 COMPLETE CBC W/AUTO DIFF WBC: CPT | Performed by: EMERGENCY MEDICINE

## 2024-10-06 PROCEDURE — 71045 X-RAY EXAM CHEST 1 VIEW: CPT

## 2024-10-06 PROCEDURE — 83880 ASSAY OF NATRIURETIC PEPTIDE: CPT | Performed by: EMERGENCY MEDICINE

## 2024-10-06 PROCEDURE — 93005 ELECTROCARDIOGRAM TRACING: CPT | Performed by: EMERGENCY MEDICINE

## 2024-10-06 PROCEDURE — 83735 ASSAY OF MAGNESIUM: CPT | Performed by: EMERGENCY MEDICINE

## 2024-10-06 PROCEDURE — 84466 ASSAY OF TRANSFERRIN: CPT | Performed by: STUDENT IN AN ORGANIZED HEALTH CARE EDUCATION/TRAINING PROGRAM

## 2024-10-06 PROCEDURE — 84484 ASSAY OF TROPONIN QUANT: CPT | Performed by: EMERGENCY MEDICINE

## 2024-10-06 PROCEDURE — 82728 ASSAY OF FERRITIN: CPT | Performed by: STUDENT IN AN ORGANIZED HEALTH CARE EDUCATION/TRAINING PROGRAM

## 2024-10-06 PROCEDURE — G0378 HOSPITAL OBSERVATION PER HR: HCPCS

## 2024-10-06 PROCEDURE — 25010000002 FUROSEMIDE PER 20 MG: Performed by: STUDENT IN AN ORGANIZED HEALTH CARE EDUCATION/TRAINING PROGRAM

## 2024-10-06 PROCEDURE — 87102 FUNGUS ISOLATION CULTURE: CPT | Performed by: STUDENT IN AN ORGANIZED HEALTH CARE EDUCATION/TRAINING PROGRAM

## 2024-10-06 PROCEDURE — 25010000002 CEFTRIAXONE PER 250 MG: Performed by: STUDENT IN AN ORGANIZED HEALTH CARE EDUCATION/TRAINING PROGRAM

## 2024-10-06 PROCEDURE — 80053 COMPREHEN METABOLIC PANEL: CPT | Performed by: EMERGENCY MEDICINE

## 2024-10-06 PROCEDURE — 80164 ASSAY DIPROPYLACETIC ACD TOT: CPT | Performed by: EMERGENCY MEDICINE

## 2024-10-06 PROCEDURE — 84145 PROCALCITONIN (PCT): CPT | Performed by: STUDENT IN AN ORGANIZED HEALTH CARE EDUCATION/TRAINING PROGRAM

## 2024-10-06 PROCEDURE — 25010000002 FUROSEMIDE PER 20 MG: Performed by: EMERGENCY MEDICINE

## 2024-10-06 PROCEDURE — 83540 ASSAY OF IRON: CPT | Performed by: STUDENT IN AN ORGANIZED HEALTH CARE EDUCATION/TRAINING PROGRAM

## 2024-10-06 RX ORDER — CLOPIDOGREL BISULFATE 75 MG/1
75 TABLET ORAL DAILY
Status: DISCONTINUED | OUTPATIENT
Start: 2024-10-07 | End: 2024-10-11 | Stop reason: HOSPADM

## 2024-10-06 RX ORDER — TAMSULOSIN HYDROCHLORIDE 0.4 MG/1
0.8 CAPSULE ORAL DAILY
Status: DISCONTINUED | OUTPATIENT
Start: 2024-10-06 | End: 2024-10-11 | Stop reason: HOSPADM

## 2024-10-06 RX ORDER — LOPERAMIDE HCL 2 MG
2 CAPSULE ORAL 3 TIMES DAILY PRN
Status: DISCONTINUED | OUTPATIENT
Start: 2024-10-06 | End: 2024-10-11 | Stop reason: HOSPADM

## 2024-10-06 RX ORDER — SODIUM CHLORIDE 0.9 % (FLUSH) 0.9 %
10 SYRINGE (ML) INJECTION AS NEEDED
Status: DISCONTINUED | OUTPATIENT
Start: 2024-10-06 | End: 2024-10-11 | Stop reason: HOSPADM

## 2024-10-06 RX ORDER — POTASSIUM CHLORIDE 1.5 G/1.58G
40 POWDER, FOR SOLUTION ORAL ONCE
Status: COMPLETED | OUTPATIENT
Start: 2024-10-06 | End: 2024-10-06

## 2024-10-06 RX ORDER — SODIUM CHLORIDE 0.9 % (FLUSH) 0.9 %
10 SYRINGE (ML) INJECTION EVERY 12 HOURS SCHEDULED
Status: DISCONTINUED | OUTPATIENT
Start: 2024-10-06 | End: 2024-10-11 | Stop reason: HOSPADM

## 2024-10-06 RX ORDER — LISINOPRIL 5 MG/1
5 TABLET ORAL DAILY
Status: DISCONTINUED | OUTPATIENT
Start: 2024-10-06 | End: 2024-10-11 | Stop reason: HOSPADM

## 2024-10-06 RX ORDER — BISACODYL 5 MG/1
5 TABLET, DELAYED RELEASE ORAL DAILY PRN
Status: DISCONTINUED | OUTPATIENT
Start: 2024-10-06 | End: 2024-10-11 | Stop reason: HOSPADM

## 2024-10-06 RX ORDER — LEVOTHYROXINE SODIUM 50 UG/1
50 TABLET ORAL
Status: DISCONTINUED | OUTPATIENT
Start: 2024-10-06 | End: 2024-10-11 | Stop reason: HOSPADM

## 2024-10-06 RX ORDER — UREA 10 %
1000 LOTION (ML) TOPICAL DAILY
Status: DISCONTINUED | OUTPATIENT
Start: 2024-10-06 | End: 2024-10-11 | Stop reason: HOSPADM

## 2024-10-06 RX ORDER — ATORVASTATIN CALCIUM 20 MG/1
40 TABLET, FILM COATED ORAL NIGHTLY
Status: DISCONTINUED | OUTPATIENT
Start: 2024-10-06 | End: 2024-10-11 | Stop reason: HOSPADM

## 2024-10-06 RX ORDER — FERROUS SULFATE 325(65) MG
325 TABLET ORAL
Status: DISCONTINUED | OUTPATIENT
Start: 2024-10-07 | End: 2024-10-11 | Stop reason: HOSPADM

## 2024-10-06 RX ORDER — DIVALPROEX SODIUM 500 MG/1
500 TABLET, DELAYED RELEASE ORAL 2 TIMES DAILY
Status: DISCONTINUED | OUTPATIENT
Start: 2024-10-06 | End: 2024-10-11 | Stop reason: HOSPADM

## 2024-10-06 RX ORDER — PANTOPRAZOLE SODIUM 40 MG/1
40 TABLET, DELAYED RELEASE ORAL
Status: DISCONTINUED | OUTPATIENT
Start: 2024-10-07 | End: 2024-10-11 | Stop reason: HOSPADM

## 2024-10-06 RX ORDER — FUROSEMIDE 10 MG/ML
60 INJECTION INTRAMUSCULAR; INTRAVENOUS ONCE
Status: COMPLETED | OUTPATIENT
Start: 2024-10-06 | End: 2024-10-06

## 2024-10-06 RX ORDER — POLYETHYLENE GLYCOL 3350 17 G/17G
17 POWDER, FOR SOLUTION ORAL DAILY PRN
Status: DISCONTINUED | OUTPATIENT
Start: 2024-10-06 | End: 2024-10-11 | Stop reason: HOSPADM

## 2024-10-06 RX ORDER — OXYBUTYNIN CHLORIDE 5 MG/1
5 TABLET, EXTENDED RELEASE ORAL DAILY
Status: DISCONTINUED | OUTPATIENT
Start: 2024-10-06 | End: 2024-10-11 | Stop reason: HOSPADM

## 2024-10-06 RX ORDER — NITROGLYCERIN 0.4 MG/1
0.4 TABLET SUBLINGUAL
Status: DISCONTINUED | OUTPATIENT
Start: 2024-10-06 | End: 2024-10-06

## 2024-10-06 RX ORDER — ASPIRIN 81 MG/1
81 TABLET, CHEWABLE ORAL DAILY
Status: DISCONTINUED | OUTPATIENT
Start: 2024-10-06 | End: 2024-10-11 | Stop reason: HOSPADM

## 2024-10-06 RX ORDER — BISACODYL 10 MG
10 SUPPOSITORY, RECTAL RECTAL DAILY PRN
Status: DISCONTINUED | OUTPATIENT
Start: 2024-10-06 | End: 2024-10-11 | Stop reason: HOSPADM

## 2024-10-06 RX ORDER — NITROGLYCERIN 0.4 MG/1
0.4 TABLET SUBLINGUAL
Status: DISCONTINUED | OUTPATIENT
Start: 2024-10-06 | End: 2024-10-11 | Stop reason: HOSPADM

## 2024-10-06 RX ORDER — ROPINIROLE 0.5 MG/1
0.25 TABLET, FILM COATED ORAL 3 TIMES DAILY
Status: DISCONTINUED | OUTPATIENT
Start: 2024-10-06 | End: 2024-10-11 | Stop reason: HOSPADM

## 2024-10-06 RX ORDER — AMOXICILLIN 250 MG
2 CAPSULE ORAL 2 TIMES DAILY
Status: DISCONTINUED | OUTPATIENT
Start: 2024-10-06 | End: 2024-10-11 | Stop reason: HOSPADM

## 2024-10-06 RX ORDER — FOLIC ACID 1 MG/1
1 TABLET ORAL DAILY
Status: DISCONTINUED | OUTPATIENT
Start: 2024-10-06 | End: 2024-10-11 | Stop reason: HOSPADM

## 2024-10-06 RX ORDER — FUROSEMIDE 10 MG/ML
40 INJECTION INTRAMUSCULAR; INTRAVENOUS
Status: DISCONTINUED | OUTPATIENT
Start: 2024-10-06 | End: 2024-10-10

## 2024-10-06 RX ADMIN — CEFTRIAXONE 2000 MG: 2 INJECTION, POWDER, FOR SOLUTION INTRAMUSCULAR; INTRAVENOUS at 16:44

## 2024-10-06 RX ADMIN — OXYBUTYNIN CHLORIDE 5 MG: 5 TABLET, EXTENDED RELEASE ORAL at 16:43

## 2024-10-06 RX ADMIN — ATORVASTATIN CALCIUM 40 MG: 20 TABLET, FILM COATED ORAL at 20:35

## 2024-10-06 RX ADMIN — FOLIC ACID 1 MG: 1 TABLET ORAL at 16:45

## 2024-10-06 RX ADMIN — FUROSEMIDE 40 MG: 10 INJECTION, SOLUTION INTRAMUSCULAR; INTRAVENOUS at 18:21

## 2024-10-06 RX ADMIN — DIVALPROEX SODIUM 500 MG: 500 TABLET, DELAYED RELEASE ORAL at 20:35

## 2024-10-06 RX ADMIN — Medication 1000 MCG: at 16:45

## 2024-10-06 RX ADMIN — Medication 10 ML: at 20:37

## 2024-10-06 RX ADMIN — Medication 10 ML: at 15:34

## 2024-10-06 RX ADMIN — SENNOSIDES AND DOCUSATE SODIUM 2 TABLET: 50; 8.6 TABLET ORAL at 20:35

## 2024-10-06 RX ADMIN — TAMSULOSIN HYDROCHLORIDE 0.8 MG: 0.4 CAPSULE ORAL at 16:43

## 2024-10-06 RX ADMIN — SENNOSIDES AND DOCUSATE SODIUM 2 TABLET: 50; 8.6 TABLET ORAL at 16:44

## 2024-10-06 RX ADMIN — FUROSEMIDE 60 MG: 10 INJECTION, SOLUTION INTRAMUSCULAR; INTRAVENOUS at 10:47

## 2024-10-06 RX ADMIN — POTASSIUM CHLORIDE 40 MEQ: 1.5 FOR SOLUTION ORAL at 20:35

## 2024-10-06 RX ADMIN — ASPIRIN 81 MG: 81 TABLET, CHEWABLE ORAL at 16:42

## 2024-10-06 RX ADMIN — ROPINIROLE HYDROCHLORIDE 0.25 MG: 0.5 TABLET, FILM COATED ORAL at 16:42

## 2024-10-06 RX ADMIN — ROPINIROLE HYDROCHLORIDE 0.25 MG: 0.5 TABLET, FILM COATED ORAL at 20:35

## 2024-10-06 NOTE — ED NOTES
Nursing report ED to floor  Hernando Lozada  79 y.o.  male    HPI :   Chief Complaint   Patient presents with    Leg Swelling       Admitting doctor:   Jacqui Chambers MD    Admitting diagnosis:   The primary encounter diagnosis was Congestive heart failure of unknown etiology. Diagnoses of Hypoxia, Pedal edema, and Anemia, unspecified type were also pertinent to this visit.    Code status:   Current Code Status       Date Active Code Status Order ID Comments User Context       Prior            Allergies:   Patient has no known allergies.    Intake and Output    Intake/Output Summary (Last 24 hours) at 10/6/2024 1141  Last data filed at 10/6/2024 1118  Gross per 24 hour   Intake --   Output 1050 ml   Net -1050 ml       Weight:       10/06/24  0854   Weight: 107 kg (235 lb)       Most recent vitals:   Vitals:    10/06/24 1001 10/06/24 1002 10/06/24 1101 10/06/24 1131   BP: 128/82  100/71 92/72   Pulse: 117 92 98 104   Resp:       Temp:       TempSrc:       SpO2: 94% 94% 94% 93%   Weight:       Height:           Active LDAs/IV Access:   Lines, Drains & Airways       Active LDAs       Name Placement date Placement time Site Days    Peripheral IV 10/06/24 0907 Right Antecubital 10/06/24  0907  Antecubital  less than 1                    Labs (abnormal labs have a star):   Labs Reviewed   COMPREHENSIVE METABOLIC PANEL - Abnormal; Notable for the following components:       Result Value    Glucose 106 (*)     Sodium 135 (*)     Chloride 97 (*)     CO2 30.7 (*)     Albumin 3.4 (*)     All other components within normal limits    Narrative:     GFR Normal >60  Chronic Kidney Disease <60  Kidney Failure <15    The GFR formula is only valid for adults with stable renal function between ages 18 and 70.   BNP (IN-HOUSE) - Abnormal; Notable for the following components:    proBNP 2,462.0 (*)     All other components within normal limits    Narrative:     This assay is used as an aid in the diagnosis of individuals suspected of  having heart failure. It can be used as an aid in the diagnosis of acute decompensated heart failure (ADHF) in patients presenting with signs and symptoms of ADHF to the emergency department (ED). In addition, NT-proBNP of <300 pg/mL indicates ADHF is not likely.    Age Range Result Interpretation  NT-proBNP Concentration (pg/mL:      <50             Positive            >450                   Gray                 300-450                    Negative             <300    50-75           Positive            >900                  Gray                300-900                  Negative            <300      >75             Positive            >1800                  Gray                300-1800                  Negative            <300   SINGLE HS TROPONIN T - Abnormal; Notable for the following components:    HS Troponin T 31 (*)     All other components within normal limits    Narrative:     High Sensitive Troponin T Reference Range:  <14.0 ng/L- Negative Female for AMI  <22.0 ng/L- Negative Male for AMI  >=14 - Abnormal Female indicating possible myocardial injury.  >=22 - Abnormal Male indicating possible myocardial injury.   Clinicians would have to utilize clinical acumen, EKG, Troponin, and serial changes to determine if it is an Acute Myocardial Infarction or myocardial injury due to an underlying chronic condition.        CBC WITH AUTO DIFFERENTIAL - Abnormal; Notable for the following components:    RBC 3.43 (*)     Hemoglobin 10.0 (*)     Hematocrit 32.0 (*)     MCHC 31.3 (*)     Lymphocyte % 13.4 (*)     Eosinophil % 0.2 (*)     Monocytes, Absolute 0.95 (*)     All other components within normal limits   VALPROIC ACID LEVEL, TOTAL - Normal    Narrative:     Therapeutic Ranges for Valproic Acid    Epilepsy:       mcg/ml  Bipolar/[place]  up to 125 mcg/ml     MAGNESIUM - Normal   RAINBOW DRAW    Narrative:     The following orders were created for panel order [place] Draw.  Procedure                                Abnormality         Status                     ---------                               -----------         ------                     Green Top (Gel)[[id number]]                                  Final result               Lavender Top[[id number]]                                     Final result               Gold Top - SST[[id number]]                                   Final result               Light Blue Top[[id number]]                                   Final result                 Please view results for these tests on the individual orders.   CBC AND DIFFERENTIAL    Narrative:     The following orders were created for panel order CBC & Differential.  Procedure                               Abnormality         Status                     ---------                               -----------         ------                     CBC Auto Differential[[id number]]        Abnormal            Final result                 Please view results for these tests on the individual orders.   GREEN TOP   LAVENDER TOP   GOLD TOP - SST   LIGHT BLUE TOP       EKG:   ECG 12 Lead Tachycardia   Preliminary Result   HEART IMWM=303  bpm   RR Crnzfutu=126  ms   MD Interval=  ms   P Horizontal Axis=  deg   P Front Axis=  deg   QRSD Interval=98  ms   QT Pwmfjhpt=833  ms   QQcY=289  ms   QRS Axis=7  deg   T Wave Axis=123  deg   - ABNORMAL ECG -   Atrial flutter   Abnormal T, consider ischemia, lateral leads   Prolonged QT interval   Date and Time of Study:2024-10-06 08:53:02          Meds given in ED:   Medications   sodium chloride 0.9 % flush 10 mL (has no administration in time range)   furosemide (LASIX) injection 60 mg (60 mg Intravenous Given 10/6/24 1047)       Imaging results:  XR Chest 1 View    Result Date: 10/6/2024  Mild pulmonary vascular congestion with minimal pleural effusions.    This report was finalized on 10/6/2024 9:08 AM by Dr. Chava Flores M.D on Workstation: [id number]       Ambulatory status:   - BR    Social issues:    Social History     Socioeconomic History    Marital status: Single   Tobacco Use    Smoking status: Never     Passive exposure: Never    Smokeless tobacco: Never   Vaping Use    Vaping status: Never Used   Substance and Sexual Activity    Alcohol use: No    Drug use: No    Sexual activity: Defer       NIH Stroke Scale:        Nursing report ED to floor:

## 2024-10-06 NOTE — ED PROVIDER NOTES
EMERGENCY DEPARTMENT ENCOUNTER    CHIEF COMPLAINT  Chief Complaint: Bilateral lower extremity pain/swelling  History given by: Patient  History limited by: Nothing  Room Number: 10/10  PMD: Sammie Gambino APRN      HPI:  Pt is a 79 y.o. male with longstanding history of TBI presents sent from [facility] facility with report of bilateral lower extremity swelling/pain.  Patient reports he has had escalating swelling of bilateral lower extremities over the past 6 months, worse over the past 2 days with too much pain in his lower extremities to walk this morning.  Patient denies chest pain, shortness of air, fever, abdominal pain, changes to urinary or bowel habits.  Patient reports he has had a cough for 2 days.  Patient reports wounds with some bleeding to bilateral legs.  No active bleeding at this time.  Patient reports he stubbed his left great toe on a nail several days ago with significant bleeding at that time, now resolved.      Aggravating Factors: Unknown  Alleviating Factors: Nothing  Treatment before arrival: Regular meds including diuretics  Chronic or social conditions impacting care: History of TBI    Additional sources:  Discussed/ obtained information from independent historians: EMS    External (non-ED) record review:   Atrial fibs, type 2 diabetes, CAD, hypertension, hypothyroidism, TBI/chronic brain injury, recurrent falls with recent admission 8/8 through 8/9/2023 for generalized weakness and frequent falls        PAST MEDICAL HISTORY  Active Ambulatory Problems     Diagnosis Date Noted    DJD (degenerative joint disease) of knee 12/14/2017    Primary hypertension 02/18/2019    SOB (shortness of breath) 02/18/2019    Leg swelling 02/18/2019    Hyperlipidemia LDL goal <100 08/23/2019    COVID-19 11/25/2022    Diabetes mellitus     GIOVANNY (obstructive sleep apnea)     Disease of thyroid gland     CKD (chronic kidney disease)     Hypomagnesemia     Chronic brain injury     Generalized  weakness     CAD (coronary artery disease)     Pedal edema 03/06/2023    Tremor 03/06/2023    Elevated troponin 03/06/2023    Lower extremity cellulitis 03/06/2023    Tinea cruris 03/06/2023    Chronic deep vein thrombosis (DVT) of calf muscle vein of left lower extremity 03/07/2023    Aortic stenosis, severe 03/06/2023    Acute urinary tract infection 04/17/2023    Hypothyroidism 04/17/2023    Acute urinary retention 04/19/2023    Acute bacterial prostatitis 04/19/2023    Chest pain 06/25/2024    Recurrent falls 08/08/2024    Hypokalemia 08/09/2024     Resolved Ambulatory Problems     Diagnosis Date Noted    Chest pain with high risk of acute coronary syndrome 04/29/2019    Chest pain 01/02/2024    Chest pain 01/04/2024     Past Medical History:   Diagnosis Date    Arthritis     Bilateral leg edema     GERD (gastroesophageal reflux disease)     Heart murmur     History of head injury     [place] (hard of hearing)     Hyperlipidemia     Hypertension     Irregular heart beat     Osteoarthritis     Past heart attack     SOB (shortness of breath) on exertion     Stroke     Vasovagal episode        PAST SURGICAL HISTORY  Past Surgical History:   Procedure Laterality Date    CARDIAC CATHETERIZATION N/A 4/30/2019    Procedure: Coronary angiography with grafts;  Surgeon: Rio Miranda MD;  [facility];  Service: Cardiology    CARDIAC CATHETERIZATION N/A 4/30/2019    Procedure: Left Heart Cath;  Surgeon: Rio Miranda MD;  [facility];  Service: Cardiology    CARDIAC CATHETERIZATION N/A 4/30/2019    Procedure: Left ventriculography;  Surgeon: Rio Miranda MD;  [facility];  Service: Cardiology    CARDIAC CATHETERIZATION  4/30/2019    Procedure: Saphenous Vein Graft;  Surgeon: Rio Miranda MD;  [facility];  Service: Cardiology    CARDIAC CATHETERIZATION N/A 4/30/2019    Procedure:  Stent GLENDY coronary;  Surgeon: Rio Miranda MD;  [facility];  Service: Cardiology    CARDIAC CATHETERIZATION N/A 3/10/2023    Procedure: Right and Left Heart Cath;  Surgeon: Rio Miranda MD;  [facility];  Service: Cardiology;  Laterality: N/A;    CARDIAC CATHETERIZATION N/A 3/10/2023    Procedure: Coronary angiography;  Surgeon: Rio Miranda MD;  [facility];  Service: Cardiology;  Laterality: N/A;    CARDIAC CATHETERIZATION N/A 3/10/2023    Procedure: Left ventriculography;  Surgeon: Rio Miranda MD;  [facility];  Service: Cardiology;  Laterality: N/A;    CARDIAC CATHETERIZATION N/A 3/10/2023    Procedure: Percutaneous Coronary Intervention;  Surgeon: Rio Miranda MD;  [facility];  Service: Cardiology;  Laterality: N/A;    CARDIAC CATHETERIZATION N/A 3/10/2023    Procedure: Stent GLENDY coronary;  Surgeon: Rio Miranda MD;  [facility];  Service: Cardiology;  Laterality: N/A;    CARDIAC CATHETERIZATION N/A 1/3/2024    Procedure: Left Heart Cath;  Surgeon: Rio Miranda MD;  [facility];  Service: Cardiovascular;  Laterality: N/A;    CARDIAC CATHETERIZATION N/A 1/3/2024    Procedure: Coronary angiography;  Surgeon: Rio Miranda MD;  Location: [facility] CATH INVASIVE LOCATION;  Service: Cardiovascular;  Laterality: N/A;    CARDIAC CATHETERIZATION N/A 1/3/2024    Procedure: Percutaneous Coronary Intervention;  Surgeon: Rio Miranda MD;  [facility];  Service: Cardiovascular;  Laterality: N/A;    CARDIAC CATHETERIZATION N/A 1/3/2024    Procedure: Left ventriculography;  Surgeon: Rio Miranda MD;  [facility];  Service: Cardiovascular;  Laterality: N/A;    CARDIAC  CATHETERIZATION Left 1/3/2024    Procedure: Native mammary injection;  Surgeon: Rio Miranda MD;  [facility];  Service: Cardiovascular;  Laterality: Left;    CARDIAC CATHETERIZATION N/A 6/26/2024    Procedure: Right and Left Heart Cath;  Surgeon: Rio Miranda MD;  [facility]ATH [facility]VAS[facility];  Service: Cardiovascular;  Laterality: N/A;    CARDIAC CATHETERIZATION N/A 6/26/2024    Procedure: Percutaneous Coronary Intervention;  Surgeon: Rio Miranda MD;  Location:  [facility] INVAS[facility];  Service: Cardiovascular;  Laterality: N/A;    CARDIAC CATHETERIZATION N/A 6/26/2024    Procedure: Left ventriculography;  Surgeon: Rio Miranda MD;  Location:  [facility]ATH INVAS[facility];  Service: Cardiovascular;  Laterality: N/A;    CARDIAC CATHETERIZATION N/A 6/26/2024    Procedure: Coronary angiography;  Surgeon: Rio Miranda MD;  [facility];  Service: Cardiovascular;  Laterality: N/A;    CARDIAC CATHETERIZATION  6/26/2024    Procedure: Saphenous Vein Graft;  Surgeon: Rio Miranda MD;  [facility];  Service: Cardiovascular;;    CARDIAC CATHETERIZATION N/A 6/26/2024    Procedure: Stent GLENDY coronary;  Surgeon: Rio Miranda MD;  [facility];  Service: Cardiovascular;  Laterality: N/A;    CARPAL TUNNEL RELEASE Bilateral     CATARACT EXTRACTION      CORONARY ARTERY BYPASS GRAFT  2002    FINGER SURGERY      MISSING LEFT POINTER FINGER TIP    INTERVENTIONAL RADIOLOGY PROCEDURE N/A 6/26/2024    Procedure: Intravascular Ultrasound;  Surgeon: Rio Miranda MD;  [facility];  Service: Cardiovascular;  Laterality: N/A;    KNEE ARTHROPLASTY Right 02/15/2017    OR ARTHRP KNE CONDYLE&PLATU MEDIAL&LAT COMPARTMENTS Left 12/14/2017    Procedure: LT TOTAL KNEE ARTHROPLASTY;  Surgeon: Jatin Lancaster MD;   Location: [facility] MAIN OR;  Service: Orthopedics    KS RT/LT HEART CATHETERS N/A 4/30/2019    Procedure: Percutaneous Coronary Intervention;  Surgeon: Rio Miranda MD;  Location: [facility] CATH INVASIVE LOCATION;  Service: Cardiology       FAMILY HISTORY  Family History   Problem Relation Age of Onset    Parkinsonism Sister     Diabetes Brother     Malmisty Hyperthermia Neg Hx        SOCIAL HISTORY  Social History     Socioeconomic History    Marital status: Single   Tobacco Use    Smoking status: Never     Passive exposure: Never    Smokeless tobacco: Never   Vaping Use    Vaping status: Never Used   Substance and Sexual Activity    Alcohol use: No    Drug use: No    Sexual activity: Defer       ALLERGIES  Patient has no known allergies.    REVIEW OF SYSTEMS  Review of Systems    PHYSICAL EXAM  ED Triage Vitals   Temp Heart Rate Resp BP SpO2   10/06/24 0831 10/06/24 0828 10/06/24 0828 10/06/24 0828 10/06/24 0828   98.3 °F (36.8 °C) 100 20 130/82 95 %      Temp src Heart Rate Source Patient Position BP Location FiO2 (%)   10/06/24 0831 -- -- -- --   Oral           Physical Exam  Vitals and nursing note reviewed.   Constitutional:       General: He is in acute distress.   HENT:      Head: Normocephalic and atraumatic.   Cardiovascular:      Rate and Rhythm: Normal rate and regular rhythm.      Pulses:           Posterior tibial pulses are 2+ on the right side and 2+ on the left side.      Heart sounds: Normal heart sounds. No murmur heard.  Pulmonary:      Effort: Pulmonary effort is normal. No respiratory distress.      Breath sounds: Normal breath sounds. No wheezing.   Abdominal:      General: Bowel sounds are normal.      Palpations: Abdomen is soft.      Tenderness: There is no abdominal tenderness. There is no guarding or rebound.   Musculoskeletal:         General: Normal range of motion.      Cervical back: Normal range of motion.      Right lower leg: Edema (2+) present.      Left lower leg: Edema (2+)  present.   Skin:     General: Skin is warm and dry.      Capillary Refill: Capillary refill takes less than 2 seconds.      Comments: Symmetric erythema bilateral lower extremities starting below the knee, no abrupt transition of skin color noted, a few skin abrasions without active bleeding or open draining wounds, puncture eric noted at the tip of left great toe without signs of bleeding or infection   Neurological:      Mental Status: He is alert and oriented to person, place, and time.      Comments: Resting tremor noted upper extremities   Psychiatric:         Mood and Affect: Mood and affect normal.         LAB RESULTS  Recent Results (from the past 24 hour(s))   ECG 12 Lead Tachycardia    Collection Time: 10/06/24  8:53 AM   Result Value Ref Range    QT Interval 390 ms    QTC Interval 511 ms   Comprehensive Metabolic Panel    Collection Time: 10/06/24  9:10 AM    Specimen: Blood   Result Value Ref Range    Glucose 106 (H) 65 - 99 mg/dL    BUN 16 8 - 23 mg/dL    Creatinine 0.88 0.76 - 1.27 mg/dL    Sodium 135 (L) 136 - 145 mmol/L    Potassium 4.1 3.5 - 5.2 mmol/L    Chloride 97 (L) 98 - 107 mmol/L    CO2 30.7 (H) 22.0 - 29.0 mmol/L    Calcium 8.8 8.6 - 10.5 mg/dL    Total Protein 6.1 6.0 - 8.5 g/dL    Albumin 3.4 (L) 3.5 - 5.2 g/dL    ALT (SGPT) 13 1 - 41 U/L    AST (SGOT) 14 1 - 40 U/L    Alkaline Phosphatase 63 39 - 117 U/L    Total Bilirubin 0.7 0.0 - 1.2 mg/dL    Globulin 2.7 gm/dL    A/G Ratio 1.3 g/dL    BUN/Creatinine Ratio 18.2 7.0 - 25.0    Anion Gap 7.3 5.0 - 15.0 mmol/L    eGFR 88.6 >60.0 mL/min/1.73   BNP    Collection Time: 10/06/24  9:10 AM    Specimen: Blood   Result Value Ref Range    proBNP 2,462.0 (H) 0.0 - 1,800.0 pg/mL   Single High Sensitivity Troponin T    Collection Time: 10/06/24  9:10 AM    Specimen: Blood   Result Value Ref Range    HS Troponin T 31 (H) <22 ng/L   Valproic Acid Level, Total    Collection Time: 10/06/24  9:10 AM    Specimen: Blood   Result Value Ref Range    Valproic  Acid 70.0 50.0 - 125.0 mcg/mL   CBC Auto Differential    Collection Time: 10/06/24  9:10 AM    Specimen: Blood   Result Value Ref Range    WBC 8.15 3.40 - 10.80 10*3/mm3    RBC 3.43 (L) 4.14 - 5.80 10*6/mm3    Hemoglobin 10.0 (L) 13.0 - 17.7 g/dL    Hematocrit 32.0 (L) 37.5 - 51.0 %    MCV 93.3 79.0 - 97.0 fL    MCH 29.2 26.6 - 33.0 pg    MCHC 31.3 (L) 31.5 - 35.7 g/dL    RDW 13.7 12.3 - 15.4 %    RDW-SD 46.5 37.0 - 54.0 fl    MPV 9.1 6.0 - 12.0 fL    Platelets 224 140 - 450 10*3/mm3    Neutrophil % 73.8 42.7 - 76.0 %    Lymphocyte % 13.4 (L) 19.6 - 45.3 %    Monocyte % 11.7 5.0 - 12.0 %    Eosinophil % 0.2 (L) 0.3 - 6.2 %    Basophil % 0.5 0.0 - 1.5 %    Immature Grans % 0.4 0.0 - 0.5 %    Neutrophils, Absolute 6.02 1.70 - 7.00 10*3/mm3    Lymphocytes, Absolute 1.09 0.70 - 3.10 10*3/mm3    Monocytes, Absolute 0.95 (H) 0.10 - 0.90 10*3/mm3    Eosinophils, Absolute 0.02 0.00 - 0.40 10*3/mm3    Basophils, Absolute 0.04 0.00 - 0.20 10*3/mm3    Immature Grans, Absolute 0.03 0.00 - 0.05 10*3/mm3    nRBC 0.0 0.0 - 0.2 /100 WBC   Green Top (Gel)    Collection Time: 10/06/24  9:10 AM   Result Value Ref Range    Extra Tube Hold for add-ons.    Lavender Top    Collection Time: 10/06/24  9:10 AM   Result Value Ref Range    Extra Tube hold for add-on    Gold Top - SST    Collection Time: 10/06/24  9:10 AM   Result Value Ref Range    Extra Tube Hold for add-ons.    Light Blue Top    Collection Time: 10/06/24  9:10 AM   Result Value Ref Range    Extra Tube Hold for add-ons.    Magnesium    Collection Time: 10/06/24  9:10 AM    Specimen: Blood   Result Value Ref Range    Magnesium 1.6 1.6 - 2.4 mg/dL       I ordered the above labs and reviewed the results    RADIOLOGY  XR Chest 1 View    Result Date: 10/6/2024  XR CHEST 1 VW-  HISTORY: Male who is 79 years-old, evaluate for edema  TECHNIQUE: Frontal view of the chest  COMPARISON: 8/27/2024  FINDINGS: The heart size is normal. Pulmonary vasculature is mildly congested. Sternotomy  wires are present.  No focal pulmonary consolidation. Minimal pleural effusions are suggested. No pneumothorax. No acute osseous process.      Mild pulmonary vascular congestion with minimal pleural effusions.    This report was finalized on 10/6/2024 9:08 AM by Dr. Chava Flores M.D on Workstation: [email or web address]       I ordered the above noted radiological studies. Interpreted by radiologist. Viewed and interpreted by me in PACS -no large pneumothorax      PROCEDURES  Procedures      MEDICATIONS GIVEN IN THE EMERGENCY DEPARTMENT  Medications   sodium chloride 0.9 % flush 10 mL (has no administration in time range)   furosemide (LASIX) injection 60 mg (60 mg Intravenous Given 10/6/24 1047)           MEDICAL DECISION MAKING  Results were reviewed/discussed with the patient and they were also made aware of online access. Pt also made aware that some labs, such as cultures, will not be resulted during ER visit and followup with PMD is necessary.   EKGs and labs independently viewed and interpreted by me.  Discussion below represents my analysis of pertinent findings related to patient's condition, differential diagnosis, treatment plan and final disposition.    Differential diagnosis includes but is not limited to cellulitis, peripheral vascular disease, venous insufficiency, malnutrition, liver disease, DVT, congestive heart failure, vasculitis    Independent interpretation of labs, radiology studies, and discussions with consultants:  ED Course as of 10/06/24 1136   Sun Oct 06, 2024   [facility] ED RN reports patient's O2 sats 87% on room air and staff at his facility report he is not on supplemental oxygen at baseline [TO]   1132 Discussed with Dr. Chambers, on-call for A who is aware of patient's presentation, imaging, labs, hypoxia in the ER and he agrees to admit for further testing, treatment as needed.  We discussed that patient is not appropriate for 5 S. observation unit given his complicated history and  significant need for resources [TO]      ED Course User Index  [TO] Betty Hughes MD       EKG          EKG time: 853  Rhythm/Rate: Atrial fibrillation, rate in the 100s  P waves and MS: Not well-appreciated  QRS, axis: Poor R wave progression  ST and T waves: Nonspecific ST/T wave findings, long QT    Interpreted Contemporaneously by me, independently viewed  changed compared to prior 8/27/2024, now with increased rate, nonspecific ST/T wave findings          MDM         DIAGNOSIS  Final diagnoses:   Congestive heart failure of unknown etiology   Hypoxia   Pedal edema   Anemia, unspecified type       DISPOSITION  ADMISSION    Discussed treatment plan and reason for admission with pt/family and admitting physician.  Pt/family voiced understanding of the plan for admission for further testing/treatment as needed.         Latest Documented Vital Signs:  As of 11:36 EDT  BP- 100/71 HR- 98 Temp- 98.3 °F (36.8 °C) (Oral) O2 sat- 94%    --      Please note that portions of this note were completed with a voice recognition program.       Note Disclaimer: At [facility], we believe that sharing information builds trust and better relationships. You are receiving this note because you are receiving care at [facility] or recently visited. It is possible you will see health information before a provider has talked with you about it. This kind of information can be easy to misunderstand. To help you fully understand what it means for your health, we urge you to discuss this note with your provider.     Betty Hughes MD  10/06/24 [id number]
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
## 2024-10-07 PROBLEM — I50.33 ACUTE ON CHRONIC DIASTOLIC CHF (CONGESTIVE HEART FAILURE): Status: ACTIVE | Noted: 2024-10-07

## 2024-10-07 LAB
ANION GAP SERPL CALCULATED.3IONS-SCNC: 7.2 MMOL/L (ref 5–15)
BUN SERPL-MCNC: 16 MG/DL (ref 8–23)
BUN/CREAT SERPL: 17.4 (ref 7–25)
CALCIUM SPEC-SCNC: 8.5 MG/DL (ref 8.6–10.5)
CHLORIDE SERPL-SCNC: 98 MMOL/L (ref 98–107)
CO2 SERPL-SCNC: 31.8 MMOL/L (ref 22–29)
CREAT SERPL-MCNC: 0.92 MG/DL (ref 0.76–1.27)
DEPRECATED RDW RBC AUTO: 46.3 FL (ref 37–54)
EGFRCR SERPLBLD CKD-EPI 2021: 85.7 ML/MIN/1.73
ERYTHROCYTE [DISTWIDTH] IN BLOOD BY AUTOMATED COUNT: 13.6 % (ref 12.3–15.4)
GLUCOSE SERPL-MCNC: 94 MG/DL (ref 65–99)
HBA1C MFR BLD: 5.3 % (ref 4.8–5.6)
HCT VFR BLD AUTO: 30.9 % (ref 37.5–51)
HGB BLD-MCNC: 9.8 G/DL (ref 13–17.7)
MAGNESIUM SERPL-MCNC: 1.9 MG/DL (ref 1.6–2.4)
MCH RBC QN AUTO: 29.6 PG (ref 26.6–33)
MCHC RBC AUTO-ENTMCNC: 31.7 G/DL (ref 31.5–35.7)
MCV RBC AUTO: 93.4 FL (ref 79–97)
PHOSPHATE SERPL-MCNC: 3.7 MG/DL (ref 2.5–4.5)
PLATELET # BLD AUTO: 219 10*3/MM3 (ref 140–450)
PMV BLD AUTO: 9.2 FL (ref 6–12)
POTASSIUM SERPL-SCNC: 4.2 MMOL/L (ref 3.5–5.2)
QT INTERVAL: 399 MS
QTC INTERVAL: 452 MS
RBC # BLD AUTO: 3.31 10*6/MM3 (ref 4.14–5.8)
SODIUM SERPL-SCNC: 137 MMOL/L (ref 136–145)
WBC NRBC COR # BLD AUTO: 5.98 10*3/MM3 (ref 3.4–10.8)

## 2024-10-07 PROCEDURE — 85027 COMPLETE CBC AUTOMATED: CPT | Performed by: STUDENT IN AN ORGANIZED HEALTH CARE EDUCATION/TRAINING PROGRAM

## 2024-10-07 PROCEDURE — 97530 THERAPEUTIC ACTIVITIES: CPT

## 2024-10-07 PROCEDURE — 99223 1ST HOSP IP/OBS HIGH 75: CPT | Performed by: STUDENT IN AN ORGANIZED HEALTH CARE EDUCATION/TRAINING PROGRAM

## 2024-10-07 PROCEDURE — 25010000002 FUROSEMIDE PER 20 MG: Performed by: STUDENT IN AN ORGANIZED HEALTH CARE EDUCATION/TRAINING PROGRAM

## 2024-10-07 PROCEDURE — 97165 OT EVAL LOW COMPLEX 30 MIN: CPT

## 2024-10-07 PROCEDURE — 36415 COLL VENOUS BLD VENIPUNCTURE: CPT | Performed by: STUDENT IN AN ORGANIZED HEALTH CARE EDUCATION/TRAINING PROGRAM

## 2024-10-07 PROCEDURE — 97162 PT EVAL MOD COMPLEX 30 MIN: CPT

## 2024-10-07 PROCEDURE — 97535 SELF CARE MNGMENT TRAINING: CPT

## 2024-10-07 PROCEDURE — 80048 BASIC METABOLIC PNL TOTAL CA: CPT | Performed by: STUDENT IN AN ORGANIZED HEALTH CARE EDUCATION/TRAINING PROGRAM

## 2024-10-07 PROCEDURE — 83036 HEMOGLOBIN GLYCOSYLATED A1C: CPT | Performed by: STUDENT IN AN ORGANIZED HEALTH CARE EDUCATION/TRAINING PROGRAM

## 2024-10-07 PROCEDURE — 83735 ASSAY OF MAGNESIUM: CPT | Performed by: STUDENT IN AN ORGANIZED HEALTH CARE EDUCATION/TRAINING PROGRAM

## 2024-10-07 PROCEDURE — 25010000002 ENOXAPARIN PER 10 MG: Performed by: STUDENT IN AN ORGANIZED HEALTH CARE EDUCATION/TRAINING PROGRAM

## 2024-10-07 PROCEDURE — 93005 ELECTROCARDIOGRAM TRACING: CPT | Performed by: STUDENT IN AN ORGANIZED HEALTH CARE EDUCATION/TRAINING PROGRAM

## 2024-10-07 PROCEDURE — 84100 ASSAY OF PHOSPHORUS: CPT | Performed by: STUDENT IN AN ORGANIZED HEALTH CARE EDUCATION/TRAINING PROGRAM

## 2024-10-07 RX ORDER — CEPHALEXIN 500 MG/1
500 CAPSULE ORAL EVERY 6 HOURS SCHEDULED
Status: DISCONTINUED | OUTPATIENT
Start: 2024-10-07 | End: 2024-10-11 | Stop reason: HOSPADM

## 2024-10-07 RX ORDER — ACETAMINOPHEN 325 MG/1
650 TABLET ORAL EVERY 6 HOURS PRN
Status: DISCONTINUED | OUTPATIENT
Start: 2024-10-07 | End: 2024-10-11 | Stop reason: HOSPADM

## 2024-10-07 RX ORDER — ENOXAPARIN SODIUM 100 MG/ML
40 INJECTION SUBCUTANEOUS EVERY 24 HOURS
Status: DISCONTINUED | OUTPATIENT
Start: 2024-10-07 | End: 2024-10-08

## 2024-10-07 RX ORDER — POTASSIUM CHLORIDE 1.5 G/1.58G
40 POWDER, FOR SOLUTION ORAL ONCE
Status: DISCONTINUED | OUTPATIENT
Start: 2024-10-07 | End: 2024-10-07

## 2024-10-07 RX ORDER — POTASSIUM CHLORIDE 1.5 G/1.58G
20 POWDER, FOR SOLUTION ORAL ONCE
Status: COMPLETED | OUTPATIENT
Start: 2024-10-07 | End: 2024-10-07

## 2024-10-07 RX ADMIN — PANTOPRAZOLE SODIUM 40 MG: 40 TABLET, DELAYED RELEASE ORAL at 05:04

## 2024-10-07 RX ADMIN — DIVALPROEX SODIUM 500 MG: 500 TABLET, DELAYED RELEASE ORAL at 08:03

## 2024-10-07 RX ADMIN — Medication 10 ML: at 20:51

## 2024-10-07 RX ADMIN — ATORVASTATIN CALCIUM 40 MG: 20 TABLET, FILM COATED ORAL at 20:50

## 2024-10-07 RX ADMIN — ACETAMINOPHEN 325MG 650 MG: 325 TABLET ORAL at 04:02

## 2024-10-07 RX ADMIN — FOLIC ACID 1 MG: 1 TABLET ORAL at 08:03

## 2024-10-07 RX ADMIN — ROPINIROLE HYDROCHLORIDE 0.25 MG: 0.5 TABLET, FILM COATED ORAL at 20:50

## 2024-10-07 RX ADMIN — Medication 10 ML: at 08:03

## 2024-10-07 RX ADMIN — Medication 1000 MCG: at 08:47

## 2024-10-07 RX ADMIN — OXYBUTYNIN CHLORIDE 5 MG: 5 TABLET, EXTENDED RELEASE ORAL at 08:03

## 2024-10-07 RX ADMIN — TAMSULOSIN HYDROCHLORIDE 0.8 MG: 0.4 CAPSULE ORAL at 08:03

## 2024-10-07 RX ADMIN — CEPHALEXIN 500 MG: 500 CAPSULE ORAL at 18:36

## 2024-10-07 RX ADMIN — LEVOTHYROXINE SODIUM 50 MCG: 50 TABLET ORAL at 05:04

## 2024-10-07 RX ADMIN — FUROSEMIDE 40 MG: 10 INJECTION, SOLUTION INTRAMUSCULAR; INTRAVENOUS at 18:22

## 2024-10-07 RX ADMIN — SENNOSIDES AND DOCUSATE SODIUM 2 TABLET: 50; 8.6 TABLET ORAL at 20:50

## 2024-10-07 RX ADMIN — FUROSEMIDE 40 MG: 10 INJECTION, SOLUTION INTRAMUSCULAR; INTRAVENOUS at 08:02

## 2024-10-07 RX ADMIN — DIVALPROEX SODIUM 500 MG: 500 TABLET, DELAYED RELEASE ORAL at 20:51

## 2024-10-07 RX ADMIN — SENNOSIDES AND DOCUSATE SODIUM 2 TABLET: 50; 8.6 TABLET ORAL at 08:47

## 2024-10-07 RX ADMIN — ROPINIROLE HYDROCHLORIDE 0.25 MG: 0.5 TABLET, FILM COATED ORAL at 08:03

## 2024-10-07 RX ADMIN — POTASSIUM CHLORIDE 20 MEQ: 1.5 FOR SOLUTION ORAL at 18:36

## 2024-10-07 RX ADMIN — CLOPIDOGREL BISULFATE 75 MG: 75 TABLET, FILM COATED ORAL at 08:03

## 2024-10-07 RX ADMIN — ASPIRIN 81 MG: 81 TABLET, CHEWABLE ORAL at 08:03

## 2024-10-07 RX ADMIN — ENOXAPARIN SODIUM 40 MG: 100 INJECTION SUBCUTANEOUS at 18:36

## 2024-10-07 RX ADMIN — ROPINIROLE HYDROCHLORIDE 0.25 MG: 0.5 TABLET, FILM COATED ORAL at 16:00

## 2024-10-07 RX ADMIN — FERROUS SULFATE TAB 325 MG (65 MG ELEMENTAL FE) 325 MG: 325 (65 FE) TAB at 08:03

## 2024-10-07 RX ADMIN — CEPHALEXIN 500 MG: 500 CAPSULE ORAL at 12:01
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
## 2024-10-07 NOTE — PLAN OF CARE
Goal Outcome Evaluation:  Plan of Care Reviewed With: patient           Outcome Evaluation: Pt admitted to [facility] for hypoxia and LE swelling 2/2 HF exacerbation. Pt lives w/ other residents at a [facility] and is (I)<>Mod (I) w/ BADLs at baseline. Uses AD for mobility and bathrooms are handicap accessible. Pt UIC upon arrival. Performed STS/toilet transfers, functional mobility, and sink side grooming/hygiene w/ CGA using RW. SBA for toileting tasks. Needing Min A for LBD, pt reports he struggles w/ LBD at baseline and is interested in AE training. Pt presents w/ mildly impaired strength, balance, and activity tolerance. OT will continue to follow to maxmize performance prior to d/c.      Anticipated Discharge Disposition (OT): other (see comments) (return to [facility])
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
## 2024-10-07 NOTE — CASE MANAGEMENT/SOCIAL WORK
Discharge Planning Assessment  [facility]     Patient Name: Hernando Lozada  MRN: [id number]  Today's Date: 10/7/2024    Admit Date: 10/6/2024    Plan: Return to [facility] apartment.   Discharge Needs Assessment       Row Name 10/07/24 1431       Living Environment    People in Home facility resident    Name(s) of People in Home Lives in [facility] setting    Current Living Arrangements other (see comments)    Potentially Unsafe Housing Conditions none    Primary Care Provided by self;homecare agency    Family Caregiver if Needed sibling(s)    Quality of Family Relationships involved;helpful    Able to Return to Prior Arrangements yes       Resource/Environmental Concerns    Resource/Environmental Concerns none       Transition Planning    Patient/Family Anticipates Transition to home;home with help/services    Patient/Family Anticipated Services at Transition none    Transportation Anticipated agency       Discharge Needs Assessment    Readmission Within the Last 30 Days no previous admission in last 30 days    Current Outpatient/Agency/Support Group adult day care;group home    Equipment Currently Used at Home shower chair;nebulizer;walker, rolling    Concerns to be Addressed adjustment to diagnosis/illness    Anticipated Changes Related to Illness none    Equipment Needed After Discharge none                   Discharge Plan       Row Name 10/07/24 1430       Plan    Plan Return to [facility] apartment.    Patient/Family in Agreement with Plan yes    Plan Comments Spoke to pt at bedside, introduced self and explained CCP role, face sheet and pharmacy information verified. Pt lives in [facility] apartment with two other patients, he has 24/7 caregiver there, he is able to do most self care tasks himself, they manage his medication and he goes to day program T,W and Thursdays where he receives therapies. He uses rwx, neb and has s/c. He asked CCP to notify his brother  Danny, spoke with him over the phone and updated him of patient room and phone number to room, all questions answered. Pt plans to return home and states program usually transports him home. Called  [facility] listed, she is on vacation, spoke with her coverage Megan at [phone], she states she is outside of [facility] agency  and provided number for Jazlyn who is internal  [phone]. She confirmed pharmacy for patient meds is [facility], pt will need d/c summary, in addition to report called to nursing department, transportation can be arranged with nursing once ready for dc. Pharmacy updated, pkt to ajn, updated MD/RN of dc plan, CCP will follow - Tracie NUÑEZ                  Continued Care and Services - Admitted Since 10/6/2024    No active coordination exists for this encounter.       Expected Discharge Date and Time       Expected Discharge Date Expected Discharge Time    Oct 8, 2024            Demographic Summary       Row Name 10/07/24 1430       General Information    Admission Type observation                   Functional Status       Row Name 10/07/24 1430       Functional Status    Usual Activity Tolerance excellent    Current Activity Tolerance good       Assessment of Health Literacy    Health Literacy Good       Functional Status, IADL    Medications assistive person    Meal Preparation assistive person    Housekeeping assistive person    Laundry assistive person    Shopping assistive person       Mental Status    General Appearance WDL WDL       Mental Status Summary    Recent Changes in Mental Status/Cognitive Functioning no changes                   Psychosocial    No documentation.                  Abuse/Neglect    No documentation.                  Legal       Row Name 10/07/24 1430       Financial/Legal    Who Manages Finances if Patient Unable Brother  Danny                   Substance Abuse    No documentation.                  Patient Forms    No  documentation.                     Tracie Santacruz RN

## 2024-10-07 NOTE — PLAN OF CARE
Problem: Adult Inpatient Plan of Care  Goal: Absence of Hospital-Acquired Illness or Injury  Outcome: Progressing  Intervention: Identify and Manage Fall Risk  Recent Flowsheet Documentation  Taken 10/7/2024 0600 by Last Valenzuela RN  Safety Promotion/Fall Prevention:   safety round/check completed   room organization consistent   nonskid shoes/slippers when out of bed   lighting adjusted   fall prevention program maintained   clutter free environment maintained   assistive device/personal items within reach  Taken 10/7/2024 0402 by Last Valenzuela RN  Safety Promotion/Fall Prevention:   safety round/check completed   room organization consistent   nonskid shoes/slippers when out of bed   lighting adjusted   fall prevention program maintained   clutter free environment maintained   assistive device/personal items within reach  Taken 10/7/2024 0200 by Last Valenzuela RN  Safety Promotion/Fall Prevention:   safety round/check completed   room organization consistent   nonskid shoes/slippers when out of bed   lighting adjusted   fall prevention program maintained   assistive device/personal items within reach   clutter free environment maintained  Taken 10/7/2024 0000 by Last Valenzuela RN  Safety Promotion/Fall Prevention:   safety round/check completed   room organization consistent   nonskid shoes/slippers when out of bed   lighting adjusted   fall prevention program maintained   clutter free environment maintained   assistive device/personal items within reach  Taken 10/6/2024 2200 by Last Valenzuela RN  Safety Promotion/Fall Prevention:   safety round/check completed   room organization consistent   nonskid shoes/slippers when out of bed   lighting adjusted   fall prevention program maintained   clutter free environment maintained   assistive device/personal items within reach  Taken 10/6/2024 2035 by Last Valenzuela RN  Safety Promotion/Fall Prevention:    safety round/check completed   room organization consistent   nonskid shoes/slippers when out of bed   lighting adjusted   fall prevention program maintained   clutter free environment maintained   assistive device/personal items within reach  Intervention: Prevent Skin Injury  Recent Flowsheet Documentation  Taken 10/7/2024 0600 by Last Valenzuela RN  Body Position: position changed independently  Taken 10/7/2024 0402 by Last Valenzuela RN  Body Position: position changed independently  Taken 10/7/2024 0200 by Last Valenzuela RN  Body Position: position changed independently  Taken 10/7/2024 0000 by Last Valenzuela RN  Body Position: position changed independently  Taken 10/6/2024 2200 by Last Valenzuela RN  Body Position: position changed independently  Taken 10/6/2024 2035 by Last Valenzuela RN  Body Position: position changed independently  Intervention: Prevent and Manage VTE (Venous Thromboembolism) Risk  Recent Flowsheet Documentation  Taken 10/7/2024 0600 by Last Valenzuela RN  Activity Management: activity minimized  Taken 10/7/2024 0402 by Last Valenzuela RN  Activity Management: activity minimized  Taken 10/7/2024 0200 by Last Valenzuela RN  Activity Management: activity minimized  Taken 10/7/2024 0000 by Last Valenzuela RN  Activity Management: activity minimized  Taken 10/6/2024 2200 by Last Valenzuela RN  Activity Management: activity minimized  Taken 10/6/2024 2035 by Last Valenzuela RN  Activity Management: activity encouraged  VTE Prevention/Management: (Plavix) other (see comments)  Range of Motion: active ROM (range of motion) encouraged  Intervention: Prevent Infection  Recent Flowsheet Documentation  Taken 10/7/2024 0600 by Last Valenzuela RN  Infection Prevention:   single patient room provided   rest/sleep promoted   personal protective equipment utilized   hand hygiene  promoted   environmental surveillance performed  Taken 10/7/2024 0402 by Last Valenzuela RN  Infection Prevention:   single patient room provided   personal protective equipment utilized   rest/sleep promoted   hand hygiene promoted   environmental surveillance performed  Taken 10/7/2024 0200 by Last Valenzuela RN  Infection Prevention:   single patient room provided   rest/sleep promoted   personal protective equipment utilized   hand hygiene promoted   environmental surveillance performed  Taken 10/7/2024 0000 by Last Valenzuela RN  Infection Prevention:   single patient room provided   personal protective equipment utilized   rest/sleep promoted   hand hygiene promoted   environmental surveillance performed  Taken 10/6/2024 2200 by Last Valenzuela RN  Infection Prevention:   single patient room provided   rest/sleep promoted   personal protective equipment utilized   hand hygiene promoted   environmental surveillance performed  Taken 10/6/2024 2035 by Last Valenzuela RN  Infection Prevention:   single patient room provided   rest/sleep promoted   personal protective equipment utilized   hand hygiene promoted   environmental surveillance performed   Goal Outcome Evaluation:  Plan of Care Reviewed With: patient        Progress: improving  Outcome Evaluation: AOx4, VSS, SR on telemetry monitor, O2 at 2 lpm via nasal cannula, on purewick, c/o pain -PRN medication given, bed alarm on, call light within reach.

## 2024-10-07 NOTE — PROGRESS NOTES
"[facility]: Enoxaparin Consult    Hernando Lozada has a pharmacy consult to dose prophylactic enoxaparin per Dr Chambers's request.     Indication: VTE Prophylaxis  Home Anticoagulation: rivaroxaban -- held 10/6    Relevant clinical data and objective history reviewed:  79 y.o. male 175.3 cm (69\") 112 kg (246 lb 14.6 oz)   Body mass index is 36.46 kg/m².   Results from last 7 days   Lab Units 10/07/24  0504   PLATELETS 10*3/mm3 219     Estimated Creatinine Clearance: 82.9 mL/min (by C-G formula based on SCr of 0.92 mg/dL).    Assessment/Plan    Will start patient on 40mg subcutaneous every 24 hours, adjusted for renal function. Consult order will be discontinued but pharmacy will continue to follow.     Cristina Begum, [facility]  Clinical Pharmacist    "

## 2024-10-07 NOTE — NURSING NOTE
WOCN received consult: BLE's with dry scabs, erythema plus 2 edema. Patient states they have been that was for 8 months, states sometimes has blisters, No open wounds currently. States he can't wear compression hose.  Wrapped BLE's roll gauze and 2 4 inch ace wraps toes to knee, apply 3 times per week. Patient states that feels much better after ace wraps applied.

## 2024-10-07 NOTE — PROGRESS NOTES
Name: Hernando Lozada ADMIT: 10/6/2024   : 1945  PCP: Sammie Gambino APRN    MRN: [id number] LOS: 0 days   AGE/SEX: 79 y.o. male  ROOM: [facility]     Subjective   Subjective   Patient seen this morning.  Feeling better, lower extremity pain improved, controlled with Tylenol, swelling seems to be improving as well.  Responding well to IV Lasix, -3.3 L charted last 24 hours.  Denies shortness of breath.  Denies chest pain, fevers, chills, nausea or vomiting.    Review of Systems   As above  Objective   Objective   Vital Signs  Temp:  [97.3 °F (36.3 °C)-98.6 °F (37 °C)] 97.3 °F (36.3 °C)  Heart Rate:  [] 75  Resp:  [18-20] 18  BP: ()/(58-88) 112/75  SpO2:  [87 %-99 %] 99 %  on  Flow (L/min):  [2] 2;   Device (Oxygen Therapy): nasal cannula  Body mass index is 36.46 kg/m².  Physical Exam    General: Alert, laying in bed, not in distress.  HEENT: Normocephalic, atraumatic  CV: Regular rate and rhythm, + systolic murmur  Lungs: Clear to auscultation bilaterally, no crackles or wheezes  Abdomen: Soft, nontender, nondistended  Extremities: 2+ bilateral lower extremity edema, symmetric circumferential erythema from bilateral knee and below    Results Review     I reviewed the patient's new clinical results.  Results from last 7 days   Lab Units 10/07/24  0504 10/06/24  0910   WBC 10*3/mm3 5.98 8.15   HEMOGLOBIN g/dL 9.8* 10.0*   PLATELETS 10*3/mm3 219 224     Results from last 7 days   Lab Units 10/07/24  0505 10/06/24  0910   SODIUM mmol/L 137 135*   POTASSIUM mmol/L 4.2 4.1   CHLORIDE mmol/L 98 97*   CO2 mmol/L 31.8* 30.7*   BUN mg/dL 16 16   CREATININE mg/dL 0.92 0.88   GLUCOSE mg/dL 94 106*   Estimated Creatinine Clearance: 82.9 mL/min (by C-G formula based on SCr of 0.92 mg/dL).  Results from last 7 days   Lab Units 10/06/24  0910   ALBUMIN g/dL 3.4*   BILIRUBIN mg/dL 0.7   ALK PHOS U/L 63   AST (SGOT) U/L 14   ALT (SGPT) U/L 13     Results from last 7 days   Lab Units 10/07/24  0505 10/06/24  0910    CALCIUM mg/dL 8.5* 8.8   ALBUMIN g/dL  --  3.4*   MAGNESIUM mg/dL 1.9 1.6   PHOSPHORUS mg/dL 3.7  --      Results from last 7 days   Lab Units 10/06/24  0910   PROCALCITONIN ng/mL 0.04     COVID19   Date Value Ref Range Status   04/20/2023 Not Detected Not Detected - Ref. Range Final   11/25/2022 Detected (C) Not Detected - Ref. Range Final     Hemoglobin A1C   Date/Time Value Ref Range Status   10/07/2024 0504 5.30 4.80 - 5.60 % Final     Glucose   Date/Time Value Ref Range Status   10/06/2024 1708 95 70 - 130 mg/dL Final           XR Chest 1 View  Narrative: XR CHEST 1 VW-     HISTORY: Male who is 79 years-old, evaluate for edema     TECHNIQUE: Frontal view of the chest     COMPARISON: 8/27/2024     FINDINGS: The heart size is normal. Pulmonary vasculature is mildly  congested. Sternotomy wires are present.  No focal pulmonary  consolidation. Minimal pleural effusions are suggested. No pneumothorax.  No acute osseous process.     Impression: Mild pulmonary vascular congestion with minimal pleural  effusions.           This report was finalized on 10/6/2024 9:08 AM by Dr. Chava Flores M.D on Workstation: [facility]       Scheduled Medications  aspirin, 81 mg, Oral, Daily  atorvastatin, 40 mg, Oral, Nightly  cefTRIAXone, 2,000 mg, Intravenous, Q24H  clopidogrel, 75 mg, Oral, Daily  divalproex, 500 mg, Oral, BID  ferrous sulfate, 325 mg, Oral, Daily With Breakfast  folic acid, 1 mg, Oral, Daily  furosemide, 40 mg, Intravenous, BID  levothyroxine, 50 mcg, Oral, Q AM  [Held by provider] lisinopril, 5 mg, Oral, Daily  oxybutynin XL, 5 mg, Oral, Daily  pantoprazole, 40 mg, Oral, Q AM  [Held by provider] rivaroxaban, 20 mg, Oral, Daily With Dinner  rOPINIRole, 0.25 mg, Oral, TID  senna-docusate sodium, 2 tablet, Oral, BID  sodium chloride, 10 mL, Intravenous, Q12H  tamsulosin, 0.8 mg, Oral, Daily  cyanocobalamin, 1,000 mcg, Oral, Daily    Infusions   Diet  Diet: Cardiac; Healthy Heart (2-3 Na+); Fluid Consistency:  Thin (IDDSI 0)    I have personally reviewed     [x]  Laboratory   [x]  Microbiology   []  Radiology   []  EKG/Telemetry  []  Cardiology/Vascular   []  Pathology    []  Records       Assessment/Plan     Active Hospital Problems    Diagnosis  POA    **Acute on chronic diastolic heart failure [I50.33]  Unknown    Hypothyroidism [E03.9]  Yes    Aortic stenosis, severe [I35.0]  Yes    CAD (coronary artery disease) [I25.10]  Yes    Diabetes mellitus [E11.9]  Yes    Primary hypertension [I10]  Yes      Resolved Hospital Problems   No resolved problems to display.       Patient is a 79-year-old male with a history of including but not limited to CAD, diastolic heart failure, severe aortic stenosis, type II DM, hypothyroidism, TBI, DVT, presented to the ED presented from nursing facility complaining of worsening lower extremity swelling/pain/erythema.      Active chronic diastolic heart failure  Aortic stenosis  CAD  Patient with recent angioplasty on 06/26/2024 found to have 99% proximal circumflex artery status post stent placement  -Most recent echo 06/22/2024 showed EF of 69.8%, severe aortic valve stenosis, grade 1 diastolic dysfunction  -Chest x-ray with mild pulmonary vascular congestion with minimal pleural effusion  -proBNP 2462 on admission  -On dual antiplatelets, statin, continue  -Continue IV Lasix 40 mg twice daily, responding well to IV diuretics  -Cardiology consult pending     Hypoxemia  -Oxygen dropped to 87% on room air per reports in the ED  - secondary to above  -IV diuresis, incentive parameter  -Wean off O2 as able,, O2 saturation goal 90% and above        Iron deficiency anemia  -Patient with history of iron deficiency anemia, on p.o. iron outpatient continue  -Repeat iron panel with no significant iron deficiency  -Hemoglobin 10.0 on admission, recent hemoglobin between 11-12,  -Hemoglobin 9.8 this morning.  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  -Hold Xarelto for now asked to proceed to start with no DVT patient is on dual antiplatelets.  Concerning for increased risk of bleeding in the setting of triple therapy and iron deficiency anemia.  Will  discusse with cardiology whether either Plavix or aspirin can be held and Xarelto resumed.      Bilateral lower extremity cellulitis?  -Patient with significant bilateral lower extremity erythema, which is likely secondary to heart failure/chronic venous stasis,   -Was initiated on p.o. antibiotics outpatient, started on IV ceftriaxone inpatient  -Will consult ID in a.m.     Hypothyroidism  -Most recent TSH 08/9/24 was normal  -Continue outpatient levothyroxine    PAF  New onset ?   -Not on rate control, heart rate stable, no known history of atrial fibrillation  -cardiology consult, defer anticoagulation to cardiology in the setting of recent stent placement and dual antiplatelets    SCDs for DVT prophylaxis.  Full code.  Discussed with patient.  Expected Discharge Date: 10/8/2024; Expected Discharge Time:        Copied text in this note has been reviewed and is accurate as of 10/07/24.         Dictated utilizing Dragon dictation        Jacqui Chambers MD  [facility]  10/07/24  08:19 EDT

## 2024-10-07 NOTE — PLAN OF CARE
Goal Outcome Evaluation:  Plan of Care Reviewed With: patient           Outcome Evaluation: Pt. is a 79 year old Male admitted to the hospital from [facility] with BLE swelling/pain.  Pt. reports that prior to admission he was using a Rwx for ambulation.  Pt. currently presents with decreased strength, decreased balance, and decreased tolerance to functional activity. This AM, pt. able to ambulate 30 feet, CGA x 1, with use of Rwx.  Pt. requires CGA x 1 for sit <-> stand transfers. BLE ther. ex. program x 10 reps completed for general strengthening. Verbal/tactile cues given during ambulation for posture correction. Pt. will benefit from skilled inpt. P.T. to address his functional deficits and to assist pt. in regaining his maximum level of independence with functional mobility.      Anticipated Discharge Disposition (PT):  (Back to [facility])
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
## 2024-10-08 PROBLEM — I48.91 ATRIAL FIBRILLATION: Status: ACTIVE | Noted: 2024-10-08

## 2024-10-08 LAB
ANION GAP SERPL CALCULATED.3IONS-SCNC: 8.2 MMOL/L (ref 5–15)
BUN SERPL-MCNC: 14 MG/DL (ref 8–23)
BUN/CREAT SERPL: 18.7 (ref 7–25)
CALCIUM SPEC-SCNC: 8.4 MG/DL (ref 8.6–10.5)
CHLORIDE SERPL-SCNC: 98 MMOL/L (ref 98–107)
CO2 SERPL-SCNC: 28.8 MMOL/L (ref 22–29)
CREAT SERPL-MCNC: 0.75 MG/DL (ref 0.76–1.27)
DEPRECATED RDW RBC AUTO: 43.8 FL (ref 37–54)
EGFRCR SERPLBLD CKD-EPI 2021: 92.9 ML/MIN/1.73
ERYTHROCYTE [DISTWIDTH] IN BLOOD BY AUTOMATED COUNT: 13.3 % (ref 12.3–15.4)
GLUCOSE BLDC GLUCOMTR-MCNC: 119 MG/DL (ref 70–130)
GLUCOSE SERPL-MCNC: 94 MG/DL (ref 65–99)
HCT VFR BLD AUTO: 31.4 % (ref 37.5–51)
HGB BLD-MCNC: 10.4 G/DL (ref 13–17.7)
MAGNESIUM SERPL-MCNC: 2.1 MG/DL (ref 1.6–2.4)
MCH RBC QN AUTO: 30 PG (ref 26.6–33)
MCHC RBC AUTO-ENTMCNC: 33.1 G/DL (ref 31.5–35.7)
MCV RBC AUTO: 90.5 FL (ref 79–97)
PLATELET # BLD AUTO: 235 10*3/MM3 (ref 140–450)
PMV BLD AUTO: 9.1 FL (ref 6–12)
POTASSIUM SERPL-SCNC: 3.8 MMOL/L (ref 3.5–5.2)
RBC # BLD AUTO: 3.47 10*6/MM3 (ref 4.14–5.8)
SODIUM SERPL-SCNC: 135 MMOL/L (ref 136–145)
WBC NRBC COR # BLD AUTO: 5.99 10*3/MM3 (ref 3.4–10.8)

## 2024-10-08 PROCEDURE — 83735 ASSAY OF MAGNESIUM: CPT | Performed by: STUDENT IN AN ORGANIZED HEALTH CARE EDUCATION/TRAINING PROGRAM

## 2024-10-08 PROCEDURE — 25010000002 FUROSEMIDE PER 20 MG: Performed by: STUDENT IN AN ORGANIZED HEALTH CARE EDUCATION/TRAINING PROGRAM

## 2024-10-08 PROCEDURE — 82948 REAGENT STRIP/BLOOD GLUCOSE: CPT

## 2024-10-08 PROCEDURE — 85027 COMPLETE CBC AUTOMATED: CPT | Performed by: STUDENT IN AN ORGANIZED HEALTH CARE EDUCATION/TRAINING PROGRAM

## 2024-10-08 PROCEDURE — 80048 BASIC METABOLIC PNL TOTAL CA: CPT | Performed by: STUDENT IN AN ORGANIZED HEALTH CARE EDUCATION/TRAINING PROGRAM

## 2024-10-08 PROCEDURE — 25010000002 ENOXAPARIN PER 10 MG: Performed by: STUDENT IN AN ORGANIZED HEALTH CARE EDUCATION/TRAINING PROGRAM

## 2024-10-08 PROCEDURE — 97530 THERAPEUTIC ACTIVITIES: CPT

## 2024-10-08 RX ORDER — ENOXAPARIN SODIUM 150 MG/ML
1 INJECTION SUBCUTANEOUS EVERY 12 HOURS
Status: DISCONTINUED | OUTPATIENT
Start: 2024-10-08 | End: 2024-10-11 | Stop reason: HOSPADM

## 2024-10-08 RX ADMIN — DIVALPROEX SODIUM 500 MG: 500 TABLET, DELAYED RELEASE ORAL at 20:45

## 2024-10-08 RX ADMIN — Medication 10 ML: at 07:46

## 2024-10-08 RX ADMIN — TAMSULOSIN HYDROCHLORIDE 0.8 MG: 0.4 CAPSULE ORAL at 07:45

## 2024-10-08 RX ADMIN — Medication 1000 MCG: at 07:44

## 2024-10-08 RX ADMIN — ROPINIROLE HYDROCHLORIDE 0.25 MG: 0.5 TABLET, FILM COATED ORAL at 07:49

## 2024-10-08 RX ADMIN — CLOPIDOGREL BISULFATE 75 MG: 75 TABLET, FILM COATED ORAL at 07:48

## 2024-10-08 RX ADMIN — ROPINIROLE HYDROCHLORIDE 0.25 MG: 0.5 TABLET, FILM COATED ORAL at 15:44

## 2024-10-08 RX ADMIN — LEVOTHYROXINE SODIUM 50 MCG: 50 TABLET ORAL at 05:49

## 2024-10-08 RX ADMIN — PANTOPRAZOLE SODIUM 40 MG: 40 TABLET, DELAYED RELEASE ORAL at 05:49

## 2024-10-08 RX ADMIN — FERROUS SULFATE TAB 325 MG (65 MG ELEMENTAL FE) 325 MG: 325 (65 FE) TAB at 07:46

## 2024-10-08 RX ADMIN — CEPHALEXIN 500 MG: 500 CAPSULE ORAL at 00:26

## 2024-10-08 RX ADMIN — OXYBUTYNIN CHLORIDE 5 MG: 5 TABLET, EXTENDED RELEASE ORAL at 07:43

## 2024-10-08 RX ADMIN — FUROSEMIDE 40 MG: 10 INJECTION, SOLUTION INTRAMUSCULAR; INTRAVENOUS at 18:00

## 2024-10-08 RX ADMIN — ROPINIROLE HYDROCHLORIDE 0.25 MG: 0.5 TABLET, FILM COATED ORAL at 20:46

## 2024-10-08 RX ADMIN — FOLIC ACID 1 MG: 1 TABLET ORAL at 07:44

## 2024-10-08 RX ADMIN — CEPHALEXIN 500 MG: 500 CAPSULE ORAL at 18:00

## 2024-10-08 RX ADMIN — ATORVASTATIN CALCIUM 40 MG: 20 TABLET, FILM COATED ORAL at 20:45

## 2024-10-08 RX ADMIN — DIVALPROEX SODIUM 500 MG: 500 TABLET, DELAYED RELEASE ORAL at 07:43

## 2024-10-08 RX ADMIN — SENNOSIDES AND DOCUSATE SODIUM 2 TABLET: 50; 8.6 TABLET ORAL at 07:45

## 2024-10-08 RX ADMIN — CEPHALEXIN 500 MG: 500 CAPSULE ORAL at 12:19

## 2024-10-08 RX ADMIN — ENOXAPARIN SODIUM 105 MG: 150 INJECTION SUBCUTANEOUS at 12:19

## 2024-10-08 RX ADMIN — SENNOSIDES AND DOCUSATE SODIUM 2 TABLET: 50; 8.6 TABLET ORAL at 20:45

## 2024-10-08 RX ADMIN — Medication 10 ML: at 20:46

## 2024-10-08 RX ADMIN — ASPIRIN 81 MG: 81 TABLET, CHEWABLE ORAL at 07:44

## 2024-10-08 RX ADMIN — FUROSEMIDE 40 MG: 10 INJECTION, SOLUTION INTRAMUSCULAR; INTRAVENOUS at 07:46

## 2024-10-08 RX ADMIN — CEPHALEXIN 500 MG: 500 CAPSULE ORAL at 05:49

## 2024-10-08 NOTE — PLAN OF CARE
Problem: Adult Inpatient Plan of Care  Goal: Plan of Care Review  Outcome: Progressing  Flowsheets (Taken 10/8/2024 0456)  Progress: improving  Plan of Care Reviewed With: patient  Goal: Patient-Specific Goal (Individualized)  Outcome: Progressing  Goal: Absence of Hospital-Acquired Illness or Injury  Outcome: Progressing  Intervention: Identify and Manage Fall Risk  Description: Perform standard risk assessment on admission using a validated tool or comprehensive approach appropriate to the patient; reassess fall risk frequently, with change in status or transfer to another level of care.  Communicate fall injury risk to interprofessional healthcare team.  Determine need for increased observation, equipment and environmental modification, such as low bed, signage and supportive, nonskid footwear.  Adjust safety measures to individual developmental age, stage and identified risk factors.  Reinforce the importance of safety and physical activity with patient and family.  Perform regular intentional rounding to assess need for position change, pain assessment and personal needs, including assistance with toileting.  Recent Flowsheet Documentation  Taken 10/8/2024 0406 by Summer Freed, RN  Safety Promotion/Fall Prevention:   activity supervised   nonskid shoes/slippers when out of bed   clutter free environment maintained   safety round/check completed  Taken 10/8/2024 0205 by Summer Freed, RN  Safety Promotion/Fall Prevention:   activity supervised   nonskid shoes/slippers when out of bed   safety round/check completed   fall prevention program maintained   clutter free environment maintained  Taken 10/8/2024 0000 by Summer Freed, RN  Safety Promotion/Fall Prevention:   activity supervised   safety round/check completed   nonskid shoes/slippers when out of bed   fall prevention program maintained  Taken 10/7/2024 2219 by Summer Freed, RN  Safety Promotion/Fall Prevention:   activity supervised    safety round/check completed   nonskid shoes/slippers when out of bed   fall prevention program maintained   clutter free environment maintained  Taken 10/7/2024 2050 by Summer Freed RN  Safety Promotion/Fall Prevention:   activity supervised   safety round/check completed   nonskid shoes/slippers when out of bed   clutter free environment maintained  Intervention: Prevent Skin Injury  Description: Perform a screening for skin injury risk, such as pressure or moisture associated skin damage on admission and at regular intervals throughout hospital stay.  Keep all areas of skin (especially folds) clean and dry.  Maintain adequate skin hydration.  Relieve and redistribute pressure and protect bony prominences; implement measures based on patient-specific risk factors.  Match turning and repositioning schedule to clinical condition.  Encourage weight shift frequently; assist with reposition if unable to complete independently.  Float heels off bed; avoid pressure on the Achilles tendon.  Keep skin free from extended contact with medical devices.  Encourage functional activity and mobility, as early as tolerated.  Use aids (e.g., slide boards, mechanical lift) during transfer.  Recent Flowsheet Documentation  Taken 10/8/2024 0406 by Summer Freed RN  Body Position:   position changed independently   tilted   left   sitting up in bed  Taken 10/8/2024 0205 by Summer Freed RN  Body Position:   left   turned   weight shifting  Taken 10/8/2024 0000 by Summer Freed RN  Body Position:   right   tilted   supine, legs elevated  Taken 10/7/2024 2219 by Summer Freed RN  Body Position:   position changed independently   tilted   left  Taken 10/7/2024 2050 by Summer Freed RN  Body Position: position changed independently  Skin Protection:   adhesive use limited   tubing/devices free from skin contact  Intervention: Prevent and Manage VTE (Venous Thromboembolism) Risk  Description: Assess for VTE (venous  thromboembolism) risk.  Encourage and assist with early ambulation.  Initiate and maintain compression or other therapy, as indicated, based on identified risk in accordance with organizational protocol and provider order.  Encourage both active and passive leg exercises while in bed, if unable to ambulate.  Recent Flowsheet Documentation  Taken 10/8/2024 0406 by Summer Freed RN  Activity Management: activity encouraged  Taken 10/8/2024 0205 by Summer Freed RN  Activity Management: activity encouraged  Taken 10/8/2024 0000 by Summer Freed RN  Activity Management: activity encouraged  VTE Prevention/Management: patient refused intervention  Range of Motion: active ROM (range of motion) encouraged  Taken 10/7/2024 2219 by Summer Freed RN  Activity Management: activity encouraged  Taken 10/7/2024 2050 by Summer Freed RN  Activity Management: activity encouraged  VTE Prevention/Management: patient refused intervention  Range of Motion: active ROM (range of motion) encouraged  Intervention: Prevent Infection  Description: Maintain skin and mucous membrane integrity; promote hand, oral and pulmonary hygiene.  Optimize fluid balance, nutrition, sleep and glycemic control to maximize infection resistance.  Identify potential sources of infection early to prevent or mitigate progression of infection (e.g., wound, lines, devices).  Evaluate ongoing need for invasive devices; remove promptly when no longer indicated.  Recent Flowsheet Documentation  Taken 10/8/2024 0205 by Summer Freed RN  Infection Prevention:   environmental surveillance performed   rest/sleep promoted   single patient room provided  Taken 10/7/2024 2219 by Summer Freed RN  Infection Prevention:   environmental surveillance performed   single patient room provided   rest/sleep promoted  Goal: Optimal Comfort and Wellbeing  Outcome: Progressing  Intervention: Provide Person-Centered Care  Description: Use a family-focused  approach to care.  Develop trust and rapport by proactively providing information, encouraging questions, addressing concerns and offering reassurance.  Acknowledge emotional response to hospitalization.  Recognize and utilize personal coping strategies.  Honor spiritual and cultural preferences.  Recent Flowsheet Documentation  Taken 10/8/2024 0000 by Summer Freed RN  Trust Relationship/Rapport: care explained  Taken 10/7/2024 2050 by Summer Freed RN  Trust Relationship/Rapport: care explained  Goal: Readiness for Transition of Care  Outcome: Progressing     Problem: Diabetes Comorbidity  Goal: Blood Glucose Level Within Targeted Range  Outcome: Progressing     Problem: Hypertension Comorbidity  Goal: Blood Pressure in Desired Range  Outcome: Progressing     Problem: Adjustment to Illness (Heart Failure)  Goal: Optimal Coping  Outcome: Progressing  Intervention: Support Psychosocial Response  Description: Acknowledge, normalize and validate the emotional response to current condition and unpredictable nature of disease progression.  Assess and monitor patient and caregiver perspective on quality of life and personal wellbeing; consider palliative care consult to enhance symptom relief and quality of life.  Engage patient in early and ongoing discussion about goals of care. Facilitate shared decision-making regarding goals and advanced care planning.  Assist patient and family with life transitions associated with heart failure (e.g., adherence to medical regimens, impact on family dynamics/roles, end-of-life care); acknowledge caregiver stress.  Recognize current coping strategies and assist in developing new strategies (problem-solving, mind-body techniques).  Assess and monitor for signs and symptoms of anxiety and depression.  Recent Flowsheet Documentation  Taken 10/8/2024 0000 by Summer Freed RN  Supportive Measures: active listening utilized  Taken 10/7/2024 2050 by Summer Freed  RN  Supportive Measures: active listening utilized     Problem: Cardiac Output Decreased (Heart Failure)  Goal: Optimal Cardiac Output  Outcome: Progressing  Intervention: Optimize Cardiac Output  Description: Closely monitor fluid balance; advocate for adjustment if balance is consistently positive.  Optimize preload, afterload and contractility with pharmacologic therapy (e.g., diuretic, angiotensin-converting enzyme inhibitor, angiotensin receptor blocker, beta-blocker, aldosterone antagonist, phosphodiesterase-5 inhibitor, inodilator, inotrope  Implement pharmacologic and nonpharmacologic interventions for pain, comfort and anxiety to avoid cardiac and respiratory compromise.  Maintain optimal position to promote venous return (e.g., elevate head of bed, avoid dependent extremity position).  Facilitate sleep/rest patterns that support or enhance activity tolerance.  Decrease energy expenditure (e.g., preplan activity, cluster care considering patient preference).  Maintain normothermia to enhance oxygenation and perfusion.  Facilitate use of additional therapy, such as ultrafiltration, intra-aortic balloon pump, ventricular assist device or extracorporeal membrane oxygenation to improve perfusion and hemodynamic stability.  Anticipate surgical intervention, such as heart surgery or heart transplantation, with refractory symptoms.  Recent Flowsheet Documentation  Taken 10/8/2024 0000 by Summer Freed RN  Environmental Support: calm environment promoted  Taken 10/7/2024 2050 by Summer Freed RN  Environmental Support: calm environment promoted     Problem: Dysrhythmia (Heart Failure)  Goal: Stable Heart Rate and Rhythm  Outcome: Progressing     Problem: Fluid Imbalance (Heart Failure)  Goal: Fluid Balance  Outcome: Progressing     Problem: Functional Ability Impaired (Heart Failure)  Goal: Optimal Functional Ability  Outcome: Progressing  Intervention: Optimize Functional Ability  Description: Assess  functional ability and cognition; consider social history, including living environment, roles and social engagement, to identify risk or presence of functional decline; routinely reassess during hospitalization to identify any change.  Facilitate physical activity and exercise to improve functional ability, cognition and quality of life, as well as minimize functional decline associated with inactivity; encourage optimal functional mobility.  Consider NMES (neuromuscular electrical stimulation) of the lower extremities for patients with limitations in ability to participate in active exercise.  Monitor physiologic response to activity or exercise and adjust accordingly; provide a warm-up and cool-down with activity.  Cluster, coordinate and organize care schedule per patient preference, priorities and tolerance.  Preplan and pace activity; balance activity with periods of rest; incorporate energy-conservation techniques.  Maintain an accessible environment to facilitate safe activity; position for optimal comfort and activity tolerance (e.g., sitting for self-care).  Encourage self-care performance to promote maximum independence in daily activity; provide adaptive equipment and rest periods if needed.  Offer personal aids, such as glasses, hearing aids and dentures; encourage familiar home items and neurosensory stimulation activities to prevent isolation and sensory deprivation.  Recent Flowsheet Documentation  Taken 10/8/2024 0406 by Summer Freed, RN  Activity Management: activity encouraged  Taken 10/8/2024 0205 by Summer Freed, RN  Activity Management: activity encouraged  Taken 10/8/2024 0000 by Summer Freed, RN  Activity Management: activity encouraged  Taken 10/7/2024 2219 by Summer Freed, RN  Activity Management: activity encouraged  Taken 10/7/2024 2050 by Summer Freed, RN  Activity Management: activity encouraged     Problem: Oral Intake Inadequate (Heart Failure)  Goal: Optimal  Nutrition Intake  Outcome: Progressing     Problem: Respiratory Compromise (Heart Failure)  Goal: Effective Oxygenation and Ventilation  Outcome: Progressing     Problem: Sleep Disordered Breathing (Heart Failure)  Goal: Effective Breathing Pattern During Sleep  Outcome: Progressing     Problem: Fall Injury Risk  Goal: Absence of Fall and Fall-Related Injury  Outcome: Progressing  Intervention: Promote Injury-Free Environment  Description: Provide a safe, barrier-free environment that encourages independent activity.  Keep care area uncluttered and well-lighted.  Determine need for increased observation or monitoring.  Avoid use of devices that minimize mobility, such as restraints or indwelling urinary catheter.  Recent Flowsheet Documentation  Taken 10/8/2024 0406 by Summer Freed RN  Safety Promotion/Fall Prevention:   activity supervised   nonskid shoes/slippers when out of bed   clutter free environment maintained   safety round/check completed  Taken 10/8/2024 0205 by Summer Freed RN  Safety Promotion/Fall Prevention:   activity supervised   nonskid shoes/slippers when out of bed   safety round/check completed   fall prevention program maintained   clutter free environment maintained  Taken 10/8/2024 0000 by Summer Freed RN  Safety Promotion/Fall Prevention:   activity supervised   safety round/check completed   nonskid shoes/slippers when out of bed   fall prevention program maintained  Taken 10/7/2024 2219 by Summer Freed RN  Safety Promotion/Fall Prevention:   activity supervised   safety round/check completed   nonskid shoes/slippers when out of bed   fall prevention program maintained   clutter free environment maintained  Taken 10/7/2024 2050 by Summer Freed RN  Safety Promotion/Fall Prevention:   activity supervised   safety round/check completed   nonskid shoes/slippers when out of bed   clutter free environment maintained   Goal Outcome Evaluation:  Plan of Care Reviewed With:  patient        Progress: improving

## 2024-10-08 NOTE — PLAN OF CARE
Goal Outcome Evaluation:  Plan of Care Reviewed With: patient           Outcome Evaluation: Upon entering room, pt. supine in bed, awake/alert, and agreeable to work with P.T. this date.  This PM, pt. able to ambulate 40 feet, CGA x 1, with use of Rwx.  Pt. requires Min. assist x 1 for bed mobility and Min. assist x 2 for sit <-> stand transfers.  BLE ther. ex. program x 10 reps completed for general strengthening.  Verbal/tactile cues given during ambulation for posture correction.  Will continue to progress functional mobility as tolerated.      Anticipated Discharge Disposition (PT): home ([facility])

## 2024-10-08 NOTE — PLAN OF CARE
Problem: Adult Inpatient Plan of Care  Goal: Absence of Hospital-Acquired Illness or Injury  Intervention: Identify and Manage Fall Risk  Recent Flowsheet Documentation  Taken 10/8/2024 1600 by Ricarda Quintanilla RN  Safety Promotion/Fall Prevention: safety round/check completed  Taken 10/8/2024 1400 by Ricarda Quintanilla RN  Safety Promotion/Fall Prevention: safety round/check completed  Taken 10/8/2024 1200 by Ricarda Quitnanilla RN  Safety Promotion/Fall Prevention: safety round/check completed  Taken 10/8/2024 1000 by Ricarda Quintanilla RN  Safety Promotion/Fall Prevention: safety round/check completed  Taken 10/8/2024 0800 by Ricarda Quintanilla RN  Safety Promotion/Fall Prevention: safety round/check completed  Intervention: Prevent Skin Injury  Recent Flowsheet Documentation  Taken 10/8/2024 1600 by Ricarda Quintanilla RN  Body Position: position changed independently  Taken 10/8/2024 1400 by Ricarda Quintanilla RN  Body Position: position changed independently  Skin Protection:   adhesive use limited   tubing/devices free from skin contact  Taken 10/8/2024 1200 by Ricarda Quintanilla RN  Body Position: position changed independently  Taken 10/8/2024 1000 by Ricarda Quintanilla RN  Body Position: position changed independently  Taken 10/8/2024 0800 by Ricarda Quintanilla RN  Body Position: position changed independently  Skin Protection:   adhesive use limited   tubing/devices free from skin contact  Intervention: Prevent and Manage VTE (Venous Thromboembolism) Risk  Recent Flowsheet Documentation  Taken 10/8/2024 1600 by Ricarda Quintanilla RN  Activity Management: activity encouraged  Taken 10/8/2024 1400 by Ricarda Quintanilla RN  Activity Management: activity encouraged  VTE Prevention/Management: (See MAR) other (see comments)  Range of Motion: active ROM (range of motion) encouraged  Taken 10/8/2024 1200 by Ricarda Quintanilla RN  Activity Management: activity encouraged  Taken 10/8/2024 1000 by Ricarda Quintanilla RN  Activity Management: activity  encouraged  Taken 10/8/2024 0800 by Ricarda Quintanilla RN  Activity Management: activity encouraged  VTE Prevention/Management: (See MAR) other (see comments)  Range of Motion: active ROM (range of motion) encouraged  Goal: Optimal Comfort and Wellbeing  Intervention: Provide Person-Centered Care  Recent Flowsheet Documentation  Taken 10/8/2024 1400 by Ricarda Quintanilla RN  Trust Relationship/Rapport: care explained  Taken 10/8/2024 0800 by Ricarda Quintanilla RN  Trust Relationship/Rapport: care explained     Problem: Functional Ability Impaired (Heart Failure)  Goal: Optimal Functional Ability  Intervention: Optimize Functional Ability  Recent Flowsheet Documentation  Taken 10/8/2024 1600 by Ricarda Quintanilla RN  Activity Management: activity encouraged  Taken 10/8/2024 1400 by Ricarda Quintanilla RN  Activity Management: activity encouraged  Taken 10/8/2024 1200 by Ricarda Quintanilla RN  Activity Management: activity encouraged  Taken 10/8/2024 1000 by Ricarda Quintanilla RN  Activity Management: activity encouraged  Taken 10/8/2024 0800 by Ricarda Quintanilla RN  Activity Management: activity encouraged     Problem: Respiratory Compromise (Heart Failure)  Goal: Effective Oxygenation and Ventilation  Intervention: Promote Airway Secretion Clearance  Recent Flowsheet Documentation  Taken 10/8/2024 1400 by Ricarda Quintanilla RN  Cough And Deep Breathing: done independently per patient  Taken 10/8/2024 0800 by Ricarda Quintanilla RN  Cough And Deep Breathing: done independently per patient     Problem: Fall Injury Risk  Goal: Absence of Fall and Fall-Related Injury  Intervention: Promote Injury-Free Environment  Recent Flowsheet Documentation  Taken 10/8/2024 1600 by Ricarda Quintanilla RN  Safety Promotion/Fall Prevention: safety round/check completed  Taken 10/8/2024 1400 by Ricarda Quintanilla RN  Safety Promotion/Fall Prevention: safety round/check completed  Taken 10/8/2024 1200 by Ricarda Quintanilla RN  Safety Promotion/Fall Prevention: safety  round/check completed  Taken 10/8/2024 1000 by Ricarda Quintanilla, RN  Safety Promotion/Fall Prevention: safety round/check completed  Taken 10/8/2024 0800 by Ricarda Quintanilla, RN  Safety Promotion/Fall Prevention: safety round/check completed   Goal Outcome Evaluation:   Vital signs stable; afib on monitor; continue IV lasix per order; continue PO abx for soft tissue infection; continue compression wraps to BLEs; pain improving to BLEs per pt; makes needs known; will continue to monitor.

## 2024-10-08 NOTE — PROGRESS NOTES
"[facility] Clinical Pharmacy Services: Enoxaparin Consult    Hernando Lozada has a pharmacy consult to dose therapeutic enoxaparin per Dr. Nova's request.     Indication: A Fib - requiring full anticoagulation  Home Anticoagulation: Xarelto (on hold)     Relevant clinical data and objective history reviewed:  79 y.o. male 175.3 cm (69\") 111 kg (245 lb 2.4 oz)   Body mass index is 36.2 kg/m².   Results from last 7 days   Lab Units 10/08/24  0533   PLATELETS 10*3/mm3 235     Estimated Creatinine Clearance: 101.3 mL/min (A) (by C-G formula based on SCr of 0.75 mg/dL (L)).    Assessment/Plan    Will start patient on  105 mg (1mg/kg) subcutaneous every 12 hours, adjusted for renal function. Consult order will be discontinued but pharmacy will continue to follow.     Wally Ontiveros III, PharmD  Clinical Pharmacist    "
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
## 2024-10-08 NOTE — PROGRESS NOTES
Name: Hernando Lozada ADMIT: 10/6/2024   : 1945  PCP: Sammie Gambino APRN    MRN: [id number] LOS: 1 days   AGE/SEX: 79 y.o. male  ROOM: [facility]     Subjective   Subjective   Patient seen this morning.  Feeling better, lower extremity pain improved, controlled with Tylenol, swelling seems to be improving as well.  Responding well to IV Lasix, -3.3 L charted last 24 hours.  Denies shortness of breath.  Denies chest pain, fevers, chills, nausea or vomiting.    10/8/2024  Patient seen and examined at bedside, no acute distress or complaints.  Diuresing well. LE edema still present but improved.     Review of Systems   12 point ROS reviewed and negative except as mentioned above    Objective   Objective   Vital Signs  Temp:  [97.7 °F (36.5 °C)-98.8 °F (37.1 °C)] 98.6 °F (37 °C)  Heart Rate:  [78-91] 87  Resp:  [18] 18  BP: (114-138)/(67-79) 118/72  SpO2:  [82 %-96 %] 95 %  on  Flow (L/min):  [2] 2;   Device (Oxygen Therapy): room air  Body mass index is 36.2 kg/m².  Physical Exam  Constitutional:       General: He is not in acute distress.     Appearance: Normal appearance.   HENT:      Head: Normocephalic and atraumatic.      Nose: No congestion.      Mouth/Throat:      Pharynx: No oropharyngeal exudate.   Eyes:      General: No scleral icterus.  Cardiovascular:      Rate and Rhythm: Normal rate. Rhythm irregular.      Heart sounds: Murmur heard.      No friction rub. No gallop.   Pulmonary:      Effort: No respiratory distress.      Breath sounds: No wheezing, rhonchi or rales.   Abdominal:      General: There is no distension.      Tenderness: There is no abdominal tenderness. There is no guarding.   Musculoskeletal:         General: Tenderness present. No deformity or signs of injury.      Cervical back: No rigidity.      Right lower leg: Edema present.      Left lower leg: Edema present.   Skin:     Coloration: Skin is not jaundiced.      Findings: Erythema present. No bruising or lesion.   Neurological:       General: No focal deficit present.      Mental Status: He is alert and oriented to person, place, and time. Mental status is at baseline.             Results Review     I reviewed the patient's new clinical results.  Results from last 7 days   Lab Units 10/08/24  0533 10/07/24  0504 10/06/24  0910   WBC 10*3/mm3 5.99 5.98 8.15   HEMOGLOBIN g/dL 10.4* 9.8* 10.0*   PLATELETS 10*3/mm3 235 219 224     Results from last 7 days   Lab Units 10/08/24  0533 10/07/24  0505 10/06/24  0910   SODIUM mmol/L 135* 137 135*   POTASSIUM mmol/L 3.8 4.2 4.1   CHLORIDE mmol/L 98 98 97*   CO2 mmol/L 28.8 31.8* 30.7*   BUN mg/dL 14 16 16   CREATININE mg/dL 0.75* 0.92 0.88   GLUCOSE mg/dL 94 94 106*   Estimated Creatinine Clearance: 101.3 mL/min (A) (by C-G formula based on SCr of 0.75 mg/dL (L)).  Results from last 7 days   Lab Units 10/06/24  0910   ALBUMIN g/dL 3.4*   BILIRUBIN mg/dL 0.7   ALK PHOS U/L 63   AST (SGOT) U/L 14   ALT (SGPT) U/L 13     Results from last 7 days   Lab Units 10/08/24  0533 10/07/24  0505 10/06/24  0910   CALCIUM mg/dL 8.4* 8.5* 8.8   ALBUMIN g/dL  --   --  3.4*   MAGNESIUM mg/dL 2.1 1.9 1.6   PHOSPHORUS mg/dL  --  3.7  --      Results from last 7 days   Lab Units 10/06/24  0910   PROCALCITONIN ng/mL 0.04     COVID19   Date Value Ref Range Status   04/20/2023 Not Detected Not Detected - Ref. Range Final   11/25/2022 Detected (C) Not Detected - Ref. Range Final     Hemoglobin A1C   Date/Time Value Ref Range Status   10/07/2024 0504 5.30 4.80 - 5.60 % Final     Glucose   Date/Time Value Ref Range Status   10/06/2024 1708 95 70 - 130 mg/dL Final           XR Chest 1 View  Narrative: XR CHEST 1 VW-     HISTORY: Male who is 79 years-old, evaluate for edema     TECHNIQUE: Frontal view of the chest     COMPARISON: 8/27/2024     FINDINGS: The heart size is normal. Pulmonary vasculature is mildly  congested. Sternotomy wires are present.  No focal pulmonary  consolidation. Minimal pleural effusions are suggested. No  pneumothorax.  No acute osseous process.     Impression: Mild pulmonary vascular congestion with minimal pleural  effusions.           This report was finalized on 10/6/2024 9:08 AM by Dr. Chava Flores M.D on Workstation: [employer]       Scheduled Medications  aspirin, 81 mg, Oral, Daily  atorvastatin, 40 mg, Oral, Nightly  cephalexin, 500 mg, Oral, Q6H  clopidogrel, 75 mg, Oral, Daily  divalproex, 500 mg, Oral, BID  enoxaparin, 1 mg/kg, Subcutaneous, Q12H  ferrous sulfate, 325 mg, Oral, Daily With Breakfast  folic acid, 1 mg, Oral, Daily  furosemide, 40 mg, Intravenous, BID  levothyroxine, 50 mcg, Oral, Q AM  [Held by provider] lisinopril, 5 mg, Oral, Daily  oxybutynin XL, 5 mg, Oral, Daily  pantoprazole, 40 mg, Oral, Q AM  [Held by provider] rivaroxaban, 20 mg, Oral, Daily With Dinner  rOPINIRole, 0.25 mg, Oral, TID  senna-docusate sodium, 2 tablet, Oral, BID  sodium chloride, 10 mL, Intravenous, Q12H  tamsulosin, 0.8 mg, Oral, Daily  cyanocobalamin, 1,000 mcg, Oral, Daily    Infusions   Diet  Diet: Cardiac; Healthy Heart (2-3 Na+); Fluid Consistency: Thin (IDDSI 0)    I have personally reviewed     [x]  Laboratory   [x]  Microbiology   []  Radiology   []  EKG/Telemetry  []  Cardiology/Vascular   []  Pathology    []  Records       Assessment/Plan     Active Hospital Problems    Diagnosis  POA    **Acute on chronic diastolic heart failure [I50.33]  Unknown    Atrial fibrillation [I48.91]  Unknown    Acute on chronic diastolic CHF (congestive heart failure) [I50.33]  Yes    Hypothyroidism [E03.9]  Yes    Aortic stenosis, severe [I35.0]  Yes    CAD (coronary artery disease) [I25.10]  Yes    Diabetes mellitus [E11.9]  Yes    Primary hypertension [I10]  Yes      Resolved Hospital Problems   No resolved problems to display.       Patient is a 79-year-old male with a history of including but not limited to CAD, diastolic heart failure, severe aortic stenosis, type II DM, hypothyroidism, TBI, DVT, presented to the  ED presented from nursing facility complaining of worsening lower extremity swelling/pain/erythema.      Active chronic diastolic heart failure  Aortic stenosis  CAD  Patient with recent angioplasty on 06/26/2024 found to have 99% proximal circumflex artery status post stent placement  -Most recent echo 06/22/2024 showed EF of 69.8%, severe aortic valve stenosis, grade 1 diastolic dysfunction  -Chest x-ray with mild pulmonary vascular congestion with minimal pleural effusion  -proBNP 2462 on admission  -On Plavox and Lovenox, aspirin on hold   -Continue IV Lasix 40 mg twice daily, responding well to IV diuretics  -Cardiology following > continue current plan     Hypoxemia  -Oxygen dropped to 87% on room air per reports in the ED  - secondary to above  -IV diuresis, incentive parameter  -Wean off O2 as able,, O2 saturation goal 90% and above  - currently 95% on RA        Iron deficiency anemia  -Patient with history of iron deficiency anemia, on p.o. iron outpatient continue  -Repeat iron panel with no significant iron deficiency  -Hemoglobin 10.0 on admission, recent hemoglobin between 11-12,  -Hemoglobin 9.8 this morning.  Monitor daily and transfuse as indicated for symptomatic anemia or hemoglobin less than 7  - hgb 10.4, no overt bleeding, monitor labs as started on therapeutic Lovenox with Plavix, aspirin on hold        History of DVT  -Most recent duplex ultrasound of bilateral lower extremities on 10/2/2024 was negative for deep or superficial thrombosis.Previous duplex ultrasound on 03/2023 showed chronic left lower remedy DVT noted in mid femoral vein  -Xarelto listed as a home med , RN confirmed with facility, patient takes Xarelto, Plavix and aspirin.  -Hold Xarelto for now asked to proceed to start with no DVT patient is on dual antiplatelets.  Concerning for increased risk of bleeding in the setting of triple therapy and iron deficiency anemia.  Will  discusse with cardiology whether either Plavix or  aspirin can be held and Xarelto resumed.  - add therapeutic Lovenox as per cardiology recommendations      Bilateral lower extremity cellulitis?  -Patient with significant bilateral lower extremity erythema, which is likely secondary to heart failure/chronic venous stasis,   -Was initiated on p.o. antibiotics outpatient, started on IV ceftriaxone inpatient  -ID transitioned to PO Keflex, doubts cellulitis but will cover     Hypothyroidism  -Most recent TSH 08/9/24 was normal  -Continue outpatient levothyroxine    PAF  New onset ?   -Not on rate control, heart rate stable, no known history of atrial fibrillation  -cardiology consult  - therapeutic Lovenox started as per cardiology recommendations in the setting of Plavix, can hold aspirin, watch for bleeding  - rate stable  - monitor    Pharmacy to dose Lovenox for DVT prophylaxis.  Full code.  Discussed with patient.  Expected Discharge Date: 10/10/2024; Expected Discharge Time:        Copied text in this note has been reviewed and is accurate as of 10/08/24.           Gary Nova DO  [facility]  10/08/24  16:38 EDT
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
## 2024-10-09 ENCOUNTER — APPOINTMENT (OUTPATIENT)
Dept: GENERAL RADIOLOGY | Facility: HOSPITAL | Age: 79
DRG: 292 | End: 2024-10-09
Payer: MEDICARE

## 2024-10-09 LAB
ANION GAP SERPL CALCULATED.3IONS-SCNC: 9 MMOL/L (ref 5–15)
BUN SERPL-MCNC: 15 MG/DL (ref 8–23)
BUN/CREAT SERPL: 18.8 (ref 7–25)
CALCIUM SPEC-SCNC: 8.1 MG/DL (ref 8.6–10.5)
CHLORIDE SERPL-SCNC: 98 MMOL/L (ref 98–107)
CO2 SERPL-SCNC: 31 MMOL/L (ref 22–29)
CREAT SERPL-MCNC: 0.8 MG/DL (ref 0.76–1.27)
DEPRECATED RDW RBC AUTO: 45.9 FL (ref 37–54)
EGFRCR SERPLBLD CKD-EPI 2021: 91.2 ML/MIN/1.73
ERYTHROCYTE [DISTWIDTH] IN BLOOD BY AUTOMATED COUNT: 13.5 % (ref 12.3–15.4)
GEN 5 2HR TROPONIN T REFLEX: 24 NG/L
GLUCOSE SERPL-MCNC: 99 MG/DL (ref 65–99)
HCT VFR BLD AUTO: 32.6 % (ref 37.5–51)
HGB BLD-MCNC: 10.6 G/DL (ref 13–17.7)
MCH RBC QN AUTO: 30.5 PG (ref 26.6–33)
MCHC RBC AUTO-ENTMCNC: 32.5 G/DL (ref 31.5–35.7)
MCV RBC AUTO: 93.7 FL (ref 79–97)
PLATELET # BLD AUTO: 264 10*3/MM3 (ref 140–450)
PMV BLD AUTO: 8.6 FL (ref 6–12)
POTASSIUM SERPL-SCNC: 3.8 MMOL/L (ref 3.5–5.2)
QT INTERVAL: 392 MS
QTC INTERVAL: 453 MS
RBC # BLD AUTO: 3.48 10*6/MM3 (ref 4.14–5.8)
SODIUM SERPL-SCNC: 138 MMOL/L (ref 136–145)
TROPONIN T DELTA: -1 NG/L
TROPONIN T SERPL HS-MCNC: 25 NG/L
WBC NRBC COR # BLD AUTO: 6.75 10*3/MM3 (ref 3.4–10.8)

## 2024-10-09 PROCEDURE — 25010000002 ENOXAPARIN PER 10 MG: Performed by: STUDENT IN AN ORGANIZED HEALTH CARE EDUCATION/TRAINING PROGRAM

## 2024-10-09 PROCEDURE — 25010000002 FUROSEMIDE PER 20 MG: Performed by: STUDENT IN AN ORGANIZED HEALTH CARE EDUCATION/TRAINING PROGRAM

## 2024-10-09 PROCEDURE — 97530 THERAPEUTIC ACTIVITIES: CPT

## 2024-10-09 PROCEDURE — 84484 ASSAY OF TROPONIN QUANT: CPT | Performed by: NURSE PRACTITIONER

## 2024-10-09 PROCEDURE — 93005 ELECTROCARDIOGRAM TRACING: CPT | Performed by: NURSE PRACTITIONER

## 2024-10-09 PROCEDURE — 97535 SELF CARE MNGMENT TRAINING: CPT

## 2024-10-09 PROCEDURE — 80048 BASIC METABOLIC PNL TOTAL CA: CPT | Performed by: STUDENT IN AN ORGANIZED HEALTH CARE EDUCATION/TRAINING PROGRAM

## 2024-10-09 PROCEDURE — 84550 ASSAY OF BLOOD/URIC ACID: CPT | Performed by: STUDENT IN AN ORGANIZED HEALTH CARE EDUCATION/TRAINING PROGRAM

## 2024-10-09 PROCEDURE — 73610 X-RAY EXAM OF ANKLE: CPT

## 2024-10-09 PROCEDURE — 85027 COMPLETE CBC AUTOMATED: CPT | Performed by: STUDENT IN AN ORGANIZED HEALTH CARE EDUCATION/TRAINING PROGRAM

## 2024-10-09 PROCEDURE — 73630 X-RAY EXAM OF FOOT: CPT

## 2024-10-09 RX ADMIN — DIVALPROEX SODIUM 500 MG: 500 TABLET, DELAYED RELEASE ORAL at 09:56

## 2024-10-09 RX ADMIN — CEPHALEXIN 500 MG: 500 CAPSULE ORAL at 23:45

## 2024-10-09 RX ADMIN — SENNOSIDES AND DOCUSATE SODIUM 2 TABLET: 50; 8.6 TABLET ORAL at 21:37

## 2024-10-09 RX ADMIN — ENOXAPARIN SODIUM 105 MG: 150 INJECTION SUBCUTANEOUS at 12:03

## 2024-10-09 RX ADMIN — ROPINIROLE HYDROCHLORIDE 0.25 MG: 0.5 TABLET, FILM COATED ORAL at 15:04

## 2024-10-09 RX ADMIN — FUROSEMIDE 40 MG: 10 INJECTION, SOLUTION INTRAMUSCULAR; INTRAVENOUS at 09:56

## 2024-10-09 RX ADMIN — CEPHALEXIN 500 MG: 500 CAPSULE ORAL at 00:19

## 2024-10-09 RX ADMIN — FUROSEMIDE 40 MG: 10 INJECTION, SOLUTION INTRAMUSCULAR; INTRAVENOUS at 18:18

## 2024-10-09 RX ADMIN — Medication 10 ML: at 21:37

## 2024-10-09 RX ADMIN — ENOXAPARIN SODIUM 105 MG: 150 INJECTION SUBCUTANEOUS at 23:45

## 2024-10-09 RX ADMIN — PANTOPRAZOLE SODIUM 40 MG: 40 TABLET, DELAYED RELEASE ORAL at 05:50

## 2024-10-09 RX ADMIN — LEVOTHYROXINE SODIUM 50 MCG: 50 TABLET ORAL at 05:50

## 2024-10-09 RX ADMIN — CEPHALEXIN 500 MG: 500 CAPSULE ORAL at 12:03

## 2024-10-09 RX ADMIN — ENOXAPARIN SODIUM 105 MG: 150 INJECTION SUBCUTANEOUS at 00:19

## 2024-10-09 RX ADMIN — CEPHALEXIN 500 MG: 500 CAPSULE ORAL at 05:50

## 2024-10-09 RX ADMIN — ROPINIROLE HYDROCHLORIDE 0.25 MG: 0.5 TABLET, FILM COATED ORAL at 09:56

## 2024-10-09 RX ADMIN — Medication 1000 MCG: at 09:58

## 2024-10-09 RX ADMIN — TAMSULOSIN HYDROCHLORIDE 0.8 MG: 0.4 CAPSULE ORAL at 09:56

## 2024-10-09 RX ADMIN — CEPHALEXIN 500 MG: 500 CAPSULE ORAL at 18:18

## 2024-10-09 RX ADMIN — OXYBUTYNIN CHLORIDE 5 MG: 5 TABLET, EXTENDED RELEASE ORAL at 09:56

## 2024-10-09 RX ADMIN — SENNOSIDES AND DOCUSATE SODIUM 2 TABLET: 50; 8.6 TABLET ORAL at 09:55

## 2024-10-09 RX ADMIN — ROPINIROLE HYDROCHLORIDE 0.25 MG: 0.5 TABLET, FILM COATED ORAL at 21:37

## 2024-10-09 RX ADMIN — ATORVASTATIN CALCIUM 40 MG: 20 TABLET, FILM COATED ORAL at 21:37

## 2024-10-09 RX ADMIN — FOLIC ACID 1 MG: 1 TABLET ORAL at 09:56

## 2024-10-09 RX ADMIN — CLOPIDOGREL BISULFATE 75 MG: 75 TABLET, FILM COATED ORAL at 09:56

## 2024-10-09 RX ADMIN — DIVALPROEX SODIUM 500 MG: 500 TABLET, DELAYED RELEASE ORAL at 21:37

## 2024-10-09 RX ADMIN — FERROUS SULFATE TAB 325 MG (65 MG ELEMENTAL FE) 325 MG: 325 (65 FE) TAB at 09:56

## 2024-10-09 RX ADMIN — Medication 10 ML: at 09:56

## 2024-10-09 NOTE — PLAN OF CARE
Goal Outcome Evaluation:  Plan of Care Reviewed With: patient        Progress: improving  Outcome Evaluation: Pt. seen this AM for OT session, agreeable to OOB activity, demo'd much improved act sebastian and balance, walking increased distances and able to perform grooming in standing at the sink SBA. Pt. appears to be making slow and steady progress and will benefit further from skilled OT services,

## 2024-10-09 NOTE — PLAN OF CARE
Goal Outcome Evaluation:  Plan of Care Reviewed With: patient           Outcome Evaluation: Upon entering room, pt. supine in bed, awake/alert, and agreeable to work with P.T. this date despite c/o Left ankle pain/soreness.  This AM, pt. able to ambulate 100 feet, CGA x 1, with use of Rwx.  Pt. requires Min. assist x 1 for bed mobility and Min. assist x 2 for sit <-> stand transfers.  BLE ther. ex. program x 10 reps completed for general strengthening. Verbal/tactile cues given during ambulation for posture correction. Overall improved tolerance to functional activity this date compared to last P.T. session with an increase in gait distance.  Will continue to progress functional mobility as tolerated.      Anticipated Discharge Disposition (PT): home ([facility] to [facility])
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
## 2024-10-09 NOTE — PLAN OF CARE
Problem: Adult Inpatient Plan of Care  Goal: Plan of Care Review  Outcome: Progressing  Goal: Patient-Specific Goal (Individualized)  Outcome: Progressing  Goal: Absence of Hospital-Acquired Illness or Injury  Outcome: Progressing  Intervention: Identify and Manage Fall Risk  Description: Perform standard risk assessment on admission using a validated tool or comprehensive approach appropriate to the patient; reassess fall risk frequently, with change in status or transfer to another level of care.  Communicate fall injury risk to interprofessional healthcare team.  Determine need for increased observation, equipment and environmental modification, such as low bed, signage and supportive, nonskid footwear.  Adjust safety measures to individual developmental age, stage and identified risk factors.  Reinforce the importance of safety and physical activity with patient and family.  Perform regular intentional rounding to assess need for position change, pain assessment and personal needs, including assistance with toileting.  Recent Flowsheet Documentation  Taken 10/9/2024 0400 by Summer Freed RN  Safety Promotion/Fall Prevention:   activity supervised   nonskid shoes/slippers when out of bed   safety round/check completed   clutter free environment maintained  Taken 10/9/2024 0200 by Summer Freed RN  Safety Promotion/Fall Prevention:   safety round/check completed   activity supervised   nonskid shoes/slippers when out of bed   clutter free environment maintained   lighting adjusted  Taken 10/9/2024 0019 by Summer Freed RN  Safety Promotion/Fall Prevention:   activity supervised   nonskid shoes/slippers when out of bed   safety round/check completed   clutter free environment maintained   lighting adjusted  Taken 10/8/2024 2202 by Summer Freed RN  Safety Promotion/Fall Prevention:   safety round/check completed   lighting adjusted   clutter free environment maintained   nonskid shoes/slippers  when out of bed   activity supervised  Taken 10/8/2024 2045 by Summer Freed RN  Safety Promotion/Fall Prevention:   safety round/check completed   nonskid shoes/slippers when out of bed   clutter free environment maintained   activity supervised  Intervention: Prevent Skin Injury  Description: Perform a screening for skin injury risk, such as pressure or moisture associated skin damage on admission and at regular intervals throughout hospital stay.  Keep all areas of skin (especially folds) clean and dry.  Maintain adequate skin hydration.  Relieve and redistribute pressure and protect bony prominences; implement measures based on patient-specific risk factors.  Match turning and repositioning schedule to clinical condition.  Encourage weight shift frequently; assist with reposition if unable to complete independently.  Float heels off bed; avoid pressure on the Achilles tendon.  Keep skin free from extended contact with medical devices.  Encourage functional activity and mobility, as early as tolerated.  Use aids (e.g., slide boards, mechanical lift) during transfer.  Recent Flowsheet Documentation  Taken 10/9/2024 0400 by Summer Freed RN  Body Position: position changed independently  Taken 10/9/2024 0200 by Summer Freed RN  Body Position:   turned   right  Taken 10/9/2024 0019 by Summer Freed RN  Body Position:   position changed independently   sitting up in bed  Taken 10/8/2024 2202 by Summer Freed RN  Body Position:   position changed independently   neutral body alignment  Taken 10/8/2024 2045 by Summer Freed RN  Body Position:   position changed independently   neutral body alignment  Skin Protection:   adhesive use limited   tubing/devices free from skin contact  Intervention: Prevent and Manage VTE (Venous Thromboembolism) Risk  Description: Assess for VTE (venous thromboembolism) risk.  Encourage and assist with early ambulation.  Initiate and maintain compression or other  therapy, as indicated, based on identified risk in accordance with organizational protocol and provider order.  Encourage both active and passive leg exercises while in bed, if unable to ambulate.  Recent Flowsheet Documentation  Taken 10/9/2024 0400 by Summer Freed RN  Activity Management: activity encouraged  Taken 10/9/2024 0200 by Summer Freed RN  Activity Management: activity encouraged  Taken 10/9/2024 0019 by Summer Freed RN  Activity Management: activity encouraged  VTE Prevention/Management: (Lovenox-see MAR) other (see comments)  Range of Motion: active ROM (range of motion) encouraged  Taken 10/8/2024 2202 by Summer Freed RN  Activity Management: activity encouraged  Taken 10/8/2024 2045 by Summer Freed RN  Activity Management: activity encouraged  Range of Motion: active ROM (range of motion) encouraged  Intervention: Prevent Infection  Description: Maintain skin and mucous membrane integrity; promote hand, oral and pulmonary hygiene.  Optimize fluid balance, nutrition, sleep and glycemic control to maximize infection resistance.  Identify potential sources of infection early to prevent or mitigate progression of infection (e.g., wound, lines, devices).  Evaluate ongoing need for invasive devices; remove promptly when no longer indicated.  Recent Flowsheet Documentation  Taken 10/9/2024 0400 by Summer Freed RN  Infection Prevention:   environmental surveillance performed   single patient room provided   rest/sleep promoted  Taken 10/9/2024 0200 by Summer Freed RN  Infection Prevention:   environmental surveillance performed   rest/sleep promoted   single patient room provided  Taken 10/9/2024 0019 by Summer Freed RN  Infection Prevention:   environmental surveillance performed   single patient room provided   visitors restricted/screened  Goal: Optimal Comfort and Wellbeing  Outcome: Progressing  Intervention: Provide Person-Centered Care  Description: Use a  family-focused approach to care.  Develop trust and rapport by proactively providing information, encouraging questions, addressing concerns and offering reassurance.  Acknowledge emotional response to hospitalization.  Recognize and utilize personal coping strategies.  Honor spiritual and cultural preferences.  Recent Flowsheet Documentation  Taken 10/9/2024 0019 by Summer Freed RN  Trust Relationship/Rapport: care explained  Taken 10/8/2024 2045 by Summer Freed RN  Trust Relationship/Rapport: care explained  Goal: Readiness for Transition of Care  Outcome: Progressing     Problem: Diabetes Comorbidity  Goal: Blood Glucose Level Within Targeted Range  Outcome: Progressing     Problem: Hypertension Comorbidity  Goal: Blood Pressure in Desired Range  Outcome: Progressing     Problem: Adjustment to Illness (Heart Failure)  Goal: Optimal Coping  Outcome: Progressing  Intervention: Support Psychosocial Response  Description: Acknowledge, normalize and validate the emotional response to current condition and unpredictable nature of disease progression.  Assess and monitor patient and caregiver perspective on quality of life and personal wellbeing; consider palliative care consult to enhance symptom relief and quality of life.  Engage patient in early and ongoing discussion about goals of care. Facilitate shared decision-making regarding goals and advanced care planning.  Assist patient and family with life transitions associated with heart failure (e.g., adherence to medical regimens, impact on family dynamics/roles, end-of-life care); acknowledge caregiver stress.  Recognize current coping strategies and assist in developing new strategies (problem-solving, mind-body techniques).  Assess and monitor for signs and symptoms of anxiety and depression.  Recent Flowsheet Documentation  Taken 10/9/2024 0019 by Summer Freed RN  Supportive Measures: active listening utilized  Taken 10/8/2024 2045 by Lourdes  Summer RN  Supportive Measures: active listening utilized  Family/Support System Care: support provided     Problem: Cardiac Output Decreased (Heart Failure)  Goal: Optimal Cardiac Output  Outcome: Progressing  Intervention: Optimize Cardiac Output  Description: Closely monitor fluid balance; advocate for adjustment if balance is consistently positive.  Optimize preload, afterload and contractility with pharmacologic therapy (e.g., diuretic, angiotensin-converting enzyme inhibitor, angiotensin receptor blocker, beta-blocker, aldosterone antagonist, phosphodiesterase-5 inhibitor, inodilator, inotrope  Implement pharmacologic and nonpharmacologic interventions for pain, comfort and anxiety to avoid cardiac and respiratory compromise.  Maintain optimal position to promote venous return (e.g., elevate head of bed, avoid dependent extremity position).  Facilitate sleep/rest patterns that support or enhance activity tolerance.  Decrease energy expenditure (e.g., preplan activity, cluster care considering patient preference).  Maintain normothermia to enhance oxygenation and perfusion.  Facilitate use of additional therapy, such as ultrafiltration, intra-aortic balloon pump, ventricular assist device or extracorporeal membrane oxygenation to improve perfusion and hemodynamic stability.  Anticipate surgical intervention, such as heart surgery or heart transplantation, with refractory symptoms.  Recent Flowsheet Documentation  Taken 10/8/2024 2045 by Summer Freed, RN  Environmental Support: calm environment promoted     Problem: Dysrhythmia (Heart Failure)  Goal: Stable Heart Rate and Rhythm  Outcome: Progressing     Problem: Fluid Imbalance (Heart Failure)  Goal: Fluid Balance  Outcome: Progressing     Problem: Functional Ability Impaired (Heart Failure)  Goal: Optimal Functional Ability  Outcome: Progressing  Intervention: Optimize Functional Ability  Description: Assess functional ability and cognition; consider  social history, including living environment, roles and social engagement, to identify risk or presence of functional decline; routinely reassess during hospitalization to identify any change.  Facilitate physical activity and exercise to improve functional ability, cognition and quality of life, as well as minimize functional decline associated with inactivity; encourage optimal functional mobility.  Consider NMES (neuromuscular electrical stimulation) of the lower extremities for patients with limitations in ability to participate in active exercise.  Monitor physiologic response to activity or exercise and adjust accordingly; provide a warm-up and cool-down with activity.  Cluster, coordinate and organize care schedule per patient preference, priorities and tolerance.  Preplan and pace activity; balance activity with periods of rest; incorporate energy-conservation techniques.  Maintain an accessible environment to facilitate safe activity; position for optimal comfort and activity tolerance (e.g., sitting for self-care).  Encourage self-care performance to promote maximum independence in daily activity; provide adaptive equipment and rest periods if needed.  Offer personal aids, such as glasses, hearing aids and dentures; encourage familiar home items and neurosensory stimulation activities to prevent isolation and sensory deprivation.  Recent Flowsheet Documentation  Taken 10/9/2024 0400 by Summer Freed, RN  Activity Management: activity encouraged  Taken 10/9/2024 0200 by Summer Freed, RN  Activity Management: activity encouraged  Taken 10/9/2024 0019 by Summer Freed, RN  Activity Management: activity encouraged  Taken 10/8/2024 2202 by Summer Freed, RN  Activity Management: activity encouraged  Taken 10/8/2024 2045 by Summer Freed, RN  Activity Management: activity encouraged     Problem: Oral Intake Inadequate (Heart Failure)  Goal: Optimal Nutrition Intake  Outcome: Progressing      Problem: Respiratory Compromise (Heart Failure)  Goal: Effective Oxygenation and Ventilation  Outcome: Progressing     Problem: Sleep Disordered Breathing (Heart Failure)  Goal: Effective Breathing Pattern During Sleep  Outcome: Progressing     Problem: Fall Injury Risk  Goal: Absence of Fall and Fall-Related Injury  Outcome: Progressing  Intervention: Promote Injury-Free Environment  Description: Provide a safe, barrier-free environment that encourages independent activity.  Keep care area uncluttered and well-lighted.  Determine need for increased observation or monitoring.  Avoid use of devices that minimize mobility, such as restraints or indwelling urinary catheter.  Recent Flowsheet Documentation  Taken 10/9/2024 0400 by Summer Freed RN  Safety Promotion/Fall Prevention:   activity supervised   nonskid shoes/slippers when out of bed   safety round/check completed   clutter free environment maintained  Taken 10/9/2024 0200 by Summer Freed RN  Safety Promotion/Fall Prevention:   safety round/check completed   activity supervised   nonskid shoes/slippers when out of bed   clutter free environment maintained   lighting adjusted  Taken 10/9/2024 0019 by Summer Freed RN  Safety Promotion/Fall Prevention:   activity supervised   nonskid shoes/slippers when out of bed   safety round/check completed   clutter free environment maintained   lighting adjusted  Taken 10/8/2024 2202 by Summer Freed RN  Safety Promotion/Fall Prevention:   safety round/check completed   lighting adjusted   clutter free environment maintained   nonskid shoes/slippers when out of bed   activity supervised  Taken 10/8/2024 2045 by Summer Freed RN  Safety Promotion/Fall Prevention:   safety round/check completed   nonskid shoes/slippers when out of bed   clutter free environment maintained   activity supervised   Goal Outcome Evaluation:  Plan of Care Reviewed With: patient        Progress: improving

## 2024-10-09 NOTE — PLAN OF CARE
Problem: Adult Inpatient Plan of Care  Goal: Absence of Hospital-Acquired Illness or Injury  Intervention: Identify and Manage Fall Risk  Recent Flowsheet Documentation  Taken 10/9/2024 1400 by Ricarda Quintanilla RN  Safety Promotion/Fall Prevention: safety round/check completed  Taken 10/9/2024 1200 by Ricarda Quintanilla RN  Safety Promotion/Fall Prevention: safety round/check completed  Taken 10/9/2024 1000 by Ricarda Quintanilla RN  Safety Promotion/Fall Prevention: safety round/check completed  Taken 10/9/2024 0800 by Ricarda Quintanilla RN  Safety Promotion/Fall Prevention: safety round/check completed  Intervention: Prevent Skin Injury  Recent Flowsheet Documentation  Taken 10/9/2024 1400 by Ricarda Quintanilla RN  Body Position: position changed independently  Skin Protection:   adhesive use limited   tubing/devices free from skin contact  Taken 10/9/2024 1200 by Ricarda Quintanilla RN  Body Position: position changed independently  Taken 10/9/2024 1000 by Ricarda Quintanilla RN  Body Position: position changed independently  Taken 10/9/2024 0800 by Ricarda Quintanilla RN  Body Position: position changed independently  Skin Protection:   adhesive use limited   tubing/devices free from skin contact  Intervention: Prevent and Manage VTE (Venous Thromboembolism) Risk  Recent Flowsheet Documentation  Taken 10/9/2024 1400 by Ricarda Quintanilla RN  Activity Management: up in chair  VTE Prevention/Management: (See MAR) other (see comments)  Range of Motion: active ROM (range of motion) encouraged  Taken 10/9/2024 1200 by Ricarda Quintanilla RN  Activity Management: activity encouraged  Taken 10/9/2024 1000 by Ricarda Quintanilla RN  Activity Management: activity encouraged  Taken 10/9/2024 0800 by Ricarda Quintanilla RN  Activity Management: activity encouraged  VTE Prevention/Management: (See MAR) other (see comments)  Range of Motion: active ROM (range of motion) encouraged  Goal: Optimal Comfort and Wellbeing  Intervention: Provide Person-Centered  Care  Recent Flowsheet Documentation  Taken 10/9/2024 1400 by Ricarda Quintanilla RN  Trust Relationship/Rapport: care explained  Taken 10/9/2024 0800 by Ricarda Quintanilla RN  Trust Relationship/Rapport: care explained     Problem: Functional Ability Impaired (Heart Failure)  Goal: Optimal Functional Ability  Intervention: Optimize Functional Ability  Recent Flowsheet Documentation  Taken 10/9/2024 1400 by Ricarda Quintanilla RN  Activity Management: up in chair  Taken 10/9/2024 1200 by Ricarda Quintanilla RN  Activity Management: activity encouraged  Taken 10/9/2024 1000 by Ricarda Quintanilla RN  Activity Management: activity encouraged  Taken 10/9/2024 0800 by Ricarda Quintanilla RN  Activity Management: activity encouraged     Problem: Respiratory Compromise (Heart Failure)  Goal: Effective Oxygenation and Ventilation  Intervention: Promote Airway Secretion Clearance  Recent Flowsheet Documentation  Taken 10/9/2024 1400 by Ricarda Quintanilla RN  Cough And Deep Breathing: done independently per patient  Taken 10/9/2024 0800 by Ricarda Quintanilla RN  Cough And Deep Breathing: done independently per patient     Problem: Fall Injury Risk  Goal: Absence of Fall and Fall-Related Injury  Intervention: Promote Injury-Free Environment  Recent Flowsheet Documentation  Taken 10/9/2024 1400 by Ricarda Quintanilla RN  Safety Promotion/Fall Prevention: safety round/check completed  Taken 10/9/2024 1200 by Ricarda Quintanilla RN  Safety Promotion/Fall Prevention: safety round/check completed  Taken 10/9/2024 1000 by Ricarda Quintanilla RN  Safety Promotion/Fall Prevention: safety round/check completed  Taken 10/9/2024 0800 by Ricarda Quintanilla RN  Safety Promotion/Fall Prevention: safety round/check completed   Goal Outcome Evaluation:   Afib on monitor - rate controlled; on room air; A&Ox4; pts lower extremity pain subsiding; BLEs rewrapped this shift; L ankle xray done bedside this shift; pt able to ambulate with PT; cont PO abx and IV lasix; possible DC tomorrow.

## 2024-10-09 NOTE — CASE MANAGEMENT/SOCIAL WORK
Continued Stay Note  [facility]     Patient Name: Hernando Lozada  MRN: [id number]  Today's Date: 10/9/2024    Admit Date: 10/6/2024    Plan: [facility] to [facility]   Discharge Plan       Row Name 10/09/24 1143       Plan    Plan Return to [facility]    Patient/Family in Agreement with Plan yes    Plan Comments CCP reviewed chart, discussed patient during MDR, pt remains on IV diuretics, tolerating RA this morning, working well with therapy. Plan remains to return to [facility]. CCP will follow - Tracie NUÑEZ                   Discharge Codes    No documentation.                 Expected Discharge Date and Time       Expected Discharge Date Expected Discharge Time    Oct 10, 2024               Tracie Santacruz RN
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
## 2024-10-09 NOTE — PROGRESS NOTES
Name: Hernando Lozada ADMIT: 10/6/2024   : 1945  PCP: Sammie Gambino APRN    MRN: [id number] LOS: 2 days   AGE/SEX: 79 y.o. male  ROOM: [facility]     Subjective   Subjective   Patient seen this morning.  Feeling better, lower extremity pain improved, controlled with Tylenol, swelling seems to be improving as well.  Responding well to IV Lasix, -3.3 L charted last 24 hours.  Denies shortness of breath.  Denies chest pain, fevers, chills, nausea or vomiting.    10/8/2024  Patient seen and examined at bedside, no acute distress or complaints.  Diuresing well. LE edema still present but improved.     10/9/2024  Patient seen up and walking with walker, states he is diuresing well but still a bit weak.      Review of Systems   12 point ROS reviewed and negative except as mentioned above    Objective   Objective   Vital Signs  Temp:  [98.4 °F (36.9 °C)-99 °F (37.2 °C)] 99 °F (37.2 °C)  Heart Rate:  [] 66  Resp:  [18] 18  BP: (115-141)/(69-90) 115/69  SpO2:  [92 %-94 %] 94 %  on  Flow (L/min):  [2] 2;   Device (Oxygen Therapy): room air  Body mass index is 33.89 kg/m².  Physical Exam  Constitutional:       General: He is not in acute distress.     Appearance: Normal appearance. He is not toxic-appearing.   HENT:      Head: Normocephalic and atraumatic.      Nose: No congestion.      Mouth/Throat:      Pharynx: No oropharyngeal exudate.   Eyes:      General: No scleral icterus.  Cardiovascular:      Rate and Rhythm: Normal rate. Rhythm irregular.      Heart sounds: Murmur heard.      No friction rub. No gallop.   Pulmonary:      Effort: No respiratory distress.      Breath sounds: No wheezing, rhonchi or rales.   Abdominal:      General: There is no distension.      Tenderness: There is no abdominal tenderness. There is no guarding.   Musculoskeletal:         General: Tenderness present. No deformity or signs of injury.      Cervical back: No rigidity.      Right lower leg: Edema present.      Left lower leg:  Edema present.   Skin:     Coloration: Skin is not jaundiced.      Findings: Erythema present. No bruising or lesion.   Neurological:      General: No focal deficit present.      Mental Status: He is alert and oriented to person, place, and time. Mental status is at baseline.             Results Review     I reviewed the patient's new clinical results.  Results from last 7 days   Lab Units 10/09/24  0603 10/08/24  0533 10/07/24  0504 10/06/24  0910   WBC 10*3/mm3 6.75 5.99 5.98 8.15   HEMOGLOBIN g/dL 10.6* 10.4* 9.8* 10.0*   PLATELETS 10*3/mm3 264 235 219 224     Results from last 7 days   Lab Units 10/09/24  0603 10/08/24  0533 10/07/24  0505 10/06/24  0910   SODIUM mmol/L 138 135* 137 135*   POTASSIUM mmol/L 3.8 3.8 4.2 4.1   CHLORIDE mmol/L 98 98 98 97*   CO2 mmol/L 31.0* 28.8 31.8* 30.7*   BUN mg/dL 15 14 16 16   CREATININE mg/dL 0.80 0.75* 0.92 0.88   GLUCOSE mg/dL 99 94 94 106*   Estimated Creatinine Clearance: 91.9 mL/min (by C-G formula based on SCr of 0.8 mg/dL).  Results from last 7 days   Lab Units 10/06/24  0910   ALBUMIN g/dL 3.4*   BILIRUBIN mg/dL 0.7   ALK PHOS U/L 63   AST (SGOT) U/L 14   ALT (SGPT) U/L 13     Results from last 7 days   Lab Units 10/09/24  0603 10/08/24  0533 10/07/24  0505 10/06/24  0910   CALCIUM mg/dL 8.1* 8.4* 8.5* 8.8   ALBUMIN g/dL  --   --   --  3.4*   MAGNESIUM mg/dL  --  2.1 1.9 1.6   PHOSPHORUS mg/dL  --   --  3.7  --      Results from last 7 days   Lab Units 10/06/24  0910   PROCALCITONIN ng/mL 0.04     COVID19   Date Value Ref Range Status   04/20/2023 Not Detected Not Detected - Ref. Range Final   11/25/2022 Detected (C) Not Detected - Ref. Range Final     Hemoglobin A1C   Date/Time Value Ref Range Status   10/07/2024 0504 5.30 4.80 - 5.60 % Final     Glucose   Date/Time Value Ref Range Status   10/08/2024 1637 119 70 - 130 mg/dL Final   10/06/2024 1708 95 70 - 130 mg/dL Final           XR Ankle 3+ View Left  XR ANKLE 3+ VW LEFT-     Clinical: Left ankle pain x8 months      FINDINGS: The left ankle joint is normal. Surrounding soft tissues  within normal limits.     CONCLUSION: Normal left ankle.     This report was finalized on 10/9/2024 10:39 AM by Dr. Theo Delarosa M.D on Workstation: [id number]     XR Foot 3+ View Left  XR FOOT 3+ VW LEFT-     Clinical: Injured great toe     COMPARISON 9/26/2024     FINDINGS: There is considerable first metatarsophalangeal joint  degeneration. Small marginal erosion demonstrated at the base of the  proximal phalanx, medial side. Gout not excluded. There is exuberant  periarticular bone hypertrophy as before. The interphalangeal joint is  stable in appearance. No fracture or bone lesion seen. No radiopaque  foreign body or soft tissue gas identified. The remainder is  unremarkable.     CONCLUSION: First metatarsal phalangeal joint degeneration as described  above, no acute/subacute osseous abnormality seen.     This report was finalized on 10/9/2024 10:38 AM by Dr. Theo Delarosa M.D on Workstation: [id number]       Scheduled Medications  [Held by provider] aspirin, 81 mg, Oral, Daily  atorvastatin, 40 mg, Oral, Nightly  cephalexin, 500 mg, Oral, Q6H  clopidogrel, 75 mg, Oral, Daily  divalproex, 500 mg, Oral, BID  enoxaparin, 1 mg/kg, Subcutaneous, Q12H  ferrous sulfate, 325 mg, Oral, Daily With Breakfast  folic acid, 1 mg, Oral, Daily  furosemide, 40 mg, Intravenous, BID  levothyroxine, 50 mcg, Oral, Q AM  [Held by provider] lisinopril, 5 mg, Oral, Daily  oxybutynin XL, 5 mg, Oral, Daily  pantoprazole, 40 mg, Oral, Q AM  [Held by provider] rivaroxaban, 20 mg, Oral, Daily With Dinner  rOPINIRole, 0.25 mg, Oral, TID  senna-docusate sodium, 2 tablet, Oral, BID  sodium chloride, 10 mL, Intravenous, Q12H  tamsulosin, 0.8 mg, Oral, Daily  cyanocobalamin, 1,000 mcg, Oral, Daily    Infusions   Diet  Diet: Cardiac; Healthy Heart (2-3 Na+); Fluid Consistency: Thin (IDDSI 0)    I have personally reviewed     [x]  Laboratory   [x]  Microbiology   []   Radiology   []  EKG/Telemetry  []  Cardiology/Vascular   []  Pathology    []  Records       Assessment/Plan     Active Hospital Problems    Diagnosis  POA    **Acute on chronic diastolic heart failure [I50.33]  Unknown    Atrial fibrillation [I48.91]  Unknown    Acute on chronic diastolic CHF (congestive heart failure) [I50.33]  Yes    Hypothyroidism [E03.9]  Yes    Aortic stenosis, severe [I35.0]  Yes    CAD (coronary artery disease) [I25.10]  Yes    Diabetes mellitus [E11.9]  Yes    Primary hypertension [I10]  Yes      Resolved Hospital Problems   No resolved problems to display.       Patient is a 79-year-old male with a history of including but not limited to CAD, diastolic heart failure, severe aortic stenosis, type II DM, hypothyroidism, TBI, DVT, presented to the ED presented from nursing facility complaining of worsening lower extremity swelling/pain/erythema.      Active chronic diastolic heart failure  Aortic stenosis  CAD  Patient with recent angioplasty on 06/26/2024 found to have 99% proximal circumflex artery status post stent placement  -Most recent echo 06/22/2024 showed EF of 69.8%, severe aortic valve stenosis, grade 1 diastolic dysfunction  -Chest x-ray with mild pulmonary vascular congestion with minimal pleural effusion  -proBNP 2462 on admission  -On Plavox and Lovenox, aspirin on hold as per cardiology recommendations   -Continue IV Lasix 40 mg twice daily, responding well to IV diuretics  -Cardiology following > continue current plan with IV diuresis     Hypoxemia  -Oxygen dropped to 87% on room air per reports in the ED  - secondary to above  -IV diuresis, incentive parameter  -Wean off O2 as able,, O2 saturation goal 90% and above  - currently 95% on RA        Iron deficiency anemia  -Patient with history of iron deficiency anemia, on p.o. iron outpatient continue  -Repeat iron panel with no significant iron deficiency  -Hemoglobin 10.0 on admission, recent hemoglobin between  11-12,  -Hemoglobin 10.6 this morning.  Monitor daily and transfuse as indicated for symptomatic anemia or hemoglobin less than 7  - hgb 10.4, no overt bleeding, monitor labs as started on therapeutic Lovenox with Plavix, aspirin on hold        History of DVT  -Most recent duplex ultrasound of bilateral lower extremities on 10/2/2024 was negative for deep or superficial thrombosis.Previous duplex ultrasound on 03/2023 showed chronic left lower remedy DVT noted in mid femoral vein  -Xarelto listed as a home med , RN confirmed with facility, patient takes Xarelto, Plavix and aspirin.  -Hold Xarelto for now asked to proceed to start with no DVT patient is on dual antiplatelets.  Concerning for increased risk of bleeding in the setting of triple therapy and iron deficiency anemia.  Will  discusse with cardiology whether either Plavix or aspirin can be held and Xarelto resumed.  - add therapeutic Lovenox as per cardiology recommendations, transition back to Xarelto at dc      Bilateral lower extremity cellulitis?  -Patient with significant bilateral lower extremity erythema, which is likely secondary to heart failure/chronic venous stasis,   -Was initiated on p.o. antibiotics outpatient, started on IV ceftriaxone inpatient  -ID transitioned to PO Keflex, doubts cellulitis but will cover     Hypothyroidism  -Most recent TSH 08/9/24 was normal  -Continue outpatient levothyroxine    PAF  New onset ?   -Not on rate control, heart rate stable, no known history of atrial fibrillation  -cardiology consult  - therapeutic Lovenox started as per cardiology recommendations in the setting of Plavix, can hold aspirin, watch for bleeding  - rate stable  - monitor    Pharmacy to dose Lovenox for DVT prophylaxis.  Full code.  Discussed with patient.  Expected Discharge Date: 10/10/2024; Expected Discharge Time:        Copied text in this note has been reviewed and is accurate as of 10/09/24.           DO Jameson Gallardo  [facility]  10/09/24  14:02 EDT

## 2024-10-10 LAB
ANION GAP SERPL CALCULATED.3IONS-SCNC: 6.3 MMOL/L (ref 5–15)
BUN SERPL-MCNC: 15 MG/DL (ref 8–23)
BUN/CREAT SERPL: 17 (ref 7–25)
CALCIUM SPEC-SCNC: 8.5 MG/DL (ref 8.6–10.5)
CHLORIDE SERPL-SCNC: 95 MMOL/L (ref 98–107)
CO2 SERPL-SCNC: 32.7 MMOL/L (ref 22–29)
CREAT SERPL-MCNC: 0.88 MG/DL (ref 0.76–1.27)
DEPRECATED RDW RBC AUTO: 45.5 FL (ref 37–54)
EGFRCR SERPLBLD CKD-EPI 2021: 88.6 ML/MIN/1.73
ERYTHROCYTE [DISTWIDTH] IN BLOOD BY AUTOMATED COUNT: 13.4 % (ref 12.3–15.4)
GLUCOSE SERPL-MCNC: 102 MG/DL (ref 65–99)
HCT VFR BLD AUTO: 33.2 % (ref 37.5–51)
HGB BLD-MCNC: 10.6 G/DL (ref 13–17.7)
MCH RBC QN AUTO: 29.6 PG (ref 26.6–33)
MCHC RBC AUTO-ENTMCNC: 31.9 G/DL (ref 31.5–35.7)
MCV RBC AUTO: 92.7 FL (ref 79–97)
PLATELET # BLD AUTO: 266 10*3/MM3 (ref 140–450)
PMV BLD AUTO: 8.7 FL (ref 6–12)
POTASSIUM SERPL-SCNC: 3.7 MMOL/L (ref 3.5–5.2)
RBC # BLD AUTO: 3.58 10*6/MM3 (ref 4.14–5.8)
SODIUM SERPL-SCNC: 134 MMOL/L (ref 136–145)
URATE SERPL-MCNC: 7.3 MG/DL (ref 3.4–7)
WBC NRBC COR # BLD AUTO: 7.88 10*3/MM3 (ref 3.4–10.8)

## 2024-10-10 PROCEDURE — 97530 THERAPEUTIC ACTIVITIES: CPT

## 2024-10-10 PROCEDURE — 85027 COMPLETE CBC AUTOMATED: CPT | Performed by: STUDENT IN AN ORGANIZED HEALTH CARE EDUCATION/TRAINING PROGRAM

## 2024-10-10 PROCEDURE — 25010000002 ENOXAPARIN PER 10 MG: Performed by: STUDENT IN AN ORGANIZED HEALTH CARE EDUCATION/TRAINING PROGRAM

## 2024-10-10 PROCEDURE — 80048 BASIC METABOLIC PNL TOTAL CA: CPT | Performed by: STUDENT IN AN ORGANIZED HEALTH CARE EDUCATION/TRAINING PROGRAM

## 2024-10-10 PROCEDURE — 25010000002 FUROSEMIDE PER 20 MG: Performed by: STUDENT IN AN ORGANIZED HEALTH CARE EDUCATION/TRAINING PROGRAM

## 2024-10-10 RX ORDER — FUROSEMIDE 40 MG
40 TABLET ORAL
Status: DISCONTINUED | OUTPATIENT
Start: 2024-10-10 | End: 2024-10-11 | Stop reason: HOSPADM

## 2024-10-10 RX ADMIN — CEPHALEXIN 500 MG: 500 CAPSULE ORAL at 18:37

## 2024-10-10 RX ADMIN — CEPHALEXIN 500 MG: 500 CAPSULE ORAL at 12:51

## 2024-10-10 RX ADMIN — FOLIC ACID 1 MG: 1 TABLET ORAL at 09:44

## 2024-10-10 RX ADMIN — Medication 10 ML: at 20:39

## 2024-10-10 RX ADMIN — SENNOSIDES AND DOCUSATE SODIUM 2 TABLET: 50; 8.6 TABLET ORAL at 09:44

## 2024-10-10 RX ADMIN — DIVALPROEX SODIUM 500 MG: 500 TABLET, DELAYED RELEASE ORAL at 12:51

## 2024-10-10 RX ADMIN — DIVALPROEX SODIUM 500 MG: 500 TABLET, DELAYED RELEASE ORAL at 20:36

## 2024-10-10 RX ADMIN — ROPINIROLE HYDROCHLORIDE 0.25 MG: 0.5 TABLET, FILM COATED ORAL at 20:36

## 2024-10-10 RX ADMIN — ENOXAPARIN SODIUM 105 MG: 150 INJECTION SUBCUTANEOUS at 12:51

## 2024-10-10 RX ADMIN — ROPINIROLE HYDROCHLORIDE 0.25 MG: 0.5 TABLET, FILM COATED ORAL at 16:28

## 2024-10-10 RX ADMIN — ACETAMINOPHEN 325MG 650 MG: 325 TABLET ORAL at 09:57

## 2024-10-10 RX ADMIN — Medication 10 ML: at 09:45

## 2024-10-10 RX ADMIN — TAMSULOSIN HYDROCHLORIDE 0.8 MG: 0.4 CAPSULE ORAL at 09:44

## 2024-10-10 RX ADMIN — LEVOTHYROXINE SODIUM 50 MCG: 50 TABLET ORAL at 05:33

## 2024-10-10 RX ADMIN — FUROSEMIDE 40 MG: 10 INJECTION, SOLUTION INTRAMUSCULAR; INTRAVENOUS at 09:44

## 2024-10-10 RX ADMIN — CEPHALEXIN 500 MG: 500 CAPSULE ORAL at 05:33

## 2024-10-10 RX ADMIN — ATORVASTATIN CALCIUM 40 MG: 20 TABLET, FILM COATED ORAL at 20:36

## 2024-10-10 RX ADMIN — CLOPIDOGREL BISULFATE 75 MG: 75 TABLET, FILM COATED ORAL at 09:44

## 2024-10-10 RX ADMIN — FERROUS SULFATE TAB 325 MG (65 MG ELEMENTAL FE) 325 MG: 325 (65 FE) TAB at 09:44

## 2024-10-10 RX ADMIN — CEPHALEXIN 500 MG: 500 CAPSULE ORAL at 23:49

## 2024-10-10 RX ADMIN — ENOXAPARIN SODIUM 105 MG: 150 INJECTION SUBCUTANEOUS at 23:49

## 2024-10-10 RX ADMIN — PANTOPRAZOLE SODIUM 40 MG: 40 TABLET, DELAYED RELEASE ORAL at 05:33

## 2024-10-10 RX ADMIN — OXYBUTYNIN CHLORIDE 5 MG: 5 TABLET, EXTENDED RELEASE ORAL at 09:44

## 2024-10-10 RX ADMIN — SENNOSIDES AND DOCUSATE SODIUM 2 TABLET: 50; 8.6 TABLET ORAL at 20:36

## 2024-10-10 RX ADMIN — Medication 1000 MCG: at 09:44

## 2024-10-10 RX ADMIN — FUROSEMIDE 40 MG: 40 TABLET ORAL at 18:37

## 2024-10-10 RX ADMIN — ROPINIROLE HYDROCHLORIDE 0.25 MG: 0.5 TABLET, FILM COATED ORAL at 09:44

## 2024-10-10 NOTE — PLAN OF CARE
Problem: Adult Inpatient Plan of Care  Goal: Absence of Hospital-Acquired Illness or Injury  Intervention: Identify and Manage Fall Risk  Recent Flowsheet Documentation  Taken 10/10/2024 0600 by Last Valenzuela RN  Safety Promotion/Fall Prevention:   safety round/check completed   room organization consistent   nonskid shoes/slippers when out of bed   lighting adjusted   clutter free environment maintained   assistive device/personal items within reach   fall prevention program maintained   mobility aid in reach  Taken 10/10/2024 0400 by Last Valenzuela RN  Safety Promotion/Fall Prevention:   safety round/check completed   room organization consistent   nonskid shoes/slippers when out of bed   lighting adjusted   fall prevention program maintained   clutter free environment maintained   assistive device/personal items within reach   mobility aid in reach  Taken 10/10/2024 0200 by Last Valenzuela RN  Safety Promotion/Fall Prevention:   safety round/check completed   room organization consistent   nonskid shoes/slippers when out of bed   mobility aid in reach   lighting adjusted   fall prevention program maintained   clutter free environment maintained   assistive device/personal items within reach  Taken 10/10/2024 0000 by Last Valenzuela RN  Safety Promotion/Fall Prevention:   safety round/check completed   room organization consistent   nonskid shoes/slippers when out of bed   lighting adjusted   fall prevention program maintained   clutter free environment maintained   assistive device/personal items within reach   mobility aid in reach  Taken 10/9/2024 2200 by Last Valenzuela, RN  Safety Promotion/Fall Prevention:   safety round/check completed   room organization consistent   nonskid shoes/slippers when out of bed   lighting adjusted   mobility aid in reach   fall prevention program maintained   clutter free environment maintained   assistive device/personal items  within reach  Taken 10/9/2024 2058 by Last Valenzuela, RN  Safety Promotion/Fall Prevention:   safety round/check completed   room organization consistent   nonskid shoes/slippers when out of bed   lighting adjusted   fall prevention program maintained   clutter free environment maintained   assistive device/personal items within reach   mobility aid in reach   Goal Outcome Evaluation:  Plan of Care Reviewed With: patient        Progress: improving  Outcome Evaluation: AOx4, VSS, AFib on telemetry monitor, room air but on O2 at 2 lpm via nasal cannula during hours of sleep, no c/o pain and discomfort, bed alarm on call light within reach.
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
## 2024-10-10 NOTE — PROGRESS NOTES
Name: Hernando Lozada ADMIT: 10/6/2024   : 1945  PCP: Sammie Gambino APRN    MRN: [id number] LOS: 3 days   AGE/SEX: 79 y.o. male  ROOM: [facility]     Subjective   Subjective   Patient seen this morning.  Feeling better, lower extremity pain improved, controlled with Tylenol, swelling seems to be improving as well.  Responding well to IV Lasix, -3.3 L charted last 24 hours.  Denies shortness of breath.  Denies chest pain, fevers, chills, nausea or vomiting.    10/8/2024  Patient seen and examined at bedside, no acute distress or complaints.  Diuresing well. LE edema still present but improved.     10/9/2024  Patient seen up and walking with walker, states he is diuresing well but still a bit weak.      10/10/2024  No overnight events, continues to improve.  Case discussed with cardiology, plan to discharge back to neuro home tomorrow.     Review of Systems   12 point ROS reviewed and negative except as mentioned above    Objective   Objective   Vital Signs  Temp:  [97.5 °F (36.4 °C)-99 °F (37.2 °C)] 97.5 °F (36.4 °C)  Heart Rate:  [76-91] 91  Resp:  [18] 18  BP: (103-131)/(60-83) 103/62  SpO2:  [95 %-97 %] 96 %  on  Flow (L/min):  [2] 2;   Device (Oxygen Therapy): nasal cannula  Body mass index is 32.21 kg/m².  Physical Exam  Constitutional:       General: He is not in acute distress.     Appearance: Normal appearance. He is not toxic-appearing.   HENT:      Head: Normocephalic and atraumatic.      Nose: No congestion.      Mouth/Throat:      Pharynx: No oropharyngeal exudate.   Eyes:      General: No scleral icterus.  Cardiovascular:      Rate and Rhythm: Normal rate. Rhythm irregular.      Heart sounds: Murmur heard.      No friction rub. No gallop.   Pulmonary:      Effort: No respiratory distress.      Breath sounds: No wheezing, rhonchi or rales.      Comments: Currently on 2L NC  Abdominal:      General: There is no distension.      Tenderness: There is no abdominal tenderness. There is no guarding.    Musculoskeletal:         General: Tenderness present. No deformity or signs of injury.      Cervical back: No rigidity.      Right lower leg: No edema.      Left lower leg: No edema.      Comments: Edema much improved   Skin:     Coloration: Skin is not jaundiced.      Findings: No bruising or lesion.   Neurological:      General: No focal deficit present.      Mental Status: He is alert and oriented to person, place, and time. Mental status is at baseline.             Results Review     I reviewed the patient's new clinical results.  Results from last 7 days   Lab Units 10/10/24  0710 10/09/24  0603 10/08/24  0533 10/07/24  0504   WBC 10*3/mm3 7.88 6.75 5.99 5.98   HEMOGLOBIN g/dL 10.6* 10.6* 10.4* 9.8*   PLATELETS 10*3/mm3 266 264 235 219     Results from last 7 days   Lab Units 10/10/24  0710 10/09/24  0603 10/08/24  0533 10/07/24  0505   SODIUM mmol/L 134* 138 135* 137   POTASSIUM mmol/L 3.7 3.8 3.8 4.2   CHLORIDE mmol/L 95* 98 98 98   CO2 mmol/L 32.7* 31.0* 28.8 31.8*   BUN mg/dL 15 15 14 16   CREATININE mg/dL 0.88 0.80 0.75* 0.92   GLUCOSE mg/dL 102* 99 94 94   Estimated Creatinine Clearance: 81.5 mL/min (by C-G formula based on SCr of 0.88 mg/dL).  Results from last 7 days   Lab Units 10/06/24  0910   ALBUMIN g/dL 3.4*   BILIRUBIN mg/dL 0.7   ALK PHOS U/L 63   AST (SGOT) U/L 14   ALT (SGPT) U/L 13     Results from last 7 days   Lab Units 10/10/24  0710 10/09/24  0603 10/08/24  0533 10/07/24  0505 10/06/24  0910   CALCIUM mg/dL 8.5* 8.1* 8.4* 8.5* 8.8   ALBUMIN g/dL  --   --   --   --  3.4*   MAGNESIUM mg/dL  --   --  2.1 1.9 1.6   PHOSPHORUS mg/dL  --   --   --  3.7  --      Results from last 7 days   Lab Units 10/06/24  0910   PROCALCITONIN ng/mL 0.04     COVID19   Date Value Ref Range Status   04/20/2023 Not Detected Not Detected - Ref. Range Final   11/25/2022 Detected (C) Not Detected - Ref. Range Final     Glucose   Date/Time Value Ref Range Status   10/08/2024 1637 119 70 - 130 mg/dL Final            XR Ankle 3+ View Left  XR ANKLE 3+ VW LEFT-     Clinical: Left ankle pain x8 months     FINDINGS: The left ankle joint is normal. Surrounding soft tissues  within normal limits.     CONCLUSION: Normal left ankle.     This report was finalized on 10/9/2024 10:39 AM by Dr. Theo Delarosa M.D on Workstation: [id number]     XR Foot 3+ View Left  XR FOOT 3+ VW LEFT-     Clinical: Injured great toe     COMPARISON 9/26/2024     FINDINGS: There is considerable first metatarsophalangeal joint  degeneration. Small marginal erosion demonstrated at the base of the  proximal phalanx, medial side. Gout not excluded. There is exuberant  periarticular bone hypertrophy as before. The interphalangeal joint is  stable in appearance. No fracture or bone lesion seen. No radiopaque  foreign body or soft tissue gas identified. The remainder is  unremarkable.     CONCLUSION: First metatarsal phalangeal joint degeneration as described  above, no acute/subacute osseous abnormality seen.     This report was finalized on 10/9/2024 10:38 AM by Dr. Theo Delarosa M.D on Workstation: [id number]       Scheduled Medications  [Held by provider] aspirin, 81 mg, Oral, Daily  atorvastatin, 40 mg, Oral, Nightly  cephalexin, 500 mg, Oral, Q6H  clopidogrel, 75 mg, Oral, Daily  divalproex, 500 mg, Oral, BID  enoxaparin, 1 mg/kg, Subcutaneous, Q12H  ferrous sulfate, 325 mg, Oral, Daily With Breakfast  folic acid, 1 mg, Oral, Daily  furosemide, 40 mg, Oral, BID  levothyroxine, 50 mcg, Oral, Q AM  [Held by provider] lisinopril, 5 mg, Oral, Daily  oxybutynin XL, 5 mg, Oral, Daily  pantoprazole, 40 mg, Oral, Q AM  [Held by provider] rivaroxaban, 20 mg, Oral, Daily With Dinner  rOPINIRole, 0.25 mg, Oral, TID  senna-docusate sodium, 2 tablet, Oral, BID  sodium chloride, 10 mL, Intravenous, Q12H  tamsulosin, 0.8 mg, Oral, Daily  cyanocobalamin, 1,000 mcg, Oral, Daily    Infusions   Diet  Diet: Cardiac; Healthy Heart (2-3 Na+); Fluid Consistency:  Thin (IDDSI 0)    I have personally reviewed     [x]  Laboratory   [x]  Microbiology   []  Radiology   []  EKG/Telemetry  []  Cardiology/Vascular   []  Pathology    []  Records       Assessment/Plan     Active Hospital Problems    Diagnosis  POA    **Acute on chronic diastolic heart failure [I50.33]  Unknown    Atrial fibrillation [I48.91]  Unknown    Acute on chronic diastolic CHF (congestive heart failure) [I50.33]  Yes    Hypothyroidism [E03.9]  Yes    Aortic stenosis, severe [I35.0]  Yes    CAD (coronary artery disease) [I25.10]  Yes    Diabetes mellitus [E11.9]  Yes    Primary hypertension [I10]  Yes      Resolved Hospital Problems   No resolved problems to display.       Patient is a 79-year-old male with a history of including but not limited to CAD, diastolic heart failure, severe aortic stenosis, type II DM, hypothyroidism, TBI, DVT, presented to the ED presented from nursing facility complaining of worsening lower extremity swelling/pain/erythema.      Active chronic diastolic heart failure  Aortic stenosis  CAD  Patient with recent angioplasty on 06/26/2024 found to have 99% proximal circumflex artery status post stent placement  -Most recent echo 06/22/2024 showed EF of 69.8%, severe aortic valve stenosis, grade 1 diastolic dysfunction  -Chest x-ray with mild pulmonary vascular congestion with minimal pleural effusion  -proBNP 2462 on admission  -On Plavox and Lovenox, aspirin on hold as per cardiology recommendations   -Continue IV Lasix 40 mg twice daily, responding well to IV diuretics  -Cardiology following > have transitioned to PO Lasix, Cardiology is good with patient returning to group home tomorrow     Hypoxemia  -Oxygen dropped to 87% on room air per reports in the ED  - secondary to above  -IV diuresis, incentive parameter  -Wean off O2 as able,, O2 saturation goal 90% and above  - currently on 2L NC  - will order walking oximetry as plan discharge back to group home tomorrow        Iron  deficiency anemia  -Patient with history of iron deficiency anemia, on p.o. iron outpatient continue  -Repeat iron panel with no significant iron deficiency  -Hemoglobin 10.0 on admission, recent hemoglobin between 11-12,  -Hemoglobin 10.6 this morning.  Monitor daily and transfuse as indicated for symptomatic anemia or hemoglobin less than 7  - hgb 10.4, no overt bleeding, monitor labs as started on therapeutic Lovenox with Plavix, aspirin on hold        History of DVT  -Most recent duplex ultrasound of bilateral lower extremities on 10/2/2024 was negative for deep or superficial thrombosis.Previous duplex ultrasound on 03/2023 showed chronic left lower remedy DVT noted in mid femoral vein  -Xarelto listed as a home med , RN confirmed with facility, patient takes Xarelto, Plavix and aspirin.  -Hold Xarelto for now asked to proceed to start with no DVT patient is on dual antiplatelets.  Concerning for increased risk of bleeding in the setting of triple therapy and iron deficiency anemia.  Will  discusse with cardiology whether either Plavix or aspirin can be held and Xarelto resumed.  - add therapeutic Lovenox as per cardiology recommendations, transition back to Xarelto at CT      Bilateral lower extremity cellulitis?  -Patient with significant bilateral lower extremity erythema, which is likely secondary to heart failure/chronic venous stasis,   -Was initiated on p.o. antibiotics outpatient, started on IV ceftriaxone inpatient  -ID transitioned to PO Keflex, doubts cellulitis but will cover     Hypothyroidism  -Most recent TSH 08/9/24 was normal  -Continue outpatient levothyroxine    PAF  - cardiology states he was new onset a. Fib in their office on 9/4/24  -Not on rate control, heart rate stable, no known history of atrial fibrillation  -cardiology consult  - therapeutic Lovenox started as per cardiology recommendations in the setting of Plavix, can hold aspirin, watch for bleeding  - Resume Xarelto at CT  -  rate stable  - monitor    Pharmacy to dose Lovenox for DVT prophylaxis.  Full code.  Discussed with patient.  Expected Discharge Date: 10/11/2024; Expected Discharge Time:        Copied text in this note has been reviewed and is accurate as of 10/10/24.           Gary Nova DO  [facility]  10/10/24  14:37 EDT

## 2024-10-10 NOTE — PLAN OF CARE
Goal Outcome Evaluation:  Plan of Care Reviewed With: patient           Outcome Evaluation: Upon entering room, pt. supine in bed, awake/alert, and agreeable to work with P.T. this date.  This PM, pt. able to ambulate 60 feet, CGA x1, with use of Rwx.  Pt. requires Min. assist x 1 for bed mobility and Min. assist x 1 for sit <-> stand transfers.  Verbal/tactile cues given during ambulation for posture correction.  At times, pt. would let go of Rwx and reach for wall railing for support.  Encouraged pt. to continue his BLE ther. ex. program on his own throughout the day as well.  Will continue to progress functional mobility as tolerated.      Anticipated Discharge Disposition (PT): home ([facility])

## 2024-10-11 ENCOUNTER — READMISSION MANAGEMENT (OUTPATIENT)
Dept: CALL CENTER | Facility: HOSPITAL | Age: 79
End: 2024-10-11
Payer: MEDICARE

## 2024-10-11 VITALS
DIASTOLIC BLOOD PRESSURE: 78 MMHG | OXYGEN SATURATION: 99 % | RESPIRATION RATE: 18 BRPM | WEIGHT: 217.4 LBS | HEART RATE: 73 BPM | TEMPERATURE: 98.5 F | BODY MASS INDEX: 32.2 KG/M2 | HEIGHT: 69 IN | SYSTOLIC BLOOD PRESSURE: 137 MMHG

## 2024-10-11 PROBLEM — I50.33 ACUTE ON CHRONIC HEART FAILURE WITH PRESERVED EJECTION FRACTION (HFPEF): Status: ACTIVE | Noted: 2024-10-11

## 2024-10-11 LAB
ANION GAP SERPL CALCULATED.3IONS-SCNC: 9.5 MMOL/L (ref 5–15)
BACTERIA ISLT: NORMAL
BUN SERPL-MCNC: 15 MG/DL (ref 8–23)
BUN/CREAT SERPL: 19 (ref 7–25)
CALCIUM SPEC-SCNC: 8.4 MG/DL (ref 8.6–10.5)
CHLORIDE SERPL-SCNC: 97 MMOL/L (ref 98–107)
CO2 SERPL-SCNC: 30.5 MMOL/L (ref 22–29)
CREAT SERPL-MCNC: 0.79 MG/DL (ref 0.76–1.27)
DEPRECATED RDW RBC AUTO: 44.7 FL (ref 37–54)
EGFRCR SERPLBLD CKD-EPI 2021: 91.5 ML/MIN/1.73
ERYTHROCYTE [DISTWIDTH] IN BLOOD BY AUTOMATED COUNT: 13.3 % (ref 12.3–15.4)
GLUCOSE SERPL-MCNC: 100 MG/DL (ref 65–99)
HCT VFR BLD AUTO: 32.9 % (ref 37.5–51)
HGB BLD-MCNC: 10.6 G/DL (ref 13–17.7)
MCH RBC QN AUTO: 29.4 PG (ref 26.6–33)
MCHC RBC AUTO-ENTMCNC: 32.2 G/DL (ref 31.5–35.7)
MCV RBC AUTO: 91.1 FL (ref 79–97)
PLATELET # BLD AUTO: 269 10*3/MM3 (ref 140–450)
PMV BLD AUTO: 8.8 FL (ref 6–12)
POTASSIUM SERPL-SCNC: 3.6 MMOL/L (ref 3.5–5.2)
RBC # BLD AUTO: 3.61 10*6/MM3 (ref 4.14–5.8)
SODIUM SERPL-SCNC: 137 MMOL/L (ref 136–145)
WBC NRBC COR # BLD AUTO: 6.26 10*3/MM3 (ref 3.4–10.8)

## 2024-10-11 PROCEDURE — 94799 UNLISTED PULMONARY SVC/PX: CPT

## 2024-10-11 PROCEDURE — 25010000002 ENOXAPARIN PER 10 MG: Performed by: STUDENT IN AN ORGANIZED HEALTH CARE EDUCATION/TRAINING PROGRAM

## 2024-10-11 PROCEDURE — 80048 BASIC METABOLIC PNL TOTAL CA: CPT | Performed by: STUDENT IN AN ORGANIZED HEALTH CARE EDUCATION/TRAINING PROGRAM

## 2024-10-11 PROCEDURE — 85027 COMPLETE CBC AUTOMATED: CPT | Performed by: STUDENT IN AN ORGANIZED HEALTH CARE EDUCATION/TRAINING PROGRAM

## 2024-10-11 PROCEDURE — 97530 THERAPEUTIC ACTIVITIES: CPT

## 2024-10-11 PROCEDURE — 94760 N-INVAS EAR/PLS OXIMETRY 1: CPT

## 2024-10-11 PROCEDURE — 94618 PULMONARY STRESS TESTING: CPT

## 2024-10-11 RX ORDER — CEPHALEXIN 500 MG/1
500 CAPSULE ORAL EVERY 6 HOURS SCHEDULED
Qty: 4 CAPSULE | Refills: 0 | Status: SHIPPED | OUTPATIENT
Start: 2024-10-11 | End: 2024-10-12

## 2024-10-11 RX ORDER — POTASSIUM CHLORIDE 750 MG/1
40 TABLET, FILM COATED, EXTENDED RELEASE ORAL EVERY 4 HOURS
Status: COMPLETED | OUTPATIENT
Start: 2024-10-11 | End: 2024-10-11

## 2024-10-11 RX ORDER — FUROSEMIDE 40 MG
40 TABLET ORAL 2 TIMES DAILY
Qty: 60 TABLET | Refills: 0 | Status: SHIPPED | OUTPATIENT
Start: 2024-10-11

## 2024-10-11 RX ADMIN — FOLIC ACID 1 MG: 1 TABLET ORAL at 10:18

## 2024-10-11 RX ADMIN — Medication 1000 MCG: at 10:19

## 2024-10-11 RX ADMIN — LEVOTHYROXINE SODIUM 50 MCG: 50 TABLET ORAL at 05:28

## 2024-10-11 RX ADMIN — ROPINIROLE HYDROCHLORIDE 0.25 MG: 0.5 TABLET, FILM COATED ORAL at 10:17

## 2024-10-11 RX ADMIN — POTASSIUM CHLORIDE 40 MEQ: 750 TABLET, EXTENDED RELEASE ORAL at 10:18

## 2024-10-11 RX ADMIN — PANTOPRAZOLE SODIUM 40 MG: 40 TABLET, DELAYED RELEASE ORAL at 05:28

## 2024-10-11 RX ADMIN — Medication 10 ML: at 10:17

## 2024-10-11 RX ADMIN — FERROUS SULFATE TAB 325 MG (65 MG ELEMENTAL FE) 325 MG: 325 (65 FE) TAB at 10:18

## 2024-10-11 RX ADMIN — TAMSULOSIN HYDROCHLORIDE 0.8 MG: 0.4 CAPSULE ORAL at 10:17

## 2024-10-11 RX ADMIN — FUROSEMIDE 40 MG: 40 TABLET ORAL at 10:17

## 2024-10-11 RX ADMIN — DIVALPROEX SODIUM 500 MG: 500 TABLET, DELAYED RELEASE ORAL at 10:19

## 2024-10-11 RX ADMIN — ENOXAPARIN SODIUM 105 MG: 150 INJECTION SUBCUTANEOUS at 13:03

## 2024-10-11 RX ADMIN — OXYBUTYNIN CHLORIDE 5 MG: 5 TABLET, EXTENDED RELEASE ORAL at 10:19

## 2024-10-11 RX ADMIN — CEPHALEXIN 500 MG: 500 CAPSULE ORAL at 05:28

## 2024-10-11 RX ADMIN — ROPINIROLE HYDROCHLORIDE 0.25 MG: 0.5 TABLET, FILM COATED ORAL at 13:03

## 2024-10-11 RX ADMIN — CLOPIDOGREL BISULFATE 75 MG: 75 TABLET, FILM COATED ORAL at 10:18

## 2024-10-11 RX ADMIN — ACETAMINOPHEN 325MG 650 MG: 325 TABLET ORAL at 10:18

## 2024-10-11 RX ADMIN — CEPHALEXIN 500 MG: 500 CAPSULE ORAL at 10:19

## 2024-10-11 RX ADMIN — SENNOSIDES AND DOCUSATE SODIUM 2 TABLET: 50; 8.6 TABLET ORAL at 10:18

## 2024-10-11 NOTE — PLAN OF CARE
Goal Outcome Evaluation:  Plan of Care Reviewed With: patient        Progress: improving    Problem: Adult Inpatient Plan of Care  Goal: Absence of Hospital-Acquired Illness or Injury  Outcome: Progressing  Intervention: Identify and Manage Fall Risk  Recent Flowsheet Documentation  Taken 10/11/2024 0400 by Last Valenzuela RN  Safety Promotion/Fall Prevention:   safety round/check completed   room organization consistent   nonskid shoes/slippers when out of bed   lighting adjusted   fall prevention program maintained   clutter free environment maintained   assistive device/personal items within reach  Taken 10/11/2024 0200 by Last Valenzuela RN  Safety Promotion/Fall Prevention:   safety round/check completed   room organization consistent   nonskid shoes/slippers when out of bed   lighting adjusted   fall prevention program maintained   clutter free environment maintained   assistive device/personal items within reach  Taken 10/11/2024 0000 by Last Valenzuela RN  Safety Promotion/Fall Prevention:   safety round/check completed   room organization consistent   nonskid shoes/slippers when out of bed   lighting adjusted   fall prevention program maintained   clutter free environment maintained   assistive device/personal items within reach  Taken 10/10/2024 2200 by Last Valenzuela RN  Safety Promotion/Fall Prevention:   safety round/check completed   room organization consistent   nonskid shoes/slippers when out of bed   lighting adjusted   fall prevention program maintained   assistive device/personal items within reach   clutter free environment maintained  Taken 10/10/2024 2035 by Last Valenzuela, RN  Safety Promotion/Fall Prevention:   safety round/check completed   room organization consistent   nonskid shoes/slippers when out of bed   lighting adjusted   fall prevention program maintained   clutter free environment maintained   assistive device/personal items within  reach  Intervention: Prevent Skin Injury  Recent Flowsheet Documentation  Taken 10/11/2024 0400 by Last Valenzuela RN  Body Position: position changed independently  Taken 10/11/2024 0200 by Last Valenzuela RN  Body Position: position changed independently  Taken 10/11/2024 0000 by Last Valenzuela RN  Body Position: position changed independently  Skin Protection:   adhesive use limited   incontinence pads utilized   transparent dressing maintained   tubing/devices free from skin contact  Taken 10/10/2024 2200 by Last Valenzuela RN  Body Position: position changed independently  Taken 10/10/2024 2035 by Last Valenzuela RN  Body Position: position changed independently  Skin Protection:   adhesive use limited   incontinence pads utilized   transparent dressing maintained   tubing/devices free from skin contact  Intervention: Prevent and Manage VTE (Venous Thromboembolism) Risk  Recent Flowsheet Documentation  Taken 10/11/2024 0400 by Last Valenzuela RN  Activity Management: activity minimized  Taken 10/11/2024 0200 by Last Valenzuela RN  Activity Management: activity minimized  Taken 10/11/2024 0000 by Last Valenzuela RN  Activity Management: activity minimized  VTE Prevention/Management: (Plavix, Lovenox) other (see comments)  Taken 10/10/2024 2200 by Last Valenzuela RN  Activity Management: activity minimized  Taken 10/10/2024 2035 by Last Valenzuela RN  Activity Management: up in chair  VTE Prevention/Management: (Plavix, Lovenox) other (see comments)  Range of Motion: active ROM (range of motion) encouraged  Intervention: Prevent Infection  Recent Flowsheet Documentation  Taken 10/11/2024 0400 by Last Valenzuela RN  Infection Prevention:   single patient room provided   rest/sleep promoted   personal protective equipment utilized   hand hygiene promoted   environmental surveillance performed  Taken 10/11/2024 0200  by Last Valenzuela, DESMOND  Infection Prevention:   single patient room provided   rest/sleep promoted   personal protective equipment utilized   hand hygiene promoted   environmental surveillance performed  Taken 10/11/2024 0000 by Last Valenzuela RN  Infection Prevention:   single patient room provided   rest/sleep promoted   personal protective equipment utilized   hand hygiene promoted   environmental surveillance performed  Taken 10/10/2024 2200 by Last Valenzuela RN  Infection Prevention:   single patient room provided   rest/sleep promoted   personal protective equipment utilized   hand hygiene promoted   environmental surveillance performed  Taken 10/10/2024 2035 by Last Valenzuela, DESMOND  Infection Prevention:   single patient room provided   rest/sleep promoted   personal protective equipment utilized   hand hygiene promoted   environmental surveillance performed

## 2024-10-11 NOTE — PLAN OF CARE
Goal Outcome Evaluation:  Plan of Care Reviewed With: patient        Progress: improving  Outcome Evaluation: vss. telemetry monitor, a fib. alert and oriented x4, room air. up with standby assist, ambulating in hallway and room. up to chair. falls precautions. uses walker. potassium replaced. leg wraps changed today. discharged to [facility].
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
## 2024-10-11 NOTE — DISCHARGE INSTRUCTIONS
- Take Keflex 500mg by mouth every 6 hours for 4 doses  - Lasix changed to 40mg by mouth twice a day  - Stop taking Aspirin as currently on Xarelto and Plavix  - Stop taking Monodox  - Stop taking Imdur  - Follow up with Cardiology within one month  - Cleared to resume schedule at [facility]  - walking oximetry to assess for O2 at discharge

## 2024-10-11 NOTE — PLAN OF CARE
Goal Outcome Evaluation:  Plan of Care Reviewed With: patient           Outcome Evaluation: Upon approaching room, pt. ambulating in hallway (with Resp. therapy for Walking Oximetry), and agreeable to continue ambulation with P.T.  This PM, pt. able to ambulate 250 feet, CGA x 1, with use of Rwx.  Pt. requires CGA x 1 for sit <-> stand transfers.  BLE ther. ex. program x 10 reps completed for general strengthening.  Pt. required x 2 standing rest breaks during ambulation due to Right foot/toe pain.  Verbal/tactile cues given throughout for posture correction.  Will continue to progress functional mobility as tolerated.      Anticipated Discharge Disposition (PT): home (Back to [facility])

## 2024-10-11 NOTE — PROGRESS NOTES
Exercise Oximetry    Patient Name:Hernando Lozada   MRN: [id number]   Date: 10/11/24             ROOM AIR BASELINE   SpO2% 98   Heart Rate    Blood Pressure      EXERCISE ON ROOM AIR SpO2% EXERCISE ON O2 @ LPM SpO2%   1 MINUTE 98 1 MINUTE    2 MINUTES 99 2 MINUTES    3 MINUTES 96 3 MINUTES    4 MINUTES  4 MINUTES    5 MINUTES  5 MINUTES    6 MINUTES  6 MINUTES               Distance Walked  3.5 minites Distance Walked   Dyspnea ([place] Scale)  Dyspnea ([place] Scale)   Fatigue ([place] Scale)   Fatigue ([place] Scale)   SpO2% Post Exercise  93 SpO2% Post Exercise   HR Post Exercise   HR Post Exercise   Time to Recovery   Time to Recovery     Comments: Patient walked  in hallway via walker on room air. Patient returned to room about 3.5 minutes due to pain in ankle /foot.

## 2024-10-11 NOTE — PROGRESS NOTES
Exercise Oximetry    Patient Name:Hernando Lozada   MRN: [id number]   Date: 10/11/24             ROOM AIR BASELINE   SpO2% 94   Heart Rate    Blood Pressure      EXERCISE ON ROOM AIR SpO2% EXERCISE ON O2 @ LPM SpO2%   1 MINUTE 96 1 MINUTE    2 MINUTES 93 2 MINUTES    3 MINUTES 97 3 MINUTES    4 MINUTES 98 4 MINUTES    5 MINUTES 99 5 MINUTES    6 MINUTES 98 6 MINUTES               Distance Walked   Distance Walked   Dyspnea ([place] Scale)   Dyspnea ([place] Scale)   Fatigue ([place] Scale)   Fatigue ([place] Scale)   SpO2% Post Exercise   SpO2% Post Exercise   HR Post Exercise   HR Post Exercise   Time to Recovery   Time to Recovery     Comments: Patient ambulated in [place] via walker x 6 minutes via head probe check on room air. Stopped 2 times due to pain in right big toe. He did not complain of any SOB.

## 2024-10-11 NOTE — OUTREACH NOTE
Prep Survey      Flowsheet Row Responses   [facility] patient discharged from? [facility]   Is LACE score < 7 ? No   Eligibility [facility]   Date of Admission 10/06/24   Date of Discharge 10/11/24   Discharge diagnosis Acute on chronic diastolic heart failure   Does the patient have one of the following disease processes/diagnoses(primary or secondary)? CHF   General alerts for this patient [facility]   Prep survey completed? Yes            Nicole EM - Registered Nurse

## 2024-10-11 NOTE — CASE MANAGEMENT/SOCIAL WORK
Case Management Discharge Note      Final Note: Home to [facility]         Selected Continued Care - Admitted Since 10/6/2024       Destination    No services have been selected for the patient.                Durable Medical Equipment    No services have been selected for the patient.                Dialysis/Infusion    No services have been selected for the patient.                Home Medical Care    No services have been selected for the patient.                Therapy    No services have been selected for the patient.                Community Resources    No services have been selected for the patient.                Community & DME    No services have been selected for the patient.                    Transportation Services  Private: Car    Final Discharge Disposition Code: 01 - home or self-care

## 2024-10-11 NOTE — DISCHARGE SUMMARY
Patient Name: Hernando Lozada  : 1945  MRN: [id number]    Date of Admission: 10/6/2024  Date of Discharge:  10/11/2024  Primary Care Physician: Sammie Gambino APRN      Chief Complaint:   Leg Swelling      Discharge Diagnoses     Active Hospital Problems    Diagnosis  POA    **Acute on chronic diastolic heart failure [I50.33]  Unknown    Acute on chronic heart failure with preserved ejection fraction (HFpEF) [I50.33]  Yes    Atrial fibrillation [I48.91]  Unknown    Acute on chronic diastolic CHF (congestive heart failure) [I50.33]  Yes    Hypothyroidism [E03.9]  Yes    Aortic stenosis, severe [I35.0]  Yes    CAD (coronary artery disease) [I25.10]  Yes    Diabetes mellitus [E11.9]  Yes    Primary hypertension [I10]  Yes      Resolved Hospital Problems   No resolved problems to display.        Hospital Course     Hernando is a 79M with PMHx of CAD, HFpEF, severe AS, DM, hypothyroid, DVT, TBI (lives in group home) who presented to [facility] on 10/6/2024 due to worsening LE edema and erythema. He was admitted and started on IV diuresis for acute on chronic HFpEF.  Oxygen given as hypoxic 2/2 HF.  Due to concern for cellulitis, IV abx started but ID de-escalated this to PO abx.  EKG showed A. Fib of which cardiology said is not new onset.  Cardiology consulted and continued IV diuresis.  Patient slowly improved with diuresis.  Cardiology transitioned to PO diuretics on 10/10/24 and clared for discharge on 10/11/24.  [facility].  He is in agreement with discharge.    #Discharge Plan    - Take Keflex 500mg by mouth every 6 hours for 4 doses  - Lasix changed to 40mg by mouth twice a day  - Stop taking Aspirin as currently on Xarelto and Plavix  - Stop taking Monodox  - Stop taking Imdur  - Follow up with Cardiology within one month  - Cleared to resume schedule at neuro home  - walking oximetry to assess for O2 at discharge    Day of Discharge      Subjective:  10/11/2024  No overnight events, patient feels improved and agreeable with discharge    Physical Exam:  Temp:  [97.5 °F (36.4 °C)-98.6 °F (37 °C)] 98.1 °F (36.7 °C)  Heart Rate:  [72-93] 72  Resp:  [18] 18  BP: (103-124)/(62-79) 124/74  Body mass index is 32.1 kg/m².  Physical Exam  Constitutional:       General: He is not in acute distress.     Appearance: Normal appearance. He is not toxic-appearing.   HENT:      Head: Normocephalic and atraumatic.      Nose: No congestion.      Mouth/Throat:      Pharynx: No oropharyngeal exudate.   Eyes:      General: No scleral icterus.  Cardiovascular:      Rate and Rhythm: Normal rate. Rhythm irregular.      Heart sounds: Murmur heard.      No friction rub. No gallop.   Pulmonary:      Effort: No respiratory distress.      Breath sounds: No wheezing, rhonchi or rales.      Comments: Currently on RA  Abdominal:      General: There is no distension.      Tenderness: There is no abdominal tenderness. There is no guarding.   Musculoskeletal:         General: Tenderness present. No deformity or signs of injury.      Cervical back: No rigidity.      Right lower leg: No edema.      Left lower leg: No edema.      Comments: Edema much improved   Skin:     Coloration: Skin is not jaundiced.      Findings: No bruising or lesion.   Neurological:      General: No focal deficit present.      Mental Status: He is alert and oriented to person, place, and time. Mental status is at baseline.      Consultants     Consult Orders (all) (From admission, onward)       Start     Ordered    10/07/24 1721  Inpatient Cardiology Consult  Once        Specialty:  Cardiology  Provider:  Rio Miranda MD    10/07/24 1721    10/07/24 0702  Inpatient Infectious Diseases Consult  IN         Specialty:  Infectious Diseases  Provider:  Alexis Duarte MD    10/06/24 1600    10/06/24 1422  Inpatient Case Management  Consult  Once        Provider:  (Not yet  assigned)    10/06/24 1421    10/06/24 1115  [facility] (on-call MD unless specified) Details  Once        Specialty:  Hospitalist  Provider:  Jacqui Chambers MD    10/06/24 1114                  Procedures     * Surgery not found *    Imaging Results (All)       Procedure Component Value Units Date/Time    XR Ankle 3+ View Left [[id number]] Collected: 10/09/24 1038     Updated: 10/09/24 1121    Narrative:      XR ANKLE 3+ VW LEFT-     Clinical: Left ankle pain x8 months     FINDINGS: The left ankle joint is normal. Surrounding soft tissues  within normal limits.     CONCLUSION: Normal left ankle.     This report was finalized on 10/9/2024 10:39 AM by Dr. Theo Delarosa M.D on Workstation: [id number]       XR Foot 3+ View Left [[id number]] Collected: 10/09/24 1035     Updated: 10/09/24 1041    Narrative:      XR FOOT 3+ VW LEFT-     Clinical: Injured great toe     COMPARISON 9/26/2024     FINDINGS: There is considerable first metatarsophalangeal joint  degeneration. Small marginal erosion demonstrated at the base of the  proximal phalanx, medial side. Gout not excluded. There is exuberant  periarticular bone hypertrophy as before. The interphalangeal joint is  stable in appearance. No fracture or bone lesion seen. No radiopaque  foreign body or soft tissue gas identified. The remainder is  unremarkable.     CONCLUSION: First metatarsal phalangeal joint degeneration as described  above, no acute/subacute osseous abnormality seen.     This report was finalized on 10/9/2024 10:38 AM by Dr. Theo Delarosa M.D on Workstation: [id number]       XR Chest 1 View [[id number]] Collected: 10/06/24 0905     Updated: 10/06/24 0911    Narrative:      XR CHEST 1 VW-     HISTORY: Male who is 79 years-old, evaluate for edema     TECHNIQUE: Frontal view of the chest     COMPARISON: 8/27/2024     FINDINGS: The heart size is normal. Pulmonary vasculature is mildly  congested. Sternotomy wires are present.  No focal  pulmonary  consolidation. Minimal pleural effusions are suggested. No pneumothorax.  No acute osseous process.       Impression:      Mild pulmonary vascular congestion with minimal pleural  effusions.           This report was finalized on 10/6/2024 9:08 AM by Dr. Chava Flores M.D on Workstation: [id number]             Results for orders placed during the hospital encounter of 10/02/24    Duplex venous lower extremity bilateral CAR    Interpretation Summary    No deep or superficial vein thrombosis identified    Subcutaneous edema in lower legs and pulsatile flow in femoral veins noted both consistent with volume overload.    Results for orders placed in visit on 12/20/23    Adult Transthoracic Echo Complete W/ Cont if Necessary Per Protocol    Interpretation Summary    Left ventricular systolic function is normal. Calculated left ventricular EF = 69.8% Left ventricular ejection fraction appears to be 66 - 70%.    Left ventricular diastolic function is consistent with (grade I) impaired relaxation.    The left atrial cavity is mild to moderately dilated.    The right atrial cavity is mildly  dilated.    Severe aortic valve stenosis is present.    Estimated right ventricular systolic pressure from tricuspid regurgitation is normal (<35 mmHg).    Pertinent Labs     Results from last 7 days   Lab Units 10/11/24  0518 10/10/24  0710 10/09/24  0603 10/08/24  0533   WBC 10*3/mm3 6.26 7.88 6.75 5.99   HEMOGLOBIN g/dL 10.6* 10.6* 10.6* 10.4*   PLATELETS 10*3/mm3 269 266 264 235     Results from last 7 days   Lab Units 10/10/24  0710 10/09/24  0603 10/08/24  0533 10/07/24  0505   SODIUM mmol/L 134* 138 135* 137   POTASSIUM mmol/L 3.7 3.8 3.8 4.2   CHLORIDE mmol/L 95* 98 98 98   CO2 mmol/L 32.7* 31.0* 28.8 31.8*   BUN mg/dL 15 15 14 16   CREATININE mg/dL 0.88 0.80 0.75* 0.92   GLUCOSE mg/dL 102* 99 94 94   EGFR mL/min/1.73 88.6 91.2 92.9 85.7     Results from last 7 days   Lab Units 10/06/24  0910   ALBUMIN g/dL 3.4*  "  BILIRUBIN mg/dL 0.7   ALK PHOS U/L 63   AST (SGOT) U/L 14   ALT (SGPT) U/L 13     Results from last 7 days   Lab Units 10/10/24  0710 10/09/24  0603 10/08/24  0533 10/07/24  0505 10/06/24  0910   CALCIUM mg/dL 8.5* 8.1* 8.4* 8.5* 8.8   ALBUMIN g/dL  --   --   --   --  3.4*   MAGNESIUM mg/dL  --   --  2.1 1.9 1.6   PHOSPHORUS mg/dL  --   --   --  3.7  --        Results from last 7 days   Lab Units 10/09/24  0808 10/09/24  0603 10/06/24  0910   HSTROP T ng/L 24* 25* 31*   PROBNP pg/mL  --   --  2,462.0*     Results from last 7 days   Lab Units 10/09/24  0808   URIC ACID mg/dL 7.3*         Invalid input(s): \"LDLCALC\"          Test Results Pending at Discharge     Pending Results       Procedure [Order ID] Specimen - Date/Time    Basic Metabolic Panel [[id number]] Collected: 10/11/24 0518    Specimen: Blood Updated: 10/11/24 0630              Discharge Details        Discharge Medications        New Medications        Instructions Start Date   cephalexin 500 MG capsule  Commonly known as: KEFLEX   500 mg, Oral, Every 6 Hours Scheduled             Changes to Medications        Instructions Start Date   furosemide 40 MG tablet  Commonly known as: LASIX  What changed:   medication strength  how much to take  when to take this   40 mg, Oral, 2 Times Daily             Continue These Medications        Instructions Start Date   acetaminophen 325 MG tablet  Commonly known as: TYLENOL   650 mg, Oral, Every 6 Hours PRN      Advair Diskus 500-50 MCG/ACT DISKUS  Generic drug: Fluticasone-Salmeterol   1 puff, Inhalation, 2 Times Daily - RT      atorvastatin 40 MG tablet  Commonly known as: LIPITOR   40 mg, Oral, Nightly      clopidogrel 75 MG tablet  Commonly known as: PLAVIX   Take 300 mg (4tablets) on day 1 then 75 mg (1 tablet) daily there after starting day 2.      cyanocobalamin 1000 MCG tablet  Commonly known as: VITAMIN B-12   1,000 mcg, Oral, Daily      divalproex 500 MG DR tablet  Commonly known as: DEPAKOTE   500 mg, " Oral, 2 Times Daily      docusate sodium 100 MG capsule  Commonly known as: COLACE   100 mg, Oral, Daily      ferrous sulfate 325 (65 FE) MG tablet   325 mg, Oral, Daily With Breakfast      folic acid 1 MG tablet  Commonly known as: FOLVITE   1 mg, Oral, Daily      glucosamine sulfate 500 MG capsule capsule   1,000 mg, Oral, Daily      levothyroxine 50 MCG tablet  Commonly known as: SYNTHROID, LEVOTHROID   50 mcg, Oral, Daily      lisinopril 5 MG tablet  Commonly known as: PRINIVIL,ZESTRIL   5 mg, Oral, Daily      loperamide 2 MG tablet  Commonly known as: IMODIUM A-D   1 tablet, Oral, Every 6 Hours PRN      Melatonin 10 MG tablet   10 mg, Oral, Nightly      metFORMIN 500 MG tablet  Commonly known as: GLUCOPHAGE   500 mg, Oral, Daily      nitroglycerin 0.4 MG SL tablet  Commonly known as: NITROSTAT   0.4 mg, Sublingual, Every 5 Minutes PRN, Take no more than 3 doses in 15 minutes.      omeprazole 40 MG capsule  Commonly known as: priLOSEC   40 mg, Oral, Every Evening      oxybutynin XL 5 MG 24 hr tablet  Commonly known as: DITROPAN-XL   5 mg, Oral, Daily      rivaroxaban 20 MG tablet  Commonly known as: XARELTO   20 mg, Oral, Daily      rOPINIRole 1 MG tablet  Commonly known as: REQUIP       tamsulosin 0.4 MG capsule 24 hr capsule  Commonly known as: FLOMAX   0.8 mg, Oral, Daily             Stop These Medications      Aspirin Low Dose 81 MG chewable tablet  Generic drug: aspirin     doxycycline 100 MG capsule  Commonly known as: MONODOX     isosorbide mononitrate 30 MG 24 hr tablet  Commonly known as: IMDUR              No Known Allergies    Discharge Disposition:  Home or Self Care      Discharge Diet:  Diet Order   Procedures    Diet: Cardiac; Healthy Heart (2-3 Na+); Fluid Consistency: Thin (IDDSI 0)       Discharge Activity:       CODE STATUS:    Code Status and Medical Interventions: CPR (Attempt to Resuscitate); Full Support   Ordered at: 10/06/24 1156     Level Of Support Discussed With:    Patient     Code  Status (Patient has no pulse and is not breathing):    CPR (Attempt to Resuscitate)     Medical Interventions (Patient has pulse or is breathing):    Full Support       Future Appointments   Date Time Provider Department Center   11/5/2024  2:15 PM Sammie Gambino APRN [facility]   12/6/2024 10:30 AM Sammie Gambino APRN [facility]   4/3/2025  1:15 PM Rio Miranda MD [facility]   8/11/2025 11:45 AM Rio Miranda MD [facility]      Follow-up Information       Sammie Gambino APRN .    Specialties: Nurse Practitioner, Family Medicine  Contact information:  [id number] [id number] [phone]                             Time Spent on Discharge:  Greater than 30 minutes      DO [facility]  10/11/24  06:53 EDT

## 2024-10-12 NOTE — PROGRESS NOTES
"Enter Query Response Below      Query Response:   Suspect HF due to valvular heart disease    Electronically signed by Gary Nova DO, 10/12/24, 3:39 PM EDT.               If applicable, please update the problem list.       Patient: Hernando Lozada        : 1945  Account: [id number]           Admit Date:         How to Respond to this query:       a. Click New Note     b. Answer query within the yellow box.                c. Update the Problem List, if applicable.      If you have any questions about this query contact me at: [email or web address]     ,    Risk factors: 79-year-old male with a history of \"CAD, diastolic heart failure, severe aortic stenosis, type II DM, hypothyroidism, TBI, DVT\" per H&P.  Risk Factors: Presented on 10/7 with lower extremity swelling/pain/erythema. The discharge summary lists, \"Acute on chronic heart failure with preserved ejection fraction (HFpEF)\" \"Aortic stenosis, severe\" and \"Primary hypertension\" as a discharge diagnosis. The cardiology consult reads \"Acute on chronic diastolic heart failure which could be due to valvular function.\" Progress note (10/10) states, \"Chest x-ray with mild pulmonary vascular congestion with minimal pleural effusion.\" ProBNP 2462 (10/6).  Treatment: Lasix (10/6-10)    Please clarify the etiology of the patient’s heart failure:    Heart failure due to hypertension  Heart failure due to valvular disease  Heart failure due to hypertension and valvular disease  Other- specify__________    By submitting this query, we are merely seeking further clarification of documentation to accurately reflect all conditions that you are monitoring, evaluating, treating or that extend the hospitalization or utilize additional resources of care. Please utilize your independent clinical judgment when addressing the question(s) above.     This query and your response, once completed, will be entered into the legal medical record.    Sincerely,  Nurys" Naveen LOGAN  Clinical Documentation Integrity Program

## 2024-10-14 ENCOUNTER — TRANSITIONAL CARE MANAGEMENT TELEPHONE ENCOUNTER (OUTPATIENT)
Dept: CALL CENTER | Facility: HOSPITAL | Age: 79
End: 2024-10-14
Payer: MEDICARE

## 2024-10-14 NOTE — OUTREACH NOTE
Call Center TCM Note      Flowsheet Row Responses   [facility] patient discharged from? [place]   Does the patient have one of the following disease processes/diagnoses(primary or secondary)? CHF   TCM attempt successful? No   Unsuccessful attempts Attempt 1             Chantell Cordon RN    10/14/2024, 10:00 EDT

## 2024-10-14 NOTE — OUTREACH NOTE
Call Center TCM Note      Flowsheet Row Responses   [facility] patient discharged from? [place]   Does the patient have one of the following disease processes/diagnoses(primary or secondary)? CHF   TCM attempt successful? Yes   Call end time 1555   General alerts for this patient [facility]   Discharge diagnosis Acute on chronic diastolic heart failure   Person spoke with today (if not patient) and relationship Betty   Comments Per [facility] home ( Betty)  pt could not accept sooner appt with PCP for HOSP DC FU .   Does the patient have an appointment with their PCP within 7-14 days of discharge? No   Nursing Interventions Routed TCM call to PCP office   Has home health visited the patient within 72 hours of discharge? N/A   Psychosocial issues? No   Did the patient receive a copy of their discharge instructions? Yes   Nursing interventions Reviewed instructions with patient   What is the patient's perception of their health status since discharge? Improving   Nursing interventions Nurse provided patient education   Is the patient able to teach back signs and symptoms of worsening condition? (i.e. weight gain, shortness of air, etc.) Yes   Is the patient/caregiver able to teach back the hierarchy of who to call/visit for symptoms/problems? PCP, Specialist, Home health nurse, Urgent Care, ED, 911 Yes   TCM call completed? Yes   Wrap up additional comments Spoke with Betty at the TBI home and she states that they have scheduled an appt with PCP at the first available transport date. Unable to schedule appt sooner due to the transport. She reports Pt is improving. Transfered call to HF clinic for appt.   Call end time 1555             Chantell Cordon RN    10/14/2024, 15:57 EDT

## 2024-10-18 ENCOUNTER — OFFICE VISIT (OUTPATIENT)
Dept: WOUND CARE | Facility: HOSPITAL | Age: 79
End: 2024-10-18
Payer: MEDICARE

## 2024-10-21 ENCOUNTER — READMISSION MANAGEMENT (OUTPATIENT)
Dept: CALL CENTER | Facility: HOSPITAL | Age: 79
End: 2024-10-21
Payer: MEDICARE

## 2024-10-21 NOTE — OUTREACH NOTE
CHF Week 2 Survey      Flowsheet Row Responses   [facility] [place] [facility] discharged from? [facility]   Does the patient have one of the following disease processes/diagnoses(primary or secondary)? CHF   Week 2 attempt successful? No   Unsuccessful attempts Attempt 1            MIGUEL ELLIS - Registered Nurse
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
## 2024-10-25 ENCOUNTER — HOSPITAL ENCOUNTER (OUTPATIENT)
Dept: CARDIOLOGY | Facility: HOSPITAL | Age: 79
Discharge: HOME | End: 2024-10-25
Payer: MEDICARE

## 2024-10-25 ENCOUNTER — OFFICE VISIT (OUTPATIENT)
Dept: CARDIOLOGY | Facility: CLINIC | Age: 79
End: 2024-10-25
Payer: MEDICARE

## 2024-10-25 ENCOUNTER — READMISSION MANAGEMENT (OUTPATIENT)
Dept: CALL CENTER | Facility: HOSPITAL | Age: 79
End: 2024-10-25
Payer: MEDICARE

## 2024-10-25 VITALS
HEART RATE: 61 BPM | BODY MASS INDEX: 34.96 KG/M2 | HEIGHT: 69 IN | SYSTOLIC BLOOD PRESSURE: 138 MMHG | DIASTOLIC BLOOD PRESSURE: 78 MMHG | WEIGHT: 236 LBS

## 2024-10-25 DIAGNOSIS — I10 PRIMARY HYPERTENSION: ICD-10-CM

## 2024-10-25 DIAGNOSIS — I35.0 AORTIC VALVE STENOSIS, ETIOLOGY OF CARDIAC VALVE DISEASE UNSPECIFIED: ICD-10-CM

## 2024-10-25 DIAGNOSIS — Z09 HOSPITAL DISCHARGE FOLLOW-UP: Primary | ICD-10-CM

## 2024-10-25 DIAGNOSIS — I25.10 CORONARY ARTERY DISEASE INVOLVING NATIVE CORONARY ARTERY OF NATIVE HEART WITHOUT ANGINA PECTORIS: ICD-10-CM

## 2024-10-25 DIAGNOSIS — I50.32 CHRONIC DIASTOLIC CONGESTIVE HEART FAILURE: ICD-10-CM

## 2024-10-25 LAB
ALBUMIN SERPL-MCNC: 3.4 G/DL (ref 3.5–5.2)
ALBUMIN/GLOB SERPL: 1 G/DL
ALP SERPL-CCNC: 77 U/L (ref 39–117)
ALT SERPL W P-5'-P-CCNC: 13 U/L (ref 1–41)
ANION GAP SERPL CALCULATED.3IONS-SCNC: 8.1 MMOL/L (ref 5–15)
AST SERPL-CCNC: 17 U/L (ref 1–40)
BILIRUB SERPL-MCNC: 0.6 MG/DL (ref 0–1.2)
BUN SERPL-MCNC: 16 MG/DL (ref 8–23)
BUN/CREAT SERPL: 14.8 (ref 7–25)
CALCIUM SPEC-SCNC: 8.8 MG/DL (ref 8.6–10.5)
CHLORIDE SERPL-SCNC: 99 MMOL/L (ref 98–107)
CO2 SERPL-SCNC: 30.9 MMOL/L (ref 22–29)
CREAT SERPL-MCNC: 1.08 MG/DL (ref 0.76–1.27)
EGFRCR SERPLBLD CKD-EPI 2021: 70.7 ML/MIN/1.73
GLOBULIN UR ELPH-MCNC: 3.4 GM/DL
GLUCOSE SERPL-MCNC: 102 MG/DL (ref 65–99)
POTASSIUM SERPL-SCNC: 4.3 MMOL/L (ref 3.5–5.2)
PROT SERPL-MCNC: 6.8 G/DL (ref 6–8.5)
SODIUM SERPL-SCNC: 138 MMOL/L (ref 136–145)

## 2024-10-25 PROCEDURE — 80053 COMPREHEN METABOLIC PANEL: CPT | Performed by: NURSE PRACTITIONER

## 2024-10-25 RX ORDER — CARVEDILOL 3.12 MG/1
3.12 TABLET ORAL 2 TIMES DAILY
Qty: 180 TABLET | Refills: 3 | Status: SHIPPED | OUTPATIENT
Start: 2024-10-25

## 2024-10-25 NOTE — OUTREACH NOTE
CHF Week 2 Survey      Flowsheet Row Responses   [facility] patient discharged from? [facility]   Does the patient have one of the following disease processes/diagnoses(primary or secondary)? CHF   Week 2 attempt successful? Yes   Call start time 1452   Call end time 1455   Discharge diagnosis Acute on chronic diastolic heart failure   Meds reviewed with patient/caregiver? Yes   Is the patient taking all medications as directed (includes completed medication regime)? Yes   Medication comments care giver   Comments regarding appointments Cardiology appt today, medications were adjusted.   Does the patient have a primary care provider?  Yes   Has the patient kept scheduled appointments due by today? Yes   Psychosocial issues? No   CHF Week 2 call completed? Yes   Is the patient interested in additional calls from an ambulatory ? No   Would this patient benefit from a Referral to Amb Social Work? No   Wrap up additional comments Care giver states patient is doing ok this week.  Has f/u with cardiology.  Meds adjusted.   Call end time 1455            TRUDY XIONG - Registered Nurse

## 2024-10-25 NOTE — PATIENT INSTRUCTIONS
-add coreg 3.125 bid.    -Monitor blood pressure 1 hour and a half after taking blood pressure medications and  write the reading down along with heart rate.  Please bring these readings with you on your follow up.     -Please monitor and stay on a low sodium (<2000 mg/day) diet and 2000ml of fluid. Exercise 30 minutes a day for 2-3 times a week.  Please weigh yourself daily if you gains more than 2 pounds in 1 day or 5 pounds in 1 week. Check for swelling in your feet, ankles, legs, and stomach.  Monitor for signs of fatigue, shortness of breath or cough.  Call the office for recommendations.

## 2024-10-31 ENCOUNTER — READMISSION MANAGEMENT (OUTPATIENT)
Dept: CALL CENTER | Facility: HOSPITAL | Age: 79
End: 2024-10-31
Payer: MEDICARE

## 2024-10-31 ENCOUNTER — APPOINTMENT (OUTPATIENT)
Dept: GENERAL RADIOLOGY | Facility: HOSPITAL | Age: 79
End: 2024-10-31
Payer: MEDICARE

## 2024-10-31 ENCOUNTER — HOSPITAL ENCOUNTER (INPATIENT)
Facility: HOSPITAL | Age: 79
LOS: 6 days | Discharge: NURSING, REHAB OR LONG-TERM CARE | End: 2024-11-06
Attending: EMERGENCY MEDICINE | Admitting: INTERNAL MEDICINE
Payer: MEDICARE

## 2024-10-31 DIAGNOSIS — R60.1 ANASARCA: Primary | ICD-10-CM

## 2024-10-31 DIAGNOSIS — I50.33 ACUTE ON CHRONIC DIASTOLIC HEART FAILURE: ICD-10-CM

## 2024-10-31 DIAGNOSIS — R06.09 EXERTIONAL DYSPNEA: ICD-10-CM

## 2024-10-31 PROBLEM — D64.9 ANEMIA: Status: ACTIVE | Noted: 2024-10-31

## 2024-10-31 LAB
ALBUMIN SERPL-MCNC: 3.3 G/DL (ref 3.5–5.2)
ALBUMIN/GLOB SERPL: 1.1 G/DL
ALP SERPL-CCNC: 70 U/L (ref 39–117)
ALT SERPL W P-5'-P-CCNC: 21 U/L (ref 1–41)
ANION GAP SERPL CALCULATED.3IONS-SCNC: 7 MMOL/L (ref 5–15)
AST SERPL-CCNC: 23 U/L (ref 1–40)
BASOPHILS # BLD AUTO: 0.02 10*3/MM3 (ref 0–0.2)
BASOPHILS NFR BLD AUTO: 0.5 % (ref 0–1.5)
BILIRUB SERPL-MCNC: 0.5 MG/DL (ref 0–1.2)
BUN SERPL-MCNC: 18 MG/DL (ref 8–23)
BUN/CREAT SERPL: 16.7 (ref 7–25)
CALCIUM SPEC-SCNC: 8.2 MG/DL (ref 8.6–10.5)
CHLORIDE SERPL-SCNC: 96 MMOL/L (ref 98–107)
CO2 SERPL-SCNC: 30 MMOL/L (ref 22–29)
CREAT SERPL-MCNC: 1.08 MG/DL (ref 0.76–1.27)
DEPRECATED RDW RBC AUTO: 47.2 FL (ref 37–54)
EGFRCR SERPLBLD CKD-EPI 2021: 70.7 ML/MIN/1.73
EOSINOPHIL # BLD AUTO: 0.01 10*3/MM3 (ref 0–0.4)
EOSINOPHIL NFR BLD AUTO: 0.2 % (ref 0.3–6.2)
ERYTHROCYTE [DISTWIDTH] IN BLOOD BY AUTOMATED COUNT: 14.2 % (ref 12.3–15.4)
GLOBULIN UR ELPH-MCNC: 2.9 GM/DL
GLUCOSE BLDC GLUCOMTR-MCNC: 195 MG/DL (ref 70–130)
GLUCOSE SERPL-MCNC: 130 MG/DL (ref 65–99)
HCT VFR BLD AUTO: 29.7 % (ref 37.5–51)
HGB BLD-MCNC: 9.7 G/DL (ref 13–17.7)
IMM GRANULOCYTES # BLD AUTO: 0 10*3/MM3 (ref 0–0.05)
IMM GRANULOCYTES NFR BLD AUTO: 0 % (ref 0–0.5)
INR PPP: 1.78 (ref 0.9–1.1)
LYMPHOCYTES # BLD AUTO: 0.82 10*3/MM3 (ref 0.7–3.1)
LYMPHOCYTES NFR BLD AUTO: 19.6 % (ref 19.6–45.3)
MCH RBC QN AUTO: 30 PG (ref 26.6–33)
MCHC RBC AUTO-ENTMCNC: 32.7 G/DL (ref 31.5–35.7)
MCV RBC AUTO: 92 FL (ref 79–97)
MONOCYTES # BLD AUTO: 0.45 10*3/MM3 (ref 0.1–0.9)
MONOCYTES NFR BLD AUTO: 10.8 % (ref 5–12)
NEUTROPHILS NFR BLD AUTO: 2.88 10*3/MM3 (ref 1.7–7)
NEUTROPHILS NFR BLD AUTO: 68.9 % (ref 42.7–76)
NRBC BLD AUTO-RTO: 0 /100 WBC (ref 0–0.2)
NT-PROBNP SERPL-MCNC: 1860 PG/ML (ref 0–1800)
PLATELET # BLD AUTO: 162 10*3/MM3 (ref 140–450)
PMV BLD AUTO: 8.7 FL (ref 6–12)
POTASSIUM SERPL-SCNC: 4.1 MMOL/L (ref 3.5–5.2)
PROT SERPL-MCNC: 6.2 G/DL (ref 6–8.5)
PROTHROMBIN TIME: 20.9 SECONDS (ref 11.7–14.2)
QT INTERVAL: 409 MS
QTC INTERVAL: 445 MS
RBC # BLD AUTO: 3.23 10*6/MM3 (ref 4.14–5.8)
SODIUM SERPL-SCNC: 133 MMOL/L (ref 136–145)
TROPONIN T SERPL HS-MCNC: 23 NG/L
VALPROATE SERPL-MCNC: 68 MCG/ML (ref 50–125)
WBC NRBC COR # BLD AUTO: 4.18 10*3/MM3 (ref 3.4–10.8)

## 2024-10-31 PROCEDURE — 85025 COMPLETE CBC W/AUTO DIFF WBC: CPT | Performed by: NURSE PRACTITIONER

## 2024-10-31 PROCEDURE — 85610 PROTHROMBIN TIME: CPT | Performed by: NURSE PRACTITIONER

## 2024-10-31 PROCEDURE — 80053 COMPREHEN METABOLIC PANEL: CPT | Performed by: NURSE PRACTITIONER

## 2024-10-31 PROCEDURE — 84484 ASSAY OF TROPONIN QUANT: CPT | Performed by: NURSE PRACTITIONER

## 2024-10-31 PROCEDURE — 99285 EMERGENCY DEPT VISIT HI MDM: CPT

## 2024-10-31 PROCEDURE — 82948 REAGENT STRIP/BLOOD GLUCOSE: CPT

## 2024-10-31 PROCEDURE — 25010000002 FUROSEMIDE PER 20 MG: Performed by: NURSE PRACTITIONER

## 2024-10-31 PROCEDURE — 71045 X-RAY EXAM CHEST 1 VIEW: CPT

## 2024-10-31 PROCEDURE — 93005 ELECTROCARDIOGRAM TRACING: CPT | Performed by: NURSE PRACTITIONER

## 2024-10-31 PROCEDURE — 80164 ASSAY DIPROPYLACETIC ACD TOT: CPT | Performed by: NURSE PRACTITIONER

## 2024-10-31 PROCEDURE — 83880 ASSAY OF NATRIURETIC PEPTIDE: CPT | Performed by: NURSE PRACTITIONER

## 2024-10-31 PROCEDURE — 63710000001 INSULIN LISPRO (HUMAN) PER 5 UNITS: Performed by: INTERNAL MEDICINE

## 2024-10-31 RX ORDER — OXYBUTYNIN CHLORIDE 5 MG/1
5 TABLET, EXTENDED RELEASE ORAL DAILY
Status: DISCONTINUED | OUTPATIENT
Start: 2024-10-31 | End: 2024-11-06 | Stop reason: HOSPADM

## 2024-10-31 RX ORDER — SODIUM CHLORIDE 0.9 % (FLUSH) 0.9 %
10 SYRINGE (ML) INJECTION EVERY 12 HOURS SCHEDULED
Status: DISCONTINUED | OUTPATIENT
Start: 2024-10-31 | End: 2024-11-06 | Stop reason: HOSPADM

## 2024-10-31 RX ORDER — LEVOTHYROXINE SODIUM 50 UG/1
50 TABLET ORAL
Status: DISCONTINUED | OUTPATIENT
Start: 2024-11-01 | End: 2024-11-06 | Stop reason: HOSPADM

## 2024-10-31 RX ORDER — NITROGLYCERIN 0.4 MG/1
0.4 TABLET SUBLINGUAL
Status: DISCONTINUED | OUTPATIENT
Start: 2024-10-31 | End: 2024-11-06 | Stop reason: HOSPADM

## 2024-10-31 RX ORDER — ACETAMINOPHEN 160 MG/5ML
650 SOLUTION ORAL EVERY 4 HOURS PRN
Status: DISCONTINUED | OUTPATIENT
Start: 2024-10-31 | End: 2024-11-06

## 2024-10-31 RX ORDER — FUROSEMIDE 10 MG/ML
80 INJECTION INTRAMUSCULAR; INTRAVENOUS EVERY 12 HOURS
Status: DISCONTINUED | OUTPATIENT
Start: 2024-11-01 | End: 2024-11-01

## 2024-10-31 RX ORDER — DIVALPROEX SODIUM 500 MG/1
500 TABLET, DELAYED RELEASE ORAL 2 TIMES DAILY
Status: DISCONTINUED | OUTPATIENT
Start: 2024-10-31 | End: 2024-11-06 | Stop reason: HOSPADM

## 2024-10-31 RX ORDER — ONDANSETRON 2 MG/ML
4 INJECTION INTRAMUSCULAR; INTRAVENOUS EVERY 6 HOURS PRN
Status: DISCONTINUED | OUTPATIENT
Start: 2024-10-31 | End: 2024-11-06 | Stop reason: HOSPADM

## 2024-10-31 RX ORDER — CARVEDILOL 3.12 MG/1
3.12 TABLET ORAL 2 TIMES DAILY
Status: DISCONTINUED | OUTPATIENT
Start: 2024-10-31 | End: 2024-11-06 | Stop reason: HOSPADM

## 2024-10-31 RX ORDER — CALCIUM GLUCONATE 94 MG/ML
1 INJECTION, SOLUTION INTRAVENOUS ONCE
Status: DISCONTINUED | OUTPATIENT
Start: 2024-11-01 | End: 2024-10-31

## 2024-10-31 RX ORDER — ROPINIROLE 0.5 MG/1
0.5 TABLET, FILM COATED ORAL NIGHTLY
Status: DISCONTINUED | OUTPATIENT
Start: 2024-10-31 | End: 2024-11-06 | Stop reason: HOSPADM

## 2024-10-31 RX ORDER — DEXTROSE MONOHYDRATE 25 G/50ML
25 INJECTION, SOLUTION INTRAVENOUS
Status: DISCONTINUED | OUTPATIENT
Start: 2024-10-31 | End: 2024-11-06 | Stop reason: HOSPADM

## 2024-10-31 RX ORDER — INSULIN LISPRO 100 [IU]/ML
2-7 INJECTION, SOLUTION INTRAVENOUS; SUBCUTANEOUS
Status: DISCONTINUED | OUTPATIENT
Start: 2024-10-31 | End: 2024-11-01

## 2024-10-31 RX ORDER — ATORVASTATIN CALCIUM 20 MG/1
40 TABLET, FILM COATED ORAL NIGHTLY
Status: DISCONTINUED | OUTPATIENT
Start: 2024-10-31 | End: 2024-11-06 | Stop reason: HOSPADM

## 2024-10-31 RX ORDER — LISINOPRIL 5 MG/1
5 TABLET ORAL DAILY
Status: DISCONTINUED | OUTPATIENT
Start: 2024-10-31 | End: 2024-11-04

## 2024-10-31 RX ORDER — SODIUM CHLORIDE 9 MG/ML
40 INJECTION, SOLUTION INTRAVENOUS AS NEEDED
Status: DISCONTINUED | OUTPATIENT
Start: 2024-10-31 | End: 2024-11-06 | Stop reason: HOSPADM

## 2024-10-31 RX ORDER — FOLIC ACID 1 MG/1
1 TABLET ORAL DAILY
Status: DISCONTINUED | OUTPATIENT
Start: 2024-10-31 | End: 2024-11-06 | Stop reason: HOSPADM

## 2024-10-31 RX ORDER — ACETAMINOPHEN 650 MG/1
650 SUPPOSITORY RECTAL EVERY 4 HOURS PRN
Status: DISCONTINUED | OUTPATIENT
Start: 2024-10-31 | End: 2024-11-06

## 2024-10-31 RX ORDER — FUROSEMIDE 10 MG/ML
80 INJECTION INTRAMUSCULAR; INTRAVENOUS ONCE
Status: COMPLETED | OUTPATIENT
Start: 2024-10-31 | End: 2024-10-31

## 2024-10-31 RX ORDER — NICOTINE POLACRILEX 4 MG
15 LOZENGE BUCCAL
Status: DISCONTINUED | OUTPATIENT
Start: 2024-10-31 | End: 2024-11-06 | Stop reason: HOSPADM

## 2024-10-31 RX ORDER — DOCUSATE SODIUM 100 MG/1
100 CAPSULE, LIQUID FILLED ORAL DAILY
Status: DISCONTINUED | OUTPATIENT
Start: 2024-10-31 | End: 2024-11-06 | Stop reason: HOSPADM

## 2024-10-31 RX ORDER — ACETAMINOPHEN 325 MG/1
650 TABLET ORAL EVERY 4 HOURS PRN
Status: DISCONTINUED | OUTPATIENT
Start: 2024-10-31 | End: 2024-11-06 | Stop reason: HOSPADM

## 2024-10-31 RX ORDER — SODIUM CHLORIDE 0.9 % (FLUSH) 0.9 %
10 SYRINGE (ML) INJECTION AS NEEDED
Status: DISCONTINUED | OUTPATIENT
Start: 2024-10-31 | End: 2024-11-06 | Stop reason: HOSPADM

## 2024-10-31 RX ORDER — CLOPIDOGREL BISULFATE 75 MG/1
75 TABLET ORAL DAILY
Status: DISCONTINUED | OUTPATIENT
Start: 2024-10-31 | End: 2024-11-06 | Stop reason: HOSPADM

## 2024-10-31 RX ORDER — UREA 10 %
1000 LOTION (ML) TOPICAL DAILY
Status: DISCONTINUED | OUTPATIENT
Start: 2024-10-31 | End: 2024-11-06 | Stop reason: HOSPADM

## 2024-10-31 RX ORDER — CALCIUM GLUCONATE 20 MG/ML
1 INJECTION, SOLUTION INTRAVENOUS ONCE
Status: DISCONTINUED | OUTPATIENT
Start: 2024-11-01 | End: 2024-11-01

## 2024-10-31 RX ORDER — IBUPROFEN 600 MG/1
1 TABLET ORAL
Status: DISCONTINUED | OUTPATIENT
Start: 2024-10-31 | End: 2024-11-06 | Stop reason: HOSPADM

## 2024-10-31 RX ORDER — FERROUS SULFATE 325(65) MG
325 TABLET ORAL
Status: DISCONTINUED | OUTPATIENT
Start: 2024-11-01 | End: 2024-11-06 | Stop reason: HOSPADM

## 2024-10-31 RX ORDER — PANTOPRAZOLE SODIUM 40 MG/1
40 TABLET, DELAYED RELEASE ORAL
Status: DISCONTINUED | OUTPATIENT
Start: 2024-11-01 | End: 2024-11-06 | Stop reason: HOSPADM

## 2024-10-31 RX ORDER — TAMSULOSIN HYDROCHLORIDE 0.4 MG/1
0.8 CAPSULE ORAL DAILY
Status: DISCONTINUED | OUTPATIENT
Start: 2024-10-31 | End: 2024-11-06 | Stop reason: HOSPADM

## 2024-10-31 RX ADMIN — DIVALPROEX SODIUM 500 MG: 500 TABLET, DELAYED RELEASE ORAL at 21:12

## 2024-10-31 RX ADMIN — RIVAROXABAN 20 MG: 20 TABLET, FILM COATED ORAL at 21:12

## 2024-10-31 RX ADMIN — DOCUSATE SODIUM 100 MG: 100 CAPSULE, LIQUID FILLED ORAL at 21:12

## 2024-10-31 RX ADMIN — OXYBUTYNIN CHLORIDE 5 MG: 5 TABLET, EXTENDED RELEASE ORAL at 21:12

## 2024-10-31 RX ADMIN — CLOPIDOGREL BISULFATE 75 MG: 75 TABLET, FILM COATED ORAL at 21:12

## 2024-10-31 RX ADMIN — FUROSEMIDE 80 MG: 10 INJECTION, SOLUTION INTRAMUSCULAR; INTRAVENOUS at 14:11

## 2024-10-31 RX ADMIN — CARVEDILOL 3.12 MG: 3.12 TABLET, FILM COATED ORAL at 21:13

## 2024-10-31 RX ADMIN — Medication 10 ML: at 21:15

## 2024-10-31 RX ADMIN — ROPINIROLE HYDROCHLORIDE 0.5 MG: 0.5 TABLET, FILM COATED ORAL at 21:12

## 2024-10-31 RX ADMIN — TAMSULOSIN HYDROCHLORIDE 0.8 MG: 0.4 CAPSULE ORAL at 21:12

## 2024-10-31 RX ADMIN — ATORVASTATIN CALCIUM 40 MG: 20 TABLET, FILM COATED ORAL at 21:12

## 2024-10-31 RX ADMIN — INSULIN LISPRO 2 UNITS: 100 INJECTION, SOLUTION INTRAVENOUS; SUBCUTANEOUS at 21:14

## 2024-10-31 RX ADMIN — FOLIC ACID 1 MG: 1 TABLET ORAL at 21:12

## 2024-10-31 RX ADMIN — METFORMIN HYDROCHLORIDE 500 MG: 500 TABLET, FILM COATED ORAL at 21:12

## 2024-10-31 RX ADMIN — CYANOCOBALAMIN TAB 500 MCG 1000 MCG: 500 TAB at 21:11

## 2024-10-31 NOTE — ED NOTES
"Nursing report ED to floor  Hernando Lozada  79 y.o.  male    HPI :  HPI  Stated Reason for Visit: BLE edema    Chief Complaint  Chief Complaint   Patient presents with    Leg Swelling       Admitting doctor:   Jeanne Chan MD    Admitting diagnosis:   The primary encounter diagnosis was Anasarca. Diagnoses of Exertional dyspnea and Acute on chronic diastolic heart failure were also pertinent to this visit.    Code status:   Current Code Status       Date Active Code Status Order ID Comments User Context       Prior            Allergies:   Patient has no known allergies.    Isolation:   No active isolations    Intake and Output    Intake/Output Summary (Last 24 hours) at 10/31/2024 1602  Last data filed at 10/31/2024 1600  Gross per 24 hour   Intake --   Output 1300 ml   Net -1300 ml       Weight:       10/31/24  1358   Weight: 110 kg (242 lb 8 oz)       Most recent vitals:   Vitals:    10/31/24 1358 10/31/24 1503 10/31/24 1546 10/31/24 1558   BP:  124/65 101/80    Pulse:  66 62 64   Resp:       Temp:       TempSrc:       SpO2:  97% 94% 95%   Weight: 110 kg (242 lb 8 oz)      Height: 175.3 cm (69\")          Active LDAs/IV Access:   Lines, Drains & Airways       Active LDAs       Name Placement date Placement time Site Days    Peripheral IV 10/31/24 1405 Right Antecubital 10/31/24  1405  Antecubital  less than 1    External Urinary Catheter 10/31/24  1526  --  less than 1                    Labs (abnormal labs have a star):   Labs Reviewed   COMPREHENSIVE METABOLIC PANEL - Abnormal; Notable for the following components:       Result Value    Glucose 130 (*)     Sodium 133 (*)     Chloride 96 (*)     CO2 30.0 (*)     Calcium 8.2 (*)     Albumin 3.3 (*)     All other components within normal limits    Narrative:     GFR Normal >60  Chronic Kidney Disease <60  Kidney Failure <15    The GFR formula is only valid for adults with stable renal function between ages 18 and 70.   SINGLE HS TROPONIN T - Abnormal; Notable for the " following components:    HS Troponin T 23 (*)     All other components within normal limits    Narrative:     High Sensitive Troponin T Reference Range:  <14.0 ng/L- Negative Female for AMI  <22.0 ng/L- Negative Male for AMI  >=14 - Abnormal Female indicating possible myocardial injury.  >=22 - Abnormal Male indicating possible myocardial injury.   Clinicians would have to utilize clinical acumen, EKG, Troponin, and serial changes to determine if it is an Acute Myocardial Infarction or myocardial injury due to an underlying chronic condition.        BNP (IN-HOUSE) - Abnormal; Notable for the following components:    proBNP 1,860.0 (*)     All other components within normal limits    Narrative:     This assay is used as an aid in the diagnosis of individuals suspected of having heart failure. It can be used as an aid in the diagnosis of acute decompensated heart failure (ADHF) in patients presenting with signs and symptoms of ADHF to the emergency department (ED). In addition, NT-proBNP of <300 pg/mL indicates ADHF is not likely.    Age Range Result Interpretation  NT-proBNP Concentration (pg/mL:      <50             Positive            >450                   Gray                 300-450                    Negative             <300    50-75           Positive            >900                  Gray                300-900                  Negative            <300      >75             Positive            >1800                  Gray                300-1800                  Negative            <300   PROTIME-INR - Abnormal; Notable for the following components:    Protime 20.9 (*)     INR 1.78 (*)     All other components within normal limits   CBC WITH AUTO DIFFERENTIAL - Abnormal; Notable for the following components:    RBC 3.23 (*)     Hemoglobin 9.7 (*)     Hematocrit 29.7 (*)     Eosinophil % 0.2 (*)     All other components within normal limits   VALPROIC ACID LEVEL, TOTAL - Normal    Narrative:     Therapeutic  Ranges for Valproic Acid    Epilepsy:       mcg/ml  Bipolar/Tatiana  up to 125 mcg/ml     CBC AND DIFFERENTIAL    Narrative:     The following orders were created for panel order CBC & Differential.  Procedure                               Abnormality         Status                     ---------                               -----------         ------                     CBC Auto Differential[[id number]]        Abnormal            Final result                 Please view results for these tests on the individual orders.       EKG:   ECG 12 Lead Dyspnea   Final Result   HEART RATE=71  bpm   RR Kxvwpzws=743  ms   KY Interval=  ms   P Horizontal Axis=  deg   P Front Axis=  deg   QRSD Rslccyvl=098  ms   QT Paozstzy=324  ms   KIjC=034  ms   QRS Axis=-6  deg   T Wave Axis=124  deg   - ABNORMAL ECG -   Atrial fibrillation   Nonspecific  T abnormalities, lateral leads   No change from previous tracing   Electronically Signed By: Mary Ann Mustafa ([facility]) 2024-10-31 15:47:21   Date and Time of Study:2024-10-31 13:56:30          Meds given in ED:   Medications   furosemide (LASIX) injection 80 mg (80 mg Intravenous Given 10/31/24 1411)       Imaging results:  XR Chest 1 View    Result Date: 10/31/2024  1. No active disease is seen in the chest  This report was finalized on 10/31/2024 2:31 PM by Dr. Primo Joya M.D on Workstation: [id number]       Ambulatory status:   - up with 1 assist      Social issues:   Social History     Socioeconomic History    Marital status: Single   Tobacco Use    Smoking status: Never     Passive exposure: Never    Smokeless tobacco: Never   Vaping Use    Vaping status: Never Used   Substance and Sexual Activity    Alcohol use: No    Drug use: No    Sexual activity: Defer       Peripheral Neurovascular  Peripheral Neurovascular (Adult)  Peripheral Neurovascular WDL: .WDL except, neurovascular assessment lower  Additional Documentation: Edema (Group)  Edema  Edema: dependent, abdomen  Dependent  Edema: 3+ (Moderate)  Abdomen Edema: 3+ (Moderate)  LLE Neurovascular Assessment  Temperature LLE: cool  Color LLE: no discoloration  Sensation LLE: tenderness present  RLE Neurovascular Assessment  Temperature RLE: cool  Color RLE: no discoloration  Sensation RLE: tenderness present    Neuro Cognitive  Neuro Cognitive (Adult)  Cognitive/Neuro/Behavioral WDL: WDL, orientation, level of consciousness  Level of Consciousness: Alert  Orientation: oriented x 4    Learning  Learning Assessment  Learning Readiness and Ability: no barriers identified  Education Provided  Person Taught: patient  Teaching Method: verbal instruction  Teaching Focus: symptom/problem overview, diagnostic test, medical device/equipment use, medication administration  Education Outcome Evaluation: acceptance expressed, verbalizes understanding    Respiratory  Respiratory WDL  Respiratory WDL: .WDL except, all  Rhythm/Pattern, Respiratory: shortness of breath    Abdominal Pain       Pain Assessments  Pain (Adult)  (0-10) Pain Rating: Rest: 10  Pain Location: groin  Pain Side/Orientation: bilateral  Pain Description: squeezing    NIH Stroke Scale       Rocio Aly RN  10/31/24 16:02 EDT

## 2024-10-31 NOTE — ED PROVIDER NOTES
EMERGENCY DEPARTMENT ENCOUNTER  Room Number:  01/01  PCP: Sammie Gambino APRN  Independent Historians: Patient      HPI:  Chief Complaint: had concerns including Leg Swelling.     A complete HPI/ROS/PMH/PSH/SH/FH are unobtainable due to:     Chronic or social conditions impacting patient care (Social Determinants of Health):       Context: Hernando Lozada is a pleasant afebrile ambulatory 79 y.o.  male with a medical history of chronic diastolic heart failure, hypertension and aortic valve stenosis who presents to the ED c/o acute lower extremity swelling.      Admitted to the hospital 10/6 and since dc c/o LE edema with shortness of breath especially with exertion.  He denies any fevers or chills.  He denies any chest discomfort.  States he weighed 231lb this AM  He reports he does not weigh himself every day so he is uncertain if he is gained weight since his last hospitalization.  Patient describes bilateral lower extremity swelling worsening to the point that now his scrotal area and his abdomen are now swollen.    Review of prior external notes (non-ED) -and- Review of prior external test results outside of this encounter:  10/25/2024 seen by [facility] with cardiology service accompanied by caregiver, diuretics at [place] are Lasix 40 twice daily    Prescription drug monitoring program review:         PAST MEDICAL HISTORY  Active Ambulatory Problems     Diagnosis Date Noted    DJD (degenerative joint disease) of knee 12/14/2017    Primary hypertension 02/18/2019    SOB (shortness of breath) 02/18/2019    Leg swelling 02/18/2019    Hyperlipidemia LDL goal <100 08/23/2019    COVID-19 11/25/2022    Diabetes mellitus     GIOVANNY (obstructive sleep apnea)     Disease of thyroid gland     CKD (chronic kidney disease)     Hypomagnesemia     Chronic brain injury     Generalized weakness     CAD (coronary artery disease)     Pedal edema 03/06/2023    Tremor 03/06/2023    Elevated troponin 03/06/2023    Lower extremity  cellulitis 03/06/2023    Tinea cruris 03/06/2023    Chronic deep vein thrombosis (DVT) of calf muscle vein of left lower extremity 03/07/2023    Aortic stenosis, severe 03/06/2023    Acute urinary tract infection 04/17/2023    Hypothyroidism 04/17/2023    Acute urinary retention 04/19/2023    Acute bacterial prostatitis 04/19/2023    Chest pain 06/25/2024    Recurrent falls 08/08/2024    Hypokalemia 08/09/2024    Acute on chronic systolic CHF (congestive heart failure) 10/06/2024    Acute on chronic diastolic heart failure 10/06/2024    Acute on chronic diastolic CHF (congestive heart failure) 10/07/2024    Atrial fibrillation 10/08/2024    Acute on chronic heart failure with preserved ejection fraction (HFpEF) 10/11/2024     Resolved Ambulatory Problems     Diagnosis Date Noted    Chest pain with high risk of acute coronary syndrome 04/29/2019    Chest pain 01/02/2024    Chest pain 01/04/2024     Past Medical History:   Diagnosis Date    Arthritis     Bilateral leg edema     GERD (gastroesophageal reflux disease)     Heart murmur     History of head injury     [place] (hard of hearing)     Hyperlipidemia     Hypertension     Irregular heart beat     Osteoarthritis     Past heart attack     SOB (shortness of breath) on exertion     Stroke     Vasovagal episode          PAST SURGICAL HISTORY  Past Surgical History:   Procedure Laterality Date    CARDIAC CATHETERIZATION N/A 4/30/2019    Procedure: Coronary angiography with grafts;  Surgeon: Rio Miranda MD;  Location: [facility] CATH INVASIVE LOCATION;  Service: Cardiology    CARDIAC CATHETERIZATION N/A 4/30/2019    Procedure: Left Heart Cath;  Surgeon: Rio Miranda MD;  Location: [facility] CATH INVASIVE LOCATION;  Service: Cardiology    CARDIAC CATHETERIZATION N/A 4/30/2019    Procedure: Left ventriculography;  Surgeon: Rio Miranda MD;  Location: [facility] CATH INVASIVE LOCATION;  Service: Cardiology    CARDIAC CATHETERIZATION  4/30/2019     Procedure: Saphenous Vein Graft;  Surgeon: Rio Miranda MD;  [facility];  Service: Cardiology    CARDIAC CATHETERIZATION N/A 4/30/2019    Procedure: Stent GLENDY coronary;  Surgeon: Rio Miranda MD;  Location:  [facility] CATH INVASIVE LOCATION;  Service: Cardiology    CARDIAC CATHETERIZATION N/A 3/10/2023    Procedure: Right and Left Heart Cath;  Surgeon: Rio Miranda MD;  [facility];  Service: Cardiology;  Laterality: N/A;    CARDIAC CATHETERIZATION N/A 3/10/2023    Procedure: Coronary angiography;  Surgeon: Rio Miranda MD;  [facility];  Service: Cardiology;  Laterality: N/A;    CARDIAC CATHETERIZATION N/A 3/10/2023    Procedure: Left ventriculography;  Surgeon: Rio Miranda MD;  [facility];  Service: Cardiology;  Laterality: N/A;    CARDIAC CATHETERIZATION N/A 3/10/2023    Procedure: Percutaneous Coronary Intervention;  Surgeon: Rio Miranda MD;  [facility];  Service: Cardiology;  Laterality: N/A;    CARDIAC CATHETERIZATION N/A 3/10/2023    Procedure: Stent GLENDY coronary;  Surgeon: Rio Miranda MD;  [facility];  Service: Cardiology;  Laterality: N/A;    CARDIAC CATHETERIZATION N/A 1/3/2024    Procedure: Left Heart Cath;  Surgeon: Rio Miranda MD;  [facility];  Service: Cardiovascular;  Laterality: N/A;    CARDIAC CATHETERIZATION N/A 1/3/2024    Procedure: Coronary angiography;  Surgeon: Rio Miranda MD;  [facility];  Service: Cardiovascular;  Laterality: N/A;    CARDIAC CATHETERIZATION N/A 1/3/2024    Procedure: Percutaneous Coronary Intervention;  Surgeon: Rio Miranda MD;  [facility];  Service: Cardiovascular;  Laterality: N/A;    CARDIAC CATHETERIZATION N/A  1/3/2024    Procedure: Left ventriculography;  Surgeon: Rio Miranda MD;  Location:  [facility] CATH INVASIVE LOCATION;  Service: Cardiovascular;  Laterality: N/A;    CARDIAC CATHETERIZATION Left 1/3/2024    Procedure: Native mammary injection;  Surgeon: Rio Miranda MD;  Location:  [facility] CATH INVASIVE LOCATION;  Service: Cardiovascular;  Laterality: Left;    CARDIAC CATHETERIZATION N/A 6/26/2024    Procedure: Right and Left Heart Cath;  Surgeon: Rio Miranda MD;  Location:  [facility] CATH INVASIVE LOC[facility];  Service: Cardiovascular;  Laterality: N/A;    CARDIAC CATHETERIZATION N/A 6/26/2024    Procedure: Percutaneous Coronary Intervention;  Surgeon: Rio Miranda MD;  Location:  [facility] CATH INVASIVE [facility];  Service: Cardiovascular;  Laterality: N/A;    CARDIAC CATHETERIZATION N/A 6/26/2024    Procedure: Left ventriculography;  Surgeon: Rio Miranda MD;  [facility];  Service: Cardiovascular;  Laterality: N/A;    CARDIAC CATHETERIZATION N/A 6/26/2024    Procedure: Coronary angiography;  Surgeon: Rio Miranda MD;  [facility];  Service: Cardiovascular;  Laterality: N/A;    CARDIAC CATHETERIZATION  6/26/2024    Procedure: Saphenous Vein Graft;  Surgeon: Rio Miranda MD;  [facility];  Service: Cardiovascular;;    CARDIAC CATHETERIZATION N/A 6/26/2024    Procedure: Stent GLENDY coronary;  Surgeon: Rio Miranda MD;  [facility];  Service: Cardiovascular;  Laterality: N/A;    CARPAL TUNNEL RELEASE Bilateral     CATARACT EXTRACTION      CORONARY ARTERY BYPASS GRAFT  2002    FINGER SURGERY      MISSING LEFT POINTER FINGER TIP    INTERVENTIONAL RADIOLOGY PROCEDURE N/A 6/26/2024    Procedure: Intravascular Ultrasound;  Surgeon: Rio Miranda MD;  [facility];  Service: Cardiovascular;  Laterality: N/A;    KNEE  ARTHROPLASTY Right 02/15/2017    MI ARTHRP KNE CONDYLE&PLATU MEDIAL&LAT COMPARTMENTS Left 12/14/2017    Procedure: LT TOTAL KNEE ARTHROPLASTY;  Surgeon: Jatin Lancaster MD;  Location: [facility] MAIN OR;  Service: Orthopedics    MI RT/LT HEART CATHETERS N/A 4/30/2019    Procedure: Percutaneous Coronary Intervention;  Surgeon: Rio Miranda MD;  Location: [facility] CATH INVASIVE LOCATION;  Service: Cardiology         FAMILY HISTORY  Family History   Problem Relation Age of Onset    Parkinsonism Sister     Diabetes Brother     Malig Hyperthermia Neg Hx          SOCIAL HISTORY  Social History     Socioeconomic History    Marital status: Single   Tobacco Use    Smoking status: Never     Passive exposure: Never    Smokeless tobacco: Never   Vaping Use    Vaping status: Never Used   Substance and Sexual Activity    Alcohol use: No    Drug use: No    Sexual activity: Defer         ALLERGIES  Patient has no known allergies.      REVIEW OF SYSTEMS  Review of Systems  Included in HPI  All systems reviewed and negative except for those discussed in HPI.      PHYSICAL EXAM    I have reviewed the triage vital signs and nursing notes.    ED Triage Vitals   Temp Heart Rate Resp BP SpO2   10/31/24 1340 10/31/24 1341 10/31/24 1341 -- 10/31/24 1341   97.5 °F (36.4 °C) 62 17  98 %      Temp src Heart Rate Source Patient Position BP Location FiO2 (%)   10/31/24 1340 -- -- -- --   Tympanic           Physical Exam  GENERAL: Chronically ill-appearing, alert, no acute distress  SKIN: Warm, dry and intact  HENT: Normocephalic, atraumatic  EYES: no scleral icterus, no injection  CV: regular rhythm, regular rate, plus murmur noted, 4+ pitting bilateral lower extremity edema with third spacing to inferior portion of patient's abdominal wall and genitourinary region  RESPIRATORY: No accessory muscle usage utilized at rest, diminished breath sounds bilateral lower lobes, mild tachypnea, able to speak in full sentences without  distress  ABDOMEN: soft, nontender, third spacing to lower abdomen soft tissue without tenderness or breakdown, moderate abdominal obesity present  MUSCULOSKELETAL: no deformity, active ROM to all extremities  NEURO: alert, moves all extremities, follows commands            LAB RESULTS  Recent Results (from the past 24 hours)   ECG 12 Lead Dyspnea    Collection Time: 10/31/24  1:56 PM   Result Value Ref Range    QT Interval 409 ms    QTC Interval 445 ms   Comprehensive Metabolic Panel    Collection Time: 10/31/24  2:05 PM    Specimen: Blood   Result Value Ref Range    Glucose 130 (H) 65 - 99 mg/dL    BUN 18 8 - 23 mg/dL    Creatinine 1.08 0.76 - 1.27 mg/dL    Sodium 133 (L) 136 - 145 mmol/L    Potassium 4.1 3.5 - 5.2 mmol/L    Chloride 96 (L) 98 - 107 mmol/L    CO2 30.0 (H) 22.0 - 29.0 mmol/L    Calcium 8.2 (L) 8.6 - 10.5 mg/dL    Total Protein 6.2 6.0 - 8.5 g/dL    Albumin 3.3 (L) 3.5 - 5.2 g/dL    ALT (SGPT) 21 1 - 41 U/L    AST (SGOT) 23 1 - 40 U/L    Alkaline Phosphatase 70 39 - 117 U/L    Total Bilirubin 0.5 0.0 - 1.2 mg/dL    Globulin 2.9 gm/dL    A/G Ratio 1.1 g/dL    BUN/Creatinine Ratio 16.7 7.0 - 25.0    Anion Gap 7.0 5.0 - 15.0 mmol/L    eGFR 70.7 >60.0 mL/min/1.73   Single High Sensitivity Troponin T    Collection Time: 10/31/24  2:05 PM    Specimen: Blood   Result Value Ref Range    HS Troponin T 23 (H) <22 ng/L   BNP    Collection Time: 10/31/24  2:05 PM    Specimen: Blood   Result Value Ref Range    proBNP 1,860.0 (H) 0.0 - 1,800.0 pg/mL   Protime-INR    Collection Time: 10/31/24  2:05 PM    Specimen: Blood   Result Value Ref Range    Protime 20.9 (H) 11.7 - 14.2 Seconds    INR 1.78 (H) 0.90 - 1.10   Valproic Acid Level, Total    Collection Time: 10/31/24  2:05 PM    Specimen: Blood   Result Value Ref Range    Valproic Acid 68.0 50.0 - 125.0 mcg/mL   CBC Auto Differential    Collection Time: 10/31/24  2:05 PM    Specimen: Blood   Result Value Ref Range    WBC 4.18 3.40 - 10.80 10*3/mm3    RBC 3.23 (L)  4.14 - 5.80 10*6/mm3    Hemoglobin 9.7 (L) 13.0 - 17.7 g/dL    Hematocrit 29.7 (L) 37.5 - 51.0 %    MCV 92.0 79.0 - 97.0 fL    MCH 30.0 26.6 - 33.0 pg    MCHC 32.7 31.5 - 35.7 g/dL    RDW 14.2 12.3 - 15.4 %    RDW-SD 47.2 37.0 - 54.0 fl    MPV 8.7 6.0 - 12.0 fL    Platelets 162 140 - 450 10*3/mm3    Neutrophil % 68.9 42.7 - 76.0 %    Lymphocyte % 19.6 19.6 - 45.3 %    Monocyte % 10.8 5.0 - 12.0 %    Eosinophil % 0.2 (L) 0.3 - 6.2 %    Basophil % 0.5 0.0 - 1.5 %    Immature Grans % 0.0 0.0 - 0.5 %    Neutrophils, Absolute 2.88 1.70 - 7.00 10*3/mm3    Lymphocytes, Absolute 0.82 0.70 - 3.10 10*3/mm3    Monocytes, Absolute 0.45 0.10 - 0.90 10*3/mm3    Eosinophils, Absolute 0.01 0.00 - 0.40 10*3/mm3    Basophils, Absolute 0.02 0.00 - 0.20 10*3/mm3    Immature Grans, Absolute 0.00 0.00 - 0.05 10*3/mm3    nRBC 0.0 0.0 - 0.2 /100 WBC         RADIOLOGY  XR Chest 1 View    Result Date: 10/31/2024  Emergency portable view of the chest on 10/31/2024  CLINICAL HISTORY: CHF/COPD protocol  This is correlated to a prior portable chest x-ray on 10/6/2024.  FINDINGS: There are multiple sternal wires from previous cardiac surgery. The cardiomediastinal silhouette is upper limits of normal. The pulmonary vasculature is within normal limits. The lungs are clear. The costophrenic angles are sharp.      1. No active disease is seen in the chest  This report was finalized on 10/31/2024 2:31 PM by Dr. Primo Jyoa M.D on Workstation: [id number]         MEDICATIONS GIVEN IN ER  Medications   furosemide (LASIX) injection 80 mg (80 mg Intravenous Given 10/31/24 1411)         ORDERS PLACED DURING THIS VISIT:  Orders Placed This Encounter   Procedures    XR Chest 1 View    Comprehensive Metabolic Panel    Single High Sensitivity Troponin T    BNP    Protime-INR    Valproic Acid Level, Total    CBC Auto Differential    Strict Intake & Output    Daily Weights    [facility] (on-call MD unless specified) Details    ECG 12 Lead Dyspnea    Insert  Peripheral IV    Inpatient Admission    CBC & Differential         OUTPATIENT MEDICATION MANAGEMENT:  No current Epic-ordered facility-administered medications on file.     Current Outpatient Medications Ordered in Epic   Medication Sig Dispense Refill    atorvastatin (LIPITOR) 40 MG tablet Take 1 tablet by mouth Every Night.      carvedilol (COREG) 3.125 MG tablet Take 1 tablet by mouth 2 (Two) Times a Day. 180 tablet 3    cephalexin (KEFLEX) 500 MG capsule       clopidogrel (PLAVIX) 75 MG tablet Take 300 mg (4tablets) on day 1 then 75 mg (1 tablet) daily there after starting day 2. 90 tablet 3    divalproex (DEPAKOTE) 500 MG DR tablet Take 1 tablet by mouth 2 (Two) Times a Day.      docusate sodium (COLACE) 100 MG capsule Take 1 capsule by mouth Daily.      doxycycline (MONODOX) 100 MG capsule       ferrous sulfate 325 (65 FE) MG tablet Take 1 tablet by mouth Daily With Breakfast.      folic acid (FOLVITE) 1 MG tablet Take 1 tablet by mouth Daily.      furosemide (LASIX) 40 MG tablet Take 1 tablet by mouth 2 (Two) Times a Day. 60 tablet 0    glucosamine sulfate 500 MG capsule capsule Take 2 capsules by mouth Daily.      levothyroxine (SYNTHROID, LEVOTHROID) 50 MCG tablet Take 1 tablet by mouth Daily. 90 tablet 1    lisinopril (PRINIVIL,ZESTRIL) 5 MG tablet Take 1 tablet by mouth Daily.      loperamide (IMODIUM A-D) 2 MG tablet Take 1 tablet by mouth Every 6 (Six) Hours As Needed.      Melatonin 10 MG tablet Take 1 tablet by mouth Every Night.      metFORMIN (GLUCOPHAGE) 500 MG tablet Take 1 tablet by mouth Daily.      nitroglycerin (NITROSTAT) 0.4 MG SL tablet Place 1 tablet under the tongue Every 5 (Five) Minutes As Needed for Chest Pain (Systolic BP Greater Than 100). Take no more than 3 doses in 15 minutes. 30 tablet 0    omeprazole (priLOSEC) 40 MG capsule Take 1 capsule by mouth Every Evening.      oxybutynin XL (DITROPAN-XL) 5 MG 24 hr tablet Take 1 tablet by mouth Daily.      rivaroxaban (XARELTO) 20 MG  tablet Take 1 tablet by mouth Daily. 30 tablet 0    rOPINIRole (REQUIP) 1 MG tablet       tamsulosin (FLOMAX) 0.4 MG capsule 24 hr capsule Take 2 capsules by mouth Daily. 30 capsule     vitamin B-12 (VITAMIN B-12) 1000 MCG tablet Take 1 tablet by mouth Daily.           PROCEDURES  Procedures            PROGRESS, DATA ANALYSIS, CONSULTS, AND MEDICAL DECISION MAKING  All labs have been independently interpreted by me.  All radiology studies have been reviewed by me. All EKG's have been independently viewed and interpreted by me.  Discussion below represents my analysis of pertinent findings related to patient's condition, differential diagnosis, treatment plan and final disposition.    Differential diagnosis includes but is not limited to heart failure, nephrotic syndrome, acute renal failure, cellulitis, necrotizing fasciitis.                      ED Course as of 10/31/24 1601   Thu Oct 31, 2024   1443 XR Chest 1 View  Per my independent interpretation is no pneumothorax, mediastinal wires noted, no pneumothorax []   1443 ECG 12 Lead Dyspnea  Perm independent interpretation is sinus rhythm rate of 71, QTc 445, no evidence of acute ischemia []   1533 WBC: 4.18 [DC]   1533 Hemoglobin(!): 9.7 [DC]   1533 HS Troponin T(!): 23 [DC]   1533 proBNP(!): 1,860.0 [DC]   1533 Glucose(!): 130 [DC]   1533 BUN: 18 [DC]   1533 Creatinine: 1.08 [DC]   1533 Sodium(!): 133 [DC]   1533 Potassium: 4.1 [DC]   1533 ALT (SGPT): 21 [DC]   1533 AST (SGOT): 23 [DC]   1533 Alkaline Phosphatase: 70 [DC]   1533 Total Bilirubin: 0.5 [DC]   1557 Phone call with dr. bains.  Discussed the patient, relevant history, exam, diagnostics, ED findings/progress, and concerns.  They agree to admit to a tele inpt bed   []   1559 Patient has had 1300mL of clear urinary output so far []      ED Course User Index  [] Natividad Oneil APRN  [DC] Alon Phoenix MD             AS OF 16:01 EDT VITALS:    BP - 101/80  HR - 64  TEMP - 97.5 °F (36.4 °C)  (Tympanic)  O2 SATS - 95%    COMPLEXITY OF CARE  The patient requires admission.      DIAGNOSIS  Final diagnoses:   Anasarca   Exertional dyspnea   Acute on chronic diastolic heart failure         DISPOSITION  ED Disposition       ED Disposition   Decision to Admit    Condition   --    Comment   Level of Care: Telemetry [5]   Diagnosis: Acute on chronic diastolic heart failure [428.33.ICD-9-CM]   Admitting Physician: MEAGHNA VIRAMONTES [[id number]]   Attending Physician: MEAGHAN VIRAMONTES [[id number]]   Certification: I Certify That Inpatient Hospital Services Are Medically Necessary For Greater Than 2 Midnights                  Please note that portions of this document were completed with a voice recognition program.    Note Disclaimer: At [facility], we believe that sharing information builds trust and better relationships. You are receiving this note because you recently visited [facility]. It is possible you will see health information before a provider has talked with you about it. This kind of information can be easy to misunderstand. To help you fully understand what it means for your health, we urge you to discuss this note with your provider.         Natividad Oneil, APRN  10/31/24 [id number]

## 2024-10-31 NOTE — ED PROVIDER NOTES
Pt presents to the ED c/o diffuse increased swelling of his legs and abdomen with increasing shortness of breath on exertion.  Was started on Lasix but this has not been helpful.  Has a history of aortic stenosis.     On exam,   General: No acute distress, nontoxic  HEENT: Mucous membranes moist, atraumatic, EOMI  Neck: Full ROM  Pulm: Symmetric chest rise, nonlabored, lungs slightly diminished bilaterally but no overt wheezes, rales, rhonchi  Cardiovascular: Regular rate and rhythm, intact distal pulses, pink edema bilateral lower extremities  GI: Soft, nontender, nondistended, no rebound, no guarding, bowel sounds present  MSK: Full ROM, no deformity  Skin: Warm, dry  Neuro: Awake, alert, oriented x 4, GCS 15, moving all extremities, no focal deficits  Psych: Calm, cooperative    EKG - Per my independent interpretation:    1356  A-fib with rate of 71  Normal axis  Normal intervals  Nonspecific T wave abnormalities  No STEMI      No emergent changes compared to October 9, 2024        Plan: Concern for acute on chronic heart failure, renal failure, electrolyte normalities, anemia, among others.  Plan for labs, diuresis, EKG, and reevaluation with results.    ED Course as of 10/31/24 1604   Thu Oct 31, 2024   1443 XR Chest 1 View  Per my independent interpretation is no pneumothorax, mediastinal wires noted, no pneumothorax [AH]   1443 ECG 12 Lead Dyspnea  Perm independent interpretation is sinus rhythm rate of 71, QTc 445, no evidence of acute ischemia [AH]   1533 WBC: 4.18 [DC]   1533 Hemoglobin(!): 9.7 [DC]   1533 HS Troponin T(!): 23 [DC]   1533 proBNP(!): 1,860.0 [DC]   1533 Glucose(!): 130 [DC]   1533 BUN: 18 [DC]   1533 Creatinine: 1.08 [DC]   1533 Sodium(!): 133 [DC]   1533 Potassium: 4.1 [DC]   1533 ALT (SGPT): 21 [DC]   1533 AST (SGOT): 23 [DC]   1533 Alkaline Phosphatase: 70 [DC]   1533 Total Bilirubin: 0.5 [DC]   1557 Phone call with dr. bains.  Discussed the patient, relevant history, exam, diagnostics,  ED findings/progress, and concerns.  They agree to admit to a tele inpt bed   []   1559 Patient has had 1300mL of clear urinary output so far []      ED Course User Index  [AH] Natividad Oneil APRN  [DC] Alon Phoenix MD       Plan for hospitalization for further evaluate the diuresis for anasarca and increasing exertional dyspnea.     MD Attestation Note    SHARED VISIT: This visit was performed by BOTH a physician and an APC. The substantive portion of the medical decision making was performed by this attesting physician who made or approved the management plan and takes responsibility for patient management. All studies in the APC note (if performed) were independently interpreted by me.                   Alon Phoenix MD  10/31/24 [id number]       Alon Phoenix MD  10/31/24 [id number]

## 2024-10-31 NOTE — PLAN OF CARE
Goal Outcome Evaluation:  Plan of Care Reviewed With: patient        Progress: no change  Outcome Evaluation: Pt admitted to . Vitals stable. Pt maintaining O2 sat on room air. case management consulted. admission completed. Pt on specialty bed. Pt needs met and questions answered. Will continue to monitor.

## 2024-10-31 NOTE — ED NOTES
"Nursing report ED to floor  Hernando Lozada  79 y.o.  male    HPI :  HPI  Stated Reason for Visit: BLE edema    Chief Complaint  Chief Complaint   Patient presents with    Leg Swelling       Admitting doctor:   Jeanne Chan MD    Admitting diagnosis:   The primary encounter diagnosis was Anasarca. Diagnoses of Exertional dyspnea and Acute on chronic diastolic heart failure were also pertinent to this visit.    Code status:   Current Code Status       Date Active Code Status Order ID Comments User Context       Prior            Allergies:   Patient has no known allergies.    Isolation:   No active isolations    Intake and Output    Intake/Output Summary (Last 24 hours) at 10/31/2024 1604  Last data filed at 10/31/2024 1600  Gross per 24 hour   Intake --   Output 1300 ml   Net -1300 ml       Weight:       10/31/24  1358   Weight: 110 kg (242 lb 8 oz)       Most recent vitals:   Vitals:    10/31/24 1358 10/31/24 1503 10/31/24 1546 10/31/24 1558   BP:  124/65 101/80    Pulse:  66 62 64   Resp:       Temp:       TempSrc:       SpO2:  97% 94% 95%   Weight: 110 kg (242 lb 8 oz)      Height: 175.3 cm (69\")          Active LDAs/IV Access:   Lines, Drains & Airways       Active LDAs       Name Placement date Placement time Site Days    Peripheral IV 10/31/24 1405 Right Antecubital 10/31/24  1405  Antecubital  less than 1    External Urinary Catheter 10/31/24  1526  --  less than 1                    Labs (abnormal labs have a star):   Labs Reviewed   COMPREHENSIVE METABOLIC PANEL - Abnormal; Notable for the following components:       Result Value    Glucose 130 (*)     Sodium 133 (*)     Chloride 96 (*)     CO2 30.0 (*)     Calcium 8.2 (*)     Albumin 3.3 (*)     All other components within normal limits    Narrative:     GFR Normal >60  Chronic Kidney Disease <60  Kidney Failure <15    The GFR formula is only valid for adults with stable renal function between ages 18 and 70.   SINGLE HS TROPONIN T - Abnormal; Notable for the " following components:    HS Troponin T 23 (*)     All other components within normal limits    Narrative:     High Sensitive Troponin T Reference Range:  <14.0 ng/L- Negative Female for AMI  <22.0 ng/L- Negative Male for AMI  >=14 - Abnormal Female indicating possible myocardial injury.  >=22 - Abnormal Male indicating possible myocardial injury.   Clinicians would have to utilize clinical acumen, EKG, Troponin, and serial changes to determine if it is an Acute Myocardial Infarction or myocardial injury due to an underlying chronic condition.        BNP (IN-HOUSE) - Abnormal; Notable for the following components:    proBNP 1,860.0 (*)     All other components within normal limits    Narrative:     This assay is used as an aid in the diagnosis of individuals suspected of having heart failure. It can be used as an aid in the diagnosis of acute decompensated heart failure (ADHF) in patients presenting with signs and symptoms of ADHF to the emergency department (ED). In addition, NT-proBNP of <300 pg/mL indicates ADHF is not likely.    Age Range Result Interpretation  NT-proBNP Concentration (pg/mL:      <50             Positive            >450                   Gray                 300-450                    Negative             <300    50-75           Positive            >900                  Gray                300-900                  Negative            <300      >75             Positive            >1800                  Gray                300-1800                  Negative            <300   PROTIME-INR - Abnormal; Notable for the following components:    Protime 20.9 (*)     INR 1.78 (*)     All other components within normal limits   CBC WITH AUTO DIFFERENTIAL - Abnormal; Notable for the following components:    RBC 3.23 (*)     Hemoglobin 9.7 (*)     Hematocrit 29.7 (*)     Eosinophil % 0.2 (*)     All other components within normal limits   VALPROIC ACID LEVEL, TOTAL - Normal    Narrative:     Therapeutic  Ranges for Valproic Acid    Epilepsy:       mcg/ml  Bipolar/Tatiana  up to 125 mcg/ml     CBC AND DIFFERENTIAL    Narrative:     The following orders were created for panel order CBC & Differential.  Procedure                               Abnormality         Status                     ---------                               -----------         ------                     CBC Auto Differential[[id number]]        Abnormal            Final result                 Please view results for these tests on the individual orders.       EKG:   ECG 12 Lead Dyspnea   Final Result   HEART RATE=71  bpm   RR Eewgfuoo=000  ms   GA Interval=  ms   P Horizontal Axis=  deg   P Front Axis=  deg   QRSD Igqrsbmp=974  ms   QT Tkwdonrp=720  ms   POwC=328  ms   QRS Axis=-6  deg   T Wave Axis=124  deg   - ABNORMAL ECG -   Atrial fibrillation   Nonspecific  T abnormalities, lateral leads   No change from previous tracing   Electronically Signed By: Mary Ann Mustafa ([facility]) 2024-10-31 15:47:21   Date and Time of Study:2024-10-31 13:56:30          Meds given in ED:   Medications   furosemide (LASIX) injection 80 mg (80 mg Intravenous Given 10/31/24 1411)       Imaging results:  XR Chest 1 View    Result Date: 10/31/2024  1. No active disease is seen in the chest  This report was finalized on 10/31/2024 2:31 PM by Dr. Primo Joya M.D on Workstation: [id number]       Ambulatory status:   - assist Xs 2    Social issues:   Social History     Socioeconomic History    Marital status: Single   Tobacco Use    Smoking status: Never     Passive exposure: Never    Smokeless tobacco: Never   Vaping Use    Vaping status: Never Used   Substance and Sexual Activity    Alcohol use: No    Drug use: No    Sexual activity: Defer       Peripheral Neurovascular  Peripheral Neurovascular (Adult)  Peripheral Neurovascular WDL: .WDL except, neurovascular assessment lower  Additional Documentation: Edema (Group)  Edema  Edema: dependent, abdomen  Dependent Edema: 3+  (Moderate)  Abdomen Edema: 3+ (Moderate)  LLE Neurovascular Assessment  Temperature LLE: cool  Color LLE: no discoloration  Sensation LLE: tenderness present  RLE Neurovascular Assessment  Temperature RLE: cool  Color RLE: no discoloration  Sensation RLE: tenderness present    Neuro Cognitive  Neuro Cognitive (Adult)  Cognitive/Neuro/Behavioral WDL: WDL, orientation, level of consciousness  Level of Consciousness: Alert  Orientation: oriented x 4    Learning  Learning Assessment  Learning Readiness and Ability: no barriers identified  Education Provided  Person Taught: patient  Teaching Method: verbal instruction  Teaching Focus: symptom/problem overview, diagnostic test, medical device/equipment use, medication administration  Education Outcome Evaluation: acceptance expressed, verbalizes understanding    Respiratory  Respiratory WDL  Respiratory WDL: .WDL except, all  Rhythm/Pattern, Respiratory: shortness of breath    Abdominal Pain       Pain Assessments  Pain (Adult)  (0-10) Pain Rating: Rest: 10  Pain Location: groin  Pain Side/Orientation: bilateral  Pain Description: squeezing    NIH Stroke Scale       Nichole Zhao RN  10/31/24 16:04 EDT
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
## 2024-11-01 LAB
ALBUMIN SERPL-MCNC: 3 G/DL (ref 3.5–5.2)
ALBUMIN/GLOB SERPL: 1.2 G/DL
ALP SERPL-CCNC: 55 U/L (ref 39–117)
ALT SERPL W P-5'-P-CCNC: 14 U/L (ref 1–41)
ANION GAP SERPL CALCULATED.3IONS-SCNC: 8 MMOL/L (ref 5–15)
AST SERPL-CCNC: 15 U/L (ref 1–40)
BASOPHILS # BLD AUTO: 0.02 10*3/MM3 (ref 0–0.2)
BASOPHILS NFR BLD AUTO: 0.5 % (ref 0–1.5)
BILIRUB SERPL-MCNC: 0.5 MG/DL (ref 0–1.2)
BUN SERPL-MCNC: 15 MG/DL (ref 8–23)
BUN/CREAT SERPL: 14.9 (ref 7–25)
CALCIUM SPEC-SCNC: 8 MG/DL (ref 8.6–10.5)
CHLORIDE SERPL-SCNC: 101 MMOL/L (ref 98–107)
CO2 SERPL-SCNC: 29 MMOL/L (ref 22–29)
CREAT SERPL-MCNC: 1.01 MG/DL (ref 0.76–1.27)
DEPRECATED RDW RBC AUTO: 46.9 FL (ref 37–54)
EGFRCR SERPLBLD CKD-EPI 2021: 76.6 ML/MIN/1.73
EOSINOPHIL # BLD AUTO: 0.02 10*3/MM3 (ref 0–0.4)
EOSINOPHIL NFR BLD AUTO: 0.5 % (ref 0.3–6.2)
ERYTHROCYTE [DISTWIDTH] IN BLOOD BY AUTOMATED COUNT: 13.9 % (ref 12.3–15.4)
GLOBULIN UR ELPH-MCNC: 2.6 GM/DL
GLUCOSE BLDC GLUCOMTR-MCNC: 170 MG/DL (ref 70–130)
GLUCOSE SERPL-MCNC: 88 MG/DL (ref 65–99)
HBA1C MFR BLD: 5.1 % (ref 4.8–5.6)
HCT VFR BLD AUTO: 29.9 % (ref 37.5–51)
HGB BLD-MCNC: 9.4 G/DL (ref 13–17.7)
IMM GRANULOCYTES # BLD AUTO: 0.01 10*3/MM3 (ref 0–0.05)
IMM GRANULOCYTES NFR BLD AUTO: 0.2 % (ref 0–0.5)
LYMPHOCYTES # BLD AUTO: 1.05 10*3/MM3 (ref 0.7–3.1)
LYMPHOCYTES NFR BLD AUTO: 24 % (ref 19.6–45.3)
MCH RBC QN AUTO: 29.1 PG (ref 26.6–33)
MCHC RBC AUTO-ENTMCNC: 31.4 G/DL (ref 31.5–35.7)
MCV RBC AUTO: 92.6 FL (ref 79–97)
MONOCYTES # BLD AUTO: 0.58 10*3/MM3 (ref 0.1–0.9)
MONOCYTES NFR BLD AUTO: 13.2 % (ref 5–12)
NEUTROPHILS NFR BLD AUTO: 2.7 10*3/MM3 (ref 1.7–7)
NEUTROPHILS NFR BLD AUTO: 61.6 % (ref 42.7–76)
NRBC BLD AUTO-RTO: 0 /100 WBC (ref 0–0.2)
PLATELET # BLD AUTO: 163 10*3/MM3 (ref 140–450)
PMV BLD AUTO: 9.5 FL (ref 6–12)
POTASSIUM SERPL-SCNC: 4.2 MMOL/L (ref 3.5–5.2)
PROT SERPL-MCNC: 5.6 G/DL (ref 6–8.5)
RBC # BLD AUTO: 3.23 10*6/MM3 (ref 4.14–5.8)
SODIUM SERPL-SCNC: 138 MMOL/L (ref 136–145)
WBC NRBC COR # BLD AUTO: 4.38 10*3/MM3 (ref 3.4–10.8)

## 2024-11-01 PROCEDURE — 82948 REAGENT STRIP/BLOOD GLUCOSE: CPT

## 2024-11-01 PROCEDURE — 97530 THERAPEUTIC ACTIVITIES: CPT

## 2024-11-01 PROCEDURE — 25010000002 FUROSEMIDE PER 20 MG: Performed by: INTERNAL MEDICINE

## 2024-11-01 PROCEDURE — 97162 PT EVAL MOD COMPLEX 30 MIN: CPT

## 2024-11-01 PROCEDURE — 80053 COMPREHEN METABOLIC PANEL: CPT | Performed by: INTERNAL MEDICINE

## 2024-11-01 PROCEDURE — 85025 COMPLETE CBC W/AUTO DIFF WBC: CPT | Performed by: INTERNAL MEDICINE

## 2024-11-01 PROCEDURE — 83036 HEMOGLOBIN GLYCOSYLATED A1C: CPT | Performed by: INTERNAL MEDICINE

## 2024-11-01 PROCEDURE — 25010000002 FUROSEMIDE PER 20 MG: Performed by: STUDENT IN AN ORGANIZED HEALTH CARE EDUCATION/TRAINING PROGRAM

## 2024-11-01 RX ORDER — FLUTICASONE PROPIONATE AND SALMETEROL 100; 50 UG/1; UG/1
POWDER RESPIRATORY (INHALATION)
COMMUNITY

## 2024-11-01 RX ORDER — FUROSEMIDE 10 MG/ML
40 INJECTION INTRAMUSCULAR; INTRAVENOUS EVERY 12 HOURS
Status: DISCONTINUED | OUTPATIENT
Start: 2024-11-01 | End: 2024-11-03

## 2024-11-01 RX ADMIN — OXYBUTYNIN CHLORIDE 5 MG: 5 TABLET, EXTENDED RELEASE ORAL at 09:08

## 2024-11-01 RX ADMIN — LEVOTHYROXINE SODIUM 50 MCG: 50 TABLET ORAL at 06:23

## 2024-11-01 RX ADMIN — ATORVASTATIN CALCIUM 40 MG: 20 TABLET, FILM COATED ORAL at 20:57

## 2024-11-01 RX ADMIN — DIVALPROEX SODIUM 500 MG: 500 TABLET, DELAYED RELEASE ORAL at 09:06

## 2024-11-01 RX ADMIN — DIVALPROEX SODIUM 500 MG: 500 TABLET, DELAYED RELEASE ORAL at 20:57

## 2024-11-01 RX ADMIN — CARVEDILOL 3.12 MG: 3.12 TABLET, FILM COATED ORAL at 09:08

## 2024-11-01 RX ADMIN — Medication 10 ML: at 09:08

## 2024-11-01 RX ADMIN — CLOPIDOGREL BISULFATE 75 MG: 75 TABLET, FILM COATED ORAL at 09:08

## 2024-11-01 RX ADMIN — LISINOPRIL 5 MG: 5 TABLET ORAL at 09:08

## 2024-11-01 RX ADMIN — PANTOPRAZOLE SODIUM 40 MG: 40 TABLET, DELAYED RELEASE ORAL at 06:22

## 2024-11-01 RX ADMIN — CYANOCOBALAMIN TAB 500 MCG 1000 MCG: 500 TAB at 09:08

## 2024-11-01 RX ADMIN — FERROUS SULFATE TAB 325 MG (65 MG ELEMENTAL FE) 325 MG: 325 (65 FE) TAB at 09:07

## 2024-11-01 RX ADMIN — Medication 10 ML: at 21:17

## 2024-11-01 RX ADMIN — FUROSEMIDE 40 MG: 10 INJECTION, SOLUTION INTRAMUSCULAR; INTRAVENOUS at 18:12

## 2024-11-01 RX ADMIN — RIVAROXABAN 20 MG: 20 TABLET, FILM COATED ORAL at 09:06

## 2024-11-01 RX ADMIN — ROPINIROLE HYDROCHLORIDE 0.5 MG: 0.5 TABLET, FILM COATED ORAL at 20:57

## 2024-11-01 RX ADMIN — TAMSULOSIN HYDROCHLORIDE 0.8 MG: 0.4 CAPSULE ORAL at 09:08

## 2024-11-01 RX ADMIN — CARVEDILOL 3.12 MG: 3.12 TABLET, FILM COATED ORAL at 20:57

## 2024-11-01 RX ADMIN — FOLIC ACID 1 MG: 1 TABLET ORAL at 09:08

## 2024-11-01 RX ADMIN — FUROSEMIDE 80 MG: 10 INJECTION, SOLUTION INTRAMUSCULAR; INTRAVENOUS at 06:22

## 2024-11-01 NOTE — PLAN OF CARE
Goal Outcome Evaluation:  Plan of Care Reviewed With: patient        Progress: improving  Outcome Evaluation: Patient continues to be in AFib, noted to have some prominent Sleep Apnea in evening and was subsequently put on 1 Liter O2, Patient admitting to have a broken CPAP machine at home. Explained to patient to follow up with  and relay to day shift for possible replacement of machine to prevent further Heart Failure exacerbation. Patient agreeable of plan of care. No s/s of acute distress noted.

## 2024-11-01 NOTE — PLAN OF CARE
Goal Outcome Evaluation:  Plan of Care Reviewed With: patient           Outcome Evaluation: switched rooms, iv lasix bid , will cont to monitor                             Problem: Adult Inpatient Plan of Care  Goal: Plan of Care Review  Outcome: Progressing  Flowsheets (Taken 11/1/2024 1700)  Outcome Evaluation: switched rooms, iv lasix bid , will cont to monitor  Plan of Care Reviewed With: patient  Goal: Patient-Specific Goal (Individualized)  Outcome: Progressing  Goal: Absence of Hospital-Acquired Illness or Injury  Outcome: Progressing  Intervention: Identify and Manage Fall Risk  Recent Flowsheet Documentation  Taken 11/1/2024 1656 by Ana Paz RN  Safety Promotion/Fall Prevention:   activity supervised   assistive device/personal items within reach   clutter free environment maintained   gait belt   fall prevention program maintained   nonskid shoes/slippers when out of bed   room organization consistent   safety round/check completed  Intervention: Prevent Infection  Recent Flowsheet Documentation  Taken 11/1/2024 1656 by Ana Paz, RN  Infection Prevention: single patient room provided  Goal: Optimal Comfort and Wellbeing  Outcome: Progressing  Goal: Readiness for Transition of Care  Outcome: Progressing

## 2024-11-01 NOTE — PROGRESS NOTES
Name: Hernando Lozada ADMIT: 10/31/2024   : 1945  PCP: Sammie Gambino APRN    MRN: [id number] LOS: 1 days   AGE/SEX: 79 y.o. male  ROOM: [facility]     Subjective   Subjective   Patient resting in bed, he is finishing his lunch.  He has no complaints at this time.  Feels like he is urinating quite a bit since getting Lasix       Objective   Objective   Vital Signs  Temp:  [97.9 °F (36.6 °C)-98.8 °F (37.1 °C)] 97.9 °F (36.6 °C)  Heart Rate:  [54-76] 60  Resp:  [16-18] 16  BP: ()/(47-82) 120/66  SpO2:  [75 %-100 %] 98 %  on  Flow (L/min) (Oxygen Therapy):  [1] 1;   Device (Oxygen Therapy): room air  Body mass index is 34.57 kg/m².  Physical Exam  Constitutional:       General: He is not in acute distress.     Appearance: He is ill-appearing. He is not toxic-appearing.   Cardiovascular:      Rate and Rhythm: Normal rate. Rhythm irregular.      Heart sounds: Murmur heard.   Pulmonary:      Effort: Pulmonary effort is normal. No respiratory distress.      Breath sounds: Normal breath sounds. No wheezing.   Abdominal:      General: Abdomen is flat.      Palpations: Abdomen is soft.      Tenderness: There is no abdominal tenderness.   Musculoskeletal:         General: No tenderness.      Right lower leg: Edema present.      Left lower leg: Edema present.   Skin:     General: Skin is warm and dry.   Neurological:      General: No focal deficit present.      Mental Status: He is alert and oriented to person, place, and time. Mental status is at baseline.      Motor: No weakness.         Results Review     I reviewed the patient's new clinical results.  Results from last 7 days   Lab Units 24  0458 10/31/24  1405   WBC 10*3/mm3 4.38 4.18   HEMOGLOBIN g/dL 9.4* 9.7*   PLATELETS 10*3/mm3 163 162     Results from last 7 days   Lab Units 24  0458 10/31/24  1405   SODIUM mmol/L 138 133*   POTASSIUM mmol/L 4.2 4.1   CHLORIDE mmol/L 101 96*   CO2 mmol/L 29.0 30.0*   BUN mg/dL 15 18   CREATININE mg/dL 1.01  1.08   GLUCOSE mg/dL 88 130*   Estimated Creatinine Clearance: 73.5 mL/min (by C-G formula based on SCr of 1.01 mg/dL).  Results from last 7 days   Lab Units 11/01/24  0458 10/31/24  1405   ALBUMIN g/dL 3.0* 3.3*   BILIRUBIN mg/dL 0.5 0.5   ALK PHOS U/L 55 70   AST (SGOT) U/L 15 23   ALT (SGPT) U/L 14 21     Results from last 7 days   Lab Units 11/01/24  0458 10/31/24  1405   CALCIUM mg/dL 8.0* 8.2*   ALBUMIN g/dL 3.0* 3.3*       COVID19   Date Value Ref Range Status   04/20/2023 Not Detected Not Detected - Ref. Range Final   11/25/2022 Detected (C) Not Detected - Ref. Range Final     Hemoglobin A1C   Date/Time Value Ref Range Status   11/01/2024 0458 5.10 4.80 - 5.60 % Final     Glucose   Date/Time Value Ref Range Status   11/01/2024 0804 170 (H) 70 - 130 mg/dL Final   10/31/2024 2018 195 (H) 70 - 130 mg/dL Final       XR Chest 1 View  Narrative: Emergency portable view of the chest on 10/31/2024     CLINICAL HISTORY: CHF/COPD protocol     This is correlated to a prior portable chest x-ray on 10/6/2024.     FINDINGS: There are multiple sternal wires from previous cardiac  surgery. The cardiomediastinal silhouette is upper limits of normal. The  pulmonary vasculature is within normal limits. The lungs are clear. The  costophrenic angles are sharp.     Impression: 1. No active disease is seen in the chest     This report was finalized on 10/31/2024 2:31 PM by Dr. Primo Joya M.D  on Workstation: [id number]       Scheduled Medications  atorvastatin, 40 mg, Oral, Nightly  carvedilol, 3.125 mg, Oral, BID  clopidogrel, 75 mg, Oral, Daily  divalproex, 500 mg, Oral, BID  docusate sodium, 100 mg, Oral, Daily  ferrous sulfate, 325 mg, Oral, Daily With Breakfast  folic acid, 1 mg, Oral, Daily  furosemide, 40 mg, Intravenous, Q12H  levothyroxine, 50 mcg, Oral, Q AM  lisinopril, 5 mg, Oral, Daily  oxybutynin XL, 5 mg, Oral, Daily  pantoprazole, 40 mg, Oral, Q AM  rivaroxaban, 20 mg, Oral, Daily  rOPINIRole, 0.5 mg, Oral,  Nightly  sodium chloride, 10 mL, Intravenous, Q12H  tamsulosin, 0.8 mg, Oral, Daily  cyanocobalamin, 1,000 mcg, Oral, Daily    Infusions   Diet  Diet: Cardiac, Diabetic, Renal; Healthy Heart (2-3 Na+); Consistent Carbohydrate; Low Sodium (2-3g), Low Potassium, Low Phosphorus; Fluid Consistency: Thin (IDDSI 0)         I have personally reviewed:  [x]  Laboratory   []  Microbiology   []  Radiology   [x]  EKG/Telemetry   []  Cardiology/Vascular   []  Pathology   []  Records    Assessment/Plan     Active Hospital Problems    Diagnosis  POA    **Acute on chronic diastolic heart failure [I50.33]  Yes    Anemia [D64.9]  Unknown    Atrial fibrillation [I48.91]  Yes    Hypothyroidism [E03.9]  Yes    Chronic deep vein thrombosis (DVT) of calf muscle vein of left lower extremity [I82.562]  Yes    Aortic stenosis, severe [I35.0]  Yes    Chronic brain injury [S06.9XAS]  Not Applicable    CKD (chronic kidney disease) [N18.9]  Yes    Diabetes mellitus [E11.9]  Yes    GIOVANNY (obstructive sleep apnea) [G47.33]  Yes    Primary hypertension [I10]  Yes      Resolved Hospital Problems   No resolved problems to display.       79 y.o. male admitted with Acute on chronic diastolic heart failure.    Acute on chronic diastolic CHF  Atrial fibrillation- persistent  Aortic stenosis- severe  HTN  CAD w/ h/o PCI  - cards consulted  - on IV lasix, plan daily BMP  - cont statin, BB, ACEi, plavix/xarelto    Hypothyroidism  - levothyroxine    Hx of DVT  - home xarelto    Hx of TBI  - continue depakote    GIOVANNY   - ok for home cpap if available    Anemia  - stable near baseline of ~9-10    Xarelto (home med) for DVT prophylaxis.  Full code.  Discussed with patient and nursing staff.  Anticipated discharge TBD, TBD        Renetta Romero MD  [facility]  11/01/24  15:54 EDT    I wore protective equipment throughout this patient encounter including gloves.  Hand hygiene was performed before donning protective equipment and after  removal when leaving the room.         Copied text in this note has been reviewed and is accurate as of 11/01/24.
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
## 2024-11-01 NOTE — CASE MANAGEMENT/SOCIAL WORK
Discharge Planning Assessment  [facility]     Patient Name: Hernando Lozada  MRN: [id number]  Today's Date: 11/1/2024    Admit Date: 10/31/2024    Plan: [facility]. Can return at D/C. Nurse needs to call report to [phone] at D/C and they will provide transport.   Discharge Needs Assessment       Row Name 11/01/24 1611       Living Environment    People in Home other (see comments)    Unique Family Situation [facility]. Lives with 2 other men.    Current Living Arrangements group home  [facility]    Potentially Unsafe Housing Conditions none    Primary Care Provided by self    Provides Primary Care For no one, unable/limited ability to care for self    Family Caregiver if Needed other (see comments)    Quality of Family Relationships involved    Able to Return to Prior Arrangements yes       Resource/Environmental Concerns    Resource/Environmental Concerns none    Transportation Concerns none       Transportation Needs    In the past 12 months, has lack of transportation kept you from medical appointments or from getting medications? no    In the past 12 months, has lack of transportation kept you from meetings, work, or from getting things needed for daily living? No       Food Insecurity    Within the past 12 months, you worried that your food would run out before you got the money to buy more. Never true    Within the past 12 months, the food you bought just didn't last and you didn't have money to get more. Never true       Transition Planning    Patient/Family Anticipates Transition to home    Patient/Family Anticipated Services at Transition     Transportation Anticipated family or friend will provide       Discharge Needs Assessment    Readmission Within the Last 30 Days previous discharge plan unsuccessful    Current Outpatient/Agency/Support Group group home    Equipment Currently Used at Home walker, rolling;shower  chair;nebulizer;cpap  States his CPAP is not working    Concerns to be Addressed care coordination/care conferences;discharge planning    Do you want help finding or keeping work or a job? I do not need or want help    Do you want help with school or training? For example, starting or completing job training or getting a high school diploma, GED or equivalent No    Anticipated Changes Related to Illness none    Equipment Needed After Discharge none    Provided Post Acute Provider List? Refused    Refused Provider List Comment States he has a nurse that gives him all of his meds.    Provided Post Acute Provider Quality & Resource List? Refused    Offered/Gave Vendor List no    Current Discharge Risk chronically ill                   Discharge Plan       Row Name 11/01/24 7652       Plan    Plan Return to [facility]. Can return at D/C. Nurse needs to call report to [phone] at D/C and they will provide transport.    Patient/Family in Agreement with Plan yes    Plan Comments Met with pt at bedside. Explained roll of . Face sheet and pharmacy verified. Pt lives in a [facility] group home with 2 other men. There are no steps to enter home. Home DME includes a walker, rolling; shower chair; nebulizer; cpap.  Pt states his CPAP is broke and he has to have a new sleep study in order to get a new one. Pt is independent with ADLs using his walker. Pt has been to [facility] for Rehab. Has no history of HH. Pt's PCP is FAISAL Castañeda. Nurse with [facility] group home gives pt his meds and they use [facility].  [facility] drives pt to appointments and will transport at D/C. Nurse needs to call report to [phone] at D/C and they will setup transport at that time. Packet in [employer]. Explained that [facility] would follow to assess for discharge needs.  Amanuel Alcaraz RN-BC                  Continued Care and Services - Admitted Since  10/31/2024    No active coordination exists for this encounter.          Demographic Summary       Row Name 11/01/24 1608       General Information    Admission Type inpatient    Arrived From emergency department    Required Notices Provided Important Message from Medicare    Reason for Consult care coordination/care conference;discharge planning    Preferred Language English                   Functional Status       Row Name 11/01/24 1608       Functional Status    Usual Activity Tolerance moderate    Current Activity Tolerance moderate       Functional Status, IADL    Medications assistive equipment    Meal Preparation assistive equipment    Housekeeping assistive equipment    Laundry assistive equipment    Shopping assistive equipment and person    If for any reason you need help with day-to-day activities such as bathing, preparing meals, shopping, managing finances, etc., do you get the help you need? I get all the help I need       Mental Status    General Appearance WDL WDL       Mental Status Summary    Recent Changes in Mental Status/Cognitive Functioning no changes       Employment/    Employment Status retired                          Amanuel Alcaraz RN     complains of pain/discomfort

## 2024-11-01 NOTE — PLAN OF CARE
Goal Outcome Evaluation:  Plan of Care Reviewed With: patient              Patient is a  79 y.o. male admitted to [facility] for acute on chronic diastolic heart failure, anasarca on 10/31/2024. PMHx includes HTN, afib, DM, aortic valve stenosis. Patient is ind at baseline with use of rwx and lives in group home. He has a wc as well he uses if needed. He reports only needing assist with donning pants. Today, patient performed bed mobility with SBA, required CGA for transfers, and ambulated 100ft using rwx requiring CGA. Strength and activity tolerance deficits noted. Patient may benefit from skilled PT services to address functional deficits and improve level of independence prior to discharge. Anticipate return to group home upon DC.      Anticipated Discharge Disposition (PT): home with assist, home with home health

## 2024-11-01 NOTE — OUTREACH NOTE
CHF Week 3 Survey      Flowsheet Row Responses   [facility] patient discharged from? [place]   Does the patient have one of the following disease processes/diagnoses(primary or secondary)? CHF   Week 3 attempt successful? No   Unsuccessful attempts Attempt 1   Revoke Readmitted            Yadira SCHWAB - Registered Nurse

## 2024-11-01 NOTE — CASE MANAGEMENT/SOCIAL WORK
Continued Stay Note  [facility]     Patient Name: Hernando Lozada  MRN: [id number]  Today's Date: 11/1/2024    Admit Date: 10/31/2024    Plan: [facility]. Can return at D/C. Nurse needs to call report to [phone] at D/C and they will provide transport.   Discharge Plan       Row Name 11/01/24 1630       Plan    Plan [facility] to [facility]. Can return at D/C. Nurse needs to call report to [phone] at D/C and they will provide transport.    Patient/Family in Agreement with Plan yes    Plan Comments Met with pt at bedside. Explained roll of . Face sheet and pharmacy verified. Pt lives in a [facility] with 2 other men. There are no steps to enter home. Home DME includes a walker, rolling; shower chair; nebulizer; cpap.  Pt states his CPAP is broke and he has to have a new sleep study in order to get a new one. Pt is independent with ADLs using his walker. Pt has been to [facility] for Rehab. Has no history of HH. Pt's PCP is FAISAL Castañeda. Nurse with [facility] gives pt his meds and they use [facility].  [facility] drives pt to appointments and will transport at D/C. Nurse needs to call report to [phone] at D/C and they will setup transport at that time. Packet in [employer]. Explained that CCP would follow to assess for discharge needs.  Amanuel Alcaraz RN-BC                   Discharge Codes    No documentation.                       Amanuel Alcaraz RN

## 2024-11-02 LAB
ALBUMIN SERPL-MCNC: 3.3 G/DL (ref 3.5–5.2)
ANION GAP SERPL CALCULATED.3IONS-SCNC: 7 MMOL/L (ref 5–15)
BUN SERPL-MCNC: 18 MG/DL (ref 8–23)
BUN/CREAT SERPL: 18.2 (ref 7–25)
CALCIUM SPEC-SCNC: 8.4 MG/DL (ref 8.6–10.5)
CHLORIDE SERPL-SCNC: 98 MMOL/L (ref 98–107)
CO2 SERPL-SCNC: 29 MMOL/L (ref 22–29)
CREAT SERPL-MCNC: 0.99 MG/DL (ref 0.76–1.27)
DEPRECATED RDW RBC AUTO: 47.6 FL (ref 37–54)
EGFRCR SERPLBLD CKD-EPI 2021: 78.5 ML/MIN/1.73
ERYTHROCYTE [DISTWIDTH] IN BLOOD BY AUTOMATED COUNT: 14.3 % (ref 12.3–15.4)
GLUCOSE SERPL-MCNC: 94 MG/DL (ref 65–99)
HCT VFR BLD AUTO: 32.9 % (ref 37.5–51)
HGB BLD-MCNC: 10.6 G/DL (ref 13–17.7)
MCH RBC QN AUTO: 29.6 PG (ref 26.6–33)
MCHC RBC AUTO-ENTMCNC: 32.2 G/DL (ref 31.5–35.7)
MCV RBC AUTO: 91.9 FL (ref 79–97)
PHOSPHATE SERPL-MCNC: 4 MG/DL (ref 2.5–4.5)
PLATELET # BLD AUTO: 187 10*3/MM3 (ref 140–450)
PMV BLD AUTO: 9.3 FL (ref 6–12)
POTASSIUM SERPL-SCNC: 4.2 MMOL/L (ref 3.5–5.2)
RBC # BLD AUTO: 3.58 10*6/MM3 (ref 4.14–5.8)
SODIUM SERPL-SCNC: 134 MMOL/L (ref 136–145)
WBC NRBC COR # BLD AUTO: 6.55 10*3/MM3 (ref 3.4–10.8)

## 2024-11-02 PROCEDURE — 85027 COMPLETE CBC AUTOMATED: CPT | Performed by: STUDENT IN AN ORGANIZED HEALTH CARE EDUCATION/TRAINING PROGRAM

## 2024-11-02 PROCEDURE — 25010000002 FUROSEMIDE PER 20 MG: Performed by: STUDENT IN AN ORGANIZED HEALTH CARE EDUCATION/TRAINING PROGRAM

## 2024-11-02 PROCEDURE — 80069 RENAL FUNCTION PANEL: CPT | Performed by: STUDENT IN AN ORGANIZED HEALTH CARE EDUCATION/TRAINING PROGRAM

## 2024-11-02 RX ADMIN — FOLIC ACID 1 MG: 1 TABLET ORAL at 08:39

## 2024-11-02 RX ADMIN — FUROSEMIDE 40 MG: 10 INJECTION, SOLUTION INTRAMUSCULAR; INTRAVENOUS at 18:31

## 2024-11-02 RX ADMIN — ATORVASTATIN CALCIUM 40 MG: 20 TABLET, FILM COATED ORAL at 21:13

## 2024-11-02 RX ADMIN — RIVAROXABAN 20 MG: 20 TABLET, FILM COATED ORAL at 08:39

## 2024-11-02 RX ADMIN — CLOPIDOGREL BISULFATE 75 MG: 75 TABLET, FILM COATED ORAL at 08:39

## 2024-11-02 RX ADMIN — TAMSULOSIN HYDROCHLORIDE 0.8 MG: 0.4 CAPSULE ORAL at 08:39

## 2024-11-02 RX ADMIN — DOCUSATE SODIUM 100 MG: 100 CAPSULE, LIQUID FILLED ORAL at 08:39

## 2024-11-02 RX ADMIN — OXYBUTYNIN CHLORIDE 5 MG: 5 TABLET, EXTENDED RELEASE ORAL at 08:39

## 2024-11-02 RX ADMIN — FERROUS SULFATE TAB 325 MG (65 MG ELEMENTAL FE) 325 MG: 325 (65 FE) TAB at 08:38

## 2024-11-02 RX ADMIN — LEVOTHYROXINE SODIUM 50 MCG: 50 TABLET ORAL at 06:24

## 2024-11-02 RX ADMIN — CYANOCOBALAMIN TAB 500 MCG 1000 MCG: 500 TAB at 08:38

## 2024-11-02 RX ADMIN — DIVALPROEX SODIUM 500 MG: 500 TABLET, DELAYED RELEASE ORAL at 08:38

## 2024-11-02 RX ADMIN — Medication 10 ML: at 08:39

## 2024-11-02 RX ADMIN — CARVEDILOL 3.12 MG: 3.12 TABLET, FILM COATED ORAL at 21:13

## 2024-11-02 RX ADMIN — Medication 10 ML: at 21:13

## 2024-11-02 RX ADMIN — DIVALPROEX SODIUM 500 MG: 500 TABLET, DELAYED RELEASE ORAL at 21:13

## 2024-11-02 RX ADMIN — ROPINIROLE HYDROCHLORIDE 0.5 MG: 0.5 TABLET, FILM COATED ORAL at 21:13

## 2024-11-02 RX ADMIN — PANTOPRAZOLE SODIUM 40 MG: 40 TABLET, DELAYED RELEASE ORAL at 06:24

## 2024-11-02 RX ADMIN — FUROSEMIDE 40 MG: 10 INJECTION, SOLUTION INTRAMUSCULAR; INTRAVENOUS at 06:24

## 2024-11-02 NOTE — PLAN OF CARE
Goal Outcome Evaluation:  Plan of Care Reviewed With: patient        Progress: no change  Outcome Evaluation: VSS. No complaints this shift. I&Os monitored. Male purewick in place to monitor UOP. BID IV lasix continues. 2L NC while sleeping to maintain o2. Planning to discharge back to [facility] when medically stable - will need transport. BLE remain swollen +2.

## 2024-11-02 NOTE — PROGRESS NOTES
Name: Hernando Lozada ADMIT: 10/31/2024   : 1945  PCP: Sammie Gambino APRN    MRN: [id number] LOS: 2 days   AGE/SEX: 79 y.o. male  ROOM: [facility]     Subjective   Subjective   Patient resting in bed, he is comfortable.  He has no complaints at this time.  Feels like he is urinating quite a bit since getting Lasix. Tells me his thighs are starting to get softer.        Objective   Objective   Vital Signs  Temp:  [97.9 °F (36.6 °C)-98.6 °F (37 °C)] 98.2 °F (36.8 °C)  Heart Rate:  [60-66] 61  Resp:  [16-18] 18  BP: ()/(45-73) 96/63  SpO2:  [95 %-98 %] 97 %  on  Flow (L/min) (Oxygen Therapy):  [2] 2;   Device (Oxygen Therapy): room air  Body mass index is 32.39 kg/m².  Physical Exam  Constitutional:       General: He is not in acute distress.     Appearance: He is ill-appearing. He is not toxic-appearing.   Cardiovascular:      Rate and Rhythm: Normal rate. Rhythm irregular.      Heart sounds: Murmur heard.   Pulmonary:      Effort: Pulmonary effort is normal. No respiratory distress.      Breath sounds: Normal breath sounds. No wheezing.   Abdominal:      General: Abdomen is flat.      Palpations: Abdomen is soft.      Tenderness: There is no abdominal tenderness.   Musculoskeletal:         General: No tenderness.      Right lower leg: Edema present.      Left lower leg: Edema present.   Skin:     General: Skin is warm and dry.   Neurological:      General: No focal deficit present.      Mental Status: He is alert and oriented to person, place, and time. Mental status is at baseline.      Motor: No weakness.         Results Review     I reviewed the patient's new clinical results.  Results from last 7 days   Lab Units 24  0328 24  0458 10/31/24  1405   WBC 10*3/mm3 6.55 4.38 4.18   HEMOGLOBIN g/dL 10.6* 9.4* 9.7*   PLATELETS 10*3/mm3 187 163 162     Results from last 7 days   Lab Units 24  0328 24  0458 10/31/24  1405   SODIUM mmol/L 134* 138 133*   POTASSIUM mmol/L 4.2 4.2 4.1    CHLORIDE mmol/L 98 101 96*   CO2 mmol/L 29.0 29.0 30.0*   BUN mg/dL 18 15 18   CREATININE mg/dL 0.99 1.01 1.08   GLUCOSE mg/dL 94 88 130*   Estimated Creatinine Clearance: 72.7 mL/min (by C-G formula based on SCr of 0.99 mg/dL).  Results from last 7 days   Lab Units 11/02/24  0328 11/01/24 0458 10/31/24  1405   ALBUMIN g/dL 3.3* 3.0* 3.3*   BILIRUBIN mg/dL  --  0.5 0.5   ALK PHOS U/L  --  55 70   AST (SGOT) U/L  --  15 23   ALT (SGPT) U/L  --  14 21     Results from last 7 days   Lab Units 11/02/24  0328 11/01/24  0458 10/31/24  1405   CALCIUM mg/dL 8.4* 8.0* 8.2*   ALBUMIN g/dL 3.3* 3.0* 3.3*   PHOSPHORUS mg/dL 4.0  --   --        COVID19   Date Value Ref Range Status   04/20/2023 Not Detected Not Detected - Ref. Range Final   11/25/2022 Detected (C) Not Detected - Ref. Range Final     Hemoglobin A1C   Date/Time Value Ref Range Status   11/01/2024 0458 5.10 4.80 - 5.60 % Final     Glucose   Date/Time Value Ref Range Status   11/01/2024 0804 170 (H) 70 - 130 mg/dL Final   10/31/2024 2018 195 (H) 70 - 130 mg/dL Final       XR Chest 1 View  Narrative: Emergency portable view of the chest on 10/31/2024     CLINICAL HISTORY: CHF/COPD protocol     This is correlated to a prior portable chest x-ray on 10/6/2024.     FINDINGS: There are multiple sternal wires from previous cardiac  surgery. The cardiomediastinal silhouette is upper limits of normal. The  pulmonary vasculature is within normal limits. The lungs are clear. The  costophrenic angles are sharp.     Impression: 1. No active disease is seen in the chest     This report was finalized on 10/31/2024 2:31 PM by Dr. Primo Joya M.D  on Workstation: [id number]       Scheduled Medications  atorvastatin, 40 mg, Oral, Nightly  carvedilol, 3.125 mg, Oral, BID  clopidogrel, 75 mg, Oral, Daily  divalproex, 500 mg, Oral, BID  docusate sodium, 100 mg, Oral, Daily  ferrous sulfate, 325 mg, Oral, Daily With Breakfast  folic acid, 1 mg, Oral, Daily  furosemide, 40 mg,  Intravenous, Q12H  levothyroxine, 50 mcg, Oral, Q AM  lisinopril, 5 mg, Oral, Daily  oxybutynin XL, 5 mg, Oral, Daily  pantoprazole, 40 mg, Oral, Q AM  rivaroxaban, 20 mg, Oral, Daily  rOPINIRole, 0.5 mg, Oral, Nightly  sodium chloride, 10 mL, Intravenous, Q12H  tamsulosin, 0.8 mg, Oral, Daily  cyanocobalamin, 1,000 mcg, Oral, Daily    Infusions   Diet  Diet: Cardiac, Diabetic, Renal; Healthy Heart (2-3 Na+); Consistent Carbohydrate; Low Sodium (2-3g), Low Potassium, Low Phosphorus; Fluid Consistency: Thin (IDDSI 0)         I have personally reviewed:  [x]  Laboratory   []  Microbiology   []  Radiology   [x]  EKG/Telemetry   []  Cardiology/Vascular   []  Pathology   []  Records    Assessment/Plan     Active Hospital Problems    Diagnosis  POA    **Acute on chronic diastolic heart failure [I50.33]  Yes    Anemia [D64.9]  Unknown    Atrial fibrillation [I48.91]  Yes    Hypothyroidism [E03.9]  Yes    Chronic deep vein thrombosis (DVT) of calf muscle vein of left lower extremity [I82.562]  Yes    Aortic stenosis, severe [I35.0]  Yes    Chronic brain injury [S06.9XAS]  Not Applicable    CKD (chronic kidney disease) [N18.9]  Yes    Diabetes mellitus [E11.9]  Yes    GIOVANNY (obstructive sleep apnea) [G47.33]  Yes    Primary hypertension [I10]  Yes      Resolved Hospital Problems   No resolved problems to display.       79 y.o. male admitted with Acute on chronic diastolic heart failure.    Acute on chronic diastolic CHF  Atrial fibrillation- persistent  Aortic stenosis- severe  HTN  CAD w/ h/o PCI  - cards consulted  - on IV lasix, plan daily BMP  - cont statin, BB, ACEi, plavix/xarelto  - UOP 11/1 of ~5L; renal fcn stable    Hypothyroidism  - levothyroxine    Hx of DVT  - home xarelto    Hx of TBI  - continue depakote    GIOVANNY   - ok for home cpap if available    Anemia  - stable near baseline of ~9-10    Xarelto (home med) for DVT prophylaxis.  Full code.  Discussed with patient and nursing staff.  Anticipated discharge TBD,  TBD        Renetta Romero MD  [facility]  11/02/24  10:48 EDT    I wore protective equipment throughout this patient encounter including gloves.  Hand hygiene was performed before donning protective equipment and after removal when leaving the room.         Copied text in this note has been reviewed and is accurate as of 11/02/24.

## 2024-11-02 NOTE — PLAN OF CARE
Problem: Adult Inpatient Plan of Care  Goal: Plan of Care Review  Outcome: Progressing  Flowsheets (Taken 11/2/2024 6768)  Progress: no change  Outcome Evaluation: Patient denies any pain/needs. He ambulated around unit with assistance of NA and walker. Patient also up to chair this afternoon. IV lasix continued this evening. Per patient, bilateral leg swelling is improving. Plan of care ongoing.  Plan of Care Reviewed With: patient

## 2024-11-02 NOTE — PROGRESS NOTES
Hospital Follow Up    LOS:  LOS: 2 days   Patient Name: Hernando Lozada  Age/Sex: 79 y.o. male  : 1945  MRN: [id number]    Day of Service: 24   Length of Stay: 2  Encounter Provider: FAISAL Hollis  [facility]  Patient Care Team:  Sammie Gambino APRN as PCP - General (Nurse Practitioner)  Albert Shukla MD as Consulting Physician (Cardiology)    Subjective:     Chief Complaint: Acute diastolic heart failure    Interval History: States he is breathing much better and edema has gone way down.    Objective:     Objective:  Temp:  [98.1 °F (36.7 °C)-98.6 °F (37 °C)] 98.6 °F (37 °C)  Heart Rate:  [61-66] 63  Resp:  [16-18] 18  BP: ()/(45-89) 129/89     Intake/Output Summary (Last 24 hours) at 2024 1623  Last data filed at 2024 1209  Gross per 24 hour   Intake 480 ml   Output 4050 ml   Net -3570 ml     Body mass index is 32.39 kg/m².      10/31/24  1358 24  0522 24  0500   Weight: 110 kg (242 lb 8 oz) 106 kg (234 lb 2.1 oz) 99.5 kg (219 lb 5.7 oz)     Weight change: -10.5 kg (-23 lb 2.3 oz)    Physical Exam:   General Appearance:    Awake alert and oriented in no acute distress.   Color:  Skin:  Neuro:  HEENT:    Lungs:     Pink  Warm and dry  No focal, motor or sensory deficits  Neck supple, pupils equal, round and reactive. No JVD, No Bruit  Clear to auscultation,respirations regular, even and                  unlabored    Heart:    Regular rate and rhythm, S1 and S2, no murmur, no gallop, no rub.  Trace to 1+ pedal and pretibial edema, DP/PT pulses are 2+   Chest Wall:    No abnormalities observed   Abdomen:     Normal bowel sounds, no masses, no organomegaly, soft        non-tender, non-distended, no guarding, no ascites noted   Extremities:   Moves all extremities well, no edema, no cyanosis, no redness       Lab Review:   Results from last 7 days   Lab Units 24  0328 24  0459  "10/31/24  1405   SODIUM mmol/L 134* 138 133*   POTASSIUM mmol/L 4.2 4.2 4.1   CHLORIDE mmol/L 98 101 96*   CO2 mmol/L 29.0 29.0 30.0*   BUN mg/dL 18 15 18   CREATININE mg/dL 0.99 1.01 1.08   GLUCOSE mg/dL 94 88 130*   CALCIUM mg/dL 8.4* 8.0* 8.2*   AST (SGOT) U/L  --  15 23   ALT (SGPT) U/L  --  14 21     Results from last 7 days   Lab Units 10/31/24  1405   HSTROP T ng/L 23*     Results from last 7 days   Lab Units 11/02/24  0328 11/01/24  0458   WBC 10*3/mm3 6.55 4.38   HEMOGLOBIN g/dL 10.6* 9.4*   HEMATOCRIT % 32.9* 29.9*   PLATELETS 10*3/mm3 187 163     Results from last 7 days   Lab Units 10/31/24  1405   INR  1.78*               Invalid input(s): \"LDLCALC\"  Results from last 7 days   Lab Units 10/31/24  1405   PROBNP pg/mL 1,860.0*         I reviewed the patient's new clinical results.  I personally viewed and interpreted the patient's EKG  Current Medications:   Scheduled Meds:atorvastatin, 40 mg, Oral, Nightly  carvedilol, 3.125 mg, Oral, BID  clopidogrel, 75 mg, Oral, Daily  divalproex, 500 mg, Oral, BID  docusate sodium, 100 mg, Oral, Daily  ferrous sulfate, 325 mg, Oral, Daily With Breakfast  folic acid, 1 mg, Oral, Daily  furosemide, 40 mg, Intravenous, Q12H  levothyroxine, 50 mcg, Oral, Q AM  lisinopril, 5 mg, Oral, Daily  oxybutynin XL, 5 mg, Oral, Daily  pantoprazole, 40 mg, Oral, Q AM  rivaroxaban, 20 mg, Oral, Daily  rOPINIRole, 0.5 mg, Oral, Nightly  sodium chloride, 10 mL, Intravenous, Q12H  tamsulosin, 0.8 mg, Oral, Daily  cyanocobalamin, 1,000 mcg, Oral, Daily      Continuous Infusions:     Allergies:  No Known Allergies    Assessment:     1 acute on chronic diastolic heart failure diuresing with IV Lasix 40 mg twice daily however weight seems to have significantly reduced.  I do not not sure that today's weight is accurate at 219, he was to 44 when he came in.  Still has some edema on the diuretics 1 more day and likely transition to p.o. tomorrow  2.  Coronary disease with circumflex stent " June 20, 2024 on Plavix  3.  Paroxysmal atrial fibrillation on Xarelto  4.  History of stroke traumatic brain injury  5.  Hypothyroidism  6.  Type 2 diabetes mellitus  7.  Moderate aortic stenosis.    Plan:           FAISAL Hollis  11/02/24  16:23 EDT  Electronically signed by FAISAL Hollis, 11/02/24, 4:23 PM EDT.

## 2024-11-02 NOTE — CONSULTS
"Nutrition Services    Patient Name:  Hernando Lozada  YOB: 1945  MRN: [id number]  Admit Date:  10/31/2024    Assessment Date:  11/02/24    CLINICAL NUTRITION - EDUCATION     ASSESSMENT  Encounter Information         Consult from RN    Education topic Sodium    Learner Patient    Learning readiness Motivated to learn    Pertinent nutrition history CHF     Anthropometrics         Height Height: 175.3 cm (69\")    Weight Weight: 99.5 kg (219 lb 5.7 oz) (11/02/24 0500)    BMI  Body mass index is 32.39 kg/m².   Obese, Class I (30 - 34.9)    Comments      Medication/Biochemical          Pertinent medications Diuretic      Pertinent lab values Results from last 7 days   Lab Units 11/02/24  0328 11/01/24  0458 10/31/24  1405   SODIUM mmol/L 134* 138 133*   POTASSIUM mmol/L 4.2 4.2 4.1   CHLORIDE mmol/L 98 101 96*   CO2 mmol/L 29.0 29.0 30.0*   BUN mg/dL 18 15 18   CREATININE mg/dL 0.99 1.01 1.08   CALCIUM mg/dL 8.4* 8.0* 8.2*   BILIRUBIN mg/dL  --  0.5 0.5   ALK PHOS U/L  --  55 70   ALT (SGPT) U/L  --  14 21   AST (SGOT) U/L  --  15 23   GLUCOSE mg/dL 94 88 130*       Lab Results   Component Value Date    HGBA1C 5.10 11/01/2024        DIAGNOSIS  PES Statement         Problem  Food and nutrition knowledge deficit    Etiology Medical Diagnosis - CHF    Signs/Symptoms Information deficit     INTERVENTION  Intervention/Evaluation         Goal   Disease management/therapy    Educated regarding Appropriate portions, Beverage choices, Eating pattern, Food choices, Food preparation, Food shopping, Label reading, Seasoning foods, Snacks    Resources given Printed materials    Monitor/evaluation  Per protocol    Discharge planning No discharge needs identified at this time     RD to follow per protocol.     Electronically signed by:  Meño Bustillos RDN, LD  11/02/24 12:57 EDT    "

## 2024-11-03 LAB
ALBUMIN SERPL-MCNC: 3.3 G/DL (ref 3.5–5.2)
ANION GAP SERPL CALCULATED.3IONS-SCNC: 11 MMOL/L (ref 5–15)
BUN SERPL-MCNC: 21 MG/DL (ref 8–23)
BUN/CREAT SERPL: 18.9 (ref 7–25)
CALCIUM SPEC-SCNC: 8.6 MG/DL (ref 8.6–10.5)
CHLORIDE SERPL-SCNC: 96 MMOL/L (ref 98–107)
CO2 SERPL-SCNC: 30 MMOL/L (ref 22–29)
CREAT SERPL-MCNC: 1.11 MG/DL (ref 0.76–1.27)
DEPRECATED RDW RBC AUTO: 47.4 FL (ref 37–54)
EGFRCR SERPLBLD CKD-EPI 2021: 68.4 ML/MIN/1.73
ERYTHROCYTE [DISTWIDTH] IN BLOOD BY AUTOMATED COUNT: 14.2 % (ref 12.3–15.4)
GLUCOSE SERPL-MCNC: 147 MG/DL (ref 65–99)
HCT VFR BLD AUTO: 35.1 % (ref 37.5–51)
HGB BLD-MCNC: 11.7 G/DL (ref 13–17.7)
MCH RBC QN AUTO: 30.5 PG (ref 26.6–33)
MCHC RBC AUTO-ENTMCNC: 33.3 G/DL (ref 31.5–35.7)
MCV RBC AUTO: 91.6 FL (ref 79–97)
PHOSPHATE SERPL-MCNC: 3.6 MG/DL (ref 2.5–4.5)
PLATELET # BLD AUTO: 217 10*3/MM3 (ref 140–450)
PMV BLD AUTO: 9 FL (ref 6–12)
POTASSIUM SERPL-SCNC: 3.8 MMOL/L (ref 3.5–5.2)
RBC # BLD AUTO: 3.83 10*6/MM3 (ref 4.14–5.8)
SODIUM SERPL-SCNC: 137 MMOL/L (ref 136–145)
WBC NRBC COR # BLD AUTO: 7.64 10*3/MM3 (ref 3.4–10.8)

## 2024-11-03 PROCEDURE — 80069 RENAL FUNCTION PANEL: CPT | Performed by: STUDENT IN AN ORGANIZED HEALTH CARE EDUCATION/TRAINING PROGRAM

## 2024-11-03 PROCEDURE — 25010000002 FUROSEMIDE PER 20 MG: Performed by: STUDENT IN AN ORGANIZED HEALTH CARE EDUCATION/TRAINING PROGRAM

## 2024-11-03 PROCEDURE — 85027 COMPLETE CBC AUTOMATED: CPT | Performed by: STUDENT IN AN ORGANIZED HEALTH CARE EDUCATION/TRAINING PROGRAM

## 2024-11-03 RX ORDER — FUROSEMIDE 40 MG/1
40 TABLET ORAL
Status: DISCONTINUED | OUTPATIENT
Start: 2024-11-03 | End: 2024-11-04

## 2024-11-03 RX ADMIN — CARVEDILOL 3.12 MG: 3.12 TABLET, FILM COATED ORAL at 08:26

## 2024-11-03 RX ADMIN — DOCUSATE SODIUM 100 MG: 100 CAPSULE, LIQUID FILLED ORAL at 08:26

## 2024-11-03 RX ADMIN — RIVAROXABAN 20 MG: 20 TABLET, FILM COATED ORAL at 08:27

## 2024-11-03 RX ADMIN — ACETAMINOPHEN 325MG 650 MG: 325 TABLET ORAL at 20:51

## 2024-11-03 RX ADMIN — OXYBUTYNIN CHLORIDE 5 MG: 5 TABLET, EXTENDED RELEASE ORAL at 08:26

## 2024-11-03 RX ADMIN — LEVOTHYROXINE SODIUM 50 MCG: 50 TABLET ORAL at 05:36

## 2024-11-03 RX ADMIN — Medication 10 ML: at 21:57

## 2024-11-03 RX ADMIN — FUROSEMIDE 40 MG: 40 TABLET ORAL at 18:17

## 2024-11-03 RX ADMIN — LISINOPRIL 5 MG: 5 TABLET ORAL at 08:26

## 2024-11-03 RX ADMIN — Medication 10 ML: at 08:27

## 2024-11-03 RX ADMIN — CYANOCOBALAMIN TAB 500 MCG 1000 MCG: 500 TAB at 08:26

## 2024-11-03 RX ADMIN — FUROSEMIDE 40 MG: 10 INJECTION, SOLUTION INTRAMUSCULAR; INTRAVENOUS at 05:36

## 2024-11-03 RX ADMIN — FERROUS SULFATE TAB 325 MG (65 MG ELEMENTAL FE) 325 MG: 325 (65 FE) TAB at 08:27

## 2024-11-03 RX ADMIN — ROPINIROLE HYDROCHLORIDE 0.5 MG: 0.5 TABLET, FILM COATED ORAL at 20:48

## 2024-11-03 RX ADMIN — DIVALPROEX SODIUM 500 MG: 500 TABLET, DELAYED RELEASE ORAL at 20:48

## 2024-11-03 RX ADMIN — DIVALPROEX SODIUM 500 MG: 500 TABLET, DELAYED RELEASE ORAL at 08:26

## 2024-11-03 RX ADMIN — TAMSULOSIN HYDROCHLORIDE 0.8 MG: 0.4 CAPSULE ORAL at 08:26

## 2024-11-03 RX ADMIN — ATORVASTATIN CALCIUM 40 MG: 20 TABLET, FILM COATED ORAL at 20:48

## 2024-11-03 RX ADMIN — FOLIC ACID 1 MG: 1 TABLET ORAL at 08:26

## 2024-11-03 RX ADMIN — CLOPIDOGREL BISULFATE 75 MG: 75 TABLET, FILM COATED ORAL at 08:26

## 2024-11-03 RX ADMIN — PANTOPRAZOLE SODIUM 40 MG: 40 TABLET, DELAYED RELEASE ORAL at 05:36

## 2024-11-03 NOTE — PROGRESS NOTES
Hospital Follow Up    LOS:  LOS: 3 days   Patient Name: Hernando Lozada  Age/Sex: 79 y.o. male  : 1945  MRN: [id number]    Day of Service: 24   Length of Stay: 3  Encounter Provider: FAISAL Hollis  Place of Service: [facility] CARDIOLOGY  Patient Care Team:  Sammie Gambino APRN as PCP - General (Nurse Practitioner)  Albert Shukla MD as Consulting Physician (Cardiology)    Subjective:     Chief Complaint: Acute diastolic heart failure    Interval History: States he is breathing better.  Weight continues to decrease    Objective:     Objective:  Temp:  [97.9 °F (36.6 °C)-98.6 °F (37 °C)] 98.1 °F (36.7 °C)  Heart Rate:  [65-77] 70  Resp:  [18] 18  BP: (104-157)/(62-95) 104/62     Intake/Output Summary (Last 24 hours) at 11/3/2024 1538  Last data filed at 11/3/2024 0100  Gross per 24 hour   Intake 0 ml   Output --   Net 0 ml     Body mass index is 32.17 kg/m².      24  0522 24  0500 24  0500   Weight: 106 kg (234 lb 2.1 oz) 99.5 kg (219 lb 5.7 oz) 98.8 kg (217 lb 13 oz)     Weight change: -0.7 kg (-1 lb 8.7 oz)    Physical Exam:   General Appearance:    Awake alert and oriented in no acute distress.   Color:  Skin:  Neuro:  HEENT:    Lungs:     Pink  Warm and dry  No focal, motor or sensory deficits  Neck supple, pupils equal, round and reactive. No JVD, No Bruit  Clear to auscultation,respirations regular, even and                  unlabored    Heart:    Regular rate and rhythm, S1 and S2, no murmur, no gallop, no rub.  Trace edema, DP/PT pulses are 2+   Chest Wall:    No abnormalities observed   Abdomen:     Normal bowel sounds, no masses, no organomegaly, soft        non-tender, non-distended, no guarding, no ascites noted   Extremities:   Moves all extremities well, no edema, no cyanosis, no redness       Lab Review:   Results from last 7 days   Lab Units 24  0523 24  0328 24  0458 10/31/24  1405   SODIUM mmol/L  "137 134* 138 133*   POTASSIUM mmol/L 3.8 4.2 4.2 4.1   CHLORIDE mmol/L 96* 98 101 96*   CO2 mmol/L 30.0* 29.0 29.0 30.0*   BUN mg/dL 21 18 15 18   CREATININE mg/dL 1.11 0.99 1.01 1.08   GLUCOSE mg/dL 147* 94 88 130*   CALCIUM mg/dL 8.6 8.4* 8.0* 8.2*   AST (SGOT) U/L  --   --  15 23   ALT (SGPT) U/L  --   --  14 21     Results from last 7 days   Lab Units 10/31/24  1405   HSTROP T ng/L 23*     Results from last 7 days   Lab Units 11/03/24  0523 11/02/24  0328   WBC 10*3/mm3 7.64 6.55   HEMOGLOBIN g/dL 11.7* 10.6*   HEMATOCRIT % 35.1* 32.9*   PLATELETS 10*3/mm3 217 187     Results from last 7 days   Lab Units 10/31/24  1405   INR  1.78*               Invalid input(s): \"LDLCALC\"  Results from last 7 days   Lab Units 10/31/24  1405   PROBNP pg/mL 1,860.0*         I reviewed the patient's new clinical results.  I personally viewed and interpreted the patient's EKG  Current Medications:   Scheduled Meds:atorvastatin, 40 mg, Oral, Nightly  carvedilol, 3.125 mg, Oral, BID  clopidogrel, 75 mg, Oral, Daily  divalproex, 500 mg, Oral, BID  docusate sodium, 100 mg, Oral, Daily  ferrous sulfate, 325 mg, Oral, Daily With Breakfast  folic acid, 1 mg, Oral, Daily  furosemide, 40 mg, Intravenous, Q12H  levothyroxine, 50 mcg, Oral, Q AM  lisinopril, 5 mg, Oral, Daily  oxybutynin XL, 5 mg, Oral, Daily  pantoprazole, 40 mg, Oral, Q AM  rivaroxaban, 20 mg, Oral, Daily  rOPINIRole, 0.5 mg, Oral, Nightly  sodium chloride, 10 mL, Intravenous, Q12H  tamsulosin, 0.8 mg, Oral, Daily  cyanocobalamin, 1,000 mcg, Oral, Daily      Continuous Infusions:     Allergies:  No Known Allergies    Assessment:       1 acute on chronic diastolic heart failure diuresing with IV Lasix 40 mg twice daily volume status looks better well switch to oral Lasix.  2.  Coronary disease with circumflex stent June 20, 2024 on Plavix  3.  Paroxysmal atrial fibrillation on Xarelto  4.  History of stroke traumatic brain injury  5.  Hypothyroidism  6.  Type 2 diabetes " mellitus  7.  Moderate aortic stenosis.     Plan:           FAISAL Hollis  11/03/24  15:38 EST  Electronically signed by FAISAL Hollis, 11/03/24, 3:38 PM EST.

## 2024-11-03 NOTE — PROGRESS NOTES
Name: Hernando Lozada ADMIT: 10/31/2024   : 1945  PCP: Sammie Gambino APRN    MRN: [id number] LOS: 3 days   AGE/SEX: 79 y.o. male  ROOM: [facility]     Subjective   Subjective   Patient resting in bed, he is comfortable.  He has no complaints at this time. UOP yesterday with 1.1L measured and 5 unmeasured occurrences.        Objective   Objective   Vital Signs  Temp:  [97.9 °F (36.6 °C)-98.6 °F (37 °C)] 98.4 °F (36.9 °C)  Heart Rate:  [63-77] 77  Resp:  [18] 18  BP: (108-157)/(63-95) 110/66  SpO2:  [94 %-97 %] 96 %  on   ;   Device (Oxygen Therapy): room air  Body mass index is 32.17 kg/m².  Physical Exam  Constitutional:       General: He is not in acute distress.     Appearance: He is ill-appearing. He is not toxic-appearing.   Cardiovascular:      Rate and Rhythm: Normal rate. Rhythm irregular.      Heart sounds: Murmur heard.   Pulmonary:      Effort: Pulmonary effort is normal. No respiratory distress.      Breath sounds: Normal breath sounds. No wheezing.   Abdominal:      General: Abdomen is flat.      Palpations: Abdomen is soft.      Tenderness: There is no abdominal tenderness.   Musculoskeletal:         General: No tenderness.      Right lower leg: Edema present.      Left lower leg: Edema present.   Skin:     General: Skin is warm and dry.   Neurological:      General: No focal deficit present.      Mental Status: He is alert and oriented to person, place, and time. Mental status is at baseline.      Motor: No weakness.         Results Review     I reviewed the patient's new clinical results.  Results from last 7 days   Lab Units 11/03/24  0523 11/02/24  0328 11/01/24  0458 10/31/24  1405   WBC 10*3/mm3 7.64 6.55 4.38 4.18   HEMOGLOBIN g/dL 11.7* 10.6* 9.4* 9.7*   PLATELETS 10*3/mm3 217 187 163 162     Results from last 7 days   Lab Units 248 10/31/24  1405   SODIUM mmol/L 137 134* 138 133*   POTASSIUM mmol/L 3.8 4.2 4.2 4.1   CHLORIDE mmol/L 96* 98 101 96*    CO2 mmol/L 30.0* 29.0 29.0 30.0*   BUN mg/dL 21 18 15 18   CREATININE mg/dL 1.11 0.99 1.01 1.08   GLUCOSE mg/dL 147* 94 88 130*   Estimated Creatinine Clearance: 64.6 mL/min (by C-G formula based on SCr of 1.11 mg/dL).  Results from last 7 days   Lab Units 11/03/24  0523 11/02/24  0328 11/01/24  0458 10/31/24  1405   ALBUMIN g/dL 3.3* 3.3* 3.0* 3.3*   BILIRUBIN mg/dL  --   --  0.5 0.5   ALK PHOS U/L  --   --  55 70   AST (SGOT) U/L  --   --  15 23   ALT (SGPT) U/L  --   --  14 21     Results from last 7 days   Lab Units 11/03/24  0523 11/02/24  0328 11/01/24  0458 10/31/24  1405   CALCIUM mg/dL 8.6 8.4* 8.0* 8.2*   ALBUMIN g/dL 3.3* 3.3* 3.0* 3.3*   PHOSPHORUS mg/dL 3.6 4.0  --   --        COVID19   Date Value Ref Range Status   04/20/2023 Not Detected Not Detected - Ref. Range Final   11/25/2022 Detected (C) Not Detected - Ref. Range Final     Hemoglobin A1C   Date/Time Value Ref Range Status   11/01/2024 0458 5.10 4.80 - 5.60 % Final     Glucose   Date/Time Value Ref Range Status   11/01/2024 0804 170 (H) 70 - 130 mg/dL Final   10/31/2024 2018 195 (H) 70 - 130 mg/dL Final       XR Chest 1 View  Narrative: Emergency portable view of the chest on 10/31/2024     CLINICAL HISTORY: CHF/COPD protocol     This is correlated to a prior portable chest x-ray on 10/6/2024.     FINDINGS: There are multiple sternal wires from previous cardiac  surgery. The cardiomediastinal silhouette is upper limits of normal. The  pulmonary vasculature is within normal limits. The lungs are clear. The  costophrenic angles are sharp.     Impression: 1. No active disease is seen in the chest     This report was finalized on 10/31/2024 2:31 PM by Dr. Primo Joya M.D  on Workstation: [id number]       Scheduled Medications  atorvastatin, 40 mg, Oral, Nightly  carvedilol, 3.125 mg, Oral, BID  clopidogrel, 75 mg, Oral, Daily  divalproex, 500 mg, Oral, BID  docusate sodium, 100 mg, Oral, Daily  ferrous sulfate, 325 mg, Oral, Daily With  Breakfast  folic acid, 1 mg, Oral, Daily  furosemide, 40 mg, Intravenous, Q12H  levothyroxine, 50 mcg, Oral, Q AM  lisinopril, 5 mg, Oral, Daily  oxybutynin XL, 5 mg, Oral, Daily  pantoprazole, 40 mg, Oral, Q AM  rivaroxaban, 20 mg, Oral, Daily  rOPINIRole, 0.5 mg, Oral, Nightly  sodium chloride, 10 mL, Intravenous, Q12H  tamsulosin, 0.8 mg, Oral, Daily  cyanocobalamin, 1,000 mcg, Oral, Daily    Infusions   Diet  Diet: Cardiac, Diabetic, Renal; Healthy Heart (2-3 Na+); Consistent Carbohydrate; Low Sodium (2-3g), Low Potassium, Low Phosphorus; Fluid Consistency: Thin (IDDSI 0)         I have personally reviewed:  [x]  Laboratory   []  Microbiology   []  Radiology   [x]  EKG/Telemetry   []  Cardiology/Vascular   []  Pathology   []  Records    Assessment/Plan     Active Hospital Problems    Diagnosis  POA    **Acute on chronic diastolic heart failure [I50.33]  Yes    Anemia [D64.9]  Unknown    Atrial fibrillation [I48.91]  Yes    Hypothyroidism [E03.9]  Yes    Chronic deep vein thrombosis (DVT) of calf muscle vein of left lower extremity [I82.562]  Yes    Aortic stenosis, severe [I35.0]  Yes    Chronic brain injury [S06.9XAS]  Not Applicable    CKD (chronic kidney disease) [N18.9]  Yes    Diabetes mellitus [E11.9]  Yes    GIOVANNY (obstructive sleep apnea) [G47.33]  Yes    Primary hypertension [I10]  Yes      Resolved Hospital Problems   No resolved problems to display.       79 y.o. male admitted with Acute on chronic diastolic heart failure.    Acute on chronic diastolic CHF  Atrial fibrillation- persistent  Aortic stenosis- severe  HTN  CAD w/ h/o PCI  - cards consulted  - on IV lasix, plan daily BMP  - cont statin, BB, ACEi, plavix/xarelto  - UOP 11/2 not recored, 1.1L measured and 5x unmeasured occurrences; renal fcn stable  - possible transition to PO diuretics today per cards    Hypothyroidism  - levothyroxine    Hx of DVT  - home xarelto    Hx of TBI  - continue depakote    GIOVANNY   - ok for home cpap if  available    Anemia  - stable near baseline of ~9-10    Xarelto (home med) for DVT prophylaxis.  Full code.  Discussed with patient and nursing staff.  Anticipated discharge CASTRO, CASTRO Romero MD  [facility]  11/03/24  10:13 EST    I wore protective equipment throughout this patient encounter including gloves.  Hand hygiene was performed before donning protective equipment and after removal when leaving the room.         Copied text in this note has been reviewed and is accurate as of 11/03/24.

## 2024-11-03 NOTE — PLAN OF CARE
Goal Outcome Evaluation:           Progress: improving  Outcome Evaluation: VSS. Q2 turn. Bilateral lower extremity edema +2 but legs are still red. Am snack given. Male incontinence wrap and brief on. Will continue to monitor

## 2024-11-04 LAB
ALBUMIN SERPL-MCNC: 3.3 G/DL (ref 3.5–5.2)
ANION GAP SERPL CALCULATED.3IONS-SCNC: 8.5 MMOL/L (ref 5–15)
BUN SERPL-MCNC: 21 MG/DL (ref 8–23)
BUN/CREAT SERPL: 20 (ref 7–25)
CALCIUM SPEC-SCNC: 8.5 MG/DL (ref 8.6–10.5)
CHLORIDE SERPL-SCNC: 100 MMOL/L (ref 98–107)
CO2 SERPL-SCNC: 30.5 MMOL/L (ref 22–29)
CREAT SERPL-MCNC: 1.05 MG/DL (ref 0.76–1.27)
DEPRECATED RDW RBC AUTO: 46.8 FL (ref 37–54)
EGFRCR SERPLBLD CKD-EPI 2021: 73.1 ML/MIN/1.73
ERYTHROCYTE [DISTWIDTH] IN BLOOD BY AUTOMATED COUNT: 14.1 % (ref 12.3–15.4)
GLUCOSE SERPL-MCNC: 111 MG/DL (ref 65–99)
HCT VFR BLD AUTO: 34.7 % (ref 37.5–51)
HGB BLD-MCNC: 11.6 G/DL (ref 13–17.7)
MCH RBC QN AUTO: 30.5 PG (ref 26.6–33)
MCHC RBC AUTO-ENTMCNC: 33.4 G/DL (ref 31.5–35.7)
MCV RBC AUTO: 91.3 FL (ref 79–97)
PHOSPHATE SERPL-MCNC: 4.1 MG/DL (ref 2.5–4.5)
PLATELET # BLD AUTO: 209 10*3/MM3 (ref 140–450)
PMV BLD AUTO: 8.7 FL (ref 6–12)
POTASSIUM SERPL-SCNC: 4.2 MMOL/L (ref 3.5–5.2)
RBC # BLD AUTO: 3.8 10*6/MM3 (ref 4.14–5.8)
SODIUM SERPL-SCNC: 139 MMOL/L (ref 136–145)
WBC NRBC COR # BLD AUTO: 9.17 10*3/MM3 (ref 3.4–10.8)

## 2024-11-04 PROCEDURE — 97110 THERAPEUTIC EXERCISES: CPT

## 2024-11-04 PROCEDURE — 80069 RENAL FUNCTION PANEL: CPT | Performed by: STUDENT IN AN ORGANIZED HEALTH CARE EDUCATION/TRAINING PROGRAM

## 2024-11-04 PROCEDURE — 97116 GAIT TRAINING THERAPY: CPT

## 2024-11-04 PROCEDURE — 85027 COMPLETE CBC AUTOMATED: CPT | Performed by: STUDENT IN AN ORGANIZED HEALTH CARE EDUCATION/TRAINING PROGRAM

## 2024-11-04 RX ORDER — FUROSEMIDE 20 MG/1
20 TABLET ORAL NIGHTLY
Status: DISCONTINUED | OUTPATIENT
Start: 2024-11-04 | End: 2024-11-06 | Stop reason: HOSPADM

## 2024-11-04 RX ORDER — LISINOPRIL 2.5 MG/1
2.5 TABLET ORAL DAILY
Status: DISCONTINUED | OUTPATIENT
Start: 2024-11-05 | End: 2024-11-04

## 2024-11-04 RX ORDER — FUROSEMIDE 40 MG/1
40 TABLET ORAL
Status: DISCONTINUED | OUTPATIENT
Start: 2024-11-05 | End: 2024-11-06 | Stop reason: HOSPADM

## 2024-11-04 RX ORDER — HYDROCODONE BITARTRATE AND HOMATROPINE METHYLBROMIDE ORAL SOLUTION 5; 1.5 MG/5ML; MG/5ML
5 LIQUID ORAL EVERY 4 HOURS PRN
Status: DISCONTINUED | OUTPATIENT
Start: 2024-11-04 | End: 2024-11-06 | Stop reason: HOSPADM

## 2024-11-04 RX ADMIN — LEVOTHYROXINE SODIUM 50 MCG: 50 TABLET ORAL at 06:27

## 2024-11-04 RX ADMIN — FUROSEMIDE 40 MG: 40 TABLET ORAL at 13:41

## 2024-11-04 RX ADMIN — CARVEDILOL 3.12 MG: 3.12 TABLET, FILM COATED ORAL at 21:03

## 2024-11-04 RX ADMIN — CYANOCOBALAMIN TAB 500 MCG 1000 MCG: 500 TAB at 09:04

## 2024-11-04 RX ADMIN — Medication 10 ML: at 10:04

## 2024-11-04 RX ADMIN — DOCUSATE SODIUM 100 MG: 100 CAPSULE, LIQUID FILLED ORAL at 09:04

## 2024-11-04 RX ADMIN — TAMSULOSIN HYDROCHLORIDE 0.8 MG: 0.4 CAPSULE ORAL at 09:04

## 2024-11-04 RX ADMIN — ATORVASTATIN CALCIUM 40 MG: 20 TABLET, FILM COATED ORAL at 21:03

## 2024-11-04 RX ADMIN — DIVALPROEX SODIUM 500 MG: 500 TABLET, DELAYED RELEASE ORAL at 21:03

## 2024-11-04 RX ADMIN — Medication 10 ML: at 21:03

## 2024-11-04 RX ADMIN — CLOPIDOGREL BISULFATE 75 MG: 75 TABLET, FILM COATED ORAL at 09:04

## 2024-11-04 RX ADMIN — FOLIC ACID 1 MG: 1 TABLET ORAL at 09:03

## 2024-11-04 RX ADMIN — DIVALPROEX SODIUM 500 MG: 500 TABLET, DELAYED RELEASE ORAL at 09:04

## 2024-11-04 RX ADMIN — FERROUS SULFATE TAB 325 MG (65 MG ELEMENTAL FE) 325 MG: 325 (65 FE) TAB at 09:04

## 2024-11-04 RX ADMIN — RIVAROXABAN 20 MG: 20 TABLET, FILM COATED ORAL at 09:04

## 2024-11-04 RX ADMIN — FUROSEMIDE 20 MG: 20 TABLET ORAL at 21:03

## 2024-11-04 RX ADMIN — PANTOPRAZOLE SODIUM 40 MG: 40 TABLET, DELAYED RELEASE ORAL at 06:27

## 2024-11-04 RX ADMIN — OXYBUTYNIN CHLORIDE 5 MG: 5 TABLET, EXTENDED RELEASE ORAL at 09:04

## 2024-11-04 RX ADMIN — ROPINIROLE HYDROCHLORIDE 0.5 MG: 0.5 TABLET, FILM COATED ORAL at 21:03

## 2024-11-04 RX ADMIN — CARVEDILOL 3.12 MG: 3.12 TABLET, FILM COATED ORAL at 13:40

## 2024-11-04 NOTE — PROGRESS NOTES
Name: Hernando Lozada ADMIT: 10/31/2024   : 1945  PCP: Sammie Gambino APRN    MRN: [id number] LOS: 4 days   AGE/SEX: 79 y.o. male  ROOM: [facility]     Subjective   Subjective   Patient resting in his recliner, he just finished working with PT. He is comfortable.  He is complaining of mild cough.        Objective   Objective   Vital Signs  Temp:  [98.1 °F (36.7 °C)-99 °F (37.2 °C)] 98.2 °F (36.8 °C)  Heart Rate:  [67-80] 67  Resp:  [18] 18  BP: ()/(50-74) 92/50  SpO2:  [90 %-97 %] 97 %  on   ;   Device (Oxygen Therapy): room air  Body mass index is 32.17 kg/m².  Physical Exam  Constitutional:       General: He is not in acute distress.     Appearance: He is ill-appearing. He is not toxic-appearing.   Cardiovascular:      Rate and Rhythm: Normal rate. Rhythm irregular.      Heart sounds: Murmur heard.   Pulmonary:      Effort: Pulmonary effort is normal. No respiratory distress.      Breath sounds: Normal breath sounds. No wheezing.   Abdominal:      General: Abdomen is flat.      Palpations: Abdomen is soft.      Tenderness: There is no abdominal tenderness.   Musculoskeletal:         General: No tenderness.      Right lower leg: Edema present.      Left lower leg: Edema present.   Skin:     General: Skin is warm and dry.   Neurological:      General: No focal deficit present.      Mental Status: He is alert and oriented to person, place, and time. Mental status is at baseline.      Motor: No weakness.         Results Review     I reviewed the patient's new clinical results.  Results from last 7 days   Lab Units 24   WBC 10*3/mm3 9.17 7.64 6.55 4.38   HEMOGLOBIN g/dL 11.6* 11.7* 10.6* 9.4*   PLATELETS 10*3/mm3 209 217 187 163     Results from last 7 days   Lab Units 24   SODIUM mmol/L 139 137 134* 138   POTASSIUM mmol/L 4.2 3.8 4.2 4.2   CHLORIDE mmol/L 100 96* 98 101   CO2 mmol/L 30.5* 30.0*  "29.0 29.0   BUN mg/dL 21 21 18 15   CREATININE mg/dL 1.05 1.11 0.99 1.01   GLUCOSE mg/dL 111* 147* 94 88   Estimated Creatinine Clearance: 68.3 mL/min (by C-G formula based on SCr of 1.05 mg/dL).  Results from last 7 days   Lab Units 11/04/24  0446 11/03/24  0523 11/02/24  0328 11/01/24  0458 10/31/24  1405   ALBUMIN g/dL 3.3* 3.3* 3.3* 3.0* 3.3*   BILIRUBIN mg/dL  --   --   --  0.5 0.5   ALK PHOS U/L  --   --   --  55 70   AST (SGOT) U/L  --   --   --  15 23   ALT (SGPT) U/L  --   --   --  14 21     Results from last 7 days   Lab Units 11/04/24 0446 11/03/24 0523 11/02/24 0328 11/01/24  0458   CALCIUM mg/dL 8.5* 8.6 8.4* 8.0*   ALBUMIN g/dL 3.3* 3.3* 3.3* 3.0*   PHOSPHORUS mg/dL 4.1 3.6 4.0  --        COVID19   Date Value Ref Range Status   04/20/2023 Not Detected Not Detected - Ref. Range Final   11/25/2022 Detected (C) Not Detected - Ref. Range Final     No results found for: \"HGBA1C\", \"POCGLU\"      XR Chest 1 View  Narrative: Emergency portable view of the chest on 10/31/2024     CLINICAL HISTORY: CHF/COPD protocol     This is correlated to a prior portable chest x-ray on 10/6/2024.     FINDINGS: There are multiple sternal wires from previous cardiac  surgery. The cardiomediastinal silhouette is upper limits of normal. The  pulmonary vasculature is within normal limits. The lungs are clear. The  costophrenic angles are sharp.     Impression: 1. No active disease is seen in the chest     This report was finalized on 10/31/2024 2:31 PM by Dr. Primo Joya M.D  on Workstation: [id number]       Scheduled Medications  atorvastatin, 40 mg, Oral, Nightly  carvedilol, 3.125 mg, Oral, BID  clopidogrel, 75 mg, Oral, Daily  divalproex, 500 mg, Oral, BID  docusate sodium, 100 mg, Oral, Daily  ferrous sulfate, 325 mg, Oral, Daily With Breakfast  folic acid, 1 mg, Oral, Daily  furosemide, 40 mg, Oral, BID  levothyroxine, 50 mcg, Oral, Q AM  oxybutynin XL, 5 mg, Oral, Daily  pantoprazole, 40 mg, Oral, Q AM  rivaroxaban, " 20 mg, Oral, Daily  rOPINIRole, 0.5 mg, Oral, Nightly  sodium chloride, 10 mL, Intravenous, Q12H  tamsulosin, 0.8 mg, Oral, Daily  cyanocobalamin, 1,000 mcg, Oral, Daily    Infusions   Diet  Diet: Cardiac, Diabetic, Renal; Healthy Heart (2-3 Na+); Consistent Carbohydrate; Low Sodium (2-3g), Low Potassium, Low Phosphorus; Fluid Consistency: Thin (IDDSI 0)         I have personally reviewed:  [x]  Laboratory   []  Microbiology   []  Radiology   [x]  EKG/Telemetry   []  Cardiology/Vascular   []  Pathology   []  Records    Assessment/Plan     Active Hospital Problems    Diagnosis  POA    **Acute on chronic diastolic heart failure [I50.33]  Yes    Anemia [D64.9]  Unknown    Atrial fibrillation [I48.91]  Yes    Hypothyroidism [E03.9]  Yes    Chronic deep vein thrombosis (DVT) of calf muscle vein of left lower extremity [I82.562]  Yes    Aortic stenosis, severe [I35.0]  Yes    Chronic brain injury [S06.9XAS]  Not Applicable    CKD (chronic kidney disease) [N18.9]  Yes    Diabetes mellitus [E11.9]  Yes    GIOVANNY (obstructive sleep apnea) [G47.33]  Yes    Primary hypertension [I10]  Yes      Resolved Hospital Problems   No resolved problems to display.       79 y.o. male admitted with Acute on chronic diastolic heart failure.    Acute on chronic diastolic CHF  Atrial fibrillation- persistent  Aortic stenosis- severe  HTN  CAD w/ h/o PCI  - cards consulted  - IV lasix switched to po lasix, plan daily BMP  - cont statin, BB, ACEi, plavix/xarelto  - d/w Parker Hernández with cardiology, BP low this morning and multiple meds held; BP to be checked later this morning with plans to be determined    Hypothyroidism  - levothyroxine    Hx of DVT  - home xarelto    Hx of TBI  - continue depakote    GIOVANNY   - ok for home cpap if available    Anemia  - stable near baseline of ~9-10    Xarelto (home med) for DVT prophylaxis.  Full code.  Discussed with patient and nursing staff and consulting provider.   Anticipated discharge return to [facility], 1-2  days        Renetta Romero MD  [facility]  11/04/24  10:48 EST    I wore protective equipment throughout this patient encounter including gloves.  Hand hygiene was performed before donning protective equipment and after removal when leaving the room.         Copied text in this note has been reviewed and is accurate as of 11/04/24.

## 2024-11-04 NOTE — PLAN OF CARE
Goal Outcome Evaluation:  Plan of Care Reviewed With: patient        Progress: improving  Outcome Evaluation: Pt tolerated treatment well this date. Required min A for bed mobility and SBA to stand. Pt ambulated 100ft w/ Rw and CGA. Slow and shuffled gait at times, though no LOBs noted. Pt also completed a few seated LE exercises, and encouraged pt to ambulate w/ nsg during the day.    Anticipated Discharge Disposition (PT): home with home health, adult foster care/group home

## 2024-11-04 NOTE — PLAN OF CARE
Goal Outcome Evaluation:  Plan of Care Reviewed With: patient        Progress: improving  Outcome Evaluation: BP soft this shift - night coreg held. Lasix switched to PO. BLE swelling improving. Brief and wrap in place. 2L while sleeping. Planning to return to memory care facility when medically stable. Will need transport @ D/C. PRN tylenol for foot pain

## 2024-11-04 NOTE — PLAN OF CARE
Goal Outcome Evaluation:              Outcome Evaluation: Pt a little hypotensive this morning. Cardio aware. Lisinopril d/c. Coreg and lasix held until this afternoon after BP improved per cardio. Lasix decreased and night only per cardio. Possible d/c tomorrow. Needs met at this time.
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
## 2024-11-04 NOTE — PLAN OF CARE
Problem: Adult Inpatient Plan of Care  Goal: Plan of Care Review  Outcome: Progressing  Flowsheets (Taken 11/3/2024 5788)  Progress: improving  Outcome Evaluation: Patient denies any needs. Family visited at bedside this evening. Lasix was switched to oral per cardio. Leg swelling improving. Plan of care ongoing.  Plan of Care Reviewed With: patient
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
## 2024-11-05 LAB
ALBUMIN SERPL-MCNC: 3.5 G/DL (ref 3.5–5.2)
ANION GAP SERPL CALCULATED.3IONS-SCNC: 9.9 MMOL/L (ref 5–15)
BUN SERPL-MCNC: 20 MG/DL (ref 8–23)
BUN/CREAT SERPL: 18.5 (ref 7–25)
CALCIUM SPEC-SCNC: 8.4 MG/DL (ref 8.6–10.5)
CHLORIDE SERPL-SCNC: 96 MMOL/L (ref 98–107)
CO2 SERPL-SCNC: 29.1 MMOL/L (ref 22–29)
CREAT SERPL-MCNC: 1.08 MG/DL (ref 0.76–1.27)
DEPRECATED RDW RBC AUTO: 45.3 FL (ref 37–54)
EGFRCR SERPLBLD CKD-EPI 2021: 70.7 ML/MIN/1.73
ERYTHROCYTE [DISTWIDTH] IN BLOOD BY AUTOMATED COUNT: 13.8 % (ref 12.3–15.4)
GLUCOSE BLDC GLUCOMTR-MCNC: 121 MG/DL (ref 70–130)
GLUCOSE SERPL-MCNC: 105 MG/DL (ref 65–99)
HCT VFR BLD AUTO: 35.1 % (ref 37.5–51)
HGB BLD-MCNC: 11.4 G/DL (ref 13–17.7)
MCH RBC QN AUTO: 29.2 PG (ref 26.6–33)
MCHC RBC AUTO-ENTMCNC: 32.5 G/DL (ref 31.5–35.7)
MCV RBC AUTO: 90 FL (ref 79–97)
PHOSPHATE SERPL-MCNC: 3.5 MG/DL (ref 2.5–4.5)
PLATELET # BLD AUTO: 220 10*3/MM3 (ref 140–450)
PMV BLD AUTO: 8.8 FL (ref 6–12)
POTASSIUM SERPL-SCNC: 4 MMOL/L (ref 3.5–5.2)
RBC # BLD AUTO: 3.9 10*6/MM3 (ref 4.14–5.8)
SODIUM SERPL-SCNC: 135 MMOL/L (ref 136–145)
WBC NRBC COR # BLD AUTO: 8.11 10*3/MM3 (ref 3.4–10.8)

## 2024-11-05 PROCEDURE — 82948 REAGENT STRIP/BLOOD GLUCOSE: CPT

## 2024-11-05 PROCEDURE — 85027 COMPLETE CBC AUTOMATED: CPT | Performed by: STUDENT IN AN ORGANIZED HEALTH CARE EDUCATION/TRAINING PROGRAM

## 2024-11-05 PROCEDURE — 90791 PSYCH DIAGNOSTIC EVALUATION: CPT

## 2024-11-05 PROCEDURE — 80069 RENAL FUNCTION PANEL: CPT | Performed by: STUDENT IN AN ORGANIZED HEALTH CARE EDUCATION/TRAINING PROGRAM

## 2024-11-05 RX ORDER — CLOPIDOGREL BISULFATE 75 MG/1
75 TABLET ORAL DAILY
Start: 2024-11-05

## 2024-11-05 RX ORDER — CLOPIDOGREL BISULFATE 75 MG/1
75 TABLET ORAL DAILY
Start: 2024-11-05 | End: 2024-11-05

## 2024-11-05 RX ORDER — ROPINIROLE 1 MG/1
1 TABLET, FILM COATED ORAL 3 TIMES DAILY
Start: 2024-11-05

## 2024-11-05 RX ORDER — FUROSEMIDE 40 MG/1
40 TABLET ORAL
Start: 2024-11-06

## 2024-11-05 RX ORDER — BISACODYL 10 MG
10 SUPPOSITORY, RECTAL RECTAL DAILY PRN
Status: DISCONTINUED | OUTPATIENT
Start: 2024-11-05 | End: 2024-11-06 | Stop reason: HOSPADM

## 2024-11-05 RX ORDER — FUROSEMIDE 20 MG/1
20 TABLET ORAL NIGHTLY
Start: 2024-11-05

## 2024-11-05 RX ADMIN — CARVEDILOL 3.12 MG: 3.12 TABLET, FILM COATED ORAL at 21:31

## 2024-11-05 RX ADMIN — DIVALPROEX SODIUM 500 MG: 500 TABLET, DELAYED RELEASE ORAL at 21:31

## 2024-11-05 RX ADMIN — Medication 10 ML: at 21:31

## 2024-11-05 RX ADMIN — ROPINIROLE HYDROCHLORIDE 0.5 MG: 0.5 TABLET, FILM COATED ORAL at 21:31

## 2024-11-05 RX ADMIN — FERROUS SULFATE TAB 325 MG (65 MG ELEMENTAL FE) 325 MG: 325 (65 FE) TAB at 09:33

## 2024-11-05 RX ADMIN — FUROSEMIDE 20 MG: 20 TABLET ORAL at 21:31

## 2024-11-05 RX ADMIN — EMPAGLIFLOZIN 10 MG: 10 TABLET, FILM COATED ORAL at 09:33

## 2024-11-05 RX ADMIN — FOLIC ACID 1 MG: 1 TABLET ORAL at 09:33

## 2024-11-05 RX ADMIN — CARVEDILOL 3.12 MG: 3.12 TABLET, FILM COATED ORAL at 09:36

## 2024-11-05 RX ADMIN — OXYBUTYNIN CHLORIDE 5 MG: 5 TABLET, EXTENDED RELEASE ORAL at 09:33

## 2024-11-05 RX ADMIN — Medication 10 ML: at 10:01

## 2024-11-05 RX ADMIN — RIVAROXABAN 20 MG: 20 TABLET, FILM COATED ORAL at 09:33

## 2024-11-05 RX ADMIN — CLOPIDOGREL BISULFATE 75 MG: 75 TABLET, FILM COATED ORAL at 09:33

## 2024-11-05 RX ADMIN — BISACODYL 10 MG: 10 SUPPOSITORY RECTAL at 21:41

## 2024-11-05 RX ADMIN — ATORVASTATIN CALCIUM 40 MG: 20 TABLET, FILM COATED ORAL at 21:31

## 2024-11-05 RX ADMIN — DOCUSATE SODIUM 100 MG: 100 CAPSULE, LIQUID FILLED ORAL at 09:33

## 2024-11-05 RX ADMIN — CYANOCOBALAMIN TAB 500 MCG 1000 MCG: 500 TAB at 09:33

## 2024-11-05 RX ADMIN — DIVALPROEX SODIUM 500 MG: 500 TABLET, DELAYED RELEASE ORAL at 09:33

## 2024-11-05 RX ADMIN — FUROSEMIDE 40 MG: 40 TABLET ORAL at 05:56

## 2024-11-05 RX ADMIN — LEVOTHYROXINE SODIUM 50 MCG: 50 TABLET ORAL at 05:56

## 2024-11-05 RX ADMIN — PANTOPRAZOLE SODIUM 40 MG: 40 TABLET, DELAYED RELEASE ORAL at 05:56

## 2024-11-05 RX ADMIN — TAMSULOSIN HYDROCHLORIDE 0.8 MG: 0.4 CAPSULE ORAL at 09:33
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
## 2024-11-05 NOTE — DISCHARGE SUMMARY
Patient Name: Hernando Lozada  : 1945  MRN: [id number]    Date of Admission: 10/31/2024  Date of Discharge:  2024  Primary Care Physician: Sammie Gambino APRN      Chief Complaint:   Leg Swelling      Discharge Diagnoses     Active Hospital Problems    Diagnosis  POA    **Acute on chronic diastolic heart failure [I50.33]  Yes    Anemia [D64.9]  Unknown    Atrial fibrillation [I48.91]  Yes    Hypothyroidism [E03.9]  Yes    Chronic deep vein thrombosis (DVT) of calf muscle vein of left lower extremity [I82.562]  Yes    Aortic stenosis, severe [I35.0]  Yes    Chronic brain injury [S06.9XAS]  Not Applicable    CKD (chronic kidney disease) [N18.9]  Yes    Diabetes mellitus [E11.9]  Yes    GIOVANNY (obstructive sleep apnea) [G47.33]  Yes    Primary hypertension [I10]  Yes      Resolved Hospital Problems   No resolved problems to display.        Hospital Course     Mr. Lozada is a 79 y.o. male with a history of chronic diastolic heart failure, persistent A-fib, severe aortic stenosis, hypertension, CAD with history of PCI, history of DVT, history of TBI, hypothyroidism, GIOVANNY who presented to [facility] initially complaining of increased lower extremity swelling.  Please see the admitting history and physical for further details.  He was found to have acute on chronic diastolic CHF and was admitted to the hospital for further evaluation and treatment.  Cardiology consulted on admission.  He was initiated on IV diuretics with significant improvement in his volume status.  He was noted to have a slightly lower blood pressure and his lisinopril has been stopped this admission and will continue to be held at discharge.  He will discharge on a higher dose of Lasix than he was on prior to arrival and will need to follow-up with cardiology later this month as already scheduled.  Cardiology is recommending a low-sodium diet, less than 2 mg of Soliday and a 2000 mL fluid restriction.  The patient is stable for  "discharge and will also need to follow-up with PCP in a week for posthospital follow-up visit.    Addendum 16:45 11/5/24:   [facility] now saying patient needs higher level of care than they can provide due to medication changes and daily weights.  Group home for medically fragile patients going to evaluate him tomorrow, patient did make statement to CCP about \"shooting himself if he has to go to a nursing home.\" Will have access see for evaluation, I do not think we need to place patient under SI as the patient does not have access to firearm while he is in the hospital and statement was made out of frustration. Cancel DC for today.     ADDENDUM  Patient's discharge held overnight due to CCP needing to finalize new discharge plan.  He was seen by [facility] again yesterday due to being extremely upset about not being able to return to facility and voicing some possible suicidal gestures.  They do not feel he has any true suicidal ideation and did not recommend any specific precautions.  Patient feels much better today about the situation and is agreeable to discharge to new facility.  Medications reviewed.  Discussed with nurse and case management.  Additional 30-minute spent preparation of discharge today.  See progress note for further details.    Electronically signed by Bam Machado MD, 11/06/24, 10:18 AM EST.      Day of Discharge     Subjective:  Resting in bed, discussed plan about discharging today- he reports still having swelling in his legs- we discussed that should continue to improve on his increased dose of lasix.     Physical Exam:  Temp:  [97.9 °F (36.6 °C)-99.1 °F (37.3 °C)] 98.4 °F (36.9 °C)  Heart Rate:  [65-84] 71  Resp:  [16-18] 16  BP: (102-135)/(53-83) 115/60  Body mass index is 32.07 kg/m².  Physical Exam  Constitutional:       General: He is not in acute distress.     Appearance: He is ill-appearing. He is not toxic-appearing.   Cardiovascular:      Rate and Rhythm: " Normal rate. Rhythm irregular.      Heart sounds: Murmur heard.   Pulmonary:      Effort: Pulmonary effort is normal. No respiratory distress.      Breath sounds: Normal breath sounds. No wheezing.   Abdominal:      General: Abdomen is flat.      Palpations: Abdomen is soft.      Tenderness: There is no abdominal tenderness.   Musculoskeletal:         General: No tenderness.      Right lower leg: Edema present.      Left lower leg: Edema present.   Skin:     General: Skin is warm and dry.   Neurological:      General: No focal deficit present.      Mental Status: He is alert and oriented to person, place, and time. Mental status is at baseline.      Motor: Tremor present. No weakness.         Consultants     Consult Orders (all) (From admission, onward)       Start     Ordered    11/01/24 0954  Inpatient Nutrition Consult  Once        Provider:  (Not yet assigned)    11/01/24 0955    10/31/24 1835  Inpatient Cardiology Consult  Once        Specialty:  Cardiology  Provider:  Edu Arteaga MD    10/31/24 1835    10/31/24 1618  Inpatient Case Management  Consult  Once        Provider:  (Not yet assigned)    10/31/24 1618    10/31/24 1443  [facility] (on-call MD unless specified) Details  Once        Specialty:  Hospitalist  Provider:  (Not yet assigned)    10/31/24 1443                  Procedures     Imaging Results (All)       Procedure Component Value Units Date/Time    XR Chest 1 View [[id number]] Collected: 10/31/24 1430     Updated: 10/31/24 1439    Narrative:      Emergency portable view of the chest on 10/31/2024     CLINICAL HISTORY: CHF/COPD protocol     This is correlated to a prior portable chest x-ray on 10/6/2024.     FINDINGS: There are multiple sternal wires from previous cardiac  surgery. The cardiomediastinal silhouette is upper limits of normal. The  pulmonary vasculature is within normal limits. The lungs are clear. The  costophrenic angles are sharp.       Impression:      1. No  "active disease is seen in the chest     This report was finalized on 10/31/2024 2:31 PM by Dr. Primo Joya M.D  on Workstation: [id number]               Pertinent Labs     Results from last 7 days   Lab Units 11/05/24 0432 11/04/24 0446 11/03/24 0523 11/02/24 0328   WBC 10*3/mm3 8.11 9.17 7.64 6.55   HEMOGLOBIN g/dL 11.4* 11.6* 11.7* 10.6*   PLATELETS 10*3/mm3 220 209 217 187     Results from last 7 days   Lab Units 11/05/24 0432 11/04/24 0446 11/03/24 0523 11/02/24 0328   SODIUM mmol/L 135* 139 137 134*   POTASSIUM mmol/L 4.0 4.2 3.8 4.2   CHLORIDE mmol/L 96* 100 96* 98   CO2 mmol/L 29.1* 30.5* 30.0* 29.0   BUN mg/dL 20 21 21 18   CREATININE mg/dL 1.08 1.05 1.11 0.99   GLUCOSE mg/dL 105* 111* 147* 94   Estimated Creatinine Clearance: 66.3 mL/min (by C-G formula based on SCr of 1.08 mg/dL).  Results from last 7 days   Lab Units 11/05/24  0432 11/04/24  0446 11/03/24  0523 11/02/24  0328 11/01/24  0458 10/31/24  1405   ALBUMIN g/dL 3.5 3.3* 3.3* 3.3* 3.0* 3.3*   BILIRUBIN mg/dL  --   --   --   --  0.5 0.5   ALK PHOS U/L  --   --   --   --  55 70   AST (SGOT) U/L  --   --   --   --  15 23   ALT (SGPT) U/L  --   --   --   --  14 21     Results from last 7 days   Lab Units 11/05/24 0432 11/04/24 0446 11/03/24 0523 11/02/24 0328   CALCIUM mg/dL 8.4* 8.5* 8.6 8.4*   ALBUMIN g/dL 3.5 3.3* 3.3* 3.3*   PHOSPHORUS mg/dL 3.5 4.1 3.6 4.0       Results from last 7 days   Lab Units 10/31/24  1405   HSTROP T ng/L 23*   PROBNP pg/mL 1,860.0*           Invalid input(s): \"LDLCALC\"        Test Results Pending at Discharge       Discharge Details        Discharge Medications        New Medications        Instructions Start Date   empagliflozin 10 MG tablet tablet  Commonly known as: JARDIANCE   10 mg, Oral, Daily   Start Date: November 6, 2024            Changes to Medications        Instructions Start Date   clopidogrel 75 MG tablet  Commonly known as: PLAVIX  What changed:   how much to take  how to take this  when to " take this   75 mg, Oral, Daily, Take 300 mg (4tablets) on day 1 then 75 mg (1 tablet) daily there after starting day 2.      furosemide 20 MG tablet  Commonly known as: LASIX  What changed:   medication strength  how much to take  when to take this   20 mg, Oral, Nightly      furosemide 40 MG tablet  Commonly known as: LASIX  What changed: You were already taking a medication with the same name, and this prescription was added. Make sure you understand how and when to take each.   40 mg, Oral, Every Early Morning   Start Date: November 6, 2024     rOPINIRole 1 MG tablet  Commonly known as: REQUIP  What changed: See the new instructions.   1 mg, Oral, 3 Times Daily, Take 1 hour before bedtime.             Continue These Medications        Instructions Start Date   atorvastatin 40 MG tablet  Commonly known as: LIPITOR   40 mg, Nightly      carvedilol 3.125 MG tablet  Commonly known as: COREG   3.125 mg, Oral, 2 Times Daily      cyanocobalamin 1000 MCG tablet  Commonly known as: VITAMIN B-12   1,000 mcg, Oral, Daily      divalproex 500 MG DR tablet  Commonly known as: DEPAKOTE   500 mg, 2 Times Daily      docusate sodium 100 MG capsule  Commonly known as: COLACE   100 mg, Daily      ferrous sulfate 325 (65 FE) MG tablet   325 mg, Daily With Breakfast      Fluticasone-Salmeterol 100-50 MCG/ACT DISKUS  Commonly known as: ADVAIR/WIXELA   Inhalation, 2 Times Daily - RT      folic acid 1 MG tablet  Commonly known as: FOLVITE   1 mg, Daily      glucosamine sulfate 500 MG capsule capsule   1,000 mg, Daily      levothyroxine 50 MCG tablet  Commonly known as: SYNTHROID, LEVOTHROID   50 mcg, Oral, Daily      loperamide 2 MG tablet  Commonly known as: IMODIUM A-D   1 tablet, Every 6 Hours PRN      Melatonin 10 MG tablet   10 mg, Nightly      nitroglycerin 0.4 MG SL tablet  Commonly known as: NITROSTAT   0.4 mg, Sublingual, Every 5 Minutes PRN, Take no more than 3 doses in 15 minutes.      omeprazole 40 MG capsule  Commonly known  as: priLOSEC   40 mg, Every Evening      oxybutynin XL 5 MG 24 hr tablet  Commonly known as: DITROPAN-XL   5 mg, Daily      rivaroxaban 20 MG tablet  Commonly known as: XARELTO   20 mg, Oral, Daily      tamsulosin 0.4 MG capsule 24 hr capsule  Commonly known as: FLOMAX   0.8 mg, Oral, Daily             Stop These Medications      cephalexin 500 MG capsule  Commonly known as: KEFLEX     doxycycline 100 MG capsule  Commonly known as: MONODOX     lisinopril 5 MG tablet  Commonly known as: PRINIVIL,ZESTRIL     metFORMIN 500 MG tablet  Commonly known as: GLUCOPHAGE              No Known Allergies      Discharge Disposition:  Home or Self Care    Discharge Diet:  Diet Order   Procedures    Diet: Cardiac, Diabetic, Renal, Fluid Restriction (240 mL/tray); Healthy Heart (2-3 Na+); Consistent Carbohydrate; Low Sodium (2-3g), Low Potassium, Low Phosphorus; 2000 mL/day; Fluid Consistency: Thin (IDDSI 0)       Discharge Activity:   Activity Instructions       Activity as Tolerated              CODE STATUS:    Code Status and Medical Interventions: CPR (Attempt to Resuscitate); Full Support   Ordered at: 10/31/24 1835     Code Status (Patient has no pulse and is not breathing):    CPR (Attempt to Resuscitate)     Medical Interventions (Patient has pulse or is breathing):    Full Support       Future Appointments   Date Time Provider Department Center   11/27/2024 10:30 AM Cierra Hernández APRN [facility]   12/6/2024 10:30 AM Sammie Gambino APRN [id number]   4/3/2025  1:15 PM Rio Miranda MD [facility]   8/11/2025 11:45 AM Rio Miranda MD [facility]     Additional Instructions for the Follow-ups that You Need to Schedule       Discharge Follow-up with PCP   As directed       Currently Documented PCP:    Sammie Gambino APRN    PCP Phone Number:    [phone]     Follow Up Details: post-hospital follow up        Discharge Follow-up with Specified Provider: Cardiology at your  already scheduled appt 11/27/24   As directed      To: Cardiology at your already scheduled appt 11/27/24               Follow-up Information       Sammie Gambino APRN .    Specialties: Nurse Practitioner, Family Medicine  Why: post-hospital follow up  Contact information:  [address] [facility] [address]  [place] [address]  [phone]                             Additional Instructions for the Follow-ups that You Need to Schedule       Discharge Follow-up with PCP   As directed       Currently Documented PCP:    Sammie Gambino APRN    PCP Phone Number:    [phone]     Follow Up Details: post-hospital follow up        Discharge Follow-up with Specified Provider: Cardiology at your already scheduled appt 11/27/24   As directed      To: Cardiology at your already scheduled appt 11/27/24            Time Spent on Discharge:  I spent greater than 30 minutes on this discharge activity which included: face-to-face encounter with the patient, reviewing the data in the system, coordination of the care with the nursing staff as well as consultants, documentation, and entering orders.       Renetta Romero MD  [facility]  11/05/24  11:06 EST

## 2024-11-05 NOTE — CASE MANAGEMENT/SOCIAL WORK
Case Management Discharge Note      Final Note: [facility] to [facility]. Can return at D/C. Nurse needs to call report to [phone] at D/C and they will provide transport.    Provided Post Acute Provider List?: Refused  Refused Provider List Comment: States he has a nurse that gives him all of his meds.  Provided Post Acute Provider Quality & Resource List?: Refused    Selected Continued Care - Admitted Since 10/31/2024       Destination    No services have been selected for the patient.                Durable Medical Equipment    No services have been selected for the patient.                Dialysis/Infusion    No services have been selected for the patient.                Home Medical Care    No services have been selected for the patient.                Therapy    No services have been selected for the patient.                Community Resources    No services have been selected for the patient.                Community & DME    No services have been selected for the patient.                    Transportation Services  Private: Car    Final Discharge Disposition Code: 01 - home or self-care

## 2024-11-05 NOTE — CASE MANAGEMENT/SOCIAL WORK
Continued Stay Note  [facility]     Patient Name: Hernando Lozada  MRN: [id number]  Today's Date: 11/5/2024    Admit Date: 10/31/2024    Plan: Pt D/C but [facility] stating unable to accept back due to medical issues. [facility] plan to assess pt in AM.   Discharge Plan       Row Name 11/05/24 1720       Plan    Plan Pt D/C but [facility] stating unable to accept back due to medical issues. [facility] plan to assess pt in AM.    Patient/Family in Agreement with Plan yes    Plan Comments Plan was for [facility] to [facility]. Nurse called report to [facility] ([phone]) as they had requested when CCP spoke with them upon admission. Nurse notified CCP that [facility] stated they can not accept back due to med changes and need fo daily weights. Spoke with [facility] [place] and was told the same. Called pt's brother, Danny, and his cousin David per pt's request and notified of situation. Both stated that pt does not have a guardian and is his own decision maker. Discussed with pt that [facility] said they are unable to accept back. Pt upset and stated he is not going to a nursing home.  Called and spoke to Frieda Mota/ with [facility] and was told they could not accept pt back and that he needs LTC at a nursing home. Frieda stated pt has been wanting to go to a nursing home for a while. Per Frieda, CCP will need to find LTC placement as they cannot accept back. Informed Frieda that pt is very upset over this and Frieda stated to have pt call him. Gave pt phone number and assisted with input of numbers. Notified Lyric Youssef CCP Manager of situation and she called and spoke to both [facility] and [facility]. Per [facility] will assess pt in AM and will look into placement at  a different [facility] in [place] that accepts medically fragile pts. Notified MD and Nurse of delay in D/C due to group home not accepting back.. Will follow up in AM. Amanuel Alcaraz RN-BC                   Discharge Codes    No documentation.                 Expected Discharge Date and Time       Expected Discharge Date Expected Discharge Time    Nov 5, 2024               Amanuel Alcaraz, RN
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
## 2024-11-05 NOTE — DISCHARGE PLACEMENT REQUEST
"Ronald Roberson (79 y.o. Male)       Date of Birth   1945    Social Security Number       Address   [facility]  [place] [address]    Home Phone   [phone]    MRN   [id number]       [facility]    Marital Status   Single                            Admission Date   10/31/24    Admission Type   Emergency    Admitting Provider   Jeanne Chan MD    Attending Provider   Renetta Romero MD    Department, Room/Bed   [address], [address]       Discharge Date       Discharge Disposition   Home or Self Care    Discharge Destination                                 Attending Provider: Renetta Romero MD    Allergies: No Known Allergies    Isolation: None   Infection: None   Code Status: CPR    Ht: 175.3 cm (69\")   Wt: 98.5 kg (217 lb 3.2 oz)    Admission Cmt: None   Principal Problem: Acute on chronic diastolic heart failure [I50.33]                   Active Insurance as of 10/31/2024       Primary Coverage       Payor Plan Insurance Group Employer/Plan Group    MEDICARE MEDICARE A & B        Payor Plan Address Payor Plan Phone Number Payor Plan Fax Number Effective Dates    [address] [phone]  1/1/1992 - None Entered    [facility] [id number]         Subscriber Name Subscriber Birth Date Member ID       RONALD ROBERSON 1945 [id number]               Secondary Coverage       Payor Plan Insurance Group Employer/Plan Group    [place]        Payor Plan Address Payor Plan Phone Number Payor Plan Fax Number Effective Dates    [address] [phone]  7/11/2018 - None Entered    [place] [address]         Subscriber Name Subscriber Birth Date Member ID       RONALD ROBERSON 1945 [id number]                     Emergency Contacts        (Rel.) Home Phone Work Phone Mobile Phone    Danny Roberson (Brother) [phone] -- [phone]    Teo Roberson (Brother) [phone] -- [phone]    IRVING CORONADO " () [phone] -- --    David Coyne (Cousin) (Relative) [phone] -- [phone]    Sharmin Bedoya (Care Giver) -- [phone] --

## 2024-11-05 NOTE — PLAN OF CARE
Goal Outcome Evaluation:  Plan of Care Reviewed With: patient        Progress: improving  Outcome Evaluation: VSS. Q2 turn. Pt ambulated to bathroom with x1 assist with walker. Passing gas. No BM. [place] red but blanchable. Possible d/c today. Safety maintained.

## 2024-11-06 VITALS
WEIGHT: 212.08 LBS | HEIGHT: 69 IN | TEMPERATURE: 98.6 F | BODY MASS INDEX: 31.41 KG/M2 | SYSTOLIC BLOOD PRESSURE: 114 MMHG | RESPIRATION RATE: 18 BRPM | HEART RATE: 77 BPM | DIASTOLIC BLOOD PRESSURE: 76 MMHG | OXYGEN SATURATION: 100 %

## 2024-11-06 LAB
ALBUMIN SERPL-MCNC: 3.2 G/DL (ref 3.5–5.2)
ANION GAP SERPL CALCULATED.3IONS-SCNC: 8.1 MMOL/L (ref 5–15)
BUN SERPL-MCNC: 22 MG/DL (ref 8–23)
BUN/CREAT SERPL: 22.9 (ref 7–25)
CALCIUM SPEC-SCNC: 8.8 MG/DL (ref 8.6–10.5)
CHLORIDE SERPL-SCNC: 98 MMOL/L (ref 98–107)
CO2 SERPL-SCNC: 29.9 MMOL/L (ref 22–29)
CREAT SERPL-MCNC: 0.96 MG/DL (ref 0.76–1.27)
DEPRECATED RDW RBC AUTO: 44.9 FL (ref 37–54)
EGFRCR SERPLBLD CKD-EPI 2021: 81.4 ML/MIN/1.73
ERYTHROCYTE [DISTWIDTH] IN BLOOD BY AUTOMATED COUNT: 13.9 % (ref 12.3–15.4)
GLUCOSE SERPL-MCNC: 113 MG/DL (ref 65–99)
HCT VFR BLD AUTO: 37.5 % (ref 37.5–51)
HGB BLD-MCNC: 12.1 G/DL (ref 13–17.7)
MCH RBC QN AUTO: 28.9 PG (ref 26.6–33)
MCHC RBC AUTO-ENTMCNC: 32.3 G/DL (ref 31.5–35.7)
MCV RBC AUTO: 89.7 FL (ref 79–97)
PHOSPHATE SERPL-MCNC: 4 MG/DL (ref 2.5–4.5)
PLATELET # BLD AUTO: 258 10*3/MM3 (ref 140–450)
PMV BLD AUTO: 9 FL (ref 6–12)
POTASSIUM SERPL-SCNC: 4 MMOL/L (ref 3.5–5.2)
RBC # BLD AUTO: 4.18 10*6/MM3 (ref 4.14–5.8)
SODIUM SERPL-SCNC: 136 MMOL/L (ref 136–145)
WBC NRBC COR # BLD AUTO: 6.73 10*3/MM3 (ref 3.4–10.8)

## 2024-11-06 PROCEDURE — 97116 GAIT TRAINING THERAPY: CPT

## 2024-11-06 PROCEDURE — 80069 RENAL FUNCTION PANEL: CPT | Performed by: STUDENT IN AN ORGANIZED HEALTH CARE EDUCATION/TRAINING PROGRAM

## 2024-11-06 PROCEDURE — 97110 THERAPEUTIC EXERCISES: CPT

## 2024-11-06 PROCEDURE — 85027 COMPLETE CBC AUTOMATED: CPT | Performed by: STUDENT IN AN ORGANIZED HEALTH CARE EDUCATION/TRAINING PROGRAM

## 2024-11-06 RX ADMIN — EMPAGLIFLOZIN 10 MG: 10 TABLET, FILM COATED ORAL at 08:07

## 2024-11-06 RX ADMIN — CARVEDILOL 3.12 MG: 3.12 TABLET, FILM COATED ORAL at 08:07

## 2024-11-06 RX ADMIN — OXYBUTYNIN CHLORIDE 5 MG: 5 TABLET, EXTENDED RELEASE ORAL at 08:07

## 2024-11-06 RX ADMIN — CLOPIDOGREL BISULFATE 75 MG: 75 TABLET, FILM COATED ORAL at 08:07

## 2024-11-06 RX ADMIN — DOCUSATE SODIUM 100 MG: 100 CAPSULE, LIQUID FILLED ORAL at 08:07

## 2024-11-06 RX ADMIN — CYANOCOBALAMIN TAB 500 MCG 1000 MCG: 500 TAB at 08:07

## 2024-11-06 RX ADMIN — RIVAROXABAN 20 MG: 20 TABLET, FILM COATED ORAL at 08:07

## 2024-11-06 RX ADMIN — TAMSULOSIN HYDROCHLORIDE 0.8 MG: 0.4 CAPSULE ORAL at 08:07

## 2024-11-06 RX ADMIN — DIVALPROEX SODIUM 500 MG: 500 TABLET, DELAYED RELEASE ORAL at 08:07

## 2024-11-06 RX ADMIN — FERROUS SULFATE TAB 325 MG (65 MG ELEMENTAL FE) 325 MG: 325 (65 FE) TAB at 08:07

## 2024-11-06 RX ADMIN — FOLIC ACID 1 MG: 1 TABLET ORAL at 08:07

## 2024-11-06 RX ADMIN — PANTOPRAZOLE SODIUM 40 MG: 40 TABLET, DELAYED RELEASE ORAL at 06:17

## 2024-11-06 RX ADMIN — LEVOTHYROXINE SODIUM 50 MCG: 50 TABLET ORAL at 06:17

## 2024-11-06 RX ADMIN — FUROSEMIDE 40 MG: 40 TABLET ORAL at 06:17

## 2024-11-06 RX ADMIN — Medication 10 ML: at 08:08

## 2024-11-06 NOTE — PROGRESS NOTES
Name: Hernando Lozada ADMIT: 10/31/2024   : 1945  PCP: Sammie Gambino APRN    MRN: [id number] LOS: 6 days   AGE/SEX: 79 y.o. male  ROOM: [facility]     Subjective   Subjective   No specific complaints.  Sitting up in chair eating breakfast.       Objective   Objective   Vital Signs  Temp:  [98.4 °F (36.9 °C)-98.7 °F (37.1 °C)] 98.4 °F (36.9 °C)  Heart Rate:  [69-82] 69  Resp:  [16] 16  BP: ()/(60-84) 105/75  SpO2:  [93 %-100 %] 96 %  on   ;   Device (Oxygen Therapy): room air  Body mass index is 31.32 kg/m².  Physical Exam  Constitutional:       General: He is not in acute distress.     Appearance: He is ill-appearing. He is not toxic-appearing.   Cardiovascular:      Rate and Rhythm: Normal rate. Rhythm irregular.      Heart sounds: Murmur heard.   Pulmonary:      Effort: Pulmonary effort is normal. No respiratory distress.      Breath sounds: Normal breath sounds. No wheezing.   Abdominal:      General: Abdomen is flat.      Palpations: Abdomen is soft.      Tenderness: There is no abdominal tenderness.   Musculoskeletal:         General: No tenderness.      Right lower leg: Edema present.      Left lower leg: Edema present.   Skin:     General: Skin is warm and dry.   Neurological:      Mental Status: He is alert. Mental status is at baseline.      Motor: No weakness.         Results Review     I reviewed the patient's new clinical results.  Results from last 7 days   Lab Units 24   WBC 10*3/mm3 6.73 8.11 9.17 7.64   HEMOGLOBIN g/dL 12.1* 11.4* 11.6* 11.7*   PLATELETS 10*3/mm3 258 220 209 217     Results from last 7 days   Lab Units 24  0524  0328 24  0458 10/31/24  1405   SODIUM mmol/L 136 135* 139 137 134* 138 133*   POTASSIUM mmol/L 4.0 4.0 4.2 3.8 4.2 4.2 4.1   CHLORIDE mmol/L 98 96* 100 96* 98 101 96*   CO2 mmol/L 29.9* 29.1* 30.5* 30.0* 29.0 29.0 30.0*   BUN mg/dL 22 20 21  21 18 15 18   CREATININE mg/dL 0.96 1.08 1.05 1.11 0.99 1.01 1.08   GLUCOSE mg/dL 113* 105* 111* 147* 94 88 130*   EGFR mL/min/1.73 81.4 70.7 73.1 68.4 78.5 76.6 70.7   ALBUMIN g/dL 3.2* 3.5 3.3* 3.3* 3.3* 3.0* 3.3*   BILIRUBIN mg/dL  --   --   --   --   --  0.5 0.5   ALK PHOS U/L  --   --   --   --   --  55 70   AST (SGOT) U/L  --   --   --   --   --  15 23   ALT (SGPT) U/L  --   --   --   --   --  14 21     Results from last 7 days   Lab Units 11/06/24  0435 11/05/24  0432 11/04/24  0446 11/03/24  0523   CALCIUM mg/dL 8.8 8.4* 8.5* 8.6   ALBUMIN g/dL 3.2* 3.5 3.3* 3.3*   PHOSPHORUS mg/dL 4.0 3.5 4.1 3.6       Glucose   Date/Time Value Ref Range Status   11/05/2024 2052 121 70 - 130 mg/dL Final       Scheduled Medications  atorvastatin, 40 mg, Oral, Nightly  carvedilol, 3.125 mg, Oral, BID  clopidogrel, 75 mg, Oral, Daily  divalproex, 500 mg, Oral, BID  docusate sodium, 100 mg, Oral, Daily  empagliflozin, 10 mg, Oral, Daily  ferrous sulfate, 325 mg, Oral, Daily With Breakfast  folic acid, 1 mg, Oral, Daily  furosemide, 20 mg, Oral, Nightly  furosemide, 40 mg, Oral, Q AM  levothyroxine, 50 mcg, Oral, Q AM  oxybutynin XL, 5 mg, Oral, Daily  pantoprazole, 40 mg, Oral, Q AM  rivaroxaban, 20 mg, Oral, Daily  rOPINIRole, 0.5 mg, Oral, Nightly  sodium chloride, 10 mL, Intravenous, Q12H  tamsulosin, 0.8 mg, Oral, Daily  cyanocobalamin, 1,000 mcg, Oral, Daily    Infusions   Diet  Diet: Cardiac, Diabetic, Renal, Fluid Restriction (240 mL/tray); Healthy Heart (2-3 Na+); Consistent Carbohydrate; Low Sodium (2-3g), Low Potassium, Low Phosphorus; 2000 mL/day; Fluid Consistency: Thin (IDDSI 0)         I have personally reviewed:  [x]  Laboratory   []  Microbiology   []  Radiology   [x]  EKG/Telemetry   []  Cardiology/Vascular   []  Pathology   []  Records    Assessment/Plan     Active Hospital Problems    Diagnosis  POA    **Acute on chronic diastolic heart failure [I50.33]  Yes    Anemia [D64.9]  Unknown    Atrial fibrillation  [I48.91]  Yes    Hypothyroidism [E03.9]  Yes    Chronic deep vein thrombosis (DVT) of calf muscle vein of left lower extremity [I82.562]  Yes    Aortic stenosis, severe [I35.0]  Yes    Chronic brain injury [S06.9XAS]  Not Applicable    CKD (chronic kidney disease) [N18.9]  Yes    Diabetes mellitus [E11.9]  Yes    GIOVANNY (obstructive sleep apnea) [G47.33]  Yes    Primary hypertension [I10]  Yes      Resolved Hospital Problems   No resolved problems to display.       79 y.o. male admitted with Acute on chronic diastolic heart failure.    Remains stable medically.  Seems that he has come to terms with having to go to a new facility.  Will need to remain hospitalized until all arrangements have been finalized.  Labs have been stable.  He is on oral diuretic.  Discontinue daily monitoring.       Acute on chronic diastolic CHF  Atrial fibrillation- persistent  Aortic stenosis- severe  HTN  CAD w/ h/o PCI  - cards consulted  - IV lasix switched to po lasix, no need for daily BMP  - cont statin, BB, ACEi, plavix/xarelto    Hx of TBI  - continue depakote    GIOVANNY   - home cpap    Anemia  - stable near baseline of ~9-10      Xarelto (home med) for DVT prophylaxis.  History of DVT.  Full code.  Discussed with patient, nursing staff, and CCP.   Plan discharge to new rehab facility once arrangements have been finalized.  Anticipate later today.      Bam Machado MD  [facility]  11/06/24  07:36 EST

## 2024-11-06 NOTE — PLAN OF CARE
Goal Outcome Evaluation:  Plan of Care Reviewed With: patient        Progress: improving  VSS. Fluid restriction continues. 400 ml left til 1000. Q2  turn. 2L O2 on while sleeping. BP soft. Will continue to monitor.
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
## 2024-11-06 NOTE — CASE MANAGEMENT/SOCIAL WORK
Case Management Discharge Note      Final Note: D/C to new [facility] home in [facility] that cares for pt's with medical needs. [facility] transporting. Pt in agreement to go to [facility] in [facility]. Called and discussed D/C plan with pt's brother Danny.    Provided Post Acute Provider List?: Refused  Refused Provider List Comment: States he has a nurse that gives him all of his meds.  Provided Post Acute Provider Quality & Resource List?: Refused    Selected Continued Care - Admitted Since 10/31/2024       Destination    No services have been selected for the patient.                Durable Medical Equipment    No services have been selected for the patient.                Dialysis/Infusion    No services have been selected for the patient.                Home Medical Care    No services have been selected for the patient.                Therapy    No services have been selected for the patient.                Community Resources    No services have been selected for the patient.                Community & DME    No services have been selected for the patient.                    Transportation Services  Private: Car  Other: Other ([facility])    Final Discharge Disposition Code: 01 - home or self-care

## 2024-11-06 NOTE — PLAN OF CARE
Goal Outcome Evaluation:  Plan of Care Reviewed With: patient        Progress: improving  Outcome Evaluation: Pt tolerated treatment well this date. Required SBA to stand from chair, then ambulated 450ft w/ Rw and CGA. No LOBs noted, though cues given to improve foot clearance from ground. Instructed pt on a few seated LE exercises, and encouraged pt to ambulate w/ nsg later.    Anticipated Discharge Disposition (PT): adult foster care/group home, home with home health

## 2024-11-06 NOTE — PLAN OF CARE
Goal Outcome Evaluation:           Progress: improving  Outcome Evaluation: VSS. Pt up in chair. Pt to be discharged at 1100. No c/o pain.

## 2024-11-17 ENCOUNTER — HOSPITAL ENCOUNTER (EMERGENCY)
Facility: HOSPITAL | Age: 79
Discharge: HOME | End: 2024-11-17
Attending: EMERGENCY MEDICINE | Admitting: EMERGENCY MEDICINE
Payer: MEDICARE

## 2024-11-17 ENCOUNTER — APPOINTMENT (OUTPATIENT)
Dept: CT IMAGING | Facility: HOSPITAL | Age: 79
End: 2024-11-17
Payer: MEDICARE

## 2024-11-17 VITALS
TEMPERATURE: 98 F | OXYGEN SATURATION: 100 % | HEIGHT: 69 IN | BODY MASS INDEX: 35.63 KG/M2 | RESPIRATION RATE: 18 BRPM | WEIGHT: 240.6 LBS | DIASTOLIC BLOOD PRESSURE: 71 MMHG | SYSTOLIC BLOOD PRESSURE: 132 MMHG | HEART RATE: 71 BPM

## 2024-11-17 DIAGNOSIS — R35.89 POLYURIA: ICD-10-CM

## 2024-11-17 DIAGNOSIS — R42 DIZZINESS: Primary | ICD-10-CM

## 2024-11-17 LAB
ALBUMIN SERPL-MCNC: 3.4 G/DL (ref 3.5–5.2)
ALBUMIN/GLOB SERPL: 1.4 G/DL
ALP SERPL-CCNC: 79 U/L (ref 39–117)
ALT SERPL W P-5'-P-CCNC: 7 U/L (ref 1–41)
ANION GAP SERPL CALCULATED.3IONS-SCNC: 8 MMOL/L (ref 5–15)
AST SERPL-CCNC: 12 U/L (ref 1–40)
BASOPHILS # BLD AUTO: 0.03 10*3/MM3 (ref 0–0.2)
BASOPHILS NFR BLD AUTO: 0.5 % (ref 0–1.5)
BILIRUB SERPL-MCNC: 0.5 MG/DL (ref 0–1.2)
BILIRUB UR QL STRIP: NEGATIVE
BUN SERPL-MCNC: 17 MG/DL (ref 8–23)
BUN/CREAT SERPL: 14.4 (ref 7–25)
CALCIUM SPEC-SCNC: 8.2 MG/DL (ref 8.6–10.5)
CHLORIDE SERPL-SCNC: 106 MMOL/L (ref 98–107)
CLARITY UR: CLEAR
CO2 SERPL-SCNC: 30 MMOL/L (ref 22–29)
COLOR UR: YELLOW
CREAT SERPL-MCNC: 1.18 MG/DL (ref 0.76–1.27)
DEPRECATED RDW RBC AUTO: 56.7 FL (ref 37–54)
EGFRCR SERPLBLD CKD-EPI 2021: 63.6 ML/MIN/1.73
EOSINOPHIL # BLD AUTO: 0.03 10*3/MM3 (ref 0–0.4)
EOSINOPHIL NFR BLD AUTO: 0.5 % (ref 0.3–6.2)
ERYTHROCYTE [DISTWIDTH] IN BLOOD BY AUTOMATED COUNT: 16.6 % (ref 12.3–15.4)
GLOBULIN UR ELPH-MCNC: 2.5 GM/DL
GLUCOSE SERPL-MCNC: 112 MG/DL (ref 65–99)
GLUCOSE UR STRIP-MCNC: NEGATIVE MG/DL
HCT VFR BLD AUTO: 34.4 % (ref 37.5–51)
HGB BLD-MCNC: 10.8 G/DL (ref 13–17.7)
HGB UR QL STRIP.AUTO: NEGATIVE
HOLD SPECIMEN: NORMAL
IMM GRANULOCYTES # BLD AUTO: 0.02 10*3/MM3 (ref 0–0.05)
IMM GRANULOCYTES NFR BLD AUTO: 0.3 % (ref 0–0.5)
KETONES UR QL STRIP: NEGATIVE
LEUKOCYTE ESTERASE UR QL STRIP.AUTO: NEGATIVE
LIPASE SERPL-CCNC: 21 U/L (ref 13–60)
LYMPHOCYTES # BLD AUTO: 1.66 10*3/MM3 (ref 0.7–3.1)
LYMPHOCYTES NFR BLD AUTO: 26 % (ref 19.6–45.3)
MAGNESIUM SERPL-MCNC: 2 MG/DL (ref 1.6–2.4)
MCH RBC QN AUTO: 29.4 PG (ref 26.6–33)
MCHC RBC AUTO-ENTMCNC: 31.4 G/DL (ref 31.5–35.7)
MCV RBC AUTO: 93.7 FL (ref 79–97)
MONOCYTES # BLD AUTO: 0.59 10*3/MM3 (ref 0.1–0.9)
MONOCYTES NFR BLD AUTO: 9.2 % (ref 5–12)
NEUTROPHILS NFR BLD AUTO: 4.05 10*3/MM3 (ref 1.7–7)
NEUTROPHILS NFR BLD AUTO: 63.5 % (ref 42.7–76)
NITRITE UR QL STRIP: NEGATIVE
NRBC BLD AUTO-RTO: 0 /100 WBC (ref 0–0.2)
PH UR STRIP.AUTO: 6.5 [PH] (ref 5–8)
PLATELET # BLD AUTO: 198 10*3/MM3 (ref 140–450)
PMV BLD AUTO: 9.6 FL (ref 6–12)
POTASSIUM SERPL-SCNC: 3.8 MMOL/L (ref 3.5–5.2)
PROT SERPL-MCNC: 5.9 G/DL (ref 6–8.5)
PROT UR QL STRIP: NEGATIVE
RBC # BLD AUTO: 3.67 10*6/MM3 (ref 4.14–5.8)
SODIUM SERPL-SCNC: 144 MMOL/L (ref 136–145)
SP GR UR STRIP: 1.01 (ref 1–1.03)
TROPONIN T SERPL HS-MCNC: 20 NG/L
UROBILINOGEN UR QL STRIP: NORMAL
VALPROATE SERPL-MCNC: 58.8 MCG/ML (ref 50–125)
WBC NRBC COR # BLD AUTO: 6.38 10*3/MM3 (ref 3.4–10.8)
WHOLE BLOOD HOLD COAG: NORMAL
WHOLE BLOOD HOLD SPECIMEN: NORMAL

## 2024-11-17 PROCEDURE — 80053 COMPREHEN METABOLIC PANEL: CPT | Performed by: EMERGENCY MEDICINE

## 2024-11-17 PROCEDURE — 99284 EMERGENCY DEPT VISIT MOD MDM: CPT

## 2024-11-17 PROCEDURE — 36415 COLL VENOUS BLD VENIPUNCTURE: CPT

## 2024-11-17 PROCEDURE — 84484 ASSAY OF TROPONIN QUANT: CPT | Performed by: EMERGENCY MEDICINE

## 2024-11-17 PROCEDURE — 81003 URINALYSIS AUTO W/O SCOPE: CPT | Performed by: EMERGENCY MEDICINE

## 2024-11-17 PROCEDURE — 93005 ELECTROCARDIOGRAM TRACING: CPT | Performed by: EMERGENCY MEDICINE

## 2024-11-17 PROCEDURE — 83735 ASSAY OF MAGNESIUM: CPT | Performed by: EMERGENCY MEDICINE

## 2024-11-17 PROCEDURE — 70450 CT HEAD/BRAIN W/O DYE: CPT

## 2024-11-17 PROCEDURE — 93005 ELECTROCARDIOGRAM TRACING: CPT

## 2024-11-17 PROCEDURE — 83690 ASSAY OF LIPASE: CPT | Performed by: EMERGENCY MEDICINE

## 2024-11-17 PROCEDURE — 85025 COMPLETE CBC W/AUTO DIFF WBC: CPT | Performed by: EMERGENCY MEDICINE

## 2024-11-17 PROCEDURE — 80164 ASSAY DIPROPYLACETIC ACD TOT: CPT | Performed by: EMERGENCY MEDICINE

## 2024-11-17 RX ORDER — SODIUM CHLORIDE 0.9 % (FLUSH) 0.9 %
10 SYRINGE (ML) INJECTION AS NEEDED
Status: DISCONTINUED | OUTPATIENT
Start: 2024-11-17 | End: 2024-11-17 | Stop reason: HOSPADM
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
## 2024-11-21 LAB
QT INTERVAL: 424 MS
QTC INTERVAL: 454 MS

## 2024-12-07 ENCOUNTER — HOSPITAL ENCOUNTER (EMERGENCY)
Facility: HOSPITAL | Age: 79
Discharge: HOME | End: 2024-12-07
Attending: STUDENT IN AN ORGANIZED HEALTH CARE EDUCATION/TRAINING PROGRAM
Payer: MEDICARE

## 2024-12-07 ENCOUNTER — APPOINTMENT (OUTPATIENT)
Dept: GENERAL RADIOLOGY | Facility: HOSPITAL | Age: 79
End: 2024-12-07
Payer: MEDICARE

## 2024-12-07 VITALS
DIASTOLIC BLOOD PRESSURE: 105 MMHG | HEART RATE: 88 BPM | WEIGHT: 219.9 LBS | RESPIRATION RATE: 18 BRPM | SYSTOLIC BLOOD PRESSURE: 160 MMHG | BODY MASS INDEX: 32.57 KG/M2 | OXYGEN SATURATION: 94 % | HEIGHT: 69 IN | TEMPERATURE: 98.1 F

## 2024-12-07 DIAGNOSIS — K59.00 CONSTIPATION, UNSPECIFIED CONSTIPATION TYPE: Primary | ICD-10-CM

## 2024-12-07 DIAGNOSIS — M79.89 LEG SWELLING: ICD-10-CM

## 2024-12-07 LAB
HOLD SPECIMEN: NORMAL
WHOLE BLOOD HOLD COAG: NORMAL
WHOLE BLOOD HOLD SPECIMEN: NORMAL

## 2024-12-07 PROCEDURE — 74018 RADEX ABDOMEN 1 VIEW: CPT

## 2024-12-07 PROCEDURE — 25010000002 FUROSEMIDE PER 20 MG: Performed by: STUDENT IN AN ORGANIZED HEALTH CARE EDUCATION/TRAINING PROGRAM

## 2024-12-07 PROCEDURE — 96374 THER/PROPH/DIAG INJ IV PUSH: CPT

## 2024-12-07 PROCEDURE — 99283 EMERGENCY DEPT VISIT LOW MDM: CPT

## 2024-12-07 RX ORDER — DOCUSATE SODIUM 100 MG/1
100 CAPSULE, LIQUID FILLED ORAL 2 TIMES DAILY
Qty: 28 CAPSULE | Refills: 0 | Status: SHIPPED | OUTPATIENT
Start: 2024-12-07

## 2024-12-07 RX ORDER — FUROSEMIDE 10 MG/ML
80 INJECTION INTRAMUSCULAR; INTRAVENOUS ONCE
Status: COMPLETED | OUTPATIENT
Start: 2024-12-07 | End: 2024-12-07

## 2024-12-07 RX ORDER — SODIUM CHLORIDE 0.9 % (FLUSH) 0.9 %
10 SYRINGE (ML) INJECTION AS NEEDED
Status: DISCONTINUED | OUTPATIENT
Start: 2024-12-07 | End: 2024-12-07 | Stop reason: HOSPADM

## 2024-12-07 RX ADMIN — FUROSEMIDE 80 MG: 10 INJECTION, SOLUTION INTRAVENOUS at 02:47

## 2024-12-07 NOTE — ED NOTES
The following fall interventions were initiated:    [x] Patient and/or family given education   [x] Call light within reach and educated on how to use   [x] Bed rails up per protocol    [x] Bed locked and in the lowest position   [x] Bed alarm set and on loudest setting   [x] Fall wrist band applied   [x] Non skid footwear applied   [x] Room free of clutter   [x] Patient items within reach   [x] Adequate lighting provided  [] Falls sign present    [x] Patient moved closer to nursing station   [] Restraints applied
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
## 2024-12-10 ENCOUNTER — PREP FOR SURGERY (OUTPATIENT)
Dept: OTHER | Facility: HOSPITAL | Age: 79
End: 2024-12-10
Payer: MEDICARE

## 2024-12-10 ENCOUNTER — OFFICE VISIT (OUTPATIENT)
Dept: CARDIOLOGY | Facility: CLINIC | Age: 79
End: 2024-12-10
Payer: MEDICARE

## 2024-12-10 VITALS
HEIGHT: 69 IN | SYSTOLIC BLOOD PRESSURE: 144 MMHG | BODY MASS INDEX: 32.44 KG/M2 | HEART RATE: 78 BPM | OXYGEN SATURATION: 98 % | WEIGHT: 219 LBS | DIASTOLIC BLOOD PRESSURE: 78 MMHG

## 2024-12-10 DIAGNOSIS — I35.0 AORTIC VALVE STENOSIS, ETIOLOGY OF CARDIAC VALVE DISEASE UNSPECIFIED: ICD-10-CM

## 2024-12-10 DIAGNOSIS — I48.19 ATRIAL FIBRILLATION, PERSISTENT: Primary | ICD-10-CM

## 2024-12-10 DIAGNOSIS — I25.10 CORONARY ARTERY DISEASE INVOLVING NATIVE CORONARY ARTERY OF NATIVE HEART WITHOUT ANGINA PECTORIS: ICD-10-CM

## 2024-12-10 DIAGNOSIS — I48.19 ATRIAL FIBRILLATION, PERSISTENT: ICD-10-CM

## 2024-12-10 DIAGNOSIS — I10 PRIMARY HYPERTENSION: ICD-10-CM

## 2024-12-10 DIAGNOSIS — I50.32 CHRONIC DIASTOLIC CONGESTIVE HEART FAILURE: Primary | ICD-10-CM

## 2024-12-10 RX ORDER — SPIRONOLACTONE 25 MG/1
12.5 TABLET ORAL DAILY
Qty: 45 TABLET | Refills: 3 | Status: SHIPPED | OUTPATIENT
Start: 2024-12-10

## 2024-12-10 RX ORDER — SODIUM CHLORIDE 9 MG/ML
40 INJECTION, SOLUTION INTRAVENOUS AS NEEDED
Status: CANCELLED | OUTPATIENT
Start: 2024-12-10

## 2024-12-10 RX ORDER — SODIUM CHLORIDE 0.9 % (FLUSH) 0.9 %
10 SYRINGE (ML) INJECTION AS NEEDED
Status: CANCELLED | OUTPATIENT
Start: 2024-12-10

## 2024-12-10 RX ORDER — SODIUM CHLORIDE 0.9 % (FLUSH) 0.9 %
10 SYRINGE (ML) INJECTION EVERY 12 HOURS SCHEDULED
Status: CANCELLED | OUTPATIENT
Start: 2024-12-10

## 2024-12-10 NOTE — PROGRESS NOTES
Reason For Visit:  Establish care for multiple cardiovascular issues    Lazarus Lozada is a 79 y.o. male with the below pertinent PMH who is referred for the above he is on.    The patient previously followed with cardiology in [place].  Per their records, he was admitted 10/6/10/11/24 for acute on chronic CHF.  He subsequently was admitted 10/31 - 11/6/2024 for an eschar but also was noted to have decompensated CHF with increased shortness of breath as well as abdominal distention and scrotal/leg swelling.  He reportedly improved with IV diuresis but did have lower BP for which lisinopril 5 mg daily was discontinued.  At discharge his diuretics were adjusted to Lasix 40 mg every morning and 20 mg every evening.  Of note, their notes indicate that he had not been started on SGLT2 inhibitor due to history of UTIs; however, he does now appear to be on Jardiance 10 mg daily.    The patient was transferred to [place] for care requirements at his current nursing facility.  He reportedly has been doing well there for the most part with downtrending weight and better controlled leg swelling.  That being said, he was taken to the ED this past weekend due to feeling more swollen; he received a dose of Lasix in the ED.  Today, he reports that he has felt some better since being in the ED although does still feel bloated and feels like his legs are tight.  He denies chest pain, shortness breath, palpitations, and lightheadedness.    Of note, the patient does state that the majority of his care has been in [place] and he is hoping to eventually transfer back up to [place].  He is establishing care today in case he remains in [place] for a more extended period.    ROS: Pertinent findings are included above.     Most Recent Cardiac Studies  Cardiac monitor 11/30/2023: Mobitz 1 AV block present, 5% PVCs, 86 episodes of NSVT lasting up to 8 beats, frequent ventricular bigeminy and trigeminy.  Echo 6/17/2024:  LVEF 66-70%, G1 DD, normal RV size/function, mild to moderate LA dilation, RA mildly dilated, severe AV stenosis  [facility] 1/3/2024: LVEF 60%, LVEDP 10, LMCA normal, % occluded with patent LIMA-LAD, ostial OM 99% stenosis reduced to 10% stenosis with balloon angioplasty, SVG-OM closed, RCA dominant with minimum irregularity.  [facility] 6/26/2024: RA 8, PA 31/8, PCWP 10, CI 4.1, LVEF 50%, LMCA normal, % occluded with LIMA-LAD patent, LCx 99% ostial/proximal stenosis that underwent successful stent placement, large OM1 has been jailed, SVG-OM has been closed, RCA dominant with early atherosclerotic plaque.    Pertinent PMH  CAD s/p CABG (LIMA-LAD and SVG-OM) and multiple PCI's  Diastolic CHF  Persistent atrial fibrillation  PVCs  Hypertension  Hyperlipidemia  Diabetes mellitus  Stroke  DVT  GIOVANNY  GERD  Hypothyroidism    Pertinent past medical, surgical, family, and social history were reviewed and updated in the EMR.      Current Outpatient Medications:     atorvastatin (LIPITOR) 40 MG tablet, Take 1 tablet by mouth Every Night., Disp: , Rfl:     carvedilol (COREG) 3.125 MG tablet, Take 1 tablet by mouth 2 (Two) Times a Day., Disp: 180 tablet, Rfl: 3    clopidogrel (PLAVIX) 75 MG tablet, Take 1 tablet by mouth Daily., Disp: , Rfl:     divalproex (DEPAKOTE) 500 MG DR tablet, Take 1 tablet by mouth 2 (Two) Times a Day., Disp: , Rfl:     docusate sodium (COLACE) 100 MG capsule, Take 1 capsule by mouth 2 (Two) Times a Day., Disp: 28 capsule, Rfl: 0    empagliflozin (JARDIANCE) 10 MG tablet tablet, Take 1 tablet by mouth Daily., Disp: , Rfl:     ferrous sulfate 325 (65 FE) MG tablet, Take 1 tablet by mouth Daily With Breakfast., Disp: , Rfl:     Fluticasone-Salmeterol (ADVAIR/WIXELA) 100-50 MCG/ACT DISKUS, Inhale 2 (Two) Times a Day., Disp: , Rfl:     folic acid (FOLVITE) 1 MG tablet, Take 1 tablet by mouth Daily., Disp: , Rfl:     furosemide (LASIX) 20 MG tablet, Take 1 tablet by mouth Every Night., Disp: , Rfl:      "furosemide (LASIX) 40 MG tablet, Take 1 tablet by mouth Every Morning., Disp: , Rfl:     glucosamine sulfate 500 MG capsule capsule, Take 2 capsules by mouth Daily., Disp: , Rfl:     levothyroxine (SYNTHROID, LEVOTHROID) 50 MCG tablet, Take 1 tablet by mouth Daily., Disp: 90 tablet, Rfl: 1    loperamide (IMODIUM A-D) 2 MG tablet, Take 1 tablet by mouth Every 6 (Six) Hours As Needed., Disp: , Rfl:     Melatonin 10 MG tablet, Take 1 tablet by mouth Every Night., Disp: , Rfl:     nitroglycerin (NITROSTAT) 0.4 MG SL tablet, Place 1 tablet under the tongue Every 5 (Five) Minutes As Needed for Chest Pain (Systolic BP Greater Than 100). Take no more than 3 doses in 15 minutes., Disp: 30 tablet, Rfl: 0    omeprazole (priLOSEC) 40 MG capsule, Take 1 capsule by mouth Every Evening., Disp: , Rfl:     oxybutynin XL (DITROPAN-XL) 5 MG 24 hr tablet, Take 1 tablet by mouth Daily., Disp: , Rfl:     rivaroxaban (XARELTO) 20 MG tablet, Take 1 tablet by mouth Daily., Disp: 30 tablet, Rfl: 0    rOPINIRole (REQUIP) 1 MG tablet, Take 1 tablet by mouth 3 (Three) Times a Day. Take 1 hour before bedtime., Disp: , Rfl:     tamsulosin (FLOMAX) 0.4 MG capsule 24 hr capsule, Take 2 capsules by mouth Daily., Disp: 30 capsule, Rfl:     vitamin B-12 (VITAMIN B-12) 1000 MCG tablet, Take 1 tablet by mouth Daily., Disp: , Rfl:      Objective   Vital Signs:  /78   Pulse 78   Ht 175.3 cm (69\")   Wt 99.3 kg (219 lb)   SpO2 98%   BMI 32.34 kg/m²   Estimated body mass index is 32.34 kg/m² as calculated from the following:    Height as of this encounter: 175.3 cm (69\").    Weight as of this encounter: 99.3 kg (219 lb).      Constitutional:       Appearance: Healthy appearance. Not in distress.   Neck:      Vascular: JVD normal.   Pulmonary:      Effort: Pulmonary effort is normal.      Breath sounds: Normal breath sounds.   Cardiovascular:      Normal rate. Irregular rhythm.      Murmurs: There is a grade 3/6 systolic murmur at the URSB.      No " gallop.  No click. No rub.   Edema:     Comments: Trace bilateral lower extremity swelling although legs do feel tight  Abdominal:      General: There is no distension.      Palpations: Abdomen is soft.      Tenderness: There is no abdominal tenderness.   Skin:     General: Skin is warm and dry.   Neurological:      Mental Status: Alert and oriented to person, place and time.        Result Review :  The following data was reviewed by: Juan Miguel Araujo MD on 12/10/2024:  CMP   CMP          11/5/2024    03:32 11/6/2024    03:35 11/17/2024    03:27   CMP   Glucose 105  113  112    BUN 20  22  17    Creatinine 1.08  0.96  1.18    EGFR 70.7  81.4  63.6    Sodium 135  136  144    Potassium 4.0  4.0  3.8    Chloride 96  98  106    Calcium 8.4  8.8  8.2    Total Protein   5.9    Albumin 3.5  3.2  3.4    Globulin   2.5    Total Bilirubin   0.5    Alkaline Phosphatase   79    AST (SGOT)   12    ALT (SGPT)   7    Albumin/Globulin Ratio   1.4    BUN/Creatinine Ratio 18.5  22.9  14.4    Anion Gap 9.9  8.1  8.0      CBC   CBC          11/5/2024    03:32 11/6/2024    03:35 11/17/2024    03:27   CBC   WBC 8.11  6.73  6.38    RBC 3.90  4.18  3.67    Hemoglobin 11.4  12.1  10.8    Hematocrit 35.1  37.5  34.4    MCV 90.0  89.7  93.7    MCH 29.2  28.9  29.4    MCHC 32.5  32.3  31.4    RDW 13.8  13.9  16.6    Platelets 220  258  198      Pro-BNP            ECG 12 Lead    Date/Time: 12/10/2024 11:44 AM  Performed by: Juan Miguel Araujo MD    Authorized by: Juan Miguel Araujo MD  Comparison: compared with previous ECG from 11/17/2024  Comparison to previous ECG: Nonspecific T wave abnormalities are more evident in the lateral leads  Rhythm: atrial fibrillation  Rate: normal  BPM: 78  Conduction: conduction normal  QRS axis: normal  Other findings: non-specific ST-T wave changes    Clinical impression: abnormal EKG            Assessment and Plan   Diagnoses and all orders for this visit:    1. Chronic diastolic congestive heart failure  (Primary)  -     Adult Transthoracic Echo Complete w/ Color, Spectral and Contrast if necessary per protocol; Future    2. Aortic valve stenosis, etiology of cardiac valve disease unspecified  -     Adult Transthoracic Echo Complete w/ Color, Spectral and Contrast if necessary per protocol; Future    3. Primary hypertension    4. Coronary artery disease involving native coronary artery of native heart without angina pectoris    5. Atrial fibrillation, persistent    Other orders  -     spironolactone (ALDACTONE) 25 MG tablet; Take 0.5 tablets by mouth Daily.  Dispense: 45 tablet; Refill: 3  -     ECG 12 Lead           - Atrial fibrillation appears to be new onset on ECG 8/27/2024 and persistently present on all subsequent ECGs since that time.  Possible that this may have contributed to some of his progressive CHF issues with recurrent admissions.  I do think he may benefit from a rhythm control strategy in the context of his aortic valve stenosis and CHF.  He has not missed any doses of his Xarelto in the past month; discussed risks/benefits and agreed on plan to proceed with cardioversion this Friday.  - Obtaining an echocardiogram for reassessment of LV function as well as to reevaluate aortic valve.  I did personally review his echocardiogram from June that appears to demonstrate moderate to severe AV stenosis.  Prior notes do mention conservative management for AV stenosis; plan to reassess and have further discussions with the patient regarding this pending echo and his long-term plan for where he will be staying.  - Starting spironolactone 12.5 mg daily.  Plan to obtain a BMP on Friday when he comes for his cardioversion.  - Continue Lasix 40 mg every morning and 20 mg every evening  - Continue carvedilol 3.125 mg twice daily  - Continue atorvastatin 40 mg daily  - Continue Xarelto 20 mg daily  - Continue Jardiance 10 mg daily  - Continue Plavix 75 mg daily    Follow Up   Return in about 3 weeks (around  12/31/2024).  Patient was given instructions and counseling regarding his condition or for health maintenance advice. Please see specific information pulled into the AVS if appropriate.     I spent 43 minutes caring for Hernando on this date of service. This time includes time spent by me in the following activities:preparing for the visit, reviewing tests, performing a medically appropriate examination and/or evaluation , counseling and educating the patient/family/caregiver, ordering medications, tests, or procedures, documenting information in the medical record, and care coordination  Time spent on the separately reported service of ECG interpretation/documentation is not included in the time used to support the E/M service also reported today.    EMR Dragon/Transcription disclaimer: Much of this encounter note is an electronic transcription/translation of spoken language to printed text. The electronic translation of spoken language may permit erroneous, or at times, nonsensical words or phrases to be inadvertently transcribed; although I have reviewed the note for such errors, some may still exist.

## 2024-12-12 ENCOUNTER — APPOINTMENT (OUTPATIENT)
Dept: GENERAL RADIOLOGY | Facility: HOSPITAL | Age: 79
End: 2024-12-12
Payer: MEDICARE

## 2024-12-12 ENCOUNTER — HOSPITAL ENCOUNTER (EMERGENCY)
Facility: HOSPITAL | Age: 79
Discharge: HOME | End: 2024-12-12
Attending: FAMILY MEDICINE | Admitting: FAMILY MEDICINE
Payer: MEDICARE

## 2024-12-12 VITALS
BODY MASS INDEX: 32.42 KG/M2 | OXYGEN SATURATION: 97 % | HEART RATE: 78 BPM | DIASTOLIC BLOOD PRESSURE: 99 MMHG | TEMPERATURE: 97.7 F | WEIGHT: 218.92 LBS | RESPIRATION RATE: 16 BRPM | HEIGHT: 69 IN | SYSTOLIC BLOOD PRESSURE: 152 MMHG

## 2024-12-12 DIAGNOSIS — R07.9 CHEST PAIN, UNSPECIFIED TYPE: Primary | ICD-10-CM

## 2024-12-12 LAB
ANION GAP SERPL CALCULATED.3IONS-SCNC: 9 MMOL/L (ref 5–15)
BASOPHILS # BLD AUTO: 0.02 10*3/MM3 (ref 0–0.2)
BASOPHILS NFR BLD AUTO: 0.3 % (ref 0–1.5)
BUN SERPL-MCNC: 19 MG/DL (ref 8–23)
BUN/CREAT SERPL: 20.9 (ref 7–25)
CALCIUM SPEC-SCNC: 8.6 MG/DL (ref 8.6–10.5)
CHLORIDE SERPL-SCNC: 105 MMOL/L (ref 98–107)
CO2 SERPL-SCNC: 31 MMOL/L (ref 22–29)
CREAT SERPL-MCNC: 0.91 MG/DL (ref 0.76–1.27)
D DIMER PPP FEU-MCNC: <0.27 MCGFEU/ML (ref 0–0.77)
DEPRECATED RDW RBC AUTO: 57.9 FL (ref 37–54)
EGFRCR SERPLBLD CKD-EPI 2021: 86.8 ML/MIN/1.73
EOSINOPHIL # BLD AUTO: 0.02 10*3/MM3 (ref 0–0.4)
EOSINOPHIL NFR BLD AUTO: 0.3 % (ref 0.3–6.2)
ERYTHROCYTE [DISTWIDTH] IN BLOOD BY AUTOMATED COUNT: 16.6 % (ref 12.3–15.4)
GEN 5 1HR TROPONIN T REFLEX: 20 NG/L
GLUCOSE SERPL-MCNC: 97 MG/DL (ref 65–99)
HCT VFR BLD AUTO: 40.6 % (ref 37.5–51)
HGB BLD-MCNC: 12.8 G/DL (ref 13–17.7)
IMM GRANULOCYTES # BLD AUTO: 0.01 10*3/MM3 (ref 0–0.05)
IMM GRANULOCYTES NFR BLD AUTO: 0.2 % (ref 0–0.5)
LYMPHOCYTES # BLD AUTO: 1.8 10*3/MM3 (ref 0.7–3.1)
LYMPHOCYTES NFR BLD AUTO: 30.1 % (ref 19.6–45.3)
MAGNESIUM SERPL-MCNC: 2.1 MG/DL (ref 1.6–2.4)
MCH RBC QN AUTO: 29.8 PG (ref 26.6–33)
MCHC RBC AUTO-ENTMCNC: 31.5 G/DL (ref 31.5–35.7)
MCV RBC AUTO: 94.4 FL (ref 79–97)
MONOCYTES # BLD AUTO: 0.42 10*3/MM3 (ref 0.1–0.9)
MONOCYTES NFR BLD AUTO: 7 % (ref 5–12)
NEUTROPHILS NFR BLD AUTO: 3.71 10*3/MM3 (ref 1.7–7)
NEUTROPHILS NFR BLD AUTO: 62.1 % (ref 42.7–76)
NRBC BLD AUTO-RTO: 0 /100 WBC (ref 0–0.2)
NT-PROBNP SERPL-MCNC: 1086 PG/ML (ref 0–1800)
PLATELET # BLD AUTO: 166 10*3/MM3 (ref 140–450)
PMV BLD AUTO: 9.6 FL (ref 6–12)
POTASSIUM SERPL-SCNC: 3.5 MMOL/L (ref 3.5–5.2)
RBC # BLD AUTO: 4.3 10*6/MM3 (ref 4.14–5.8)
SODIUM SERPL-SCNC: 145 MMOL/L (ref 136–145)
TROPONIN T DELTA: 1 NG/L
TROPONIN T SERPL HS-MCNC: 19 NG/L
WBC NRBC COR # BLD AUTO: 5.98 10*3/MM3 (ref 3.4–10.8)

## 2024-12-12 PROCEDURE — 85379 FIBRIN DEGRADATION QUANT: CPT | Performed by: FAMILY MEDICINE

## 2024-12-12 PROCEDURE — 36415 COLL VENOUS BLD VENIPUNCTURE: CPT

## 2024-12-12 PROCEDURE — 83880 ASSAY OF NATRIURETIC PEPTIDE: CPT | Performed by: FAMILY MEDICINE

## 2024-12-12 PROCEDURE — 84484 ASSAY OF TROPONIN QUANT: CPT | Performed by: FAMILY MEDICINE

## 2024-12-12 PROCEDURE — 93005 ELECTROCARDIOGRAM TRACING: CPT

## 2024-12-12 PROCEDURE — 99284 EMERGENCY DEPT VISIT MOD MDM: CPT

## 2024-12-12 PROCEDURE — 80048 BASIC METABOLIC PNL TOTAL CA: CPT | Performed by: FAMILY MEDICINE

## 2024-12-12 PROCEDURE — 85025 COMPLETE CBC W/AUTO DIFF WBC: CPT | Performed by: FAMILY MEDICINE

## 2024-12-12 PROCEDURE — 71045 X-RAY EXAM CHEST 1 VIEW: CPT

## 2024-12-12 PROCEDURE — 83735 ASSAY OF MAGNESIUM: CPT | Performed by: FAMILY MEDICINE

## 2024-12-12 RX ORDER — SODIUM CHLORIDE 0.9 % (FLUSH) 0.9 %
10 SYRINGE (ML) INJECTION AS NEEDED
Status: DISCONTINUED | OUTPATIENT
Start: 2024-12-12 | End: 2024-12-12 | Stop reason: HOSPADM
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
## 2024-12-13 ENCOUNTER — ANESTHESIA (OUTPATIENT)
Dept: CARDIOLOGY | Facility: HOSPITAL | Age: 79
End: 2024-12-13
Payer: MEDICARE

## 2024-12-13 ENCOUNTER — HOSPITAL ENCOUNTER (OUTPATIENT)
Dept: CARDIOLOGY | Facility: HOSPITAL | Age: 79
Discharge: HOME | End: 2024-12-13
Payer: MEDICARE

## 2024-12-13 ENCOUNTER — ANESTHESIA EVENT (OUTPATIENT)
Dept: CARDIOLOGY | Facility: HOSPITAL | Age: 79
End: 2024-12-13
Payer: MEDICARE

## 2024-12-13 VITALS
SYSTOLIC BLOOD PRESSURE: 156 MMHG | HEIGHT: 69 IN | OXYGEN SATURATION: 95 % | WEIGHT: 218 LBS | RESPIRATION RATE: 18 BRPM | DIASTOLIC BLOOD PRESSURE: 89 MMHG | HEART RATE: 82 BPM | BODY MASS INDEX: 32.29 KG/M2

## 2024-12-13 DIAGNOSIS — I48.19 ATRIAL FIBRILLATION, PERSISTENT: ICD-10-CM

## 2024-12-13 LAB
QT INTERVAL: 402 MS
QT INTERVAL: 412 MS
QT INTERVAL: 664 MS
QTC INTERVAL: 458 MS
QTC INTERVAL: 472 MS
QTC INTERVAL: 838 MS

## 2024-12-13 PROCEDURE — 25010000002 PROPOFOL 1000 MG/100ML EMULSION

## 2024-12-13 PROCEDURE — 92960 CARDIOVERSION ELECTRIC EXT: CPT

## 2024-12-13 PROCEDURE — 93005 ELECTROCARDIOGRAM TRACING: CPT | Performed by: HOSPITALIST

## 2024-12-13 PROCEDURE — 25010000002 ENOXAPARIN PER 10 MG: Performed by: HOSPITALIST

## 2024-12-13 PROCEDURE — 25010000002 LIDOCAINE PF 2% 2 % SOLUTION

## 2024-12-13 RX ORDER — ENOXAPARIN SODIUM 100 MG/ML
1 INJECTION SUBCUTANEOUS ONCE
Status: COMPLETED | OUTPATIENT
Start: 2024-12-13 | End: 2024-12-13

## 2024-12-13 RX ORDER — PROPOFOL 10 MG/ML
INJECTION, EMULSION INTRAVENOUS AS NEEDED
Status: DISCONTINUED | OUTPATIENT
Start: 2024-12-13 | End: 2024-12-13 | Stop reason: SURG

## 2024-12-13 RX ORDER — SODIUM CHLORIDE 9 MG/ML
40 INJECTION, SOLUTION INTRAVENOUS AS NEEDED
Status: DISCONTINUED | OUTPATIENT
Start: 2024-12-13 | End: 2024-12-14 | Stop reason: HOSPADM

## 2024-12-13 RX ORDER — LIDOCAINE HYDROCHLORIDE 20 MG/ML
INJECTION, SOLUTION EPIDURAL; INFILTRATION; INTRACAUDAL; PERINEURAL AS NEEDED
Status: DISCONTINUED | OUTPATIENT
Start: 2024-12-13 | End: 2024-12-13 | Stop reason: SURG

## 2024-12-13 RX ORDER — SODIUM CHLORIDE 0.9 % (FLUSH) 0.9 %
10 SYRINGE (ML) INJECTION AS NEEDED
Status: DISCONTINUED | OUTPATIENT
Start: 2024-12-13 | End: 2024-12-14 | Stop reason: HOSPADM

## 2024-12-13 RX ORDER — SODIUM CHLORIDE 0.9 % (FLUSH) 0.9 %
10 SYRINGE (ML) INJECTION EVERY 12 HOURS SCHEDULED
Status: DISCONTINUED | OUTPATIENT
Start: 2024-12-13 | End: 2024-12-14 | Stop reason: HOSPADM

## 2024-12-13 RX ADMIN — LIDOCAINE HYDROCHLORIDE 100 MG: 20 INJECTION, SOLUTION EPIDURAL; INFILTRATION; INTRACAUDAL; PERINEURAL at 10:09

## 2024-12-13 RX ADMIN — PROPOFOL 50 MG: 10 INJECTION, EMULSION INTRAVENOUS at 10:09

## 2024-12-13 RX ADMIN — ENOXAPARIN SODIUM 100 MG: 100 INJECTION SUBCUTANEOUS at 10:02

## 2024-12-13 NOTE — ANESTHESIA PREPROCEDURE EVALUATION
Anesthesia Evaluation     Patient summary reviewed and Nursing notes reviewed   no history of anesthetic complications:   NPO Solid Status: > 8 hours  NPO Liquid Status: > 8 hours           Airway   Mallampati: I  TM distance: >3 FB  Neck ROM: full  Dental    (+) edentulous    Pulmonary    (+) ,shortness of breath, sleep apnea  (-) not a smoker  Cardiovascular     (+) hypertension, valvular problems/murmurs, past MI , CAD, CABG, dysrhythmias Atrial Fib, CHF , DVT, hyperlipidemia      Neuro/Psych  (+) CVA, syncope, tremors    ROS Comment: Hx of head injury, impaired mental capacity  GI/Hepatic/Renal/Endo    (+) morbid obesity, GERD, renal disease-, diabetes mellitus, thyroid problem hypothyroidism    Musculoskeletal     Abdominal    Substance History      OB/GYN          Other   arthritis,                   Anesthesia Plan    ASA 3     MAC     intravenous induction     Anesthetic plan, risks, benefits, and alternatives have been provided, discussed and informed consent has been obtained with: patient.    Plan discussed with CRNA.

## 2024-12-13 NOTE — ANESTHESIA POSTPROCEDURE EVALUATION
Patient: Hernando Lozada    Procedure Summary       Date: 12/13/24 Room / Location: [facility] CATH LAB    Anesthesia Start: 1003 Anesthesia Stop: 1011    Procedure: CARDIOVERSION EXTERNAL IN CARDIOLOGY DEPARTMENT Diagnosis:       Atrial fibrillation, persistent      (atrial fibrillation)    Scheduled Providers: Juan Miguel Araujo MD Provider: Michael Mishra CRNA    Anesthesia Type: MAC ASA Status: 3            Anesthesia Type: MAC    Vitals  No vitals data found for the desired time range.          Post Anesthesia Care and Evaluation    Patient location during evaluation: PHASE II  Patient participation: complete - patient participated  Level of consciousness: awake and alert  Pain score: 0    Airway patency: patent  Anesthetic complications: No anesthetic complications  PONV Status: none  Cardiovascular status: acceptable  Respiratory status: acceptable  Hydration status: acceptable  No anesthesia care post op

## 2025-01-15 ENCOUNTER — OFFICE VISIT (OUTPATIENT)
Dept: CARDIOLOGY | Facility: CLINIC | Age: 80
End: 2025-01-15
Payer: MEDICARE

## 2025-01-15 ENCOUNTER — HOSPITAL ENCOUNTER (OUTPATIENT)
Dept: CARDIOLOGY | Facility: HOSPITAL | Age: 80
Discharge: HOME | End: 2025-01-15
Admitting: INTERNAL MEDICINE
Payer: MEDICARE

## 2025-01-15 VITALS
HEIGHT: 69 IN | DIASTOLIC BLOOD PRESSURE: 70 MMHG | SYSTOLIC BLOOD PRESSURE: 135 MMHG | HEART RATE: 63 BPM | WEIGHT: 210 LBS | BODY MASS INDEX: 31.1 KG/M2

## 2025-01-15 VITALS
WEIGHT: 218 LBS | HEIGHT: 69 IN | DIASTOLIC BLOOD PRESSURE: 81 MMHG | SYSTOLIC BLOOD PRESSURE: 135 MMHG | BODY MASS INDEX: 32.29 KG/M2

## 2025-01-15 DIAGNOSIS — I10 PRIMARY HYPERTENSION: ICD-10-CM

## 2025-01-15 DIAGNOSIS — I35.0 AORTIC STENOSIS, SEVERE: ICD-10-CM

## 2025-01-15 DIAGNOSIS — I48.19 ATRIAL FIBRILLATION, PERSISTENT: ICD-10-CM

## 2025-01-15 DIAGNOSIS — I35.0 AORTIC STENOSIS, SEVERE: Primary | ICD-10-CM

## 2025-01-15 DIAGNOSIS — Z79.01 CHRONIC ANTICOAGULATION: ICD-10-CM

## 2025-01-15 LAB
AORTIC ARCH: 3.6 CM
AORTIC DIMENSIONLESS INDEX: 0.3 (DI)
AV MEAN PRESS GRAD SYS DOP V1V2: 24.1 MMHG
AV VMAX SYS DOP: 326.5 CM/SEC
BH CV ECHO MEAS - ACS: 1.15 CM
BH CV ECHO MEAS - AI P1/2T: 602.4 MSEC
BH CV ECHO MEAS - AO MAX PG: 42.6 MMHG
BH CV ECHO MEAS - AO ROOT DIAM: 3.7 CM
BH CV ECHO MEAS - AO V2 VTI: 72.9 CM
BH CV ECHO MEAS - AVA(I,D): 0.94 CM2
BH CV ECHO MEAS - EDV(CUBED): 224.8 ML
BH CV ECHO MEAS - EDV(MOD-SP2): 107 ML
BH CV ECHO MEAS - EDV(MOD-SP4): 105 ML
BH CV ECHO MEAS - EF(MOD-SP2): 61.7 %
BH CV ECHO MEAS - EF(MOD-SP4): 62.9 %
BH CV ECHO MEAS - ESV(CUBED): 62.3 ML
BH CV ECHO MEAS - ESV(MOD-SP2): 41 ML
BH CV ECHO MEAS - ESV(MOD-SP4): 39 ML
BH CV ECHO MEAS - FS: 34.8 %
BH CV ECHO MEAS - IVS/LVPW: 1.05 CM
BH CV ECHO MEAS - IVSD: 1.3 CM
BH CV ECHO MEAS - LAT PEAK E' VEL: 7.3 CM/SEC
BH CV ECHO MEAS - LV DIASTOLIC VOL/BSA (35-75): 49 CM2
BH CV ECHO MEAS - LV MASS(C)D: 345.5 GRAMS
BH CV ECHO MEAS - LV MAX PG: 3.2 MMHG
BH CV ECHO MEAS - LV MEAN PG: 1.83 MMHG
BH CV ECHO MEAS - LV SYSTOLIC VOL/BSA (12-30): 18.2 CM2
BH CV ECHO MEAS - LV V1 MAX: 89.2 CM/SEC
BH CV ECHO MEAS - LV V1 VTI: 22.9 CM
BH CV ECHO MEAS - LVIDD: 6.1 CM
BH CV ECHO MEAS - LVIDS: 4 CM
BH CV ECHO MEAS - LVOT AREA: 3 CM2
BH CV ECHO MEAS - LVOT DIAM: 1.95 CM
BH CV ECHO MEAS - LVPWD: 1.24 CM
BH CV ECHO MEAS - MED PEAK E' VEL: 5.3 CM/SEC
BH CV ECHO MEAS - MV A DUR: 0.1 SEC
BH CV ECHO MEAS - MV A MAX VEL: 49.3 CM/SEC
BH CV ECHO MEAS - MV DEC SLOPE: 467 CM/SEC2
BH CV ECHO MEAS - MV DEC TIME: 0.31 SEC
BH CV ECHO MEAS - MV E MAX VEL: 149 CM/SEC
BH CV ECHO MEAS - MV E/A: 3
BH CV ECHO MEAS - MV MAX PG: 12.7 MMHG
BH CV ECHO MEAS - MV MEAN PG: 4.9 MMHG
BH CV ECHO MEAS - MV P1/2T: 112.2 MSEC
BH CV ECHO MEAS - MV V2 VTI: 44.4 CM
BH CV ECHO MEAS - MVA(P1/2T): 1.96 CM2
BH CV ECHO MEAS - MVA(VTI): 1.54 CM2
BH CV ECHO MEAS - PA ACC TIME: 0.06 SEC
BH CV ECHO MEAS - PA V2 MAX: 76.7 CM/SEC
BH CV ECHO MEAS - RAP SYSTOLE: 3 MMHG
BH CV ECHO MEAS - RV MAX PG: 2.8 MMHG
BH CV ECHO MEAS - RV V1 MAX: 83.1 CM/SEC
BH CV ECHO MEAS - RV V1 VTI: 16.8 CM
BH CV ECHO MEAS - RVSP: 25 MMHG
BH CV ECHO MEAS - SV(LVOT): 68.3 ML
BH CV ECHO MEAS - SV(MOD-SP2): 66 ML
BH CV ECHO MEAS - SV(MOD-SP4): 66 ML
BH CV ECHO MEAS - SVI(LVOT): 31.9 ML/M2
BH CV ECHO MEAS - SVI(MOD-SP2): 30.8 ML/M2
BH CV ECHO MEAS - SVI(MOD-SP4): 30.8 ML/M2
BH CV ECHO MEAS - TAPSE (>1.6): 1.34 CM
BH CV ECHO MEAS - TR MAX PG: 22.1 MMHG
BH CV ECHO MEAS - TR MAX VEL: 235.3 CM/SEC
BH CV ECHO MEASUREMENTS AVERAGE E/E' RATIO: 23.65
BH CV XLRA - TDI S': 9.9 CM/SEC
LEFT ATRIUM VOLUME INDEX: 44.9 ML/M2
LV EF BIPLANE MOD: 63.1 %
SINUS: 3.3 CM

## 2025-01-15 PROCEDURE — 93306 TTE W/DOPPLER COMPLETE: CPT | Performed by: INTERNAL MEDICINE

## 2025-01-15 PROCEDURE — 93306 TTE W/DOPPLER COMPLETE: CPT

## 2025-01-15 PROCEDURE — 25510000001 PERFLUTREN 6.52 MG/ML SUSPENSION 2 ML VIAL: Performed by: INTERNAL MEDICINE

## 2025-01-15 RX ADMIN — SODIUM CHLORIDE 3 ML: 9 INJECTION INTRAMUSCULAR; INTRAVENOUS; SUBCUTANEOUS at 14:25
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
## 2025-01-16 PROBLEM — Z79.01 CHRONIC ANTICOAGULATION: Status: ACTIVE | Noted: 2025-01-16

## 2025-01-25 NOTE — PROGRESS NOTES
ED OBSERVATION PROGRESS/DISCHARGE SUMMARY    Date of Admission: 6/25/2024   LOS: 0 days   PCP: Sammie Gambino APRN      Subjective     Hospital Outcome:     Hernando Lozada is a 79 y.o. male with past medical history including but not limited to hypertension, hyperlipidemia, DM2, CKD, CAD s/p stent placement and DVT presents to [facility] with left anterior chest pain.  Pain radiates into left shoulder and arm.  Describes pain as squeezing sensation.  Pain is nonexertional and nonpleuritic.  No alleviating or aggravating factors.  Currently patient asymptomatic.  Denies abdominal pain, nausea, vomit, diarrhea.  Denies abdominal pain nausea, vomit, diarrhea.  Denies cough, fever, chills, lower extremity edema.        ED workup reviewed: Troponin 17 and 18 with delta of 1, creatinine 21, glucose 112, WBC 6.2, hemoglobin 11.5 and platelets 147.  Chest x-ray shows no evidence of active disease and EKG shows A-fib otherwise nonischemic.    6/26/2024  Currently asymptomatic.  Mr. Lozada shares that he has a caffeinated drink mix which gives him reflux pain and leads to the chest pain.  I discussed caffeine avoidance to improve reflux pain.  Dr. Miranda evaluated patient and had ordered MRA neck with and without which shows bilateral carotids and vertebral arteries are widely patent.      Troponin increased to 52 from 18 this morning, cardiology evaluated patient and has ordered echocardiogram and ordered left cardiac authorization.    ROS:  Review of Systems   Constitutional:  No weight changes, fever, or chills. No night sweats, no fatigue, no malaise.    ENT/Mouth:  No hearing changes, no ear pain, no nasal congestion, no sinus pain, no hoarseness, no sore throat, no rhinorrhea, no dysphagia.   Eyes:  No eye pain, swelling, or redness. No foreign body, discharge. No vision changes.    Cardiovascular:  No chest pain, no shortness of air, no dyspnea on exertion, no orthopnea, no palpitations, no edema.       Respiratory:  No cough, no smoke exposure, no dyspnea.    Gastrointestinal:  No nausea, vomiting, or diarrhea. No constipation, or GI discomfort. No reflux pain, no anorexia, no dysphagia. No hematochezia or melena.    Genitourinary:  No dyspareunia, no dysuria, no urinary frequency. No hematuria, no urinary incontinence. No flank pain or urinary flow changes.   Musculoskeletal:  No arthralgias, no myalgias, no joint swelling or stiffness. No back or neck pain, no recent injuries.    Skin:  No skin lesions, no pruritus, no hair changes.    Neuro:  No weakness, no numbness, no paresthesias, no loss of consciousness, no syncope, no dizziness, no headache.   Psych:  No anxiety/panic, no depression, no insomnia, no personality changes, no delusions.    Heme/Lymph:  No bruising, or bleeding. No transfusions history, no lymphadenopathy.   Endocrine:  No polyuria, polydipsia, no temperature intolerances.     Objective   Physical Exam:   Constitutional: Awake, alert. Well developed for age. Non-toxic appearing.   Eyes: PERRL x2, sclerae anicteric, no conjunctival injection. No EOM noted.   HENT: NCAT, mucous membranes moist,   Neck: Supple, no thyromegaly, no lymphadenopathy, trachea midline  Respiratory: + Right upper lobe expiratory wheeze   Cardiovascular: RRR, no murmurs, rubs, or gallops, palpable pedal pulses bilaterally. No appreciable edema.   Gastrointestinal: Positive bowel sounds, soft, nontender, not distended.   Musculoskeletal: No bilateral ankle edema, no clubbing or cyanosis to extremities. No obvious deformities.   Psychiatric: Appropriate affect, cooperative. Converses appropriately for age.   Neurologic: Oriented x 3, strength symmetric in all extremities, Cranial Nerves grossly intact to confrontation, speech clear  Skin: No rashes, skin intact.     Results Review:    I have reviewed the labs, radiology results and diagnostic studies.    Results from last 7 days   Lab Units 06/25/24  1108   WBC  10*3/mm3 6.20   HEMOGLOBIN g/dL 11.5*   HEMATOCRIT % 36.0*   PLATELETS 10*3/mm3 147     Results from last 7 days   Lab Units 06/25/24  1108 06/20/24  1245   SODIUM mmol/L 141 140   POTASSIUM mmol/L 3.9 3.9   CHLORIDE mmol/L 100 100   CO2 mmol/L 32.0* 29.4*   BUN mg/dL 11 10   CREATININE mg/dL 0.81 0.87   CALCIUM mg/dL 8.6 9.0   BILIRUBIN mg/dL 0.5 0.6   ALK PHOS U/L 67 68   ALT (SGPT) U/L 12 9   AST (SGOT) U/L 14 12   GLUCOSE mg/dL 112* 100*     Imaging Results (Last 24 Hours)       Procedure Component Value Units Date/Time    MRI Angiogram Neck With & Without Contrast [[id number]] Collected: 06/25/24 2103     Updated: 06/25/24 2103    Narrative:      MRA OF THE NECK WITH AND WITHOUT CONTRAST     HISTORY: Stroke.     COMPARISON: None.     FINDINGS: The great vessels are arranged in a bovine configuration.  There is 0% stenosis of the internal carotid arteries using NASCET  criteria. The left vertebral artery is larger than that of the right.  There is signal loss appreciated involving the vertebral arteries  proximally bilaterally. This may be secondary to tortuosity and patient  motion. Proximal vertebral stenosis could not be excluded. The cervical  segments are of relatively uniform caliber.       Impression:      There is no evidence of carotid stenosis. There is signal  loss appreciated involving the vertebral arteries proximally  bilaterally. This may be accentuated by patient motion and tortuosity.  Further evaluation could be performed with a CT angiogram of the neck  and head.             XR Chest 1 View [[id number]] Collected: 06/25/24 1120     Updated: 06/25/24 1124    Narrative:      ONE-VIEW PORTABLE CHEST     HISTORY: Chest pain.     FINDINGS: The lungs are well expanded and clear. The heart size is  normal with sternal wires and previous cardiac surgery. There is no  acute disease or change from 6/20/2024.     This report was finalized on 6/25/2024 11:21 AM by Dr. Ankur Cherry M.D on Workstation:  [id number]               I have reviewed the medications.  ---------------------------------------------------------------------------------------------  Assessment & Plan   Assessment/Problem List    Chest pain      Plan:    Plan:   CAD  Chest pain  -Troponin flat continue to trend  -EKG nonischemic  -Cardiology consult  -Continuous cardiac monitoring  -Continue aspirin, statin and Imdur  -Currently asymptomatic    Gastric reflux  -Advised to cut back on caffeine  -Non-smoker  -Add PPI     DM2  -Accu-Chek before meals and at bedtime  -Insulin sliding scale     Hypertension  -Continue home meds  -Vital signs per nursing     Hypothyroidism  -Continue levothyroxine    COPD  -Symbicort    This note will serve as a progress note    Justin Coffey, APRN 06/26/24 02:28 EDT    I have worn appropriate PPE during this patient encounter, sanitized my hands both with entering and exiting patient's room.      56 minutes has been spent by [facility] providers in the care of this patient while under observation status             Not applicable

## 2025-02-09 ENCOUNTER — HOSPITAL ENCOUNTER (INPATIENT)
Facility: HOSPITAL | Age: 80
LOS: 4 days | Discharge: NURSING, REHAB OR LONG-TERM CARE | End: 2025-02-13
Attending: EMERGENCY MEDICINE | Admitting: INTERNAL MEDICINE
Payer: MEDICARE

## 2025-02-09 ENCOUNTER — APPOINTMENT (OUTPATIENT)
Dept: CT IMAGING | Facility: HOSPITAL | Age: 80
End: 2025-02-09
Payer: MEDICARE

## 2025-02-09 ENCOUNTER — APPOINTMENT (OUTPATIENT)
Dept: GENERAL RADIOLOGY | Facility: HOSPITAL | Age: 80
End: 2025-02-09
Payer: MEDICARE

## 2025-02-09 DIAGNOSIS — Z79.01 ANTICOAGULATED: ICD-10-CM

## 2025-02-09 DIAGNOSIS — M79.5 FOREIGN BODY (FB) IN SOFT TISSUE: ICD-10-CM

## 2025-02-09 DIAGNOSIS — S06.5XAA SDH (SUBDURAL HEMATOMA): Primary | ICD-10-CM

## 2025-02-09 DIAGNOSIS — S51.012A SKIN TEAR OF LEFT ELBOW WITHOUT COMPLICATION, INITIAL ENCOUNTER: ICD-10-CM

## 2025-02-09 LAB
ABO GROUP BLD: NORMAL
ALBUMIN SERPL-MCNC: 3.9 G/DL (ref 3.5–5.2)
ALBUMIN/GLOB SERPL: 1.3 G/DL
ALP SERPL-CCNC: 67 U/L (ref 39–117)
ALT SERPL W P-5'-P-CCNC: 12 U/L (ref 1–41)
ANION GAP SERPL CALCULATED.3IONS-SCNC: 9 MMOL/L (ref 5–15)
APTT PPP: 32.7 SECONDS (ref 22.7–35.4)
AST SERPL-CCNC: 12 U/L (ref 1–40)
BASOPHILS # BLD AUTO: 0.02 10*3/MM3 (ref 0–0.2)
BASOPHILS NFR BLD AUTO: 0.2 % (ref 0–1.5)
BILIRUB SERPL-MCNC: 1.1 MG/DL (ref 0–1.2)
BLD GP AB SCN SERPL QL: NEGATIVE
BUN SERPL-MCNC: 17 MG/DL (ref 8–23)
BUN/CREAT SERPL: 20.5 (ref 7–25)
CALCIUM SPEC-SCNC: 9.1 MG/DL (ref 8.6–10.5)
CHLORIDE SERPL-SCNC: 99 MMOL/L (ref 98–107)
CO2 SERPL-SCNC: 31 MMOL/L (ref 22–29)
CREAT SERPL-MCNC: 0.83 MG/DL (ref 0.76–1.27)
DEPRECATED RDW RBC AUTO: 50.6 FL (ref 37–54)
EGFRCR SERPLBLD CKD-EPI 2021: 90.1 ML/MIN/1.73
EOSINOPHIL # BLD AUTO: 0.01 10*3/MM3 (ref 0–0.4)
EOSINOPHIL NFR BLD AUTO: 0.1 % (ref 0.3–6.2)
ERYTHROCYTE [DISTWIDTH] IN BLOOD BY AUTOMATED COUNT: 15.3 % (ref 12.3–15.4)
GLOBULIN UR ELPH-MCNC: 2.9 GM/DL
GLUCOSE BLDC GLUCOMTR-MCNC: 95 MG/DL (ref 70–130)
GLUCOSE SERPL-MCNC: 105 MG/DL (ref 65–99)
HCT VFR BLD AUTO: 42.7 % (ref 37.5–51)
HGB BLD-MCNC: 14.4 G/DL (ref 13–17.7)
IMM GRANULOCYTES # BLD AUTO: 0.05 10*3/MM3 (ref 0–0.05)
IMM GRANULOCYTES NFR BLD AUTO: 0.5 % (ref 0–0.5)
INR PPP: 1.33 (ref 0.9–1.1)
LYMPHOCYTES # BLD AUTO: 1.06 10*3/MM3 (ref 0.7–3.1)
LYMPHOCYTES NFR BLD AUTO: 10.6 % (ref 19.6–45.3)
MCH RBC QN AUTO: 30.8 PG (ref 26.6–33)
MCHC RBC AUTO-ENTMCNC: 33.7 G/DL (ref 31.5–35.7)
MCV RBC AUTO: 91.2 FL (ref 79–97)
MONOCYTES # BLD AUTO: 0.89 10*3/MM3 (ref 0.1–0.9)
MONOCYTES NFR BLD AUTO: 8.9 % (ref 5–12)
NEUTROPHILS NFR BLD AUTO: 7.98 10*3/MM3 (ref 1.7–7)
NEUTROPHILS NFR BLD AUTO: 79.7 % (ref 42.7–76)
NRBC BLD AUTO-RTO: 0 /100 WBC (ref 0–0.2)
PLATELET # BLD AUTO: 158 10*3/MM3 (ref 140–450)
PMV BLD AUTO: 9.1 FL (ref 6–12)
POTASSIUM SERPL-SCNC: 3.8 MMOL/L (ref 3.5–5.2)
PROT SERPL-MCNC: 6.8 G/DL (ref 6–8.5)
PROTHROMBIN TIME: 16.5 SECONDS (ref 11.7–14.2)
RBC # BLD AUTO: 4.68 10*6/MM3 (ref 4.14–5.8)
RH BLD: POSITIVE
SODIUM SERPL-SCNC: 139 MMOL/L (ref 136–145)
T&S EXPIRATION DATE: NORMAL
VALPROATE SERPL-MCNC: 61 MCG/ML (ref 50–125)
WBC NRBC COR # BLD AUTO: 10.01 10*3/MM3 (ref 3.4–10.8)

## 2025-02-09 PROCEDURE — 70450 CT HEAD/BRAIN W/O DYE: CPT

## 2025-02-09 PROCEDURE — 25010000002 PROTHROMBIN COMPLEX CONC HUMAN 1000 UNITS KIT: Performed by: EMERGENCY MEDICINE

## 2025-02-09 PROCEDURE — 94761 N-INVAS EAR/PLS OXIMETRY MLT: CPT

## 2025-02-09 PROCEDURE — 94799 UNLISTED PULMONARY SVC/PX: CPT

## 2025-02-09 PROCEDURE — 25810000003 SODIUM CHLORIDE 0.9 % SOLUTION: Performed by: INTERNAL MEDICINE

## 2025-02-09 PROCEDURE — 86900 BLOOD TYPING SEROLOGIC ABO: CPT | Performed by: EMERGENCY MEDICINE

## 2025-02-09 PROCEDURE — 80053 COMPREHEN METABOLIC PANEL: CPT | Performed by: EMERGENCY MEDICINE

## 2025-02-09 PROCEDURE — 82948 REAGENT STRIP/BLOOD GLUCOSE: CPT

## 2025-02-09 PROCEDURE — 73080 X-RAY EXAM OF ELBOW: CPT

## 2025-02-09 PROCEDURE — 85610 PROTHROMBIN TIME: CPT | Performed by: EMERGENCY MEDICINE

## 2025-02-09 PROCEDURE — 86901 BLOOD TYPING SEROLOGIC RH(D): CPT | Performed by: EMERGENCY MEDICINE

## 2025-02-09 PROCEDURE — 25010000002 TETANUS-DIPHTH-ACELL PERTUSSIS 5-2.5-18.5 LF-MCG/0.5 SUSPENSION PREFILLED SYRINGE: Performed by: EMERGENCY MEDICINE

## 2025-02-09 PROCEDURE — 86850 RBC ANTIBODY SCREEN: CPT | Performed by: EMERGENCY MEDICINE

## 2025-02-09 PROCEDURE — 99221 1ST HOSP IP/OBS SF/LOW 40: CPT

## 2025-02-09 PROCEDURE — 85730 THROMBOPLASTIN TIME PARTIAL: CPT | Performed by: EMERGENCY MEDICINE

## 2025-02-09 PROCEDURE — 90471 IMMUNIZATION ADMIN: CPT | Performed by: EMERGENCY MEDICINE

## 2025-02-09 PROCEDURE — 87040 BLOOD CULTURE FOR BACTERIA: CPT | Performed by: INTERNAL MEDICINE

## 2025-02-09 PROCEDURE — 99291 CRITICAL CARE FIRST HOUR: CPT

## 2025-02-09 PROCEDURE — 72125 CT NECK SPINE W/O DYE: CPT

## 2025-02-09 PROCEDURE — 85025 COMPLETE CBC W/AUTO DIFF WBC: CPT | Performed by: EMERGENCY MEDICINE

## 2025-02-09 PROCEDURE — 25010000002 PROTHROMBIN COMPLEX CONC HUMAN 500 UNITS KIT: Performed by: EMERGENCY MEDICINE

## 2025-02-09 PROCEDURE — 94640 AIRWAY INHALATION TREATMENT: CPT

## 2025-02-09 PROCEDURE — 90715 TDAP VACCINE 7 YRS/> IM: CPT | Performed by: EMERGENCY MEDICINE

## 2025-02-09 PROCEDURE — 80164 ASSAY DIPROPYLACETIC ACD TOT: CPT | Performed by: EMERGENCY MEDICINE

## 2025-02-09 PROCEDURE — 94664 DEMO&/EVAL PT USE INHALER: CPT

## 2025-02-09 RX ORDER — DOCUSATE SODIUM 100 MG/1
100 CAPSULE, LIQUID FILLED ORAL 2 TIMES DAILY
Status: DISCONTINUED | OUTPATIENT
Start: 2025-02-09 | End: 2025-02-13 | Stop reason: HOSPADM

## 2025-02-09 RX ORDER — FUROSEMIDE 40 MG/1
40 TABLET ORAL
Status: DISCONTINUED | OUTPATIENT
Start: 2025-02-09 | End: 2025-02-13 | Stop reason: HOSPADM

## 2025-02-09 RX ORDER — MULTIVITAMIN WITH IRON
1000 TABLET ORAL DAILY
Status: DISCONTINUED | OUTPATIENT
Start: 2025-02-09 | End: 2025-02-13 | Stop reason: HOSPADM

## 2025-02-09 RX ORDER — ROPINIROLE 1 MG/1
1 TABLET, FILM COATED ORAL 3 TIMES DAILY
Status: DISCONTINUED | OUTPATIENT
Start: 2025-02-09 | End: 2025-02-13 | Stop reason: HOSPADM

## 2025-02-09 RX ORDER — CARVEDILOL 3.12 MG/1
3.12 TABLET ORAL 2 TIMES DAILY
Status: DISCONTINUED | OUTPATIENT
Start: 2025-02-09 | End: 2025-02-13 | Stop reason: HOSPADM

## 2025-02-09 RX ORDER — OXYBUTYNIN CHLORIDE 5 MG/1
5 TABLET, EXTENDED RELEASE ORAL DAILY
Status: DISCONTINUED | OUTPATIENT
Start: 2025-02-09 | End: 2025-02-13 | Stop reason: HOSPADM

## 2025-02-09 RX ORDER — ATORVASTATIN CALCIUM 20 MG/1
40 TABLET, FILM COATED ORAL NIGHTLY
Status: DISCONTINUED | OUTPATIENT
Start: 2025-02-09 | End: 2025-02-13 | Stop reason: HOSPADM

## 2025-02-09 RX ORDER — BUDESONIDE AND FORMOTEROL FUMARATE DIHYDRATE 160; 4.5 UG/1; UG/1
2 AEROSOL RESPIRATORY (INHALATION)
Status: DISCONTINUED | OUTPATIENT
Start: 2025-02-09 | End: 2025-02-13 | Stop reason: HOSPADM

## 2025-02-09 RX ORDER — SODIUM CHLORIDE 9 MG/ML
50 INJECTION, SOLUTION INTRAVENOUS CONTINUOUS
Status: DISCONTINUED | OUTPATIENT
Start: 2025-02-09 | End: 2025-02-10

## 2025-02-09 RX ORDER — FUROSEMIDE 20 MG/1
20 TABLET ORAL NIGHTLY
Status: DISCONTINUED | OUTPATIENT
Start: 2025-02-09 | End: 2025-02-13 | Stop reason: HOSPADM

## 2025-02-09 RX ORDER — TAMSULOSIN HYDROCHLORIDE 0.4 MG/1
0.8 CAPSULE ORAL DAILY
Status: DISCONTINUED | OUTPATIENT
Start: 2025-02-09 | End: 2025-02-13 | Stop reason: HOSPADM

## 2025-02-09 RX ORDER — FOLIC ACID 1 MG/1
1 TABLET ORAL DAILY
Status: DISCONTINUED | OUTPATIENT
Start: 2025-02-09 | End: 2025-02-13 | Stop reason: HOSPADM

## 2025-02-09 RX ORDER — ACETAMINOPHEN 650 MG/1
650 SUPPOSITORY RECTAL EVERY 6 HOURS PRN
Status: DISCONTINUED | OUTPATIENT
Start: 2025-02-09 | End: 2025-02-10

## 2025-02-09 RX ORDER — PANTOPRAZOLE SODIUM 40 MG/1
40 TABLET, DELAYED RELEASE ORAL
Status: DISCONTINUED | OUTPATIENT
Start: 2025-02-09 | End: 2025-02-13 | Stop reason: HOSPADM

## 2025-02-09 RX ORDER — LEVOTHYROXINE SODIUM 50 UG/1
50 TABLET ORAL DAILY
Status: DISCONTINUED | OUTPATIENT
Start: 2025-02-09 | End: 2025-02-13 | Stop reason: HOSPADM

## 2025-02-09 RX ORDER — FERROUS SULFATE 325(65) MG
325 TABLET ORAL
Status: DISCONTINUED | OUTPATIENT
Start: 2025-02-09 | End: 2025-02-13 | Stop reason: HOSPADM

## 2025-02-09 RX ORDER — DIVALPROEX SODIUM 500 MG/1
500 TABLET, DELAYED RELEASE ORAL 2 TIMES DAILY
Status: DISCONTINUED | OUTPATIENT
Start: 2025-02-09 | End: 2025-02-13 | Stop reason: HOSPADM

## 2025-02-09 RX ADMIN — BUDESONIDE AND FORMOTEROL FUMARATE DIHYDRATE 2 PUFF: 160; 4.5 AEROSOL RESPIRATORY (INHALATION) at 09:37

## 2025-02-09 RX ADMIN — PROTHROMBIN, COAGULATION FACTOR VII HUMAN, COAGULATION FACTOR IX HUMAN, COAGULATION FACTOR X HUMAN, PROTEIN C, PROTEIN S HUMAN, AND WATER 4777 UNITS: KIT at 05:31

## 2025-02-09 RX ADMIN — SODIUM CHLORIDE 50 ML/HR: 9 INJECTION, SOLUTION INTRAVENOUS at 17:00

## 2025-02-09 RX ADMIN — ACETAMINOPHEN 650 MG: 650 SUPPOSITORY RECTAL at 19:01

## 2025-02-09 RX ADMIN — TETANUS TOXOID, REDUCED DIPHTHERIA TOXOID AND ACELLULAR PERTUSSIS VACCINE, ADSORBED 0.5 ML: 5; 2.5; 8; 8; 2.5 SUSPENSION INTRAMUSCULAR at 06:04

## 2025-02-09 NOTE — ED NOTES
"Nursing report ED to floor  Hernando Lozada  79 y.o.  male    HPI :  HPI  Stated Reason for Visit: Pt to ED from home via EMS after fall out of bed. Pt was attempting to grab the chair next to the bed and slid out of the bed onto floor. Pt hit face and left arm on the ground. Pt denies loc. Pt reports \"seeing stars\" immediately after the fall; denies weakness, dizziness, vision changes at this time. Pt A&O x4. Skin tear to left elbow. Pt taking Plavix  History Obtained From: EMS    Chief Complaint  Chief Complaint   Patient presents with    Fall       Admitting doctor:   Fareed Garcia MD    Admitting diagnosis:   The primary encounter diagnosis was SDH (subdural hematoma). Diagnoses of Skin tear of left elbow without complication, initial encounter, Foreign body (FB) in soft tissue, and Anticoagulated were also pertinent to this visit.    Code status:   Current Code Status       Date Active Code Status Order ID Comments User Context       Prior            Allergies:   Patient has no known allergies.    Isolation:   No active isolations    Intake and Output    Intake/Output Summary (Last 24 hours) at 2/9/2025 1541  Last data filed at 2/9/2025 1143  Gross per 24 hour   Intake --   Output 450 ml   Net -450 ml       Weight:       02/09/25  1142   Weight: 99.7 kg (219 lb 11.2 oz)       Most recent vitals:   Vitals:    02/09/25 1401 02/09/25 1431 02/09/25 1507 02/09/25 1524   BP: 154/96 153/91     Pulse: 82 92 87 91   Resp:       Temp:       SpO2: 94% 92% 94% 95%   Weight:       Height:           Active LDAs/IV Access:   Lines, Drains & Airways       Active LDAs       Name Placement date Placement time Site Days    Peripheral IV 02/09/25 0436 Right Antecubital 02/09/25  0436  Antecubital  less than 1    Peripheral IV 02/09/25 0445 Anterior;Left Forearm 02/09/25  0445  Forearm  less than 1    External Urinary Catheter 02/09/25  0601  --  less than 1                    Labs (abnormal labs have a star):   Labs Reviewed "   COMPREHENSIVE METABOLIC PANEL - Abnormal; Notable for the following components:       Result Value    Glucose 105 (*)     CO2 31.0 (*)     All other components within normal limits    Narrative:     GFR Categories in Chronic Kidney Disease (CKD)      GFR Category          GFR (mL/min/1.73)    Interpretation  G1                     90 or greater         Normal or high (1)  G2                      60-89                Mild decrease (1)  G3a                   45-59                Mild to moderate decrease  G3b                   30-44                Moderate to severe decrease  G4                    15-29                Severe decrease  G5                    14 or less           Kidney failure          (1)In the absence of evidence of kidney disease, neither GFR category G1 or G2 fulfill the criteria for CKD.    eGFR calculation 2021 CKD-EPI creatinine equation, which does not include race as a factor   PROTIME-INR - Abnormal; Notable for the following components:    Protime 16.5 (*)     INR 1.33 (*)     All other components within normal limits   CBC WITH AUTO DIFFERENTIAL - Abnormal; Notable for the following components:    Neutrophil % 79.7 (*)     Lymphocyte % 10.6 (*)     Eosinophil % 0.1 (*)     Neutrophils, Absolute 7.98 (*)     All other components within normal limits   APTT - Normal   VALPROIC ACID LEVEL, TOTAL - Normal    Narrative:     Therapeutic Ranges for Valproic Acid    Epilepsy:       mcg/ml  Bipolar/Tatiana  up to 125 mcg/ml     TYPE AND SCREEN   CBC AND DIFFERENTIAL    Narrative:     The following orders were created for panel order CBC & Differential.  Procedure                               Abnormality         Status                     ---------                               -----------         ------                     CBC Auto Differential[[id number]]        Abnormal            Final result                 Please view results for these tests on the individual orders.       EKG:   No orders  to display       Meds given in ED:   Medications   atorvastatin (LIPITOR) tablet 40 mg (has no administration in time range)   carvedilol (COREG) tablet 3.125 mg (has no administration in time range)   divalproex (DEPAKOTE) DR tablet 500 mg (has no administration in time range)   docusate sodium (COLACE) capsule 100 mg (has no administration in time range)   empagliflozin (JARDIANCE) tablet 10 mg (has no administration in time range)   ferrous sulfate tablet 325 mg (has no administration in time range)   budesonide-formoterol (SYMBICORT) 160-4.5 MCG/ACT inhaler 2 puff (2 puffs Inhalation Given 2/9/25 2417)   folic acid (FOLVITE) tablet 1 mg (has no administration in time range)   furosemide (LASIX) tablet 20 mg (has no administration in time range)   furosemide (LASIX) tablet 40 mg (has no administration in time range)   levothyroxine (SYNTHROID, LEVOTHROID) tablet 50 mcg (has no administration in time range)   pantoprazole (PROTONIX) EC tablet 40 mg (has no administration in time range)   oxybutynin XL (DITROPAN-XL) 24 hr tablet 5 mg (has no administration in time range)   rOPINIRole (REQUIP) tablet 1 mg (has no administration in time range)   spironolactone (ALDACTONE) half tablet 12.5 mg (has no administration in time range)   tamsulosin (FLOMAX) 24 hr capsule 0.8 mg (has no administration in time range)   vitamin B-12 (CYANOCOBALAMIN) tablet 1,000 mcg (has no administration in time range)   sodium chloride 0.9 % infusion (has no administration in time range)   prothrombin complex conc human (KCentra) IV solution 4,777 Units (4,777 Units Intravenous Given 2/9/25 0531)   Tetanus-Diphth-Acell Pertussis (BOOSTRIX) injection 0.5 mL (0.5 mL Intramuscular Given 2/9/25 0604)       Imaging results:  XR Elbow 3+ View Left    Result Date: 2/9/2025   As described.    This report was finalized on 2/9/2025 9:52 AM by Dr. Chava Flores M.D on Workstation: [email or web address]      CT Head Without Contrast    Result Date: 2/9/2025    Left parafalcine subdural hematoma is again demonstrated, with slight interval decrease, now measuring about 3 mm in thickness, previously 4 mm.     This report was finalized on 2/9/2025 9:45 AM by Dr. Chava Flores M.D on Workstation: [employer]      CT Cervical Spine Without Contrast    Result Date: 2/9/2025  Electronically signed by Yessy Mccallum MD on 02-09-25 at 0346    CT Head Without Contrast    Addendum Date: 2/9/2025    ADDENDUM: 02 09 25 03:52 Verify Receipt Verified receipt with Abelardo in ER given to    on 02 09 03:52 (-05:00)     Result Date: 2/9/2025  Communications:  Verify Receipt Electronically signed by Yessy Mccallum MD on 02-09-25 at 0345     Ambulatory status:   - assist Xs 2    Social issues:   Social History     Socioeconomic History    Marital status: Single   Tobacco Use    Smoking status: Never     Passive exposure: Never    Smokeless tobacco: Never   Vaping Use    Vaping status: Never Used   Substance and Sexual Activity    Alcohol use: No    Drug use: No    Sexual activity: Defer       Peripheral Neurovascular  Peripheral Neurovascular (Adult)  Peripheral Neurovascular WDL: WDL    Neuro Cognitive  Neuro Cognitive (Adult)  Cognitive/Neuro/Behavioral WDL: WDL  Level of Consciousness: Alert  Arousal Level: arouses to voice  Orientation: oriented x 4  Speech: clear  Additional Documentation: Hand /Ankle Strength (Group), NIH Stroke Scale (group), Pupils (Group)  Pupils  Pupil PERRLA: yes  Motor Response  Motor Response: LUE motor response, RUE motor response  LUE Motor Response: tremors  RUE Motor Response: tremors  Hand /Ankle Strength  Hand , Left: strong  Hand , Right: strong  Dorsiflexion, Left: moderate  Dorsiflexion, Right: moderate  Plantarflexion, Left: moderate  Plantarflexion, Right: moderate  Ovidio Coma Scale  Best Eye Response: 4-->(E4) spontaneous  Best Motor Response: 6-->(M6) obeys commands  Best Verbal Response: 5-->(V5) oriented  Ovidio Coma  Scale Score: 15  NIH Stroke Scale  Interval: baseline  1a. Level of Consciousness: 0-->Alert, keenly responsive  1b. LOC Questions: 0-->Answers both questions correctly  1c. LOC Commands: 0-->Performs both tasks correctly  2. Best Gaze: 0-->Normal  3. Visual: 0-->No visual loss  4. Facial Palsy: 0-->Normal symmetrical movements  5a. Motor Arm, Left: 0-->No drift, limb holds 90 (or 45) degrees for full 10 secs  5b. Motor Arm, Right: 0-->No drift, limb holds 90 (or 45) degrees for full 10 secs  6a. Motor Leg, Left: 0-->No drift, leg holds 30 degree position for full 5 secs  6b. Motor Leg, Right: 0-->No drift, leg holds 30 degree position for full 5 secs  7. Limb Ataxia: 0-->Absent  8. Sensory: 0-->Normal, no sensory loss  9. Best Language: 0-->No aphasia, normal  10. Dysarthria: 0-->Normal  11. Extinction and Inattention (formerly Neglect): 0-->No abnormality  Total (NIH Stroke Scale): 0    Learning  Learning Assessment  Learning Readiness and Ability: no barriers identified  Education Provided  Person Taught: patient  Teaching Method: verbal instruction  Teaching Focus: symptom/problem overview  Education Outcome Evaluation: eager to learn    Respiratory  Respiratory WDL  Respiratory WDL: WDL  Breath Sounds  All Lung Fields Breath Sounds: All Fields  All Lung Fields Breath Sounds: clear  Breath Sounds Post-Respiratory Treatment  Breath Sounds Posttreatment All Fields: All Fields  Breath Sounds Posttreatment All Fields: no change    Abdominal Pain       Pain Assessments  Pain (Adult)  (0-10) Pain Rating: Rest: 0  (0-10) Pain Rating: Activity: 0    NIH Stroke Scale  NIH Stroke Scale  Interval: baseline  1a. Level of Consciousness: 0-->Alert, keenly responsive  1b. LOC Questions: 0-->Answers both questions correctly  1c. LOC Commands: 0-->Performs both tasks correctly  2. Best Gaze: 0-->Normal  3. Visual: 0-->No visual loss  4. Facial Palsy: 0-->Normal symmetrical movements  5a. Motor Arm, Left: 0-->No drift, limb holds  90 (or 45) degrees for full 10 secs  5b. Motor Arm, Right: 0-->No drift, limb holds 90 (or 45) degrees for full 10 secs  6a. Motor Leg, Left: 0-->No drift, leg holds 30 degree position for full 5 secs  6b. Motor Leg, Right: 0-->No drift, leg holds 30 degree position for full 5 secs  7. Limb Ataxia: 0-->Absent  8. Sensory: 0-->Normal, no sensory loss  9. Best Language: 0-->No aphasia, normal  10. Dysarthria: 0-->Normal  11. Extinction and Inattention (formerly Neglect): 0-->No abnormality  Total (NIH Stroke Scale): 0    Nichole Zhao RN  02/09/25 15:41 EST

## 2025-02-09 NOTE — ED PROVIDER NOTES
"EMERGENCY DEPARTMENT ENCOUNTER    History  Chief Complaint   Patient presents with    Fall       History provided by: Patient    HPI:  Chief Complaint: fall    Context: Pt is a 79 y.o. male who presents after a mechanical fall.  He was attempting to get up out of bed and grabbed a chair next to him when he slid out of bed, hitting his left elbow and face.  He says he \"saw stars\" but did not have any loss of consciousness.  Is not currently having any severe headache, neck pain, chest wall pain, or abdominal pain.  Does have a skin tear to his elbow.  He reports that he does have a history of atrial fibrillation.  He is anticoagulated on Xarelto.  Also on Plavix.      Active Ambulatory Problems     Diagnosis Date Noted    DJD (degenerative joint disease) of knee 12/14/2017    Primary hypertension 02/18/2019    SOB (shortness of breath) 02/18/2019    Leg swelling 02/18/2019    Hyperlipidemia LDL goal <100 08/23/2019    COVID-19 11/25/2022    Diabetes mellitus     GIOVANNY (obstructive sleep apnea)     Disease of thyroid gland     CKD (chronic kidney disease)     Hypomagnesemia     Chronic brain injury     Generalized weakness     CAD (coronary artery disease)     Pedal edema 03/06/2023    Tremor 03/06/2023    Elevated troponin 03/06/2023    Lower extremity cellulitis 03/06/2023    Tinea cruris 03/06/2023    Chronic deep vein thrombosis (DVT) of calf muscle vein of left lower extremity 03/07/2023    Aortic stenosis, severe 03/06/2023    Acute urinary tract infection 04/17/2023    Hypothyroidism 04/17/2023    Acute urinary retention 04/19/2023    Acute bacterial prostatitis 04/19/2023    Chest pain 06/25/2024    Recurrent falls 08/08/2024    Hypokalemia 08/09/2024    Acute on chronic systolic CHF (congestive heart failure) 10/06/2024    Acute on chronic diastolic heart failure 10/06/2024    Acute on chronic diastolic CHF (congestive heart failure) 10/07/2024    Atrial fibrillation, persistent 10/08/2024    Acute on chronic " heart failure with preserved ejection fraction (HFpEF) 10/11/2024    Anemia 10/31/2024    Chronic anticoagulation 01/16/2025     Resolved Ambulatory Problems     Diagnosis Date Noted    Chest pain with high risk of acute coronary syndrome 04/29/2019    Chest pain 01/02/2024    Chest pain 01/04/2024     Past Medical History:   Diagnosis Date    A-fib     Aortic stenosis     Arthritis     Bilateral leg edema     Diastolic heart failure     GERD (gastroesophageal reflux disease)     Heart murmur     History of head injury     [place] (hard of hearing)     Hyperlipidemia     Hypertension     Irregular heart beat     Osteoarthritis     Past heart attack     SOB (shortness of breath) on exertion     Stroke     Vasovagal episode        Past Surgical History:   Procedure Laterality Date    CARDIAC CATHETERIZATION N/A 4/30/2019    Procedure: Coronary angiography with grafts;  Surgeon: Rio Miranda MD;  [facility];  Service: Cardiology    CARDIAC CATHETERIZATION N/A 4/30/2019    Procedure: Left Heart Cath;  Surgeon: Rio Miranda MD;  [facility];  Service: Cardiology    CARDIAC CATHETERIZATION N/A 4/30/2019    Procedure: Left ventriculography;  Surgeon: Rio Miranda MD;  [facility];  Service: Cardiology    CARDIAC CATHETERIZATION  4/30/2019    Procedure: Saphenous Vein Graft;  Surgeon: Rio Miranda MD;  [facility];  Service: Cardiology    CARDIAC CATHETERIZATION N/A 4/30/2019    Procedure: Stent GLENDY coronary;  Surgeon: Rio Miranda MD;  [facility];  Service: Cardiology    CARDIAC CATHETERIZATION N/A 3/10/2023    Procedure: Right and Left Heart Cath;  Surgeon: Rio Miranda MD;  [facility];  Service: Cardiology;  Laterality: N/A;    CARDIAC CATHETERIZATION N/A 3/10/2023    Procedure: Coronary  angiography;  Surgeon: Rio Miranda MD;  [facility];  Service: Cardiology;  Laterality: N/A;    CARDIAC CATHETERIZATION N/A 3/10/2023    Procedure: Left ventriculography;  Surgeon: Rio Miranda MD;  [facility];  Service: Cardiology;  Laterality: N/A;    CARDIAC CATHETERIZATION N/A 3/10/2023    Procedure: Percutaneous Coronary Intervention;  Surgeon: Rio Miranda MD;  [facility];  Service: Cardiology;  Laterality: N/A;    CARDIAC CATHETERIZATION N/A 3/10/2023    Procedure: Stent GLENDY coronary;  Surgeon: Rio Miranda MD;  [facility];  Service: Cardiology;  Laterality: N/A;    CARDIAC CATHETERIZATION N/A 1/3/2024    Procedure: Left Heart Cath;  Surgeon: Rio Miranda MD;  Location: [facility] CATH INVASIVE LOCATION;  Service: Cardiovascular;  Laterality: N/A;    CARDIAC CATHETERIZATION N/A 1/3/2024    Procedure: Coronary angiography;  Surgeon: Rio Miranda MD;  Location: [facility] CATH INVASIVE LOCATION;  Service: Cardiovascular;  Laterality: N/A;    CARDIAC CATHETERIZATION N/A 1/3/2024    Procedure: Percutaneous Coronary Intervention;  Surgeon: Rio Miranda MD;  Location: [facility] CATH INVASIVE LOCATION;  Service: Cardiovascular;  Laterality: N/A;    CARDIAC CATHETERIZATION N/A 1/3/2024    Procedure: Left ventriculography;  Surgeon: Rio Miranda MD;  Location: [facility] CATH INVASIVE LOCATION;  Service: Cardiovascular;  Laterality: N/A;    CARDIAC CATHETERIZATION Left 1/3/2024    Procedure: Native mammary injection;  Surgeon: Rio Miranda MD;  Location: [facility] CATH INVASIVE LOCATION;  Service: Cardiovascular;  Laterality: Left;    CARDIAC CATHETERIZATION N/A 6/26/2024    Procedure: Right and Left Heart Cath;  Surgeon: Rio Miranda MD;  [facility];  Service: Cardiovascular;  Laterality: N/A;     CARDIAC CATHETERIZATION N/A 6/26/2024    Procedure: Percutaneous Coronary Intervention;  Surgeon: Rio Miranda MD;  [facility];  Service: Cardiovascular;  Laterality: N/A;    CARDIAC CATHETERIZATION N/A 6/26/2024    Procedure: Left ventriculography;  Surgeon: Rio Miranda MD;  [facility];  Service: Cardiovascular;  Laterality: N/A;    CARDIAC CATHETERIZATION N/A 6/26/2024    Procedure: Coronary angiography;  Surgeon: Rio Miranda MD;  [facility];  Service: Cardiovascular;  Laterality: N/A;    CARDIAC CATHETERIZATION  6/26/2024    Procedure: Saphenous Vein Graft;  Surgeon: Rio Miranda MD;  [facility];  Service: Cardiovascular;;    CARDIAC CATHETERIZATION N/A 6/26/2024    Procedure: Stent GLENDY coronary;  Surgeon: Rio Miranda MD;  Location: [facility] CATH INVASIVE LOCATION;  Service: Cardiovascular;  Laterality: N/A;    CARPAL TUNNEL RELEASE Bilateral     CATARACT EXTRACTION      CORONARY ARTERY BYPASS GRAFT  2002    FINGER SURGERY      MISSING LEFT POINTER FINGER TIP    INTERVENTIONAL RADIOLOGY PROCEDURE N/A 6/26/2024    Procedure: Intravascular Ultrasound;  Surgeon: Rio Miranda MD;  Location: [facility] CATH INVASIVE LOCATION;  Service: Cardiovascular;  Laterality: N/A;    KNEE ARTHROPLASTY Right 02/15/2017    IL ARTHRP KNE CONDYLE&PLATU MEDIAL&LAT COMPARTMENTS Left 12/14/2017    Procedure: LT TOTAL KNEE ARTHROPLASTY;  Surgeon: Jatin Lancaster MD;  Location: [facility] MAIN OR;  Service: Orthopedics    IL RT/LT HEART CATHETERS N/A 4/30/2019    Procedure: Percutaneous Coronary Intervention;  Surgeon: Rio Miranda MD;  Location: [facility] CATH INVASIVE LOCATION;  Service: Cardiology       Physical Exam  ED Triage Vitals [02/09/25 0227]   Temp Heart Rate Resp BP SpO2   97.8 °F (36.6 °C) 87 18 157/89 97 %      Temp src Heart Rate Source Patient  Position BP Location FiO2 (%)   -- -- -- -- --     Physical Exam  Constitutional:       Appearance: Normal appearance.   HENT:      Head: Atraumatic.   Eyes:      Conjunctiva/sclera: Conjunctivae normal.   Cardiovascular:      Rate and Rhythm: Normal rate and regular rhythm.      Heart sounds: Murmur heard.   Pulmonary:      Effort: Pulmonary effort is normal. No respiratory distress.      Breath sounds: Normal breath sounds.   Abdominal:      Tenderness: There is no abdominal tenderness. There is no guarding or rebound.   Skin:     Capillary Refill: Capillary refill takes less than 2 seconds.   Neurological:      Mental Status: He is alert and oriented to person, place, and time.      GCS: GCS eye subscore is 4. GCS verbal subscore is 5. GCS motor subscore is 6.      Cranial Nerves: No cranial nerve deficit.      Sensory: No sensory deficit.      Motor: Tremor (Lateral upper extremities) present. No weakness.         Medical Decision Making:    Differential Diagnosis includes but not limited to: ICH, skull fracture, vertebral body fracture, skin tear, elbow fracture    Medical Record Review: Reviewed cardiology office visit note 1/15/2025    Labs Results:  Recent Results (from the past 24 hours)   Comprehensive Metabolic Panel    Collection Time: 02/09/25  4:37 AM    Specimen: Blood   Result Value Ref Range    Glucose 105 (H) 65 - 99 mg/dL    BUN 17 8 - 23 mg/dL    Creatinine 0.83 0.76 - 1.27 mg/dL    Sodium 139 136 - 145 mmol/L    Potassium 3.8 3.5 - 5.2 mmol/L    Chloride 99 98 - 107 mmol/L    CO2 31.0 (H) 22.0 - 29.0 mmol/L    Calcium 9.1 8.6 - 10.5 mg/dL    Total Protein 6.8 6.0 - 8.5 g/dL    Albumin 3.9 3.5 - 5.2 g/dL    ALT (SGPT) 12 1 - 41 U/L    AST (SGOT) 12 1 - 40 U/L    Alkaline Phosphatase 67 39 - 117 U/L    Total Bilirubin 1.1 0.0 - 1.2 mg/dL    Globulin 2.9 gm/dL    A/G Ratio 1.3 g/dL    BUN/Creatinine Ratio 20.5 7.0 - 25.0    Anion Gap 9.0 5.0 - 15.0 mmol/L    eGFR 90.1 >60.0 mL/min/1.73    Protime-INR    Collection Time: 02/09/25  4:37 AM    Specimen: Blood   Result Value Ref Range    Protime 16.5 (H) 11.7 - 14.2 Seconds    INR 1.33 (H) 0.90 - 1.10   aPTT    Collection Time: 02/09/25  4:37 AM    Specimen: Blood   Result Value Ref Range    PTT 32.7 22.7 - 35.4 seconds   Type & Screen    Collection Time: 02/09/25  4:37 AM    Specimen: Blood   Result Value Ref Range    ABO Type O     RH type Positive     Antibody Screen Negative     T&S Expiration Date 2/12/2025 11:59:59 PM    Valproic Acid Level, Total    Collection Time: 02/09/25  4:37 AM    Specimen: Blood   Result Value Ref Range    Valproic Acid 61.0 50.0 - 125.0 mcg/mL   CBC Auto Differential    Collection Time: 02/09/25  4:37 AM    Specimen: Blood   Result Value Ref Range    WBC 10.01 3.40 - 10.80 10*3/mm3    RBC 4.68 4.14 - 5.80 10*6/mm3    Hemoglobin 14.4 13.0 - 17.7 g/dL    Hematocrit 42.7 37.5 - 51.0 %    MCV 91.2 79.0 - 97.0 fL    MCH 30.8 26.6 - 33.0 pg    MCHC 33.7 31.5 - 35.7 g/dL    RDW 15.3 12.3 - 15.4 %    RDW-SD 50.6 37.0 - 54.0 fl    MPV 9.1 6.0 - 12.0 fL    Platelets 158 140 - 450 10*3/mm3    Neutrophil % 79.7 (H) 42.7 - 76.0 %    Lymphocyte % 10.6 (L) 19.6 - 45.3 %    Monocyte % 8.9 5.0 - 12.0 %    Eosinophil % 0.1 (L) 0.3 - 6.2 %    Basophil % 0.2 0.0 - 1.5 %    Immature Grans % 0.5 0.0 - 0.5 %    Neutrophils, Absolute 7.98 (H) 1.70 - 7.00 10*3/mm3    Lymphocytes, Absolute 1.06 0.70 - 3.10 10*3/mm3    Monocytes, Absolute 0.89 0.10 - 0.90 10*3/mm3    Eosinophils, Absolute 0.01 0.00 - 0.40 10*3/mm3    Basophils, Absolute 0.02 0.00 - 0.20 10*3/mm3    Immature Grans, Absolute 0.05 0.00 - 0.05 10*3/mm3    nRBC 0.0 0.0 - 0.2 /100 WBC     Lab Comments:  My independent interpretation of the above labs: Benign      Radiology:  CT Cervical Spine Without Contrast    Result Date: 2/9/2025  Patient: PILLO ROBERSONY  Time Out: 03:46 Exam(s): CT C SPINE PE in mild EXAM:   CT Cervical Spine Without Intravenous Contrast CLINICAL HISTORY:    Reason for  exam: fall. TECHNIQUE:   Axial computed tomography images of the cervical spine without intravenous contrast.  CTDI is 19.54 mGy and DLP is 398.8 mGy-cm.  This CT exam was performed according to the principle of ALARA (As Low As Reasonably Achievable) by using one or more of the following dose reduction techniques: automated exposure control, adjustment of the mA and or kV according to patient size, and or use of iterative reconstruction technique. COMPARISON:   No relevant prior studies available. FINDINGS:   Vertebrae:  No acute fracture.   Discs spinal canal neural foramina:  degenerative changes.   Soft tissues:  No prevertebral swelling. IMPRESSION:       No acute fracture or subluxation.     Electronically signed by Yessy Mccallum MD on 02-09-25 at 0346    CT Head Without Contrast    Addendum Date: 2/9/2025    ADDENDUM: 02 09 25 03:52 Verify Receipt Verified receipt with Abelardo in ER given to    on 02 09 03:52 (-05:00)     Result Date: 2/9/2025  CR Patient: RONALD ROBERSON  Time Out: 03:45 Exam(s): CT HEAD Without Contrast EXAM:   CT Head Without Intravenous Contrast CLINICAL HISTORY:    Reason for exam: Fall. TECHNIQUE:   Axial computed tomography images of the head brain without intravenous contrast.  CTDI is 55.7 mGy and DLP is 997.8 mGy-cm.  This CT exam was performed according to the principle of ALARA (As Low As Reasonably Achievable) by using one or more of the following dose reduction techniques: automated exposure control, adjustment of the mA and or kV according to patient size, and or use of iterative reconstruction technique. COMPARISON: 11 17 2024. FINDINGS:   Brain:  Acute trace subdural hematoma along the falx.   Ventricles:  No hydrocephalus.   Bones joints:  Unremarkable.   Soft tissues:  Unremarkable.   Sinuses:  No air fluid level.   Mastoid air cells:  Clear. IMPRESSION:       Acute trace subdural hematoma along the falx.    Communications:  Verify Receipt Electronically signed by Yessy  MD Alessio on 02-09-25 at 0345   Radiology Comments:  I ordered the above imaging and reviewed the results.    My independent interpretation of the CT head: SDH  Elbow x-ray: No obvious fracture or dislocation , 2 radiopaque foreign bodies    Medications given in ED:  Medications   Tetanus-Diphth-Acell Pertussis (BOOSTRIX) injection 0.5 mL (has no administration in time range)   prothrombin complex conc human (KCentra) IV solution 4,777 Units (4,777 Units Intravenous Given 2/9/25 0531)         Rationale:  This is an overall clinically well-appearing 79-year-old male who is presenting today secondary to a fall.  It sound like this was a mechanical fall, he did suffer a skin tear to his left elbow as well as strike to the head.    He was initially evaluated with CT scans of the head and cervical spine.  This notable for small subdural hematoma along the falx.  Given that he is anticoagulated for atrial fibrillation, will need reversal of anticoagulation.  Will consult with neurosurgery.    He has neurovascular intact in affected extremity.  Will plan on washout, tetanus and local wound care.    Labs overall fairly benign.  No tenderness to the chest, abdomen, pelvis or back to stress need for further imaging of these areas.    Progress Notes:      5:30 AM: I saw and reevaluated the patient.  He has not had any change in neurologic status.  I asked him about the potential foreign bodies in his elbow.  He says he does not have any recollection of this.  He has a wood floor and does not believe anything could have been embedded.  They are nonpalpable by exam.  Discussed with him that this can be further evaluated in the outpatient setting.  I spoke with the patient about his ED work up and diagnosis. I informed the patient that he will be admitted for further evaluation/treatment. All questions answered.      Consult:  4:14 AM EST: Discussed case with Dr. Mansfield.  Reviewed history, exam, results and treatments.  In  agreement with plan for reversal of anticoagulation, repeat head CT in 4 to 6 hours, admit to ICU overnight  5:47 AM EST: I spoke with Dr. Garcia, will admit to ICU      Diagnosis:  Final diagnoses:   SDH (subdural hematoma)   Skin tear of left elbow without complication, initial encounter   Foreign body (FB) in soft tissue   Anticoagulated     35 minutes of critical care provided. This time excludes other billable procedures. Time does include preparation of documents, medical consultations, review of old records, and direct bedside care. Patient is at high risk for life-threatening deterioration due to acute subdural hematoma in the anticoagulated patient.         Parts of this note may be an electronic transcription/translation of spoken language to printed text using the Dragon dictation system        Provider Note Signed by:     Marine Arceo MD  02/09/25 0552

## 2025-02-09 NOTE — H&P
[facility]  [phone]  Dr. Fareed Garcia      Subjective   LOS: 0 days     79-year-old male who presents to the ED after a mechanical fall.  CT head showed subdural hematoma in the falx and we were asked to admit.  Patient is on Xarelto and Plavix.  Patient states he was getting out of bed to use the restroom.  His mattress is very high.  He has difficulty getting out and in from bed as a result.  His previous fall was several months ago.  He mostly hit his left arm and face/head.  He denies loss of consciousness.  No seizure-like activity.  He denies any new focal weakness.  Has essential tremors.    Underlying history of HFpEF, A-fib, moderate aortic stenosis, CAD with history PCI, history DVT, history TBI, CKD, type 2 diabetes mellitus, GIOVANNY, hypertension.  Because of TBI patient was previously in a neurorestorative unit.    Hernando W Kierra  reports no history of alcohol use.,  reports that he has never smoked. He has never been exposed to tobacco smoke. He has never used smokeless tobacco.     Past Hx:  has a past medical history of A-fib, Aortic stenosis, Arthritis, Bilateral leg edema, CAD (coronary artery disease), Diabetes mellitus, Diastolic heart failure, Disease of thyroid gland, GERD (gastroesophageal reflux disease), Heart murmur, History of head injury, [place] (hard of hearing), Hyperlipidemia, Hypertension, Irregular heart beat, GIOVANNY (obstructive sleep apnea), Osteoarthritis, Past heart attack, SOB (shortness of breath) on exertion, Stroke, and Vasovagal episode.  Surg Hx:  has a past surgical history that includes Coronary artery bypass graft (2002); Knee Arthroplasty (Right, 02/15/2017); Carpal tunnel release (Bilateral); Finger surgery; arthrp kne condyle&platu medial&lat compartments (Left, 12/14/2017); Cataract extraction; rt/lt heart catheters (N/A, 4/30/2019); Cardiac catheterization (N/A, 4/30/2019); Cardiac catheterization (N/A, 4/30/2019); Cardiac catheterization (N/A, 4/30/2019);  Cardiac catheterization (2019); Cardiac catheterization (N/A, 2019); Cardiac catheterization (N/A, 3/10/2023); Cardiac catheterization (N/A, 3/10/2023); Cardiac catheterization (N/A, 3/10/2023); Cardiac catheterization (N/A, 3/10/2023); Cardiac catheterization (N/A, 3/10/2023); Cardiac catheterization (N/A, 1/3/2024); Cardiac catheterization (N/A, 1/3/2024); Cardiac catheterization (N/A, 1/3/2024); Cardiac catheterization (N/A, 1/3/2024); Cardiac catheterization (Left, 1/3/2024); Cardiac catheterization (N/A, 2024); Cardiac catheterization (N/A, 2024); Cardiac catheterization (N/A, 2024); Interventional radiology procedure (N/A, 2024); Cardiac catheterization (N/A, 2024); Cardiac catheterization (2024); and Cardiac catheterization (N/A, 2024).  FH: family history includes Diabetes in his brother; Parkinsonism in his sister.  SH:  reports that he has never smoked. He has never been exposed to tobacco smoke. He has never used smokeless tobacco. He reports that he does not drink alcohol and does not use drugs.    (Not in a hospital admission)    No Known Allergies    Review of Systems   Constitutional:  Negative for chills and fever.   HENT:  Negative for congestion and sore throat.    Respiratory:  Negative for cough and shortness of breath.    Gastrointestinal:  Negative for abdominal pain and nausea.   Genitourinary:  Negative for dysuria and hematuria.   Musculoskeletal:  Negative for arthralgias and back pain.   Skin:  Negative for pallor and rash.   Neurological:  Negative for seizures and headaches.   Psychiatric/Behavioral:  Negative for agitation and confusion.        Vital Signs past 24hrs  BP range: BP: (106-157)/(72-91) 126/72  Pulse range: Heart Rate:  [77-87] 81  Resp rate range: Resp:  [18] 18  Temp range: Temp (24hrs), Av.8 °F (36.6 °C), Min:97.8 °F (36.6 °C), Max:97.8 °F (36.6 °C)    Oxygen range: SpO2:  [91 %-97 %] 94 %;  ;   Device (Oxygen Therapy):  room air   ; Body mass index is 31.01 kg/m².  Net IO Since Admission: No IO data has been entered for this period [02/09/25 0623]      Mechanical Ventilator:     Adult male who is laying in bed.  No acute distress.  Pupils equal and react light.  Oropharynx moist.  JVP not elevated trachea midline thyroid not enlarged.  Lungs reveal bilateral air entry and clear to auscultation.  No rales rhonchi wheeze.  Percussion note resonant chest expansion equal no chest wall deformity or tenderness.  Heart examination S1-S2 present rhythm regular no murmurs.  Obese soft nontender bowel sounds present no liver spleen enlargement.  No peripheral cyanosis clubbing.  Able to move all extremities.  No significant lymphadenopathy.   trace edema lower extremities.  Abdomen is mild bruising over left side of his head frontal and left elbow has a wound from abrasion that is oozing blood.    Results Review:    I have reviewed the laboratory and imaging data from current admission. My annotations are as noted in assessment and plan.  Result Review:  I have personally reviewed the results from the time of this admission to 2/9/2025 06:23 EST and agree with these findings:  [x]  Laboratory list / accordion  [x]  Microbiology  [x]  Radiology  []  EKG/Telemetry   []  Cardiology/Vascular   []  Pathology  []  Old records  []  Other:        Medication Review:  I have reviewed the current MAR. My annotations are as noted in assessment and plan.    No current facility-administered medications for this encounter.    Lines, Drains & Airways       Active LDAs       Name Placement date Placement time Site Days    Peripheral IV 02/09/25 0436 Right Antecubital 02/09/25  0436  Antecubital  less than 1    Peripheral IV 02/09/25 0445 Anterior;Left Forearm 02/09/25  0445  Forearm  less than 1    External Urinary Catheter 02/09/25  0601  --  less than 1                  Isolation status: No active isolations        Isolation:  No active isolations    PCCM  Problems  Small SDH in the falx after mechanical fall  Skin tear of the left elbow with abrasion  Reported foreign body in the left elbow  Chronic anticoagulation with Xarelto  Current treatment with Plavix  CAD with history PCI  Atrial fibrillation  Severe aortic stenosis  History TBI in 1956  CKD  Type 2 diabetes mellitus  GIOVANNY    Plan of Treatment    Acute SDH after fall and inpatient on Xarelto and Plavix.  Monitor in ICU as per neurosurgery.  Control blood pressure.  Repeat CT head.  Patient was given Kcentra in an attempt to reverse his anticoagulation.    Reported foreign body in the left elbow.  Noted in ER notes.  X-ray of the elbows is pending.    Sliding scale for diabetes.    Will need to determine timing for resumption of anticoagulation and Plavix etc.    Electronically signed by Fareed Garcia MD, 02/09/25, 6:23 AM EST.      Part of this note may be an electronic transcription/translation of spoken language to printed text using the Dragon Dictation System.

## 2025-02-09 NOTE — SIGNIFICANT NOTE
RN contacted regarding fixing current order so patient can be evaluated. Will follow-up as schedule allows once order is input correctly.
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
## 2025-02-10 ENCOUNTER — APPOINTMENT (OUTPATIENT)
Dept: GENERAL RADIOLOGY | Facility: HOSPITAL | Age: 80
End: 2025-02-10
Payer: MEDICARE

## 2025-02-10 ENCOUNTER — APPOINTMENT (OUTPATIENT)
Dept: CT IMAGING | Facility: HOSPITAL | Age: 80
End: 2025-02-10
Payer: MEDICARE

## 2025-02-10 PROBLEM — I50.42 CHRONIC COMBINED SYSTOLIC AND DIASTOLIC CONGESTIVE HEART FAILURE: Status: ACTIVE | Noted: 2024-10-11

## 2025-02-10 PROBLEM — J06.9 VIRAL URI: Status: ACTIVE | Noted: 2025-02-10

## 2025-02-10 PROBLEM — J15.7 MYCOPLASMA PNEUMONIA: Status: ACTIVE | Noted: 2025-02-10

## 2025-02-10 LAB
B PARAPERT DNA SPEC QL NAA+PROBE: NOT DETECTED
B PERT DNA SPEC QL NAA+PROBE: NOT DETECTED
C PNEUM DNA NPH QL NAA+NON-PROBE: NOT DETECTED
FLUAV SUBTYP SPEC NAA+PROBE: NOT DETECTED
FLUBV RNA ISLT QL NAA+PROBE: NOT DETECTED
GLUCOSE BLDC GLUCOMTR-MCNC: 105 MG/DL (ref 70–130)
GLUCOSE BLDC GLUCOMTR-MCNC: 126 MG/DL (ref 70–130)
HADV DNA SPEC NAA+PROBE: NOT DETECTED
HCOV 229E RNA SPEC QL NAA+PROBE: NOT DETECTED
HCOV HKU1 RNA SPEC QL NAA+PROBE: NOT DETECTED
HCOV NL63 RNA SPEC QL NAA+PROBE: DETECTED
HCOV OC43 RNA SPEC QL NAA+PROBE: NOT DETECTED
HMPV RNA NPH QL NAA+NON-PROBE: NOT DETECTED
HPIV1 RNA ISLT QL NAA+PROBE: NOT DETECTED
HPIV2 RNA SPEC QL NAA+PROBE: NOT DETECTED
HPIV3 RNA NPH QL NAA+PROBE: NOT DETECTED
HPIV4 P GENE NPH QL NAA+PROBE: NOT DETECTED
M PNEUMO IGG SER IA-ACNC: DETECTED
RHINOVIRUS RNA SPEC NAA+PROBE: NOT DETECTED
RSV RNA NPH QL NAA+NON-PROBE: NOT DETECTED
SARS-COV-2 RNA NPH QL NAA+NON-PROBE: NOT DETECTED

## 2025-02-10 PROCEDURE — 92610 EVALUATE SWALLOWING FUNCTION: CPT | Performed by: SPEECH-LANGUAGE PATHOLOGIST

## 2025-02-10 PROCEDURE — 94799 UNLISTED PULMONARY SVC/PX: CPT

## 2025-02-10 PROCEDURE — 82948 REAGENT STRIP/BLOOD GLUCOSE: CPT

## 2025-02-10 PROCEDURE — 99232 SBSQ HOSP IP/OBS MODERATE 35: CPT | Performed by: NURSE PRACTITIONER

## 2025-02-10 PROCEDURE — 70450 CT HEAD/BRAIN W/O DYE: CPT

## 2025-02-10 PROCEDURE — 0202U NFCT DS 22 TRGT SARS-COV-2: CPT | Performed by: INTERNAL MEDICINE

## 2025-02-10 PROCEDURE — 71046 X-RAY EXAM CHEST 2 VIEWS: CPT

## 2025-02-10 PROCEDURE — 94760 N-INVAS EAR/PLS OXIMETRY 1: CPT

## 2025-02-10 RX ORDER — BENZONATATE 100 MG/1
200 CAPSULE ORAL 3 TIMES DAILY PRN
Status: DISCONTINUED | OUTPATIENT
Start: 2025-02-10 | End: 2025-02-13 | Stop reason: HOSPADM

## 2025-02-10 RX ORDER — AZITHROMYCIN 250 MG/1
250 TABLET, FILM COATED ORAL DAILY
Status: DISCONTINUED | OUTPATIENT
Start: 2025-02-11 | End: 2025-02-13 | Stop reason: HOSPADM

## 2025-02-10 RX ORDER — ONDANSETRON 2 MG/ML
4 INJECTION INTRAMUSCULAR; INTRAVENOUS EVERY 6 HOURS PRN
Status: DISCONTINUED | OUTPATIENT
Start: 2025-02-10 | End: 2025-02-13 | Stop reason: HOSPADM

## 2025-02-10 RX ORDER — GUAIFENESIN 600 MG/1
1200 TABLET, EXTENDED RELEASE ORAL EVERY 12 HOURS SCHEDULED
Status: DISCONTINUED | OUTPATIENT
Start: 2025-02-10 | End: 2025-02-13 | Stop reason: HOSPADM

## 2025-02-10 RX ORDER — ACETAMINOPHEN 325 MG/1
650 TABLET ORAL EVERY 4 HOURS PRN
Status: DISCONTINUED | OUTPATIENT
Start: 2025-02-10 | End: 2025-02-13 | Stop reason: HOSPADM

## 2025-02-10 RX ORDER — DEXTROMETHORPHAN POLISTIREX 30 MG/5ML
60 SUSPENSION ORAL EVERY 12 HOURS SCHEDULED
Status: DISCONTINUED | OUTPATIENT
Start: 2025-02-10 | End: 2025-02-13 | Stop reason: HOSPADM

## 2025-02-10 RX ORDER — IBUPROFEN 600 MG/1
1 TABLET ORAL
Status: DISCONTINUED | OUTPATIENT
Start: 2025-02-10 | End: 2025-02-13 | Stop reason: HOSPADM

## 2025-02-10 RX ORDER — AZITHROMYCIN 250 MG/1
500 TABLET, FILM COATED ORAL DAILY
Status: COMPLETED | OUTPATIENT
Start: 2025-02-10 | End: 2025-02-10

## 2025-02-10 RX ORDER — DEXTROSE MONOHYDRATE 25 G/50ML
25 INJECTION, SOLUTION INTRAVENOUS
Status: DISCONTINUED | OUTPATIENT
Start: 2025-02-10 | End: 2025-02-13 | Stop reason: HOSPADM

## 2025-02-10 RX ORDER — INSULIN LISPRO 100 [IU]/ML
2-9 INJECTION, SOLUTION INTRAVENOUS; SUBCUTANEOUS
Status: DISCONTINUED | OUTPATIENT
Start: 2025-02-10 | End: 2025-02-13 | Stop reason: HOSPADM

## 2025-02-10 RX ORDER — NICOTINE POLACRILEX 4 MG
15 LOZENGE BUCCAL
Status: DISCONTINUED | OUTPATIENT
Start: 2025-02-10 | End: 2025-02-13 | Stop reason: HOSPADM

## 2025-02-10 RX ADMIN — DEXTROMETHORPHAN 60 MG: 30 SUSPENSION, EXTENDED RELEASE ORAL at 22:12

## 2025-02-10 RX ADMIN — Medication 1000 MCG: at 11:11

## 2025-02-10 RX ADMIN — ATORVASTATIN CALCIUM 40 MG: 20 TABLET, FILM COATED ORAL at 22:11

## 2025-02-10 RX ADMIN — ROPINIROLE 1 MG: 1 TABLET, FILM COATED ORAL at 18:04

## 2025-02-10 RX ADMIN — ACETAMINOPHEN 325MG 650 MG: 325 TABLET ORAL at 22:10

## 2025-02-10 RX ADMIN — TAMSULOSIN HYDROCHLORIDE 0.8 MG: 0.4 CAPSULE ORAL at 11:11

## 2025-02-10 RX ADMIN — ROPINIROLE 1 MG: 1 TABLET, FILM COATED ORAL at 22:13

## 2025-02-10 RX ADMIN — GUAIFENESIN 1200 MG: 600 TABLET, MULTILAYER, EXTENDED RELEASE ORAL at 22:13

## 2025-02-10 RX ADMIN — DOCUSATE SODIUM 100 MG: 100 CAPSULE, LIQUID FILLED ORAL at 22:12

## 2025-02-10 RX ADMIN — OXYBUTYNIN CHLORIDE 5 MG: 5 TABLET, EXTENDED RELEASE ORAL at 11:10

## 2025-02-10 RX ADMIN — DIVALPROEX SODIUM 500 MG: 500 TABLET, DELAYED RELEASE ORAL at 22:12

## 2025-02-10 RX ADMIN — DOCUSATE SODIUM 100 MG: 100 CAPSULE, LIQUID FILLED ORAL at 11:16

## 2025-02-10 RX ADMIN — FOLIC ACID 1 MG: 1 TABLET ORAL at 11:11

## 2025-02-10 RX ADMIN — ROPINIROLE 1 MG: 1 TABLET, FILM COATED ORAL at 11:11

## 2025-02-10 RX ADMIN — Medication 12.5 MG: at 11:11

## 2025-02-10 RX ADMIN — EMPAGLIFLOZIN 10 MG: 10 TABLET, FILM COATED ORAL at 12:52

## 2025-02-10 RX ADMIN — ACETAMINOPHEN 650 MG: 650 SUPPOSITORY RECTAL at 04:07

## 2025-02-10 RX ADMIN — LEVOTHYROXINE SODIUM 50 MCG: 50 TABLET ORAL at 11:11

## 2025-02-10 RX ADMIN — AZITHROMYCIN DIHYDRATE 500 MG: 250 TABLET ORAL at 22:11

## 2025-02-10 RX ADMIN — DIVALPROEX SODIUM 500 MG: 500 TABLET, DELAYED RELEASE ORAL at 12:52

## 2025-02-10 RX ADMIN — FUROSEMIDE 20 MG: 20 TABLET ORAL at 22:13

## 2025-02-10 RX ADMIN — BUDESONIDE AND FORMOTEROL FUMARATE DIHYDRATE 2 PUFF: 160; 4.5 AEROSOL RESPIRATORY (INHALATION) at 21:50

## 2025-02-10 RX ADMIN — CARVEDILOL 3.12 MG: 3.12 TABLET, FILM COATED ORAL at 22:11

## 2025-02-10 RX ADMIN — CARVEDILOL 3.12 MG: 3.12 TABLET, FILM COATED ORAL at 11:11

## 2025-02-10 RX ADMIN — FERROUS SULFATE TAB 325 MG (65 MG ELEMENTAL FE) 325 MG: 325 (65 FE) TAB at 11:11

## 2025-02-10 NOTE — PROGRESS NOTES
[facility] NEUROSURGERY INTRACRANIAL PROGRESS NOTE    PATIENT IDENTIFICATION:   Name:  Hernando Lozada      MRN:  [id number]     79 y.o.  male               CC:tSDH, fall       Subjective     Interval History: Denies headache.  Repeat CT head.    ROS:  HEENT: No headache, no vision changes  GI: No nausea, vomiting, no swallow difficulties  Neuro: Generalized weakness    Objective     Vital signs in last 24 hours:  Temp:  [97.8 °F (36.6 °C)-102.6 °F (39.2 °C)] 101.7 °F (38.7 °C)  Heart Rate:  [] 77  Resp:  [16-18] 18  BP: (135-150)/(64-95) 135/64      Intake/Output this shift:  I/O this shift:  In: 700 [I.V.:700]  Out: 400 [Urine:400]      Intake/Output last 3 shifts:  I/O last 3 completed shifts:  In: -   Out: 1425 [Urine:1425]    LABS:  Results from last 7 days   Lab Units 02/09/25  0437   WBC 10*3/mm3 10.01   HEMOGLOBIN g/dL 14.4   HEMATOCRIT % 42.7   PLATELETS 10*3/mm3 158     Results from last 7 days   Lab Units 02/09/25  0437   SODIUM mmol/L 139   POTASSIUM mmol/L 3.8   CHLORIDE mmol/L 99   CO2 mmol/L 31.0*   BUN mg/dL 17   CREATININE mg/dL 0.83   CALCIUM mg/dL 9.1   BILIRUBIN mg/dL 1.1   ALK PHOS U/L 67   ALT (SGPT) U/L 12   AST (SGOT) U/L 12   GLUCOSE mg/dL 105*        IMAGING STUDIES:  CT head-left posterior parafalcine subdural hematoma extending into the left tentorium cerebelli is stable.  Significant cerebral atrophy present.    I personally viewed and interpreted the patient's CT head also reviewed by and discussed with Dr. Mansfield.    Meds reviewed/changed: Yes    Current Facility-Administered Medications:     acetaminophen (TYLENOL) tablet 650 mg, 650 mg, Oral, Q4H PRN, Bam Machado MD    atorvastatin (LIPITOR) tablet 40 mg, 40 mg, Oral, Nightly, Stan Gallegos MD    azithromycin (ZITHROMAX) tablet 500 mg, 500 mg, Oral, Daily **FOLLOWED BY** [START ON 2/11/2025] azithromycin (ZITHROMAX) tablet 250 mg, 250 mg, Oral, Daily, Bam Machado MD    benzonatate (TESSALON) capsule 200 mg, 200 mg, Oral, TID  PRN, Bam Machado MD    budesonide-formoterol (SYMBICORT) 160-4.5 MCG/ACT inhaler 2 puff, 2 puff, Inhalation, BID - RT, Stan Gallegos MD, 2 puff at 02/09/25 0937    carvedilol (COREG) tablet 3.125 mg, 3.125 mg, Oral, BID, Stan Gallegos MD, 3.125 mg at 02/10/25 1111    dextromethorphan polistirex ER (DELSYM) 30 MG/5ML oral suspension 60 mg, 60 mg, Oral, Q12H, Bam Machaod MD    dextrose (D50W) (25 g/50 mL) IV injection 25 g, 25 g, Intravenous, Q15 Min PRN, Bam Machado MD    dextrose (GLUTOSE) oral gel 15 g, 15 g, Oral, Q15 Min PRN, Bam Machado MD    divalproex (DEPAKOTE) DR tablet 500 mg, 500 mg, Oral, BID, Stan Gallegos MD, 500 mg at 02/10/25 1252    docusate sodium (COLACE) capsule 100 mg, 100 mg, Oral, BID, Stan Gallegos MD, 100 mg at 02/10/25 1116    empagliflozin (JARDIANCE) tablet 10 mg, 10 mg, Oral, Daily, Stan Gallegos MD, 10 mg at 02/10/25 1252    ferrous sulfate tablet 325 mg, 325 mg, Oral, Daily With Breakfast, Stan Gallegos MD, 325 mg at 02/10/25 1111    folic acid (FOLVITE) tablet 1 mg, 1 mg, Oral, Daily, Stan Gallegos MD, 1 mg at 02/10/25 1111    furosemide (LASIX) tablet 20 mg, 20 mg, Oral, Nightly, Stan Gallegos MD    furosemide (LASIX) tablet 40 mg, 40 mg, Oral, Q AM, Stan Gallegos MD    glucagon (GLUCAGEN) injection 1 mg, 1 mg, Intramuscular, Q15 Min PRN, Bam Machado MD    guaiFENesin (MUCINEX) 12 hr tablet 1,200 mg, 1,200 mg, Oral, Q12H, Bam Machado MD    insulin lispro (HUMALOG/ADMELOG) injection 2-9 Units, 2-9 Units, Subcutaneous, 4x Daily AC & at Bedtime, Bam Machado MD    levothyroxine (SYNTHROID, LEVOTHROID) tablet 50 mcg, 50 mcg, Oral, Daily, Stan Gallegos MD, 50 mcg at 02/10/25 1111    ondansetron (ZOFRAN) injection 4 mg, 4 mg, Intravenous, Q6H PRN, Bam Machado MD    oxybutynin XL (DITROPAN-XL) 24 hr tablet 5 mg, 5 mg, Oral, Daily, Stan Gallegos MD, 5 mg at 02/10/25 1110    pantoprazole (PROTONIX) EC tablet 40 mg, 40 mg, Oral, Q AM, Stan Gallegos MD     rOPINIRole (REQUIP) tablet 1 mg, 1 mg, Oral, TID, Stan Gallegos MD, 1 mg at 02/10/25 1111    spironolactone (ALDACTONE) half tablet 12.5 mg, 12.5 mg, Oral, Daily, Stan Gallegos MD, 12.5 mg at 02/10/25 1111    tamsulosin (FLOMAX) 24 hr capsule 0.8 mg, 0.8 mg, Oral, Daily, Stan Gallegos MD, 0.8 mg at 02/10/25 1111    vitamin B-12 (CYANOCOBALAMIN) tablet 1,000 mcg, 1,000 mcg, Oral, Daily, Stan Gallegos MD, 1,000 mcg at 02/10/25 1111      Physical Exam:    General:   Awake, alert, oriented x1. Speech clear with no aphasia.  Poor eye contact  Neck:    supple  CN III IV VI:   Extraocular movements are full , PERRL 3 mm brisk  CN VII:   Facial movements are symmetric. No weakness.  Motor:    Moving all extremities, decreased range of motion in arms.  No obvious drift.  Bilateral upper extremity tremor right greater than left more so in resting but some intention tremor as well  Station and Gait:  Not tested due to fall risk  Coordination:  Finger-to-nose test shows no dysmetria.  Extremities:   Wearing SCD    Assessment & Plan     ASSESSMENT:      SDH (subdural hematoma)    Primary hypertension    Diabetes mellitus    GIOVANNY (obstructive sleep apnea)    CKD (chronic kidney disease)    Atrial fibrillation, persistent    Chronic combined systolic and diastolic congestive heart failure    Mycoplasma pneumonia    Viral URI    Patient with mechanical fall while on Xarelto and Plavix sustained a small posterior falx subdural hematoma extending into the left tentorium cerebelli.  This is stable on repeat imaging.  His anticoagulation and antiplatelets were held.  Denies any headache today.  He has no focal weakness.  He has tremor in his upper extremities which he states is baseline.  This time, patient may have aspirin 81 mg daily if needed, but should remain off Xarelto and Plavix.  We will have him follow-up in 2 weeks with CT head and will determine resumption of anticoagulation at that time.  Patient seems to be fairly  "frail and may need some rehab prior to returning home although he tells me he lives with a caregiver.    PLAN:   Okay to initiate aspirin 81 mg  Hold Plavix and Xarelto  Follow-up neurosurgery 2 weeks with CT head  Recommend therapy eval.  Needs rehab prior to home.    I discussed the patient's findings and my recommendations with patient, nursing staff, and Dr. Mansfield    During patient visit, I utilized appropriate personal protective equipment including mask and gloves.  Mask used was standard procedure mask. Appropriate PPE was worn during the entire visit.  Hand hygiene was completed before and after.      LOS: 1 day       Jaylin Kang, APRN  2/10/2025  17:21 EST    \"Dictated utilizing Dragon dictation\".      "

## 2025-02-10 NOTE — PLAN OF CARE
Goal Outcome Evaluation:  Plan of Care Reviewed With: patient           Outcome Evaluation: Order received, chart reviewed.  Pt states he does not wear dentures when eating d/t poor fit.  Pt demonstrated inconsistent delayed cough only when taking multiple straw sips of thin liquds.  No s/s aspiration with single drinks of thin, puree, soft/hard solids, or mixed consistencies. Meds w/ puree.Upright with all po.  Small bites, single drinks.               SLP Swallowing Diagnosis: mild, oral dysphagia (02/10/25 0954)

## 2025-02-10 NOTE — PLAN OF CARE
Goal Outcome Evaluation:         Patient alert and oriented x4. Denies pain. Appears very stiff and rigid in neck and shoulders. Febrile this shift. Tylenol suppository given. Lab called and notified nurse that Resp panel came back + for Covid [id number]. Patient placed in Isolation.

## 2025-02-10 NOTE — CONSULTS
Name: Hernando Lozada ADMIT: 2025   : 1945  PCP: Provider, No Known    MRN: [id number] LOS: 1 days   AGE/SEX: 79 y.o. male  ROOM: [facility]/     Subjective   Subjective   Has had some cough.  Otherwise no new complaints today.       Objective   Objective   Vital Signs  Temp:  [97.8 °F (36.6 °C)-102.6 °F (39.2 °C)] 101.7 °F (38.7 °C)  Heart Rate:  [] 77  Resp:  [16-18] 18  BP: (135-156)/(64-95) 135/64  SpO2:  [92 %-97 %] 94 %  on   ;   Device (Oxygen Therapy): room air  Body mass index is 30.52 kg/m².  Physical Exam  Vitals and nursing note reviewed.   Constitutional:       General: He is not in acute distress.     Appearance: He is ill-appearing.   Cardiovascular:      Rate and Rhythm: Normal rate. Rhythm irregularly irregular.   Pulmonary:      Effort: Pulmonary effort is normal.      Breath sounds: Rhonchi (Few) present.   Abdominal:      General: Bowel sounds are normal.      Palpations: Abdomen is soft.      Tenderness: There is no abdominal tenderness.   Musculoskeletal:         General: No swelling.   Skin:     General: Skin is warm and dry.   Neurological:      Mental Status: He is alert. Mental status is at baseline.      Motor: Tremor present.       Results Review     I reviewed the patient's new clinical results.  Results from last 7 days   Lab Units 25  0437   WBC 10*3/mm3 10.01   HEMOGLOBIN g/dL 14.4   PLATELETS 10*3/mm3 158     Results from last 7 days   Lab Units 25  0437   SODIUM mmol/L 139   POTASSIUM mmol/L 3.8   CHLORIDE mmol/L 99   CO2 mmol/L 31.0*   BUN mg/dL 17   CREATININE mg/dL 0.83   GLUCOSE mg/dL 105*   EGFR mL/min/1.73 90.1     Results from last 7 days   Lab Units 25  0437   ALBUMIN g/dL 3.9   BILIRUBIN mg/dL 1.1   ALK PHOS U/L 67   AST (SGOT) U/L 12   ALT (SGPT) U/L 12     Results from last 7 days   Lab Units 25  0437   CALCIUM mg/dL 9.1   ALBUMIN g/dL 3.9       Glucose   Date/Time Value Ref Range Status   2025 1827 95 70 - 130 mg/dL Final       XR  Elbow 3+ View Left    Result Date: 2/9/2025   As described.    This report was finalized on 2/9/2025 9:52 AM by Dr. Chava Flores M.D on Workstation: [employer]      CT Head Without Contrast    Result Date: 2/9/2025   Left parafalcine subdural hematoma is again demonstrated, with slight interval decrease, now measuring about 3 mm in thickness, previously 4 mm.     This report was finalized on 2/9/2025 9:45 AM by Dr. Chava Flores M.D on Workstation: [employer]      CT Cervical Spine Without Contrast    Result Date: 2/9/2025  Electronically signed by Yessy Mccallum MD on 02-09-25 at 0346    CT Head Without Contrast    Addendum Date: 2/9/2025    ADDENDUM: 02 09 25 03:52 Verify Receipt Verified receipt with Abelardo in ER given to    on 02 09 03:52 (-05:00)     Result Date: 2/9/2025  Communications:  Verify Receipt Electronically signed by Yessy Mccallum MD on 02-09-25 at 0345     I have personally reviewed all medications:  Scheduled Medications  atorvastatin, 40 mg, Oral, Nightly  azithromycin, 500 mg, Oral, Daily   Followed by  [START ON 2/11/2025] azithromycin, 250 mg, Oral, Daily  budesonide-formoterol, 2 puff, Inhalation, BID - RT  carvedilol, 3.125 mg, Oral, BID  dextromethorphan polistirex ER, 60 mg, Oral, Q12H  divalproex, 500 mg, Oral, BID  docusate sodium, 100 mg, Oral, BID  empagliflozin, 10 mg, Oral, Daily  ferrous sulfate, 325 mg, Oral, Daily With Breakfast  folic acid, 1 mg, Oral, Daily  furosemide, 20 mg, Oral, Nightly  furosemide, 40 mg, Oral, Q AM  guaiFENesin, 1,200 mg, Oral, Q12H  levothyroxine, 50 mcg, Oral, Daily  oxybutynin XL, 5 mg, Oral, Daily  pantoprazole, 40 mg, Oral, Q AM  rOPINIRole, 1 mg, Oral, TID  spironolactone, 12.5 mg, Oral, Daily  tamsulosin, 0.8 mg, Oral, Daily  cyanocobalamin, 1,000 mcg, Oral, Daily    Infusions     Diet  Diet: Regular/House, Diabetic, Cardiac, Fluid Restriction (240 mL/tray); Healthy Heart (2-3 Na+); Consistent Carbohydrate; 2000 mL/day; Texture:  Soft to Chew (NDD 3); Soft to Chew: Chopped Meat; Fluid Consistency: Thin (IDDSI 0)    I have personally reviewed:  [x]  Laboratory   [x]  Microbiology   [x]  Radiology   [x]  EKG/Telemetry  [x]  Cardiology/Vascular   []  Pathology    []  Records       Assessment/Plan     Active Hospital Problems    Diagnosis  POA    **SDH (subdural hematoma) [S06.5XAA]  Yes    Mycoplasma pneumonia [J15.7]  Yes    Viral URI [J06.9]  Yes    Chronic combined systolic and diastolic congestive heart failure [I50.42]  Yes    Atrial fibrillation, persistent [I48.19]  Yes    GIOVANNY (obstructive sleep apnea) [G47.33]  Yes    Diabetes mellitus [E11.9]  Yes    CKD (chronic kidney disease) [N18.9]  Yes    Primary hypertension [I10]  Yes      Resolved Hospital Problems   No resolved problems to display.       79 y.o. male admitted with SDH (subdural hematoma).    Stable per ANN.  Passed a swallow study and tolerating pills today.  Patient definitely on Xarelto for A-fib but nursing try to figure out if he was taking Plavix or not.  Both on hold for now until cleared by neurosurgery.  No fracture seen left elbow x-rays.  Suspect foreign bodies are chronic.  Will have wound care nurse see for skin tear.    He tested positive for coronavirus and mycoplasma pneumonia on respiratory PCR.  Given complaint of cough we will get a chest x-ray and start him on azithromycin.  Discussed with pulmonology who will evaluate him for this as well.  Will write for Mucinex, Tessalon Perles, Delsym, etc.    He is a diabetic.  Currently no orders for Accu-Cheks or insulin coverage.  Will order moderate dose protocol.    No labs ordered this morning.  Will order to reevaluate tomorrow.      SCDs for DVT prophylaxis.  Full code.  Discussed with patient, nursing staff, and consulting provider.  Anticipate discharge home with HH vs SNU facility later this week.  Expected Discharge Date: 2/12/2025; Expected Discharge Time:       Bam Machado MD  [facility]  02/10/25  16:13 EST
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
## 2025-02-10 NOTE — PLAN OF CARE
Goal Outcome Evaluation:  Plan of Care Reviewed With: patient   Pt admitted from ED with subdural hematoma, alert and oriented, no c/o pain, febrile, prn tylenol given, NPO until speech eval, voiding per pw, plans for d/c pending     Progress: no change

## 2025-02-11 LAB
ANION GAP SERPL CALCULATED.3IONS-SCNC: 10 MMOL/L (ref 5–15)
BUN SERPL-MCNC: 21 MG/DL (ref 8–23)
BUN/CREAT SERPL: 22.1 (ref 7–25)
CALCIUM SPEC-SCNC: 8.4 MG/DL (ref 8.6–10.5)
CHLORIDE SERPL-SCNC: 97 MMOL/L (ref 98–107)
CO2 SERPL-SCNC: 27 MMOL/L (ref 22–29)
CREAT SERPL-MCNC: 0.95 MG/DL (ref 0.76–1.27)
DEPRECATED RDW RBC AUTO: 51.2 FL (ref 37–54)
EGFRCR SERPLBLD CKD-EPI 2021: 82.4 ML/MIN/1.73
ERYTHROCYTE [DISTWIDTH] IN BLOOD BY AUTOMATED COUNT: 15.5 % (ref 12.3–15.4)
GLUCOSE BLDC GLUCOMTR-MCNC: 101 MG/DL (ref 70–130)
GLUCOSE BLDC GLUCOMTR-MCNC: 108 MG/DL (ref 70–130)
GLUCOSE BLDC GLUCOMTR-MCNC: 142 MG/DL (ref 70–130)
GLUCOSE BLDC GLUCOMTR-MCNC: 148 MG/DL (ref 70–130)
GLUCOSE SERPL-MCNC: 108 MG/DL (ref 65–99)
HBA1C MFR BLD: 5.5 % (ref 4.8–5.6)
HCT VFR BLD AUTO: 41 % (ref 37.5–51)
HGB BLD-MCNC: 13.5 G/DL (ref 13–17.7)
MCH RBC QN AUTO: 29.9 PG (ref 26.6–33)
MCHC RBC AUTO-ENTMCNC: 32.9 G/DL (ref 31.5–35.7)
MCV RBC AUTO: 90.9 FL (ref 79–97)
PLATELET # BLD AUTO: 144 10*3/MM3 (ref 140–450)
PMV BLD AUTO: 9.6 FL (ref 6–12)
POTASSIUM SERPL-SCNC: 3.7 MMOL/L (ref 3.5–5.2)
RBC # BLD AUTO: 4.51 10*6/MM3 (ref 4.14–5.8)
SODIUM SERPL-SCNC: 134 MMOL/L (ref 136–145)
WBC NRBC COR # BLD AUTO: 8.11 10*3/MM3 (ref 3.4–10.8)

## 2025-02-11 PROCEDURE — 94664 DEMO&/EVAL PT USE INHALER: CPT

## 2025-02-11 PROCEDURE — 80048 BASIC METABOLIC PNL TOTAL CA: CPT | Performed by: HOSPITALIST

## 2025-02-11 PROCEDURE — 94799 UNLISTED PULMONARY SVC/PX: CPT

## 2025-02-11 PROCEDURE — 97530 THERAPEUTIC ACTIVITIES: CPT

## 2025-02-11 PROCEDURE — 97162 PT EVAL MOD COMPLEX 30 MIN: CPT

## 2025-02-11 PROCEDURE — 85027 COMPLETE CBC AUTOMATED: CPT | Performed by: HOSPITALIST

## 2025-02-11 PROCEDURE — 83036 HEMOGLOBIN GLYCOSYLATED A1C: CPT | Performed by: HOSPITALIST

## 2025-02-11 PROCEDURE — 82948 REAGENT STRIP/BLOOD GLUCOSE: CPT

## 2025-02-11 PROCEDURE — 94760 N-INVAS EAR/PLS OXIMETRY 1: CPT

## 2025-02-11 RX ORDER — ASPIRIN 81 MG/1
81 TABLET ORAL DAILY
Status: DISCONTINUED | OUTPATIENT
Start: 2025-02-11 | End: 2025-02-13 | Stop reason: HOSPADM

## 2025-02-11 RX ADMIN — BUDESONIDE AND FORMOTEROL FUMARATE DIHYDRATE 2 PUFF: 160; 4.5 AEROSOL RESPIRATORY (INHALATION) at 22:36

## 2025-02-11 RX ADMIN — TAMSULOSIN HYDROCHLORIDE 0.8 MG: 0.4 CAPSULE ORAL at 09:48

## 2025-02-11 RX ADMIN — ACETAMINOPHEN 325MG 650 MG: 325 TABLET ORAL at 05:26

## 2025-02-11 RX ADMIN — LEVOTHYROXINE SODIUM 50 MCG: 50 TABLET ORAL at 09:47

## 2025-02-11 RX ADMIN — DIVALPROEX SODIUM 500 MG: 500 TABLET, DELAYED RELEASE ORAL at 09:44

## 2025-02-11 RX ADMIN — CARVEDILOL 3.12 MG: 3.12 TABLET, FILM COATED ORAL at 09:44

## 2025-02-11 RX ADMIN — FUROSEMIDE 20 MG: 20 TABLET ORAL at 20:56

## 2025-02-11 RX ADMIN — FUROSEMIDE 40 MG: 40 TABLET ORAL at 05:26

## 2025-02-11 RX ADMIN — ROPINIROLE 1 MG: 1 TABLET, FILM COATED ORAL at 09:48

## 2025-02-11 RX ADMIN — DEXTROMETHORPHAN 60 MG: 30 SUSPENSION, EXTENDED RELEASE ORAL at 20:55

## 2025-02-11 RX ADMIN — ROPINIROLE 1 MG: 1 TABLET, FILM COATED ORAL at 20:56

## 2025-02-11 RX ADMIN — ATORVASTATIN CALCIUM 40 MG: 20 TABLET, FILM COATED ORAL at 20:56

## 2025-02-11 RX ADMIN — DIVALPROEX SODIUM 500 MG: 500 TABLET, DELAYED RELEASE ORAL at 20:56

## 2025-02-11 RX ADMIN — DEXTROMETHORPHAN 60 MG: 30 SUSPENSION, EXTENDED RELEASE ORAL at 09:44

## 2025-02-11 RX ADMIN — FOLIC ACID 1 MG: 1 TABLET ORAL at 09:47

## 2025-02-11 RX ADMIN — PANTOPRAZOLE SODIUM 40 MG: 40 TABLET, DELAYED RELEASE ORAL at 05:26

## 2025-02-11 RX ADMIN — GUAIFENESIN 1200 MG: 600 TABLET, MULTILAYER, EXTENDED RELEASE ORAL at 09:47

## 2025-02-11 RX ADMIN — ASPIRIN 81 MG: 81 TABLET, COATED ORAL at 12:51

## 2025-02-11 RX ADMIN — DOCUSATE SODIUM 100 MG: 100 CAPSULE, LIQUID FILLED ORAL at 20:56

## 2025-02-11 RX ADMIN — Medication 12.5 MG: at 09:48

## 2025-02-11 RX ADMIN — GUAIFENESIN 1200 MG: 600 TABLET, MULTILAYER, EXTENDED RELEASE ORAL at 20:56

## 2025-02-11 RX ADMIN — ROPINIROLE 1 MG: 1 TABLET, FILM COATED ORAL at 17:06

## 2025-02-11 RX ADMIN — DOCUSATE SODIUM 100 MG: 100 CAPSULE, LIQUID FILLED ORAL at 09:46

## 2025-02-11 RX ADMIN — FERROUS SULFATE TAB 325 MG (65 MG ELEMENTAL FE) 325 MG: 325 (65 FE) TAB at 09:46

## 2025-02-11 RX ADMIN — Medication 1000 MCG: at 09:48

## 2025-02-11 RX ADMIN — EMPAGLIFLOZIN 10 MG: 10 TABLET, FILM COATED ORAL at 09:46

## 2025-02-11 RX ADMIN — OXYBUTYNIN CHLORIDE 5 MG: 5 TABLET, EXTENDED RELEASE ORAL at 09:47

## 2025-02-11 RX ADMIN — BUDESONIDE AND FORMOTEROL FUMARATE DIHYDRATE 2 PUFF: 160; 4.5 AEROSOL RESPIRATORY (INHALATION) at 07:55

## 2025-02-11 RX ADMIN — CARVEDILOL 3.12 MG: 3.12 TABLET, FILM COATED ORAL at 20:56

## 2025-02-11 RX ADMIN — AZITHROMYCIN 250 MG: 250 TABLET, FILM COATED ORAL at 12:52

## 2025-02-11 NOTE — CASE MANAGEMENT/SOCIAL WORK
Discharge Planning Assessment  [facility]     Patient Name: Hernando Lozada  MRN: [id number]  Today's Date: 2/11/2025    Admit Date: 2/9/2025    Plan: Referrals placed to [facility] for SNF.   Discharge Needs Assessment       Row Name 02/11/25 1823       Living Environment    People in Home other (see comments)    Current Living Arrangements group home    Duration at Residence December 2024    Potentially Unsafe Housing Conditions none    In the past 12 months has the electric, gas, oil, or water company threatened to shut off services in your home? No    Primary Care Provided by self    Family Caregiver if Needed other (see comments)  staff at [facility]    Quality of Family Relationships unable to assess    Able to Return to Prior Arrangements other (see comments)    Living Arrangement Comments Plans discharge to SNF before returning to [facility]       Resource/Environmental Concerns    Resource/Environmental Concerns none    Transportation Concerns none       Transportation Needs    In the past 12 months, has lack of transportation kept you from medical appointments or from getting medications? no    In the past 12 months, has lack of transportation kept you from meetings, work, or from getting things needed for daily living? No       Food Insecurity    Within the past 12 months, you worried that your food would run out before you got the money to buy more. Never true    Within the past 12 months, the food you bought just didn't last and you didn't have money to get more. Never true       Transition Planning    Patient/Family Anticipates Transition to inpatient rehabilitation facility    Patient/Family Anticipated Services at Transition     Transportation Anticipated health plan transportation       Discharge Needs Assessment    Readmission Within the Last 30 Days no previous admission in last 30 days    Current Outpatient/Agency/Support Group group home    Equipment  Currently Used at Home walker, rolling    Concerns to be Addressed discharge planning    Do you want help finding or keeping work or a job? Patient unable to answer    Do you want help with school or training? For example, starting or completing job training or getting a high school diploma, GED or equivalent Patient unable to answer    Anticipated Changes Related to Illness inability to care for self    Equipment Needed After Discharge none    Discharge Facility/Level of Care Needs nursing facility, skilled    Provided Post Acute Provider List? Yes    Post Acute Provider List Nursing Home    Provided Post Acute Provider Quality & Resource List? Yes    Post Acute Provider Quality and Resource List Nursing Home    Delivered To Support Person    Support Person Junie Luke/caregiver [phone]    Method of Delivery Telephone    Patient's Choice of Community Agency(s) [facility] and [facility]                   Discharge Plan       Row Name 02/11/25 7204       Plan    Plan Referrals placed to [facility] for SNF.    Patient/Family in Agreement with Plan yes    Plan Comments Call placed to patient's caregiver Junie Luke ([phone]) to complete initial assessment. Per RN, patient is somewhat confused and is in isolation. Confirmed information on facesheet. PCP: Milo Carrasquillo MD and Pharmacy: [facility] ([facility]). Patient lives in a group home and Junie is owner/caregiver. States he's been there since December 2024. Denies difficulty affording medication or transportation concerns. Says patient requires assistance with ADLs and uses walker for mobility. States he's never used HH but has been to SNF in the past. Therapy is recommending SNF prior to returning to group home. Reviewed patient choice list with Junie per phone. Agreeable with referrals being placed to [facility] for SNF. Await call back regarding acceptance/bed availability.  Continue to follow...[facility]                  Continued Care and Services - Admitted Since 2/9/2025       Destination       Service Provider Request Status Services Address Phone Fax Patient Preferred    [facility] Pending - Request Sent -- [address] [address] [phone] --    [facility] Pending - Request Sent -- [address] [address] [phone] --    [facility] Pending - Request Sent -- [address] [facility] [address] [phone] --                  Expected Discharge Date and Time       Expected Discharge Date Expected Discharge Time    Feb 13, 2025            Demographic Summary    No documentation.                  Functional Status    No documentation.                  Psychosocial    No documentation.                  Abuse/Neglect    No documentation.                  Legal    No documentation.                  Substance Abuse    No documentation.                  Patient Forms    No documentation.                     Shanon Vergara RN

## 2025-02-11 NOTE — PLAN OF CARE
Goal Outcome Evaluation:            Patient calm and cooperative. Took meds whole in pudding and did very well. Patient neck and shoulders remain rigid/stiff. Patient is able to move all 4 extremities. Q2 turns. Chest Xray completed and shows right lower lobe consolidation. Alert and oriented x4.

## 2025-02-11 NOTE — DISCHARGE PLACEMENT REQUEST
"Ronald Roberson (79 y.o. Male)       Date of Birth   1945    Social Security Number       Address   [address] [place] [address]    Home Phone   [phone]    MRN   [id number]       [facility]    Marital Status   Single                            Admission Date   2/9/25    Admission Type   Emergency    Admitting Provider   Fareed Garcia MD    Attending Provider   Bam Machado MD    Department, Room/Bed   [address], P586/1       Discharge Date       Discharge Disposition       Discharge Destination                                 Attending Provider: Bam Machado MD    Allergies: No Known Allergies    Isolation: Droplet   Infection: Mycoplasma pneumonia (02/10/25)   Code Status: CPR    Ht: 175.3 cm (69.02\")   Wt: 93.8 kg (206 lb 12.7 oz)    Admission Cmt: None   Principal Problem: SDH (subdural hematoma) [S06.5XAA]                   Active Insurance as of 2/9/2025       Primary Coverage       Payor Plan Insurance Group Employer/Plan Group    MEDICARE MEDICARE A & B        Payor Plan Address Payor Plan Phone Number Payor Plan Fax Number Effective Dates    [address] [phone]  1/1/1992 - None Entered    [place] [place] [facility] [address]         Subscriber Name Subscriber Birth Date Member ID       RONALD ROBERSON 1945 [id number]               Secondary Coverage       Payor Plan Insurance Group Employer/Plan Group    [place]        Payor Plan Address Payor Plan Phone Number Payor Plan Fax Number Effective Dates    [address] [phone]  7/11/2018 - None Entered    [place] [address]         Subscriber Name Subscriber Birth Date Member ID       RONALD ROBERSON 1945 [id number]                     Emergency Contacts        (Rel.) Home Phone Work Phone Mobile Phone    Junie Luke (Care Giver) -- -- [phone]    Danny Roberson (Brother) [phone] -- [phone]    Teo Roberson (Brother) [phone] -- [phone]    " IRVING CORONADO () [phone] -- --    David Coyne (Cousin) (Relative) [phone] -- [phone]    maxx sánchez (Care Giver) [phone] -- --

## 2025-02-11 NOTE — PROGRESS NOTES
Name: Hernando Lozada ADMIT: 2025   : 1945  PCP: Provider, No Known    MRN: [id number] LOS: 2 days   AGE/SEX: 79 y.o. male  ROOM: [facility]     Subjective   Subjective   Looks like he feels better today.  Not coughing during my exam.       Objective   Objective   Vital Signs  Temp:  [97.5 °F (36.4 °C)-101.7 °F (38.7 °C)] 98.2 °F (36.8 °C)  Heart Rate:  [77-87] 83  Resp:  [18-20] 20  BP: ()/(57-83) 116/81  SpO2:  [92 %-98 %] 92 %  on   ;   Device (Oxygen Therapy): room air  Body mass index is 30.52 kg/m².  Physical Exam  Vitals and nursing note reviewed.   Constitutional:       General: He is not in acute distress.  Cardiovascular:      Rate and Rhythm: Normal rate and regular rhythm.   Pulmonary:      Effort: Pulmonary effort is normal.      Breath sounds: Normal breath sounds.   Abdominal:      General: Bowel sounds are normal.      Palpations: Abdomen is soft.      Tenderness: There is no abdominal tenderness.   Musculoskeletal:         General: No swelling.   Skin:     General: Skin is warm and dry.   Neurological:      Mental Status: He is alert. Mental status is at baseline.       Results Review     I reviewed the patient's new clinical results.  Results from last 7 days   Lab Units 25  0608 25  0437   WBC 10*3/mm3 8.11 10.01   HEMOGLOBIN g/dL 13.5 14.4   PLATELETS 10*3/mm3 144 158     Results from last 7 days   Lab Units 25  0607 25  0437   SODIUM mmol/L 134* 139   POTASSIUM mmol/L 3.7 3.8   CHLORIDE mmol/L 97* 99   CO2 mmol/L 27.0 31.0*   BUN mg/dL 21 17   CREATININE mg/dL 0.95 0.83   GLUCOSE mg/dL 108* 105*   EGFR mL/min/1.73 82.4 90.1     Results from last 7 days   Lab Units 25  0437   ALBUMIN g/dL 3.9   BILIRUBIN mg/dL 1.1   ALK PHOS U/L 67   AST (SGOT) U/L 12   ALT (SGPT) U/L 12     Results from last 7 days   Lab Units 25  0607 25  0437   CALCIUM mg/dL 8.4* 9.1   ALBUMIN g/dL  --  3.9       Hemoglobin A1C   Date/Time Value Ref Range Status    02/11/2025 0608 5.50 4.80 - 5.60 % Final     Glucose   Date/Time Value Ref Range Status   02/11/2025 0731 101 70 - 130 mg/dL Final   02/10/2025 1928 126 70 - 130 mg/dL Final   02/10/2025 1640 105 70 - 130 mg/dL Final   02/09/2025 1827 95 70 - 130 mg/dL Final       XR Chest PA & Lateral    Result Date: 2/10/2025   Medial right lower lobe consolidation.  This report was finalized on 2/10/2025 9:55 PM by Dr. Tomas Huynh M.D on Workstation: [id number]      CT Head Without Contrast    Result Date: 2/9/2025   Left parafalcine subdural hematoma is again demonstrated, with slight interval decrease, now measuring about 3 mm in thickness, previously 4 mm.     This report was finalized on 2/9/2025 9:45 AM by Dr. Chava Flores M.D on Workstation: [id number]       I have personally reviewed all medications:  Scheduled Medications  atorvastatin, 40 mg, Oral, Nightly  azithromycin, 250 mg, Oral, Daily  budesonide-formoterol, 2 puff, Inhalation, BID - RT  carvedilol, 3.125 mg, Oral, BID  dextromethorphan polistirex ER, 60 mg, Oral, Q12H  divalproex, 500 mg, Oral, BID  docusate sodium, 100 mg, Oral, BID  empagliflozin, 10 mg, Oral, Daily  ferrous sulfate, 325 mg, Oral, Daily With Breakfast  folic acid, 1 mg, Oral, Daily  furosemide, 20 mg, Oral, Nightly  furosemide, 40 mg, Oral, Q AM  guaiFENesin, 1,200 mg, Oral, Q12H  insulin lispro, 2-9 Units, Subcutaneous, 4x Daily AC & at Bedtime  levothyroxine, 50 mcg, Oral, Daily  oxybutynin XL, 5 mg, Oral, Daily  pantoprazole, 40 mg, Oral, Q AM  rOPINIRole, 1 mg, Oral, TID  spironolactone, 12.5 mg, Oral, Daily  tamsulosin, 0.8 mg, Oral, Daily  cyanocobalamin, 1,000 mcg, Oral, Daily    Infusions   Diet  Diet: Regular/House, Diabetic, Cardiac, Fluid Restriction (240 mL/tray); Healthy Heart (2-3 Na+); Consistent Carbohydrate; 2000 mL/day; Texture: Soft to Chew (NDD 3); Soft to Chew: Chopped Meat; Fluid Consistency: Thin (IDDSI 0)    I have personally reviewed:  [x]  Laboratory   [x]   Microbiology   [x]  Radiology   [x]  EKG/Telemetry  [x]  Cardiology/Vascular   []  Pathology    []  Records       Assessment/Plan     Active Hospital Problems    Diagnosis  POA    **SDH (subdural hematoma) [S06.5XAA]  Yes    Mycoplasma pneumonia [J15.7]  Yes    Viral URI [J06.9]  Yes    Chronic combined systolic and diastolic congestive heart failure [I50.42]  Yes    Atrial fibrillation, persistent [I48.19]  Yes    GIOVANNY (obstructive sleep apnea) [G47.33]  Yes    Diabetes mellitus [E11.9]  Yes    CKD (chronic kidney disease) [N18.9]  Yes    Primary hypertension [I10]  Yes      Resolved Hospital Problems   No resolved problems to display.       79 y.o. male admitted with SDH (subdural hematoma).    Respiratory status seems improved.  Continue course of azithromycin for mycoplasma pneumonia.  He is working with PT and OT today.  Per neurosurgery needs to go from here to rehab before transitioning back home.  CCP to follow-up.  If remains afebrile would be stable for discharge certainly by the end of the week.     Per ANN okay for baby aspirin but continue to hold Plavix and Xarelto until follows up with neurosurgery in outpatient with repeat head CT in about 2 weeks.    Blood sugar stable.      SCDs for DVT prophylaxis.  Full code.  Discussed with patient.  Anticipate discharge to SNU facility later this week.  Expected Discharge Date: 2/12/2025; Expected Discharge Time:       Bam Machado MD  [facility]  02/11/25  08:25 EST

## 2025-02-11 NOTE — PLAN OF CARE
Problem: Adult Inpatient Plan of Care  Goal: Plan of Care Review  Outcome: Progressing  Flowsheets (Taken 2/10/2025 1900)  Progress: no change  Goal: Patient-Specific Goal (Individualized)  Outcome: Progressing  Goal: Absence of Hospital-Acquired Illness or Injury  Outcome: Progressing  Intervention: Identify and Manage Fall Risk  Recent Flowsheet Documentation  Taken 2/10/2025 1800 by Kendrick Soni RN  Safety Promotion/Fall Prevention:   safety round/check completed   assistive device/personal items within reach  Taken 2/10/2025 1603 by Kendrick Soni RN  Safety Promotion/Fall Prevention:   safety round/check completed   assistive device/personal items within reach  Taken 2/10/2025 1402 by Kendrick Soni RN  Safety Promotion/Fall Prevention:   safety round/check completed   assistive device/personal items within reach  Taken 2/10/2025 1200 by Kendrick Soni RN  Safety Promotion/Fall Prevention:   safety round/check completed   assistive device/personal items within reach  Taken 2/10/2025 1005 by Kendrick Soni RN  Safety Promotion/Fall Prevention:   safety round/check completed   assistive device/personal items within reach  Intervention: Prevent Skin Injury  Recent Flowsheet Documentation  Taken 2/10/2025 1005 by Kendrick Soni RN  Body Position: weight shifting  Intervention: Prevent and Manage VTE (Venous Thromboembolism) Risk  Recent Flowsheet Documentation  Taken 2/10/2025 1005 by Kendrick Soni RN  VTE Prevention/Management:   bilateral   SCDs (sequential compression devices) on  Intervention: Prevent Infection  Recent Flowsheet Documentation  Taken 2/10/2025 1800 by Kendrick Soni RN  Infection Prevention:   personal protective equipment utilized   environmental surveillance performed  Taken 2/10/2025 1603 by Kendrick Soni RN  Infection Prevention:   personal protective equipment utilized   environmental surveillance performed  Taken  2/10/2025 1402 by Kendrick Soni, RN  Infection Prevention:   personal protective equipment utilized   environmental surveillance performed  Taken 2/10/2025 1200 by Kendrick Soni RN  Infection Prevention:   personal protective equipment utilized   environmental surveillance performed  Taken 2/10/2025 1005 by Kendrick Soni, RN  Infection Prevention:   personal protective equipment utilized   environmental surveillance performed  Goal: Optimal Comfort and Wellbeing  Outcome: Progressing  Goal: Readiness for Transition of Care  Outcome: Progressing   Goal Outcome Evaluation:           Progress: no change
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
## 2025-02-11 NOTE — PROGRESS NOTES
"                                              LOS: 2 days   Patient Care Team:  Provider, No Known as PCP - Albert Pearson MD as Consulting Physician (Cardiology)    Chief Complaint:  F/up viral infection and positive mycoplasma test    Subjective   Interval History      He reported mid cough today but denies dyspnea,  and dyspnea.     REVIEW OF SYSTEMS:   CARDIOVASCULAR: No chest pain, chest pressure or chest discomfort. No palpitations or edema.     GASTROINTESTINAL: No anorexia, nausea, vomiting or diarrhea. No abdominal pain.  CONSTITUTIONAL: No fever or chills.     Ventilator/Non-Invasive Ventilation Settings (From admission, onward)      None                  Physical Exam:     Vital Signs  Temp:  [97.5 °F (36.4 °C)-101.7 °F (38.7 °C)] 99 °F (37.2 °C)  Heart Rate:  [72-87] 72  Resp:  [16-20] 16  BP: ()/(57-83) 128/66    Intake/Output Summary (Last 24 hours) at 2/11/2025 1505  Last data filed at 2/11/2025 0800  Gross per 24 hour   Intake 1515 ml   Output 950 ml   Net 565 ml     Flowsheet Rows      Flowsheet Row First Filed Value   Admission Height 175.3 cm (69\") Documented at 02/09/2025 0227   Admission Weight 99.7 kg (219 lb 11.2 oz) Documented at 02/09/2025 1142            PPE used per hospital policy    General Appearance:   Alert, cooperative, in no acute distress   ENMT:  Mallampati score 3, moist mucous membrane   Eyes:  Pupils equal and reactive to light. EOMI   Neck:   Large. Trachea midline. No thyromegaly.   Lungs:    Coarse at the bases. RLL crackles.     Heart:   Regular rhythm and normal rate, normal S1 and S2, no         murmur   Skin:   No rash or ecchymosis   Abdomen:    Obese. Soft. No tenderness. No HSM.   Neuro/psych:  Conscious, alert, oriented x3. Strength 5/5 in upper and lower  ext.  Appropriate mood and affect   Extremities:  No cyanosis, clubbing or edema.  Warm extremities and well-perfused          Results Review:        Results from last 7 days   Lab Units " 02/11/25  0607 02/09/25  0437   SODIUM mmol/L 134* 139   POTASSIUM mmol/L 3.7 3.8   CHLORIDE mmol/L 97* 99   CO2 mmol/L 27.0 31.0*   BUN mg/dL 21 17   CREATININE mg/dL 0.95 0.83   GLUCOSE mg/dL 108* 105*   CALCIUM mg/dL 8.4* 9.1         Results from last 7 days   Lab Units 02/11/25  0608 02/09/25  0437   WBC 10*3/mm3 8.11 10.01   HEMOGLOBIN g/dL 13.5 14.4   HEMATOCRIT % 41.0 42.7   PLATELETS 10*3/mm3 144 158     Results from last 7 days   Lab Units 02/09/25  0437   INR  1.33*   APTT seconds 32.7                               I reviewed the patient's new clinical results.        Medication Review:   aspirin, 81 mg, Oral, Daily  atorvastatin, 40 mg, Oral, Nightly  azithromycin, 250 mg, Oral, Daily  budesonide-formoterol, 2 puff, Inhalation, BID - RT  carvedilol, 3.125 mg, Oral, BID  dextromethorphan polistirex ER, 60 mg, Oral, Q12H  divalproex, 500 mg, Oral, BID  docusate sodium, 100 mg, Oral, BID  empagliflozin, 10 mg, Oral, Daily  ferrous sulfate, 325 mg, Oral, Daily With Breakfast  folic acid, 1 mg, Oral, Daily  furosemide, 20 mg, Oral, Nightly  furosemide, 40 mg, Oral, Q AM  guaiFENesin, 1,200 mg, Oral, Q12H  insulin lispro, 2-9 Units, Subcutaneous, 4x Daily AC & at Bedtime  levothyroxine, 50 mcg, Oral, Daily  oxybutynin XL, 5 mg, Oral, Daily  pantoprazole, 40 mg, Oral, Q AM  rOPINIRole, 1 mg, Oral, TID  spironolactone, 12.5 mg, Oral, Daily  tamsulosin, 0.8 mg, Oral, Daily  cyanocobalamin, 1,000 mcg, Oral, Daily             Diagnostic imaging:  I personally and independently reviewed the following images:    CXR 2/11/2025: RLL consolidation.    Assessment     Mycoplasma PNA RLL  Seasonal coronavirus infection.  Bronchospasm  Traumatic SDH    GIOVANNY  A-fib  Chronic systolic and diastolic CHF  DM type II        Plan     Azithromycin 250 mg daily for 5 days  Mucinex 1200 mg BID  Symbicort 2 puffs twice daily  Lasix 20 mg daily  DVT prophylaxis with SCD      Stan Gallegos MD  02/11/25  15:05 EST          This note was  dictated utilizing Dragon dictation

## 2025-02-11 NOTE — PROGRESS NOTES
"                                              LOS: 1 day   Patient Care Team:  Provider, No Known as PCP - Albert Pearson MD as Consulting Physician (Cardiology)    Chief Complaint:  F/up viral infection and positive mycoplasma test    Subjective   Interval History  I reviewed the admission note, progress notes, PMH, PSH, Family hx, social history, imagings and prior records on this admission, summarized the finding in my note and formulated a transition of care plan.      He denies cough and dyspnea.     REVIEW OF SYSTEMS:   CARDIOVASCULAR: No chest pain, chest pressure or chest discomfort. No palpitations or edema.     GASTROINTESTINAL: No anorexia, nausea, vomiting or diarrhea. No abdominal pain.  CONSTITUTIONAL: No fever or chills.     Ventilator/Non-Invasive Ventilation Settings (From admission, onward)      None                  Physical Exam:     Vital Signs  Temp:  [97.8 °F (36.6 °C)-101.7 °F (38.7 °C)] 101.7 °F (38.7 °C)  Heart Rate:  [] 77  Resp:  [16-18] 18  BP: (135-147)/(64-93) 135/64    Intake/Output Summary (Last 24 hours) at 2/10/2025 1913  Last data filed at 2/10/2025 1655  Gross per 24 hour   Intake 1155 ml   Output 875 ml   Net 280 ml     Flowsheet Rows      Flowsheet Row First Filed Value   Admission Height 175.3 cm (69\") Documented at 02/09/2025 0227   Admission Weight 99.7 kg (219 lb 11.2 oz) Documented at 02/09/2025 1142            PPE used per hospital policy    General Appearance:   Alert, cooperative, in no acute distress   ENMT:  Mallampati score 3, moist mucous membrane   Eyes:  Pupils equal and reactive to light. EOMI   Neck:   Large. Trachea midline. No thyromegaly.   Lungs:    Coarse bilaterally. No wheezing    Heart:   Regular rhythm and normal rate, normal S1 and S2, no         murmur   Skin:   No rash or ecchymosis   Abdomen:    Obese. Soft. No tenderness. No HSM.   Neuro/psych:  Conscious, alert, oriented x3. Strength 5/5 in upper and lower  ext.  " Appropriate mood and affect   Extremities:  No cyanosis, clubbing or edema.  Warm extremities and well-perfused          Results Review:        Results from last 7 days   Lab Units 02/09/25  0437   SODIUM mmol/L 139   POTASSIUM mmol/L 3.8   CHLORIDE mmol/L 99   CO2 mmol/L 31.0*   BUN mg/dL 17   CREATININE mg/dL 0.83   GLUCOSE mg/dL 105*   CALCIUM mg/dL 9.1         Results from last 7 days   Lab Units 02/09/25  0437   WBC 10*3/mm3 10.01   HEMOGLOBIN g/dL 14.4   HEMATOCRIT % 42.7   PLATELETS 10*3/mm3 158     Results from last 7 days   Lab Units 02/09/25  0437   INR  1.33*   APTT seconds 32.7                               I reviewed the patient's new clinical results.        Medication Review:   atorvastatin, 40 mg, Oral, Nightly  azithromycin, 500 mg, Oral, Daily   Followed by  [START ON 2/11/2025] azithromycin, 250 mg, Oral, Daily  budesonide-formoterol, 2 puff, Inhalation, BID - RT  carvedilol, 3.125 mg, Oral, BID  dextromethorphan polistirex ER, 60 mg, Oral, Q12H  divalproex, 500 mg, Oral, BID  docusate sodium, 100 mg, Oral, BID  empagliflozin, 10 mg, Oral, Daily  ferrous sulfate, 325 mg, Oral, Daily With Breakfast  folic acid, 1 mg, Oral, Daily  furosemide, 20 mg, Oral, Nightly  furosemide, 40 mg, Oral, Q AM  guaiFENesin, 1,200 mg, Oral, Q12H  insulin lispro, 2-9 Units, Subcutaneous, 4x Daily AC & at Bedtime  levothyroxine, 50 mcg, Oral, Daily  oxybutynin XL, 5 mg, Oral, Daily  pantoprazole, 40 mg, Oral, Q AM  rOPINIRole, 1 mg, Oral, TID  spironolactone, 12.5 mg, Oral, Daily  tamsulosin, 0.8 mg, Oral, Daily  cyanocobalamin, 1,000 mcg, Oral, Daily             Diagnostic imaging:  I personally and independently reviewed the following images:  Lung portion of CT of the cervical spine 2/9/2025: No infiltrates    Assessment     Mycoplasma infection  Seasonal coronavirus infection.  Bronchospasm  Traumatic SDH    GIOVANNY  A-fib  Chronic systolic and diastolic CHF  DM type II    All problems new to me    Plan     Check  CXR  Initiate azithromycin  Mucinex 1200 mg BID  Symbicort 2 puffs twice daily    Discussed with Dr. Carter Gallegos MD  02/10/25  19:13 EST          This note was dictated utilizing Dragon dictation

## 2025-02-11 NOTE — NURSING NOTE
Reason for Visit: [facility]CN:  Consult received to see patient with injury on left elbow. Patient is alert, upon assessment we could see partial-thickness skin loss on left elbow consistent with skin tear.  Sacrococcygeal/Buttocks and bilateral heels are with intact skin and normal coloration.  Treatment Plan/Recommendations: Vaseline gauze can be used to left Elbow.  Wound care order and nursing intervention have been placed into Epic.  Please add Accumax pump for pressure relief.  Wound Team Follow up Plan: [facility]CN will follow him according to the needs.

## 2025-02-11 NOTE — PLAN OF CARE
Goal Outcome Evaluation:  Plan of Care Reviewed With: patient           Outcome Evaluation: Pt is a 80 yo M who was admitted with fall from bed resulting in a SDH. Pt presnts to PT with some impaired functional mobility and gait secondary to generalized weakness, impaired balance, and decreased activity tolerance. Pt may benefit from skilled PT to address strength, mobility, and gait.    Anticipated Discharge Disposition (PT): skilled nursing facility

## 2025-02-12 ENCOUNTER — TELEPHONE (OUTPATIENT)
Dept: NEUROSURGERY | Facility: CLINIC | Age: 80
End: 2025-02-12
Payer: MEDICARE

## 2025-02-12 DIAGNOSIS — S06.5XAA SDH (SUBDURAL HEMATOMA): Primary | ICD-10-CM

## 2025-02-12 LAB
GLUCOSE BLDC GLUCOMTR-MCNC: 101 MG/DL (ref 70–130)
GLUCOSE BLDC GLUCOMTR-MCNC: 114 MG/DL (ref 70–130)
GLUCOSE BLDC GLUCOMTR-MCNC: 121 MG/DL (ref 70–130)
GLUCOSE BLDC GLUCOMTR-MCNC: 126 MG/DL (ref 70–130)

## 2025-02-12 PROCEDURE — 82948 REAGENT STRIP/BLOOD GLUCOSE: CPT

## 2025-02-12 PROCEDURE — 94664 DEMO&/EVAL PT USE INHALER: CPT

## 2025-02-12 PROCEDURE — 97530 THERAPEUTIC ACTIVITIES: CPT

## 2025-02-12 PROCEDURE — 94799 UNLISTED PULMONARY SVC/PX: CPT

## 2025-02-12 PROCEDURE — 97110 THERAPEUTIC EXERCISES: CPT

## 2025-02-12 RX ADMIN — ROPINIROLE 1 MG: 1 TABLET, FILM COATED ORAL at 09:13

## 2025-02-12 RX ADMIN — FUROSEMIDE 20 MG: 20 TABLET ORAL at 21:09

## 2025-02-12 RX ADMIN — ROPINIROLE 1 MG: 1 TABLET, FILM COATED ORAL at 16:44

## 2025-02-12 RX ADMIN — BUDESONIDE AND FORMOTEROL FUMARATE DIHYDRATE 2 PUFF: 160; 4.5 AEROSOL RESPIRATORY (INHALATION) at 11:46

## 2025-02-12 RX ADMIN — TAMSULOSIN HYDROCHLORIDE 0.8 MG: 0.4 CAPSULE ORAL at 09:13

## 2025-02-12 RX ADMIN — FERROUS SULFATE TAB 325 MG (65 MG ELEMENTAL FE) 325 MG: 325 (65 FE) TAB at 09:13

## 2025-02-12 RX ADMIN — DIVALPROEX SODIUM 500 MG: 500 TABLET, DELAYED RELEASE ORAL at 09:13

## 2025-02-12 RX ADMIN — CARVEDILOL 3.12 MG: 3.12 TABLET, FILM COATED ORAL at 21:10

## 2025-02-12 RX ADMIN — PANTOPRAZOLE SODIUM 40 MG: 40 TABLET, DELAYED RELEASE ORAL at 05:54

## 2025-02-12 RX ADMIN — GUAIFENESIN 1200 MG: 600 TABLET, MULTILAYER, EXTENDED RELEASE ORAL at 09:13

## 2025-02-12 RX ADMIN — Medication 12.5 MG: at 09:13

## 2025-02-12 RX ADMIN — CARVEDILOL 3.12 MG: 3.12 TABLET, FILM COATED ORAL at 09:13

## 2025-02-12 RX ADMIN — DEXTROMETHORPHAN 60 MG: 30 SUSPENSION, EXTENDED RELEASE ORAL at 09:14

## 2025-02-12 RX ADMIN — FOLIC ACID 1 MG: 1 TABLET ORAL at 09:13

## 2025-02-12 RX ADMIN — DOCUSATE SODIUM 100 MG: 100 CAPSULE, LIQUID FILLED ORAL at 21:10

## 2025-02-12 RX ADMIN — LEVOTHYROXINE SODIUM 50 MCG: 50 TABLET ORAL at 09:13

## 2025-02-12 RX ADMIN — ATORVASTATIN CALCIUM 40 MG: 20 TABLET, FILM COATED ORAL at 21:10

## 2025-02-12 RX ADMIN — Medication 1000 MCG: at 09:13

## 2025-02-12 RX ADMIN — EMPAGLIFLOZIN 10 MG: 10 TABLET, FILM COATED ORAL at 09:13

## 2025-02-12 RX ADMIN — DEXTROMETHORPHAN 60 MG: 30 SUSPENSION, EXTENDED RELEASE ORAL at 21:09

## 2025-02-12 RX ADMIN — DOCUSATE SODIUM 100 MG: 100 CAPSULE, LIQUID FILLED ORAL at 09:13

## 2025-02-12 RX ADMIN — OXYBUTYNIN CHLORIDE 5 MG: 5 TABLET, EXTENDED RELEASE ORAL at 09:13

## 2025-02-12 RX ADMIN — DIVALPROEX SODIUM 500 MG: 500 TABLET, DELAYED RELEASE ORAL at 21:09

## 2025-02-12 RX ADMIN — AZITHROMYCIN 250 MG: 250 TABLET, FILM COATED ORAL at 09:13

## 2025-02-12 RX ADMIN — ROPINIROLE 1 MG: 1 TABLET, FILM COATED ORAL at 21:10

## 2025-02-12 RX ADMIN — GUAIFENESIN 1200 MG: 600 TABLET, MULTILAYER, EXTENDED RELEASE ORAL at 21:10

## 2025-02-12 RX ADMIN — ASPIRIN 81 MG: 81 TABLET, COATED ORAL at 09:13

## 2025-02-12 RX ADMIN — FUROSEMIDE 40 MG: 40 TABLET ORAL at 05:54

## 2025-02-12 NOTE — PROGRESS NOTES
Name: Hernando Lozada ADMIT: 2025   : 1945  PCP: Provider, No Known    MRN: [id number] LOS: 3 days   AGE/SEX: 79 y.o. male  ROOM: [facility]     Subjective   Subjective   Seems to be doing well.  Says his cough is improving.         Objective   Objective   Vital Signs  Temp:  [98.4 °F (36.9 °C)-99.9 °F (37.7 °C)] 98.8 °F (37.1 °C)  Heart Rate:  [69-76] 69  Resp:  [16-22] 22  BP: ()/(63-95) 97/63  SpO2:  [91 %-94 %] 91 %  on   ;   Device (Oxygen Therapy): room air  Body mass index is 30.52 kg/m².  Physical Exam  Vitals and nursing note reviewed.   Constitutional:       General: He is not in acute distress.  Cardiovascular:      Rate and Rhythm: Normal rate and regular rhythm.   Pulmonary:      Effort: Pulmonary effort is normal.      Breath sounds: Normal breath sounds.   Abdominal:      General: Bowel sounds are normal.      Palpations: Abdomen is soft.      Tenderness: There is no abdominal tenderness.   Musculoskeletal:         General: No swelling.   Skin:     General: Skin is warm and dry.   Neurological:      Mental Status: He is alert. Mental status is at baseline.       Results Review     I reviewed the patient's new clinical results.  Results from last 7 days   Lab Units 25  0608 25  0437   WBC 10*3/mm3 8.11 10.01   HEMOGLOBIN g/dL 13.5 14.4   PLATELETS 10*3/mm3 144 158     Results from last 7 days   Lab Units 25  0607 25  0437   SODIUM mmol/L 134* 139   POTASSIUM mmol/L 3.7 3.8   CHLORIDE mmol/L 97* 99   CO2 mmol/L 27.0 31.0*   BUN mg/dL 21 17   CREATININE mg/dL 0.95 0.83   GLUCOSE mg/dL 108* 105*   EGFR mL/min/1.73 82.4 90.1     Results from last 7 days   Lab Units 25  0437   ALBUMIN g/dL 3.9   BILIRUBIN mg/dL 1.1   ALK PHOS U/L 67   AST (SGOT) U/L 12   ALT (SGPT) U/L 12     Results from last 7 days   Lab Units 25  0607 25  0437   CALCIUM mg/dL 8.4* 9.1   ALBUMIN g/dL  --  3.9       Hemoglobin A1C   Date/Time Value Ref Range Status   2025 0608  5.50 4.80 - 5.60 % Final     Glucose   Date/Time Value Ref Range Status   02/12/2025 1131 101 70 - 130 mg/dL Final   02/12/2025 0817 126 70 - 130 mg/dL Final   02/11/2025 2148 148 (H) 70 - 130 mg/dL Final   02/11/2025 1644 142 (H) 70 - 130 mg/dL Final   02/11/2025 1153 108 70 - 130 mg/dL Final   02/11/2025 0731 101 70 - 130 mg/dL Final   02/10/2025 1928 126 70 - 130 mg/dL Final       XR Chest PA & Lateral    Result Date: 2/10/2025   Medial right lower lobe consolidation.  This report was finalized on 2/10/2025 9:55 PM by Dr. Tomas Huynh M.D on Workstation: [id number]       I have personally reviewed all medications:  Scheduled Medications  aspirin, 81 mg, Oral, Daily  atorvastatin, 40 mg, Oral, Nightly  azithromycin, 250 mg, Oral, Daily  budesonide-formoterol, 2 puff, Inhalation, BID - RT  carvedilol, 3.125 mg, Oral, BID  dextromethorphan polistirex ER, 60 mg, Oral, Q12H  divalproex, 500 mg, Oral, BID  docusate sodium, 100 mg, Oral, BID  empagliflozin, 10 mg, Oral, Daily  ferrous sulfate, 325 mg, Oral, Daily With Breakfast  folic acid, 1 mg, Oral, Daily  furosemide, 20 mg, Oral, Nightly  furosemide, 40 mg, Oral, Q AM  guaiFENesin, 1,200 mg, Oral, Q12H  insulin lispro, 2-9 Units, Subcutaneous, 4x Daily AC & at Bedtime  levothyroxine, 50 mcg, Oral, Daily  oxybutynin XL, 5 mg, Oral, Daily  pantoprazole, 40 mg, Oral, Q AM  rOPINIRole, 1 mg, Oral, TID  spironolactone, 12.5 mg, Oral, Daily  tamsulosin, 0.8 mg, Oral, Daily  cyanocobalamin, 1,000 mcg, Oral, Daily    Infusions   Diet  Diet: Regular/House, Diabetic, Cardiac, Fluid Restriction (240 mL/tray); Healthy Heart (2-3 Na+); Consistent Carbohydrate; 2000 mL/day; Texture: Soft to Chew (NDD 3); Soft to Chew: Chopped Meat; Fluid Consistency: Thin (IDDSI 0)    I have personally reviewed:  [x]  Laboratory   [x]  Microbiology   [x]  Radiology   [x]  EKG/Telemetry  [x]  Cardiology/Vascular   []  Pathology    []  Records       Assessment/Plan     Active Hospital  Problems    Diagnosis  POA    **SDH (subdural hematoma) [S06.5XAA]  Yes    Mycoplasma pneumonia [J15.7]  Yes    Viral URI [J06.9]  Yes    Chronic combined systolic and diastolic congestive heart failure [I50.42]  Yes    Atrial fibrillation, persistent [I48.19]  Yes    GIOVANNY (obstructive sleep apnea) [G47.33]  Yes    Diabetes mellitus [E11.9]  Yes    CKD (chronic kidney disease) [N18.9]  Yes    Primary hypertension [I10]  Yes      Resolved Hospital Problems   No resolved problems to display.       79 y.o. male admitted with SDH (subdural hematoma).    Medically seems stable.  Continue short course of antibiotics for mycoplasma pneumonia.  Supportive care only for coronavirus infection.  Respiratory status seems stable and his lungs sound better.  Cough improved.  Already cleared for discharge by neurosurgery.  Plan discharge to SNU facility once arrangements have been made.       Per ANN okay for baby aspirin but continue to hold Plavix and Xarelto until follows up with neurosurgery in outpatient with repeat head CT in about 2 weeks.  SCDs for DVT prophylaxis.  Full code.  Discussed with patient.  Anticipate discharge to SNU facility once arrangements have been made.  Expected Discharge Date: 2/13/2025; Expected Discharge Time:       Bam Machado MD  [facility]  02/12/25  15:42 EST

## 2025-02-12 NOTE — TELEPHONE ENCOUNTER
----- Message from Mita PRABHAKAR sent at 2/11/2025  5:20 PM EST -----  Regarding: FW: Hospital follow-up  Can you enter order for CT head and I will call and schedule? Thank you  ----- Message -----  From: Jaylin Kang APRN  Sent: 2/10/2025   5:34 PM EST  To: Mita Ku; Carmencita Alfonso MA  Subject: Hospital follow-up                               Patient needs follow-up 2 weeks with CT head with Dr. Mansfield for traumatic subdural hematoma after fall.
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
## 2025-02-12 NOTE — PLAN OF CARE
VSS this shift. No complaints of pain. Patient with tremor in hands at baseline. Patient up to chair assist x1 today with PT. Patient tolerating meals at this time. Takes pills whole in pudding. Contact precautions maintained for mycoplasma pneumonia. Bed alarm on and call light in reach.                No/Unable to asses

## 2025-02-12 NOTE — PLAN OF CARE
Goal Outcome Evaluation:  Plan of Care Reviewed With: patient        Progress: no change  Outcome Evaluation: Pt was seen for PT tx this PM. Pt was in bed and req incr time w/ mod A to sit up to EOB. Pt stood w/ min A. Pt amb approx 25' w/ CGA/min A and use of fww. Pt was slow and shuffling w/ cues for upright posture and incr step length. Pt performed B LE ther ex and was UIC at end of session. Notified aide pt was UIC.    Anticipated Discharge Disposition (PT): skilled nursing facility

## 2025-02-12 NOTE — PLAN OF CARE
Problem: Adult Inpatient Plan of Care  Goal: Plan of Care Review  Outcome: Progressing  Flowsheets (Taken 2/11/2025 1956)  Progress: improving  Plan of Care Reviewed With:   patient   caregiver  Goal: Patient-Specific Goal (Individualized)  Outcome: Progressing  Goal: Absence of Hospital-Acquired Illness or Injury  Outcome: Progressing  Intervention: Identify and Manage Fall Risk  Recent Flowsheet Documentation  Taken 2/11/2025 1802 by Kendrick Soni RN  Safety Promotion/Fall Prevention:   safety round/check completed   assistive device/personal items within reach  Taken 2/11/2025 1604 by Kendrick Soni RN  Safety Promotion/Fall Prevention:   safety round/check completed   assistive device/personal items within reach  Taken 2/11/2025 1403 by Kendrick Soni RN  Safety Promotion/Fall Prevention:   safety round/check completed   assistive device/personal items within reach  Taken 2/11/2025 1205 by Kendrick Soni RN  Safety Promotion/Fall Prevention:   safety round/check completed   assistive device/personal items within reach  Taken 2/11/2025 1015 by Kendrick Soni RN  Safety Promotion/Fall Prevention:   safety round/check completed   assistive device/personal items within reach  Taken 2/11/2025 0805 by Kendrick Soni RN  Safety Promotion/Fall Prevention:   safety round/check completed   assistive device/personal items within reach  Intervention: Prevent Skin Injury  Recent Flowsheet Documentation  Taken 2/11/2025 1015 by Kendrick Soni RN  Body Position: position changed independently  Skin Protection: transparent dressing maintained  Intervention: Prevent and Manage VTE (Venous Thromboembolism) Risk  Recent Flowsheet Documentation  Taken 2/11/2025 1015 by Kendrick Soni RN  VTE Prevention/Management:   bilateral   SCDs (sequential compression devices) on  Intervention: Prevent Infection  Recent Flowsheet Documentation  Taken 2/11/2025 1802 by Krishna Thomason  DESMOND Marks  Infection Prevention:   personal protective equipment utilized   environmental surveillance performed  Taken 2/11/2025 1604 by Kendrick Soni RN  Infection Prevention:   personal protective equipment utilized   environmental surveillance performed  Taken 2/11/2025 1403 by Kendrick Soni RN  Infection Prevention:   personal protective equipment utilized   environmental surveillance performed  Taken 2/11/2025 1205 by Kendrick Soni RN  Infection Prevention:   personal protective equipment utilized   environmental surveillance performed  Taken 2/11/2025 1015 by Kendrick Soni RN  Infection Prevention:   personal protective equipment utilized   environmental surveillance performed  Taken 2/11/2025 0805 by Kendrick Soni RN  Infection Prevention:   personal protective equipment utilized   environmental surveillance performed  Goal: Optimal Comfort and Wellbeing  Outcome: Progressing  Intervention: Provide Person-Centered Care  Recent Flowsheet Documentation  Taken 2/11/2025 1015 by Kendrick Soni RN  Trust Relationship/Rapport:   care explained   choices provided   emotional support provided   empathic listening provided   questions answered   questions encouraged   reassurance provided   thoughts/feelings acknowledged  Goal: Readiness for Transition of Care  Outcome: Progressing   Goal Outcome Evaluation:  Plan of Care Reviewed With: patient, caregiver        Progress: improving     Patient A/O x4 during shift. WC evaluated patient, and recommends q2 turns, cushion if sitting in sofa, HOB at 30 degrees or below, zinc barrier cream for gluteal area. Skin intact. Redness blanchable. Per Neuro, was ok to start on ASA. Continue to hold Plavix and Xarelto. Patient had good appetite during shift. No acute incidents during shift.

## 2025-02-13 VITALS
OXYGEN SATURATION: 95 % | DIASTOLIC BLOOD PRESSURE: 88 MMHG | TEMPERATURE: 98.4 F | HEART RATE: 73 BPM | HEIGHT: 69 IN | SYSTOLIC BLOOD PRESSURE: 122 MMHG | BODY MASS INDEX: 30.63 KG/M2 | WEIGHT: 206.79 LBS | RESPIRATION RATE: 18 BRPM

## 2025-02-13 LAB
GLUCOSE BLDC GLUCOMTR-MCNC: 109 MG/DL (ref 70–130)
GLUCOSE BLDC GLUCOMTR-MCNC: 135 MG/DL (ref 70–130)

## 2025-02-13 PROCEDURE — 82948 REAGENT STRIP/BLOOD GLUCOSE: CPT

## 2025-02-13 RX ORDER — AZITHROMYCIN 250 MG/1
250 TABLET, FILM COATED ORAL DAILY
Start: 2025-02-14 | End: 2025-02-15

## 2025-02-13 RX ORDER — ASPIRIN 81 MG/1
81 TABLET ORAL DAILY
Start: 2025-02-14

## 2025-02-13 RX ADMIN — FERROUS SULFATE TAB 325 MG (65 MG ELEMENTAL FE) 325 MG: 325 (65 FE) TAB at 09:47

## 2025-02-13 RX ADMIN — PANTOPRAZOLE SODIUM 40 MG: 40 TABLET, DELAYED RELEASE ORAL at 05:23

## 2025-02-13 RX ADMIN — CARVEDILOL 3.12 MG: 3.12 TABLET, FILM COATED ORAL at 09:47

## 2025-02-13 RX ADMIN — TAMSULOSIN HYDROCHLORIDE 0.8 MG: 0.4 CAPSULE ORAL at 09:46

## 2025-02-13 RX ADMIN — DIVALPROEX SODIUM 500 MG: 500 TABLET, DELAYED RELEASE ORAL at 09:47

## 2025-02-13 RX ADMIN — ASPIRIN 81 MG: 81 TABLET, COATED ORAL at 09:47

## 2025-02-13 RX ADMIN — Medication 1000 MCG: at 09:46

## 2025-02-13 RX ADMIN — DOCUSATE SODIUM 100 MG: 100 CAPSULE, LIQUID FILLED ORAL at 09:47

## 2025-02-13 RX ADMIN — FUROSEMIDE 40 MG: 40 TABLET ORAL at 05:23

## 2025-02-13 RX ADMIN — LEVOTHYROXINE SODIUM 50 MCG: 50 TABLET ORAL at 09:47

## 2025-02-13 RX ADMIN — OXYBUTYNIN CHLORIDE 5 MG: 5 TABLET, EXTENDED RELEASE ORAL at 09:46

## 2025-02-13 RX ADMIN — Medication 12.5 MG: at 09:47

## 2025-02-13 RX ADMIN — GUAIFENESIN 1200 MG: 600 TABLET, MULTILAYER, EXTENDED RELEASE ORAL at 09:47

## 2025-02-13 RX ADMIN — AZITHROMYCIN 250 MG: 250 TABLET, FILM COATED ORAL at 09:47

## 2025-02-13 RX ADMIN — DEXTROMETHORPHAN 60 MG: 30 SUSPENSION, EXTENDED RELEASE ORAL at 09:47

## 2025-02-13 RX ADMIN — ROPINIROLE 1 MG: 1 TABLET, FILM COATED ORAL at 09:46

## 2025-02-13 RX ADMIN — FOLIC ACID 1 MG: 1 TABLET ORAL at 09:47

## 2025-02-13 RX ADMIN — BUDESONIDE AND FORMOTEROL FUMARATE DIHYDRATE 2 PUFF: 160; 4.5 AEROSOL RESPIRATORY (INHALATION) at 09:49

## 2025-02-13 RX ADMIN — EMPAGLIFLOZIN 10 MG: 10 TABLET, FILM COATED ORAL at 09:47

## 2025-02-13 NOTE — DISCHARGE SUMMARY
Patient Name: Hernando Lozada  : 1945  MRN: [id number]    Date of Admission: 2025  Date of Discharge:  2025  Primary Care Physician: Provider, No Known      Chief Complaint:   Fall      Discharge Diagnoses     Active Hospital Problems    Diagnosis  POA    **SDH (subdural hematoma) [S06.5XAA]  Yes    Mycoplasma pneumonia [J15.7]  Yes    Viral URI [J06.9]  Yes    Chronic combined systolic and diastolic congestive heart failure [I50.42]  Yes    Atrial fibrillation, persistent [I48.19]  Yes    GIOVANNY (obstructive sleep apnea) [G47.33]  Yes    Diabetes mellitus [E11.9]  Yes    CKD (chronic kidney disease) [N18.9]  Yes    Primary hypertension [I10]  Yes      Resolved Hospital Problems   No resolved problems to display.        Hospital Course     Mr. Lozada is a 79 y.o. male with a history of CHF, sleep apnea, diabetes, HTN, CKD and A-fib on Xarelto who presented to [facility] initially complaining of a fall.  Please see the admitting history and physical for further details.  He was found to have a traumatic subdural hematoma and was admitted to the intensive care unit for further evaluation and treatment.  He was seen by neurosurgery but fortunately bleeding was stable and he did not require any surgery.  Neurosurgery wants him to stay off Xarelto and Plavix until they follow-up with him in their office in about 2 weeks with another head CT.  He has been started on baby aspirin.  Incidentally found to have a mycoplasma pneumonia as well as coronavirus infection.  He has been started on azithromycin and his cough is much improved.  Respiratory status very stable breathing comfortably on room air with improvement in his cough.  He will finish out 5 days of azithromycin but plans are to go to rehab today.      Day of Discharge     Subjective:  Doing better with no new complaints.  Cough much improved.    Physical Exam:  Temp:  [98.2 °F (36.8 °C)-99 °F (37.2 °C)] 98.2 °F (36.8 °C)  Heart Rate:   [61-84] 70  Resp:  [18-22] 18  BP: ()/(63-91) 117/76  Body mass index is 30.52 kg/m².  Physical Exam  Vitals and nursing note reviewed.   Constitutional:       General: He is not in acute distress.  Cardiovascular:      Rate and Rhythm: Normal rate and regular rhythm.   Pulmonary:      Effort: Pulmonary effort is normal.      Breath sounds: Normal breath sounds.   Abdominal:      General: Bowel sounds are normal.      Palpations: Abdomen is soft.      Tenderness: There is no abdominal tenderness.   Musculoskeletal:         General: No swelling.   Skin:     General: Skin is warm and dry.   Neurological:      Mental Status: He is alert. Mental status is at baseline.         Consultants     Consult Orders (all) (From admission, onward)       Start     Ordered    02/10/25 0702  Inpatient Internal Medicine Consult  IN AM        Specialty:  Internal Medicine  Provider:  Jacqui Chambers MD    02/09/25 1828    02/09/25 1851  Inpatient Case Management  Consult  Once        Provider:  (Not yet assigned)    02/09/25 1850    02/09/25 0515  Pulmonology (on-call MD unless specified)  STAT        Specialty:  Pulmonary Disease  Provider:  Fareed Garcia MD    02/09/25 0514    02/09/25 0409  Neurosurgery (on-call MD unless specified)  STAT        Specialty:  Neurosurgery  Provider:  (Not yet assigned)    02/09/25 0408                  Procedures     * Surgery not found *    Imaging Results (All)       Procedure Component Value Units Date/Time    XR Chest PA & Lateral [[id number]] Collected: 02/10/25 2154     Updated: 02/10/25 2158    Narrative:      TWO VIEWS OF THE CHEST        HISTORY: cough; S06.5XAA-Traumatic subdural hemorrhage with loss of  consciousness status unknown, initial encounter; S51.012A-Laceration  without foreign body of left elbow, initial encounter; M79.5-Residual  foreign body in soft tissue; Z79.01-Long term (current) use of  anticoagulants     COMPARISON: 12/12/2024     TECHNIQUE: PA and  lateral views were performed of the chest.     FINDINGS:     Heart size is normal.     The left lung is normally aerated.     There is medial right lower lobe consolidation.     No evidence of effusion.       Impression:         Medial right lower lobe consolidation.     This report was finalized on 2/10/2025 9:55 PM by Dr. Tomas Huynh M.D on Workstation: [id number]       CT Head Without Contrast [[id number]] Collected: 02/10/25 1012     Updated: 02/10/25 1031    Narrative:      CT HEAD WO CONTRAST-     HISTORY:  SDH; S06.5XAA-Traumatic subdural hemorrhage with loss of  consciousness status unknown, initial encounter; S51.012A-Laceration  without foreign body of left elbow, initial encounter; M79.5-Residual  foreign body in soft tissue; Z79.01-Long term (current) use of  anticoagulants     COMPARISON: Comparison is made to multiple prior CT examinations of the  head with the most recent examination being a CT examination of  3/9/2025.     FINDINGS: The patient's head is canted in the scanner. There is  age-appropriate atrophy. The subdural hematoma is appreciated related to  the posterior aspect of the falx which also extends to the superior  aspect of the tentorium cerebelli. It appears similar to the CT  examination of 2/9/2025 with the tentorial component measuring  approximately 2.2 mm in thickness. The most posterior aspect of the  subdural is minimally more prominent as compared to 2/9/2025 (less than  1 mm larger). There is no evidence of intra-axial hemorrhage or of acute  infarction. Continued surveillance is recommended. Age-appropriate  atrophy and moderate vascular calcification is noted.        Radiation dose reduction techniques were utilized, including automated  exposure modulation based on body size.     This report was finalized on 2/10/2025 10:28 AM by Dr. Fercho Snyder M.D on Workstation: [id number]       XR Elbow 3+ View Left [[id number]] Collected: 02/09/25 0948     Updated: 02/09/25  0955    Narrative:      XR ELBOW 3+ VW LEFT-     INDICATIONS: Trauma     TECHNIQUE: 4 views of the left elbow     COMPARISON: None available     FINDINGS:     No acute fracture, erosion, effusion or dislocation is identified. A  couple rounded 3 mm metallic radiodensities project at the soft tissues,  one at the distal upper arm, the other at the mid forearm, suggesting  foreign bodies. Arterial calcifications are present. Follow-up/further  evaluation can be obtained as indications persist.       Impression:         As described.           This report was finalized on 2/9/2025 9:52 AM by Dr. Chava Flores M.D on Workstation: [email or web address]       CT Head Without Contrast [[id number]] Collected: 02/09/25 0941     Updated: 02/09/25 0948    Narrative:      CT HEAD WO CONTRAST-     INDICATIONS: Subdural hematoma, follow-up     TECHNIQUE: Radiation dose reduction techniques were utilized, including  automated exposure control and exposure modulation based on body size.  Noncontrast head CT     COMPARISON: 2/9/2025 at 0306 hours     FINDINGS:     Left parafalcine subdural hematoma is again demonstrated, with slight  interval decrease, now measuring about 3 mm in thickness, previously 4  mm.     No midline shift or mass effect. No acute territorial infarct is  identified.     Mild periventricular hypodensities suggest chronic small vessel ischemic  change in a patient this age.     Arterial calcifications are seen at the base of the brain.     Ventricles, cisterns, cerebral sulci are unremarkable for patient age.     Mild paranasal sinus mucosal thickening is present. The visualized  paranasal sinuses, orbits, mastoid air cells are otherwise unremarkable.     Subcutaneous hematoma is seen at the left cheek             Impression:         Left parafalcine subdural hematoma is again demonstrated, with slight  interval decrease, now measuring about 3 mm in thickness, previously 4  mm.              This report was finalized on  2/9/2025 9:45 AM by Dr. Chava Flores M.D on Workstation: [id number]       CT Head Without Contrast [[id number]] Collected: 02/09/25 0346     Updated: 02/09/25 0353    Addenda:        ADDENDUM:  02 09 25 03:52 Verify Receipt Verified receipt with Abelardo in ER given to      on 02 09 03:52 (-05:00)      Signed: 02/09/25 0345 by Yessy Mccallum MD    Narrative:      CR  Patient: RONALD ROBERSON  Time Out: 03:45  Exam(s): CT HEAD Without Contrast     EXAM:    CT Head Without Intravenous Contrast    CLINICAL HISTORY:     Reason for exam: Fall.    TECHNIQUE:    Axial computed tomography images of the head brain without intravenous   contrast.  CTDI is 55.7 mGy and DLP is 997.8 mGy-cm.  This CT exam was   performed according to the principle of ALARA (As Low As Reasonably   Achievable) by using one or more of the following dose reduction   techniques: automated exposure control, adjustment of the mA and or kV   according to patient size, and or use of iterative reconstruction   technique.    COMPARISON:    11 17 2024.    FINDINGS:    Brain:  Acute trace subdural hematoma along the falx.    Ventricles:  No hydrocephalus.    Bones joints:  Unremarkable.    Soft tissues:  Unremarkable.    Sinuses:  No air fluid level.    Mastoid air cells:  Clear.    IMPRESSION:           Acute trace subdural hematoma along the falx.    Impression:            Communications:     Verify Receipt     Electronically signed by Yessy Mccallum MD on 02-09-25 at 0345    CT Cervical Spine Without Contrast [[id number]] Collected: 02/09/25 0347     Updated: 02/09/25 0347    Narrative:        Patient: RONALD ROBERSON  Time Out: 03:46  Exam(s): CT C SPINE     PE in mild EXAM:    CT Cervical Spine Without Intravenous Contrast    CLINICAL HISTORY:     Reason for exam: fall.    TECHNIQUE:    Axial computed tomography images of the cervical spine without   intravenous contrast.  CTDI is 19.54 mGy and DLP is 398.8 mGy-cm.  This   CT exam was performed according  "to the principle of ALARA (As Low As   Reasonably Achievable) by using one or more of the following dose   reduction techniques: automated exposure control, adjustment of the mA   and or kV according to patient size, and or use of iterative   reconstruction technique.    COMPARISON:    No relevant prior studies available.    FINDINGS:    Vertebrae:  No acute fracture.    Discs spinal canal neural foramina:  degenerative changes.    Soft tissues:  No prevertebral swelling.    IMPRESSION:         No acute fracture or subluxation.      Impression:          Electronically signed by Yessy Mccallum MD on 02-09-25 at 0346       Pertinent Labs     Results from last 7 days   Lab Units 02/11/25  0608 02/09/25  0437   WBC 10*3/mm3 8.11 10.01   HEMOGLOBIN g/dL 13.5 14.4   PLATELETS 10*3/mm3 144 158     Results from last 7 days   Lab Units 02/11/25  0607 02/09/25  0437   SODIUM mmol/L 134* 139   POTASSIUM mmol/L 3.7 3.8   CHLORIDE mmol/L 97* 99   CO2 mmol/L 27.0 31.0*   BUN mg/dL 21 17   CREATININE mg/dL 0.95 0.83   GLUCOSE mg/dL 108* 105*   EGFR mL/min/1.73 82.4 90.1     Results from last 7 days   Lab Units 02/09/25  0437   ALBUMIN g/dL 3.9   BILIRUBIN mg/dL 1.1   ALK PHOS U/L 67   AST (SGOT) U/L 12   ALT (SGPT) U/L 12     Results from last 7 days   Lab Units 02/11/25 0607 02/09/25  0437   CALCIUM mg/dL 8.4* 9.1   ALBUMIN g/dL  --  3.9               Invalid input(s): \"LDLCALC\"  Results from last 7 days   Lab Units 02/09/25 2057 02/09/25 2056   BLOODCX  No growth at 3 days No growth at 3 days     Results from last 7 days   Lab Units 02/10/25  0217   COVID19  Not Detected       Test Results Pending at Discharge     Pending Results       None              Discharge Details        Discharge Medications        New Medications        Instructions Start Date   aspirin 81 MG EC tablet   81 mg, Oral, Daily   Start Date: February 14, 2025     azithromycin 250 MG tablet  Commonly known as: ZITHROMAX   250 mg, Oral, Daily   Start Date: " February 14, 2025            Continue These Medications        Instructions Start Date   atorvastatin 40 MG tablet  Commonly known as: LIPITOR   40 mg, Nightly      carvedilol 3.125 MG tablet  Commonly known as: COREG   3.125 mg, Oral, 2 Times Daily      cyanocobalamin 1000 MCG tablet  Commonly known as: VITAMIN B-12   1,000 mcg, Oral, Daily      divalproex 500 MG DR tablet  Commonly known as: DEPAKOTE   500 mg, 2 Times Daily      docusate sodium 100 MG capsule  Commonly known as: COLACE   100 mg, Oral, 2 Times Daily      empagliflozin 10 MG tablet tablet  Commonly known as: JARDIANCE   10 mg, Oral, Daily      ferrous sulfate 325 (65 FE) MG tablet   325 mg, Daily With Breakfast      Fluticasone-Salmeterol 100-50 MCG/ACT DISKUS  Commonly known as: ADVAIR/WIXELA   2 Times Daily - RT      folic acid 1 MG tablet  Commonly known as: FOLVITE   1 mg, Daily      furosemide 20 MG tablet  Commonly known as: LASIX   20 mg, Oral, Nightly      furosemide 40 MG tablet  Commonly known as: LASIX   40 mg, Oral, Every Early Morning      levothyroxine 50 MCG tablet  Commonly known as: SYNTHROID, LEVOTHROID   50 mcg, Oral, Daily      omeprazole 40 MG capsule  Commonly known as: priLOSEC   40 mg, Every Evening      oxybutynin XL 5 MG 24 hr tablet  Commonly known as: DITROPAN-XL   5 mg, Daily      rOPINIRole 1 MG tablet  Commonly known as: REQUIP   1 mg, Oral, 3 Times Daily, Take 1 hour before bedtime.      spironolactone 25 MG tablet  Commonly known as: ALDACTONE   12.5 mg, Oral, Daily      tamsulosin 0.4 MG capsule 24 hr capsule  Commonly known as: FLOMAX   0.8 mg, Oral, Daily             Stop These Medications      clopidogrel 75 MG tablet  Commonly known as: PLAVIX     glucosamine sulfate 500 MG capsule capsule     loperamide 2 MG tablet  Commonly known as: IMODIUM A-D     Melatonin 10 MG tablet     nitroglycerin 0.4 MG SL tablet  Commonly known as: NITROSTAT     rivaroxaban 20 MG tablet  Commonly known as: XARELTO              No  Known Allergies    Discharge Disposition:  Skilled Nursing Facility (DC - External)      Discharge Diet:  Diet Order   Procedures    Diet: Regular/House, Diabetic, Cardiac, Fluid Restriction (240 mL/tray); Healthy Heart (2-3 Na+); Consistent Carbohydrate; 2000 mL/day; Texture: Soft to Chew (NDD 3); Soft to Chew: Chopped Meat; Fluid Consistency: Thin (IDDSI 0)       Discharge Activity:   Activity Instructions       Activity as Tolerated              CODE STATUS:    Code Status and Medical Interventions: CPR (Attempt to Resuscitate); Full Support   Ordered at: 02/11/25 1004     Code Status (Patient has no pulse and is not breathing):    CPR (Attempt to Resuscitate)     Medical Interventions (Patient has pulse or is breathing):    Full Support       Future Appointments   Date Time Provider Department Center   2/27/2025  7:00 PM YOU [facility] CT 1 BH YOU CT BR None   3/3/2025 10:45 AM Apoorva Mansfield MD [facility] NS YOU YOU   7/24/2025 10:00 AM YOU [facility] ECHO CART BH YOU [facility] YOU   7/24/2025 11:15 AM Rio Miranda MD [facility] CD [facility] YOU   8/11/2025 11:45 AM Rio Miranda MD [facility] CD [facility] YOU     Additional Instructions for the Follow-ups that You Need to Schedule       Discharge Follow-up with PCP   As directed       Currently Documented PCP:    Provider, No Known    PCP Phone Number:    [phone]     Follow Up Details: 1 to 2 weeks (or sooner if problems)               Follow-up Information       Provider, No Known .    Why: 1 to 2 weeks (or sooner if problems)  Contact information:  [place] [id number] [phone]               Jaylin Kang APRN Follow up in 2 week(s).    Specialties: Nurse Practitioner, Neurosurgery  Contact information:  [address] [place], [place] [id number] [phone]                             Additional Instructions for the Follow-ups that You Need to Schedule       Discharge Follow-up with PCP   As directed       Currently Documented PCP:     Provider, No Known    PCP Phone Number:    [phone]     Follow Up Details: 1 to 2 weeks (or sooner if problems)            Time Spent on Discharge:  Greater than 30 minutes      Bam Machado MD  [facility]  02/13/25  11:59 EST

## 2025-02-13 NOTE — CASE MANAGEMENT/SOCIAL WORK
Continued Stay Note  [facility]     Patient Name: Hernando Lozada  MRN: [id number]  Today's Date: 2/13/2025    Admit Date: 2/9/2025    Plan: Discharge to [facility].   Discharge Plan       Row Name 02/13/25 1323       Plan    Plan Discharge to [facility].    Patient/Family in Agreement with Plan yes    Plan Comments Received message from [facility] stating bed available. Message sent to MD to notify. Packet: Traveler | [employer]  Pharmacy: updated  Transport: Rosalia Pineda @ [facility]....[facility]                   Discharge Codes    No documentation.                 Expected Discharge Date and Time       Expected Discharge Date Expected Discharge Time    Feb 13, 2025               Shanon Vergara RN

## 2025-02-13 NOTE — PROGRESS NOTES
"                                              LOS: 3 days   Patient Care Team:  Provider, No Known as PCP - Albert Pearson MD as Consulting Physician (Cardiology)    Chief Complaint:  F/up viral infection and positive mycoplasma test    Subjective   Interval History      No cough or dyspnea. No fever    REVIEW OF SYSTEMS:       GASTROINTESTINAL: No anorexia, nausea, vomiting or diarrhea. No abdominal pain.  CONSTITUTIONAL: No fever or chills.     Ventilator/Non-Invasive Ventilation Settings (From admission, onward)      None                  Physical Exam:     Vital Signs  Temp:  [98.2 °F (36.8 °C)-99 °F (37.2 °C)] 98.2 °F (36.8 °C)  Heart Rate:  [68-84] 68  Resp:  [16-22] 18  BP: ()/(63-84) 124/84    Intake/Output Summary (Last 24 hours) at 2/12/2025 2225  Last data filed at 2/12/2025 1630  Gross per 24 hour   Intake 1050 ml   Output 500 ml   Net 550 ml     Flowsheet Rows      Flowsheet Row First Filed Value   Admission Height 175.3 cm (69\") Documented at 02/09/2025 0227   Admission Weight 99.7 kg (219 lb 11.2 oz) Documented at 02/09/2025 1142            PPE used per hospital policy    General Appearance:   Alert, cooperative, in no acute distress   ENMT:  Mallampati score 3, moist mucous membrane   Eyes:  Pupils equal and reactive to light. EOMI   Neck:   Trachea midline. No thyromegaly.   Lungs:    Coarse at the bases. RLL crackles.     Heart:   Regular rhythm and normal rate, normal S1 and S2, no         murmur   Skin:   No rash or ecchymosis   Abdomen:    Obese. Soft. No tenderness. No HSM.   Neuro/psych:  Conscious, alert, oriented x3. Strength 5/5 in upper and lower  ext.  Appropriate mood and affect   Extremities:  No cyanosis, clubbing or edema.  Warm extremities and well-perfused          Results Review:        Results from last 7 days   Lab Units 02/11/25  0607 02/09/25  0437   SODIUM mmol/L 134* 139   POTASSIUM mmol/L 3.7 3.8   CHLORIDE mmol/L 97* 99   CO2 mmol/L 27.0 31.0* "   BUN mg/dL 21 17   CREATININE mg/dL 0.95 0.83   GLUCOSE mg/dL 108* 105*   CALCIUM mg/dL 8.4* 9.1         Results from last 7 days   Lab Units 02/11/25  0608 02/09/25  0437   WBC 10*3/mm3 8.11 10.01   HEMOGLOBIN g/dL 13.5 14.4   HEMATOCRIT % 41.0 42.7   PLATELETS 10*3/mm3 144 158     Results from last 7 days   Lab Units 02/09/25  0437   INR  1.33*   APTT seconds 32.7                               I reviewed the patient's new clinical results.        Medication Review:   aspirin, 81 mg, Oral, Daily  atorvastatin, 40 mg, Oral, Nightly  azithromycin, 250 mg, Oral, Daily  budesonide-formoterol, 2 puff, Inhalation, BID - RT  carvedilol, 3.125 mg, Oral, BID  dextromethorphan polistirex ER, 60 mg, Oral, Q12H  divalproex, 500 mg, Oral, BID  docusate sodium, 100 mg, Oral, BID  empagliflozin, 10 mg, Oral, Daily  ferrous sulfate, 325 mg, Oral, Daily With Breakfast  folic acid, 1 mg, Oral, Daily  furosemide, 20 mg, Oral, Nightly  furosemide, 40 mg, Oral, Q AM  guaiFENesin, 1,200 mg, Oral, Q12H  insulin lispro, 2-9 Units, Subcutaneous, 4x Daily AC & at Bedtime  levothyroxine, 50 mcg, Oral, Daily  oxybutynin XL, 5 mg, Oral, Daily  pantoprazole, 40 mg, Oral, Q AM  rOPINIRole, 1 mg, Oral, TID  spironolactone, 12.5 mg, Oral, Daily  tamsulosin, 0.8 mg, Oral, Daily  cyanocobalamin, 1,000 mcg, Oral, Daily             Diagnostic imaging:  I personally and independently reviewed the following images:    CXR 2/11/2025: RLL consolidation.    Assessment     Mycoplasma PNA RLL  Seasonal coronavirus infection.  Bronchospasm  Traumatic SDH    GIOVANNY  A-fib  Chronic systolic and diastolic CHF  DM type II        Plan     Azithromycin 250 mg daily for 5 days  Mucinex 1200 mg BID  Symbicort 2 puffs twice daily- can resume home inhaler on DC  Ropinirol 1 mg TID for RLS  Lasix 20 mg daily  DVT prophylaxis with SCD      Not much else to add from pulm perspective and therefore will sing off. Please call back if needed.    Stan Gallegos,  MD  02/12/25  22:25 EST          This note was dictated utilizing Dragon dictation

## 2025-02-13 NOTE — PLAN OF CARE
Problem: Adult Inpatient Plan of Care  Goal: Absence of Hospital-Acquired Illness or Injury  Intervention: Prevent Skin Injury  Recent Flowsheet Documentation  Taken 2/13/2025 0013 by Ricarda Polo RN  Body Position: patient/family refused  Taken 2/12/2025 2200 by Ricarda Polo RN  Body Position: position maintained  Taken 2/12/2025 2030 by Ricarda Polo RN  Body Position:   turned   left   Goal Outcome Evaluation:  Plan of Care Reviewed With: patient        Progress: no change  Outcome Evaluation: VSS, pt resting comfortably overnight.

## 2025-02-14 LAB
BACTERIA SPEC AEROBE CULT: NORMAL
BACTERIA SPEC AEROBE CULT: NORMAL

## 2025-02-14 NOTE — SIGNIFICANT NOTE
02/14/25 1149   Plan   Final Discharge Disposition Code 03 - skilled nursing facility (SNF)   Final Note [facility].

## 2025-02-20 NOTE — PROGRESS NOTES
"Subjective   Patient ID: Hernando Lozada is a 79 y.o. male is here today for follow-up for   -SDH (subdural hematoma)   Hospital follow up, in ED on 02/09/2025  CT head without contrast completed on 2/27/2025    Patient is here with caregiver Junie.  Patient reports having a fall when he went to  ED on 02/09/2025.  Patient reports not having any headaches or vision changes.  He is walking on a walker.   Patient does have tremors.           HPI:  Patient is here today for neurosurgical follow-up after a fall onto 925 and a falcine subdural hematoma that was seen at that time.  He is on aspirin and Plavix for cardiac issues.  His Plavix has been held the previous 2 weeks.  He presents today with a new CT head which demonstrates full resolution of his falcine subdural hematoma.    Patient denies any headaches, he has not had any recent falls, he is using a walker to ambulate for safety, he is here with his caregiver who supports the statements.  No seizure activity.          Objective     Vitals:    03/03/25 1043   BP: 124/70   BP Location: Left arm   Patient Position: Sitting   Cuff Size: Adult   Pulse: 110   Resp: 16   Temp: 96.7 °F (35.9 °C)   TempSrc: Temporal   SpO2: 98%   Weight: 93 kg (205 lb)   Height: 175.3 cm (69.02\")   PainSc: 0-No pain     Body mass index is 30.26 kg/m².    Lab Results   Component Value Date    WBC 8.11 02/11/2025    HGB 13.5 02/11/2025    HCT 41.0 02/11/2025    MCV 90.9 02/11/2025     02/11/2025     Lab Results   Component Value Date    GLUCOSE 108 (H) 02/11/2025    CALCIUM 8.4 (L) 02/11/2025     (L) 02/11/2025    K 3.7 02/11/2025    CO2 27.0 02/11/2025    CL 97 (L) 02/11/2025    BUN 21 02/11/2025    CREATININE 0.95 02/11/2025    EGFR 82.4 02/11/2025    BCR 22.1 02/11/2025    ANIONGAP 10.0 02/11/2025         Physical Exam:    NAD  A&O3  Face symmetric  Motor:   5/5 BUE  5/5 BLE  Uses walker to ambulate, gait antalgic  No pronator drift      Assessment & Plan   Assessment & " Plan      Hernando Lozada  reports that he has never smoked. He has never been exposed to tobacco smoke. He has never used smokeless tobacco.     Independent Review of Radiographic Studies:      I personally reviewed the images from the following studies.  CT of the head performed 2/10/2025 demonstrates a tentorial subdural hematoma measuring 2.2 mm in thickness.  CT of the head performed 2/27/2025 demonstrates full resolution of the posterior falcine left tentorial subdural.    Medical Decision Making:      I spent 30 minutes caring for Hernando on this date of service. This time includes time spent by me in the following activities:preparing for the visit, reviewing tests, obtaining and/or reviewing a separately obtained history, performing a medically appropriate examination and/or evaluation , counseling and educating the patient/family/caregiver, ordering medications, tests, or procedures, documenting information in the medical record, independently interpreting results and communicating that information with the patient/family/caregiver, and care coordination      Assessment & Plan  SDH (subdural hematoma)  His subdural is completely resolved.  He needs no further imaging or neurosurgical follow-up.  I recommend that he enact additional safety measures to prevent a fall on dual antiplatelets.    He is free to restart his Plavix.  He should contact his cardiologist if he requires any further refills.         Return for No scheduled neurosurgical followup needed.       This note was completed using Dragon Dictation software.

## 2025-02-21 ENCOUNTER — PATIENT OUTREACH (OUTPATIENT)
Dept: CASE MANAGEMENT | Facility: OTHER | Age: 80
End: 2025-02-21
Payer: MEDICARE

## 2025-02-21 NOTE — OUTREACH NOTE
AMBULATORY CASE MANAGEMENT NOTE    Names and Relationships of Patient/Support Persons: Contact: Keshia; Relationship:  -     Patient Outreach     Per Keshia there are no anticipated DC date at this time.         Michelle GRANT  Ambulatory Case Management    2/21/2025, 11:52 EST

## 2025-02-27 ENCOUNTER — HOSPITAL ENCOUNTER (OUTPATIENT)
Facility: HOSPITAL | Age: 80
Discharge: HOME | End: 2025-02-27
Admitting: STUDENT IN AN ORGANIZED HEALTH CARE EDUCATION/TRAINING PROGRAM
Payer: MEDICARE

## 2025-02-27 DIAGNOSIS — S06.5XAA SDH (SUBDURAL HEMATOMA): ICD-10-CM

## 2025-02-27 PROCEDURE — 70450 CT HEAD/BRAIN W/O DYE: CPT

## 2025-02-28 ENCOUNTER — READMISSION MANAGEMENT (OUTPATIENT)
Dept: CALL CENTER | Facility: HOSPITAL | Age: 80
End: 2025-02-28
Payer: MEDICARE

## 2025-02-28 ENCOUNTER — TRANSITIONAL CARE MANAGEMENT TELEPHONE ENCOUNTER (OUTPATIENT)
Dept: CALL CENTER | Facility: HOSPITAL | Age: 80
End: 2025-02-28
Payer: MEDICARE

## 2025-02-28 ENCOUNTER — PATIENT OUTREACH (OUTPATIENT)
Dept: CASE MANAGEMENT | Facility: OTHER | Age: 80
End: 2025-02-28
Payer: MEDICARE

## 2025-02-28 NOTE — OUTREACH NOTE
Prep Survey      Flowsheet Row Responses   [facility] facility patient discharged from? Non-BH   Is LACE score < 7 ? Non-BH Discharge   Eligibility [facility]   Date of Discharge 02/26/25   Discharge Disposition Home or Self Care   Discharge diagnosis SDH (subdural hematoma)   Does the patient have one of the following disease processes/diagnoses(primary or secondary)? Stroke   Prep survey completed? Yes            Mita SCHWAB - Registered Nurse

## 2025-02-28 NOTE — OUTREACH NOTE
AMBULATORY CASE MANAGEMENT NOTE    Names and Relationships of Patient/Support Persons: Contact: Keshia; Relationship: Other -     Patient Outreach    Incoming call from Keshia at [facility]. Pt discharged home 2/26. Shimon sent to [facility].         Michelle GRANT  Ambulatory Case Management    2/28/2025, 09:38 EST

## 2025-02-28 NOTE — OUTREACH NOTE
Call Center TCM Note      Flowsheet Row Responses   [facility] patient discharged from? Non-   Does the patient have one of the following disease processes/diagnoses(primary or secondary)? Other   TCM attempt successful? No   Unsuccessful attempts Attempt 2            Nicole Harden RN    2/28/2025, 16:40 EST

## 2025-02-28 NOTE — OUTREACH NOTE
Call Center TCM Note      Flowsheet Row Responses   [facility] patient discharged from? Non-   Does the patient have one of the following disease processes/diagnoses(primary or secondary)? Other   TCM attempt successful? No   Unsuccessful attempts Attempt 1            Nicole Harden, DESMOND    2/28/2025, 15:04 EST

## 2025-03-01 ENCOUNTER — TRANSITIONAL CARE MANAGEMENT TELEPHONE ENCOUNTER (OUTPATIENT)
Dept: CALL CENTER | Facility: HOSPITAL | Age: 80
End: 2025-03-01
Payer: MEDICARE

## 2025-03-01 NOTE — OUTREACH NOTE
Call Center TCM Note      Flowsheet Row Responses   [facility] facility patient discharged [facility]-  [facility]   Does the patient have one of the following disease processes/diagnoses(primary or secondary)? Other   TCM attempt successful? No   Unsuccessful attempts Attempt 3            Gillian Marin RN    3/1/2025, 11:16 EST

## 2025-03-03 ENCOUNTER — OFFICE VISIT (OUTPATIENT)
Dept: NEUROSURGERY | Facility: CLINIC | Age: 80
End: 2025-03-03
Payer: MEDICARE

## 2025-03-03 VITALS
TEMPERATURE: 96.7 F | RESPIRATION RATE: 16 BRPM | BODY MASS INDEX: 30.36 KG/M2 | HEART RATE: 110 BPM | WEIGHT: 205 LBS | OXYGEN SATURATION: 98 % | SYSTOLIC BLOOD PRESSURE: 124 MMHG | HEIGHT: 69 IN | DIASTOLIC BLOOD PRESSURE: 70 MMHG

## 2025-03-03 DIAGNOSIS — S06.5XAA SDH (SUBDURAL HEMATOMA): Primary | ICD-10-CM

## 2025-03-03 RX ORDER — PHENOL 1.4 %
1 AEROSOL, SPRAY (ML) MUCOUS MEMBRANE NIGHTLY
COMMUNITY
Start: 2024-12-19

## 2025-03-03 RX ORDER — ACETAMINOPHEN 325 MG/1
TABLET ORAL
COMMUNITY
Start: 2024-12-19

## 2025-03-03 RX ORDER — LOPERAMIDE HYDROCHLORIDE 2 MG/1
CAPSULE ORAL
COMMUNITY
Start: 2024-12-19

## 2025-03-03 NOTE — ASSESSMENT & PLAN NOTE
His subdural is completely resolved.  He needs no further imaging or neurosurgical follow-up.  I recommend that he enact additional safety measures to prevent a fall on dual antiplatelets.    He is free to restart his Plavix.  He should contact his cardiologist if he requires any further refills.

## 2025-03-04 ENCOUNTER — PATIENT OUTREACH (OUTPATIENT)
Dept: CASE MANAGEMENT | Facility: OTHER | Age: 80
End: 2025-03-04
Payer: MEDICARE

## 2025-03-04 NOTE — OUTREACH NOTE
AMBULATORY CASE MANAGEMENT NOTE    Names and Relationships of Patient/Support Persons: Contact: Hernando Lozada; Relationship: Self -     Patient Outreach    Call placed to patient to follow up recent hospitalization and rehab stay. Pt answered the phone but hung up as ACM introducing self. Will make no further attempts to contact.         Michelle GRANT  Ambulatory Case Management    3/4/2025, 10:56 EST

## 2025-04-26 ENCOUNTER — APPOINTMENT (OUTPATIENT)
Dept: CT IMAGING | Facility: HOSPITAL | Age: 80
End: 2025-04-26
Payer: MEDICARE

## 2025-04-26 ENCOUNTER — HOSPITAL ENCOUNTER (EMERGENCY)
Facility: HOSPITAL | Age: 80
Discharge: HOME | End: 2025-04-26
Attending: EMERGENCY MEDICINE
Payer: MEDICARE

## 2025-04-26 VITALS
DIASTOLIC BLOOD PRESSURE: 75 MMHG | TEMPERATURE: 98.4 F | RESPIRATION RATE: 18 BRPM | HEART RATE: 76 BPM | SYSTOLIC BLOOD PRESSURE: 122 MMHG | OXYGEN SATURATION: 98 %

## 2025-04-26 DIAGNOSIS — S09.90XA CLOSED HEAD INJURY, INITIAL ENCOUNTER: ICD-10-CM

## 2025-04-26 DIAGNOSIS — S05.12XA PERIORBITAL CONTUSION OF LEFT EYE, INITIAL ENCOUNTER: ICD-10-CM

## 2025-04-26 DIAGNOSIS — S02.2XXA CLOSED FRACTURE OF NASAL BONE, INITIAL ENCOUNTER: Primary | ICD-10-CM

## 2025-04-26 PROCEDURE — 70486 CT MAXILLOFACIAL W/O DYE: CPT

## 2025-04-26 PROCEDURE — 72125 CT NECK SPINE W/O DYE: CPT

## 2025-04-26 PROCEDURE — 99284 EMERGENCY DEPT VISIT MOD MDM: CPT

## 2025-04-26 PROCEDURE — 70450 CT HEAD/BRAIN W/O DYE: CPT

## 2025-04-26 RX ORDER — HYDROCODONE BITARTRATE AND ACETAMINOPHEN 5; 325 MG/1; MG/1
1 TABLET ORAL ONCE
Refills: 0 | Status: COMPLETED | OUTPATIENT
Start: 2025-04-26 | End: 2025-04-26

## 2025-04-26 RX ADMIN — HYDROCODONE BITARTRATE AND ACETAMINOPHEN 1 TABLET: 5; 325 TABLET ORAL at 02:17

## 2025-04-26 NOTE — DISCHARGE INSTRUCTIONS
You have been seen for a head injury.  Your imaging in the emergency department was negative for any acute intracranial pathology.   Because you are on an anticoagulant, there is a small risk of a delayed intracranial bleed for up to 1 week from the time of injury.  Please have family members keep a close eye on you over the next several days.  Please make sure you have close follow-up with your primary care physician.  Please return to the emergency department immediately for headache, visual disturbance, confusion, weakness, nausea or vomiting, or any other concerning symptom.      Although you are being discharged in the ED today, I encouraged her to return for worsening symptoms.  Things can, and do, change such a treatment at home with medication may not be adequate.  Specifically I recommend returning for chest pain or discomfort, difficulty breathing, persistent vomiting or difficulty holding down liquids or medications, fever > 102.0 F,  or any other worsening or alarming symptoms.     You have been evaluated in the emergency department for your presenting symptoms and deemed appropriate for discharge home.  Understand that your health care does not end here today.  It is important that your primary care physician be made aware of your visit.  I recommend calling your primary care provider in the next 48 hours to arrange follow-up care.  Your primary care provider needs to review your treatment and recovery to ensure that no further treatment is necessary.  Additional testing or procedures may be necessary as an outpatient at the discretion of your primary care provider.    I also recommend following up with your PCP for recheck of your blood pressure and treatment as needed.

## 2025-04-26 NOTE — ED PROVIDER NOTES
EMERGENCY DEPARTMENT ENCOUNTER  Room Number:  08/08  PCP: Milo Carrasquillo DO  Independent Historians: Patient      HPI:  Chief Complaint: had concerns including Fall.     A complete HPI/ROS/PMH/PSH/SH/FH are unobtainable due to: None    Chronic or social conditions impacting patient care (Social Determinants of Health): None      Context: Hernando Lozada is a 80 y.o. male with a medical history of subdural hematoma, hypertension, diabetes, CKD, DVT, CHF, and hypothyroidism presents emergency department today with a closed head injury after mechanical fall at home.  Patient says he leaned over to pick something up and fell forward hitting his head on the floor.  He denies LOC.  He reports injuries to the forehead, left e  ye area, and the nose.  He denies neck or back pain.  He was able to get himself back up and has been ambulatory since the fall.  Patient is on Plavix but is not otherwise anticoagulated.  He reports a headache but denies dizziness or blurred vision.  He denies nausea or vomiting.      Review of prior external notes (non-ED) -and- Review of prior external test results outside of this encounter:   I reviewed the neurosurgery office visit from 3/3/2025 where patient was seen in follow-up for a subdural hematoma from 2/9/2025.  Most recent CT was completed on 2/27/2025.  His CT showed full resolution of the subdural hematoma.  Plavix was restarted.    PAST MEDICAL HISTORY  Active Ambulatory Problems     Diagnosis Date Noted    DJD (degenerative joint disease) of knee 12/14/2017    Primary hypertension 02/18/2019    SOB (shortness of breath) 02/18/2019    Leg swelling 02/18/2019    Hyperlipidemia LDL goal <100 08/23/2019    COVID-19 11/25/2022    Diabetes mellitus     GIOVANNY (obstructive sleep apnea)     Disease of thyroid gland     CKD (chronic kidney disease)     Hypomagnesemia     Chronic brain injury     Generalized weakness     CAD (coronary artery disease)     Pedal edema 03/06/2023    Tremor  03/06/2023    Elevated troponin 03/06/2023    Lower extremity cellulitis 03/06/2023    Tinea cruris 03/06/2023    Chronic deep vein thrombosis (DVT) of calf muscle vein of left lower extremity 03/07/2023    Aortic stenosis, severe 03/06/2023    Acute urinary tract infection 04/17/2023    Hypothyroidism 04/17/2023    Acute urinary retention 04/19/2023    Acute bacterial prostatitis 04/19/2023    Chest pain 06/25/2024    Recurrent falls 08/08/2024    Hypokalemia 08/09/2024    Acute on chronic systolic CHF (congestive heart failure) 10/06/2024    Acute on chronic diastolic heart failure 10/06/2024    Acute on chronic diastolic CHF (congestive heart failure) 10/07/2024    Atrial fibrillation, persistent 10/08/2024    Chronic combined systolic and diastolic congestive heart failure 10/11/2024    Anemia 10/31/2024    Chronic anticoagulation 01/16/2025    Mycoplasma pneumonia 02/10/2025    Viral URI 02/10/2025     Resolved Ambulatory Problems     Diagnosis Date Noted    Chest pain with high risk of acute coronary syndrome 04/29/2019    Chest pain 01/02/2024    Chest pain 01/04/2024    SDH (subdural hematoma) 02/09/2025     Past Medical History:   Diagnosis Date    A-fib     Aortic stenosis     Arthritis     Bilateral leg edema     Diastolic heart failure     GERD (gastroesophageal reflux disease)     Heart murmur     History of head injury     [place] (hard of hearing)     Hyperlipidemia     Hypertension     Irregular heart beat     Osteoarthritis     Past heart attack     SOB (shortness of breath) on exertion     Stroke     Vasovagal episode          PAST SURGICAL HISTORY  Past Surgical History:   Procedure Laterality Date    CARDIAC CATHETERIZATION N/A 4/30/2019    Procedure: Coronary angiography with grafts;  Surgeon: Rio Miranda MD;  Location: [facility] CATH INVASIVE LOCATION;  Service: Cardiology    CARDIAC CATHETERIZATION N/A 4/30/2019    Procedure: Left Heart Cath;  Surgeon: Rio Miranda MD;  [facility];  Service: Cardiology    CARDIAC CATHETERIZATION N/A 4/30/2019    Procedure: Left ventriculography;  Surgeon: Rio Miranda MD;  Location: [facility] CATH INVASIVE LOCATION;  Service: Cardiology    CARDIAC CATHETERIZATION  4/30/2019    Procedure: Saphenous Vein Graft;  Surgeon: Rio Miranda MD;  [facility];  Service: Cardiology    CARDIAC CATHETERIZATION N/A 4/30/2019    Procedure: Stent GLENDY coronary;  Surgeon: Rio Miranda MD;  Location: [facility] CATH INVASIVE LOCATION;  Service: Cardiology    CARDIAC CATHETERIZATION N/A 3/10/2023    Procedure: Right and Left Heart Cath;  Surgeon: Rio Miranda MD;  Location: [facility] CATH INVASIVE LOCATION;  Service: Cardiology;  Laterality: N/A;    CARDIAC CATHETERIZATION N/A 3/10/2023    Procedure: Coronary angiography;  Surgeon: Rio Miranda MD;  Location: [facility] CATH INVASIVE LOCATION;  Service: Cardiology;  Laterality: N/A;    CARDIAC CATHETERIZATION N/A 3/10/2023    Procedure: Left ventriculography;  Surgeon: Rio Miranda MD;  Location: [facility] CATH INVASIVE LOCATION;  Service: Cardiology;  Laterality: N/A;    CARDIAC CATHETERIZATION N/A 3/10/2023    Procedure: Percutaneous Coronary Intervention;  Surgeon: Rio Miranda MD;  Location: [facility] CATH INVASIVE LOCATION;  Service: Cardiology;  Laterality: N/A;    CARDIAC CATHETERIZATION N/A 3/10/2023    Procedure: Stent GLENDY coronary;  Surgeon: Rio Miranda MD;  Location: [facility] CATH INVASIVE LOCATION;  Service: Cardiology;  Laterality: N/A;    CARDIAC CATHETERIZATION N/A 1/3/2024    Procedure: Left Heart Cath;  Surgeon: Rio Miranda MD;  Location: [facility] CATH INVASIVE LOCATION;  Service: Cardiovascular;  Laterality: N/A;    CARDIAC CATHETERIZATION N/A 1/3/2024    Procedure: Coronary angiography;  Surgeon: Rio Miranda MD;  Location: [facility] CATH INVASIVE LOCATION;  Service:  Cardiovascular;  Laterality: N/A;    CARDIAC CATHETERIZATION N/A 1/3/2024    Procedure: Percutaneous Coronary Intervention;  Surgeon: Rio Miranda MD;  Location:  [facility]ATH INVASIVE [facility];  Service: Cardiovascular;  Laterality: N/A;    CARDIAC CATHETERIZATION N/A 1/3/2024    Procedure: Left ventriculography;  Surgeon: Rio Miranda MD;  Location:  [facility] CATH INVASIVE LOC[facility];  Service: Cardiovascular;  Laterality: N/A;    CARDIAC CATHETERIZATION Left 1/3/2024    Procedure: Native mammary injection;  Surgeon: Rio Miranda MD;  Location:  [facility] CATH INVASIVE LOCATION;  Service: Cardiovascular;  Laterality: Left;    CARDIAC CATHETERIZATION N/A 6/26/2024    Procedure: Right and Left Heart Cath;  Surgeon: Rio Miranda MD;  Location:  [facility] CATH INVASIVE LOCATION;  Service: Cardiovascular;  Laterality: N/A;    CARDIAC CATHETERIZATION N/A 6/26/2024    Procedure: Percutaneous Coronary Intervention;  Surgeon: Rio Miranda MD;  [facility];  Service: Cardiovascular;  Laterality: N/A;    CARDIAC CATHETERIZATION N/A 6/26/2024    Procedure: Left ventriculography;  Surgeon: Rio Miranda MD;  [facility];  Service: Cardiovascular;  Laterality: N/A;    CARDIAC CATHETERIZATION N/A 6/26/2024    Procedure: Coronary angiography;  Surgeon: Rio Miranda MD;  [facility];  Service: Cardiovascular;  Laterality: N/A;    CARDIAC CATHETERIZATION  6/26/2024    Procedure: Saphenous Vein Graft;  Surgeon: Rio Miranda MD;  [facility];  Service: Cardiovascular;;    CARDIAC CATHETERIZATION N/A 6/26/2024    Procedure: Stent GLENDY coronary;  Surgeon: Rio Miranda MD;  [facility];  Service: Cardiovascular;  Laterality: N/A;    CARPAL TUNNEL RELEASE Bilateral     CATARACT EXTRACTION      CORONARY ARTERY BYPASS GRAFT  2002     FINGER SURGERY      MISSING LEFT POINTER FINGER TIP    INTERVENTIONAL RADIOLOGY PROCEDURE N/A 6/26/2024    Procedure: Intravascular Ultrasound;  Surgeon: Rio Miranda MD;  Location:  [facility] CATH INVASIVE LOCATION;  Service: Cardiovascular;  Laterality: N/A;    KNEE ARTHROPLASTY Right 02/15/2017    [place] ARTHRP KNE CONDYLE&PLATU MEDIAL&LAT COMPARTMENTS Left 12/14/2017    Procedure: LT TOTAL KNEE ARTHROPLASTY;  Surgeon: Jatin Lancaster MD;  Location: [facility] MAIN OR;  Service: Orthopedics    NY RT/LT HEART CATHETERS N/A 4/30/2019    Procedure: Percutaneous Coronary Intervention;  Surgeon: Rio Miranda MD;  Location:  [facility] CATH INVASIVE LOCATION;  Service: Cardiology         FAMILY HISTORY  Family History   Problem Relation Age of Onset    Parkinsonism Sister     Diabetes Brother     Malig Hyperthermia Neg Hx          SOCIAL HISTORY  Social History     Socioeconomic History    Marital status: Single   Tobacco Use    Smoking status: Never     Passive exposure: Never    Smokeless tobacco: Never   Vaping Use    Vaping status: Never Used   Substance and Sexual Activity    Alcohol use: No    Drug use: No    Sexual activity: Defer         ALLERGIES  Patient has no known allergies.      REVIEW OF SYSTEMS  Included in HPI  All systems reviewed and negative except for those discussed in HPI.      PHYSICAL EXAM    I have reviewed the triage vital signs and nursing notes.    ED Triage Vitals [04/26/25 0052]   Temp Heart Rate Resp BP SpO2   98.4 °F (36.9 °C) 76 16 172/86 98 %      Temp src Heart Rate Source Patient Position BP Location FiO2 (%)   Tympanic -- -- -- --       Physical Exam  Constitutional:       Appearance: Normal appearance.   HENT:      Head: Normocephalic.      Comments: Large contusion to the left side of the forehead with an overlying abrasion.  No large or deep laceration.     Mouth/Throat:      Mouth: Mucous membranes are moist.   Eyes:      Extraocular Movements: Extraocular movements  intact.      Pupils: Pupils are equal, round, and reactive to light.      Comments: Left periorbital contusion.   Cardiovascular:      Rate and Rhythm: Normal rate and regular rhythm.      Pulses: Normal pulses.      Heart sounds: Normal heart sounds.   Pulmonary:      Effort: Pulmonary effort is normal. No respiratory distress.      Breath sounds: Normal breath sounds.   Abdominal:      General: Abdomen is flat. There is no distension.      Palpations: Abdomen is soft.      Tenderness: There is no abdominal tenderness.   Musculoskeletal:         General: Normal range of motion.      Cervical back: Normal range of motion and neck supple.      Comments: Spontaneously moving all extremities, no C, T, or L-spine tenderness.   Skin:     General: Skin is warm and dry.      Capillary Refill: Capillary refill takes less than 2 seconds.   Neurological:      General: No focal deficit present.      Mental Status: He is alert and oriented to person, place, and time.   Psychiatric:         Mood and Affect: Mood normal.         Behavior: Behavior normal.         RADIOLOGY  CT Cervical Spine Without Contrast  Result Date: 4/26/2025  CT OF THE CERVICAL SPINE  HISTORY: Fall  COMPARISON: None available.  TECHNIQUE: Axial CT imaging was obtained through the cervical spine. Coronal and sagittal reformatted images were obtained.  FINDINGS: No acute fracture or subluxation of the cervical spine is seen. Intervertebral disc space narrowing is most significant at C6-C7. There is no prevertebral soft tissue swelling. Images through the lung apices are clear. Canal stenosis is probably most significant at C6-C7. Neural foraminal narrowing is most significant at C3-C4, particularly on the left.      No acute fracture or subluxation identified.  Radiation dose reduction techniques were utilized, including automated exposure control and exposure modulation based on body size.   This report was finalized on 4/26/2025 2:53 AM by Dr. Swan  STAN Leos on Workstation: [id number]      CT Head Without Contrast  CT Head Without Contrast, CT Facial Bones Without Contrast  Result Date: 4/26/2025  CT OF THE HEAD WITHOUT CONTRAST; CT OF THE FACIAL BONES  HISTORY: Fall  COMPARISON: 3/27/2025  TECHNIQUE: Axial CT imaging was obtained through the brain. No IV contrast was administered. Axial CT imaging was obtained through the facial bones. Coronal and sagittal reformatted images were obtained.  FINDINGS: CT OF THE HEAD: No acute intracranial hemorrhage is seen. There is diffuse atrophy. There is periventricular and deep white matter microangiopathic change. There is no midline shift or mass effect. No calvarial fracture is seen. There is some trace fluid within the right mastoid air cells.  CT OF THE FACIAL BONES: There appear to be bilateral nasal bone fractures. No additional facial bone fractures are seen. There is a left frontal scalp hematoma. There is left periorbital soft tissue swelling. Left globe is intact. Both orbits appear unremarkable. Nasopharynx and oropharynx appear within normal limits. There are carotid calcifications. Mucosal thickening is noted within the ethmoid and maxillary sinuses. There are bilateral mucous retention cysts.       1. No acute intracranial findings. 2. Bilateral nasal bone fractures.  Radiation dose reduction techniques were utilized, including automated exposure control and exposure modulation based on body size.   This report was finalized on 4/26/2025 2:50 AM by Dr. Sosa Leos M.D on Workstation: [id number]          MEDICATIONS GIVEN IN ER  Medications   HYDROcodone-acetaminophen (NORCO) 5-325 MG per tablet 1 tablet (1 tablet Oral Given 4/26/25 0217)         OUTPATIENT MEDICATION MANAGEMENT:  No current Epic-ordered facility-administered medications on file.     Current Outpatient Medications Ordered in Epic   Medication Sig Dispense Refill    acetaminophen (TYLENOL) 325 MG tablet Take 2 tablets every  6 hours by oral route as needed.      aspirin 81 MG EC tablet Take 1 tablet by mouth Daily.      atorvastatin (LIPITOR) 40 MG tablet Take 1 tablet by mouth Every Night.      carvedilol (COREG) 3.125 MG tablet Take 1 tablet by mouth 2 (Two) Times a Day. 180 tablet 3    divalproex (DEPAKOTE) 500 MG DR tablet Take 1 tablet by mouth 2 (Two) Times a Day.      docusate sodium (COLACE) 100 MG capsule Take 1 capsule by mouth 2 (Two) Times a Day. 28 capsule 0    empagliflozin (JARDIANCE) 10 MG tablet tablet Take 1 tablet by mouth Daily.      ferrous sulfate 325 (65 FE) MG tablet Take 1 tablet by mouth Daily With Breakfast.      Fluticasone-Salmeterol (ADVAIR/WIXELA) 100-50 MCG/ACT DISKUS Inhale 2 (Two) Times a Day.      folic acid (FOLVITE) 1 MG tablet Take 1 tablet by mouth Daily.      furosemide (LASIX) 20 MG tablet Take 1 tablet by mouth Every Night.      furosemide (LASIX) 40 MG tablet Take 1 tablet by mouth Every Morning.      levothyroxine (SYNTHROID, LEVOTHROID) 50 MCG tablet Take 1 tablet by mouth Daily. 90 tablet 1    loperamide (IMODIUM) 2 MG capsule TAKE (1) CAPSULE BY MOUTH EVERY SIX HOURS AS NEEDED FOR DIARRHEA      Melatonin 10 MG tablet Take 1 tablet by mouth Every Night.      omeprazole (priLOSEC) 40 MG capsule Take 1 capsule by mouth Every Evening.      oxybutynin XL (DITROPAN-XL) 5 MG 24 hr tablet Take 1 tablet by mouth Daily.      rOPINIRole (REQUIP) 1 MG tablet Take 1 tablet by mouth 3 (Three) Times a Day. Take 1 hour before bedtime.      spironolactone (ALDACTONE) 25 MG tablet Take 0.5 tablets by mouth Daily. 45 tablet 3    tamsulosin (FLOMAX) 0.4 MG capsule 24 hr capsule Take 2 capsules by mouth Daily. 30 capsule     vitamin B-12 (VITAMIN B-12) 1000 MCG tablet Take 1 tablet by mouth Daily.           PROGRESS, DATA ANALYSIS, CONSULTS, AND MEDICAL DECISION MAKING  ORDERS PLACED DURING THIS VISIT:  Orders Placed This Encounter   Procedures    CT Head Without Contrast    CT Facial Bones Without Contrast     CT Cervical Spine Without Contrast       All labs have been independently interpreted by me.  All radiology studies have been reviewed by me. All EKG's have been independently viewed and interpreted by me.  Discussion below represents my analysis of pertinent findings related to patient's condition, differential diagnosis, treatment plan and final disposition.    Differential diagnosis includes but is not limited to:   My differential diagnosis includes but is not limited to cerebral contusion, cervical strain, concussion with LOC, concussion without LOC, contusion, fracture of the skull, orbits or mandible, hematoma, intracranial hemorrhage including subdural, epidural, subarachnoid and intracerebral, laceration and postconcussion syndrome       ED Course:  ED Course as of 04/26/25 0404   Sat Apr 26, 2025   0210 I discussed the case with Dr. Martin and he agrees to evaluate the patient at the bedside.    [CC]   0254 CT Head Without Contrast  My independent interpretation is no intracranial hemorrhage [CC]   0255 CT Facial Bones Without Contrast  My independent interpretation is nasal bone fractures [CC]   0315 Patient was able to ambulate here in the emergency department without difficulty. I discussed the patient's labs, radiology findings (including all incidental findings), diagnosis, and plan for discharge.  A repeat exam reveals no new worrisome changes from my initial exam findings.  The patient understands that the fact that they are being discharged does not denote that nothing is abnormal, it indicates that no clinical emergency is present and that they must follow-up as directed in order to properly maintain their health.  Follow-up instructions (specifically listed below) and return to ER precautions were given at this time.  I specifically instructed the patient to follow-up with their PCP.  The patient understands and agrees with the plan, and is ready for discharge.  All questions answered. [CC]       ED Course User Index  [CC] Kasia Austin PA-C           AS OF 04:04 EDT VITALS:    BP - 122/75  HR - 76  TEMP - 98.4 °F (36.9 °C) (Tympanic)  O2 SATS - 98%        MDM:  Patient is a 80-year-old male presents emergency department today with a closed head injury after mechanical fall at home.  Patient leaned over to pick something up and fell forward hitting his head on the floor.  He did not lose consciousness.  On arrival here in the emergency department patient was hypertensive but vitals otherwise reassuring, hypertension improved while here in the ED.  Patient is afebrile.  On my exam the patient is awake, alert, oriented, and answering all questions appropriately.  He has a contusion to the left side of the forehead and the left periorbital contusion.  He also has some bleeding from both naris.  Patient was evaluated with CT of the head, facial bones, and cervical spine.  He was found to have a bilateral nasal bone fracture.  Otherwise no traumatic injuries identified.  Patient was able to ambulate and tolerate p.o. here in the emergency department.  Educated him to not blow the nose and follow-up on an outpatient basis.  Return precautions were given.  He will be discharged with outpatient follow-up.        DIAGNOSIS  Final diagnoses:   Closed head injury, initial encounter   Periorbital contusion of left eye, initial encounter   Closed fracture of nasal bone, initial encounter         DISPOSITION  ED Disposition       ED Disposition   Discharge    Condition   Stable    Comment   --                      Please note that portions of this document were completed with a voice recognition program.    Note Disclaimer: At [facility], we believe that sharing information builds trust and better relationships. You are receiving this note because you recently visited [facility]. It is possible you will see health information before a provider has talked with you about it. This kind of information can  be easy to misunderstand. To help you fully understand what it means for your health, we urge you to discuss this note with your provider.     Kasia Austin PALulaC  04/26/25 040

## 2025-04-26 NOTE — ED PROVIDER NOTES
MD ATTESTATION NOTE    SHARED VISIT: This visit was performed by BOTH a physician and an APC. The substantive portion of the medical decision making was performed by this attesting physician who made or approved the management plan and takes responsibility for patient management. All studies documented in the APC note (if performed) were independently interpreted by me.    The BRANDI and I have discussed this patient's history, physical exam, MDM, and treatment plan.  I have reviewed the documentation and personally had a face to face interaction with the patient. The attached note describes my personal findings.      Hernando Lozada is a 80 y.o. male who presents to the ED c/o acute fall.  Patient was leaning forward around midnight and fell forward striking his head and face on the ground.  Patient sustained contusion to forehead and face.  Patient denies any difficulty breathing denies any other injury.  Patient states he is on Plavix.  No changes in vision.  Left Since.    On exam:  GENERAL: not distressed  HENT: nares patent, contusion left side of forehead swelling to bridge of nose  EYES: no scleral icterus  CV: regular rhythm, regular rate  RESPIRATORY: normal effort  ABDOMEN: soft  MUSCULOSKELETAL: no deformity  NEURO: alert, moves all extremities, follows commands  SKIN: warm, dry    Labs  No results found for this or any previous visit (from the past 24 hours).    Radiology  CT Cervical Spine Without Contrast  Result Date: 4/26/2025  CT OF THE CERVICAL SPINE  HISTORY: Fall  COMPARISON: None available.  TECHNIQUE: Axial CT imaging was obtained through the cervical spine. Coronal and sagittal reformatted images were obtained.  FINDINGS: No acute fracture or subluxation of the cervical spine is seen. Intervertebral disc space narrowing is most significant at C6-C7. There is no prevertebral soft tissue swelling. Images through the lung apices are clear. Canal stenosis is probably most significant at C6-C7. Neural  foraminal narrowing is most significant at C3-C4, particularly on the left.      No acute fracture or subluxation identified.  Radiation dose reduction techniques were utilized, including automated exposure control and exposure modulation based on body size.   This report was finalized on 4/26/2025 2:53 AM by Dr. Sosa Leos M.D on Workstation: [id number]      CT Head Without Contrast  CT Head Without Contrast, CT Facial Bones Without Contrast  Result Date: 4/26/2025  CT OF THE HEAD WITHOUT CONTRAST; CT OF THE FACIAL BONES  HISTORY: Fall  COMPARISON: 3/27/2025  TECHNIQUE: Axial CT imaging was obtained through the brain. No IV contrast was administered. Axial CT imaging was obtained through the facial bones. Coronal and sagittal reformatted images were obtained.  FINDINGS: CT OF THE HEAD: No acute intracranial hemorrhage is seen. There is diffuse atrophy. There is periventricular and deep white matter microangiopathic change. There is no midline shift or mass effect. No calvarial fracture is seen. There is some trace fluid within the right mastoid air cells.  CT OF THE FACIAL BONES: There appear to be bilateral nasal bone fractures. No additional facial bone fractures are seen. There is a left frontal scalp hematoma. There is left periorbital soft tissue swelling. Left globe is intact. Both orbits appear unremarkable. Nasopharynx and oropharynx appear within normal limits. There are carotid calcifications. Mucosal thickening is noted within the ethmoid and maxillary sinuses. There are bilateral mucous retention cysts.       1. No acute intracranial findings. 2. Bilateral nasal bone fractures.  Radiation dose reduction techniques were utilized, including automated exposure control and exposure modulation based on body size.   This report was finalized on 4/26/2025 2:50 AM by Dr. Sosa Leos M.D on Workstation: [id number]        Medications given in the ED:  Medications   HYDROcodone-acetaminophen  (NORCO) 5-325 MG per tablet 1 tablet (1 tablet Oral Given 4/26/25 0217)       Orders placed during this visit:  Orders Placed This Encounter   Procedures    CT Head Without Contrast    CT Facial Bones Without Contrast    CT Cervical Spine Without Contrast       Medical Decision Making:  ED Course as of 04/26/25 0613   Sat Apr 26, 2025 0210 I discussed the case with Dr. Martin and he agrees to evaluate the patient at the bedside.    [CC]   0254 CT Head Without Contrast  My independent interpretation is no intracranial hemorrhage [CC]   0255 CT Facial Bones Without Contrast  My independent interpretation is nasal bone fractures [CC]   0315 Patient was able to ambulate here in the emergency department without difficulty. I discussed the patient's labs, radiology findings (including all incidental findings), diagnosis, and plan for discharge.  A repeat exam reveals no new worrisome changes from my initial exam findings.  The patient understands that the fact that they are being discharged does not denote that nothing is abnormal, it indicates that no clinical emergency is present and that they must follow-up as directed in order to properly maintain their health.  Follow-up instructions (specifically listed below) and return to ER precautions were given at this time.  I specifically instructed the patient to follow-up with their PCP.  The patient understands and agrees with the plan, and is ready for discharge.  All questions answered. [CC]      ED Course User Index  [CC] Kasia Austin PA-C       Clinical Scores:                                   Differential diagnosis:  Contusion, nasal fracture, ICH    Diagnosis  Final diagnoses:   Closed head injury, initial encounter   Periorbital contusion of left eye, initial encounter   Closed fracture of nasal bone, initial encounter          Tomas Martin MD  04/26/25 0613

## 2025-04-26 NOTE — ED NOTES
Pt arrived via EMS from home. Pt had a fall pt complains of headache. Has some sweling to the forehead. EMS reports pt was ambulatory on scene. No blood thinners, no LOC.

## 2025-05-04 ENCOUNTER — APPOINTMENT (OUTPATIENT)
Dept: GENERAL RADIOLOGY | Facility: HOSPITAL | Age: 80
End: 2025-05-04
Payer: MEDICARE

## 2025-05-04 ENCOUNTER — APPOINTMENT (OUTPATIENT)
Dept: CT IMAGING | Facility: HOSPITAL | Age: 80
End: 2025-05-04
Payer: MEDICARE

## 2025-05-04 ENCOUNTER — HOSPITAL ENCOUNTER (INPATIENT)
Facility: HOSPITAL | Age: 80
LOS: 1 days | Discharge: NURSING, REHAB OR LONG-TERM CARE | End: 2025-05-07
Attending: EMERGENCY MEDICINE | Admitting: STUDENT IN AN ORGANIZED HEALTH CARE EDUCATION/TRAINING PROGRAM
Payer: MEDICARE

## 2025-05-04 DIAGNOSIS — R29.6 MULTIPLE FALLS: ICD-10-CM

## 2025-05-04 DIAGNOSIS — R60.0 BILATERAL LOWER EXTREMITY EDEMA: ICD-10-CM

## 2025-05-04 DIAGNOSIS — Z79.01 CHRONIC ANTICOAGULATION: ICD-10-CM

## 2025-05-04 DIAGNOSIS — S09.90XA INJURY OF HEAD, INITIAL ENCOUNTER: ICD-10-CM

## 2025-05-04 DIAGNOSIS — I48.91 ATRIAL FIBRILLATION, UNSPECIFIED TYPE: ICD-10-CM

## 2025-05-04 DIAGNOSIS — L03.115 CELLULITIS OF RIGHT LEG: Primary | ICD-10-CM

## 2025-05-04 LAB
ALBUMIN SERPL-MCNC: 4 G/DL (ref 3.5–5.2)
ALBUMIN/GLOB SERPL: 1.5 G/DL
ALP SERPL-CCNC: 84 U/L (ref 39–117)
ALT SERPL W P-5'-P-CCNC: 19 U/L (ref 1–41)
ANION GAP SERPL CALCULATED.3IONS-SCNC: 11 MMOL/L (ref 5–15)
APTT PPP: 35.1 SECONDS (ref 22.7–35.4)
AST SERPL-CCNC: 20 U/L (ref 1–40)
BASOPHILS # BLD AUTO: 0.04 10*3/MM3 (ref 0–0.2)
BASOPHILS NFR BLD AUTO: 0.5 % (ref 0–1.5)
BILIRUB SERPL-MCNC: 0.5 MG/DL (ref 0–1.2)
BILIRUB UR QL STRIP: NEGATIVE
BUN SERPL-MCNC: 18 MG/DL (ref 8–23)
BUN/CREAT SERPL: 19.4 (ref 7–25)
CALCIUM SPEC-SCNC: 8.9 MG/DL (ref 8.6–10.5)
CHLORIDE SERPL-SCNC: 96 MMOL/L (ref 98–107)
CK SERPL-CCNC: 99 U/L (ref 20–200)
CLARITY UR: CLEAR
CO2 SERPL-SCNC: 29 MMOL/L (ref 22–29)
COLOR UR: YELLOW
CREAT SERPL-MCNC: 0.93 MG/DL (ref 0.76–1.27)
D-LACTATE SERPL-SCNC: 0.9 MMOL/L (ref 0.5–2)
DEPRECATED RDW RBC AUTO: 47.1 FL (ref 37–54)
EGFRCR SERPLBLD CKD-EPI 2021: 84 ML/MIN/1.73
EOSINOPHIL # BLD AUTO: 0.02 10*3/MM3 (ref 0–0.4)
EOSINOPHIL NFR BLD AUTO: 0.2 % (ref 0.3–6.2)
ERYTHROCYTE [DISTWIDTH] IN BLOOD BY AUTOMATED COUNT: 13.5 % (ref 12.3–15.4)
GEN 5 1HR TROPONIN T REFLEX: 22 NG/L
GLOBULIN UR ELPH-MCNC: 2.7 GM/DL
GLUCOSE SERPL-MCNC: 104 MG/DL (ref 65–99)
GLUCOSE UR STRIP-MCNC: ABNORMAL MG/DL
HCT VFR BLD AUTO: 37.6 % (ref 37.5–51)
HGB BLD-MCNC: 12.3 G/DL (ref 13–17.7)
HGB UR QL STRIP.AUTO: NEGATIVE
HOLD SPECIMEN: NORMAL
IMM GRANULOCYTES # BLD AUTO: 0.02 10*3/MM3 (ref 0–0.05)
IMM GRANULOCYTES NFR BLD AUTO: 0.2 % (ref 0–0.5)
INR PPP: 1.49 (ref 0.9–1.1)
KETONES UR QL STRIP: NEGATIVE
LEUKOCYTE ESTERASE UR QL STRIP.AUTO: NEGATIVE
LYMPHOCYTES # BLD AUTO: 1.72 10*3/MM3 (ref 0.7–3.1)
LYMPHOCYTES NFR BLD AUTO: 21.5 % (ref 19.6–45.3)
MAGNESIUM SERPL-MCNC: 2.1 MG/DL (ref 1.6–2.4)
MCH RBC QN AUTO: 31.1 PG (ref 26.6–33)
MCHC RBC AUTO-ENTMCNC: 32.7 G/DL (ref 31.5–35.7)
MCV RBC AUTO: 95.2 FL (ref 79–97)
MONOCYTES # BLD AUTO: 0.72 10*3/MM3 (ref 0.1–0.9)
MONOCYTES NFR BLD AUTO: 9 % (ref 5–12)
NEUTROPHILS NFR BLD AUTO: 5.49 10*3/MM3 (ref 1.7–7)
NEUTROPHILS NFR BLD AUTO: 68.6 % (ref 42.7–76)
NITRITE UR QL STRIP: NEGATIVE
NRBC BLD AUTO-RTO: 0 /100 WBC (ref 0–0.2)
NT-PROBNP SERPL-MCNC: 1907 PG/ML (ref 0–1800)
PH UR STRIP.AUTO: 7 [PH] (ref 5–8)
PLATELET # BLD AUTO: 259 10*3/MM3 (ref 140–450)
PMV BLD AUTO: 9 FL (ref 6–12)
POTASSIUM SERPL-SCNC: 3.9 MMOL/L (ref 3.5–5.2)
PROCALCITONIN SERPL-MCNC: <0.02 NG/ML (ref 0–0.25)
PROT SERPL-MCNC: 6.7 G/DL (ref 6–8.5)
PROT UR QL STRIP: NEGATIVE
PROTHROMBIN TIME: 18 SECONDS (ref 11.7–14.2)
RBC # BLD AUTO: 3.95 10*6/MM3 (ref 4.14–5.8)
SODIUM SERPL-SCNC: 136 MMOL/L (ref 136–145)
SP GR UR STRIP: 1.02 (ref 1–1.03)
TROPONIN T % DELTA: 0
TROPONIN T NUMERIC DELTA: 0 NG/L
TROPONIN T SERPL HS-MCNC: 22 NG/L
UROBILINOGEN UR QL STRIP: ABNORMAL
VALPROATE SERPL-MCNC: 49 MCG/ML (ref 50–125)
WBC NRBC COR # BLD AUTO: 8.01 10*3/MM3 (ref 3.4–10.8)
WHOLE BLOOD HOLD COAG: NORMAL
WHOLE BLOOD HOLD SPECIMEN: NORMAL

## 2025-05-04 PROCEDURE — 93005 ELECTROCARDIOGRAM TRACING: CPT | Performed by: EMERGENCY MEDICINE

## 2025-05-04 PROCEDURE — 76376 3D RENDER W/INTRP POSTPROCES: CPT

## 2025-05-04 PROCEDURE — 74176 CT ABD & PELVIS W/O CONTRAST: CPT

## 2025-05-04 PROCEDURE — 84484 ASSAY OF TROPONIN QUANT: CPT | Performed by: EMERGENCY MEDICINE

## 2025-05-04 PROCEDURE — 70450 CT HEAD/BRAIN W/O DYE: CPT

## 2025-05-04 PROCEDURE — 99285 EMERGENCY DEPT VISIT HI MDM: CPT

## 2025-05-04 PROCEDURE — 71045 X-RAY EXAM CHEST 1 VIEW: CPT

## 2025-05-04 PROCEDURE — 82550 ASSAY OF CK (CPK): CPT | Performed by: EMERGENCY MEDICINE

## 2025-05-04 PROCEDURE — 93010 ELECTROCARDIOGRAM REPORT: CPT | Performed by: INTERNAL MEDICINE

## 2025-05-04 PROCEDURE — 25010000002 CEFTRIAXONE PER 250 MG: Performed by: EMERGENCY MEDICINE

## 2025-05-04 PROCEDURE — P9612 CATHETERIZE FOR URINE SPEC: HCPCS

## 2025-05-04 PROCEDURE — 81003 URINALYSIS AUTO W/O SCOPE: CPT | Performed by: EMERGENCY MEDICINE

## 2025-05-04 PROCEDURE — 80164 ASSAY DIPROPYLACETIC ACD TOT: CPT | Performed by: EMERGENCY MEDICINE

## 2025-05-04 PROCEDURE — 36415 COLL VENOUS BLD VENIPUNCTURE: CPT | Performed by: EMERGENCY MEDICINE

## 2025-05-04 PROCEDURE — 85610 PROTHROMBIN TIME: CPT | Performed by: EMERGENCY MEDICINE

## 2025-05-04 PROCEDURE — 85730 THROMBOPLASTIN TIME PARTIAL: CPT | Performed by: EMERGENCY MEDICINE

## 2025-05-04 PROCEDURE — 87040 BLOOD CULTURE FOR BACTERIA: CPT | Performed by: EMERGENCY MEDICINE

## 2025-05-04 PROCEDURE — 85025 COMPLETE CBC W/AUTO DIFF WBC: CPT | Performed by: EMERGENCY MEDICINE

## 2025-05-04 PROCEDURE — 72125 CT NECK SPINE W/O DYE: CPT

## 2025-05-04 PROCEDURE — 84145 PROCALCITONIN (PCT): CPT | Performed by: EMERGENCY MEDICINE

## 2025-05-04 PROCEDURE — 83880 ASSAY OF NATRIURETIC PEPTIDE: CPT | Performed by: EMERGENCY MEDICINE

## 2025-05-04 PROCEDURE — 71250 CT THORAX DX C-: CPT

## 2025-05-04 PROCEDURE — 83605 ASSAY OF LACTIC ACID: CPT | Performed by: EMERGENCY MEDICINE

## 2025-05-04 PROCEDURE — 83735 ASSAY OF MAGNESIUM: CPT | Performed by: EMERGENCY MEDICINE

## 2025-05-04 PROCEDURE — 80053 COMPREHEN METABOLIC PANEL: CPT | Performed by: EMERGENCY MEDICINE

## 2025-05-04 RX ORDER — SODIUM CHLORIDE 0.9 % (FLUSH) 0.9 %
10 SYRINGE (ML) INJECTION AS NEEDED
Status: DISCONTINUED | OUTPATIENT
Start: 2025-05-04 | End: 2025-05-07 | Stop reason: HOSPADM

## 2025-05-04 RX ORDER — MUPIROCIN 20 MG/G
1 OINTMENT TOPICAL ONCE
Status: COMPLETED | OUTPATIENT
Start: 2025-05-04 | End: 2025-05-04

## 2025-05-04 RX ADMIN — CEFTRIAXONE 2000 MG: 2 INJECTION, POWDER, FOR SOLUTION INTRAMUSCULAR; INTRAVENOUS at 21:07

## 2025-05-04 RX ADMIN — MUPIROCIN 1 APPLICATION: 20 OINTMENT TOPICAL at 21:07

## 2025-05-04 NOTE — ED TRIAGE NOTES
Pt to triage from home via [facility] [id number] with c/o multiple falls. Pt states fell about 6 times today. Pt seen here on 4/26 for falls and nasal fracture. Pt still with bruising to left side of face. Pt states he hurt his back when he fell today, did not hit head. Pt on xarelto.

## 2025-05-05 ENCOUNTER — APPOINTMENT (OUTPATIENT)
Dept: GENERAL RADIOLOGY | Facility: HOSPITAL | Age: 80
End: 2025-05-05
Payer: MEDICARE

## 2025-05-05 PROBLEM — N13.30 HYDRONEPHROSIS: Status: ACTIVE | Noted: 2025-05-05

## 2025-05-05 PROBLEM — S00.83XA FACIAL BRUISING, INITIAL ENCOUNTER: Status: ACTIVE | Noted: 2025-05-05

## 2025-05-05 PROBLEM — N32.89 DISTENDED BLADDER: Status: ACTIVE | Noted: 2025-05-05
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
## 2025-05-05 PROBLEM — M54.9 BACK PAIN: Status: ACTIVE | Noted: 2025-05-05

## 2025-05-05 LAB
ANION GAP SERPL CALCULATED.3IONS-SCNC: 8.2 MMOL/L (ref 5–15)
BUN SERPL-MCNC: 15 MG/DL (ref 8–23)
BUN/CREAT SERPL: 18.5 (ref 7–25)
CALCIUM SPEC-SCNC: 8.5 MG/DL (ref 8.6–10.5)
CHLORIDE SERPL-SCNC: 101 MMOL/L (ref 98–107)
CO2 SERPL-SCNC: 27.8 MMOL/L (ref 22–29)
CREAT SERPL-MCNC: 0.81 MG/DL (ref 0.76–1.27)
DEPRECATED RDW RBC AUTO: 47.1 FL (ref 37–54)
EGFRCR SERPLBLD CKD-EPI 2021: 90.2 ML/MIN/1.73
ERYTHROCYTE [DISTWIDTH] IN BLOOD BY AUTOMATED COUNT: 13.5 % (ref 12.3–15.4)
GLUCOSE BLDC GLUCOMTR-MCNC: 103 MG/DL (ref 70–130)
GLUCOSE BLDC GLUCOMTR-MCNC: 109 MG/DL (ref 70–130)
GLUCOSE BLDC GLUCOMTR-MCNC: 119 MG/DL (ref 70–130)
GLUCOSE BLDC GLUCOMTR-MCNC: 94 MG/DL (ref 70–130)
GLUCOSE SERPL-MCNC: 94 MG/DL (ref 65–99)
HCT VFR BLD AUTO: 35 % (ref 37.5–51)
HGB BLD-MCNC: 11.7 G/DL (ref 13–17.7)
MCH RBC QN AUTO: 31.9 PG (ref 26.6–33)
MCHC RBC AUTO-ENTMCNC: 33.4 G/DL (ref 31.5–35.7)
MCV RBC AUTO: 95.4 FL (ref 79–97)
PLATELET # BLD AUTO: 232 10*3/MM3 (ref 140–450)
PMV BLD AUTO: 9.3 FL (ref 6–12)
POTASSIUM SERPL-SCNC: 3.7 MMOL/L (ref 3.5–5.2)
QT INTERVAL: 424 MS
QTC INTERVAL: 465 MS
RBC # BLD AUTO: 3.67 10*6/MM3 (ref 4.14–5.8)
SODIUM SERPL-SCNC: 137 MMOL/L (ref 136–145)
WBC NRBC COR # BLD AUTO: 6.26 10*3/MM3 (ref 3.4–10.8)

## 2025-05-05 PROCEDURE — 85027 COMPLETE CBC AUTOMATED: CPT | Performed by: NURSE PRACTITIONER

## 2025-05-05 PROCEDURE — 82948 REAGENT STRIP/BLOOD GLUCOSE: CPT

## 2025-05-05 PROCEDURE — G0378 HOSPITAL OBSERVATION PER HR: HCPCS

## 2025-05-05 PROCEDURE — 72070 X-RAY EXAM THORAC SPINE 2VWS: CPT

## 2025-05-05 PROCEDURE — 80048 BASIC METABOLIC PNL TOTAL CA: CPT | Performed by: NURSE PRACTITIONER

## 2025-05-05 PROCEDURE — 25010000002 CEFTRIAXONE PER 250 MG: Performed by: NURSE PRACTITIONER

## 2025-05-05 PROCEDURE — 72100 X-RAY EXAM L-S SPINE 2/3 VWS: CPT

## 2025-05-05 RX ORDER — ONDANSETRON 2 MG/ML
4 INJECTION INTRAMUSCULAR; INTRAVENOUS EVERY 6 HOURS PRN
Status: DISCONTINUED | OUTPATIENT
Start: 2025-05-05 | End: 2025-05-07 | Stop reason: HOSPADM

## 2025-05-05 RX ORDER — FERROUS SULFATE 325(65) MG
325 TABLET ORAL
Status: DISCONTINUED | OUTPATIENT
Start: 2025-05-05 | End: 2025-05-07 | Stop reason: HOSPADM

## 2025-05-05 RX ORDER — CLOPIDOGREL BISULFATE 75 MG/1
75 TABLET ORAL DAILY
COMMUNITY

## 2025-05-05 RX ORDER — CLOPIDOGREL BISULFATE 75 MG/1
75 TABLET ORAL DAILY
Status: DISCONTINUED | OUTPATIENT
Start: 2025-05-05 | End: 2025-05-07 | Stop reason: HOSPADM

## 2025-05-05 RX ORDER — CARVEDILOL 3.12 MG/1
3.12 TABLET ORAL 2 TIMES DAILY
Status: DISCONTINUED | OUTPATIENT
Start: 2025-05-05 | End: 2025-05-07 | Stop reason: HOSPADM

## 2025-05-05 RX ORDER — DEXTROSE MONOHYDRATE 25 G/50ML
25 INJECTION, SOLUTION INTRAVENOUS
Status: DISCONTINUED | OUTPATIENT
Start: 2025-05-05 | End: 2025-05-07 | Stop reason: HOSPADM

## 2025-05-05 RX ORDER — FUROSEMIDE 20 MG/1
20 TABLET ORAL NIGHTLY
Status: DISCONTINUED | OUTPATIENT
Start: 2025-05-05 | End: 2025-05-07 | Stop reason: HOSPADM

## 2025-05-05 RX ORDER — SODIUM CHLORIDE 0.9 % (FLUSH) 0.9 %
10 SYRINGE (ML) INJECTION AS NEEDED
Status: DISCONTINUED | OUTPATIENT
Start: 2025-05-05 | End: 2025-05-07 | Stop reason: HOSPADM

## 2025-05-05 RX ORDER — BISACODYL 5 MG/1
5 TABLET, DELAYED RELEASE ORAL DAILY PRN
Status: DISCONTINUED | OUTPATIENT
Start: 2025-05-05 | End: 2025-05-07 | Stop reason: HOSPADM

## 2025-05-05 RX ORDER — ONDANSETRON 4 MG/1
4 TABLET, ORALLY DISINTEGRATING ORAL EVERY 6 HOURS PRN
Status: DISCONTINUED | OUTPATIENT
Start: 2025-05-05 | End: 2025-05-07 | Stop reason: HOSPADM

## 2025-05-05 RX ORDER — OXYBUTYNIN CHLORIDE 5 MG/1
5 TABLET, EXTENDED RELEASE ORAL DAILY
Status: DISCONTINUED | OUTPATIENT
Start: 2025-05-05 | End: 2025-05-05

## 2025-05-05 RX ORDER — POLYETHYLENE GLYCOL 3350 17 G/17G
17 POWDER, FOR SOLUTION ORAL DAILY PRN
Status: DISCONTINUED | OUTPATIENT
Start: 2025-05-05 | End: 2025-05-07 | Stop reason: HOSPADM

## 2025-05-05 RX ORDER — ALUMINA, MAGNESIA, AND SIMETHICONE 2400; 2400; 240 MG/30ML; MG/30ML; MG/30ML
15 SUSPENSION ORAL EVERY 6 HOURS PRN
Status: DISCONTINUED | OUTPATIENT
Start: 2025-05-05 | End: 2025-05-07 | Stop reason: HOSPADM

## 2025-05-05 RX ORDER — LEVOTHYROXINE SODIUM 50 UG/1
50 TABLET ORAL
Status: DISCONTINUED | OUTPATIENT
Start: 2025-05-05 | End: 2025-05-07 | Stop reason: HOSPADM

## 2025-05-05 RX ORDER — DIVALPROEX SODIUM 500 MG/1
500 TABLET, DELAYED RELEASE ORAL 2 TIMES DAILY
Status: DISCONTINUED | OUTPATIENT
Start: 2025-05-05 | End: 2025-05-07 | Stop reason: HOSPADM

## 2025-05-05 RX ORDER — SPIRONOLACTONE 25 MG/1
12.5 TABLET ORAL DAILY
Status: DISCONTINUED | OUTPATIENT
Start: 2025-05-05 | End: 2025-05-07 | Stop reason: HOSPADM

## 2025-05-05 RX ORDER — PANTOPRAZOLE SODIUM 40 MG/1
40 TABLET, DELAYED RELEASE ORAL
Status: DISCONTINUED | OUTPATIENT
Start: 2025-05-06 | End: 2025-05-07 | Stop reason: HOSPADM

## 2025-05-05 RX ORDER — ATORVASTATIN CALCIUM 20 MG/1
40 TABLET, FILM COATED ORAL NIGHTLY
Status: DISCONTINUED | OUTPATIENT
Start: 2025-05-05 | End: 2025-05-07 | Stop reason: HOSPADM

## 2025-05-05 RX ORDER — ACETAMINOPHEN 160 MG/5ML
650 SOLUTION ORAL EVERY 4 HOURS PRN
Status: DISCONTINUED | OUTPATIENT
Start: 2025-05-05 | End: 2025-05-07 | Stop reason: HOSPADM

## 2025-05-05 RX ORDER — BISACODYL 10 MG
10 SUPPOSITORY, RECTAL RECTAL DAILY PRN
Status: DISCONTINUED | OUTPATIENT
Start: 2025-05-05 | End: 2025-05-07 | Stop reason: HOSPADM

## 2025-05-05 RX ORDER — ACETAMINOPHEN 325 MG/1
650 TABLET ORAL EVERY 4 HOURS PRN
Status: DISCONTINUED | OUTPATIENT
Start: 2025-05-05 | End: 2025-05-07 | Stop reason: HOSPADM

## 2025-05-05 RX ORDER — FUROSEMIDE 40 MG/1
40 TABLET ORAL
Status: DISCONTINUED | OUTPATIENT
Start: 2025-05-06 | End: 2025-05-07 | Stop reason: HOSPADM

## 2025-05-05 RX ORDER — FOLIC ACID 1 MG/1
1 TABLET ORAL DAILY
Status: DISCONTINUED | OUTPATIENT
Start: 2025-05-05 | End: 2025-05-07 | Stop reason: HOSPADM

## 2025-05-05 RX ORDER — NICOTINE POLACRILEX 4 MG
15 LOZENGE BUCCAL
Status: DISCONTINUED | OUTPATIENT
Start: 2025-05-05 | End: 2025-05-07 | Stop reason: HOSPADM

## 2025-05-05 RX ORDER — IBUPROFEN 600 MG/1
1 TABLET ORAL
Status: DISCONTINUED | OUTPATIENT
Start: 2025-05-05 | End: 2025-05-07 | Stop reason: HOSPADM

## 2025-05-05 RX ORDER — MULTIVITAMIN WITH IRON
1000 TABLET ORAL DAILY
Status: DISCONTINUED | OUTPATIENT
Start: 2025-05-05 | End: 2025-05-07 | Stop reason: HOSPADM

## 2025-05-05 RX ORDER — ROPINIROLE 0.5 MG/1
1 TABLET, FILM COATED ORAL 3 TIMES DAILY
Status: DISCONTINUED | OUTPATIENT
Start: 2025-05-05 | End: 2025-05-06

## 2025-05-05 RX ORDER — INSULIN LISPRO 100 [IU]/ML
2-9 INJECTION, SOLUTION INTRAVENOUS; SUBCUTANEOUS
Status: DISCONTINUED | OUTPATIENT
Start: 2025-05-05 | End: 2025-05-07 | Stop reason: HOSPADM

## 2025-05-05 RX ORDER — TAMSULOSIN HYDROCHLORIDE 0.4 MG/1
0.8 CAPSULE ORAL DAILY
Status: DISCONTINUED | OUTPATIENT
Start: 2025-05-05 | End: 2025-05-07 | Stop reason: HOSPADM

## 2025-05-05 RX ORDER — BUDESONIDE AND FORMOTEROL FUMARATE DIHYDRATE 160; 4.5 UG/1; UG/1
2 AEROSOL RESPIRATORY (INHALATION)
Status: DISCONTINUED | OUTPATIENT
Start: 2025-05-05 | End: 2025-05-07 | Stop reason: HOSPADM

## 2025-05-05 RX ORDER — ACETAMINOPHEN 650 MG/1
650 SUPPOSITORY RECTAL EVERY 4 HOURS PRN
Status: DISCONTINUED | OUTPATIENT
Start: 2025-05-05 | End: 2025-05-07 | Stop reason: HOSPADM

## 2025-05-05 RX ORDER — AMOXICILLIN 250 MG
2 CAPSULE ORAL 2 TIMES DAILY PRN
Status: DISCONTINUED | OUTPATIENT
Start: 2025-05-05 | End: 2025-05-07 | Stop reason: HOSPADM

## 2025-05-05 RX ADMIN — EMPAGLIFLOZIN 10 MG: 10 TABLET, FILM COATED ORAL at 14:13

## 2025-05-05 RX ADMIN — Medication 10 ML: at 20:54

## 2025-05-05 RX ADMIN — RIVAROXABAN 20 MG: 20 TABLET, FILM COATED ORAL at 14:13

## 2025-05-05 RX ADMIN — CEFTRIAXONE SODIUM 2000 MG: 2 INJECTION, POWDER, FOR SOLUTION INTRAMUSCULAR; INTRAVENOUS at 11:51

## 2025-05-05 RX ADMIN — CARVEDILOL 3.12 MG: 3.12 TABLET, FILM COATED ORAL at 20:55

## 2025-05-05 RX ADMIN — ROPINIROLE HYDROCHLORIDE 1 MG: 0.5 TABLET, FILM COATED ORAL at 20:55

## 2025-05-05 RX ADMIN — FOLIC ACID 1 MG: 1 TABLET ORAL at 14:13

## 2025-05-05 RX ADMIN — LEVOTHYROXINE SODIUM 50 MCG: 50 TABLET ORAL at 14:12

## 2025-05-05 RX ADMIN — Medication 1000 MCG: at 14:13

## 2025-05-05 RX ADMIN — ROPINIROLE HYDROCHLORIDE 1 MG: 0.5 TABLET, FILM COATED ORAL at 17:18

## 2025-05-05 RX ADMIN — FUROSEMIDE 20 MG: 20 TABLET ORAL at 20:56

## 2025-05-05 RX ADMIN — SPIRONOLACTONE 12.5 MG: 25 TABLET ORAL at 14:13

## 2025-05-05 RX ADMIN — TAMSULOSIN HYDROCHLORIDE 0.8 MG: 0.4 CAPSULE ORAL at 14:12

## 2025-05-05 RX ADMIN — ATORVASTATIN CALCIUM 40 MG: 20 TABLET, FILM COATED ORAL at 20:56

## 2025-05-05 RX ADMIN — CLOPIDOGREL BISULFATE 75 MG: 75 TABLET, FILM COATED ORAL at 14:13

## 2025-05-05 RX ADMIN — FERROUS SULFATE TAB 325 MG (65 MG ELEMENTAL FE) 325 MG: 325 (65 FE) TAB at 14:13

## 2025-05-05 RX ADMIN — DIVALPROEX SODIUM 500 MG: 500 TABLET, DELAYED RELEASE ORAL at 20:55

## 2025-05-05 NOTE — PLAN OF CARE
Problem: Fall Injury Risk  Goal: Absence of Fall and Fall-Related Injury  Outcome: Progressing  Intervention: Promote Injury-Free Environment  Description: Provide a safe, barrier-free environment that encourages independent activity.Keep care area uncluttered and well-lighted.Determine need for increased observation or monitoring.Avoid use of devices that minimize mobility, such as restraints or indwelling urinary catheter.  Recent Flowsheet Documentation  Taken 5/5/2025 1800 by César Vaughn RN  Safety Promotion/Fall Prevention:   assistive device/personal items within reach   clutter free environment maintained   safety round/check completed  Taken 5/5/2025 1600 by César Vaughn RN  Safety Promotion/Fall Prevention:   assistive device/personal items within reach   clutter free environment maintained   safety round/check completed  Taken 5/5/2025 1400 by César Vaughn RN  Safety Promotion/Fall Prevention:   assistive device/personal items within reach   clutter free environment maintained   safety round/check completed  Taken 5/5/2025 1200 by César Vaughn RN  Safety Promotion/Fall Prevention:   assistive device/personal items within reach   clutter free environment maintained   safety round/check completed  Taken 5/5/2025 1000 by César Vaughn RN  Safety Promotion/Fall Prevention:   assistive device/personal items within reach   clutter free environment maintained   safety round/check completed  Taken 5/5/2025 0800 by César Vaughn RN  Safety Promotion/Fall Prevention:   assistive device/personal items within reach   clutter free environment maintained   safety round/check completed   Goal Outcome Evaluation:

## 2025-05-05 NOTE — PLAN OF CARE
Problem: Adult Inpatient Plan of Care  Goal: Plan of Care Review  Outcome: Progressing  Goal: Patient-Specific Goal (Individualized)  Outcome: Progressing  Goal: Absence of Hospital-Acquired Illness or Injury  Outcome: Progressing  Intervention: Identify and Manage Fall Risk  Recent Flowsheet Documentation  Taken 5/5/2025 0047 by Gavin Hanson RN  Safety Promotion/Fall Prevention:   activity supervised   safety round/check completed  Intervention: Prevent Skin Injury  Recent Flowsheet Documentation  Taken 5/5/2025 0047 by Gavin Hanson RN  Body Position: supine  Intervention: Prevent Infection  Recent Flowsheet Documentation  Taken 5/5/2025 0047 by Gavin Hanson RN  Infection Prevention: environmental surveillance performed  Goal: Optimal Comfort and Wellbeing  Outcome: Progressing  Intervention: Monitor Pain and Promote Comfort  Recent Flowsheet Documentation  Taken 5/5/2025 0047 by Gavin Hanson RN  Pain Management Interventions:   care clustered   pillow support provided   position adjusted   quiet environment facilitated  Intervention: Provide Person-Centered Care  Recent Flowsheet Documentation  Taken 5/5/2025 0047 by Gavin Hanson RN  Trust Relationship/Rapport:   care explained   choices provided   questions answered   questions encouraged   reassurance provided  Goal: Readiness for Transition of Care  Outcome: Progressing  Intervention: Mutually Develop Transition Plan  Recent Flowsheet Documentation  Taken 5/5/2025 0058 by Gavin Hanson RN  Transportation Anticipated: health plan transportation  Patient/Family Anticipated Services at Transition:   Patient/Family Anticipates Transition to: home with help/services  Taken 5/5/2025 0052 by Gavin Hanson RN  Equipment Currently Used at Home:   wheelchair   walker, standard     Problem: Fall Injury Risk  Goal: Absence of Fall and Fall-Related Injury  Outcome: Progressing  Intervention: Promote Injury-Free Environment  Recent  Flowsheet Documentation  Taken 5/5/2025 0047 by Gavin Hanson, RN  Safety Promotion/Fall Prevention:   activity supervised   safety round/check completed     Problem: Skin Injury Risk Increased  Goal: Skin Health and Integrity  Outcome: Progressing  Intervention: Optimize Skin Protection  Recent Flowsheet Documentation  Taken 5/5/2025 0047 by Gavin Hanson, RN  Head of Bed (HOB) Positioning: HOB at 20 degrees   Goal Outcome Evaluation:

## 2025-05-05 NOTE — ED PROVIDER NOTES
EMERGENCY DEPARTMENT ENCOUNTER  Room Number:  33/33  PCP: Milo Carrasquillo DO  Independent Historians: Patient and nursing triage report      HPI:  Chief Complaint: Multiple falls    A complete HPI/ROS/PMH/PSH/SH/FH are unobtainable due to: None    Chronic or social conditions impacting patient care (Social Determinants of Health): None      Context: Hernando Lozada is a 80 y.o. male with a medical history of sleep apnea, diabetes, hyperlipidemia, DVT, atrial fibrillation, CKD, and subdural hematoma who presents to the ED c/o acute recurrent falls, lower extremity edema, and generalized weakness.  Patient reports 6 falls today.  Patient seen recently in the ED for falls.  Patient complained of head, neck, back, and abdominal trauma today.  No nausea vomiting or dyspnea that is new or different.  Patient also reports injury to right lower extremity few days ago with increasing redness in that area.  No reported fevers.      Review of prior external notes (non-ED) -and- Review of prior external test results outside of this encounter:  Hospital discharge summary 2/13/2025 reviewed: Patient admitted for subdural hematoma sustained from a fall.  Patient was to come off of the Xarelto and Plavix until follow-up with neurosurgery in the office.  =============================  Home health medication records that accompany the patient appears that he is back on the Xarelto at this time.      PAST MEDICAL HISTORY  Active Ambulatory Problems     Diagnosis Date Noted    DJD (degenerative joint disease) of knee 12/14/2017    Primary hypertension 02/18/2019    SOB (shortness of breath) 02/18/2019    Leg swelling 02/18/2019    Hyperlipidemia LDL goal <100 08/23/2019    COVID-19 11/25/2022    Diabetes mellitus     GIOVANNY (obstructive sleep apnea)     Disease of thyroid gland     CKD (chronic kidney disease)     Hypomagnesemia     Chronic brain injury     Generalized weakness     CAD (coronary artery disease)     Pedal edema 03/06/2023     Tremor 03/06/2023    Elevated troponin 03/06/2023    Lower extremity cellulitis 03/06/2023    Tinea cruris 03/06/2023    Chronic deep vein thrombosis (DVT) of calf muscle vein of left lower extremity 03/07/2023    Aortic stenosis, severe 03/06/2023    Acute urinary tract infection 04/17/2023    Hypothyroidism 04/17/2023    Acute urinary retention 04/19/2023    Acute bacterial prostatitis 04/19/2023    Chest pain 06/25/2024    Recurrent falls 08/08/2024    Hypokalemia 08/09/2024    Acute on chronic systolic CHF (congestive heart failure) 10/06/2024    Acute on chronic diastolic heart failure 10/06/2024    Acute on chronic diastolic CHF (congestive heart failure) 10/07/2024    Atrial fibrillation, persistent 10/08/2024    Chronic combined systolic and diastolic congestive heart failure 10/11/2024    Anemia 10/31/2024    Chronic anticoagulation 01/16/2025    Mycoplasma pneumonia 02/10/2025    Viral URI 02/10/2025     Resolved Ambulatory Problems     Diagnosis Date Noted    Chest pain with high risk of acute coronary syndrome 04/29/2019    Chest pain 01/02/2024    Chest pain 01/04/2024    SDH (subdural hematoma) 02/09/2025     Past Medical History:   Diagnosis Date    A-fib     Aortic stenosis     Arthritis     Bilateral leg edema     Diastolic heart failure     GERD (gastroesophageal reflux disease)     Heart murmur     History of head injury     [place] hearing)     Hyperlipidemia     Hypertension     Irregular heart beat     Osteoarthritis     Past heart attack     SOB (shortness of breath) on exertion     Stroke     Vasovagal episode          PAST SURGICAL HISTORY  Past Surgical History:   Procedure Laterality Date    CARDIAC CATHETERIZATION N/A 4/30/2019    Procedure: Coronary angiography with grafts;  Surgeon: Rio Miranda MD;  Location: [facility] CATH INVASIVE LOCATION;  Service: Cardiology    CARDIAC CATHETERIZATION N/A 4/30/2019    Procedure: Left Heart Cath;  Surgeon: Rio Miranda MD;   [facility];  Service: Cardiology    CARDIAC CATHETERIZATION N/A 4/30/2019    Procedure: Left ventriculography;  Surgeon: Rio Miranda MD;  [facility];  Service: Cardiology    CARDIAC CATHETERIZATION  4/30/2019    Procedure: Saphenous Vein Graft;  Surgeon: Rio Miranda MD;  [facility];  Service: Cardiology    CARDIAC CATHETERIZATION N/A 4/30/2019    Procedure: Stent GLENDY coronary;  Surgeon: Rio Miranda MD;  Location: [facility] CATH INVASIVE LOCATION;  Service: Cardiology    CARDIAC CATHETERIZATION N/A 3/10/2023    Procedure: Right and Left Heart Cath;  Surgeon: Rio Miranda MD;  [facility];  Service: Cardiology;  Laterality: N/A;    CARDIAC CATHETERIZATION N/A 3/10/2023    Procedure: Coronary angiography;  Surgeon: Rio Miranda MD;  Location: [facility] CATH INVASIVE LOCATION;  Service: Cardiology;  Laterality: N/A;    CARDIAC CATHETERIZATION N/A 3/10/2023    Procedure: Left ventriculography;  Surgeon: Rio Miranda MD;  Location: [facility] CATH INVASIVE LOCATION;  Service: Cardiology;  Laterality: N/A;    CARDIAC CATHETERIZATION N/A 3/10/2023    Procedure: Percutaneous Coronary Intervention;  Surgeon: Rio Miranda MD;  Location: [facility] CATH INVASIVE LOCATION;  Service: Cardiology;  Laterality: N/A;    CARDIAC CATHETERIZATION N/A 3/10/2023    Procedure: Stent GLENDY coronary;  Surgeon: Rio Miranda MD;  Location: [facility] CATH INVASIVE LOCATION;  Service: Cardiology;  Laterality: N/A;    CARDIAC CATHETERIZATION N/A 1/3/2024    Procedure: Left Heart Cath;  Surgeon: Rio Miranda MD;  Location: [facility] CATH INVASIVE LOCATION;  Service: Cardiovascular;  Laterality: N/A;    CARDIAC CATHETERIZATION N/A 1/3/2024    Procedure: Coronary angiography;  Surgeon: Rio Miranda MD;  Location: [facility] CATH INVASIVE LOCATION;  Service:  Cardiovascular;  Laterality: N/A;    CARDIAC CATHETERIZATION N/A 1/3/2024    Procedure: Percutaneous Coronary Intervention;  Surgeon: Rio Miranda MD;  Location:  [facility]ATH INVAS[facility];  Service: Cardiovascular;  Laterality: N/A;    CARDIAC CATHETERIZATION N/A 1/3/2024    Procedure: Left ventriculography;  Surgeon: Rio Miranda MD;  Location:  [facility] CATH INVASIVE LOC[facility];  Service: Cardiovascular;  Laterality: N/A;    CARDIAC CATHETERIZATION Left 1/3/2024    Procedure: Native mammary injection;  Surgeon: Rio Miranda MD;  Location:  [facility] CATH INVASIVE LOC[facility];  Service: Cardiovascular;  Laterality: Left;    CARDIAC CATHETERIZATION N/A 6/26/2024    Procedure: Right and Left Heart Cath;  Surgeon: Rio Miranda MD;  Location:  [facility] CATH INVASIVE [facility];  Service: Cardiovascular;  Laterality: N/A;    CARDIAC CATHETERIZATION N/A 6/26/2024    Procedure: Percutaneous Coronary Intervention;  Surgeon: Rio Miranda MD;  [facility];  Service: Cardiovascular;  Laterality: N/A;    CARDIAC CATHETERIZATION N/A 6/26/2024    Procedure: Left ventriculography;  Surgeon: Rio Miranda MD;  [facility];  Service: Cardiovascular;  Laterality: N/A;    CARDIAC CATHETERIZATION N/A 6/26/2024    Procedure: Coronary angiography;  Surgeon: Rio Miranda MD;  [facility];  Service: Cardiovascular;  Laterality: N/A;    CARDIAC CATHETERIZATION  6/26/2024    Procedure: Saphenous Vein Graft;  Surgeon: Rio Miranda MD;  [facility];  Service: Cardiovascular;;    CARDIAC CATHETERIZATION N/A 6/26/2024    Procedure: Stent GLENDY coronary;  Surgeon: Rio Miranda MD;  [facility];  Service: Cardiovascular;  Laterality: N/A;    CARPAL TUNNEL RELEASE Bilateral     CATARACT EXTRACTION      CORONARY ARTERY BYPASS GRAFT  2002     FINGER SURGERY      MISSING LEFT POINTER FINGER TIP    INTERVENTIONAL RADIOLOGY PROCEDURE N/A 6/26/2024    Procedure: Intravascular Ultrasound;  Surgeon: Rio Miranda MD;  Location:  [facility] CATH INVASIVE LOC[facility];  Service: Cardiovascular;  Laterality: N/A;    KNEE ARTHROPLASTY Right 02/15/2017    OK ARTHRP KNE CONDYLE&PLATU MEDIAL&LAT COMPARTMENTS Left 12/14/2017    Procedure: LT TOTAL KNEE ARTHROPLASTY;  Surgeon: Jatin Lancaster MD;  Location:  [facility];  Service: Orthopedics    OK RT/LT HEART CATHETERS N/A 4/30/2019    Procedure: Percutaneous Coronary Intervention;  Surgeon: Rio Miranda MD;  Location:  [facility] CATH INVASIVE [facility];  Service: Cardiology         FAMILY HISTORY  Family History   Problem Relation Age of Onset    Parkinsonism Sister     Diabetes Brother     Malig Hyperthermia Neg Hx          SOCIAL HISTORY  Social History     Socioeconomic History    Marital status: Single   Tobacco Use    Smoking status: Never     Passive exposure: Never    Smokeless tobacco: Never   Vaping Use    Vaping status: Never Used   Substance and Sexual Activity    Alcohol use: No    Drug use: No    Sexual activity: Defer         ALLERGIES  Patient has no known allergies.      REVIEW OF SYSTEMS  Review of Systems  Included in HPI  All systems reviewed and negative except for those discussed in HPI.      PHYSICAL EXAM    I have reviewed the triage vital signs and nursing notes.    ED Triage Vitals [05/04/25 1957]   Temp Heart Rate Resp BP SpO2   97.9 °F (36.6 °C) 72 18 133/74 96 %      Temp src Heart Rate Source Patient Position BP Location FiO2 (%)   Oral Monitor Sitting Left arm --       Physical Exam    Physical Exam   Constitutional: Acute on chronically ill-appearing.  No acute distress  HENT:  Head: Subacute ecchymosis of the face and forehead.  No depressed skull fracture.  Oropharynx: Mucous membranes are moist.   Eyes: . No scleral icterus. No conjunctival pallor.  Subconjunctival  hemorrhage noted bilaterally.  Neck: Normal range of motion. Neck supple.   Cardiovascular: Pink warm and well perfused throughout.  Irregularly irregular  Pulmonary/Chest: No respiratory distress.  No tachypnea or increased work of breathing appreciated.  Speaks in full sentences  Abdominal: Soft. There is no tenderness. There is no rebound and no guarding.  No peritoneal findings but some acute and subacute ecchymosis to the abdominal wall and posterior thorax noted.  Musculoskeletal: Moves all extremities equally.  Numerous ecchymosis.  Erythema with induration right lower extremity with a skin tear that appears to be the source of cellulitis.  3+ pitting edema bilateral lower extremities.  Neurological: Alert and oriented.  No acute focal deficit appreciated.  Skin: Skin is pink, warm, and dry.   Psychiatric: Mood and affect normal.   Nursing note and vitals reviewed.             LAB RESULTS  Recent Results (from the past 24 hours)   ECG 12 Lead Tachycardia    Collection Time: 05/04/25  8:25 PM   Result Value Ref Range    QT Interval 424 ms    QTC Interval 465 ms         RADIOLOGY  No Radiology Exams Resulted Within Past 24 Hours      MEDICATIONS GIVEN IN ER  Medications   sodium chloride 0.9 % flush 10 mL (has no administration in time range)   cefTRIAXone (ROCEPHIN) 2,000 mg in sodium chloride 0.9 % 100 mL MBP (has no administration in time range)         ORDERS PLACED DURING THIS VISIT:  Orders Placed This Encounter   Procedures    Blood Culture - Blood,    Blood Culture - Blood,    XR Chest 1 View    CT Head Without Contrast    CT Cervical Spine Without Contrast    CT Chest Without Contrast Diagnostic    CT Abdomen Pelvis Without Contrast    [place]o Draw    Comprehensive Metabolic Panel    High Sensitivity Troponin T    Magnesium    Urinalysis With Microscopic If Indicated (No Culture) - Urine, Clean Catch    CBC Auto Differential    Protime-INR    aPTT    Valproic Acid Level, Total    CK    Lactic Acid,  Plasma    Procalcitonin    BNP    NPO Diet NPO Type: Strict NPO    Undress & Gown    Continuous Pulse Oximetry    Vital Signs    Orthostatic Blood Pressure    Oxygen Therapy- Nasal Cannula; Titrate 1-6 LPM Per SpO2; 90 - 95%    POC Glucose Once    ECG 12 Lead Tachycardia    Insert Peripheral IV    Fall Precautions    CBC & Differential    Green Top (Gel)    Lavender Top    Gold Top - SST    Light Blue Top         OUTPATIENT MEDICATION MANAGEMENT:  Current Facility-Administered Medications Ordered in Epic   Medication Dose Route Frequency Provider Last Rate Last Admin    cefTRIAXone (ROCEPHIN) 2,000 mg in sodium chloride 0.9 % 100 mL MBP  2,000 mg Intravenous Once René Holley MD        sodium chloride 0.9 % flush 10 mL  10 mL Intravenous PRN René Holley MD         Current Outpatient Medications Ordered in Epic   Medication Sig Dispense Refill    acetaminophen (TYLENOL) 325 MG tablet Take 2 tablets every 6 hours by oral route as needed.      aspirin 81 MG EC tablet Take 1 tablet by mouth Daily.      atorvastatin (LIPITOR) 40 MG tablet Take 1 tablet by mouth Every Night.      carvedilol (COREG) 3.125 MG tablet Take 1 tablet by mouth 2 (Two) Times a Day. 180 tablet 3    divalproex (DEPAKOTE) 500 MG DR tablet Take 1 tablet by mouth 2 (Two) Times a Day.      docusate sodium (COLACE) 100 MG capsule Take 1 capsule by mouth 2 (Two) Times a Day. 28 capsule 0    empagliflozin (JARDIANCE) 10 MG tablet tablet Take 1 tablet by mouth Daily.      ferrous sulfate 325 (65 FE) MG tablet Take 1 tablet by mouth Daily With Breakfast.      Fluticasone-Salmeterol (ADVAIR/WIXELA) 100-50 MCG/ACT DISKUS Inhale 2 (Two) Times a Day.      folic acid (FOLVITE) 1 MG tablet Take 1 tablet by mouth Daily.      furosemide (LASIX) 20 MG tablet Take 1 tablet by mouth Every Night.      furosemide (LASIX) 40 MG tablet Take 1 tablet by mouth Every Morning.      levothyroxine (SYNTHROID, LEVOTHROID) 50 MCG tablet Take 1 tablet by mouth Daily. 90  tablet 1    loperamide (IMODIUM) 2 MG capsule TAKE (1) CAPSULE BY MOUTH EVERY SIX HOURS AS NEEDED FOR DIARRHEA      Melatonin 10 MG tablet Take 1 tablet by mouth Every Night.      omeprazole (priLOSEC) 40 MG capsule Take 1 capsule by mouth Every Evening.      oxybutynin XL (DITROPAN-XL) 5 MG 24 hr tablet Take 1 tablet by mouth Daily.      rOPINIRole (REQUIP) 1 MG tablet Take 1 tablet by mouth 3 (Three) Times a Day. Take 1 hour before bedtime.      spironolactone (ALDACTONE) 25 MG tablet Take 0.5 tablets by mouth Daily. 45 tablet 3    tamsulosin (FLOMAX) 0.4 MG capsule 24 hr capsule Take 2 capsules by mouth Daily. 30 capsule     vitamin B-12 (VITAMIN B-12) 1000 MCG tablet Take 1 tablet by mouth Daily.           PROCEDURES  Procedures            PROGRESS, DATA ANALYSIS, CONSULTS, AND MEDICAL DECISION MAKING  All labs have been independently interpreted by me.  All radiology studies have been reviewed by me. All EKGs have been independently viewed and interpreted by me.  Discussion below represents my analysis of pertinent findings related to patient's condition, differential diagnosis, treatment plan and final disposition.    Differential diagnosis:   My differential diagnosis includes but is not limited to generalized weakness, electrolyte abnormality, CVA, TIA, Bell's palsy, acute MI, GI bleed, urinary tract infection, systemic infections including sepsis, alcohol abuse, drug abuse including prescription and street drug.      Clinical Scores:                  ED Course as of 05/04/25 2029   Sun May 04, 2025   2028 EKG           EKG time: 2025  Rhythm/Rate: Rate controlled A-fib, ventricular rate 71  P waves and ID: A-fib  QRS, axis: Narrow complex  ST and T waves: No STEMI; QTc within normal limits    Interpreted Contemporaneously by me, independently viewed     [RS]      ED Course User Index  [RS] René Holley MD         Prescription drug monitoring program review:     AS OF 20:29 EDT VITALS:    BP - 133/74  HR -  72  TEMP - 97.9 °F (36.6 °C) (Oral)  O2 SATS - 96%    COMPLEXITY OF CARE  The patient requires admission.      DIAGNOSIS  Final diagnoses:   Cellulitis of right leg   Multiple falls   Injury of head, initial encounter   Chronic anticoagulation   Atrial fibrillation, unspecified type   Bilateral lower extremity edema         DISPOSITION  ED Disposition       ED Disposition   Intended Admit    Condition   --    Comment   --                  ADMISSION    Discussed treatment plan and reason for admission with pt/family and admitting physician.  Pt/family voiced understanding of the plan for admission for further testing/treatment as needed.       Please note that portions of this document were completed with a voice recognition program.    Note Disclaimer: At [facility], we believe that sharing information builds trust and better relationships. You are receiving this note because you recently visited [facility]. It is possible you will see health information before a provider has talked with you about it. This kind of information can be easy to misunderstand. To help you fully understand what it means for your health, we urge you to discuss this note with your provider.         René Holley MD  05/04/25 2029     Yes

## 2025-05-05 NOTE — H&P
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 is no acute bony abnormality.       Impression:         Moderate distention of the urinary bladder, etiology uncertain. There is  secondary mild bilateral hydronephrosis.     Small bilateral pleural effusions, otherwise unremarkable.        This report was finalized on 5/4/2025 10:24 PM by Dr. Tomas Huynh M.D on Workstation: [id number]       CT Abdomen Pelvis Without Contrast [[id number]] Collected: 05/04/25 2215     Updated: 05/04/25 2227    Narrative:      CT CHEST, ABDOMEN & PELVIS WITH CONTRAST     INDICATION:   The patient is being seen in the ED for trauma and is determined to have  a high energy mechanism and/or high risk for missed injuries due to  presence of associated injuries or distracting pain. The patient will  need imaging of the chest, abdomen, and pelvis given diagnoses such as  pneumothorax, internal thoracic/abdominal hemorrhage with visceral  injury and rib or pelvic fractures cannot be excluded clinically. The  diagnostic  information gained in this study exceeds the estimated risk  of radiation induced injury at the time of order placement.  Mechanism of injury: Fall, chest and abdomen pain     TECHNIQUE:  CT of the chest, abdomen and pelvis without IV contrast. Coronal and  sagittal reconstructions were obtained.   Radiation dose reduction  techniques were utilized, including automated exposure control, and  exposure modulation based on body size.     COMPARISON:   None available.     FINDINGS:     Chest: There is no suspicious mediastinal adenopathy or other mass.    The thoracic aorta is normal in caliber. There is calcification of the  aortic valve plane, associated with, but not diagnostic of aortic  stenosis. There are coronary arterial calcifications. There are small  bilateral pleural effusions. But there is no acute pulmonary infiltrate.     Abdomen: The gallbladder has been removed, and the liver appears  otherwise normal.  The spleen and pancreas appear normal.  Both adrenal  glands are normal. There is mild bilateral hydronephrosis, but the right  kidney is otherwise unremarkable. There is a left renal upper pole 1 mm  nonobstructing calculus, but there is no ureterolithiasis. The aorta is  normal in caliber.       There is no abdominal or retroperitoneal adenopathy or other mass.   There is no abdominal or retroperitoneal inflammatory change, or  abnormal fluid or air collection.  There is no evidence of bowel  obstruction.       Pelvis:  The appendix is clearly identified, and is normal.  There is no  pelvic inflammatory change or other abnormal fluid collection.  There is  no pelvic adenopathy or other soft tissue mass.  There is no CT evidence  of hernia or bowel obstruction. The urinary bladder is moderately  distended. Mild bilateral hydronephrosis is likely secondary to bladder  distention.     There is no acute bony abnormality.       Impression:         Moderate distention of the urinary bladder, etiology  uncertain. There is  secondary mild bilateral hydronephrosis.     Small bilateral pleural effusions, otherwise unremarkable.        This report was finalized on 5/4/2025 10:24 PM by Dr. Tomas Huynh M.D on Workstation: [id number]       CT Head Without Contrast [[id number]] Collected: 05/04/25 2156     Updated: 05/04/25 2200    Narrative:      NON-CONTRAST CT HEAD & CT CERVICAL SPINE     REASON:  The patient is being seen in the ED for trauma and is  determined to have a high energy mechanism and/or high risk for missed  injuries due to presence of associated injuries or distracting pain. The  patient will need imaging of the head per the trauma protocol given  diagnoses such as intracranial hemorrhage cannot be excluded.    The  patient will need imaging of the cervical spine given diagnoses such as  cervical spine fracture cannot be excluded.  The diagnostic information  gained in this study exceeds the estimated risk of radiation induced  injury at the time of order placement.  Mechanism of injury: Fall from standing, neck pain and facial contusions     COMPARISON STUDIES: Head and cervical spine CT 4/26/2025.     TECHNIQUE:  Axial images were acquired from the skull base to vertex  without contrast, including multiplanar reformats, per standard  departmental protocol.   Routine cervical spine CT without contrast.  Multiplanar reformats were created. Radiation dose reduction techniques  were utilized, including automated exposure control, and exposure  modulation based on body size.     FINDINGS:     Head CT: There is no CT evidence of acute intracranial hemorrhage, mass,  or infarct. There is volume loss, but there is no evidence of  hydrocephalus or extra-axial fluid collection.  Brain parenchymal  density is within normal limits.     Skull base and calvarium show no acute abnormality, and the extracranial  soft tissues show no acute abnormality.     C-spine CT: Cervical spine alignment is within normal  limits.  There is  no fracture or other acute bony abnormality.  The paraspinous soft  tissues show no acute abnormality.       Impression:         Negative unenhanced head CT, no acute abnormality.      Negative cervical spine CT, no acute abnormality.           This report was finalized on 5/4/2025 9:57 PM by Dr. Tomas Huynh M.D  on Workstation: [id number]       CT Cervical Spine Without Contrast [[id number]] Collected: 05/04/25 2156     Updated: 05/04/25 2200    Narrative:      NON-CONTRAST CT HEAD & CT CERVICAL SPINE     REASON:  The patient is being seen in the ED for trauma and is  determined to have a high energy mechanism and/or high risk for missed  injuries due to presence of associated injuries or distracting pain. The  patient will need imaging of the head per the trauma protocol given  diagnoses such as intracranial hemorrhage cannot be excluded.    The  patient will need imaging of the cervical spine given diagnoses such as  cervical spine fracture cannot be excluded.  The diagnostic information  gained in this study exceeds the estimated risk of radiation induced  injury at the time of order placement.  Mechanism of injury: Fall from standing, neck pain and facial contusions     COMPARISON STUDIES: Head and cervical spine CT 4/26/2025.     TECHNIQUE:  Axial images were acquired from the skull base to vertex  without contrast, including multiplanar reformats, per standard  departmental protocol.   Routine cervical spine CT without contrast.  Multiplanar reformats were created. Radiation dose reduction techniques  were utilized, including automated exposure control, and exposure  modulation based on body size.     FINDINGS:     Head CT: There is no CT evidence of acute intracranial hemorrhage, mass,  or infarct. There is volume loss, but there is no evidence of  hydrocephalus or extra-axial fluid collection.  Brain parenchymal  density is within normal limits.     Skull base and calvarium show no  acute abnormality, and the extracranial  soft tissues show no acute abnormality.     C-spine CT: Cervical spine alignment is within normal limits.  There is  no fracture or other acute bony abnormality.  The paraspinous soft  tissues show no acute abnormality.       Impression:         Negative unenhanced head CT, no acute abnormality.      Negative cervical spine CT, no acute abnormality.           This report was finalized on 5/4/2025 9:57 PM by Dr. Tomas Huynh M.D  on Workstation: [id number]       XR Chest 1 View [[id number]] Collected: 05/04/25 2126     Updated: 05/04/25 2129    Narrative:      CXR ONE VIEW      HISTORY: Weak/Dizzy/[facility] triage protocol; L03.115-Cellulitis of right  lower limb; R29.6-Repeated falls; S09.90XA-Unspecified injury of head,  initial encounter; Z79.01-Long term (current) use of anticoagulants;  I48.91-Unspecified atrial fibrillation; R60.0-Localized edema     COMPARISON: 2/10/2025     TECHNIQUE: single portable AP       Impression:         Allowing for submaximal inspiratory result, heart size appears within  normal limits. Prior sternotomy and bypass.     There is a submaximal inspiratory result. Allowing for that, there is no  convincing evidence of infiltrate, effusion or pneumothorax.           This report was finalized on 5/4/2025 9:26 PM by Dr. Tomas Huynh M.D  on Workstation: [id number]               Results for orders placed during the hospital encounter of 01/15/25    Adult Transthoracic Echo Complete W/ Cont if Necessary Per Protocol    Interpretation Summary    Left ventricular systolic function is normal. Calculated left ventricular EF = 63.1% Left ventricular ejection fraction appears to be 61 - 65%.    The left ventricular cavity is borderline dilated.    Left ventricular diastolic function was indeterminate.    The left atrial cavity is moderately dilated.    The right atrial cavity is borderline dilated.    Moderate to severe aortic valve stenosis is  present.    Estimated right ventricular systolic pressure from tricuspid regurgitation is normal (<35 mmHg).      ECG 12 Lead Tachycardia   Final Result   HEART RATE=71  bpm   RR Srholczr=854  ms   LA Interval=  ms   P Horizontal Axis=  deg   P Front Axis=  deg   QRSD Prjqfqns=282  ms   QT Grjxbzbm=694  ms   ZGwT=229  ms   QRS Axis=-1  deg   T Wave Axis=85  deg   - ABNORMAL ECG -   Atrial fibrillation   LVH with secondary repolarization abnormality   When compared with ECG of 31-Oct-2024 13:56:30,   No significant change   Electronically Signed By: Robert Lockett ([facility]) 2025-05-05 07:32:19   Date and Time of Study:2025-05-04 20:25:46      Telemetry Scan   Final Result           Assessment/Plan     Active Hospital Problems    Diagnosis  POA    **Cellulitis of right leg [L03.115]  Yes    Accident due to mechanical fall without injury [W19.XXXA]  Yes    Hydronephrosis [N13.30]  Yes    Distended bladder [N32.89]  Yes    Facial bruising, initial encounter [S00.83XA]  Yes    Back pain [M54.9]  Yes    Chronic combined systolic and diastolic congestive heart failure [I50.42]  Yes      Resolved Hospital Problems   No resolved problems to display.       Mr. Lozada is a 80 y.o. non-smoker with a history of atrial fibrillation on chronic anticoagulation, hypertension, hyperlipidemia, GERD, diabetes mellitus, chronic combined systolic and diastolic congestive heart failure that presents to [facility] complaining of fall and back pain and also admits to not using his walker at that time while at home after reportedly tripping over a recliner with subsequent facial strike.      Facial bruising, initial encounter    Back pain    Accident due to mechanical fall without injury / recurrent falls  - Describes mechanical fall/tripped over a recliner at home  - CT head, cervical spine unremarkable for acute findings  - Ordering x-ray thoracic and lumbar spine for completeness  - symptom management as needed / avoid sedation  / continuous oximetry  - ordering orthostatic BP  - Falls precautions   - Consult therapies ? SNF      Cellulitis of right leg  - Less concern for cellulitis & more likely chronic venous stasis  - BC x 2 pending  - Discontinue empiric antibiotic for now given afebrile and absence of leukocytosis      Chronic combined systolic and diastolic congestive heart failure  - Plan to continue home regimen / diuretics monitor volume status and renal function      Hydronephrosis    Distended bladder  - Incidental finding on CT  - Denies urinary complaints / UA unremarkable with exception of glucosuria most likely due to Jardiance    I discussed the patient's findings and my recommendations with patient and nursing staff, & Dr. Millan.    VTE Prophylaxis - Xarelto (home med).  Code Status - Full code.       FAISAL Florentino  [facility]  05/05/25  13:39 EDT

## 2025-05-05 NOTE — ED PROVIDER NOTES
ED Course as of 05/04/25 2332   Sun May 04, 2025   2028 EKG           EKG time: 2025  Rhythm/Rate: Rate controlled A-fib, ventricular rate 71  P waves and MS: A-fib  QRS, axis: Narrow complex  ST and T waves: No STEMI; QTc within normal limits    Interpreted Contemporaneously by me, independently viewed     [RS]   2042 Joined patient's care team, pending labs, imaging, and disposition. [MP]   2329 I spoke with  with [facility].  Discussed patient presentation and ED findings.  She agrees to admit patient to a telemetry observation bed to the service of Dr. Millan. [MP]   2332 CT scan of head independently interpreted by me as no hemorrhage [MP]      ED Course User Index  [MP] Nurys Perales PA-C  [RS] René Holley MD Pleiss, Melanie M, PA-C  05/04/25 2332

## 2025-05-06 PROBLEM — R54 AGE-RELATED PHYSICAL DEBILITY: Status: ACTIVE | Noted: 2025-05-06

## 2025-05-06 PROBLEM — L03.90 CELLULITIS: Status: ACTIVE | Noted: 2025-05-06

## 2025-05-06 LAB
ANION GAP SERPL CALCULATED.3IONS-SCNC: 10 MMOL/L (ref 5–15)
BUN SERPL-MCNC: 14 MG/DL (ref 8–23)
BUN/CREAT SERPL: 13.5 (ref 7–25)
CALCIUM SPEC-SCNC: 8.7 MG/DL (ref 8.6–10.5)
CHLORIDE SERPL-SCNC: 98 MMOL/L (ref 98–107)
CO2 SERPL-SCNC: 28 MMOL/L (ref 22–29)
CREAT SERPL-MCNC: 1.04 MG/DL (ref 0.76–1.27)
DEPRECATED RDW RBC AUTO: 46.8 FL (ref 37–54)
EGFRCR SERPLBLD CKD-EPI 2021: 73.5 ML/MIN/1.73
ERYTHROCYTE [DISTWIDTH] IN BLOOD BY AUTOMATED COUNT: 13.6 % (ref 12.3–15.4)
GLUCOSE BLDC GLUCOMTR-MCNC: 109 MG/DL (ref 70–130)
GLUCOSE BLDC GLUCOMTR-MCNC: 115 MG/DL (ref 70–130)
GLUCOSE BLDC GLUCOMTR-MCNC: 163 MG/DL (ref 70–130)
GLUCOSE BLDC GLUCOMTR-MCNC: 217 MG/DL (ref 70–130)
GLUCOSE SERPL-MCNC: 98 MG/DL (ref 65–99)
HCT VFR BLD AUTO: 36.8 % (ref 37.5–51)
HGB BLD-MCNC: 12.5 G/DL (ref 13–17.7)
MCH RBC QN AUTO: 31.9 PG (ref 26.6–33)
MCHC RBC AUTO-ENTMCNC: 34 G/DL (ref 31.5–35.7)
MCV RBC AUTO: 93.9 FL (ref 79–97)
PLATELET # BLD AUTO: 255 10*3/MM3 (ref 140–450)
PMV BLD AUTO: 8.9 FL (ref 6–12)
POTASSIUM SERPL-SCNC: 3.6 MMOL/L (ref 3.5–5.2)
RBC # BLD AUTO: 3.92 10*6/MM3 (ref 4.14–5.8)
SODIUM SERPL-SCNC: 136 MMOL/L (ref 136–145)
WBC NRBC COR # BLD AUTO: 6.25 10*3/MM3 (ref 3.4–10.8)

## 2025-05-06 PROCEDURE — 94799 UNLISTED PULMONARY SVC/PX: CPT

## 2025-05-06 PROCEDURE — 63710000001 INSULIN LISPRO (HUMAN) PER 5 UNITS: Performed by: NURSE PRACTITIONER

## 2025-05-06 PROCEDURE — 82948 REAGENT STRIP/BLOOD GLUCOSE: CPT

## 2025-05-06 PROCEDURE — 80048 BASIC METABOLIC PNL TOTAL CA: CPT | Performed by: NURSE PRACTITIONER

## 2025-05-06 PROCEDURE — 97530 THERAPEUTIC ACTIVITIES: CPT

## 2025-05-06 PROCEDURE — 94760 N-INVAS EAR/PLS OXIMETRY 1: CPT

## 2025-05-06 PROCEDURE — 97166 OT EVAL MOD COMPLEX 45 MIN: CPT

## 2025-05-06 PROCEDURE — 97162 PT EVAL MOD COMPLEX 30 MIN: CPT

## 2025-05-06 PROCEDURE — 94664 DEMO&/EVAL PT USE INHALER: CPT

## 2025-05-06 PROCEDURE — 94761 N-INVAS EAR/PLS OXIMETRY MLT: CPT

## 2025-05-06 PROCEDURE — 85027 COMPLETE CBC AUTOMATED: CPT | Performed by: NURSE PRACTITIONER

## 2025-05-06 PROCEDURE — 94640 AIRWAY INHALATION TREATMENT: CPT

## 2025-05-06 PROCEDURE — 97110 THERAPEUTIC EXERCISES: CPT

## 2025-05-06 RX ORDER — PRIMIDONE 50 MG/1
100 TABLET ORAL NIGHTLY
COMMUNITY

## 2025-05-06 RX ORDER — PRIMIDONE 50 MG/1
50 TABLET ORAL NIGHTLY
Status: DISCONTINUED | OUTPATIENT
Start: 2025-05-06 | End: 2025-05-07 | Stop reason: HOSPADM

## 2025-05-06 RX ADMIN — FUROSEMIDE 40 MG: 40 TABLET ORAL at 06:19

## 2025-05-06 RX ADMIN — LEVOTHYROXINE SODIUM 50 MCG: 50 TABLET ORAL at 06:19

## 2025-05-06 RX ADMIN — INSULIN LISPRO 4 UNITS: 100 INJECTION, SOLUTION INTRAVENOUS; SUBCUTANEOUS at 08:42

## 2025-05-06 RX ADMIN — DIVALPROEX SODIUM 500 MG: 500 TABLET, DELAYED RELEASE ORAL at 08:43

## 2025-05-06 RX ADMIN — ROPINIROLE HYDROCHLORIDE 1 MG: 0.5 TABLET, FILM COATED ORAL at 08:43

## 2025-05-06 RX ADMIN — EMPAGLIFLOZIN 10 MG: 10 TABLET, FILM COATED ORAL at 08:43

## 2025-05-06 RX ADMIN — ACETAMINOPHEN 650 MG: 325 TABLET, FILM COATED ORAL at 03:50

## 2025-05-06 RX ADMIN — CARVEDILOL 3.12 MG: 3.12 TABLET, FILM COATED ORAL at 08:43

## 2025-05-06 RX ADMIN — PRIMIDONE 50 MG: 50 TABLET ORAL at 20:56

## 2025-05-06 RX ADMIN — DIVALPROEX SODIUM 500 MG: 500 TABLET, DELAYED RELEASE ORAL at 20:56

## 2025-05-06 RX ADMIN — BUDESONIDE AND FORMOTEROL FUMARATE DIHYDRATE 2 PUFF: 160; 4.5 AEROSOL RESPIRATORY (INHALATION) at 08:02

## 2025-05-06 RX ADMIN — FOLIC ACID 1 MG: 1 TABLET ORAL at 08:43

## 2025-05-06 RX ADMIN — FUROSEMIDE 20 MG: 20 TABLET ORAL at 20:56

## 2025-05-06 RX ADMIN — RIVAROXABAN 20 MG: 20 TABLET, FILM COATED ORAL at 08:44

## 2025-05-06 RX ADMIN — Medication 1000 MCG: at 08:43

## 2025-05-06 RX ADMIN — Medication 10 ML: at 08:44

## 2025-05-06 RX ADMIN — INSULIN LISPRO 2 UNITS: 100 INJECTION, SOLUTION INTRAVENOUS; SUBCUTANEOUS at 20:56

## 2025-05-06 RX ADMIN — BUDESONIDE AND FORMOTEROL FUMARATE DIHYDRATE 2 PUFF: 160; 4.5 AEROSOL RESPIRATORY (INHALATION) at 19:26

## 2025-05-06 RX ADMIN — Medication 5 MG: at 20:56

## 2025-05-06 RX ADMIN — ATORVASTATIN CALCIUM 40 MG: 20 TABLET, FILM COATED ORAL at 20:56

## 2025-05-06 RX ADMIN — ACETAMINOPHEN 650 MG: 325 TABLET, FILM COATED ORAL at 19:32

## 2025-05-06 RX ADMIN — FERROUS SULFATE TAB 325 MG (65 MG ELEMENTAL FE) 325 MG: 325 (65 FE) TAB at 08:43

## 2025-05-06 RX ADMIN — CARVEDILOL 3.12 MG: 3.12 TABLET, FILM COATED ORAL at 20:56

## 2025-05-06 RX ADMIN — TAMSULOSIN HYDROCHLORIDE 0.8 MG: 0.4 CAPSULE ORAL at 08:43

## 2025-05-06 RX ADMIN — PANTOPRAZOLE SODIUM 40 MG: 40 TABLET, DELAYED RELEASE ORAL at 06:19

## 2025-05-06 RX ADMIN — SPIRONOLACTONE 12.5 MG: 25 TABLET ORAL at 08:44

## 2025-05-06 RX ADMIN — CLOPIDOGREL BISULFATE 75 MG: 75 TABLET, FILM COATED ORAL at 08:43
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
## 2025-05-06 NOTE — PLAN OF CARE
Goal Outcome Evaluation:  Plan of Care Reviewed With: patient        Progress: improving  Outcome Evaluation: Pt is assist x2 to chair. Deneis pain or needs at this time. VSS. discharge to rehab?

## 2025-05-06 NOTE — DISCHARGE PLACEMENT REQUEST
"Ronald Roberson (80 y.o. Male)       Date of Birth   1945    Social Security Number       Address   [id number] [place] [address] [address] [place] [address]    Home Phone   [phone]    MRN   [id number]       [facility]    Marital Status   Single                            Admission Date   5/4/2025    Admission Type   Emergency    Admitting Provider   René Millan MD    Attending Provider   Shrvaan Mayfield MD    Department, Room/Bed   [address], P891/1       Discharge Date       Discharge Disposition       Discharge Destination                                 Attending Provider: Shravan Mayfield MD    Allergies: No Known Allergies    Isolation: None   Infection: None   Code Status: CPR    Ht: 175.3 cm (69\")   Wt: 101 kg (223 lb)    Admission Cmt: None   Principal Problem: Cellulitis of right leg [L03.115]                   Active Insurance as of 5/4/2025       Primary Coverage       Payor Plan Insurance Group Employer/Plan Group    MEDICARE MEDICARE A & B        Payor Plan Address Payor Plan Phone Number Payor Plan Fax Number Effective Dates    [address] [phone]  1/1/1992 - None Entered    [facility] [id number]         Subscriber Name Subscriber Birth Date Member ID       RONALD ROBERSON 1945 [id number]               Secondary Coverage       Payor Plan Insurance Group Employer/Plan Group    [place]        Payor Plan Address Payor Plan Phone Number Payor Plan Fax Number Effective Dates    [address] [phone]  7/11/2018 - None Entered    [facility] [id number]         Subscriber Name Subscriber Birth Date Member ID       RONALD ROBERSON 1945 [id number]                     Emergency Contacts        (Rel.) Home Phone Work Phone Mobile Phone    Teo Roberson (Brother) [phone] -- [phone]    David Coyne (Cousin) (Relative) [phone] -- [phone]    Danny Roberson (Brother) [phone] -- " [phone]    IRVING CORONADO () [phone] -- --        \

## 2025-05-06 NOTE — CASE MANAGEMENT/SOCIAL WORK
Discharge Planning Assessment  [facility]     Patient Name: Hernando Lozada  MRN: [id number]  Today's Date: 5/6/2025    Admit Date: 5/4/2025    Plan: Referral to [facility] (Medicaid bed)   Discharge Needs Assessment       Row Name 05/06/25 1037       Living Environment    People in Home other (see comments)  roommates    Unique Family Situation group home apartment    Current Living Arrangements apartment;residential facility    In the past 12 months has the electric, gas, oil, or water company threatened to shut off services in your home? No    Primary Care Provided by self    Provides Primary Care For no one    Family Caregiver if Needed other relative(s)    Family Caregiver Names David Coyne/cousin and Teo Lozada/brother    Quality of Family Relationships helpful;involved    Able to Return to Prior Arrangements yes       Resource/Environmental Concerns    Resource/Environmental Concerns none       Transition Planning    Patient/Family Anticipates Transition to inpatient rehabilitation facility    Patient/Family Anticipated Services at Transition     Transportation Anticipated health plan transportation       Discharge Needs Assessment    Readmission Within the Last 30 Days no previous admission in last 30 days    Equipment Currently Used at Home walker, rolling    Equipment Needed After Discharge none    Outpatient/Agency/Support Group Needs skilled nursing facility    Discharge Facility/Level of Care Needs nursing facility, skilled    Provided Post Acute Provider List? Yes                   Discharge Plan       Row Name 05/06/25 1047       Plan    Plan Referral to [facility] (Medicaid bed)    Patient/Family in Agreement with Plan yes    Plan Comments Spoke with pt bedside. Pt reports his new address is [address] [place] [address] (unable to update facesheet in epic) . Pt reports he lives with 2 roommates in an apartment. Per older notes pt lives in a [facility] group  home/apartment. Pt reports he has a  with neuro restorative/Alethea Mota [phone] VM left as pt thinks she is working on LTC placement at [facility] for pt. Pt reports he uses a walker to ambulate but has been falling at home more. He has been to [facility], [facility], and [facility] in past. His PCP is Dr. Carrasquillo and agreeable to M2B at d/c. Spoke with [facility] referral provided to see if pt can come under his Medicaid as he is in observation status. PT eval pending. CCP to follow.                  Continued Care and Services - Admitted Since 5/4/2025       Destination       Service Provider Request Status Services Address Phone Fax Patient Preferred    [facility] Considering  Billing review -- [address] [facility] [address] [phone] --                  Selected Continued Care - Prior Encounters Includes continued care and service providers with selected services from prior encounters from 2/3/2025 to 5/6/2025      Discharged on 2/13/2025 Admission date: 2/9/2025 - Discharge disposition: Skilled Nursing Facility (DC - External)      Destination       Service Provider Services Address Phone Fax Patient Preferred    [facility] [address] [facility] [address] [facility] [address] [phone] --                          Expected Discharge Date and Time       Expected Discharge Date Expected Discharge Time    May 6, 2025            Demographic Summary    No documentation.                  Functional Status    No documentation.                  Psychosocial    No documentation.                  Abuse/Neglect    No documentation.                  Legal    No documentation.                  Substance Abuse    No documentation.                  Patient Forms    No documentation.                     CORRIE Pedroza

## 2025-05-06 NOTE — PLAN OF CARE
Goal Outcome Evaluation:  Plan of Care Reviewed With: patient        Progress: improving  Outcome Evaluation: 80 y.o. male admitted to [facility] for multiple falls at home and RLE cellulitis.  At baseline, patient lives in group home with 3 roommates, has assistance with showers, meals and laundry (Independent with all other ADLs).  Attends [facility] x wk.  A&Ox3, BUE wFL, 3+/5.  Patient presents to OT with decreased strength, activity tolerance, coordination and balance.  Patient sitting UIC upon arrival.  STS from chair level with CGA and Rw.  Patient able to ambulate tosinkside and wash hands with CGA.  Patient agreeable to return to recliner chair and elevate feet.  Patient angelica mild unsteadiness, flexed posture and cuing for safety with functional task.  OT would be beneficial to address underlying physical deficits and increase ADLs.  Anticipate patient d/c to SNF for continued progress before d/c back to group home.    Anticipated Discharge Disposition (OT): skilled nursing facility
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
## 2025-05-06 NOTE — PROGRESS NOTES
Name: Hernando Lozada ADMIT: 2025   : 1945  PCP: Milo Carrasquillo DO    MRN: [id number] LOS: 0 days   AGE/SEX: 80 y.o. male  ROOM: [facility]   Subjective   Chief Complaint   Patient presents with    Fall    Back Pain     80 y.o. male w/ Afib on AC, HTN, DM2, combined heart failure presenting with recurrent falls. Workup is negative for fracture.    Working with therapy and trying to get stronger  Denies any dyspnea    ROS  No f/c  No n/v  No cp/palp  No soa/cough    Objective   Vital Signs  Temp:  [97.5 °F (36.4 °C)-98.1 °F (36.7 °C)] 97.5 °F (36.4 °C)  Heart Rate:  [56-79] 62  Resp:  [16-20] 18  BP: (111-156)/() 111/67  SpO2:  [96 %-98 %] 97 %  on   ;   Device (Oxygen Therapy): room air  Body mass index is 32.93 kg/m².    Physical Exam  HENT:      Head: Normocephalic and atraumatic.   Eyes:      General: No scleral icterus.  Cardiovascular:      Rate and Rhythm: Normal rate.   Pulmonary:      Effort: Pulmonary effort is normal. No respiratory distress.   Abdominal:      General: There is no distension.      Palpations: Abdomen is soft.      Tenderness: There is no abdominal tenderness.   Musculoskeletal:      Cervical back: Neck supple.   Neurological:      Mental Status: He is alert.   Psychiatric:         Behavior: Behavior normal.     Chronic LE edema with mild stasis dermatitis  Chronically ill     Results Review:       I reviewed the patient's new clinical results.  Results from last 7 days   Lab Units 25  03325   WBC 10*3/mm3 6.25 6.26 8.01   HEMOGLOBIN g/dL 12.5* 11.7* 12.3*   PLATELETS 10*3/mm3 255 232 259     Results from last 7 days   Lab Units 25  03325   SODIUM mmol/L 136 137 136   POTASSIUM mmol/L 3.6 3.7 3.9   CHLORIDE mmol/L 98 101 96*   CO2 mmol/L 28.0 27.8 29.0   BUN mg/dL 14 15 18   CREATININE mg/dL 1.04 0.81 0.93   GLUCOSE mg/dL 98 94 104*   Estimated Creatinine Clearance: 68.6 mL/min (by C-G formula based on  SCr of 1.04 mg/dL).  Results from last 7 days   Lab Units 05/04/25 2019   ALBUMIN g/dL 4.0   BILIRUBIN mg/dL 0.5   ALK PHOS U/L 84   AST (SGOT) U/L 20   ALT (SGPT) U/L 19     Results from last 7 days   Lab Units 05/06/25  0330 05/05/25  0437 05/04/25 2019   CALCIUM mg/dL 8.7 8.5* 8.9   ALBUMIN g/dL  --   --  4.0   MAGNESIUM mg/dL  --   --  2.1     Results from last 7 days   Lab Units 05/04/25 2046 05/04/25 2019   PROCALCITONIN ng/mL  --  <0.02   LACTATE mmol/L 0.9  --        Coag   Results from last 7 days   Lab Units 05/04/25 2019   INR  1.49*   APTT seconds 35.1     HbA1C   Lab Results   Component Value Date    HGBA1C 5.50 02/11/2025    HGBA1C 5.10 11/01/2024    HGBA1C 5.30 10/07/2024     Infection   Results from last 7 days   Lab Units 05/04/25 2057 05/04/25 2046 05/04/25 2019   BLOODCX  No growth at 24 hours No growth at 24 hours  --    PROCALCITONIN ng/mL  --   --  <0.02     Radiology(recent) XR Spine Thoracic 2 View  Result Date: 5/5/2025   As described.    This report was finalized on 5/5/2025 4:16 PM by Dr. Chava Flores M.D on Workstation: [id number]      XR Spine Lumbar 2 or 3 View  Result Date: 5/5/2025   As described.    This report was finalized on 5/5/2025 4:16 PM by Dr. Chava Flores M.D on Workstation: [id number]      CT Chest Without Contrast Diagnostic  Result Date: 5/4/2025   Moderate distention of the urinary bladder, etiology uncertain. There is secondary mild bilateral hydronephrosis.  Small bilateral pleural effusions, otherwise unremarkable.   This report was finalized on 5/4/2025 10:24 PM by Dr. Tomas Huynh M.D on Workstation: [id number]      CT Abdomen Pelvis Without Contrast  Result Date: 5/4/2025   Moderate distention of the urinary bladder, etiology uncertain. There is secondary mild bilateral hydronephrosis.  Small bilateral pleural effusions, otherwise unremarkable.   This report was finalized on 5/4/2025 10:24 PM by Dr. Tomas Huynh M.D on Workstation:  [id number]      CT Head Without Contrast  Result Date: 5/4/2025   Negative unenhanced head CT, no acute abnormality.  Negative cervical spine CT, no acute abnormality.    This report was finalized on 5/4/2025 9:57 PM by Dr. Tomas Huynh M.D on Workstation: [id number]      CT Cervical Spine Without Contrast  Result Date: 5/4/2025   Negative unenhanced head CT, no acute abnormality.  Negative cervical spine CT, no acute abnormality.    This report was finalized on 5/4/2025 9:57 PM by Dr. Tomas Huynh M.D on Workstation: [id number]      XR Chest 1 View  Result Date: 5/4/2025   Allowing for submaximal inspiratory result, heart size appears within normal limits. Prior sternotomy and bypass.  There is a submaximal inspiratory result. Allowing for that, there is no convincing evidence of infiltrate, effusion or pneumothorax.    This report was finalized on 5/4/2025 9:26 PM by Dr. Tomas Huynh M.D on Workstation: [id number]      HS Troponin T   Date Value Ref Range Status   05/04/2025 22 (H) <22 ng/L Final   05/04/2025 22 (H) <22 ng/L Final     No components found for: \"TSH;2\"    atorvastatin, 40 mg, Oral, Nightly  budesonide-formoterol, 2 puff, Inhalation, BID - RT  carvedilol, 3.125 mg, Oral, BID  clopidogrel, 75 mg, Oral, Daily  divalproex, 500 mg, Oral, BID  empagliflozin, 10 mg, Oral, Daily  ferrous sulfate, 325 mg, Oral, Daily With Breakfast  folic acid, 1 mg, Oral, Daily  furosemide, 20 mg, Oral, Nightly  furosemide, 40 mg, Oral, Q AM  insulin lispro, 2-9 Units, Subcutaneous, 4x Daily AC & at Bedtime  levothyroxine, 50 mcg, Oral, Q AM  pantoprazole, 40 mg, Oral, Q AM  rivaroxaban, 20 mg, Oral, Daily  rOPINIRole, 1 mg, Oral, TID  spironolactone, 12.5 mg, Oral, Daily  tamsulosin, 0.8 mg, Oral, Daily  cyanocobalamin, 1,000 mcg, Oral, Daily       Diet: Cardiac, Diabetic; Healthy Heart (2-3 Na+); Consistent Carbohydrate; Texture: Soft to Chew (NDD 3); Soft to Chew: Ground Meat; Fluid Consistency: Thin " (IDDSI 0)      Assessment & Plan      Active Hospital Problems    Diagnosis  POA    **Cellulitis of right leg [L03.115]  Yes    Accident due to mechanical fall without injury [W19.XXXA]  Yes    Hydronephrosis [N13.30]  Yes    Distended bladder [N32.89]  Yes    Facial bruising, initial encounter [S00.83XA]  Yes    Back pain [M54.9]  Yes    Chronic combined systolic and diastolic congestive heart failure [I50.42]  Yes      Resolved Hospital Problems   No resolved problems to display.     80 y.o. male w/ Afib on AC, HTN, DM2, combined heart failure presenting with recurrent falls. Workup is negative for fracture. Legs look c/w venous stasis and not infection.     Continue above medications including his lasix, plavix, xarelto and other chronic agents. Monitor his urination and continue flomax. Working with PT and likely would benefit from rehab.      staff      Shravan Mayfield MD  [facility]  05/06/25  15:17 EDT

## 2025-05-06 NOTE — CASE MANAGEMENT/SOCIAL WORK
Continued Stay Note  [facility]     Patient Name: Hernando Lozada  MRN: [id number]  Today's Date: 5/6/2025    Admit Date: 5/4/2025    Plan: Referral to [place] (Medicaid bed)   Discharge Plan       Row Name 05/06/25 1102       Plan    Plan Comments Call back from Alethea/ she is with [facility] [phone] and has been pt's  for 15 years. Pt is in a group home/apartment with an agency called [facility] and he goes to [facility] 2x a week with mediciad waiver program. She reports she is not working with any nursing home to get him in. Explained CCP will try and see if [place] will take him under his Medicaid. CCP to follow.      Row Name 05/06/25 1042       Plan    Plan Referral to [facility] (Medicaid bed)    Patient/Family in Agreement with Plan yes    Plan Comments Spoke with pt bedside. Pt reports [place] new [place] [facility] [place] [phone] (unable to update facesheet in epic) . Pt reports he lives with 2 roommates in an apartment. Per older notes pt lives in a [facility] group home/apartment. Pt reports he has a  with [facility] [phone] VM left as pt thinks she is working on LTC placement at [place] for pt. Pt reports he uses a walker to ambulate but has been falling at home more. He has been to [facility], and [facility] in past. His PCP is Dr. Carrasquillo and agreeable to M2[facility] at d/c. Spoke with [facility] referral provided to see if pt can come under his Medicaid as he is in observation status. PT eval pending. CCP to follow.                   Discharge Codes    No documentation.                 Expected Discharge Date and Time       Expected Discharge Date Expected Discharge Time    May 6, 2025               CORRIE Pedroza

## 2025-05-07 VITALS
HEART RATE: 67 BPM | DIASTOLIC BLOOD PRESSURE: 79 MMHG | BODY MASS INDEX: 33.03 KG/M2 | SYSTOLIC BLOOD PRESSURE: 123 MMHG | WEIGHT: 223 LBS | RESPIRATION RATE: 18 BRPM | HEIGHT: 69 IN | TEMPERATURE: 98.2 F | OXYGEN SATURATION: 93 %

## 2025-05-07 LAB
GLUCOSE BLDC GLUCOMTR-MCNC: 111 MG/DL (ref 70–130)
GLUCOSE BLDC GLUCOMTR-MCNC: 147 MG/DL (ref 70–130)
QT INTERVAL: 478 MS
QTC INTERVAL: 503 MS

## 2025-05-07 PROCEDURE — 94761 N-INVAS EAR/PLS OXIMETRY MLT: CPT

## 2025-05-07 PROCEDURE — 94799 UNLISTED PULMONARY SVC/PX: CPT

## 2025-05-07 PROCEDURE — 94664 DEMO&/EVAL PT USE INHALER: CPT

## 2025-05-07 PROCEDURE — 93005 ELECTROCARDIOGRAM TRACING: CPT | Performed by: INTERNAL MEDICINE

## 2025-05-07 PROCEDURE — 93010 ELECTROCARDIOGRAM REPORT: CPT | Performed by: INTERNAL MEDICINE

## 2025-05-07 PROCEDURE — 82948 REAGENT STRIP/BLOOD GLUCOSE: CPT

## 2025-05-07 PROCEDURE — 94760 N-INVAS EAR/PLS OXIMETRY 1: CPT

## 2025-05-07 RX ORDER — METOPROLOL SUCCINATE 25 MG/1
12.5 TABLET, EXTENDED RELEASE ORAL DAILY
Start: 2025-05-08

## 2025-05-07 RX ADMIN — DIVALPROEX SODIUM 500 MG: 500 TABLET, DELAYED RELEASE ORAL at 09:49

## 2025-05-07 RX ADMIN — SPIRONOLACTONE 12.5 MG: 25 TABLET ORAL at 09:49

## 2025-05-07 RX ADMIN — PANTOPRAZOLE SODIUM 40 MG: 40 TABLET, DELAYED RELEASE ORAL at 09:49

## 2025-05-07 RX ADMIN — FERROUS SULFATE TAB 325 MG (65 MG ELEMENTAL FE) 325 MG: 325 (65 FE) TAB at 09:49

## 2025-05-07 RX ADMIN — RIVAROXABAN 20 MG: 20 TABLET, FILM COATED ORAL at 09:48

## 2025-05-07 RX ADMIN — BUDESONIDE AND FORMOTEROL FUMARATE DIHYDRATE 2 PUFF: 160; 4.5 AEROSOL RESPIRATORY (INHALATION) at 07:39

## 2025-05-07 RX ADMIN — FUROSEMIDE 40 MG: 40 TABLET ORAL at 09:49

## 2025-05-07 RX ADMIN — FOLIC ACID 1 MG: 1 TABLET ORAL at 09:48

## 2025-05-07 RX ADMIN — TAMSULOSIN HYDROCHLORIDE 0.8 MG: 0.4 CAPSULE ORAL at 09:49

## 2025-05-07 RX ADMIN — Medication 1000 MCG: at 09:48

## 2025-05-07 RX ADMIN — LEVOTHYROXINE SODIUM 50 MCG: 50 TABLET ORAL at 09:48

## 2025-05-07 RX ADMIN — CLOPIDOGREL BISULFATE 75 MG: 75 TABLET, FILM COATED ORAL at 09:48

## 2025-05-07 RX ADMIN — EMPAGLIFLOZIN 10 MG: 10 TABLET, FILM COATED ORAL at 09:49

## 2025-05-07 NOTE — PLAN OF CARE
Problem: Adult Inpatient Plan of Care  Goal: Absence of Hospital-Acquired Illness or Injury  Intervention: Identify and Manage Fall Risk  Recent Flowsheet Documentation  Taken 5/7/2025 0008 by Remi Keyes RN  Safety Promotion/Fall Prevention:   activity supervised   assistive device/personal items within reach   clutter free environment maintained   safety round/check completed   nonskid shoes/slippers when out of bed  Taken 5/6/2025 2056 by Remi Keyes, RN  Safety Promotion/Fall Prevention:   activity supervised   assistive device/personal items within reach   clutter free environment maintained   safety round/check completed   nonskid shoes/slippers when out of bed  Intervention: Prevent Skin Injury  Recent Flowsheet Documentation  Taken 5/7/2025 0008 by Remi Keyes RN  Body Position: position changed independently  Taken 5/6/2025 2056 by Remi Keyes RN  Body Position: position changed independently  Goal: Optimal Comfort and Wellbeing  Intervention: Monitor Pain and Promote Comfort  Recent Flowsheet Documentation  Taken 5/6/2025 2056 by Remi Keyes RN  Pain Management Interventions: pain medication given   Goal Outcome Evaluation:

## 2025-05-07 NOTE — NURSING NOTE
This nurse received call from CTU, notifying of pt HR in the 30's-40's with multiple pauses. This nurse ordered STAT EKG and placed a call to A. Pt easily awaken, asymptomatic at this time. Awaiting call back.

## 2025-05-07 NOTE — CASE MANAGEMENT/SOCIAL WORK
Continued Stay Note  [facility]     Patient Name: Hernando Lozada  MRN: [id number]  Today's Date: 5/7/2025    Admit Date: 5/4/2025    Plan: Medicaid bed at [facility]   Discharge Plan       Row Name 05/07/25 1221       Plan    Plan Medicaid bed at [facility]    Plan Comments CCP spoke with [facility]; able to accept patient under his Medicaid. Patient agreeable. CCP updated patient's cousin, David of d/c and patient going to [facility]. He is in agreement. [facility] arranged [facility] van for 3:00. pharmacy updated and packet given to RN. Janny JOSEPH                   Discharge Codes    No documentation.                 Expected Discharge Date and Time       Expected Discharge Date Expected Discharge Time    May 7, 2025               JAKE North
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
## 2025-05-07 NOTE — CASE MANAGEMENT/SOCIAL WORK
Case Management Discharge Note      Final Note: Medicaid bed at [place] via Counts include 234 beds at the [facility]. Janny JOSEPH    Provided Post Acute Provider List?: Yes    Selected Continued Care - Discharged on 5/7/2025 Admission date: 5/4/2025 - Discharge disposition: Skilled Nursing Facility (DC - External)      Destination Coordination complete.      Service Provider Services Address Phone Fax Patient Preferred    [facility] [address] [facility] [address] [phone] --              Durable Medical Equipment    No services have been selected for the patient.                Dialysis/Infusion    No services have been selected for the patient.                Home Medical Care    No services have been selected for the patient.                Therapy    No services have been selected for the patient.                Community Resources    No services have been selected for the patient.                Community & DME    No services have been selected for the patient.                    Selected Continued Care - Prior Encounters Includes continued care and service providers with selected services from prior encounters from 2/3/2025 to 5/7/2025      Discharged on 2/13/2025 Admission date: 2/9/2025 - Discharge disposition: Skilled Nursing Facility (DC - External)      Destination       Service Provider Services Address Phone Fax Patient Preferred    [facility] [address] [address] [phone] --                               Final Discharge Disposition Code: 04 - intermediate care facility

## 2025-05-07 NOTE — PAYOR COMM NOTE
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 chronic condition.         Basic Metabolic Panel [[id number]]  (Abnormal) Collected: 05/05/25 0437    Specimen: Blood Updated: 05/05/25 0526     Glucose 94 mg/dL      BUN 15 mg/dL      Creatinine 0.81 mg/dL      Sodium 137 mmol/L      Potassium 3.7 mmol/L      Chloride 101 mmol/L      CO2 27.8 mmol/L      Calcium 8.5 mg/dL      BUN/Creatinine Ratio 18.5     Anion Gap 8.2 mmol/L      eGFR 90.2 mL/min/1.73     Narrative:      GFR Categories in Chronic Kidney Disease (CKD)               GFR Category          GFR (mL/min/1.73)    Interpretation  G1                    90 or greater        Normal or high (1)  G2                    60-89                Mild decrease (1)  G3a                   45-59                Mild to moderate decrease  G3b                   30-44                Moderate to severe decrease  G4                    15-29                Severe decrease  G5                    14 or less           Kidney failure    (1)In the absence of evidence of kidney disease, neither GFR category G1 or G2 fulfill the criteria for CKD.    eGFR calculation 2021 CKD-EPI creatinine equation, which does not include race as a factor    CBC (No Diff) [[id number]]  (Abnormal) Collected: 05/05/25 0437    Specimen: Blood Updated: 05/05/25 0513     WBC 6.26 10*3/mm3      RBC 3.67 10*6/mm3      Hemoglobin 11.7 g/dL      Hematocrit 35.0 %      MCV 95.4 fL      MCH 31.9 pg      MCHC 33.4 g/dL      RDW 13.5 %      RDW-SD 47.1 fl      MPV 9.3 fL      Platelets 232 10*3/mm3     POC Glucose Once [[id number]]  (Normal) Collected: 05/05/25 0640    Specimen: Blood Updated: 05/05/25 0641     Glucose 94 mg/dL     POC Glucose Once [[id number]]  (Normal) Collected: 05/05/25 1043    Specimen: Blood Updated: 05/05/25 1044     Glucose 109 mg/dL             Imaging Results (Last 24 Hours)       Procedure Component Value Units Date/Time    CT Chest Without Contrast Diagnostic [[id number]] Collected: 05/04/25 2215     Updated: 05/04/25 2227    Narrative:      CT CHEST, ABDOMEN & PELVIS WITH CONTRAST     INDICATION:   The patient is being seen in the ED for trauma and is determined to have  a high energy mechanism and/or high risk for missed injuries due to  presence of associated injuries or distracting pain. The patient will  need imaging of the chest, abdomen, and pelvis given diagnoses such as  pneumothorax, internal thoracic/abdominal hemorrhage with visceral  injury and rib or pelvic fractures cannot be excluded  clinically. The  diagnostic information gained in this study exceeds the estimated risk  of radiation induced injury at the time of order placement.  Mechanism of injury: Fall, chest and abdomen pain     TECHNIQUE:  CT of the chest, abdomen and pelvis without IV contrast. Coronal and  sagittal reconstructions were obtained.   Radiation dose reduction  techniques were utilized, including automated exposure control, and  exposure modulation based on body size.     COMPARISON:   None available.     FINDINGS:     Chest: There is no suspicious mediastinal adenopathy or other mass.    The thoracic aorta is normal in caliber. There is calcification of the  aortic valve plane, associated with, but not diagnostic of aortic  stenosis. There are coronary arterial calcifications. There are small  bilateral pleural effusions. But there is no acute pulmonary infiltrate.     Abdomen: The gallbladder has been removed, and the liver appears  otherwise normal.  The spleen and pancreas appear normal.  Both adrenal  glands are normal. There is mild bilateral hydronephrosis, but the right  kidney is otherwise unremarkable. There is a left renal upper pole 1 mm  nonobstructing calculus, but there is no ureterolithiasis. The aorta is  normal in caliber.       There is no abdominal or retroperitoneal adenopathy or other mass.   There is no abdominal or retroperitoneal inflammatory change, or  abnormal fluid or air collection.  There is no evidence of bowel  obstruction.       Pelvis:  The appendix is clearly identified, and is normal.  There is no  pelvic inflammatory change or other abnormal fluid collection.  There is  no pelvic adenopathy or other soft tissue mass.  There is no CT evidence  of hernia or bowel obstruction. The urinary bladder is moderately  distended. Mild bilateral hydronephrosis is likely secondary to bladder  distention.     There is no acute bony abnormality.       Impression:         Moderate distention of the  urinary bladder, etiology uncertain. There is  secondary mild bilateral hydronephrosis.     Small bilateral pleural effusions, otherwise unremarkable.        This report was finalized on 5/4/2025 10:24 PM by Dr. Tomas Huynh M.D on Workstation: [id number]       CT Abdomen Pelvis Without Contrast [[id number]] Collected: 05/04/25 2215     Updated: 05/04/25 2227    Narrative:      CT CHEST, ABDOMEN & PELVIS WITH CONTRAST     INDICATION:   The patient is being seen in the ED for trauma and is determined to have  a high energy mechanism and/or high risk for missed injuries due to  presence of associated injuries or distracting pain. The patient will  need imaging of the chest, abdomen, and pelvis given diagnoses such as  pneumothorax, internal thoracic/abdominal hemorrhage with visceral  injury and rib or pelvic fractures cannot be excluded clinically. The  diagnostic information gained in this study exceeds the estimated risk  of radiation induced injury at the time of order placement.  Mechanism of injury: Fall, chest and abdomen pain     TECHNIQUE:  CT of the chest, abdomen and pelvis without IV contrast. Coronal and  sagittal reconstructions were obtained.   Radiation dose reduction  techniques were utilized, including automated exposure control, and  exposure modulation based on body size.     COMPARISON:   None available.     FINDINGS:     Chest: There is no suspicious mediastinal adenopathy or other mass.    The thoracic aorta is normal in caliber. There is calcification of the  aortic valve plane, associated with, but not diagnostic of aortic  stenosis. There are coronary arterial calcifications. There are small  bilateral pleural effusions. But there is no acute pulmonary infiltrate.     Abdomen: The gallbladder has been removed, and the liver appears  otherwise normal.  The spleen and pancreas appear normal.  Both adrenal  glands are normal. There is mild bilateral hydronephrosis, but the right  kidney is  otherwise unremarkable. There is a left renal upper pole 1 mm  nonobstructing calculus, but there is no ureterolithiasis. The aorta is  normal in caliber.       There is no abdominal or retroperitoneal adenopathy or other mass.   There is no abdominal or retroperitoneal inflammatory change, or  abnormal fluid or air collection.  There is no evidence of bowel  obstruction.       Pelvis:  The appendix is clearly identified, and is normal.  There is no  pelvic inflammatory change or other abnormal fluid collection.  There is  no pelvic adenopathy or other soft tissue mass.  There is no CT evidence  of hernia or bowel obstruction. The urinary bladder is moderately  distended. Mild bilateral hydronephrosis is likely secondary to bladder  distention.     There is no acute bony abnormality.       Impression:         Moderate distention of the urinary bladder, etiology uncertain. There is  secondary mild bilateral hydronephrosis.     Small bilateral pleural effusions, otherwise unremarkable.        This report was finalized on 5/4/2025 10:24 PM by Dr. Tomas Huynh M.D on Workstation: [id number]       CT Head Without Contrast [[id number]] Collected: 05/04/25 2156     Updated: 05/04/25 2200    Narrative:      NON-CONTRAST CT HEAD & CT CERVICAL SPINE     REASON:  The patient is being seen in the ED for trauma and is  determined to have a high energy mechanism and/or high risk for missed  injuries due to presence of associated injuries or distracting pain. The  patient will need imaging of the head per the trauma protocol given  diagnoses such as intracranial hemorrhage cannot be excluded.    The  patient will need imaging of the cervical spine given diagnoses such as  cervical spine fracture cannot be excluded.  The diagnostic information  gained in this study exceeds the estimated risk of radiation induced  injury at the time of order placement.  Mechanism of injury: Fall from standing, neck pain and facial contusions      COMPARISON STUDIES: Head and cervical spine CT 4/26/2025.     TECHNIQUE:  Axial images were acquired from the skull base to vertex  without contrast, including multiplanar reformats, per standard  departmental protocol.   Routine cervical spine CT without contrast.  Multiplanar reformats were created. Radiation dose reduction techniques  were utilized, including automated exposure control, and exposure  modulation based on body size.     FINDINGS:     Head CT: There is no CT evidence of acute intracranial hemorrhage, mass,  or infarct. There is volume loss, but there is no evidence of  hydrocephalus or extra-axial fluid collection.  Brain parenchymal  density is within normal limits.     Skull base and calvarium show no acute abnormality, and the extracranial  soft tissues show no acute abnormality.     C-spine CT: Cervical spine alignment is within normal limits.  There is  no fracture or other acute bony abnormality.  The paraspinous soft  tissues show no acute abnormality.       Impression:         Negative unenhanced head CT, no acute abnormality.      Negative cervical spine CT, no acute abnormality.           This report was finalized on 5/4/2025 9:57 PM by Dr. Tomas Huynh M.D  on Workstation: [id number]       CT Cervical Spine Without Contrast [[id number]] Collected: 05/04/25 2156     Updated: 05/04/25 2200    Narrative:      NON-CONTRAST CT HEAD & CT CERVICAL SPINE     REASON:  The patient is being seen in the ED for trauma and is  determined to have a high energy mechanism and/or high risk for missed  injuries due to presence of associated injuries or distracting pain. The  patient will need imaging of the head per the trauma protocol given  diagnoses such as intracranial hemorrhage cannot be excluded.    The  patient will need imaging of the cervical spine given diagnoses such as  cervical spine fracture cannot be excluded.  The diagnostic information  gained in this study exceeds the estimated  risk of radiation induced  injury at the time of order placement.  Mechanism of injury: Fall from standing, neck pain and facial contusions     COMPARISON STUDIES: Head and cervical spine CT 4/26/2025.     TECHNIQUE:  Axial images were acquired from the skull base to vertex  without contrast, including multiplanar reformats, per standard  departmental protocol.   Routine cervical spine CT without contrast.  Multiplanar reformats were created. Radiation dose reduction techniques  were utilized, including automated exposure control, and exposure  modulation based on body size.     FINDINGS:     Head CT: There is no CT evidence of acute intracranial hemorrhage, mass,  or infarct. There is volume loss, but there is no evidence of  hydrocephalus or extra-axial fluid collection.  Brain parenchymal  density is within normal limits.     Skull base and calvarium show no acute abnormality, and the extracranial  soft tissues show no acute abnormality.     C-spine CT: Cervical spine alignment is within normal limits.  There is  no fracture or other acute bony abnormality.  The paraspinous soft  tissues show no acute abnormality.       Impression:         Negative unenhanced head CT, no acute abnormality.      Negative cervical spine CT, no acute abnormality.           This report was finalized on 5/4/2025 9:57 PM by Dr. Tomas Huynh M.D  on Workstation: [id number]       XR Chest 1 View [[id number]] Collected: 05/04/25 2126     Updated: 05/04/25 2129    Narrative:      CXR ONE VIEW      HISTORY: Weak/Dizzy/AMS triage protocol; L03.115-Cellulitis of right  lower limb; R29.6-Repeated falls; S09.90XA-Unspecified injury of head,  initial encounter; Z79.01-Long term (current) use of anticoagulants;  I48.91-Unspecified atrial fibrillation; R60.0-Localized edema     COMPARISON: 2/10/2025     TECHNIQUE: single portable AP       Impression:         Allowing for submaximal inspiratory result, heart size appears within  normal limits.  Prior sternotomy and bypass.     There is a submaximal inspiratory result. Allowing for that, there is no  convincing evidence of infiltrate, effusion or pneumothorax.           This report was finalized on 5/4/2025 9:26 PM by Dr. Tomas Huynh M.D  on Workstation: [id number]               Results for orders placed during the hospital encounter of 01/15/25    Adult Transthoracic Echo Complete W/ Cont if Necessary Per Protocol    Interpretation Summary    Left ventricular systolic function is normal. Calculated left ventricular EF = 63.1% Left ventricular ejection fraction appears to be 61 - 65%.    The left ventricular cavity is borderline dilated.    Left ventricular diastolic function was indeterminate.    The left atrial cavity is moderately dilated.    The right atrial cavity is borderline dilated.    Moderate to severe aortic valve stenosis is present.    Estimated right ventricular systolic pressure from tricuspid regurgitation is normal (<35 mmHg).      ECG 12 Lead Tachycardia   Final Result   HEART RATE=71  bpm   RR Vvwtlohr=918  ms   MN Interval=  ms   P Horizontal Axis=  deg   P Front Axis=  deg   QRSD Bgdnjwdf=742  ms   QT Apmbdyou=691  ms   BJbC=078  ms   QRS Axis=-1  deg   T Wave Axis=85  deg   - ABNORMAL ECG -   Atrial fibrillation   LVH with secondary repolarization abnormality   When compared with ECG of 31-Oct-2024 13:56:30,   No significant change   Electronically Signed By: Robert Lockett ([facility], [place]) 2025-05-05 07:32:19   Date and Time of Study:2025-05-04 20:25:46      Telemetry Scan   Final Result           Assessment/Plan     Active Hospital Problems    Diagnosis  POA    **Cellulitis of right leg [L03.115]  Yes    Accident due to mechanical fall without injury [W19.XXXA]  Yes    Hydronephrosis [N13.30]  Yes    Distended bladder [N32.89]  Yes    Facial bruising, initial encounter [S00.83XA]  Yes    Back pain [M54.9]  Yes    Chronic combined systolic and diastolic congestive heart failure [I50.42]   Yes      Resolved Hospital Problems   No resolved problems to display.       Mr. Lozada is a 80 y.o. non-smoker with a history of atrial fibrillation on chronic anticoagulation, hypertension, hyperlipidemia, GERD, diabetes mellitus, chronic combined systolic and diastolic congestive heart failure that presents to [facility] complaining of fall and back pain and also admits to not using his walker at that time while at home after reportedly tripping over a recliner with subsequent facial strike.      Facial bruising, initial encounter    Back pain    Accident due to mechanical fall without injury / recurrent falls  - Describes mechanical fall/tripped over a recliner at home  - CT head, cervical spine unremarkable for acute findings  - Ordering x-ray thoracic and lumbar spine for completeness  - symptom management as needed / avoid sedation / continuous oximetry  - ordering orthostatic BP  - Falls precautions   - Consult therapies ? SNF      Cellulitis of right leg  - Less concern for cellulitis & more likely chronic venous stasis  - BC x 2 pending  - Discontinue empiric antibiotic for now given afebrile and absence of leukocytosis      Chronic combined systolic and diastolic congestive heart failure  - Plan to continue home regimen / diuretics monitor volume status and renal function      Hydronephrosis    Distended bladder  - Incidental finding on CT  - Denies urinary complaints / UA unremarkable with exception of glucosuria most likely due to Jardiance    I discussed the patient's findings and my recommendations with patient and nursing staff, & Dr. Millan.    VTE Prophylaxis - Xarelto (home med).  Code Status - Full code.       FAISAL Florentino  [facility]  05/05/25  13:39 EDT     Electronically signed by René Millan MD at 05/05/25 1830          Emergency Department Notes        Nurys Perales PA-C at 05/04/25 2043            ED Course as of 05/04/25 2332    Sun May 04, 2025   2028 EKG           EKG time: 2025  Rhythm/Rate: Rate controlled A-fib, ventricular rate 71  P waves and CA: A-fib  QRS, axis: Narrow complex  ST and T waves: No STEMI; QTc within normal limits    Interpreted Contemporaneously by me, independently viewed     [RS]   2042 Joined patient's care team, pending labs, imaging, and disposition. [MP]   2329 I spoke with  with [facility].  Discussed patient presentation and ED findings.  She agrees to admit patient to a telemetry observation bed to the service of Dr. Millan. [MP]   2332 CT scan of head independently interpreted by me as no hemorrhage [MP]      ED Course User Index  [MP] Nurys Perales PA-C  [RS] René Holley MD Pleiss, Melanie M, PA-C  05/04/25 2332      Electronically signed by Nurys Perales PA-C at 05/04/25 2332       René Holley MD at 05/04/25 2024           EMERGENCY DEPARTMENT ENCOUNTER  Room Number:  33/33  PCP: Milo Carrasquillo DO  Independent Historians: Patient and nursing triage report      HPI:  Chief Complaint: Multiple falls    A complete HPI/ROS/PMH/PSH/SH/FH are unobtainable due to: None    Chronic or social conditions impacting patient care (Social Determinants of Health): None      Context: Hernando Lozada is a 80 y.o. male with a medical history of sleep apnea, diabetes, hyperlipidemia, DVT, atrial fibrillation, CKD, and subdural hematoma who presents to the ED c/o acute recurrent falls, lower extremity edema, and generalized weakness.  Patient reports 6 falls today.  Patient seen recently in the ED for falls.  Patient complained of head, neck, back, and abdominal trauma today.  No nausea vomiting or dyspnea that is new or different.  Patient also reports injury to right lower extremity few days ago with increasing redness in that area.  No reported fevers.      Review of prior external notes (non-ED) -and- Review of prior external test results outside of this encounter:  Hospital discharge summary  2/13/2025 reviewed: Patient admitted for subdural hematoma sustained from a fall.  Patient was to come off of the Xarelto and Plavix until follow-up with neurosurgery in the office.  =============================  Home health medication records that accompany the patient appears that he is back on the Xarelto at this time.      PAST MEDICAL HISTORY  Active Ambulatory Problems     Diagnosis Date Noted    DJD (degenerative joint disease) of knee 12/14/2017    Primary hypertension 02/18/2019    SOB (shortness of breath) 02/18/2019    Leg swelling 02/18/2019    Hyperlipidemia LDL goal <100 08/23/2019    COVID-19 11/25/2022    Diabetes mellitus     GIOVANNY (obstructive sleep apnea)     Disease of thyroid gland     CKD (chronic kidney disease)     Hypomagnesemia     Chronic brain injury     Generalized weakness     CAD (coronary artery disease)     Pedal edema 03/06/2023    Tremor 03/06/2023    Elevated troponin 03/06/2023    Lower extremity cellulitis 03/06/2023    Tinea cruris 03/06/2023    Chronic deep vein thrombosis (DVT) of calf muscle vein of left lower extremity 03/07/2023    Aortic stenosis, severe 03/06/2023    Acute urinary tract infection 04/17/2023    Hypothyroidism 04/17/2023    Acute urinary retention 04/19/2023    Acute bacterial prostatitis 04/19/2023    Chest pain 06/25/2024    Recurrent falls 08/08/2024    Hypokalemia 08/09/2024    Acute on chronic systolic CHF (congestive heart failure) 10/06/2024    Acute on chronic diastolic heart failure 10/06/2024    Acute on chronic diastolic CHF (congestive heart failure) 10/07/2024    Atrial fibrillation, persistent 10/08/2024    Chronic combined systolic and diastolic congestive heart failure 10/11/2024    Anemia 10/31/2024    Chronic anticoagulation 01/16/2025    Mycoplasma pneumonia 02/10/2025    Viral URI 02/10/2025     Resolved Ambulatory Problems     Diagnosis Date Noted    Chest pain with high risk of acute coronary syndrome 04/29/2019    Chest pain  01/02/2024    Chest pain 01/04/2024    SDH (subdural hematoma) 02/09/2025     Past Medical History:   Diagnosis Date    A-fib     Aortic stenosis     Arthritis     Bilateral leg edema     Diastolic heart failure     GERD (gastroesophageal reflux disease)     Heart murmur     History of head injury     [place] (hard of hearing)     Hyperlipidemia     Hypertension     Irregular heart beat     Osteoarthritis     Past heart attack     SOB (shortness of breath) on exertion     Stroke     Vasovagal episode          PAST SURGICAL HISTORY  Past Surgical History:   Procedure Laterality Date    CARDIAC CATHETERIZATION N/A 4/30/2019    Procedure: Coronary angiography with grafts;  Surgeon: Rio Miranda MD;  Location: [facility] CATH INVASIVE LOCATION;  Service: Cardiology    CARDIAC CATHETERIZATION N/A 4/30/2019    Procedure: Left Heart Cath;  Surgeon: Rio Miranda MD;  Location: [facility] CATH INVASIVE LOCATION;  Service: Cardiology    CARDIAC CATHETERIZATION N/A 4/30/2019    Procedure: Left ventriculography;  Surgeon: Rio Miranda MD;  Location: [facility] CATH INVASIVE LOCATION;  Service: Cardiology    CARDIAC CATHETERIZATION  4/30/2019    Procedure: Saphenous Vein Graft;  Surgeon: Rio Miranda MD;  Location: [facility] CATH INVASIVE LOCATION;  Service: Cardiology    CARDIAC CATHETERIZATION N/A 4/30/2019    Procedure: Stent GLENDY coronary;  Surgeon: Rio Miranda MD;  Location: [facility] CATH INVASIVE LOCATION;  Service: Cardiology    CARDIAC CATHETERIZATION N/A 3/10/2023    Procedure: Right and Left Heart Cath;  Surgeon: Rio Miranda MD;  Location: [facility] CATH INVASIVE LOCATION;  Service: Cardiology;  Laterality: N/A;    CARDIAC CATHETERIZATION N/A 3/10/2023    Procedure: Coronary angiography;  Surgeon: Rio Miranda MD;  Location: [facility] CATH INVASIVE LOCATION;  Service: Cardiology;  Laterality: N/A;    CARDIAC CATHETERIZATION N/A 3/10/2023    Procedure: Left ventriculography;   Surgeon: Rio Miranda MD;  [facility];  Service: Cardiology;  Laterality: N/A;    CARDIAC CATHETERIZATION N/A 3/10/2023    Procedure: Percutaneous Coronary Intervention;  Surgeon: Rio Miranda MD;  [facility];  Service: Cardiology;  Laterality: N/A;    CARDIAC CATHETERIZATION N/A 3/10/2023    Procedure: Stent GLENDY coronary;  Surgeon: Rio Miranda MD;  [facility];  Service: Cardiology;  Laterality: N/A;    CARDIAC CATHETERIZATION N/A 1/3/2024    Procedure: Left Heart Cath;  Surgeon: Rio Miranda MD;  [facility];  Service: Cardiovascular;  Laterality: N/A;    CARDIAC CATHETERIZATION N/A 1/3/2024    Procedure: Coronary angiography;  Surgeon: Rio Miranda MD;  [facility];  Service: Cardiovascular;  Laterality: N/A;    CARDIAC CATHETERIZATION N/A 1/3/2024    Procedure: Percutaneous Coronary Intervention;  Surgeon: Rio Miranda MD;  Location: [facility] CATH INVASIVE LOCATION;  Service: Cardiovascular;  Laterality: N/A;    CARDIAC CATHETERIZATION N/A 1/3/2024    Procedure: Left ventriculography;  Surgeon: Rio Miranda MD;  Location[facility] LOC[facility];  Service: Cardiovascular;  Laterality: N/A;    CARDIAC CATHETERIZATION Left 1/3/2024    Procedure: Native mammary injection;  Surgeon: Rio Miranda MD;  Location: [facility] CATH INVASIVE LOCATION;  Service: Cardiovascular;  Laterality: Left;    CARDIAC CATHETERIZATION N/A 6/26/2024    Procedure: Right and Left Heart Cath;  Surgeon: Rio Miranda MD;  Location[facility] LOC[facility];  Service: Cardiovascular;  Laterality: N/A;    CARDIAC CATHETERIZATION N/A 6/26/2024    Procedure: Percutaneous Coronary Intervention;  Surgeon: Rio Miranda MD;  Location: [facility] CATH INVASIVE LOCATION;  Service: Cardiovascular;  Laterality:  N/A;    CARDIAC CATHETERIZATION N/A 6/26/2024    Procedure: Left ventriculography;  Surgeon: Rio Miranda MD;  [facility];  Service: Cardiovascular;  Laterality: N/A;    CARDIAC CATHETERIZATION N/A 6/26/2024    Procedure: Coronary angiography;  Surgeon: Rio Miranda MD;  [facility];  Service: Cardiovascular;  Laterality: N/A;    CARDIAC CATHETERIZATION  6/26/2024    Procedure: Saphenous Vein Graft;  Surgeon: Rio Miranda MD;  [facility];  Service: Cardiovascular;;    CARDIAC CATHETERIZATION N/A 6/26/2024    Procedure: Stent GLENDY coronary;  Surgeon: Rio Miranda MD;  [facility];  Service: Cardiovascular;  Laterality: N/A;    CARPAL TUNNEL RELEASE Bilateral     CATARACT EXTRACTION      CORONARY ARTERY BYPASS GRAFT  2002    FINGER SURGERY      MISSING LEFT POINTER FINGER TIP    INTERVENTIONAL RADIOLOGY PROCEDURE N/A 6/26/2024    Procedure: Intravascular Ultrasound;  Surgeon: Rio Miranda MD;  Location: [facility] CATH INVASIVE LOCATION;  Service: Cardiovascular;  Laterality: N/A;    KNEE ARTHROPLASTY Right 02/15/2017    [place] ARTHRP KNE CONDYLE&PLATU MEDIAL&LAT COMPARTMENTS Left 12/14/2017    Procedure: LT TOTAL KNEE ARTHROPLASTY;  Surgeon: Jatin Lancaster MD;  Location: [facility] MAIN OR;  Service: Orthopedics    NY RT/LT HEART CATHETERS N/A 4/30/2019    Procedure: Percutaneous Coronary Intervention;  Surgeon: Rio Miranda MD;  Location: [facility] CATH INVASIVE LOCATION;  Service: Cardiology         FAMILY HISTORY  Family History   Problem Relation Age of Onset    Parkinsonism Sister     Diabetes Brother     Malig Hyperthermia Neg Hx          SOCIAL HISTORY  Social History     Socioeconomic History    Marital status: Single   Tobacco Use    Smoking status: Never     Passive exposure: Never    Smokeless tobacco: Never   Vaping Use    Vaping status: Never Used    Substance and Sexual Activity    Alcohol use: No    Drug use: No    Sexual activity: Defer         ALLERGIES  Patient has no known allergies.      REVIEW OF SYSTEMS  Review of Systems  Included in HPI  All systems reviewed and negative except for those discussed in HPI.      PHYSICAL EXAM    I have reviewed the triage vital signs and nursing notes.    ED Triage Vitals [05/04/25 1957]   Temp Heart Rate Resp BP SpO2   97.9 °F (36.6 °C) 72 18 133/74 96 %      Temp src Heart Rate Source Patient Position BP Location FiO2 (%)   Oral Monitor Sitting Left arm --       Physical Exam    Physical Exam   Constitutional: Acute on chronically ill-appearing.  No acute distress  HENT:  Head: Subacute ecchymosis of the face and forehead.  No depressed skull fracture.  Oropharynx: Mucous membranes are moist.   Eyes: . No scleral icterus. No conjunctival pallor.  Subconjunctival hemorrhage noted bilaterally.  Neck: Normal range of motion. Neck supple.   Cardiovascular: Pink warm and well perfused throughout.  Irregularly irregular  Pulmonary/Chest: No respiratory distress.  No tachypnea or increased work of breathing appreciated.  Speaks in full sentences  Abdominal: Soft. There is no tenderness. There is no rebound and no guarding.  No peritoneal findings but some acute and subacute ecchymosis to the abdominal wall and posterior thorax noted.  Musculoskeletal: Moves all extremities equally.  Numerous ecchymosis.  Erythema with induration right lower extremity with a skin tear that appears to be the source of cellulitis.  3+ pitting edema bilateral lower extremities.  Neurological: Alert and oriented.  No acute focal deficit appreciated.  Skin: Skin is pink, warm, and dry.   Psychiatric: Mood and affect normal.   Nursing note and vitals reviewed.             LAB RESULTS  Recent Results (from the past 24 hours)   ECG 12 Lead Tachycardia    Collection Time: 05/04/25  8:25 PM   Result Value Ref Range    QT Interval 424 ms    QTC  Interval 465 ms         RADIOLOGY  No Radiology Exams Resulted Within Past 24 Hours      MEDICATIONS GIVEN IN ER  Medications   sodium chloride 0.9 % flush 10 mL (has no administration in time range)   cefTRIAXone (ROCEPHIN) 2,000 mg in sodium chloride 0.9 % 100 mL MBP (has no administration in time range)         ORDERS PLACED DURING THIS VISIT:  Orders Placed This Encounter   Procedures    Blood Culture - Blood,    Blood Culture - Blood,    XR Chest 1 View    CT Head Without Contrast    CT Cervical Spine Without Contrast    CT Chest Without Contrast Diagnostic    CT Abdomen Pelvis Without Contrast    [place] Draw    Comprehensive Metabolic Panel    High Sensitivity Troponin T    Magnesium    Urinalysis With Microscopic If Indicated (No Culture) - Urine, Clean Catch    CBC Auto Differential    Protime-INR    aPTT    Valproic Acid Level, Total    CK    Lactic Acid, Plasma    Procalcitonin    BNP    NPO Diet NPO Type: Strict NPO    Undress & Gown    Continuous Pulse Oximetry    Vital Signs    Orthostatic Blood Pressure    Oxygen Therapy- Nasal Cannula; Titrate 1-6 LPM Per SpO2; 90 - 95%    POC Glucose Once    ECG 12 Lead Tachycardia    Insert Peripheral IV    Fall Precautions    CBC & Differential    Green Top (Gel)    Lavender Top    Gold Top - SST    Light Blue Top         OUTPATIENT MEDICATION MANAGEMENT:  Current Facility-Administered Medications Ordered in Epic   Medication Dose Route Frequency Provider Last Rate Last Admin    cefTRIAXone (ROCEPHIN) 2,000 mg in sodium chloride 0.9 % 100 mL MBP  2,000 mg Intravenous Once René Holley MD        sodium chloride 0.9 % flush 10 mL  10 mL Intravenous PRN René Holley MD         Current Outpatient Medications Ordered in Epic   Medication Sig Dispense Refill    acetaminophen (TYLENOL) 325 MG tablet Take 2 tablets every 6 hours by oral route as needed.      aspirin 81 MG EC tablet Take 1 tablet by mouth Daily.      atorvastatin (LIPITOR) 40 MG tablet Take 1 tablet by  mouth Every Night.      carvedilol (COREG) 3.125 MG tablet Take 1 tablet by mouth 2 (Two) Times a Day. 180 tablet 3    divalproex (DEPAKOTE) 500 MG DR tablet Take 1 tablet by mouth 2 (Two) Times a Day.      docusate sodium (COLACE) 100 MG capsule Take 1 capsule by mouth 2 (Two) Times a Day. 28 capsule 0    empagliflozin (JARDIANCE) 10 MG tablet tablet Take 1 tablet by mouth Daily.      ferrous sulfate 325 (65 FE) MG tablet Take 1 tablet by mouth Daily With Breakfast.      Fluticasone-Salmeterol (ADVAIR/WIXELA) 100-50 MCG/ACT DISKUS Inhale 2 (Two) Times a Day.      folic acid (FOLVITE) 1 MG tablet Take 1 tablet by mouth Daily.      furosemide (LASIX) 20 MG tablet Take 1 tablet by mouth Every Night.      furosemide (LASIX) 40 MG tablet Take 1 tablet by mouth Every Morning.      levothyroxine (SYNTHROID, LEVOTHROID) 50 MCG tablet Take 1 tablet by mouth Daily. 90 tablet 1    loperamide (IMODIUM) 2 MG capsule TAKE (1) CAPSULE BY MOUTH EVERY SIX HOURS AS NEEDED FOR DIARRHEA      Melatonin 10 MG tablet Take 1 tablet by mouth Every Night.      omeprazole (priLOSEC) 40 MG capsule Take 1 capsule by mouth Every Evening.      oxybutynin XL (DITROPAN-XL) 5 MG 24 hr tablet Take 1 tablet by mouth Daily.      rOPINIRole (REQUIP) 1 MG tablet Take 1 tablet by mouth 3 (Three) Times a Day. Take 1 hour before bedtime.      spironolactone (ALDACTONE) 25 MG tablet Take 0.5 tablets by mouth Daily. 45 tablet 3    tamsulosin (FLOMAX) 0.4 MG capsule 24 hr capsule Take 2 capsules by mouth Daily. 30 capsule     vitamin B-12 (VITAMIN B-12) 1000 MCG tablet Take 1 tablet by mouth Daily.           PROCEDURES  Procedures            PROGRESS, DATA ANALYSIS, CONSULTS, AND MEDICAL DECISION MAKING  All labs have been independently interpreted by me.  All radiology studies have been reviewed by me. All EKGs have been independently viewed and interpreted by me.  Discussion below represents my analysis of pertinent findings related to patient's condition,  differential diagnosis, treatment plan and final disposition.    Differential diagnosis:   My differential diagnosis includes but is not limited to generalized weakness, electrolyte abnormality, CVA, TIA, Bell's palsy, acute MI, GI bleed, urinary tract infection, systemic infections including sepsis, alcohol abuse, drug abuse including prescription and street drug.      Clinical Scores:                  ED Course as of 05/04/25 2029   Sun May 04, 2025   2028 EKG           EKG time: 2025  Rhythm/Rate: Rate controlled A-fib, ventricular rate 71  P waves and [place]: A-fib  QRS, axis: Narrow complex  ST and T waves: No STEMI; QTc within normal limits    Interpreted Contemporaneously by me, independently viewed     [RS]      ED Course User Index  [RS] René Holley MD         Prescription drug monitoring program review:     AS OF 20:29 EDT VITALS:    BP - 133/74  HR - 72  TEMP - 97.9 °F (36.6 °C) (Oral)  O2 SATS - 96%    COMPLEXITY OF CARE  The patient requires admission.      DIAGNOSIS  Final diagnoses:   Cellulitis of right leg   Multiple falls   Injury of head, initial encounter   Chronic anticoagulation   Atrial fibrillation, unspecified type   Bilateral lower extremity edema         DISPOSITION  ED Disposition       ED Disposition   Intended Admit    Condition   --    Comment   --                  ADMISSION    Discussed treatment plan and reason for admission with pt/family and admitting physician.  Pt/family voiced understanding of the plan for admission for further testing/treatment as needed.       Please note that portions of this document were completed with a voice recognition program.    Note Disclaimer: At [facility], we believe that sharing information builds trust and better relationships. You are receiving this note because you recently visited [facility]. It is possible you will see health information before a provider has talked with you about it. This kind of information can be easy to  misunderstand. To help you fully understand what it means for your health, we urge you to discuss this note with your provider.         René Holley MD  05/04/25 2029      Electronically signed by René Holley MD at 05/04/25 2029       Rosamaria James, RN at 05/04/25 1954          Pt to triage from home via [facility] [id number] with c/o multiple falls. Pt states fell about 6 times today. Pt seen here on 4/26 for falls and nasal fracture. Pt still with bruising to left side of face. Pt states he hurt his back when he fell today, did not hit head. Pt on xarelto.     Electronically signed by Rosamaria James RN at 05/04/25 1959       Medication Administration Report for Hernando Lozada as of 5/7/25 through 5/7/25     Legend:    Given Hold Not Given Due Canceled Entry Other Actions    Time Time (Time) Time Time-Action         Discontinued     Completed     Future     MAR Hold     Linked             Medications 05/07/25      acetaminophen (TYLENOL) tablet 650 mg  Dose: 650 mg  Freq: Every 4 Hours PRN Route: PO  PRN Reason: Mild Pain  Start: 05/05/25 0110     Admin Instructions:   If given for fever, use fever parameter: fever greater than 100.4 °F  Based on patient request - if ordered for moderate or severe pain, provider allows for administration of a medication prescribed for a lower pain scale.    Do not exceed 4 grams of acetaminophen in a 24 hr period. Max dose of 2gm for AST/ALT greater than 120 units/L.    If given for pain, use the following pain scale:   Mild Pain = Pain Score of 1-3, CPOT 1-2  Moderate Pain = Pain Score of 4-6, CPOT 3-4  Severe Pain = Pain Score of 7-10, CPOT 5-8         Or   acetaminophen (TYLENOL) 160 MG/5ML oral solution 650 mg  Dose: 650 mg  Freq: Every 4 Hours PRN Route: PO  PRN Reason: Mild Pain  Start: 05/05/25 0110     Admin Instructions:   If given for fever, use fever parameter: fever greater than 100.4 °F  Based on patient request - if ordered for moderate or  severe pain, provider allows for administration of a medication prescribed for a lower pain scale.    Do not exceed 4 grams of acetaminophen in a 24 hr period. Max dose of 2gm for AST/ALT greater than 120 units/L.    If given for pain, use the following pain scale:   Mild Pain = Pain Score of 1-3, CPOT 1-2  Moderate Pain = Pain Score of 4-6, CPOT 3-4  Severe Pain = Pain Score of 7-10, CPOT 5-8         Or   acetaminophen (TYLENOL) suppository 650 mg  Dose: 650 mg  Freq: Every 4 Hours PRN Route: RE  PRN Reason: Mild Pain  Start: 05/05/25 0110     Admin Instructions:   If given for fever, use fever parameter: fever greater than 100.4 °F  Based on patient request - if ordered for moderate or severe pain, provider allows for administration of a medication prescribed for a lower pain scale.    Do not exceed 4 grams of acetaminophen in a 24 hr period. Max dose of 2gm for AST/ALT greater than 120 units/L.    If given for pain, use the following pain scale:   Mild Pain = Pain Score of 1-3, CPOT 1-2  Moderate Pain = Pain Score of 4-6, CPOT 3-4  Severe Pain = Pain Score of 7-10, CPOT 5-8         aluminum-magnesium hydroxide-simethicone (MAALOX MAX) 400-400-40 MG/5ML suspension 15 mL  Dose: 15 mL  Freq: Every 6 Hours PRN Route: PO  PRN Reason: Heartburn  Start: 05/05/25 0110     Admin Instructions:   Maximum 60 mL in 24 hours.         atorvastatin (LIPITOR) tablet 40 mg  Dose: 40 mg  Freq: Nightly Route: PO  Start: 05/05/25 2100     Admin Instructions:   Avoid grapefruit juice.      2100                    sennosides-docusate (PERICOLACE) 8.6-50 MG per tablet 2 tablet  Dose: 2 tablet  Freq: 2 Times Daily PRN Route: PO  PRN Reason: Constipation  Start: 05/05/25 0110     Admin Instructions:   Start bowel management regimen if patient has not had a bowel movement after 12 hours.         And   polyethylene glycol (MIRALAX) packet 17 g  Dose: 17 g  Freq: Daily PRN Route: PO  PRN Reason: Constipation  PRN Comment: Use if  senna-docusate is ineffective  Start: 05/05/25 0110     Admin Instructions:   Use if no bowel movement after 12 hours. Mix in 6-8 ounces of water.  Use 4-8 ounces of water, tea, or juice for each 17 gram dose.         And   bisacodyl (DULCOLAX) EC tablet 5 mg  Dose: 5 mg  Freq: Daily PRN Route: PO  PRN Reason: Constipation  PRN Comment: Use if polyethylene glycol is ineffective  Start: 05/05/25 0110     Admin Instructions:   Use if no bowel movement after 12 hours.  Swallow whole. Do not crush, split, or chew tablet.         And   bisacodyl (DULCOLAX) suppository 10 mg  Dose: 10 mg  Freq: Daily PRN Route: RE  PRN Reason: Constipation  PRN Comment: Use if bisacodyl oral is ineffective  Start: 05/05/25 0110     Admin Instructions:   Use if no bowel movement after 12 hours.  Hold for diarrhea         budesonide-formoterol (SYMBICORT) 160-4.5 MCG/ACT inhaler 2 puff  Dose: 2 puff  Freq: 2 Times Daily - RT Route: IN  Start: 05/05/25 1400     Admin Instructions:      Shake well.  Rinse mouth after use, do not swallow water.  Send aerosols to pharmacy in ziplock bag for proper disposal.      0739-Given     2130                  carvedilol (COREG) tablet 3.125 mg  Dose: 3.125 mg  Freq: 2 Times Daily Route: PO  Start: 05/05/25 2100     Admin Instructions:   Hold for SBP less than 100, DBP less than 60, or heart rate less than 50. If a dose is held, please contact the provider.  Give with food.      0750-Held by provider [C]     0900-Held by provider     2100-Held by provider                 clopidogrel (PLAVIX) tablet 75 mg  Dose: 75 mg  Freq: Daily Route: PO  Start: 05/05/25 1400      0948-Given                   dextrose (D50W) (25 g/50 mL) IV injection 25 g  Dose: 25 g  Freq: Every 15 Minutes PRN Route: IV  PRN Reason: Low Blood Sugar  PRN Comment: Blood Sugar Less Than 70  Start: 05/05/25 0115     Admin Instructions:   Blood sugar less than 70; patient has IV access - Unresponsive, NPO or Unable To Safely Swallow          dextrose (GLUTOSE) oral gel 15 g  Dose: 15 g  Freq: Every 15 Minutes PRN Route: PO  PRN Reason: Low Blood Sugar  PRN Comment: Blood sugar less than 70  Start: 05/05/25 0115     Admin Instructions:   BS<70, Patient Alert, Is not NPO, Can safely swallow.         divalproex (DEPAKOTE) DR tablet 500 mg  Dose: 500 mg  Freq: 2 Times Daily Route: PO  Start: 05/05/25 2100     Admin Instructions:   Do not crush or chew the capsules or tablets. The drug may not work as designed if the capsule or tablet is crushed or chewed. Swallow whole.  Group 2 (Pink) Hazardous Drug - Reproductive Risk Only - See Handling Guide      0949-Given     2100                  empagliflozin (JARDIANCE) tablet 10 mg  Dose: 10 mg  Freq: Daily Route: PO  Start: 05/05/25 1400      0949-Given                   ferrous sulfate tablet 325 mg  Dose: 325 mg  Freq: Daily With Breakfast Route: PO  Start: 05/05/25 1400     Admin Instructions:   Swallow whole. Do not crush, split, or chew. Take with food if GI upset occurs.      0949-Given                   folic acid (FOLVITE) tablet 1 mg  Dose: 1 mg  Freq: Daily Route: PO  Start: 05/05/25 1400      0948-Given                   furosemide (LASIX) tablet 20 mg  Dose: 20 mg  Freq: Nightly Route: PO  Start: 05/05/25 2100      1800                   furosemide (LASIX) tablet 40 mg  Dose: 40 mg  Freq: Every Early Morning Route: PO  Start: 05/06/25 0600      0949-Given                   glucagon (GLUCAGEN) injection 1 mg  Dose: 1 mg  Freq: Every 15 Minutes PRN Route: IM  PRN Reason: Low Blood Sugar  PRN Comment: Blood Glucose Less Than 70  Start: 05/05/25 0115     Admin Instructions:   Blood Glucose Less Than 70 - Patient Without IV Access - Unresponsive, NPO or Unable To Safely Swallow  Reconstitute powder for injection by adding 1 mL of -supplied sterile diluent or sterile water for injection to a vial containing 1 mg of the drug, to provide solutions containing 1 mg/mL. Shake vial gently to  dissolve.         insulin lispro (HUMALOG/ADMELOG) injection 2-9 Units  Dose: 2-9 Units  Freq: 4 Times Daily Before Meals & Nightly Route: SC  Start: 05/05/25 0730     Admin Instructions:   Correction Insulin - Moderate Dose (Total Insulin Dose 40-60 units/day, Average Weight Patient, Patient Taking Oral Hypoglycemic)    Blood Glucose 150-199 mg/dL - 2 units  Blood Glucose 200-249 mg/dL - 4 units  Blood Glucose 250-299 mg/dL - 6 units  Blood Glucose 300-349 mg/dL - 7 units  Blood Glucose 350-400 mg/dL - 8 units  Blood Glucose greater than 400 mg/dL - 9 units & Call Provider     Caution: Look alike/sound alike drug alert        (0804)-Not Given     (1155)-Not Given     1730 2100                levothyroxine (SYNTHROID, LEVOTHROID) tablet 50 mcg  Dose: 50 mcg  Freq: Every Early Morning Route: PO  Start: 05/05/25 1315     Admin Instructions:   Take on empty stomach.      0948-Given                   melatonin tablet 5 mg  Dose: 5 mg  Freq: Nightly PRN Route: PO  PRN Reason: Sleep  Start: 05/05/25 0110          ondansetron ODT (ZOFRAN-ODT) disintegrating tablet 4 mg  Dose: 4 mg  Freq: Every 6 Hours PRN Route: PO  PRN Reasons: Nausea,Vomiting  Start: 05/05/25 0110     Admin Instructions:   If BOTH ondansetron (ZOFRAN) and promethazine (PHENERGAN) are ordered use ondansetron first and THEN promethazine IF ondansetron is ineffective.  Place on tongue and allow to dissolve.         Or   ondansetron (ZOFRAN) injection 4 mg  Dose: 4 mg  Freq: Every 6 Hours PRN Route: IV  PRN Reasons: Nausea,Vomiting  Start: 05/05/25 0110     Admin Instructions:   If BOTH ondansetron (ZOFRAN) and promethazine (PHENERGAN) are ordered use ondansetron first and THEN promethazine IF ondansetron is ineffective.         pantoprazole (PROTONIX) EC tablet 40 mg  Dose: 40 mg  Freq: Every Early Morning Route: PO  Start: 05/06/25 0600     Admin Instructions:   Swallow whole; do not crush, split, or chew.      0949-Given                   primidone  (MYSOLINE) tablet 50 mg  Dose: 50 mg  Freq: Nightly Route: PO  Start: 05/06/25 2100      2100                   rivaroxaban (XARELTO) tablet 20 mg  Dose: 20 mg  Freq: Daily Route: PO  Indications of Use: history of DVT/PE  Start: 05/05/25 1400     Admin Instructions:   If giving by tube: suspend in 50mL water, administer, and immediately follow with enteral feeding.  Administer doses 15mg or greater with food; doses of 2.5mg and 10mg may be administered without regard to meals.      0948-Given                   sodium chloride 0.9 % flush 10 mL  Dose: 10 mL  Freq: As Needed Route: IV  PRN Reason: Line Care  Start: 05/05/25 0109         sodium chloride 0.9 % flush 10 mL  Dose: 10 mL  Freq: As Needed Route: IV  PRN Reason: Line Care  Start: 05/04/25 2014         spironolactone (ALDACTONE) tablet 12.5 mg  Dose: 12.5 mg  Freq: Daily Route: PO  Start: 05/05/25 1400     Admin Instructions:   Hold for SBP less than 100, DBP less than 60.  Group 1 (Yellow) Hazardous Drug - See Handling Guide      0949-Given                   tamsulosin (FLOMAX) 24 hr capsule 0.8 mg  Dose: 0.8 mg  Freq: Daily Route: PO  Start: 05/05/25 1400     Admin Instructions:   Do not crush or chew the capsules or tablets. The drug may not work as designed if the capsule or tablet is crushed or chewed. Swallow whole. If patient unable to swallow whole, contact pharmacy for alternative.      0949-Given                   vitamin B-12 (CYANOCOBALAMIN) tablet 1,000 mcg  Dose: 1,000 mcg  Freq: Daily Route: PO  Start: 05/05/25 1400      0948-Given                   Completed Medications  Medications 05/07/25      cefTRIAXone (ROCEPHIN) 2,000 mg in sodium chloride 0.9 % 100 mL MBP  Dose: 2,000 mg  Freq: Once Route: IV  Indications Comment: Cellulitis right lower extremity  Start: 05/04/25 2040 End: 05/04/25 2213     Admin Instructions:   LR should be paused and flushing of the line with NS is recommended prior to and after completion of ceftriaxone infusion  due to incompatibility. Do not co-adminster with calcium-containing solutions.  Caution: Look alike/sound alike drug alert         mupirocin (BACTROBAN) 2 % ointment 1 Application  Dose: 1 Application  Freq: Once Route: TOP  Start: 05/04/25 2111 End: 05/04/25 2107     Admin Instructions:   Apply to RLE wound.         Discontinued Medications  Medications 05/07/25      cefTRIAXone (ROCEPHIN) 2,000 mg in sodium chloride 0.9 % 100 mL MBP  Dose: 2,000 mg  Freq: Daily Route: IV  Indications Comment: Cellulitis right lower extremity  Start: 05/05/25 1000 End: 05/05/25 1336     Admin Instructions:   LR should be paused and flushing of the line with NS is recommended prior to and after completion of ceftriaxone infusion due to incompatibility. Do not co-adminster with calcium-containing solutions.  Caution: Look alike/sound alike drug alert         oxybutynin XL (DITROPAN-XL) 24 hr tablet 5 mg  Dose: 5 mg  Freq: Daily Route: PO  Start: 05/05/25 1400 End: 05/05/25 1308     Admin Instructions:   Do not crush or chew the capsules or tablets. The drug may not work as designed if the capsule or tablet is crushed or chewed. Swallow whole.         rOPINIRole (REQUIP) tablet 1 mg  Dose: 1 mg  Freq: 3 Times Daily Route: PO  Start: 05/05/25 1600 End: 05/06/25 1725     Admin Instructions:   Caution: Look alike/sound alike drug alert                     Physician Progress Notes (last 24 hours)  Notes from 05/06/25 1554 through 05/07/25 1554   No notes of this type exist for this encounter.       Consult Notes (last 24 hours)  Notes from 05/06/25 1554 through 05/07/25 1554   No notes of this type exist for this encounter.

## 2025-05-09 LAB
BACTERIA SPEC AEROBE CULT: NORMAL
BACTERIA SPEC AEROBE CULT: NORMAL

## 2025-05-16 ENCOUNTER — HOSPITAL ENCOUNTER (EMERGENCY)
Facility: HOSPITAL | Age: 80
Discharge: HOME | End: 2025-05-17
Attending: EMERGENCY MEDICINE
Payer: MEDICARE

## 2025-05-16 DIAGNOSIS — N18.9 CHRONIC KIDNEY DISEASE, UNSPECIFIED CKD STAGE: ICD-10-CM

## 2025-05-16 DIAGNOSIS — L08.9 SOFT TISSUE INFECTION: Primary | ICD-10-CM

## 2025-05-16 LAB
ALBUMIN SERPL-MCNC: 3.8 G/DL (ref 3.5–5.2)
ALBUMIN/GLOB SERPL: 1.3 G/DL
ALP SERPL-CCNC: 118 U/L (ref 39–117)
ALT SERPL W P-5'-P-CCNC: 16 U/L (ref 1–41)
ANION GAP SERPL CALCULATED.3IONS-SCNC: 12 MMOL/L (ref 5–15)
AST SERPL-CCNC: 18 U/L (ref 1–40)
BASOPHILS # BLD AUTO: 0.04 10*3/MM3 (ref 0–0.2)
BASOPHILS NFR BLD AUTO: 0.5 % (ref 0–1.5)
BILIRUB SERPL-MCNC: 0.5 MG/DL (ref 0–1.2)
BUN SERPL-MCNC: 15 MG/DL (ref 8–23)
BUN/CREAT SERPL: 15.3 (ref 7–25)
CALCIUM SPEC-SCNC: 8.5 MG/DL (ref 8.6–10.5)
CHLORIDE SERPL-SCNC: 99 MMOL/L (ref 98–107)
CO2 SERPL-SCNC: 29 MMOL/L (ref 22–29)
CREAT SERPL-MCNC: 0.98 MG/DL (ref 0.76–1.27)
DEPRECATED RDW RBC AUTO: 46.9 FL (ref 37–54)
EGFRCR SERPLBLD CKD-EPI 2021: 78.9 ML/MIN/1.73
EOSINOPHIL # BLD AUTO: 0.04 10*3/MM3 (ref 0–0.4)
EOSINOPHIL NFR BLD AUTO: 0.5 % (ref 0.3–6.2)
ERYTHROCYTE [DISTWIDTH] IN BLOOD BY AUTOMATED COUNT: 13.3 % (ref 12.3–15.4)
GLOBULIN UR ELPH-MCNC: 3 GM/DL
GLUCOSE SERPL-MCNC: 129 MG/DL (ref 65–99)
HCT VFR BLD AUTO: 38.1 % (ref 37.5–51)
HGB BLD-MCNC: 12.8 G/DL (ref 13–17.7)
IMM GRANULOCYTES # BLD AUTO: 0.02 10*3/MM3 (ref 0–0.05)
IMM GRANULOCYTES NFR BLD AUTO: 0.2 % (ref 0–0.5)
LYMPHOCYTES # BLD AUTO: 1.42 10*3/MM3 (ref 0.7–3.1)
LYMPHOCYTES NFR BLD AUTO: 17.3 % (ref 19.6–45.3)
MCH RBC QN AUTO: 32.1 PG (ref 26.6–33)
MCHC RBC AUTO-ENTMCNC: 33.6 G/DL (ref 31.5–35.7)
MCV RBC AUTO: 95.5 FL (ref 79–97)
MONOCYTES # BLD AUTO: 0.88 10*3/MM3 (ref 0.1–0.9)
MONOCYTES NFR BLD AUTO: 10.7 % (ref 5–12)
NEUTROPHILS NFR BLD AUTO: 5.82 10*3/MM3 (ref 1.7–7)
NEUTROPHILS NFR BLD AUTO: 70.8 % (ref 42.7–76)
NRBC BLD AUTO-RTO: 0 /100 WBC (ref 0–0.2)
PLATELET # BLD AUTO: 247 10*3/MM3 (ref 140–450)
PMV BLD AUTO: 8.8 FL (ref 6–12)
POTASSIUM SERPL-SCNC: 3.8 MMOL/L (ref 3.5–5.2)
PROT SERPL-MCNC: 6.8 G/DL (ref 6–8.5)
RBC # BLD AUTO: 3.99 10*6/MM3 (ref 4.14–5.8)
SODIUM SERPL-SCNC: 140 MMOL/L (ref 136–145)
WBC NRBC COR # BLD AUTO: 8.22 10*3/MM3 (ref 3.4–10.8)

## 2025-05-16 PROCEDURE — 99283 EMERGENCY DEPT VISIT LOW MDM: CPT

## 2025-05-16 PROCEDURE — 80053 COMPREHEN METABOLIC PANEL: CPT

## 2025-05-16 PROCEDURE — 85025 COMPLETE CBC W/AUTO DIFF WBC: CPT

## 2025-05-16 RX ORDER — CEPHALEXIN 500 MG/1
500 CAPSULE ORAL 4 TIMES DAILY
Qty: 20 CAPSULE | Refills: 0 | Status: SHIPPED | OUTPATIENT
Start: 2025-05-16 | End: 2025-05-21

## 2025-05-16 NOTE — DISCHARGE INSTRUCTIONS
Please follow-up with your PCP.    Rest.  Increase fluid intake.  Ice as needed.  Apply warm compress several times per day.  Elevate to reduce swelling.      Although you are being discharged in the ED today, I encourage you to return for worsening symptoms.  Things can, and do, change such a treatment at home with medication may not be adequate.  Specifically I recommend returning for chest pain or discomfort, difficulty breathing, persistent vomiting or difficulty holding down liquids or medications, fever > 102.0 F,  or any other worsening or alarming symptoms.     Take meds as prescribed. FINISH ALL ANTIBIOTICS AS PRESCRIBED.    You have been evaluated in the emergency department for your presenting symptoms and deemed appropriate for discharge home.  Understand that your health care does not end here today.  It is important that your primary care physician be made aware of your visit.  I recommend calling your primary care provider in the next 48 hours to arrange follow-up care.  Your primary care provider needs to review your treatment and recovery to ensure that no further treatment is necessary.  Additional testing or procedures may be necessary as an outpatient at the discretion of your primary care provider.    I also recommend following up with your PCP for recheck of your blood pressure and treatment as needed.

## 2025-05-16 NOTE — CASE MANAGEMENT/SOCIAL WORK
Discharge Planning Assessment  [facility]     Patient Name: Hernando Lozada  MRN: [id number]  Today's Date: 5/16/2025    Admit Date: 5/16/2025        Discharge Needs Assessment    No documentation.                  Discharge Plan       Row Name 05/16/25 1930       Plan    Plan Comments Spoke with patient's care manager, Frieda, to confirm patient's status at [place] (LTC vs rehab). Frieda verifies that patient is there for short term rehab, but that he cannot return to his group home [facility] due to nothing being in place for his return (med rec, etc etc). Patient will need to return to [facility]. After a long conversation with patient, he is agreeable to this. I will place EMS dispatch request.                  Selected Continued Care - Prior Encounters Includes continued care and service providers with selected services from prior encounters from 2/15/2025 to 5/16/2025      Discharged on 5/7/2025 Admission date: 5/4/2025 - Discharge disposition: Intermediate Care       Destination       Service Provider Services Address Phone Fax Patient Preferred    [facility] [address] [facility] [address] [phone] --                             Demographic Summary    No documentation.                  Functional Status    No documentation.                  Psychosocial    No documentation.                  Abuse/Neglect    No documentation.                  Legal    No documentation.                  Substance Abuse    No documentation.                  Patient Forms    No documentation.                     Jackson Mcneill RN
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
## 2025-05-16 NOTE — ED NOTES
"AOX4 uses a wheelchair to get around    Pt states swelling has been in ml lower extremities [id number]   Pt states he fell approx 2 weks ago     Nursing report ED to floor  Hernando Lozada  80 y.o.  male    HPI :  HPI  Stated Reason for Visit: wellcheck  History Obtained From: patient    Chief Complaint  Chief Complaint   Patient presents with    Wellness Check       Admitting doctor:   No admitting provider for patient encounter.    Admitting diagnosis:   There were no encounter diagnoses.    Code status:   Current Code Status       Date Active Code Status Order ID Comments User Context       Prior            Allergies:   Patient has no known allergies.    Isolation:   No active isolations    Intake and Output  No intake or output data in the 24 hours ending 05/16/25 1843    Weight:       05/16/25  1801   Weight: 101 kg (223 lb)       Most recent vitals:   Vitals:    05/16/25 1650 05/16/25 1652 05/16/25 1801   BP:  (!) 155/110    Pulse: 69     Resp: 18     Temp:  98.2 °F (36.8 °C)    SpO2: 98%     Weight:   101 kg (223 lb)   Height:   175.3 cm (69\")       Active LDAs/IV Access:   Lines, Drains & Airways       Active LDAs       Name Placement date Placement time Site Days    Peripheral IV 05/16/25 1811 20 G Left Antecubital 05/16/25  1811  Antecubital  less than 1                    Labs (abnormal labs have a star):   Labs Reviewed   COMPREHENSIVE METABOLIC PANEL - Abnormal; Notable for the following components:       Result Value    Glucose 129 (*)     Calcium 8.5 (*)     Alkaline Phosphatase 118 (*)     All other components within normal limits    Narrative:     GFR Categories in Chronic Kidney Disease (CKD)              GFR Category          GFR (mL/min/1.73)    Interpretation  G1                    90 or greater        Normal or high (1)  G2                    60-89                Mild decrease (1)  G3a                   45-59                Mild to moderate decrease  G3b                   30-44                Moderate " Wil 45 Transitions Initial Follow Up Call    Outreach made within 2 business days of discharge: Yes    Patient: Louie Campbell   Patient : 1946   MRN: [id number]    Reason for Admission:   Discharge Date: 10/8/21       Spoke with: Unable to reach, first attempt.  Unidentified voicemail, mailbox full        Scheduled appointment with PCP within 7-14 days    Follow Up  Future Appointments   Date Time Provider Lynda Acevedo    87:31 PM Tiffanie Thomson, APRN [address] to severe decrease  G4                    15-29                Severe decrease  G5                    14 or less           Kidney failure    (1)In the absence of evidence of kidney disease, neither GFR category G1 or G2 fulfill the criteria for CKD.    eGFR calculation 2021 CKD-EPI creatinine equation, which does not include race as a factor   CBC WITH AUTO DIFFERENTIAL - Abnormal; Notable for the following components:    RBC 3.99 (*)     Hemoglobin 12.8 (*)     Lymphocyte % 17.3 (*)     All other components within normal limits   CBC AND DIFFERENTIAL    Narrative:     The following orders were created for panel order CBC & Differential.  Procedure                               Abnormality         Status                     ---------                               -----------         ------                     CBC Auto Differential[[id number]]        Abnormal            Final result                 Please view results for these tests on the individual orders.       EKG:   No orders to display       Meds given in ED:   Medications - No data to display    Imaging results:  No radiology results for the last day    Ambulatory status:   - wheelchair     Social issues:   Social History     Socioeconomic History    Marital status: Single   Tobacco Use    Smoking status: Never     Passive exposure: Never    Smokeless tobacco: Never   Vaping Use    Vaping status: Never Used   Substance and Sexual Activity    Alcohol use: No    Drug use: No    Sexual activity: Defer       Peripheral Neurovascular       Neuro Cognitive  Neuro Cognitive (Adult)  Cognitive/Neuro/Behavioral WDL: WDL, orientation, level of consciousness  Level of Consciousness: Alert  Orientation: oriented x 4    Learning  Learning Assessment  Learning Readiness and Ability: no barriers identified    Respiratory  Respiratory  Airway WDL: WDL  Respiratory WDL  Respiratory WDL: WDL    Abdominal Pain       Pain Assessments  Pain (Adult)  (0-10) Pain Rating: Rest:  5  Pain Location: extremity  Pain Side/Orientation: right, upper    NIH Stroke Scale       Liliya Parker RN  05/16/25 18:43 EDT

## 2025-05-16 NOTE — ED TRIAGE NOTES
Pt from [facility] via [facility] transport, pt reports he is here for swelling all over for months, also reports recent multiple falls w/various bruising all over    Staff at facility reports pt called [id number] d/t unhappy with the care he is receiving there

## 2025-05-17 VITALS
TEMPERATURE: 97.5 F | RESPIRATION RATE: 16 BRPM | HEART RATE: 79 BPM | BODY MASS INDEX: 33.03 KG/M2 | DIASTOLIC BLOOD PRESSURE: 77 MMHG | WEIGHT: 223 LBS | OXYGEN SATURATION: 98 % | SYSTOLIC BLOOD PRESSURE: 141 MMHG | HEIGHT: 69 IN

## 2025-05-17 NOTE — ED NOTES
EMS called at 0107 am stating that [facility] will not let them in the building and that they will not accept the pt back.  This RN got a cell phone number to the nurse EMS was talking to on scene and called [phone] and spoke with Ryan.  She is stating that the pt did not want to return to the facility when he left earlier today so why does he want to come back now, she was also upset that she did not get report on the patient prior to him returning.  This RN explained to the nurse that our  here at the [facility]\"" spoke with the patients  and the patient and had him set up to return to [facility] since there was nothing set up for him to return to his group home.  The patient was agreeable, when EMS arrived he knew he was returning to [facility] and did not say he did not want to go, he was ok with returning.  This RN also explained to the nurse that his primary nurse attempted to call report 5 times but they either would not answer the phone or hung up on our nurse.  The nurse at the facility said she does not have to accept the patient without report.  I again explained that we attempted to call, and could not get anyone on the phone.  Ryan took a short report from me and said it was okay for the patient to return.  This RN called our EMS crew that was on scene with the patient to make them aware that they will take the patient back.  This RN spoke with Jameel, with EMS.

## 2025-05-17 NOTE — ED NOTES
Attempt #3 to contact [facility]-unable to contact personnel at this time, Kyleigh Vieira RN notified. D/C instructions sent with EMS.

## 2025-05-18 ENCOUNTER — HOSPITAL ENCOUNTER (EMERGENCY)
Facility: HOSPITAL | Age: 80
Discharge: NURSING, REHAB OR LONG-TERM CARE | End: 2025-05-18
Attending: EMERGENCY MEDICINE | Admitting: EMERGENCY MEDICINE
Payer: MEDICARE

## 2025-05-18 VITALS
WEIGHT: 222.66 LBS | DIASTOLIC BLOOD PRESSURE: 63 MMHG | BODY MASS INDEX: 32.98 KG/M2 | HEIGHT: 69 IN | OXYGEN SATURATION: 100 % | RESPIRATION RATE: 18 BRPM | HEART RATE: 86 BPM | SYSTOLIC BLOOD PRESSURE: 118 MMHG | TEMPERATURE: 97.8 F

## 2025-05-18 DIAGNOSIS — I87.2 VENOUS INSUFFICIENCY OF BOTH LOWER EXTREMITIES: ICD-10-CM

## 2025-05-18 DIAGNOSIS — R60.0 PEDAL EDEMA: Primary | ICD-10-CM

## 2025-05-18 LAB
ALBUMIN SERPL-MCNC: 4 G/DL (ref 3.5–5.2)
ALBUMIN/GLOB SERPL: 1.3 G/DL
ALP SERPL-CCNC: 111 U/L (ref 39–117)
ALT SERPL W P-5'-P-CCNC: 13 U/L (ref 1–41)
ANION GAP SERPL CALCULATED.3IONS-SCNC: 9 MMOL/L (ref 5–15)
AST SERPL-CCNC: 19 U/L (ref 1–40)
BASOPHILS # BLD AUTO: 0.05 10*3/MM3 (ref 0–0.2)
BASOPHILS NFR BLD AUTO: 0.7 % (ref 0–1.5)
BILIRUB SERPL-MCNC: 0.7 MG/DL (ref 0–1.2)
BUN SERPL-MCNC: 14 MG/DL (ref 8–23)
BUN/CREAT SERPL: 13.2 (ref 7–25)
CALCIUM SPEC-SCNC: 9.6 MG/DL (ref 8.6–10.5)
CHLORIDE SERPL-SCNC: 96 MMOL/L (ref 98–107)
CO2 SERPL-SCNC: 32 MMOL/L (ref 22–29)
CREAT SERPL-MCNC: 1.06 MG/DL (ref 0.76–1.27)
DEPRECATED RDW RBC AUTO: 46.6 FL (ref 37–54)
EGFRCR SERPLBLD CKD-EPI 2021: 71.8 ML/MIN/1.73
EOSINOPHIL # BLD AUTO: 0.05 10*3/MM3 (ref 0–0.4)
EOSINOPHIL NFR BLD AUTO: 0.7 % (ref 0.3–6.2)
ERYTHROCYTE [DISTWIDTH] IN BLOOD BY AUTOMATED COUNT: 13.3 % (ref 12.3–15.4)
GLOBULIN UR ELPH-MCNC: 3 GM/DL
GLUCOSE SERPL-MCNC: 132 MG/DL (ref 65–99)
HCT VFR BLD AUTO: 37.4 % (ref 37.5–51)
HGB BLD-MCNC: 12.7 G/DL (ref 13–17.7)
IMM GRANULOCYTES # BLD AUTO: 0.02 10*3/MM3 (ref 0–0.05)
IMM GRANULOCYTES NFR BLD AUTO: 0.3 % (ref 0–0.5)
LYMPHOCYTES # BLD AUTO: 1.45 10*3/MM3 (ref 0.7–3.1)
LYMPHOCYTES NFR BLD AUTO: 19 % (ref 19.6–45.3)
MCH RBC QN AUTO: 32.2 PG (ref 26.6–33)
MCHC RBC AUTO-ENTMCNC: 34 G/DL (ref 31.5–35.7)
MCV RBC AUTO: 94.7 FL (ref 79–97)
MONOCYTES # BLD AUTO: 0.88 10*3/MM3 (ref 0.1–0.9)
MONOCYTES NFR BLD AUTO: 11.5 % (ref 5–12)
NEUTROPHILS NFR BLD AUTO: 5.17 10*3/MM3 (ref 1.7–7)
NEUTROPHILS NFR BLD AUTO: 67.8 % (ref 42.7–76)
NRBC BLD AUTO-RTO: 0 /100 WBC (ref 0–0.2)
PLATELET # BLD AUTO: 248 10*3/MM3 (ref 140–450)
PMV BLD AUTO: 9.2 FL (ref 6–12)
POTASSIUM SERPL-SCNC: 4.1 MMOL/L (ref 3.5–5.2)
PROT SERPL-MCNC: 7 G/DL (ref 6–8.5)
RBC # BLD AUTO: 3.95 10*6/MM3 (ref 4.14–5.8)
SODIUM SERPL-SCNC: 137 MMOL/L (ref 136–145)
WBC NRBC COR # BLD AUTO: 7.62 10*3/MM3 (ref 3.4–10.8)

## 2025-05-18 PROCEDURE — 80053 COMPREHEN METABOLIC PANEL: CPT | Performed by: EMERGENCY MEDICINE

## 2025-05-18 PROCEDURE — 99283 EMERGENCY DEPT VISIT LOW MDM: CPT

## 2025-05-18 PROCEDURE — 36415 COLL VENOUS BLD VENIPUNCTURE: CPT

## 2025-05-18 PROCEDURE — 85025 COMPLETE CBC W/AUTO DIFF WBC: CPT | Performed by: EMERGENCY MEDICINE

## 2025-05-18 NOTE — ED PROVIDER NOTES
EMERGENCY DEPARTMENT ENCOUNTER    Room Number:  10/10  PCP: Kath Higginbotham MD  Historian: Patient, EMS    I initially evaluated the patient at 1549    HPI:  Chief Complaint: Bilateral leg swelling and redness  A complete HPI/ROS/PMH/PSH/SH/FH are unobtainable due to: Nothing  Context: Hernando Lozada is a 80 y.o. male with a medical history of hypertension, CAD, hyperlipidemia, CHF, venous stasis, A-fib who presents to the ED c/o acute bilateral leg swelling and redness.  Symptoms have been present for several weeks.  Denies fever, chills, chest pain, or increased shortness of breath.  He has a history of venous stasis.  He has had wounds on his right lower leg since his leg struck a car door a couple of weeks ago.  He was seen here in the ED 2 days ago because he was unhappy with his nursing facility.  He was started on Keflex at that time for leg redness even though there was low suspicion for acute infection.  Per recent discharge summary, he has a history of venous stasis and chronic leg swelling.  He is on Lasix 40 mg daily and spironolactone 12.5 mg daily.            PAST MEDICAL HISTORY  Active Ambulatory Problems     Diagnosis Date Noted    DJD (degenerative joint disease) of knee 12/14/2017    Primary hypertension 02/18/2019    SOB (shortness of breath) 02/18/2019    Leg swelling 02/18/2019    Hyperlipidemia LDL goal <100 08/23/2019    COVID-19 11/25/2022    Diabetes mellitus     GIOVANNY (obstructive sleep apnea)     Disease of thyroid gland     CKD (chronic kidney disease)     Hypomagnesemia     Chronic brain injury     Generalized weakness     CAD (coronary artery disease)     Pedal edema 03/06/2023    Tremor 03/06/2023    Elevated troponin 03/06/2023    Lower extremity cellulitis 03/06/2023    Tinea cruris 03/06/2023    Chronic deep vein thrombosis (DVT) of calf muscle vein of left lower extremity 03/07/2023    Aortic stenosis, severe 03/06/2023    Acute urinary tract infection 04/17/2023    Hypothyroidism  04/17/2023    Acute urinary retention 04/19/2023    Acute bacterial prostatitis 04/19/2023    Chest pain 06/25/2024    Recurrent falls 08/08/2024    Hypokalemia 08/09/2024    Acute on chronic systolic CHF (congestive heart failure) 10/06/2024    Acute on chronic diastolic heart failure 10/06/2024    Acute on chronic diastolic CHF (congestive heart failure) 10/07/2024    Atrial fibrillation, persistent 10/08/2024    Chronic combined systolic and diastolic congestive heart failure 10/11/2024    Anemia 10/31/2024    Chronic anticoagulation 01/16/2025    Mycoplasma pneumonia 02/10/2025    Viral URI 02/10/2025    Accident due to mechanical fall without injury 05/05/2025    Hydronephrosis 05/05/2025    Distended bladder 05/05/2025    Facial bruising, initial encounter 05/05/2025    Back pain 05/05/2025    Age-related physical debility 05/06/2025    Cellulitis 05/06/2025     Resolved Ambulatory Problems     Diagnosis Date Noted    Chest pain with high risk of acute coronary syndrome 04/29/2019    Chest pain 01/02/2024    Chest pain 01/04/2024    SDH (subdural hematoma) 02/09/2025     Past Medical History:   Diagnosis Date    A-fib     Aortic stenosis     Arthritis     Bilateral leg edema     Diastolic heart failure     GERD (gastroesophageal reflux disease)     Heart murmur     History of head injury     [place] (hard of hearing)     Hyperlipidemia     Hypertension     Irregular heart beat     Osteoarthritis     Past heart attack     SOB (shortness of breath) on exertion     Stroke     Vasovagal episode          PAST SURGICAL HISTORY  Past Surgical History:   Procedure Laterality Date    CARDIAC CATHETERIZATION N/A 4/30/2019    Procedure: Coronary angiography with grafts;  Surgeon: Rio Miranda MD;  Location: [facility] INVASIVE LOCATION;  Service: Cardiology    CARDIAC CATHETERIZATION N/A 4/30/2019    Procedure: Left Heart Cath;  Surgeon: Roi Miranda MD;  Location: [facility] INVASIVE LOCATION;   Service: Cardiology    CARDIAC CATHETERIZATION N/A 4/30/2019    Procedure: Left ventriculography;  Surgeon: Rio Miranda MD;  [facility];  Service: Cardiology    CARDIAC CATHETERIZATION  4/30/2019    Procedure: Saphenous Vein Graft;  Surgeon: Rio Miranda MD;  [facility];  Service: Cardiology    CARDIAC CATHETERIZATION N/A 4/30/2019    Procedure: Stent GLENDY coronary;  Surgeon: Rio Miranda MD;  [facility];  Service: Cardiology    CARDIAC CATHETERIZATION N/A 3/10/2023    Procedure: Right and Left Heart Cath;  Surgeon: Rio Miranda MD;  [facility];  Service: Cardiology;  Laterality: N/A;    CARDIAC CATHETERIZATION N/A 3/10/2023    Procedure: Coronary angiography;  Surgeon: Rio Miranda MD;  Location: [facility] CATH INVASIVE LOCATION;  Service: Cardiology;  Laterality: N/A;    CARDIAC CATHETERIZATION N/A 3/10/2023    Procedure: Left ventriculography;  Surgeon: Rio Miranda MD;  [facility];  Service: Cardiology;  Laterality: N/A;    CARDIAC CATHETERIZATION N/A 3/10/2023    Procedure: Percutaneous Coronary Intervention;  Surgeon: Rio Miranda MD;  [facility];  Service: Cardiology;  Laterality: N/A;    CARDIAC CATHETERIZATION N/A 3/10/2023    Procedure: Stent GLENDY coronary;  Surgeon: Rio Miranda MD;  Location: [facility] CATH INVASIVE LOCATION;  Service: Cardiology;  Laterality: N/A;    CARDIAC CATHETERIZATION N/A 1/3/2024    Procedure: Left Heart Cath;  Surgeon: Rio Miranda MD;  [facility];  Service: Cardiovascular;  Laterality: N/A;    CARDIAC CATHETERIZATION N/A 1/3/2024    Procedure: Coronary angiography;  Surgeon: Rio Miranda MD;  [facility];  Service: Cardiovascular;  Laterality: N/A;    CARDIAC  CATHETERIZATION N/A 1/3/2024    Procedure: Percutaneous Coronary Intervention;  Surgeon: Rio Miranda MD;  Location:  [facility] CATH INVAS[facility];  Service: Cardiovascular;  Laterality: N/A;    CARDIAC CATHETERIZATION N/A 1/3/2024    Procedure: Left ventriculography;  Surgeon: Rio Miranda MD;  Location:  [facility] CATH INVASIVE LOC[facility];  Service: Cardiovascular;  Laterality: N/A;    CARDIAC CATHETERIZATION Left 1/3/2024    Procedure: Native mammary injection;  Surgeon: Rio Miranda MD;  Location:  [facility] CATH INVASIVE [facility];  Service: Cardiovascular;  Laterality: Left;    CARDIAC CATHETERIZATION N/A 6/26/2024    Procedure: Right and Left Heart Cath;  Surgeon: Rio Miranda MD;  Location:  [facility] CATH INVASIVE [facility];  Service: Cardiovascular;  Laterality: N/A;    CARDIAC CATHETERIZATION N/A 6/26/2024    Procedure: Percutaneous Coronary Intervention;  Surgeon: Rio Miranda MD;  Location:  [facility] CATH INVASIVE [facility];  Service: Cardiovascular;  Laterality: N/A;    CARDIAC CATHETERIZATION N/A 6/26/2024    Procedure: Left ventriculography;  Surgeon: Rio Miranda MD;  Location:  [facility] CATH INVASIVE [facility];  Service: Cardiovascular;  Laterality: N/A;    CARDIAC CATHETERIZATION N/A 6/26/2024    Procedure: Coronary angiography;  Surgeon: Rio Miranda MD;  [facility];  Service: Cardiovascular;  Laterality: N/A;    CARDIAC CATHETERIZATION  6/26/2024    Procedure: Saphenous Vein Graft;  Surgeon: Rio Miranda MD;  [facility];  Service: Cardiovascular;;    CARDIAC CATHETERIZATION N/A 6/26/2024    Procedure: Stent GLENDY coronary;  Surgeon: Rio Miranda MD;  Location:  [facility] CATH INVAS[facility];  Service: Cardiovascular;  Laterality: N/A;    CARPAL TUNNEL RELEASE Bilateral     CATARACT EXTRACTION      CORONARY ARTERY BYPASS GRAFT  2002    FINGER SURGERY      MISSING LEFT POINTER  FINGER TIP    INTERVENTIONAL RADIOLOGY PROCEDURE N/A 6/26/2024    Procedure: Intravascular Ultrasound;  Surgeon: Rio Miranda MD;  Location:  [facility] CATH INVASIVE LOC[facility];  Service: Cardiovascular;  Laterality: N/A;    KNEE ARTHROPLASTY Right 02/15/2017    MT ARTHRP KNE CONDYLE&PLATU MEDIAL&LAT COMPARTMENTS Left 12/14/2017    Procedure: LT TOTAL KNEE ARTHROPLASTY;  Surgeon: Jatin Lancaster MD;  Location:  [facility];  Service: Orthopedics    MT RT/LT HEART CATHETERS N/A 4/30/2019    Procedure: Percutaneous Coronary Intervention;  Surgeon: Rio Miranda MD;  Location:  [facility] CATH INVASIVE [facility];  Service: Cardiology         FAMILY HISTORY  Family History   Problem Relation Age of Onset    Parkinsonism Sister     Diabetes Brother     Malig Hyperthermia Neg Hx          SOCIAL HISTORY  Social History     Socioeconomic History    Marital status: Single   Tobacco Use    Smoking status: Never     Passive exposure: Never    Smokeless tobacco: Never   Vaping Use    Vaping status: Never Used   Substance and Sexual Activity    Alcohol use: No    Drug use: No    Sexual activity: Defer         ALLERGIES  Patient has no known allergies.    REVIEW OF SYSTEMS  Review of Systems  Included in HPI  All systems reviewed and negative except for those discussed in HPI.      PHYSICAL EXAM  ED Triage Vitals [05/18/25 1501]   Temp Heart Rate Resp BP SpO2   97.8 °F (36.6 °C) 63 16 118/63 100 %      Temp src Heart Rate Source Patient Position BP Location FiO2 (%)   Tympanic -- -- -- --       Physical Exam      GENERAL: Awake and alert.  Nontoxic-appearing elderly male.  No acute distress  HENT: There is diffuse bruising of the face, (due to fall 2 weeks ago), nares patent  EYES: no scleral icterus  CV: regular rhythm, normal rate  RESPIRATORY: normal effort, clear to auscultation bilaterally  ABDOMEN: soft, nontender  MUSCULOSKELETAL: Extremities are nontender with full range of motion.  There is 2+ edema in both  lower legs  NEURO: Speech is normal.  No facial droop.  PSYCH:  calm, cooperative  SKIN: warm, dry, there is erythema over the anterior aspect of both lower legs, no lymphangitis, there is a scant amount of serous drainage coming from a punctate wound on the left lower leg, there are healing abrasions on the right shin    Vital signs and nursing notes reviewed.          LAB RESULTS  Recent Results (from the past 24 hours)   Comprehensive Metabolic Panel    Collection Time: 05/18/25  4:11 PM    Specimen: Blood   Result Value Ref Range    Glucose 132 (H) 65 - 99 mg/dL    BUN 14 8 - 23 mg/dL    Creatinine 1.06 0.76 - 1.27 mg/dL    Sodium 137 136 - 145 mmol/L    Potassium 4.1 3.5 - 5.2 mmol/L    Chloride 96 (L) 98 - 107 mmol/L    CO2 32.0 (H) 22.0 - 29.0 mmol/L    Calcium 9.6 8.6 - 10.5 mg/dL    Total Protein 7.0 6.0 - 8.5 g/dL    Albumin 4.0 3.5 - 5.2 g/dL    ALT (SGPT) 13 1 - 41 U/L    AST (SGOT) 19 1 - 40 U/L    Alkaline Phosphatase 111 39 - 117 U/L    Total Bilirubin 0.7 0.0 - 1.2 mg/dL    Globulin 3.0 gm/dL    A/G Ratio 1.3 g/dL    BUN/Creatinine Ratio 13.2 7.0 - 25.0    Anion Gap 9.0 5.0 - 15.0 mmol/L    eGFR 71.8 >60.0 mL/min/1.73   CBC Auto Differential    Collection Time: 05/18/25  4:11 PM    Specimen: Blood   Result Value Ref Range    WBC 7.62 3.40 - 10.80 10*3/mm3    RBC 3.95 (L) 4.14 - 5.80 10*6/mm3    Hemoglobin 12.7 (L) 13.0 - 17.7 g/dL    Hematocrit 37.4 (L) 37.5 - 51.0 %    MCV 94.7 79.0 - 97.0 fL    MCH 32.2 26.6 - 33.0 pg    MCHC 34.0 31.5 - 35.7 g/dL    RDW 13.3 12.3 - 15.4 %    RDW-SD 46.6 37.0 - 54.0 fl    MPV 9.2 6.0 - 12.0 fL    Platelets 248 140 - 450 10*3/mm3    Neutrophil % 67.8 42.7 - 76.0 %    Lymphocyte % 19.0 (L) 19.6 - 45.3 %    Monocyte % 11.5 5.0 - 12.0 %    Eosinophil % 0.7 0.3 - 6.2 %    Basophil % 0.7 0.0 - 1.5 %    Immature Grans % 0.3 0.0 - 0.5 %    Neutrophils, Absolute 5.17 1.70 - 7.00 10*3/mm3    Lymphocytes, Absolute 1.45 0.70 - 3.10 10*3/mm3    Monocytes, Absolute 0.88 0.10 -  0.90 10*3/mm3    Eosinophils, Absolute 0.05 0.00 - 0.40 10*3/mm3    Basophils, Absolute 0.05 0.00 - 0.20 10*3/mm3    Immature Grans, Absolute 0.02 0.00 - 0.05 10*3/mm3    nRBC 0.0 0.0 - 0.2 /100 WBC       Ordered the above labs and reviewed the results.        RADIOLOGY  No Radiology Exams Resulted Within Past 24 Hours    Ordered the above noted radiological studies. Reviewed by me in PACS.            PROCEDURES  Procedures        OUTPATIENT MEDICATION MANAGEMENT:  No current Epic-ordered facility-administered medications on file.     Current Outpatient Medications Ordered in Epic   Medication Sig Dispense Refill    acetaminophen (TYLENOL) 325 MG tablet Take 2 tablets every 6 hours by oral route as needed.      atorvastatin (LIPITOR) 40 MG tablet Take 1 tablet by mouth Every Night.      cephalexin (KEFLEX) 500 MG capsule Take 1 capsule by mouth 4 (Four) Times a Day for 5 days. 20 capsule 0    clopidogrel (PLAVIX) 75 MG tablet Take 1 tablet by mouth Daily.      divalproex (DEPAKOTE) 500 MG DR tablet Take 1 tablet by mouth 2 (Two) Times a Day.      docusate sodium (COLACE) 100 MG capsule Take 1 capsule by mouth 2 (Two) Times a Day. 28 capsule 0    empagliflozin (JARDIANCE) 10 MG tablet tablet Take 1 tablet by mouth Daily.      ferrous sulfate 325 (65 FE) MG tablet Take 1 tablet by mouth Daily With Breakfast.      Fluticasone-Salmeterol (ADVAIR/WIXELA) 100-50 MCG/ACT DISKUS Inhale 2 (Two) Times a Day.      folic acid (FOLVITE) 1 MG tablet Take 1 tablet by mouth Daily.      furosemide (LASIX) 20 MG tablet Take 1 tablet by mouth Every Night.      furosemide (LASIX) 40 MG tablet Take 1 tablet by mouth Every Morning.      levothyroxine (SYNTHROID, LEVOTHROID) 50 MCG tablet Take 1 tablet by mouth Daily. 90 tablet 1    loperamide (IMODIUM) 2 MG capsule TAKE (1) CAPSULE BY MOUTH EVERY SIX HOURS AS NEEDED FOR DIARRHEA      Melatonin 10 MG tablet Take 1 tablet by mouth Every Night.      metoprolol succinate XL (Toprol XL) 25 MG  24 hr tablet Take 0.5 tablets by mouth Daily.      omeprazole (priLOSEC) 40 MG capsule Take 1 capsule by mouth Every Evening.      primidone (MYSOLINE) 50 MG tablet Take 2 tablets by mouth Every Night.      rivaroxaban (XARELTO) 20 MG tablet Take 1 tablet by mouth Daily.      spironolactone (ALDACTONE) 25 MG tablet Take 0.5 tablets by mouth Daily. 45 tablet 3    tamsulosin (FLOMAX) 0.4 MG capsule 24 hr capsule Take 2 capsules by mouth Daily. 30 capsule     vitamin B-12 (VITAMIN B-12) 1000 MCG tablet Take 1 tablet by mouth Daily.             MEDICATIONS GIVEN IN ER  Medications - No data to display                MEDICAL DECISION MAKING, PROGRESS, and CONSULTS    All labs have been independently reviewed by me.  All radiology studies have been reviewed by me and I have also reviewed the radiology report.   EKG's independently viewed and interpreted by me.  Discussion below represents my analysis of pertinent findings related to patient's condition, differential diagnosis, treatment plan and final disposition.      Additional sources:    - Discussed/ obtained information from independent historians: EMS    - External (non-ED) record review: Patient was admitted here 5/4 through 5/7/2025 for recurrent falls.  He had bilateral leg redness which was felt to be due to venous stasis and not cellulitis.  He was seen here in the ED 2 days ago because he was unhappy had his nursing facility.  He was noted to have chronic right leg and right arm wounds.  He was also noted to have 2+ edema to both lower legs.  White blood cell count was 8.2.  [facility] nurse with the patient and the plan was to have him meet with his  on 5/19 to discuss other placement options.  He was given a prescription for Keflex.    -Prescription drug monitoring program review:     N/A    - Chronic or social conditions impacting patient care (Social Determinants of Health): None          Orders placed during this visit:  Orders Placed This Encounter    Procedures    Comprehensive Metabolic Panel    CBC Auto Differential    CBC & Differential         Additional orders considered but not ordered:          Differential diagnosis includes, but is not limited to:    Venous insufficiency, CHF, cellulitis, stasis dermatitis      Independent interpretation of labs, radiology studies, and discussions with consultants:  ED Course as of 05/18/25 2208   Sun May 18, 2025   1548 BP: 118/63 [WH]   1548 Temp: 97.8 °F (36.6 °C) [WH]   1548 Heart Rate: 63 [WH]   1548 Resp: 16 [WH]   1548 SpO2: 100 %  On 2 L [WH]   1629 WBC: 7.62 [WH]   1629 Hemoglobin(!): 12.7 [WH]   1649 Glucose(!): 132 [WH]   1649 BUN: 14 [WH]   1649 Creatinine: 1.06 [WH]   1846 Patient is resting comfortably.  Test results and diagnoses were discussed with him.  He will be discharged back to his facility.  He was advised to take an extra dose of Lasix daily for the next 3 days.  All questions were answered.  Return precautions were discussed. [WH]   1859 MDM: Patient presented to the ED with bilateral leg swelling and redness.  This seems to be a chronic issue.  He was afebrile.  White blood cell count was normal.  Symptoms are most likely due to venous insufficiency and venous stasis dermatitis.  He was started on Keflex 2 days ago.  He will be discharged back to his facility. [WH]      ED Course User Index  [WH] Randall Smalls MD         COMPLEXITY OF CARE        DIAGNOSIS  Final diagnoses:   Pedal edema   Venous insufficiency of both lower extremities         DISPOSITION  DISCHARGE    Patient discharged in stable condition.    Reviewed implications of results, diagnosis, meds, responsibility to follow up, warning signs and symptoms of possible worsening, potential complications and reasons to return to ER, including fever, chills, worsening symptoms, trouble breathing, or other concern..    Patient/Family voiced understanding of above instructions.    Discussed plan for discharge, as there is no  emergent indication for admission. Patient referred to primary care provider for BP management due to today's BP. Pt/family is agreeable and understands need for follow up and repeat testing.  Pt is aware that discharge does not mean that nothing is wrong but it indicates no emergency is present that requires admission and they must continue care with follow-up as given below or physician of their choice.     FOLLOW-UP  Kath Higginbotham MD  [address] [place] [address] [phone]    Schedule an appointment as soon as possible for a visit            Medication List      No changes were made to your prescriptions during this visit.                   Latest Documented Vital Signs:  AS OF 22:08 EDT VITALS:    BP - 118/63  HR - 86  TEMP - 97.8 °F (36.6 °C) (Tympanic)  O2 SATS - 100%            --    Please note that portions of this were completed with a voice recognition program.       Note Disclaimer: At [facility], we believe that sharing information builds trust and better relationships. You are receiving this note because you are receiving care at [facility] or recently visited. It is possible you will see health information before a provider has talked with you about it. This kind of information can be easy to misunderstand. To help you fully understand what it means for your health, we urge you to discuss this note with your provider.             Randall Smalls MD  05/18/25 7447

## 2025-05-18 NOTE — DISCHARGE INSTRUCTIONS
Take extra dose of Lasix daily for the next 3 days.  Elevate your legs.  Follow-up with your primary care provider soon as possible.  Return to the emergency department for worsening symptoms, fever, trouble breathing, or other concerns.

## 2025-05-18 NOTE — ED NOTES
Report called to [facility]. Report given to Nurse Jauregui. EMS has been requested for transport

## 2025-05-28 ENCOUNTER — HOSPITAL ENCOUNTER (EMERGENCY)
Facility: HOSPITAL | Age: 80
Discharge: HOME | End: 2025-05-29
Attending: EMERGENCY MEDICINE
Payer: MEDICARE

## 2025-05-28 DIAGNOSIS — R10.84 GENERALIZED ABDOMINAL PAIN: ICD-10-CM

## 2025-05-28 DIAGNOSIS — K59.00 CONSTIPATION, UNSPECIFIED CONSTIPATION TYPE: Primary | ICD-10-CM

## 2025-05-28 PROCEDURE — 99285 EMERGENCY DEPT VISIT HI MDM: CPT

## 2025-05-28 PROCEDURE — 85730 THROMBOPLASTIN TIME PARTIAL: CPT | Performed by: EMERGENCY MEDICINE

## 2025-05-28 PROCEDURE — 83690 ASSAY OF LIPASE: CPT | Performed by: EMERGENCY MEDICINE

## 2025-05-28 PROCEDURE — 85610 PROTHROMBIN TIME: CPT | Performed by: EMERGENCY MEDICINE

## 2025-05-28 PROCEDURE — 80053 COMPREHEN METABOLIC PANEL: CPT | Performed by: EMERGENCY MEDICINE

## 2025-05-28 PROCEDURE — 85025 COMPLETE CBC W/AUTO DIFF WBC: CPT | Performed by: EMERGENCY MEDICINE

## 2025-05-28 RX ORDER — MORPHINE SULFATE 2 MG/ML
4 INJECTION, SOLUTION INTRAMUSCULAR; INTRAVENOUS ONCE AS NEEDED
Status: COMPLETED | OUTPATIENT
Start: 2025-05-28 | End: 2025-05-29

## 2025-05-28 RX ORDER — ONDANSETRON 2 MG/ML
8 INJECTION INTRAMUSCULAR; INTRAVENOUS ONCE AS NEEDED
Status: COMPLETED | OUTPATIENT
Start: 2025-05-28 | End: 2025-05-29

## 2025-05-29 ENCOUNTER — APPOINTMENT (OUTPATIENT)
Dept: CT IMAGING | Facility: HOSPITAL | Age: 80
End: 2025-05-29
Payer: MEDICARE

## 2025-05-29 VITALS
HEART RATE: 64 BPM | OXYGEN SATURATION: 99 % | RESPIRATION RATE: 20 BRPM | SYSTOLIC BLOOD PRESSURE: 112 MMHG | DIASTOLIC BLOOD PRESSURE: 66 MMHG | TEMPERATURE: 97.8 F

## 2025-05-29 LAB
ALBUMIN SERPL-MCNC: 3.7 G/DL (ref 3.5–5.2)
ALBUMIN/GLOB SERPL: 1.3 G/DL
ALP SERPL-CCNC: 101 U/L (ref 39–117)
ALT SERPL W P-5'-P-CCNC: 11 U/L (ref 1–41)
ANION GAP SERPL CALCULATED.3IONS-SCNC: 8.9 MMOL/L (ref 5–15)
APTT PPP: 33.2 SECONDS (ref 22.7–35.4)
AST SERPL-CCNC: 15 U/L (ref 1–40)
BASOPHILS # BLD AUTO: 0.04 10*3/MM3 (ref 0–0.2)
BASOPHILS NFR BLD AUTO: 0.5 % (ref 0–1.5)
BILIRUB SERPL-MCNC: 0.4 MG/DL (ref 0–1.2)
BILIRUB UR QL STRIP: NEGATIVE
BUN SERPL-MCNC: 14 MG/DL (ref 8–23)
BUN/CREAT SERPL: 14.1 (ref 7–25)
CALCIUM SPEC-SCNC: 8.8 MG/DL (ref 8.6–10.5)
CHLORIDE SERPL-SCNC: 100 MMOL/L (ref 98–107)
CLARITY UR: CLEAR
CO2 SERPL-SCNC: 30.1 MMOL/L (ref 22–29)
COLOR UR: YELLOW
CREAT SERPL-MCNC: 0.99 MG/DL (ref 0.76–1.27)
DEPRECATED RDW RBC AUTO: 47.4 FL (ref 37–54)
EGFRCR SERPLBLD CKD-EPI 2021: 78 ML/MIN/1.73
EOSINOPHIL # BLD AUTO: 0.04 10*3/MM3 (ref 0–0.4)
EOSINOPHIL NFR BLD AUTO: 0.5 % (ref 0.3–6.2)
ERYTHROCYTE [DISTWIDTH] IN BLOOD BY AUTOMATED COUNT: 13.6 % (ref 12.3–15.4)
GLOBULIN UR ELPH-MCNC: 2.9 GM/DL
GLUCOSE SERPL-MCNC: 111 MG/DL (ref 65–99)
GLUCOSE UR STRIP-MCNC: ABNORMAL MG/DL
HCT VFR BLD AUTO: 37.2 % (ref 37.5–51)
HGB BLD-MCNC: 12 G/DL (ref 13–17.7)
HGB UR QL STRIP.AUTO: NEGATIVE
IMM GRANULOCYTES # BLD AUTO: 0.04 10*3/MM3 (ref 0–0.05)
IMM GRANULOCYTES NFR BLD AUTO: 0.5 % (ref 0–0.5)
INR PPP: 1.37 (ref 0.9–1.1)
KETONES UR QL STRIP: ABNORMAL
LEUKOCYTE ESTERASE UR QL STRIP.AUTO: NEGATIVE
LIPASE SERPL-CCNC: 34 U/L (ref 13–60)
LYMPHOCYTES # BLD AUTO: 2 10*3/MM3 (ref 0.7–3.1)
LYMPHOCYTES NFR BLD AUTO: 26 % (ref 19.6–45.3)
MCH RBC QN AUTO: 30.8 PG (ref 26.6–33)
MCHC RBC AUTO-ENTMCNC: 32.3 G/DL (ref 31.5–35.7)
MCV RBC AUTO: 95.6 FL (ref 79–97)
MONOCYTES # BLD AUTO: 0.79 10*3/MM3 (ref 0.1–0.9)
MONOCYTES NFR BLD AUTO: 10.3 % (ref 5–12)
NEUTROPHILS NFR BLD AUTO: 4.79 10*3/MM3 (ref 1.7–7)
NEUTROPHILS NFR BLD AUTO: 62.2 % (ref 42.7–76)
NITRITE UR QL STRIP: NEGATIVE
NRBC BLD AUTO-RTO: 0 /100 WBC (ref 0–0.2)
PH UR STRIP.AUTO: 5.5 [PH] (ref 5–8)
PLATELET # BLD AUTO: 246 10*3/MM3 (ref 140–450)
PMV BLD AUTO: 8.9 FL (ref 6–12)
POTASSIUM SERPL-SCNC: 3.8 MMOL/L (ref 3.5–5.2)
PROT SERPL-MCNC: 6.6 G/DL (ref 6–8.5)
PROT UR QL STRIP: NEGATIVE
PROTHROMBIN TIME: 16.9 SECONDS (ref 11.7–14.2)
RBC # BLD AUTO: 3.89 10*6/MM3 (ref 4.14–5.8)
SODIUM SERPL-SCNC: 139 MMOL/L (ref 136–145)
SP GR UR STRIP: >1.03 (ref 1–1.03)
UROBILINOGEN UR QL STRIP: ABNORMAL
WBC NRBC COR # BLD AUTO: 7.7 10*3/MM3 (ref 3.4–10.8)

## 2025-05-29 PROCEDURE — 96375 TX/PRO/DX INJ NEW DRUG ADDON: CPT

## 2025-05-29 PROCEDURE — 96374 THER/PROPH/DIAG INJ IV PUSH: CPT

## 2025-05-29 PROCEDURE — 81003 URINALYSIS AUTO W/O SCOPE: CPT | Performed by: EMERGENCY MEDICINE

## 2025-05-29 PROCEDURE — 25010000002 ONDANSETRON PER 1 MG: Performed by: EMERGENCY MEDICINE

## 2025-05-29 PROCEDURE — 74177 CT ABD & PELVIS W/CONTRAST: CPT

## 2025-05-29 PROCEDURE — 25510000001 IOPAMIDOL 61 % SOLUTION: Performed by: EMERGENCY MEDICINE

## 2025-05-29 PROCEDURE — 25010000002 MORPHINE PER 10 MG: Performed by: EMERGENCY MEDICINE

## 2025-05-29 RX ORDER — IOPAMIDOL 612 MG/ML
85 INJECTION, SOLUTION INTRAVASCULAR
Status: COMPLETED | OUTPATIENT
Start: 2025-05-29 | End: 2025-05-29

## 2025-05-29 RX ADMIN — ONDANSETRON 8 MG: 2 INJECTION, SOLUTION INTRAMUSCULAR; INTRAVENOUS at 00:13

## 2025-05-29 RX ADMIN — MORPHINE SULFATE 4 MG: 2 INJECTION, SOLUTION INTRAMUSCULAR; INTRAVENOUS at 00:13

## 2025-05-29 RX ADMIN — IOPAMIDOL 85 ML: 612 INJECTION, SOLUTION INTRAVENOUS at 01:01

## 2025-05-29 NOTE — ED PROVIDER NOTES
ED Course as of 05/29/25 0433   Wed May 28, 2025   3089 My differential diagnosis for abdominal pain for a male includes but is not limited to:  Gastritis, gastroenteritis, peptic ulcer disease, GERD, esophageal perforation, acute appendicitis, mesenteric adenitis, Meckel's diverticulum, epiploic appendagitis, diverticulitis, colon cancer, ulcerative colitis, Crohn's disease, intussusception, small bowel obstruction, adhesions, ischemic bowel, perforated viscus, ileus, obstipation, biliary colic, cholecystitis, cholelithiasis, hepatitis, pancreatitis, common bile duct obstruction, cholangitis, bile leak, splenic trauma, splenic rupture, splenic infarction, splenic abscess, abdominal abscess, ascites, spontaneous bacterial peritonitis, hernia, UTI, cystitis, prostatitis, ureterolithiasis, urinary obstruction, testicular torsion, AAA, myocardial infarction, pneumonia, cancer, porphyria, DKA, medications, sickle cell, viral syndrome, zoster   [JG]   Thu May 29, 2025   0120 Joined care team, pending CT scan [MP]   0151 CT abdomen and pelvis independently interpreted by me as no bowel obstruction [MP]   0432 Patient presents to ED with complaint of generalized abdominal pain and constipation.  Worked up with labs and CT scan.  Treated with morphine and Zofran.  Lab workup overall benign.  No evidence for urinary tract infection on urinalysis.  CT demonstrates constipation without evidence of bowel obstruction or fecal impaction.  Patient encouraged to continue prescribed docusate.  Can add on MiraLAX as needed to help with constipation.  Discussed ED return precautions.  He is otherwise well-appearing, hemodynamically stable, and therefore appropriate for discharge. [MP]      ED Course User Index  [JG] Martínez Rincon MD  [MP] uNrys Perales PA-C Pleiss, Melanie M, PA-C  05/29/25 0432

## 2025-05-29 NOTE — ED PROVIDER NOTES
EMERGENCY DEPARTMENT ENCOUNTER  Room Number:  17/17  Date of encounter:  5/29/2025  PCP: Kath Higginbotham MD  Patient Care Team:  Kath Higginbotham MD as PCP - General (Internal Medicine)  Albert Shukla MD as Consulting Physician (Cardiology)     HPI:  Context: Hernando Lozada is a 80 y.o. male who presents to the ED c/o chief complaint of abdominal pain.  Patient complains of generalized abdominal pain that began yesterday.  Patient describes the pain as squeezing, reports is constant, reports nausea, denies any emesis.  Patient reports he has not had a bowel movement for over a week.    MEDICAL HISTORY REVIEW  Reviewed in EPIC    PAST MEDICAL HISTORY  Active Ambulatory Problems     Diagnosis Date Noted    DJD (degenerative joint disease) of knee 12/14/2017    Primary hypertension 02/18/2019    SOB (shortness of breath) 02/18/2019    Leg swelling 02/18/2019    Hyperlipidemia LDL goal <100 08/23/2019    COVID-19 11/25/2022    Diabetes mellitus     GIOVANNY (obstructive sleep apnea)     Disease of thyroid gland     CKD (chronic kidney disease)     Hypomagnesemia     Chronic brain injury     Generalized weakness     CAD (coronary artery disease)     Pedal edema 03/06/2023    Tremor 03/06/2023    Elevated troponin 03/06/2023    Lower extremity cellulitis 03/06/2023    Tinea cruris 03/06/2023    Chronic deep vein thrombosis (DVT) of calf muscle vein of left lower extremity 03/07/2023    Aortic stenosis, severe 03/06/2023    Acute urinary tract infection 04/17/2023    Hypothyroidism 04/17/2023    Acute urinary retention 04/19/2023    Acute bacterial prostatitis 04/19/2023    Chest pain 06/25/2024    Recurrent falls 08/08/2024    Hypokalemia 08/09/2024    Acute on chronic systolic CHF (congestive heart failure) 10/06/2024    Acute on chronic diastolic heart failure 10/06/2024    Acute on chronic diastolic CHF (congestive heart failure) 10/07/2024    Atrial fibrillation, persistent 10/08/2024    Chronic combined  systolic and diastolic congestive heart failure 10/11/2024    Anemia 10/31/2024    Chronic anticoagulation 01/16/2025    Mycoplasma pneumonia 02/10/2025    Viral URI 02/10/2025    Accident due to mechanical fall without injury 05/05/2025    Hydronephrosis 05/05/2025    Distended bladder 05/05/2025    Facial bruising, initial encounter 05/05/2025    Back pain 05/05/2025    Age-related physical debility 05/06/2025    Cellulitis 05/06/2025     Resolved Ambulatory Problems     Diagnosis Date Noted    Chest pain with high risk of acute coronary syndrome 04/29/2019    Chest pain 01/02/2024    Chest pain 01/04/2024    SDH (subdural hematoma) 02/09/2025     Past Medical History:   Diagnosis Date    A-fib     Aortic stenosis     Arthritis     Bilateral leg edema     Diastolic heart failure     GERD (gastroesophageal reflux disease)     Heart murmur     History of head injury     [place] (hard of hearing)     Hyperlipidemia     Hypertension     Irregular heart beat     Osteoarthritis     Past heart attack     SOB (shortness of breath) on exertion     Stroke     Vasovagal episode        PAST SURGICAL HISTORY  Past Surgical History:   Procedure Laterality Date    CARDIAC CATHETERIZATION N/A 4/30/2019    Procedure: Coronary angiography with grafts;  Surgeon: Rio Miranda MD;  Location: [facility] CATH INVASIVE LOCATION;  Service: Cardiology    CARDIAC CATHETERIZATION N/A 4/30/2019    Procedure: Left Heart Cath;  Surgeon: Rio Miranda MD;  [facility];  Service: Cardiology    CARDIAC CATHETERIZATION N/A 4/30/2019    Procedure: Left ventriculography;  Surgeon: Rio Miranda MD;  Location: [facility] CATH INVASIVE LOCATION;  Service: Cardiology    CARDIAC CATHETERIZATION  4/30/2019    Procedure: Saphenous Vein Graft;  Surgeon: Rio Miranda MD;  Location: [facility] CATH INVASIVE LOCATION;  Service: Cardiology    CARDIAC CATHETERIZATION N/A 4/30/2019    Procedure: Stent GLENDY  coronary;  Surgeon: Rio Miranda MD;  [facility];  Service: Cardiology    CARDIAC CATHETERIZATION N/A 3/10/2023    Procedure: Right and Left Heart Cath;  Surgeon: Rio Miranda MD;  Location:  [facility] CATH INVASIVE LOCATION;  Service: Cardiology;  Laterality: N/A;    CARDIAC CATHETERIZATION N/A 3/10/2023    Procedure: Coronary angiography;  Surgeon: Rio Miranda MD;  [facility];  Service: Cardiology;  Laterality: N/A;    CARDIAC CATHETERIZATION N/A 3/10/2023    Procedure: Left ventriculography;  Surgeon: Rio Miranda MD;  [facility];  Service: Cardiology;  Laterality: N/A;    CARDIAC CATHETERIZATION N/A 3/10/2023    Procedure: Percutaneous Coronary Intervention;  Surgeon: Rio Miranda MD;  [facility];  Service: Cardiology;  Laterality: N/A;    CARDIAC CATHETERIZATION N/A 3/10/2023    Procedure: Stent GLENDY coronary;  Surgeon: Rio Miranda MD;  [facility];  Service: Cardiology;  Laterality: N/A;    CARDIAC CATHETERIZATION N/A 1/3/2024    Procedure: Left Heart Cath;  Surgeon: Rio Miranda MD;  [facility];  Service: Cardiovascular;  Laterality: N/A;    CARDIAC CATHETERIZATION N/A 1/3/2024    Procedure: Coronary angiography;  Surgeon: Rio Miranda MD;  [facility];  Service: Cardiovascular;  Laterality: N/A;    CARDIAC CATHETERIZATION N/A 1/3/2024    Procedure: Percutaneous Coronary Intervention;  Surgeon: Rio Miranda MD;  [facility];  Service: Cardiovascular;  Laterality: N/A;    CARDIAC CATHETERIZATION N/A 1/3/2024    Procedure: Left ventriculography;  Surgeon: Rio Miranda MD;  [facility];  Service: Cardiovascular;  Laterality: N/A;    CARDIAC CATHETERIZATION Left  1/3/2024    Procedure: Native mammary injection;  Surgeon: Rio Miranda MD;  Location: [facility] CATH INVASIVE LOCATION;  Service: Cardiovascular;  Laterality: Left;    CARDIAC CATHETERIZATION N/A 6/26/2024    Procedure: Right and Left Heart Cath;  Surgeon: Rio Miranda MD;  [facility];  Service: Cardiovascular;  Laterality: N/A;    CARDIAC CATHETERIZATION N/A 6/26/2024    Procedure: Percutaneous Coronary Intervention;  Surgeon: Rio Miranda MD;  [facility];  Service: Cardiovascular;  Laterality: N/A;    CARDIAC CATHETERIZATION N/A 6/26/2024    Procedure: Left ventriculography;  Surgeon: Rio Miranda MD;  [facility];  Service: Cardiovascular;  Laterality: N/A;    CARDIAC CATHETERIZATION N/A 6/26/2024    Procedure: Coronary angiography;  Surgeon: Rio Miranda MD;  Location: [facility] CATH INVASIVE LOCATION;  Service: Cardiovascular;  Laterality: N/A;    CARDIAC CATHETERIZATION  6/26/2024    Procedure: Saphenous Vein Graft;  Surgeon: Rio Miranda MD;  Location: [facility] CATH INVASIVE LOCATION;  Service: Cardiovascular;;    CARDIAC CATHETERIZATION N/A 6/26/2024    Procedure: Stent GLENDY coronary;  Surgeon: Rio Miranda MD;  Location: [facility] CATH INVASIVE LOCATION;  Service: Cardiovascular;  Laterality: N/A;    CARPAL TUNNEL RELEASE Bilateral     CATARACT EXTRACTION      CORONARY ARTERY BYPASS GRAFT  2002    FINGER SURGERY      MISSING LEFT POINTER FINGER TIP    INTERVENTIONAL RADIOLOGY PROCEDURE N/A 6/26/2024    Procedure: Intravascular Ultrasound;  Surgeon: Rio Miranda MD;  Location: [facility] CATH INVASIVE LOCATION;  Service: Cardiovascular;  Laterality: N/A;    KNEE ARTHROPLASTY Right 02/15/2017    MO ARTHRP KNE CONDYLE&PLATU MEDIAL&LAT COMPARTMENTS Left 12/14/2017    Procedure: LT TOTAL KNEE ARTHROPLASTY;  Surgeon: Jatin Lancaster MD;  Location: [facility] MAIN OR;   Service: Orthopedics    ME RT/LT HEART CATHETERS N/A 4/30/2019    Procedure: Percutaneous Coronary Intervention;  Surgeon: Rio Miranda MD;  Location: [facility] INVASIVE LOCATION;  Service: Cardiology       FAMILY HISTORY  Family History   Problem Relation Age of Onset    Parkinsonism Sister     Diabetes Brother     Malmisty Hyperthermia Neg Hx        SOCIAL HISTORY  Social History     Socioeconomic History    Marital status: Single   Tobacco Use    Smoking status: Never     Passive exposure: Never    Smokeless tobacco: Never   Vaping Use    Vaping status: Never Used   Substance and Sexual Activity    Alcohol use: No    Drug use: No    Sexual activity: Defer       ALLERGIES  Patient has no known allergies.    The patient's allergies have been reviewed    PHYSICAL EXAM  I have reviewed the triage vital signs and nursing notes.  ED Triage Vitals [05/28/25 2333]   Temp Heart Rate Resp BP SpO2   97.8 °F (36.6 °C) 100 20 160/88 98 %      Temp src Heart Rate Source Patient Position BP Location FiO2 (%)   Oral Monitor Sitting Right arm --       General: No acute distress.  HENT: NCAT, PERRL, Nares patent.  Eyes: no scleral icterus.  Neck: trachea midline, no ROM limitations.  CV: regular rhythm, regular rate.  Respiratory: normal effort, CTAB.  Abdomen: soft, nondistended, mild periumbilical tenderness to palpation, no rebound tenderness, no guarding or rigidity.  Musculoskeletal: no deformity.  Neuro: alert, moves all extremities, follows commands.  Skin: warm, dry.  1+ pitting edema bilateral lower extremities.    LAB RESULTS  Recent Results (from the past 24 hours)   Comprehensive Metabolic Panel    Collection Time: 05/28/25 11:59 PM    Specimen: Blood   Result Value Ref Range    Glucose 111 (H) 65 - 99 mg/dL    BUN 14.0 8.0 - 23.0 mg/dL    Creatinine 0.99 0.76 - 1.27 mg/dL    Sodium 139 136 - 145 mmol/L    Potassium 3.8 3.5 - 5.2 mmol/L    Chloride 100 98 - 107 mmol/L    CO2 30.1 (H) 22.0 - 29.0 mmol/L     Calcium 8.8 8.6 - 10.5 mg/dL    Total Protein 6.6 6.0 - 8.5 g/dL    Albumin 3.7 3.5 - 5.2 g/dL    ALT (SGPT) 11 1 - 41 U/L    AST (SGOT) 15 1 - 40 U/L    Alkaline Phosphatase 101 39 - 117 U/L    Total Bilirubin 0.4 0.0 - 1.2 mg/dL    Globulin 2.9 gm/dL    A/G Ratio 1.3 g/dL    BUN/Creatinine Ratio 14.1 7.0 - 25.0    Anion Gap 8.9 5.0 - 15.0 mmol/L    eGFR 78.0 >60.0 mL/min/1.73   Lipase    Collection Time: 05/28/25 11:59 PM    Specimen: Blood   Result Value Ref Range    Lipase 34 13 - 60 U/L   Protime-INR    Collection Time: 05/28/25 11:59 PM    Specimen: Blood   Result Value Ref Range    Protime 16.9 (H) 11.7 - 14.2 Seconds    INR 1.37 (H) 0.90 - 1.10   aPTT    Collection Time: 05/28/25 11:59 PM    Specimen: Blood   Result Value Ref Range    PTT 33.2 22.7 - 35.4 seconds   CBC Auto Differential    Collection Time: 05/28/25 11:59 PM    Specimen: Blood   Result Value Ref Range    WBC 7.70 3.40 - 10.80 10*3/mm3    RBC 3.89 (L) 4.14 - 5.80 10*6/mm3    Hemoglobin 12.0 (L) 13.0 - 17.7 g/dL    Hematocrit 37.2 (L) 37.5 - 51.0 %    MCV 95.6 79.0 - 97.0 fL    MCH 30.8 26.6 - 33.0 pg    MCHC 32.3 31.5 - 35.7 g/dL    RDW 13.6 12.3 - 15.4 %    RDW-SD 47.4 37.0 - 54.0 fl    MPV 8.9 6.0 - 12.0 fL    Platelets 246 140 - 450 10*3/mm3    Neutrophil % 62.2 42.7 - 76.0 %    Lymphocyte % 26.0 19.6 - 45.3 %    Monocyte % 10.3 5.0 - 12.0 %    Eosinophil % 0.5 0.3 - 6.2 %    Basophil % 0.5 0.0 - 1.5 %    Immature Grans % 0.5 0.0 - 0.5 %    Neutrophils, Absolute 4.79 1.70 - 7.00 10*3/mm3    Lymphocytes, Absolute 2.00 0.70 - 3.10 10*3/mm3    Monocytes, Absolute 0.79 0.10 - 0.90 10*3/mm3    Eosinophils, Absolute 0.04 0.00 - 0.40 10*3/mm3    Basophils, Absolute 0.04 0.00 - 0.20 10*3/mm3    Immature Grans, Absolute 0.04 0.00 - 0.05 10*3/mm3    nRBC 0.0 0.0 - 0.2 /100 WBC   Urinalysis With Microscopic If Indicated (No Culture) - Urine, Clean Catch    Collection Time: 05/29/25 12:10 AM    Specimen: Urine, Clean Catch   Result Value Ref Range     Color, UA Yellow Yellow, Straw    Appearance, UA Clear Clear    pH, UA 5.5 5.0 - 8.0    Specific Gravity, UA >1.030 (H) 1.005 - 1.030    Glucose, UA >=1000 mg/dL (3+) (A) Negative    Ketones, UA Trace (A) Negative    Bilirubin, UA Negative Negative    Blood, UA Negative Negative    Protein, UA Negative Negative    Leuk Esterase, UA Negative Negative    Nitrite, UA Negative Negative    Urobilinogen, UA 1.0 E.U./dL 0.2 - 1.0 E.U./dL       I ordered the above labs and reviewed the results.    RADIOLOGY  CT Abdomen Pelvis With Contrast  Result Date: 5/29/2025  CT OF THE ABDOMEN AND PELVIS WITH CONTRAST  HISTORY: Abdominal pain. Nausea and constipation.  COMPARISON: May 4, 2025  TECHNIQUE: Axial CT imaging was obtained through the abdomen and pelvis. IV contrast was administered.  FINDINGS: Images through the lung bases demonstrate some interlobular septal thickening. This may reflect vascular congestion. There are also small bilateral effusions. The effusions have decreased when compared to the prior study. No suspicious hepatic lesions are seen. The stomach, duodenum, adrenal glands, spleen, and pancreas are normal. The gallbladder is absent. Patient here appears to have a prominent cystic duct remnant. This has been seen on exams dating back to 2021. The kidneys enhance symmetrically. No hydronephrosis is seen. There are simple appearing bilateral renal cysts. There is a 1 to 2 mm nonobstructing stone within the left kidney. There are aortoiliac calcifications. Prostate gland contains some dystrophic calcifications. Urinary bladder appears somewhat distended. It is mildly thick-walled, and correlation with urinalysis and urine cultures is recommended. There is no bowel obstruction. The appendix is normal. Stool burden within the colon is increased, in keeping with history of constipation. No acute osseous abnormalities are seen.       1. Urinary bladder is distended. It does appear somewhat thick walled, and  correlation with urinalysis and urine cultures is recommended. 2. Interlobular septal thickening may reflect vascular congestion. 3. Increased stool burden of the colon, in keeping with history of constipation.  Radiation dose reduction techniques were utilized, including automated exposure control and exposure modulation based on body size.   This report was finalized on 5/29/2025 1:29 AM by Dr. Sosa Leos M.D on Workstation: [id number]        I ordered the above noted radiological studies. I reviewed the images and results. I agree with the radiologist interpretation.    PROCEDURES  Procedures    MEDICATIONS GIVEN IN ER  Medications   morphine injection 4 mg (4 mg Intravenous Given 5/29/25 0013)   ondansetron (ZOFRAN) injection 8 mg (8 mg Intravenous Given 5/29/25 0013)   iopamidol (ISOVUE-300) 61 % injection 85 mL (85 mL Intravenous Given 5/29/25 0101)       PROGRESS, DATA ANALYSIS, CONSULTS, AND MEDICAL DECISION MAKING  A complete history and physical exam have been performed.  All available laboratory and imaging results have been reviewed by myself prior to disposition.    MDM    After the initial H&P, I discussed pertinent information from history and physical exam with patient/family.  Discussed differential diagnosis.  Discussed plan for ED evaluation/workup/treatment.  All questions answered.  Patient/family is agreeable with plan.  ED Course as of 05/29/25 1019   Wed May 28, 2025   3644 My differential diagnosis for abdominal pain for a male includes but is not limited to:  Gastritis, gastroenteritis, peptic ulcer disease, GERD, esophageal perforation, acute appendicitis, mesenteric adenitis, Meckel's diverticulum, epiploic appendagitis, diverticulitis, colon cancer, ulcerative colitis, Crohn's disease, intussusception, small bowel obstruction, adhesions, ischemic bowel, perforated viscus, ileus, obstipation, biliary colic, cholecystitis, cholelithiasis, hepatitis, pancreatitis, common bile duct  obstruction, cholangitis, bile leak, splenic trauma, splenic rupture, splenic infarction, splenic abscess, abdominal abscess, ascites, spontaneous bacterial peritonitis, hernia, UTI, cystitis, prostatitis, ureterolithiasis, urinary obstruction, testicular torsion, AAA, myocardial infarction, pneumonia, cancer, porphyria, DKA, medications, sickle cell, viral syndrome, zoster   [JG]   u May 29, 2025   0120 Joined care team, pending CT scan [MP]   0151 CT abdomen and pelvis independently interpreted by me as no bowel obstruction [MP]   0432 Patient presents to ED with complaint of generalized abdominal pain and constipation.  Worked up with labs and CT scan.  Treated with morphine and Zofran.  Lab workup overall benign.  No evidence for urinary tract infection on urinalysis.  CT demonstrates constipation without evidence of bowel obstruction or fecal impaction.  Patient encouraged to continue prescribed docusate.  Can add on MiraLAX as needed to help with constipation.  Discussed ED return precautions.  He is otherwise well-appearing, hemodynamically stable, and therefore appropriate for discharge. [MP]      ED Course User Index  [JG] Martínez Rincon MD  [MP] Nurys Perales PA-C       AS OF 10:19 EDT VITALS:    BP - 112/66  HR - 64  TEMP - 97.8 °F (36.6 °C) (Oral)  O2 SATS - 99%    DIAGNOSIS  Final diagnoses:   Constipation, unspecified constipation type   Generalized abdominal pain         DISPOSITION  DISCHARGE    Patient discharged in stable condition.    Reviewed implications of results, diagnosis, meds, responsibility to follow up, warning signs and symptoms of possible worsening, potential complications and reasons to return to ER.    Patient/Family voiced understanding of above instructions.    Discussed plan for discharge, as there is no emergent indication for admission. Patient referred to primary care provider for BP management due to today's BP. Pt/family is agreeable and understands need for  follow up and repeat testing.  Pt is aware that discharge does not mean that nothing is wrong but it indicates no emergency is present that requires admission and they must continue care with follow-up as given below or physician of their choice.     FOLLOW-UP  Kath Higginbotham MD  [address] [place] [address] [phone]    Schedule an appointment as soon as possible for a visit   For reevaluation after ED visit         Medication List      No changes were made to your prescriptions during this visit.            Martínez Rincon MD  05/29/25 1019

## 2025-05-29 NOTE — DISCHARGE INSTRUCTIONS
Follow-up with primary care provider.  Continue prescribed Colace.  Consider adding on MiraLAX to help with bowel movement.  Return to emergency department for any worsening symptoms.

## 2025-07-24 ENCOUNTER — OFFICE VISIT (OUTPATIENT)
Dept: CARDIOLOGY | Facility: CLINIC | Age: 80
End: 2025-07-24
Payer: MEDICARE

## 2025-07-24 ENCOUNTER — HOSPITAL ENCOUNTER (OUTPATIENT)
Dept: CARDIOLOGY | Facility: HOSPITAL | Age: 80
Discharge: HOME | End: 2025-07-24
Admitting: INTERNAL MEDICINE
Payer: MEDICARE

## 2025-07-24 VITALS
DIASTOLIC BLOOD PRESSURE: 78 MMHG | HEART RATE: 61 BPM | WEIGHT: 223 LBS | BODY MASS INDEX: 33.03 KG/M2 | HEIGHT: 69 IN | SYSTOLIC BLOOD PRESSURE: 116 MMHG

## 2025-07-24 VITALS — BODY MASS INDEX: 32.88 KG/M2 | WEIGHT: 222 LBS | HEIGHT: 69 IN

## 2025-07-24 DIAGNOSIS — I48.19 ATRIAL FIBRILLATION, PERSISTENT: ICD-10-CM

## 2025-07-24 DIAGNOSIS — I35.0 AORTIC STENOSIS, SEVERE: ICD-10-CM

## 2025-07-24 DIAGNOSIS — I10 PRIMARY HYPERTENSION: ICD-10-CM

## 2025-07-24 DIAGNOSIS — R06.02 SHORTNESS OF BREATH: ICD-10-CM

## 2025-07-24 DIAGNOSIS — I25.10 CORONARY ARTERY DISEASE INVOLVING NATIVE CORONARY ARTERY OF NATIVE HEART WITHOUT ANGINA PECTORIS: ICD-10-CM

## 2025-07-24 DIAGNOSIS — I35.0 AORTIC STENOSIS, SEVERE: Primary | ICD-10-CM

## 2025-07-24 LAB
AORTIC DIMENSIONLESS INDEX: 0.23 (DI)
AV MEAN PRESS GRAD SYS DOP V1V2: 25.3 MMHG
AV VMAX SYS DOP: 327.3 CM/SEC
BH CV ECHO MEAS - ACS: 1.36 CM
BH CV ECHO MEAS - AO MAX PG: 42.9 MMHG
BH CV ECHO MEAS - AO ROOT DIAM: 3.1 CM
BH CV ECHO MEAS - AO V2 VTI: 74.7 CM
BH CV ECHO MEAS - AVA(I,D): 0.82 CM2
BH CV ECHO MEAS - EDV(CUBED): 102.4 ML
BH CV ECHO MEAS - EDV(MOD-SP2): 107 ML
BH CV ECHO MEAS - EDV(MOD-SP4): 113 ML
BH CV ECHO MEAS - EF(MOD-SP2): 66.4 %
BH CV ECHO MEAS - EF(MOD-SP4): 66.4 %
BH CV ECHO MEAS - ESV(CUBED): 27.3 ML
BH CV ECHO MEAS - ESV(MOD-SP2): 36 ML
BH CV ECHO MEAS - ESV(MOD-SP4): 38 ML
BH CV ECHO MEAS - FS: 35.6 %
BH CV ECHO MEAS - IVS/LVPW: 0.97 CM
BH CV ECHO MEAS - IVSD: 1.09 CM
BH CV ECHO MEAS - LAT PEAK E' VEL: 5.2 CM/SEC
BH CV ECHO MEAS - LV DIASTOLIC VOL/BSA (35-75): 52.3 CM2
BH CV ECHO MEAS - LV MASS(C)D: 188.9 GRAMS
BH CV ECHO MEAS - LV MAX PG: 2.47 MMHG
BH CV ECHO MEAS - LV MEAN PG: 1.41 MMHG
BH CV ECHO MEAS - LV SYSTOLIC VOL/BSA (12-30): 17.6 CM2
BH CV ECHO MEAS - LV V1 MAX: 78.6 CM/SEC
BH CV ECHO MEAS - LV V1 VTI: 17.5 CM
BH CV ECHO MEAS - LVIDD: 4.7 CM
BH CV ECHO MEAS - LVIDS: 3 CM
BH CV ECHO MEAS - LVOT AREA: 3.5 CM2
BH CV ECHO MEAS - LVOT DIAM: 2.12 CM
BH CV ECHO MEAS - LVPWD: 1.13 CM
BH CV ECHO MEAS - MED PEAK E' VEL: 5 CM/SEC
BH CV ECHO MEAS - MV DEC SLOPE: 386 CM/SEC2
BH CV ECHO MEAS - MV DEC TIME: 0.37 SEC
BH CV ECHO MEAS - MV E MAX VEL: 143 CM/SEC
BH CV ECHO MEAS - MV MAX PG: 9.9 MMHG
BH CV ECHO MEAS - MV MEAN PG: 3.8 MMHG
BH CV ECHO MEAS - MV P1/2T: 118.7 MSEC
BH CV ECHO MEAS - MV V2 VTI: 49.5 CM
BH CV ECHO MEAS - MVA(P1/2T): 1.85 CM2
BH CV ECHO MEAS - MVA(VTI): 1.24 CM2
BH CV ECHO MEAS - PA ACC TIME: 0.05 SEC
BH CV ECHO MEAS - PA V2 MAX: 83.3 CM/SEC
BH CV ECHO MEAS - RAP SYSTOLE: 3 MMHG
BH CV ECHO MEAS - RV MAX PG: 2.7 MMHG
BH CV ECHO MEAS - RV V1 MAX: 82.3 CM/SEC
BH CV ECHO MEAS - RV V1 VTI: 16.7 CM
BH CV ECHO MEAS - SV(LVOT): 61.5 ML
BH CV ECHO MEAS - SV(MOD-SP2): 71 ML
BH CV ECHO MEAS - SV(MOD-SP4): 75 ML
BH CV ECHO MEAS - SVI(LVOT): 28.5 ML/M2
BH CV ECHO MEAS - SVI(MOD-SP2): 32.9 ML/M2
BH CV ECHO MEAS - SVI(MOD-SP4): 34.7 ML/M2
BH CV ECHO MEAS - TAPSE (>1.6): 1.83 CM
BH CV ECHO MEASUREMENTS AVERAGE E/E' RATIO: 28.04
BH CV XLRA - TDI S': 7.8 CM/SEC
LV EF BIPLANE MOD: 65.8 %
SINUS: 2.5 CM

## 2025-07-24 PROCEDURE — 25510000001 PERFLUTREN 6.52 MG/ML SUSPENSION 2 ML VIAL: Performed by: INTERNAL MEDICINE

## 2025-07-24 PROCEDURE — 93000 ELECTROCARDIOGRAM COMPLETE: CPT | Performed by: INTERNAL MEDICINE

## 2025-07-24 PROCEDURE — 93306 TTE W/DOPPLER COMPLETE: CPT

## 2025-07-24 PROCEDURE — 93306 TTE W/DOPPLER COMPLETE: CPT | Performed by: INTERNAL MEDICINE

## 2025-07-24 PROCEDURE — 3074F SYST BP LT 130 MM HG: CPT | Performed by: INTERNAL MEDICINE

## 2025-07-24 PROCEDURE — 99214 OFFICE O/P EST MOD 30 MIN: CPT | Performed by: INTERNAL MEDICINE

## 2025-07-24 PROCEDURE — 3078F DIAST BP <80 MM HG: CPT | Performed by: INTERNAL MEDICINE

## 2025-07-24 RX ADMIN — SODIUM CHLORIDE 3 ML: 9 INJECTION INTRAMUSCULAR; INTRAVENOUS; SUBCUTANEOUS at 10:55
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
## 2025-07-30 ENCOUNTER — TELEPHONE (OUTPATIENT)
Dept: CARDIOLOGY | Facility: CLINIC | Age: 80
End: 2025-07-30
Payer: MEDICARE

## 2025-07-30 NOTE — TELEPHONE ENCOUNTER
PER [facility]ANT[facility] MR ROBERSON K+ IS TOO LOW  HE NEEDS IT REPEATED   I CALLED THE FACILITY LEFT A MESSAGE TO HAVE HIS NURSE CALL ME ASAP TO GET THE LABS REPEATEDASAP

## 2025-08-01 ENCOUNTER — HOSPITAL ENCOUNTER (OUTPATIENT)
Facility: HOSPITAL | Age: 80
Setting detail: HOSPITAL OUTPATIENT SURGERY
Discharge: HOME | End: 2025-08-01
Attending: INTERNAL MEDICINE | Admitting: INTERNAL MEDICINE
Payer: MEDICARE

## 2025-08-01 VITALS
HEART RATE: 66 BPM | DIASTOLIC BLOOD PRESSURE: 73 MMHG | TEMPERATURE: 97.1 F | WEIGHT: 274 LBS | RESPIRATION RATE: 15 BRPM | OXYGEN SATURATION: 98 % | HEIGHT: 69 IN | BODY MASS INDEX: 40.58 KG/M2 | SYSTOLIC BLOOD PRESSURE: 134 MMHG

## 2025-08-01 DIAGNOSIS — I35.0 AORTIC STENOSIS, SEVERE: ICD-10-CM

## 2025-08-01 LAB
HCO3 BLDA-SCNC: 29.7 MMOL/L (ref 21–28)
HCO3 BLDA-SCNC: 30.5 MMOL/L (ref 21–28)
HCT VFR BLDA CALC: 41 % (ref 38–51)
HCT VFR BLDA CALC: 42 % (ref 38–51)
HGB BLDA-MCNC: 13.9 G/DL (ref 12–17)
HGB BLDA-MCNC: 14.3 G/DL (ref 12–17)
PCO2 BLDA: 42.7 MM HG (ref 35–45)
PCO2 BLDV: 46.9 MMHG (ref 41–51)
PH BLDA: 7.42 [PH] (ref 7.31–7.41)
PH BLDA: 7.45 PH UNITS (ref 7.35–7.45)
PO2 BLDA: 89 MMHG (ref 80–105)
PO2 BLDV: 40 MM HG (ref 35–42)
POTASSIUM BLDA-SCNC: 3.9 MMOL/L (ref 3.5–4.9)
POTASSIUM BLDA-SCNC: 4.1 MMOL/L (ref 3.5–4.9)
SAO2 % BLDA: 97 % (ref 95–98)
SAO2 % BLDCOA: 76 % (ref 45–75)

## 2025-08-01 PROCEDURE — C1769 GUIDE WIRE: HCPCS | Performed by: INTERNAL MEDICINE

## 2025-08-01 PROCEDURE — C1760 CLOSURE DEV, VASC: HCPCS | Performed by: INTERNAL MEDICINE

## 2025-08-01 PROCEDURE — 82803 BLOOD GASES ANY COMBINATION: CPT

## 2025-08-01 PROCEDURE — 25010000002 LIDOCAINE 2% SOLUTION: Performed by: INTERNAL MEDICINE

## 2025-08-01 PROCEDURE — C1894 INTRO/SHEATH, NON-LASER: HCPCS | Performed by: INTERNAL MEDICINE

## 2025-08-01 PROCEDURE — 25010000002 MIDAZOLAM PER 1 MG: Performed by: INTERNAL MEDICINE

## 2025-08-01 PROCEDURE — 82810 BLOOD GASES O2 SAT ONLY: CPT

## 2025-08-01 PROCEDURE — 93461 R&L HRT ART/VENTRICLE ANGIO: CPT | Performed by: INTERNAL MEDICINE

## 2025-08-01 PROCEDURE — 25810000003 SODIUM CHLORIDE 0.9 % SOLUTION: Performed by: INTERNAL MEDICINE

## 2025-08-01 PROCEDURE — C1887 CATHETER, GUIDING: HCPCS | Performed by: INTERNAL MEDICINE

## 2025-08-01 PROCEDURE — 25510000001 IOPAMIDOL PER 1 ML: Performed by: INTERNAL MEDICINE

## 2025-08-01 PROCEDURE — 85018 HEMOGLOBIN: CPT

## 2025-08-01 PROCEDURE — 85014 HEMATOCRIT: CPT

## 2025-08-01 PROCEDURE — 25010000002 FENTANYL CITRATE (PF) 50 MCG/ML SOLUTION: Performed by: INTERNAL MEDICINE

## 2025-08-01 RX ORDER — SODIUM CHLORIDE 0.9 % (FLUSH) 0.9 %
10 SYRINGE (ML) INJECTION EVERY 12 HOURS SCHEDULED
Status: DISCONTINUED | OUTPATIENT
Start: 2025-08-01 | End: 2025-08-01 | Stop reason: HOSPADM

## 2025-08-01 RX ORDER — POTASSIUM CHLORIDE 1.5 G/1.58G
40 POWDER, FOR SOLUTION ORAL 2 TIMES DAILY
COMMUNITY

## 2025-08-01 RX ORDER — LIDOCAINE HYDROCHLORIDE 20 MG/ML
INJECTION, SOLUTION INFILTRATION; PERINEURAL
Status: DISCONTINUED | OUTPATIENT
Start: 2025-08-01 | End: 2025-08-01 | Stop reason: HOSPADM

## 2025-08-01 RX ORDER — FENTANYL CITRATE 50 UG/ML
INJECTION, SOLUTION INTRAMUSCULAR; INTRAVENOUS
Status: DISCONTINUED | OUTPATIENT
Start: 2025-08-01 | End: 2025-08-01 | Stop reason: HOSPADM

## 2025-08-01 RX ORDER — ACETAMINOPHEN 325 MG/1
650 TABLET ORAL EVERY 4 HOURS PRN
Status: DISCONTINUED | OUTPATIENT
Start: 2025-08-01 | End: 2025-08-01 | Stop reason: HOSPADM

## 2025-08-01 RX ORDER — MIDAZOLAM HYDROCHLORIDE 1 MG/ML
INJECTION, SOLUTION INTRAMUSCULAR; INTRAVENOUS
Status: DISCONTINUED | OUTPATIENT
Start: 2025-08-01 | End: 2025-08-01 | Stop reason: HOSPADM

## 2025-08-01 RX ORDER — SODIUM CHLORIDE 0.9 % (FLUSH) 0.9 %
10 SYRINGE (ML) INJECTION AS NEEDED
Status: DISCONTINUED | OUTPATIENT
Start: 2025-08-01 | End: 2025-08-01 | Stop reason: HOSPADM

## 2025-08-01 RX ORDER — NITROGLYCERIN 0.4 MG/1
0.4 TABLET SUBLINGUAL
Status: DISCONTINUED | OUTPATIENT
Start: 2025-08-01 | End: 2025-08-01 | Stop reason: HOSPADM

## 2025-08-01 RX ORDER — SODIUM CHLORIDE 9 MG/ML
75 INJECTION, SOLUTION INTRAVENOUS CONTINUOUS
Status: DISCONTINUED | OUTPATIENT
Start: 2025-08-01 | End: 2025-08-01 | Stop reason: HOSPADM

## 2025-08-01 RX ORDER — IOPAMIDOL 755 MG/ML
INJECTION, SOLUTION INTRAVASCULAR
Status: DISCONTINUED | OUTPATIENT
Start: 2025-08-01 | End: 2025-08-01 | Stop reason: HOSPADM

## 2025-08-01 RX ADMIN — SODIUM CHLORIDE 75 ML/HR: 9 INJECTION, SOLUTION INTRAVENOUS at 10:29

## 2025-08-01 NOTE — DISCHARGE INSTRUCTIONS
[facility]  [address] [place] [address]      Coronary Angiogram (Femoral Approach) After Care     Refer to this sheet in the next few weeks. These instructions provide you with information on caring for yourself after your procedure. Your health care provider may also give you more specific instructions. Your treatment has been planned according to current medical practices, but problems sometimes occur. Call your health care provider if you have any problems or questions after your procedure.      What to Expect After the Procedure:  After your procedure, it is typical to have the following sensations:  Minor discomfort or tenderness and a small bump at the catheter insertion site. The bump should usually decrease in size and tenderness within 1 to 2 weeks.  Any bruising will usually fade within 2 to 4 weeks.    Home Care Instructions:  Do not apply powder or lotion to the site.  Do not take baths, swim, or use a hot tub until your health care provider approves and the site is completely healed.  Do not bend, squat, or lift anything over 20 lb (9 kg) or as directed by your health care provider. However, we recommend lifting nothing heavier than a gallon of milk.    You may shower 24 hours after the procedure. Remove the bandage (dressing) and gently wash the site with plain soap and water. Gently pat the site dry. You may apply a band aid daily for 2 days if desired.    Inspect the site at least twice daily.  Increase your fluid intake for the next 2 days.    Limit your activity for the first 48 hours. .    Avoid strenuous activity for 1 week or as advised by your physician.    Follow instructions about when you can drive or return to work as directed by your physician.    Hold direct pressure over the site when you cough, sneeze, laugh or change positions.  Do this for the next 2 days.    Do not operate machinery or power tools for 24 hours.  A responsible adult should be with you for the  first 24 hours after you arrive home. Do not make any important legal decisions or sign legal papers for 24 hours.  Do not drink alcohol for 24 hours.  Metformin or any medications containing Metformin should not be taken for 48 hours after your procedure.      Call Your Doctor If:  You have drainage (other than a small amount of blood on the dressing).  You have chills or a fever > 101.  You have redness, warmth, swelling(larger than a walnut), or pain at the insertion site  You develop chest pain or shortness of breath, feel faint, or pass out.  You develop pain, discoloration, coldness, numbness, tingling, or severe bruising in the leg that held the catheter.  You develop bleeding from any other place, such as the bowels.  You have heavy bleeding from the site, hold pressure on the site for 20 minutes.  If the bleeding stops, apply a fresh bandage and call your cardiologist.  However, if you continue to have bleeding, call 911.  You have any symptoms of a stroke.  Remember BE FAST  B-balance. Sudden trouble walking or loss of balance.  E-eyes.  Sudden changes in how you see or a sudden onset of a very bad headache.   F-face. Sudden weakness or loss of feeling of the face or facial droop on one side.   A-arms Sudden weakness or numbness in one arm.  One arm drifts down if they are both held out in front of you. This happens suddenly and usually on one side of the body.  S-speech.  Sudden trouble speaking, slurred speech or trouble understanding what people are saying.   T-time  Time to call emergency services.  Write down the symptoms and the time they started.        Make Sure You:  Understand these instructions.  Will watch your condition.  Will get help right away if you are not doing well or get worse.[facility]  [address] [place] [address]    Right Heart Cath After Care    Refer to this sheet in the next few weeks. These instructions provide you with information on caring for yourself  after your procedure. Your caregiver may also give you more specific instructions. Your treatment has been planned according to current medical practices, but problems sometimes occur. Call your caregiver if you have any problems or questions after your procedure.    Home Care Instructions:  You may shower the day after the procedure. Remove the bandage (dressing) and gently wash the site with plain soap and water. Gently pat the site dry. You may apply a band aid daily for 2 days if desired.    Do not apply powder or lotion to the site until the site is completely healed.  Do not submerge the affected site in water until the site is completely healed.   No heavy lifting today.   Inspect the site at least twice daily. You may notice some bruising at the site..     If you received sedation a responsible adult should be with you for the first 24 hours after you arrive home. Do not make any important legal decisions or sign legal papers for 24 hours.  Do not drink alcohol for 24 hours.  Do not operate machinery or power tools for 24 hours. You may drive 24 hours after the procedure unless otherwise instructed by your caregiver.        Call Your Doctor if:   You have unusual pain at the puncture site.  You have redness, warmth, swelling, or pain at the puncture site.  You have drainage (other than a small amount of blood on the dressing).  `You have chills or a fever > 101.  Your arm becomes pale or dark, cool, tingly, or numb.  You develop chest pain, shortness of breath, feel faint or pass out.    You have heavy bleeding from the site, hold pressure on the site for 20 minutes.  If the bleeding stops, apply a fresh bandage and call your cardiologist.  However, if you        continue to have bleeding, call 911 and continue to apply pressure to the site.   You have any symptoms of a stroke.  Remember BE FAST  B-balance. Sudden trouble walking or loss of balance.  E-eyes.  Sudden changes in how you see or a sudden onset  of a very bad headache.   F-face. Sudden weakness or loss of feeling of the face or facial droop on one side.   A-arms Sudden weakness or numbness in one arm.  One arm drifts down if they are both held out in front of you. This happens suddenly and usually on one side of the body.   S-speech.  Sudden trouble speaking, slurred speech or trouble understanding what are saying.   T-time  Time to call emergency services.  Write down the symptoms and the time they started.

## 2025-08-01 NOTE — Clinical Note
Hemostasis started on the right brachial vein. Manual pressure applied to vessel. Manual pressure was held by Suzette LOGAN. Manual pressure was held for 5 min. Hemostasis achieved successfully.

## 2025-08-01 NOTE — Clinical Note
The DP pulses are detected w/ doppler bilaterally. The PT pulses are detected w/ doppler bilaterally. The femoral pulses are +2 bilaterally.

## 2025-08-01 NOTE — Clinical Note
Prepped: groin and right brachial. Prepped with: ChloraPrep. The site was clipped. The patient was draped in a sterile fashion.

## 2025-08-01 NOTE — PERIOPERATIVE NURSING NOTE
Spoke to SOLO Almanzar at [place] regarding patient status and access sites from procedure. Attempted to call back to bedside RN to give report and rn declined stating she already spoke to Jenelle regarding patient. Patient to be transported back to [place] w/ transport service

## 2025-08-13 RX ORDER — POTASSIUM CHLORIDE 1500 MG/1
TABLET, EXTENDED RELEASE ORAL
COMMUNITY
Start: 2025-07-31

## 2025-08-14 ENCOUNTER — OFFICE VISIT (OUTPATIENT)
Dept: CARDIOLOGY | Facility: CLINIC | Age: 80
End: 2025-08-14
Payer: MEDICARE

## 2025-08-14 VITALS
SYSTOLIC BLOOD PRESSURE: 112 MMHG | HEART RATE: 63 BPM | BODY MASS INDEX: 40.46 KG/M2 | DIASTOLIC BLOOD PRESSURE: 70 MMHG | HEIGHT: 69 IN

## 2025-08-14 DIAGNOSIS — I35.0 AORTIC STENOSIS, SEVERE: ICD-10-CM

## 2025-08-14 DIAGNOSIS — I48.19 ATRIAL FIBRILLATION, PERSISTENT: ICD-10-CM

## 2025-08-14 DIAGNOSIS — I35.0 AORTIC VALVE STENOSIS, ETIOLOGY OF CARDIAC VALVE DISEASE UNSPECIFIED: Primary | ICD-10-CM

## 2025-08-14 DIAGNOSIS — I25.10 CORONARY ARTERY DISEASE INVOLVING NATIVE CORONARY ARTERY OF NATIVE HEART WITHOUT ANGINA PECTORIS: ICD-10-CM

## (undated) DEVICE — Device

## (undated) DEVICE — 6F .070 XB 3.5 100CM: Brand: VISTA BRITE TIP

## (undated) DEVICE — PK CATH CARD 40

## (undated) DEVICE — CATH DIAG IMPULSE FR4 5F 100CM

## (undated) DEVICE — HI-TORQUE BALANCE MIDDLEWEIGHT GUIDE WIRE .014 STRAIGHT TIP 3.0 CM X 190 CM: Brand: HI-TORQUE BALANCE MIDDLEWEIGHT

## (undated) DEVICE — RADIFOCUS GLIDEWIRE: Brand: GLIDEWIRE

## (undated) DEVICE — BALN PTCA TAKERU RX NC 3.5X8MM

## (undated) DEVICE — BALN PTCA TAKERU RX NC 2.5X12MM

## (undated) DEVICE — CATH DIAG IMPULSE IMT 5F 100CM

## (undated) DEVICE — MINI TREK CORONARY DILATATION CATHETER 2.0 MM X 12 MM / RAPID-EXCHANGE: Brand: MINI TREK

## (undated) DEVICE — BANDAGE,GAUZE,BULKEE II,4.5"X4.1YD,STRL: Brand: MEDLINE

## (undated) DEVICE — ENCORE® LATEX ORTHO SIZE 8.5, STERILE LATEX POWDER-FREE SURGICAL GLOVE: Brand: ENCORE

## (undated) DEVICE — APPL DURAPREP IODOPHOR APL 26ML

## (undated) DEVICE — DEV INDEFLATOR P/N 580289

## (undated) DEVICE — ANTIBACTERIAL UNDYED BRAIDED (POLYGLACTIN 910), SYNTHETIC ABSORBABLE SUTURE: Brand: COATED VICRYL

## (undated) DEVICE — 2108 SERIES SAGITTAL BLADE, NO OFFSET  (12.4 X 1.19 X 82.1MM)

## (undated) DEVICE — STPLR SKIN VISISTAT WD 35CT

## (undated) DEVICE — CATH DIAG IMPULSE FL4 5F 100CM

## (undated) DEVICE — ANGIO-SEAL VIP VASCULAR CLOSURE DEVICE: Brand: ANGIO-SEAL

## (undated) DEVICE — GW EMR FIX EXCHG J STD .035 3MM 260CM

## (undated) DEVICE — KT DRN EVAC WND PVC PCH WTROC RND 10F400

## (undated) DEVICE — TREK CORONARY DILATATION CATHETER 2.50 MM X 12 MM / RAPID-EXCHANGE: Brand: TREK

## (undated) DEVICE — RUNTHROUGH NS EXTRA FLOPPY PTCA GUIDEWIRE: Brand: RUNTHROUGH

## (undated) DEVICE — FR5 INFINITI MULTIPAC: Brand: INFINITI

## (undated) DEVICE — PK KN TOTL 40

## (undated) DEVICE — INTRO SHEATH ART/FEM ENGAGE .035 5F12CM

## (undated) DEVICE — GW HITORQUE/BAL MID/WT J W/HCOAT .014 3X190CM

## (undated) DEVICE — BNDG ELAS ELITE V/CLOSE 6IN 5YD LF STRL

## (undated) DEVICE — PERCLOSE™ PROSTYLE™ SUTURE-MEDIATED CLOSURE AND REPAIR SYSTEM: Brand: PERCLOSE™ PROSTYLE™

## (undated) DEVICE — CATH DIAG IMPULSE FL3.5 6F 100CM

## (undated) DEVICE — GUIDE CATHETER: Brand: MACH1™

## (undated) DEVICE — CATH DIAG IMPULSE FL3.5 5F 100CM

## (undated) DEVICE — GAUZE,SPONGE,FLUFF,6"X6.75",STRL,10/TRAY: Brand: MEDLINE

## (undated) DEVICE — INTRO SHEATH ART/FEM ENGAGE .035 6F12CM

## (undated) DEVICE — NDL SPINE 20G 3 1/2 YEL STRL 1P/U

## (undated) DEVICE — BALN PRESS WEDGE 5F 110CM

## (undated) DEVICE — DGW .035 FC J3MM 260CM TEF: Brand: EMERALD

## (undated) DEVICE — PINNACLE INTRODUCER SHEATH: Brand: PINNACLE

## (undated) DEVICE — GLV SURG TRIUMPH CLASSIC PF LTX 8.5 STRL

## (undated) DEVICE — CATH DIAG IMPULSE MPA2 SH 5F 100CM

## (undated) DEVICE — KT MANIFLD CARDIAC

## (undated) DEVICE — DUAL CUT SAGITTAL BLADE

## (undated) DEVICE — 6F .070 JL3.5 100CM: Brand: CORDIS

## (undated) DEVICE — GENESIS TROCHLEAR PIN 1/8 X 3: Brand: GENESIS

## (undated) DEVICE — HI-TORQUE WHISPER MS GUIDE WIRE .014 STRAIGHT TIP 3.0 CM X 190 CM: Brand: HI-TORQUE WHISPER

## (undated) DEVICE — CATH DIAG EXPO .045 MPA2 5F 100CM

## (undated) DEVICE — DEV INDEFLATOR

## (undated) DEVICE — GLIDESHEATH BASIC HYDROPHILIC COATED INTRODUCER SHEATH: Brand: GLIDESHEATH

## (undated) DEVICE — SOL NACL 0.9PCT 1000ML

## (undated) DEVICE — BALN PTCA TAKERU RX 1.5X12MM

## (undated) DEVICE — SYR LUERLOK 20CC

## (undated) DEVICE — EACH LANGSTON DUAL LUMEN CATHETER IS INDICATED FOR DELIVERY OF CONTRAST MEDIUM IN ANGIOGRAPHIC STUDIES AND FOR SIMULTANEOUS PRESSURE MEASUREMENT FROM TWO SITES. THIS TYPE OF PRESSURE MEASUREMENT IS USEFUL IN DETERMINING TRANSVALVULAR, INTRAVASCULAR AND INTRAVENTRICULAR PRESSURE GRADIENTS.: Brand: LANGSTON® DUAL LUMEN CATHETER

## (undated) DEVICE — DRSNG WND GZ CURAD OIL EMULSION 3X8IN LF STRL 1PK

## (undated) DEVICE — UNDERCAST PADDING: Brand: DEROYAL

## (undated) DEVICE — GLIDESHEATH SLENDER STAINLESS STEEL KIT: Brand: GLIDESHEATH SLENDER

## (undated) DEVICE — CORONARY IMAGING CATHETER: Brand: OPTICROSS™ 6 HD